# Patient Record
Sex: FEMALE | Race: WHITE | NOT HISPANIC OR LATINO | Employment: OTHER
[De-identification: names, ages, dates, MRNs, and addresses within clinical notes are randomized per-mention and may not be internally consistent; named-entity substitution may affect disease eponyms.]

---

## 2017-01-09 DIAGNOSIS — F41.9 ANXIETY: Primary | ICD-10-CM

## 2017-01-10 RX ORDER — CLONAZEPAM 0.5 MG/1
TABLET ORAL
Qty: 30 TABLET | Refills: 0 | Status: SHIPPED | OUTPATIENT
Start: 2017-01-10 | End: 2017-02-09

## 2017-01-10 NOTE — TELEPHONE ENCOUNTER
Clonazepam 0.5mg tab      Last Written Prescription Date: 12-9-2016  Last Fill Quantity: 30,  # refills: 0   Last Office Visit with G, P or [facility] prescribing provider: 5-

## 2017-02-09 DIAGNOSIS — F41.9 ANXIETY: Primary | ICD-10-CM

## 2017-02-10 RX ORDER — CLONAZEPAM 0.5 MG/1
TABLET ORAL
Qty: 30 TABLET | Refills: 0 | Status: SHIPPED | OUTPATIENT
Start: 2017-02-10 | End: 2017-03-10

## 2017-03-10 DIAGNOSIS — F41.9 ANXIETY: ICD-10-CM

## 2017-03-13 RX ORDER — CLONAZEPAM 0.5 MG/1
TABLET ORAL
Qty: 30 TABLET | Refills: 0 | Status: SHIPPED | OUTPATIENT
Start: 2017-03-13 | End: 2017-07-22

## 2017-04-24 DIAGNOSIS — I10 BENIGN ESSENTIAL HYPERTENSION: ICD-10-CM

## 2017-04-24 DIAGNOSIS — K21.9 ESOPHAGEAL REFLUX: ICD-10-CM

## 2017-04-24 NOTE — TELEPHONE ENCOUNTER
benazepril       Last Written Prescription Date: 2/2/17  Last Fill Quantity: 90, # refills: 0  Last Office Visit with G, [facility] or [facility] prescribing provider: 5/20/16       Potassium   Date Value Ref Range Status   11/13/2013 3.8 3.5 - 5.1 mmol/L Final     Creatinine   Date Value Ref Range Status   11/13/2013 0.89 0.60 - 1.00 mg/dL Final     BP Readings from Last 3 Encounters:   05/20/16 152/80   06/12/15 170/100   02/10/15 140/80

## 2017-04-26 RX ORDER — BENAZEPRIL HYDROCHLORIDE 40 MG/1
40 TABLET ORAL DAILY
Qty: 90 TABLET | Refills: 0 | Status: SHIPPED | OUTPATIENT
Start: 2017-04-26 | End: 2017-07-22

## 2017-04-26 RX ORDER — FAMOTIDINE 20 MG/1
TABLET, FILM COATED ORAL
Qty: 90 TABLET | Refills: 1 | Status: SHIPPED | OUTPATIENT
Start: 2017-04-26 | End: 2017-07-22

## 2017-07-08 ENCOUNTER — HEALTH MAINTENANCE LETTER (OUTPATIENT)
Age: 67
End: 2017-07-08

## 2017-07-22 ENCOUNTER — HOSPITAL ENCOUNTER (EMERGENCY)
Facility: HOSPITAL | Age: 67
Discharge: ANOTHER HOSPITAL | End: 2017-07-23
Attending: EMERGENCY MEDICINE | Admitting: EMERGENCY MEDICINE
Payer: MEDICARE

## 2017-07-22 DIAGNOSIS — R06.02 SHORTNESS OF BREATH: ICD-10-CM

## 2017-07-22 DIAGNOSIS — R79.89 ELEVATED TROPONIN: ICD-10-CM

## 2017-07-22 DIAGNOSIS — J18.9 PNEUMONIA OF LEFT LOWER LOBE DUE TO INFECTIOUS ORGANISM: ICD-10-CM

## 2017-07-22 DIAGNOSIS — J81.0 ACUTE EDEMA OF LUNG (H): Primary | ICD-10-CM

## 2017-07-22 DIAGNOSIS — E87.6 HYPOKALEMIA: ICD-10-CM

## 2017-07-22 LAB
BASE EXCESS BLDA CALC-SCNC: 0 MMOL/L
BASOPHILS # BLD AUTO: 0.1 10E9/L (ref 0–0.2)
BASOPHILS NFR BLD AUTO: 0.5 %
DIFFERENTIAL METHOD BLD: ABNORMAL
EOSINOPHIL # BLD AUTO: 0.4 10E9/L (ref 0–0.7)
EOSINOPHIL NFR BLD AUTO: 2.4 %
ERYTHROCYTE [DISTWIDTH] IN BLOOD BY AUTOMATED COUNT: 12.7 % (ref 10–15)
HCO3 BLD-SCNC: 25 MMOL/L (ref 21–28)
HCT VFR BLD AUTO: 42.7 % (ref 35–47)
HGB BLD-MCNC: 14.4 G/DL (ref 11.7–15.7)
IMM GRANULOCYTES # BLD: 0.1 10E9/L (ref 0–0.4)
IMM GRANULOCYTES NFR BLD: 0.8 %
LYMPHOCYTES # BLD AUTO: 1.8 10E9/L (ref 0.8–5.3)
LYMPHOCYTES NFR BLD AUTO: 10.4 %
MCH RBC QN AUTO: 30.1 PG (ref 26.5–33)
MCHC RBC AUTO-ENTMCNC: 33.7 G/DL (ref 31.5–36.5)
MCV RBC AUTO: 89 FL (ref 78–100)
MONOCYTES # BLD AUTO: 0.9 10E9/L (ref 0–1.3)
MONOCYTES NFR BLD AUTO: 5 %
NEUTROPHILS # BLD AUTO: 14.2 10E9/L (ref 1.6–8.3)
NEUTROPHILS NFR BLD AUTO: 80.9 %
NRBC # BLD AUTO: 0 10*3/UL
NRBC BLD AUTO-RTO: 0 /100
O2/TOTAL GAS SETTING VFR VENT: 44 %
OXYHGB MFR BLD: 92 % (ref 92–100)
PCO2 BLD: 44 MM HG (ref 35–45)
PH BLD: 7.37 PH (ref 7.35–7.45)
PLATELET # BLD AUTO: 203 10E9/L (ref 150–450)
PO2 BLD: 66 MM HG (ref 80–105)
RBC # BLD AUTO: 4.78 10E12/L (ref 3.8–5.2)
WBC # BLD AUTO: 17.5 10E9/L (ref 4–11)

## 2017-07-22 PROCEDURE — 85025 COMPLETE CBC W/AUTO DIFF WBC: CPT | Performed by: EMERGENCY MEDICINE

## 2017-07-22 PROCEDURE — 96375 TX/PRO/DX INJ NEW DRUG ADDON: CPT

## 2017-07-22 PROCEDURE — 80053 COMPREHEN METABOLIC PANEL: CPT | Performed by: EMERGENCY MEDICINE

## 2017-07-22 PROCEDURE — 36600 WITHDRAWAL OF ARTERIAL BLOOD: CPT

## 2017-07-22 PROCEDURE — 25000125 ZZHC RX 250: Performed by: EMERGENCY MEDICINE

## 2017-07-22 PROCEDURE — 82805 BLOOD GASES W/O2 SATURATION: CPT | Performed by: EMERGENCY MEDICINE

## 2017-07-22 PROCEDURE — 83880 ASSAY OF NATRIURETIC PEPTIDE: CPT | Performed by: EMERGENCY MEDICINE

## 2017-07-22 PROCEDURE — 25000128 H RX IP 250 OP 636: Performed by: EMERGENCY MEDICINE

## 2017-07-22 PROCEDURE — 84484 ASSAY OF TROPONIN QUANT: CPT | Performed by: EMERGENCY MEDICINE

## 2017-07-22 PROCEDURE — 85379 FIBRIN DEGRADATION QUANT: CPT | Performed by: EMERGENCY MEDICINE

## 2017-07-22 PROCEDURE — 94640 AIRWAY INHALATION TREATMENT: CPT

## 2017-07-22 PROCEDURE — 93010 ELECTROCARDIOGRAM REPORT: CPT | Performed by: INTERNAL MEDICINE

## 2017-07-22 PROCEDURE — 99285 EMERGENCY DEPT VISIT HI MDM: CPT | Performed by: EMERGENCY MEDICINE

## 2017-07-22 PROCEDURE — 99285 EMERGENCY DEPT VISIT HI MDM: CPT | Mod: 25

## 2017-07-22 PROCEDURE — 85610 PROTHROMBIN TIME: CPT | Performed by: EMERGENCY MEDICINE

## 2017-07-22 PROCEDURE — 36415 COLL VENOUS BLD VENIPUNCTURE: CPT | Performed by: EMERGENCY MEDICINE

## 2017-07-22 PROCEDURE — 83605 ASSAY OF LACTIC ACID: CPT | Performed by: EMERGENCY MEDICINE

## 2017-07-22 PROCEDURE — 93005 ELECTROCARDIOGRAM TRACING: CPT

## 2017-07-22 RX ORDER — LORAZEPAM 2 MG/ML
1 INJECTION INTRAMUSCULAR ONCE
Status: COMPLETED | OUTPATIENT
Start: 2017-07-22 | End: 2017-07-22

## 2017-07-22 RX ORDER — LORAZEPAM 1 MG/1
1 TABLET ORAL ONCE
Status: DISCONTINUED | OUTPATIENT
Start: 2017-07-22 | End: 2017-07-22

## 2017-07-22 RX ORDER — IPRATROPIUM BROMIDE AND ALBUTEROL SULFATE 2.5; .5 MG/3ML; MG/3ML
3 SOLUTION RESPIRATORY (INHALATION) ONCE
Status: COMPLETED | OUTPATIENT
Start: 2017-07-22 | End: 2017-07-22

## 2017-07-22 RX ORDER — HYDRALAZINE HYDROCHLORIDE 20 MG/ML
20 INJECTION INTRAMUSCULAR; INTRAVENOUS ONCE
Status: DISCONTINUED | OUTPATIENT
Start: 2017-07-22 | End: 2017-07-23 | Stop reason: HOSPADM

## 2017-07-22 RX ORDER — SODIUM CHLORIDE 9 MG/ML
INJECTION, SOLUTION INTRAVENOUS CONTINUOUS
Status: DISCONTINUED | OUTPATIENT
Start: 2017-07-22 | End: 2017-07-23 | Stop reason: HOSPADM

## 2017-07-22 RX ADMIN — LORAZEPAM 1 MG: 2 INJECTION INTRAMUSCULAR at 23:29

## 2017-07-22 RX ADMIN — IPRATROPIUM BROMIDE AND ALBUTEROL SULFATE 3 ML: .5; 3 SOLUTION RESPIRATORY (INHALATION) at 23:49

## 2017-07-22 RX ADMIN — SODIUM CHLORIDE: 9 INJECTION, SOLUTION INTRAVENOUS at 23:33

## 2017-07-22 ASSESSMENT — ENCOUNTER SYMPTOMS
HEMOPTYSIS: 0
ABDOMINAL PAIN: 0
FEVER: 0
HEADACHES: 0
WHEEZING: 1
SHORTNESS OF BREATH: 1
VOMITING: 0
DIAPHORESIS: 1
SPUTUM PRODUCTION: 0

## 2017-07-23 ENCOUNTER — TRANSFERRED RECORDS (OUTPATIENT)
Dept: HEALTH INFORMATION MANAGEMENT | Facility: HOSPITAL | Age: 67
End: 2017-07-23

## 2017-07-23 VITALS
TEMPERATURE: 97 F | RESPIRATION RATE: 20 BRPM | OXYGEN SATURATION: 95 % | SYSTOLIC BLOOD PRESSURE: 158 MMHG | BODY MASS INDEX: 34.88 KG/M2 | WEIGHT: 173 LBS | HEART RATE: 117 BPM | HEIGHT: 59 IN | DIASTOLIC BLOOD PRESSURE: 91 MMHG

## 2017-07-23 LAB
ALBUMIN SERPL-MCNC: 3.1 G/DL (ref 3.4–5)
ALP SERPL-CCNC: 110 U/L (ref 40–150)
ALT SERPL W P-5'-P-CCNC: 54 U/L (ref 0–50)
ANION GAP SERPL CALCULATED.3IONS-SCNC: 9 MMOL/L (ref 3–14)
AST SERPL W P-5'-P-CCNC: 53 U/L (ref 0–45)
BILIRUB SERPL-MCNC: 0.2 MG/DL (ref 0.2–1.3)
BUN SERPL-MCNC: 8 MG/DL (ref 7–30)
CALCIUM SERPL-MCNC: 8.3 MG/DL (ref 8.5–10.1)
CHLORIDE SERPL-SCNC: 106 MMOL/L (ref 94–109)
CO2 SERPL-SCNC: 26 MMOL/L (ref 20–32)
CREAT SERPL-MCNC: 0.7 MG/DL (ref 0.52–1.04)
D DIMER PPP DDU-MCNC: 292 NG/ML D-DU (ref 0–300)
GFR SERPL CREATININE-BSD FRML MDRD: 85 ML/MIN/1.7M2
GLUCOSE SERPL-MCNC: 150 MG/DL (ref 70–99)
INR PPP: 0.98 (ref 0.8–1.2)
LACTATE SERPL-SCNC: 2.2 MMOL/L (ref 0.4–2)
NT-PROBNP SERPL-MCNC: 472 PG/ML (ref 0–900)
POTASSIUM SERPL-SCNC: 3 MMOL/L (ref 3.4–5.3)
PROT SERPL-MCNC: 6.4 G/DL (ref 6.8–8.8)
SODIUM SERPL-SCNC: 141 MMOL/L (ref 133–144)
TROPONIN I SERPL-MCNC: 0.06 UG/L (ref 0–0.04)

## 2017-07-23 PROCEDURE — 36415 COLL VENOUS BLD VENIPUNCTURE: CPT | Performed by: EMERGENCY MEDICINE

## 2017-07-23 PROCEDURE — 25000128 H RX IP 250 OP 636: Performed by: EMERGENCY MEDICINE

## 2017-07-23 PROCEDURE — 40000275 ZZH STATISTIC RCP TIME EA 10 MIN

## 2017-07-23 PROCEDURE — 87040 BLOOD CULTURE FOR BACTERIA: CPT | Performed by: EMERGENCY MEDICINE

## 2017-07-23 PROCEDURE — 25000125 ZZHC RX 250: Performed by: EMERGENCY MEDICINE

## 2017-07-23 PROCEDURE — 96372 THER/PROPH/DIAG INJ SC/IM: CPT | Mod: XS

## 2017-07-23 PROCEDURE — 96375 TX/PRO/DX INJ NEW DRUG ADDON: CPT

## 2017-07-23 PROCEDURE — 94660 CPAP INITIATION&MGMT: CPT

## 2017-07-23 PROCEDURE — 25000128 H RX IP 250 OP 636

## 2017-07-23 PROCEDURE — 96365 THER/PROPH/DIAG IV INF INIT: CPT | Mod: XS

## 2017-07-23 PROCEDURE — 96368 THER/DIAG CONCURRENT INF: CPT

## 2017-07-23 PROCEDURE — 71020 ZZHC CHEST TWO VIEWS, FRONT/LAT: CPT | Mod: TC

## 2017-07-23 RX ORDER — HEPARIN SODIUM 5000 [USP'U]/.5ML
5000 INJECTION, SOLUTION INTRAVENOUS; SUBCUTANEOUS ONCE
Status: COMPLETED | OUTPATIENT
Start: 2017-07-23 | End: 2017-07-23

## 2017-07-23 RX ORDER — FUROSEMIDE 10 MG/ML
20 INJECTION INTRAMUSCULAR; INTRAVENOUS ONCE
Status: COMPLETED | OUTPATIENT
Start: 2017-07-23 | End: 2017-07-23

## 2017-07-23 RX ORDER — POTASSIUM CL/LIDO/0.9 % NACL 10MEQ/0.1L
10 INTRAVENOUS SOLUTION, PIGGYBACK (ML) INTRAVENOUS
Status: DISCONTINUED | OUTPATIENT
Start: 2017-07-23 | End: 2017-07-23 | Stop reason: HOSPADM

## 2017-07-23 RX ORDER — NITROGLYCERIN 20 MG/100ML
.07-2 INJECTION INTRAVENOUS CONTINUOUS
Status: DISCONTINUED | OUTPATIENT
Start: 2017-07-23 | End: 2017-07-23 | Stop reason: HOSPADM

## 2017-07-23 RX ORDER — LEVOFLOXACIN 5 MG/ML
500 INJECTION, SOLUTION INTRAVENOUS ONCE
Status: COMPLETED | OUTPATIENT
Start: 2017-07-23 | End: 2017-07-23

## 2017-07-23 RX ORDER — NITROGLYCERIN 20 MG/100ML
INJECTION INTRAVENOUS
Status: COMPLETED
Start: 2017-07-23 | End: 2017-07-23

## 2017-07-23 RX ADMIN — NITROGLYCERIN 0.07 MCG/KG/MIN: 20 INJECTION INTRAVENOUS at 00:52

## 2017-07-23 RX ADMIN — LEVOFLOXACIN 500 MG: 5 INJECTION, SOLUTION INTRAVENOUS at 00:33

## 2017-07-23 RX ADMIN — POTASSIUM CHLORIDE 10 MEQ: 14.9 INJECTION, SOLUTION, CONCENTRATE PARENTERAL at 00:52

## 2017-07-23 RX ADMIN — FUROSEMIDE 20 MG: 10 INJECTION, SOLUTION INTRAVENOUS at 00:25

## 2017-07-23 RX ADMIN — HEPARIN SODIUM 5000 UNITS: 10000 INJECTION, SOLUTION INTRAVENOUS; SUBCUTANEOUS at 00:57

## 2017-07-23 NOTE — ED NOTES
Per Dr. Palencia hold hydralazine at this time as BP is currently 169/89. Pt remains on q15min BP checks. Pt resting comfortably, breathing has slowed and is unlabored.

## 2017-07-23 NOTE — PROGRESS NOTES
Placed pt on Bipap 12/7 and FiO2 to keep SpO2 >92% per Dr Palencia. SpO2 on 30% was 96%. Pt was told to be NPO while on BiPAP.

## 2017-07-23 NOTE — ED NOTES
DATE:  7/23/2017   TIME OF RECEIPT FROM LAB:  0033  LAB TEST:  Lactic acid  LAB VALUE:  2.2  RESULTS GIVEN WITH READ-BACK TO (PROVIDER):  Doni Palencia MD  TIME LAB VALUE REPORTED TO PROVIDER:   0037    Significant value reported to MD

## 2017-07-23 NOTE — ED PROVIDER NOTES
History     Chief Complaint   Patient presents with     Shortness of Breath     quit smoking today, sudden onset shortness of breath and wheezing     Patient is a 66 year old female presenting with shortness of breath. The history is provided by the patient.   Shortness of Breath   Severity:  Moderate  Timing:  Constant  Progression:  Worsening  Chronicity:  New  Context comment:  Quit smoking earlier today  Relieved by:  Nothing  Worsened by:  Nothing  Ineffective treatments:  None tried  Associated symptoms: diaphoresis and wheezing    Associated symptoms: no abdominal pain, no chest pain, no fever, no headaches, no hemoptysis, no sputum production and no vomiting      Heather Rosas is a 66 year old female who presents to the emergency department with shortness of breath.  Patient quit smoking earlier today.  He moved into new apartment recently and the apartment floor is carpeted.  Started feeling short of breath and palpitations this evening.  Progressively worsened.  Denies any chest pain.  Patient reports similar episode of shortness of breath when she tried to quit smoking a few years ago.  Does not use inhalers.  No recent URI symptoms.  Denies any fever.  No cough or sputum production.    I have reviewed the Medications, Allergies, Past Medical and Surgical History, and Social History in the Epic system.    Allergies:   Allergies   Allergen Reactions     Cats Other (See Comments)     Sneezing, runny nose     Codeine Sulfate Nausea and Vomiting and GI Disturbance     Fexofenadine Hcl      Allegra      Rosuvastatin Other (See Comments)     Dizziness - Crestor      [place] Oil GI Disturbance     Any pink fish         No current facility-administered medications on file prior to encounter.   Current Outpatient Prescriptions on File Prior to Encounter:  nicotine (NICODERM CQ) 21 MG/24HR patch 2h hr Place 1 patch onto the skin every 24 hours       Patient Active Problem List   Diagnosis     Dyslipidemia      "Fibromyalgia     HTN (hypertension)     Major depressive disorder, recurrent episode, moderate (H)     ANNA (generalized anxiety disorder)     GERD (Gastroesophageal reflux disease)     Obesity (H)     Schizophrenia (H)     Seasonal allergic rhinitis     ACP (advance care planning)       Past Surgical History:   Procedure Laterality Date     ANGIOGRAM  02/2005    Normal      BIOPSY BREAST  1984    LT, normal     CARPAL TUNNEL RELEASE RT/LT  2005    RT, carpal tunnel     COLONOSCOPY  2011    Repeat in ten years      COLONOSCOPY  1996     COLONOSCOPY  2004     placement of central line  2005       Social History   Substance Use Topics     Smoking status: Former Smoker     Packs/day: 0.50     Types: Cigarettes     Quit date: 12/1/2013     Smokeless tobacco: Never Used      Comment: Year Quit: 2011     Alcohol use Yes      Comment: rare       Most Recent Immunizations   Administered Date(s) Administered     Influenza (H1N1) 12/29/2009     Influenza (IIV3) 09/26/2012     Influenza Vaccine IM 3yrs+ 4 Valent IIV4 11/12/2013     TD (ADULT, 7+) 07/22/2004     Yellow Fever 09/28/2012       BMI: Estimated body mass index is 34.94 kg/(m^2) as calculated from the following:    Height as of this encounter: 1.499 m (4' 11\").    Weight as of this encounter: 78.5 kg (173 lb).        Review of Systems   Constitutional: Positive for diaphoresis. Negative for fever.   Respiratory: Positive for shortness of breath and wheezing. Negative for hemoptysis and sputum production.    Cardiovascular: Negative for chest pain.   Gastrointestinal: Negative for abdominal pain and vomiting.   Neurological: Negative for headaches.   All other systems reviewed and are negative.      Physical Exam   BP: (!) 208/117  Pulse: 117  Temp: 98.8  F (37.1  C)  Resp: (!) 30  SpO2: (!) 86 %  Physical Exam   Constitutional: She appears well-developed and well-nourished. She appears distressed.   HENT:   Head: Normocephalic and atraumatic.   Mouth/Throat: " Oropharynx is clear and moist. No oropharyngeal exudate.   Eyes: Pupils are equal, round, and reactive to light. No scleral icterus.   Neck: Neck supple.   Cardiovascular: Regular rhythm, normal heart sounds and intact distal pulses.    Pulmonary/Chest: She is in respiratory distress. She has wheezes.   Abdominal: Soft. Bowel sounds are normal. There is no tenderness. There is no rebound.   Musculoskeletal: She exhibits no edema or tenderness.   Skin: Skin is warm. No rash noted. She is not diaphoretic.   Nursing note and vitals reviewed.      ED Course   Patient evaluated and started on DuoNeb nebs, Ativan and hydralazine one dose given for elevated blood pressure.  Continuous oxygen on nonrebreather mask.  Laboratory tests ordered and chest x-ray.  12:24-chest x-ray shows bilateral pulmonary vascular congestion and LLL consolidation.  Lasix 20 mg IV given.  CBC shows leukocytosis with WBC count of 17.  Arterial blood gases shows hypoxemia with SPO2 of 66%.  CMP shows mild hypokalemia with potassium of 3.0.  Troponin is elevated to 0.056  Started on nitroglycerin drip.  Heparin bolus given.  IV potassium replacement started.  Started on BiPAP.    ED Course     Procedures         Labs Ordered and Resulted from Time of ED Arrival Up to the Time of Departure from the ED   BLOOD GAS ARTERIAL AND OXYHGB - Abnormal; Notable for the following:        Result Value    pO2 Arterial 66 (*)     All other components within normal limits   CBC WITH PLATELETS DIFFERENTIAL - Abnormal; Notable for the following:     WBC 17.5 (*)     Absolute Neutrophil 14.2 (*)     All other components within normal limits   COMPREHENSIVE METABOLIC PANEL - Abnormal; Notable for the following:     Potassium 3.0 (*)     Glucose 150 (*)     Calcium 8.3 (*)     Albumin 3.1 (*)     Protein Total 6.4 (*)     ALT 54 (*)     AST 53 (*)     All other components within normal limits   TROPONIN I - Abnormal; Notable for the following:     Troponin I ES 0.056  (*)     All other components within normal limits   LACTIC ACID - Abnormal; Notable for the following:     Lactic Acid 2.2 (*)     All other components within normal limits   D-DIMER (FV RANGE)   INR   NT PROBNP INPATIENT   VITAL SIGNS   PULSE OXIMETRY NURSING   CARDIAC CONTINUOUS MONITORING   PERIPHERAL IV CATHETER   STRICT INTAKE AND OUTPUT   BLOOD CULTURE   BLOOD CULTURE       Assessments & Plan (with Medical Decision Making)   Pulmonary edema: Started on nitroglycerin drip and IV Lasix.  Continue BiPAP.  Left lower lobe pneumonia: Started on IV Levaquin and oxygen  Elevated troponin: Given heparin, oxygen and aspirin.  We will continue serial troponin.  Patient transferred to [facility] in [place].  Hypokalemia: Started on IV potassium replacement.  Hypertension: Improved after being started on IV nitroglycerin and Lasix IV.  Patient transferred to [facility] in [place] under care of Dr. Carr    I have reviewed the nursing notes.    I have reviewed the findings, diagnosis, plan and need for follow up with the patient.    Discharge Medication List as of 7/23/2017  2:05 AM          Final diagnoses:   Shortness of breath   Elevated troponin   Pneumonia of left lower lobe due to infectious organism (H)   Acute edema of lung (H)   Hypokalemia       7/22/2017   [facility]     Doni Palencia MD  07/23/17 0431

## 2017-07-23 NOTE — ED NOTES
"Pt notes that she has not seen her PCP, Dr. Avila, in 1 year. Pt states that she \"gradually\" took herself off all her medications and the only med she is using a nicotine patch.  "

## 2017-07-23 NOTE — ED NOTES
"Pt arrives via [facility]. Pt states that she quit smoking today and started using a nicotine patch. Pt states that she had been smoking \"three-quarters\" pack/day. Pt states that she started to feel short of breath and then \"fell asleep\" and then pt woke and couldn't breath. Pt was able to walk down 3 flights of stairs and yelled out for someone to call 911. EMS started an IV and gave pt a duo neb en route as pt's sats were mid 80's upon their arrival. Pt is noticeably dyspneic upon arrival and O2 sats on RA were 84%. Pt was immediately placed on O2 via NC at 2L and was eventually titrated up to 6L before being placed on a NRB at 15L.     "

## 2017-07-23 NOTE — ED NOTES
Family at bedside. Pt's apartment keys given to son and daughter-in-law. Rest of belongings with pt.

## 2017-07-27 ENCOUNTER — TELEPHONE (OUTPATIENT)
Dept: FAMILY MEDICINE | Facility: OTHER | Age: 67
End: 2017-07-27

## 2017-07-27 NOTE — TELEPHONE ENCOUNTER
7:47 AM    Reason for Call: OVERBOOK    Patient is having the following symptoms: Hospital follow up ([facility])/high BP/breathing issues for 6 days.    The patient is requesting an appointment for Friday with Dr Avila (pt has new meds that she will be out of).    Was an appointment offered for this call? Yes    Preferred method for responding to this message: Telephone Call   [phone]    If we cannot reach you directly, may we leave a detailed response at the number you provided? No, pt does not have answering machine    Can this message wait until your PCP/provider returns, if unavailable today? Yes, pt aware provider out today    Britta Morales

## 2017-07-28 NOTE — TELEPHONE ENCOUNTER
Please schedule patient for date/time: can see today at 3:20 today    Have patient go to ER/Urgent Care Center. Urgent Care hours are 9:30 am to 8 pm, open 7 days a week. No.    Provider will call patient.No.    Other:

## 2017-07-29 LAB
BACTERIA SPEC CULT: NORMAL
BACTERIA SPEC CULT: NORMAL
Lab: NORMAL
Lab: NORMAL
MICRO REPORT STATUS: NORMAL
MICRO REPORT STATUS: NORMAL
SPECIMEN SOURCE: NORMAL
SPECIMEN SOURCE: NORMAL

## 2018-02-18 ENCOUNTER — ANESTHESIA (OUTPATIENT)
Dept: SURGERY | Facility: HOSPITAL | Age: 68
End: 2018-02-18
Payer: MEDICARE

## 2018-02-18 ENCOUNTER — HOSPITAL ENCOUNTER (OUTPATIENT)
Facility: HOSPITAL | Age: 68
Setting detail: OBSERVATION
Discharge: HOME | End: 2018-02-20
Attending: FAMILY MEDICINE | Admitting: OBSTETRICS & GYNECOLOGY
Payer: MEDICARE

## 2018-02-18 ENCOUNTER — APPOINTMENT (OUTPATIENT)
Dept: ULTRASOUND IMAGING | Facility: HOSPITAL | Age: 68
End: 2018-02-18
Attending: FAMILY MEDICINE
Payer: MEDICARE

## 2018-02-18 ENCOUNTER — ANESTHESIA EVENT (OUTPATIENT)
Dept: SURGERY | Facility: HOSPITAL | Age: 68
End: 2018-02-18
Payer: MEDICARE

## 2018-02-18 ENCOUNTER — APPOINTMENT (OUTPATIENT)
Dept: GENERAL RADIOLOGY | Facility: HOSPITAL | Age: 68
End: 2018-02-18
Attending: OBSTETRICS & GYNECOLOGY
Payer: MEDICARE

## 2018-02-18 DIAGNOSIS — N93.9 UTERINE BLEEDING: ICD-10-CM

## 2018-02-18 DIAGNOSIS — G89.18 POST-OP PAIN: Primary | ICD-10-CM

## 2018-02-18 DIAGNOSIS — I25.119 CORONARY ARTERY DISEASE INVOLVING NATIVE HEART WITH ANGINA PECTORIS, UNSPECIFIED VESSEL OR LESION TYPE (H): ICD-10-CM

## 2018-02-18 DIAGNOSIS — I10 BENIGN ESSENTIAL HYPERTENSION: ICD-10-CM

## 2018-02-18 PROBLEM — N92.1 METRORRHAGIA: Status: ACTIVE | Noted: 2018-02-18

## 2018-02-18 LAB
ABO + RH BLD: NORMAL
ABO + RH BLD: NORMAL
ALBUMIN SERPL-MCNC: 2.6 G/DL (ref 3.4–5)
ALP SERPL-CCNC: 80 U/L (ref 40–150)
ALT SERPL W P-5'-P-CCNC: 20 U/L (ref 0–50)
ANION GAP SERPL CALCULATED.3IONS-SCNC: 8 MMOL/L (ref 3–14)
AST SERPL W P-5'-P-CCNC: 17 U/L (ref 0–45)
BASOPHILS # BLD AUTO: 0.1 10E9/L (ref 0–0.2)
BASOPHILS NFR BLD AUTO: 0.4 %
BILIRUB SERPL-MCNC: 0.2 MG/DL (ref 0.2–1.3)
BLD PROD TYP BPU: NORMAL
BLD UNIT ID BPU: 0
BLOOD PRODUCT CODE: NORMAL
BPU ID: NORMAL
BUN SERPL-MCNC: 10 MG/DL (ref 7–30)
CALCIUM SERPL-MCNC: 7.9 MG/DL (ref 8.5–10.1)
CHLORIDE SERPL-SCNC: 104 MMOL/L (ref 94–109)
CK SERPL-CCNC: 100 U/L (ref 30–225)
CK SERPL-CCNC: 107 U/L (ref 30–225)
CK SERPL-CCNC: 218 U/L (ref 30–225)
CO2 SERPL-SCNC: 26 MMOL/L (ref 20–32)
CREAT SERPL-MCNC: 0.74 MG/DL (ref 0.52–1.04)
DIFFERENTIAL METHOD BLD: ABNORMAL
EOSINOPHIL # BLD AUTO: 0.1 10E9/L (ref 0–0.7)
EOSINOPHIL NFR BLD AUTO: 0.9 %
ERYTHROCYTE [DISTWIDTH] IN BLOOD BY AUTOMATED COUNT: 14.3 % (ref 10–15)
GFR SERPL CREATININE-BSD FRML MDRD: 79 ML/MIN/1.7M2
GLUCOSE SERPL-MCNC: 158 MG/DL (ref 70–99)
HCT VFR BLD AUTO: 18.9 % (ref 35–47)
HCT VFR BLD AUTO: 27.8 % (ref 35–47)
HGB BLD-MCNC: 6.5 G/DL (ref 11.7–15.7)
HGB BLD-MCNC: 8.8 G/DL (ref 11.7–15.7)
IMM GRANULOCYTES # BLD: 0.1 10E9/L (ref 0–0.4)
IMM GRANULOCYTES NFR BLD: 0.6 %
LYMPHOCYTES # BLD AUTO: 1.8 10E9/L (ref 0.8–5.3)
LYMPHOCYTES NFR BLD AUTO: 14.9 %
MCH RBC QN AUTO: 29.7 PG (ref 26.5–33)
MCHC RBC AUTO-ENTMCNC: 34.4 G/DL (ref 31.5–36.5)
MCV RBC AUTO: 86 FL (ref 78–100)
MONOCYTES # BLD AUTO: 0.8 10E9/L (ref 0–1.3)
MONOCYTES NFR BLD AUTO: 6.9 %
NEUTROPHILS # BLD AUTO: 9.1 10E9/L (ref 1.6–8.3)
NEUTROPHILS NFR BLD AUTO: 76.3 %
NRBC # BLD AUTO: 0 10*3/UL
NRBC BLD AUTO-RTO: 0 /100
PLATELET # BLD AUTO: 200 10E9/L (ref 150–450)
POTASSIUM SERPL-SCNC: 3.5 MMOL/L (ref 3.4–5.3)
PROT SERPL-MCNC: 5.3 G/DL (ref 6.8–8.8)
RBC # BLD AUTO: 2.19 10E12/L (ref 3.8–5.2)
SODIUM SERPL-SCNC: 138 MMOL/L (ref 133–144)
SPECIMEN EXP DATE BLD: NORMAL
TRANSFUSION STATUS PATIENT QL: NORMAL
TROPONIN I SERPL-MCNC: 0.4 UG/L (ref 0–0.04)
TROPONIN I SERPL-MCNC: 0.42 UG/L (ref 0–0.04)
TROPONIN I SERPL-MCNC: 1 UG/L (ref 0–0.04)
TROPONIN I SERPL-MCNC: 3.58 UG/L (ref 0–0.04)
WBC # BLD AUTO: 11.9 10E9/L (ref 4–11)

## 2018-02-18 PROCEDURE — 36430 TRANSFUSION BLD/BLD COMPNT: CPT

## 2018-02-18 PROCEDURE — 85025 COMPLETE CBC W/AUTO DIFF WBC: CPT | Performed by: FAMILY MEDICINE

## 2018-02-18 PROCEDURE — G0378 HOSPITAL OBSERVATION PER HR: HCPCS

## 2018-02-18 PROCEDURE — 85018 HEMOGLOBIN: CPT | Mod: 91 | Performed by: INTERNAL MEDICINE

## 2018-02-18 PROCEDURE — 25000132 ZZH RX MED GY IP 250 OP 250 PS 637: Mod: GY | Performed by: INTERNAL MEDICINE

## 2018-02-18 PROCEDURE — 93005 ELECTROCARDIOGRAM TRACING: CPT

## 2018-02-18 PROCEDURE — 82550 ASSAY OF CK (CPK): CPT | Performed by: OBSTETRICS & GYNECOLOGY

## 2018-02-18 PROCEDURE — 99220 ZZC INITIAL OBSERVATION CARE,LEVL III: CPT | Mod: 57 | Performed by: OBSTETRICS & GYNECOLOGY

## 2018-02-18 PROCEDURE — 25000125 ZZHC RX 250: Performed by: INTERNAL MEDICINE

## 2018-02-18 PROCEDURE — 84484 ASSAY OF TROPONIN QUANT: CPT | Performed by: OBSTETRICS & GYNECOLOGY

## 2018-02-18 PROCEDURE — 80053 COMPREHEN METABOLIC PANEL: CPT | Performed by: FAMILY MEDICINE

## 2018-02-18 PROCEDURE — 93010 ELECTROCARDIOGRAM REPORT: CPT | Performed by: INTERNAL MEDICINE

## 2018-02-18 PROCEDURE — 71045 X-RAY EXAM CHEST 1 VIEW: CPT | Mod: TC

## 2018-02-18 PROCEDURE — 99285 EMERGENCY DEPT VISIT HI MDM: CPT | Performed by: FAMILY MEDICINE

## 2018-02-18 PROCEDURE — 82550 ASSAY OF CK (CPK): CPT | Performed by: INTERNAL MEDICINE

## 2018-02-18 PROCEDURE — 36415 COLL VENOUS BLD VENIPUNCTURE: CPT | Performed by: INTERNAL MEDICINE

## 2018-02-18 PROCEDURE — 94640 AIRWAY INHALATION TREATMENT: CPT

## 2018-02-18 PROCEDURE — 99203 OFFICE O/P NEW LOW 30 MIN: CPT | Performed by: INTERNAL MEDICINE

## 2018-02-18 PROCEDURE — P9016 RBC LEUKOCYTES REDUCED: HCPCS | Performed by: FAMILY MEDICINE

## 2018-02-18 PROCEDURE — 86920 COMPATIBILITY TEST SPIN: CPT | Performed by: FAMILY MEDICINE

## 2018-02-18 PROCEDURE — 84484 ASSAY OF TROPONIN QUANT: CPT | Performed by: INTERNAL MEDICINE

## 2018-02-18 PROCEDURE — A9270 NON-COVERED ITEM OR SERVICE: HCPCS | Mod: GY | Performed by: INTERNAL MEDICINE

## 2018-02-18 PROCEDURE — 86900 BLOOD TYPING SEROLOGIC ABO: CPT | Performed by: FAMILY MEDICINE

## 2018-02-18 PROCEDURE — 25000128 H RX IP 250 OP 636: Performed by: INTERNAL MEDICINE

## 2018-02-18 PROCEDURE — 76856 US EXAM PELVIC COMPLETE: CPT | Mod: TC

## 2018-02-18 PROCEDURE — 36415 COLL VENOUS BLD VENIPUNCTURE: CPT | Performed by: OBSTETRICS & GYNECOLOGY

## 2018-02-18 PROCEDURE — 86850 RBC ANTIBODY SCREEN: CPT | Performed by: FAMILY MEDICINE

## 2018-02-18 PROCEDURE — 84484 ASSAY OF TROPONIN QUANT: CPT | Mod: 91 | Performed by: INTERNAL MEDICINE

## 2018-02-18 PROCEDURE — 36416 COLLJ CAPILLARY BLOOD SPEC: CPT | Performed by: INTERNAL MEDICINE

## 2018-02-18 PROCEDURE — 40000275 ZZH STATISTIC RCP TIME EA 10 MIN

## 2018-02-18 PROCEDURE — 99285 EMERGENCY DEPT VISIT HI MDM: CPT | Mod: 25

## 2018-02-18 PROCEDURE — 86901 BLOOD TYPING SEROLOGIC RH(D): CPT | Performed by: FAMILY MEDICINE

## 2018-02-18 PROCEDURE — 85014 HEMATOCRIT: CPT | Mod: 91 | Performed by: INTERNAL MEDICINE

## 2018-02-18 PROCEDURE — 36415 COLL VENOUS BLD VENIPUNCTURE: CPT | Performed by: FAMILY MEDICINE

## 2018-02-18 PROCEDURE — 25000128 H RX IP 250 OP 636: Performed by: FAMILY MEDICINE

## 2018-02-18 RX ORDER — IPRATROPIUM BROMIDE AND ALBUTEROL SULFATE 2.5; .5 MG/3ML; MG/3ML
3 SOLUTION RESPIRATORY (INHALATION) EVERY 4 HOURS PRN
Status: DISCONTINUED | OUTPATIENT
Start: 2018-02-18 | End: 2018-02-20 | Stop reason: HOSPADM

## 2018-02-18 RX ORDER — SODIUM CHLORIDE 9 MG/ML
1000 INJECTION, SOLUTION INTRAVENOUS CONTINUOUS
Status: DISCONTINUED | OUTPATIENT
Start: 2018-02-18 | End: 2018-02-19

## 2018-02-18 RX ORDER — LORATADINE 10 MG/1
10 TABLET ORAL DAILY
Status: DISCONTINUED | OUTPATIENT
Start: 2018-02-18 | End: 2018-02-20 | Stop reason: HOSPADM

## 2018-02-18 RX ORDER — FUROSEMIDE 10 MG/ML
20 INJECTION INTRAMUSCULAR; INTRAVENOUS ONCE
Status: COMPLETED | OUTPATIENT
Start: 2018-02-18 | End: 2018-02-18

## 2018-02-18 RX ORDER — GUAIFENESIN 600 MG/1
1200 TABLET, EXTENDED RELEASE ORAL 2 TIMES DAILY
Status: DISCONTINUED | OUTPATIENT
Start: 2018-02-18 | End: 2018-02-20 | Stop reason: HOSPADM

## 2018-02-18 RX ORDER — ACETAMINOPHEN 325 MG/1
650 TABLET ORAL ONCE
Status: COMPLETED | OUTPATIENT
Start: 2018-02-18 | End: 2018-02-18

## 2018-02-18 RX ORDER — FLUTICASONE PROPIONATE 50 MCG
1 SPRAY, SUSPENSION (ML) NASAL DAILY
Status: DISCONTINUED | OUTPATIENT
Start: 2018-02-18 | End: 2018-02-20 | Stop reason: HOSPADM

## 2018-02-18 RX ADMIN — FUROSEMIDE 20 MG: 10 INJECTION, SOLUTION INTRAVENOUS at 20:43

## 2018-02-18 RX ADMIN — ACETAMINOPHEN 650 MG: 325 TABLET, FILM COATED ORAL at 21:47

## 2018-02-18 RX ADMIN — IPRATROPIUM BROMIDE AND ALBUTEROL SULFATE 3 ML: .5; 3 SOLUTION RESPIRATORY (INHALATION) at 17:58

## 2018-02-18 RX ADMIN — LORATADINE 10 MG: 10 TABLET ORAL at 21:47

## 2018-02-18 RX ADMIN — SODIUM CHLORIDE 1000 ML: 9 INJECTION, SOLUTION INTRAVENOUS at 11:44

## 2018-02-18 ASSESSMENT — LIFESTYLE VARIABLES: TOBACCO_USE: 1

## 2018-02-18 NOTE — IP AVS SNAPSHOT
[facility]    [address] [address]    Phone:  [phone]    Fax:  [phone]                                       After Visit Summary   2/18/2018    Heather Rosas    MRN: [id number]           After Visit Summary Signature Page     I have received my discharge instructions, and my questions have been answered. I have discussed any challenges I see with this plan with the nurse or doctor.    ..........................................................................................................................................  Patient/Patient Representative Signature      ..........................................................................................................................................  Patient Representative Print Name and Relationship to Patient    ..................................................               ................................................  Date                                            Time    ..........................................................................................................................................  Reviewed by Signature/Title    ...................................................              ..............................................  Date                                                            Time

## 2018-02-18 NOTE — ANESTHESIA PREPROCEDURE EVALUATION
Anesthesia Evaluation     . Pt has had prior anesthetic. Type: MAC    No history of anesthetic complications          ROS/MED HX    ENT/Pulmonary:     (+)allergic rhinitis, tobacco use, Current use 0.75 packs/day  COPD, , . .    Neurologic:  - neg neurologic ROS     Cardiovascular: Comment: Patients troponin elevated today when hemoglobin dropped. Internal med following patient and discussed plan with anesthesia. Likely demand ischemia as no h/o CAD    (+) Dyslipidemia, hypertension---past MI,-. : . . . :. . Previous cardiac testing date:results:date: results:ECG reviewed date:2/18/18 results:Sinus tachycardia possible left atrial enlargement  RBBB date: results:          METS/Exercise Tolerance:  >4 METS   Hematologic:     (+) Anemia, History of Transfusion (s/p x2u PRBC w/ this admit) no previous transfusion reaction -      Musculoskeletal:   (+) arthritis, , , other musculoskeletal- Fibromyalgia      GI/Hepatic:     (+) GERD       Renal/Genitourinary:     (+) chronic renal disease, Other Renal/ Genitourinary, Uterine Bleeding       Endo:     (+) Obesity, .      Psychiatric:     (+) psychiatric history anxiety, depression, other (comment) and schizophrenia (schizophrenia)      Infectious Disease:   (+) Other Infectious Disease h/o Toxic shock syndrome 2006      Malignancy:      - no malignancy   Other: Comment: fibromyalgia   (+) No chance of pregnancy C-spine cleared: N/A, H/O Chronic Pain,no other significant disability                    Physical Exam  Normal systems: cardiovascular and dental    Airway   Mallampati: III  TM distance: >3 FB    Dental     Cardiovascular   Rhythm and rate: regular and normal      Pulmonary (+) wheezes                       Anesthesia Plan      History & Physical Review  History and physical reviewed and following examination; no interval change.    ASA Status:  4 emergent.    NPO Status:  > 8 hours    Plan for MAC with Intravenous and Propofol induction. Maintenance will be  TIVA.  Reason for MAC:  Deep or markedly invasive procedure (G8), Extreme anxiety (QS) and Other - see comments  PONV prophylaxis:  Ondansetron (or other 5HT-3) and Dexamethasone or Solumedrol  Risks and benefits of MAC anesthetic discussed including dental damage, aspiration, loss of airway, conversion to general anesthetic, CV complications, MI, stroke, death. Pt wishes to proceed.     Demand ischemia: no h/o CAD, but Elevated Troponin to 0.996 at 1800 on 2/18/2018 with intermittent chest pain thought secondary to hgb 6.5. Troponin peaked to 5.657 at 0300, now decreasing to 5.361 at 0600. EKG and CK MB remain normal throughout. Hgb improved after transfusions to 9.1.      Postoperative Care  Postoperative pain management:  IV analgesics and Oral pain medications.      Consents  Anesthetic plan, risks, benefits and alternatives discussed with:  Patient.  Use of blood products discussed: Yes.   Use of blood products discussed with Patient.  Consented to blood products.  .                          .

## 2018-02-18 NOTE — ED NOTES
Bed: ED10a  Expected date: 2/18/18  Expected time:   Means of arrival: Ambulance  Comments:  [facility]

## 2018-02-18 NOTE — PLAN OF CARE
Dr. Nettles updated of EKG completion with system evaluation results: Sinus tachycardia, possible left atrial enlargement with right bundle branch block.

## 2018-02-18 NOTE — PROGRESS NOTES
S:   Patient had some chest pain and because to that an EKG and CXR and cardiac enzymes ordered    O:   EKG shows RBBB but no elevation of ST segment or inverted T waves.         CK total was normal         Troponin elevated    A:    Patient is being planned for D&C and endometrial ablation at 6 PM    P:    Internal Medicine consultation made.  Spoke to Dr. Smith         Internal medicine to look into findings of chest pain and Troponin elevation.

## 2018-02-18 NOTE — H&P
[facility]  History and Physical    Heather Rosas MRN# [id number]   Age: 67 year old YOB: 1950     Date of Admission:  2018    Primary care provider: Laci Avila           Chief Complaint:   Heather Rosas is a 67 year old female who has been bleeding for 5 days with the passage of blood clots.  Her Hb has fallen to 6.5 gram % and she seems a little breathless.  She has had an episode of bleeding per vagina about one year ago for about 5 days as well but it stopped so she did not seek medical attention          Pregnancy history:     OBSTETRIC HISTORY:   2      She has 2 adult children and  5 grand children.  No obstetric history on file.  Obstetric History     No data available          EDC: Estimated Date of Delivery: None noted.    Prenatal Labs: Lab Results   Component Value Date    ABO A 2018    RH Pos 2018    HGB 6.5 (LL) 2018       GBS Status:   No results found for: GBS    Active Problem List  Patient Active Problem List   Diagnosis     Dyslipidemia     Fibromyalgia     HTN (hypertension)     Major depressive disorder, recurrent episode, moderate (H)     ANNA (generalized anxiety disorder)     GERD (Gastroesophageal reflux disease)     Obesity (H)     Schizophrenia (H)     Seasonal allergic rhinitis     ACP (advance care planning)       Medication Prior to Admission    (Not in a hospital admission).        Maternal Past Medical History:     Past Medical History:   Diagnosis Date     Allergic rhinitis 2011     Anxiety 2011     Anxiety state, unspecified     Anxiety state     Dyslipidemia 2003     fibromyalgia 2004     GERD, Esophageal reflux 2011     HTN (hypertension)     Essential hypertension     Major depression, recurrent (H) 2011     Nonorganic sleep disorder, unspecified     Non-org. sleep disorder     Obesity 2011     Schizophrenia (H) 2011     Toxic shock syndrome (H)      due to MRSA, ARDS, renal failure                       Family History:     Family History   Problem Relation Age of Onset     C.A.D. Father 45     (cause of death)      Other - See Comments Father      rheumatic fever      Allergies Father      Breast Cancer Maternal Aunt      Breast Cancer Other      CANCER Sister 56     bone ca      Breast Cancer Maternal Aunt      Colon Cancer Paternal Aunt      (cause of death)      Crohn Disease       Depression Maternal Uncle      Depression Maternal Aunt      DIABETES Paternal Grandmother      type 2     GASTROINTESTINAL DISEASE Mother      GERD     CANCER Mother      pancreatic ca (cause of death) /liver ca     Neurologic Disorder Brother      neuropathy     CANCER Brother 58     lymph node in neck     Allergies Brother      Family history reviewed and updated in [facility]            Social History:     Social History   Substance Use Topics     Smoking status: Former Smoker     Packs/day: 0.50     Types: Cigarettes     Quit date: 12/1/2013     Smokeless tobacco: Never Used      Comment: Year Quit: 2011     Alcohol use Yes      Comment: rare            Review of Systems:   CONSTITUTIONAL: NEGATIVE for fever, chills, change in weight  ENT/MOUTH: NEGATIVE for ear, mouth and throat problems  RESP: NEGATIVE for significant cough or SOB  CV: NEGATIVE for chest pain, palpitations or peripheral edema          Physical Exam:   Vitals were reviewed  Temp: 98.5  F (36.9  C) Temp src: Tympanic BP: 144/82 Pulse: 110 Heart Rate: 114 Resp: 16 SpO2: 100 % O2 Device: None (Room air)    Constitutional:   awake, alert, cooperative, no apparent distress, and appears stated age   Eyes:   Lids and lashes normal, pupils equal, round and reactive to light, extra ocular muscles intact, sclera clear, conjunctiva normal   ENT:   Normocephalic, without obvious abnormality, atraumatic, sinuses nontender on palpation, external ears without lesions, oral pharynx with moist mucous membranes, tonsils without  erythema or exudates, gums normal and good dentition.   Neck:   Supple, symmetrical, trachea midline, no adenopathy, thyroid symmetric, not enlarged and no tenderness, skin normal   Hematologic / Lymphatic:   no cervical lymphadenopathy   Back:   Symmetric, no curvature, spinous processes are non-tender on palpation, paraspinous muscles are non-tender on palpation, no costal vertebral tenderness   Lungs:   No increased work of breathing, good air exchange, clear to auscultation bilaterally, no crackles or wheezing   Cardiovascular:   Normal apical impulse, regular rate and rhythm, normal S1 and S2, no S3 or S4, and no murmur noted   Abdomen:   No scars, normal bowel sounds, soft, non-distended, non-tender, no masses palpated, no hepatosplenomegally   Chest / Breast:      Genitounirinary:   External Genitalia:  General appearance; normal   Musculoskeletal:   There is no redness, warmth, or swelling of the joints.  Full range of motion noted.  Motor strength is 5 out of 5 all extremities bilaterally.  Tone is normal.   Neurologic:   Awake, alert, oriented to name, place and time.    Neuropsychiatric:   General: normal, calm and normal eye contact   Skin:   no bruising or bleeding      Vulva normal   Vagina   Blood clots in vagina   Bartholin's glands not enlarged  Cervix:  Appears normal.   Blood clot coming through cervix  Uterus  Anteverted normal size to bulky  Adnexa not palpable not tender    No mass felt in the pelvis                          Assessment:   Heather Rosas is 67 year old female with post menopausal bleeding and anemia with Hb 6.5 Gm %. Slightly beathless.          Plan:   Blood Transfusion  Schedule for dilatation and curettage and endometrial ablation today.      Jose Nettles MD

## 2018-02-18 NOTE — ED PROVIDER NOTES
eMERGENCY dEPARTMENT eNCOUnter        CHIEF COMPLAINT    Chief Complaint   Patient presents with     Vaginal Bleeding       [facility]    Heather Rosas is a 67 year old female who presents with vaginal bleeding starting 4 days ago.  She is postmenopausal and has not had a period for several years.  Today she was passing large clots and started to feel weak.  She called the ambulance  REVIEW OF SYSTEMS    General: No fevers or chills  : No dysuria or flank pain  GI: No vomiting or diarrhea  Pulmonary: No difficulty breathing or cough  Neurologic: No loss of consciousness or syncope  See HPI for further details.  All other systems reviewed and are negative.    PAST MEDICAL & SURGICAL HISTORY    Past Medical History:   Diagnosis Date     Allergic rhinitis 01/01/2011     Anxiety 01/01/2011     Anxiety state, unspecified     Anxiety state     Dyslipidemia 11/26/2003     fibromyalgia 06/14/2004     GERD, Esophageal reflux 01/01/2011     HTN (hypertension)     Essential hypertension     Major depression, recurrent (H) 1/1/2011     Nonorganic sleep disorder, unspecified     Non-org. 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       ALLERGIES    Allergies   Allergen Reactions     Cats Other (See Comments)     Sneezing, runny nose     Codeine Sulfate Nausea and Vomiting and GI Disturbance     Fexofenadine Hcl      Allegra      Rosuvastatin Other (See Comments)     Dizziness - Crestor      Masterson Oil GI Disturbance     Any pink fish       FAMILY AND  SOCIAL HISTORY    Family History   Problem Relation Age of Onset     C.A.D. Father 45     (cause of death)      Other - See Comments Father      rheumatic fever      Allergies Father      Breast Cancer Maternal Aunt      Breast Cancer Other      CANCER Sister 56     bone ca      Breast Cancer Maternal Aunt      Colon Cancer Paternal Aunt      (cause of death)      Crohn Disease       Depression Maternal Uncle      Depression Maternal Aunt      DIABETES Paternal Grandmother      type 2     GASTROINTESTINAL DISEASE Mother      GERD     CANCER Mother      pancreatic ca (cause of death) /liver ca     Neurologic Disorder Brother      neuropathy     CANCER Brother 58     lymph node in neck     Allergies Brother      Social History     Social History     Marital status:      Spouse name: N/A     Number of children: N/A     Years of education: N/A     Occupational History     [id number]          Social History Main Topics     Smoking status: Former Smoker     Packs/day: 0.50     Types: Cigarettes     Quit date: 12/1/2013     Smokeless tobacco: Never Used      Comment: Year Quit: 2011     Alcohol use Yes      Comment: rare     Drug use: No     Sexual activity: No      Comment: devorced     Other Topics Concern      Service No     Blood Transfusions Yes     Permits if needed     Caffeine Concern Yes     1 cup     Occupational Exposure No     Hobby Hazards No     Sleep Concern No     Stress Concern No     Weight Concern No     Special Diet No     Back Care No     Exercise No     Seat Belt Yes     Self-Exams Yes     Parent/Sibling W/ Cabg, Mi Or Angioplasty Before 65f 55m? Yes     Father     Social History Narrative       PHYSICAL EXAM    VITAL SIGNS: /74  Pulse 105  Temp 99.1  F (37.3  C) (Oral)  Resp 20  SpO2 97%   Constitutional:  Well-developed, well-nourished, appears comfortable although is slightly pale  Eyes:  Non-icteric sclera, no conjunctival injection    HENT:  Atraumatic, external nose normal.   NECK: Supple, no JVD   Respiratory:  No respiratory distress, normal breath sounds   Cardiovascular:  Normal rate, no murmurs  GI:  Abdominal is soft, there is no tenderness, Bowel sounds unremarkable   :  No CVA tenderness.  Bimanual vaginal exam shows large clots in the vaginal vault.  Integument:  Nondiaphoretic Skin, no obvious rashes  Neurologic: Awake and oriented, no slurred speech    RADIOLOGY/PROCEDURES    Results for orders placed or performed during the hospital encounter of 02/18/18   US Pelvic Complete with Transvaginal    Narrative    PROCEDURE: US PELVIC COMPLETE WITH TRANSVAGINAL    HISTORY: vaginal bleeding;     TECHNIQUE: Transabdominal and transvaginal ultrasound of the pelvis.    COMPARISON: none    FINDINGS:     MEASUREMENTS:    Endometrium: 28 mm in thickness  Right Ovary: 1.5 x 1.9 x 1.3 cm (Length x Height x Width)  Left Ovary:  2.5x1.6 x 1.6 cm (Length x Height x Width)    UTERUS: Is a 3 cm diameter fibroid in the uterine body. The  endometrium is abnormally thickened measuring 2.8 cm.     ADNEXA: Is a small cyst seen in the right ovary measuring 1.5 cm in  diameter. Arteriovenous flow is seen in both ovaries although  suboptimal images were obtained.    MISC: No free fluid is seen in the cul-de-sac      Impression    IMPRESSION:     Abnormally thickened endometrium for a postmenopausal woman.    JEFERSON NEGRO MD   XR Chest Port 1 View    Narrative    PROCEDURE:  XR CHEST PORT 1 VW    HISTORY:  chest pain; .     COMPARISON:  July 23, 2017    FINDINGS:   The cardiac silhouette is normal in size. The pulmonary vasculature is  normal.  The lungs are clear. No pleural effusion or pneumothorax.      Impression    IMPRESSION:  No acute cardiopulmonary disease.  The bilateral  pulmonary parenchymal opacities have cleared as compared to the  previous examination    JEFERSON NEGRO MD         ED COURSE & MEDICAL DECISION MAKING    Pertinent Labs &  Imaging studies reviewed and interpreted. (See chart for details)  art for details of medications given during the ED stay.      Vitals:    02/18/18 1512 02/18/18 1529 02/18/18 1636 02/18/18 1650   BP: (!) 178/108 159/81 157/72 146/74   Pulse: 105      Resp: 16  22 20   Temp: 98.9  F (37.2  C) 98.9  F (37.2  C) 99.3  F (37.4  C) 99.1  F (37.3  C)   TempSrc: Oral Oral Oral Oral   SpO2: 98%  97% 97%         FINAL IMPRESSION    Uterine bleeding    Plan: Dr. Nettles is consulted and is here to see the patient with plans to take her to the OR.  She had a lot of concerns about blood transfusion, Dr. Nettles discussed risks with her and she agrees.        (Please note that this note was completed with a voice recognition program.  Every attempt was made to edit the dictations, but inevitably there remain words that are mis-transcribed.)       Amanda Hodgson MD  02/18/18 8862

## 2018-02-18 NOTE — PLAN OF CARE
Arrived on floor via gurney. Blood transfusion infusing denies any itching feverish feeling. Reports she has been having midsternal chest pain for 1 hour but has not reported this to anyone yet. She states this began before her blood transfusion started. She reports she is a smoker and a dry cough is noted. Dr Nettles contacted in regards to onset of chest pain. Troponin and ck blood draw ordered, chest xray ordered, and ekg ordered.

## 2018-02-18 NOTE — ED NOTES
"66 y/o female presents via [facility] with reports of vaginal bleeding over last 4 days. Patient states vaginal bleeding started out bright red on Thursday and Friday then turned to \"pinkish water.\" Describes as \"gushing out\" but also states \"it's lightened up.\" Reports weakness and fatigue. Denies pelvic or abdominal pain. States this happened one year ago where she bled for 3 days straight \"and then it just stopped without going in.\" Has not worn sanitary pads - unable to quantify blood loss. 18 gauge established to L AC by EMS.   "

## 2018-02-18 NOTE — ED NOTES
DATE:  2/18/2018   TIME OF RECEIPT FROM LAB:  1156  LAB TEST:  Hgb  LAB VALUE:  6.5  RESULTS GIVEN WITH READ-BACK TO (PROVIDER):  Amanda Hodgson MD  TIME LAB VALUE REPORTED TO PROVIDER:   1158

## 2018-02-18 NOTE — PLAN OF CARE
DATE:  2/18/2018   TIME OF RECEIPT FROM LAB: 5443  LAB TEST:  troponin  LAB VALUE:  0.423  RESULTS GIVEN WITH READ-BACK TO (PROVIDER):  Jose Nettles MD  TIME LAB VALUE REPORTED TO PROVIDER:   8818

## 2018-02-18 NOTE — IP AVS SNAPSHOT
MRN:[id number]                      After Visit Summary   2/18/2018    Heather Rosas    MRN: [id number]           Thank you!     Thank you for choosing [facility] for your care. Our goal is always to provide you with excellent care. Hearing back from our patients is one way we can continue to improve our services. Please take a few minutes to complete the written survey that you may receive in the mail after you visit with us. Thank you!        Patient Information     Date Of Birth          1950        About your hospital stay     You were admitted on:  February 18, 2018 You last received care in the:  [facility]    You were discharged on:  February 20, 2018        Reason for your hospital stay       For blood transfusion because of anemia from blood loss from post menopausal vaginal bleeding.  Hysteroscopy and Dilatation and Curettage and endometrial ablation done on 2.19.2018 by Dr. Nettles  Post op   OK  For observation in Med/Surg. Floor and then discharge if OK.            Reason for your hospital stay       Heather Rosas is a 67 year old woman who was admitted on 2/18/2018.  We were consulted for evaluation of approximately 20 minutes of substernal chest pain after she presented while she was still profoundly anemic.  She showed an elevated troponin which is now begun to decline.  She is otherwise had no exercise related symptoms in the past; she is moderately physically active. Underwent D&C with endometrial ablation after transfusion without incident. Shortly before the procedure she showed moderately accelerated hypertension.  She been treated in the past but discontinued medication treatment after some weight loss.  Subsequently blood pressure has been improved with addition of lisinopril.  Troponins continued to decline.  No recurrence of chest discomfort or any other suggestion of active ischemia.  Discharged with planned follow-up for treatment of hypertension and  planned exercise testing, as well as follow-up with Dr. abraham following treatment for uterine bleeding.                  Who to Call     For medical emergencies, please call 911.  For non-urgent questions about your medical care, please call your primary care provider or clinic, [phone]  For questions related to your surgery, please call your surgery clinic        Attending Provider     Provider Specialty    Amanda Hodgson MD [facility]    Laci Avila MD [facility]    Tho, Jose FULTON MD OB/Gyn       Primary Care Provider Office Phone # Fax #    Laci Avila [phone]      After Care Instructions     Activity       Your activity upon discharge: activity as tolerated.  Avoid sex 6 weeks.            Activity       Your activity upon discharge: activity as tolerated            Diet       Follow this diet upon discharge: Regular adult diet.            Diet       Follow this diet upon discharge: Orders Placed This Encounter      Diet      Regular Diet Adult                  Follow-up Appointments     Follow-up and recommended labs and tests        Follow up with Dr. Nettles , at (location with clinic name or city) in 2-4 weeks at the [facility] [place] .            Follow-up and recommended labs and tests        Follow up with primary care provider, Laci Avila, within 7 days for hospital follow- up.   Outpatient stress test arranged  Follow-up with Dr. Nettles as previously arranged.                  Your next 10 appointments already scheduled     Feb 27, 2018 11:00 AM CST   (Arrive by 10:45 AM)   SHORT with Laci Avila MD   [facility] ([facility] )    [address]  [facility] [address]   [phone]            Mar 19, 2018  1:30 PM CDT   (Arrive by 1:15 PM)   Post Op with Jose Nettles MD   [facility] ([facility] )    [address]  [facility] [address]   [phone]             "  Future tests that were ordered for you     Exercise Stress test           NM Exercise stress test                 Further instructions from your care team       Nuclear Medicine will be calling you to set up an appointment for a future Stress Test. If you do not here from the Nuclear Medicine  by 18 please call [phone] to schedule.  A follow up appointment with Dr Avila and a surgery follow up with Dr Nettles have been scheduled for you.    Pending Results     Date and Time Order Name Status Description    2018 1120 Surgical pathology exam In process             Statement of Approval     Ordered          18 0911  I have reviewed and agree with all the recommendations and orders detailed in this document.  EFFECTIVE NOW     Approved and electronically signed by:  North Smith MD           18 1145  I have reviewed and agree with all the recommendations and orders detailed in this document.  EFFECTIVE NOW     Approved and electronically signed by:  Jose Nettles MD             Admission Information     Date & Time Provider Department Dept. Phone    2018 Jose Nettles MD [facility] [phone]      Your Vitals Were     Blood Pressure Pulse Temperature Respirations Pulse Oximetry       123/48 97 98.7  F (37.1  C) (Tympanic) 18 96%       MyChart Information     [employer] lets you send messages to your doctor, view your test results, renew your prescriptions, schedule appointments and more. To sign up, go to [email or web address] . Click on \"Log in\" on the left side of the screen, which will take you to the Welcome page. Then click on \"Sign up Now\" on the right side of the page.     You will be asked to enter the access code listed below, as well as some personal information. Please follow the directions to create your username and password.     Your access code is: [email or web address]  Expires: 2018  9:23 AM     Your access code will  in 90 days. " If you need help or a new code, please call your [place] clinic or [phone].        Care EveryWhere ID     This is your Care EveryWhere ID. This could be used by other organizations to access your [place] medical records  [id number]        Equal Access to Services     EDELMIRA BACH : Hadii aad ku hadmaryannnellie Saeidali, waaxda luqadaha, qaybta kaalmada adediana, leonardo cabrera pollogarfield gusman qingjaswant haque. So [facility] [phone].    ATENCIÓN: Si habla español, tiene a lea disposición servicios gratuitos de asistencia lingüística. Llame al [phone].    We comply with applicable federal civil rights laws and [place] laws. We do not discriminate on the basis of race, color, national origin, age, disability, sex, sexual orientation, or gender identity.               Review of your medicines      START taking        Dose / Directions    aspirin 81 MG EC tablet   Used for:  Coronary artery disease involving native heart with angina pectoris, unspecified vessel or lesion type (H)        Dose:  81 mg   Take 1 tablet (81 mg) by mouth daily   Quantity:  30 tablet   Refills:  11       lisinopril 5 MG tablet   Commonly known as:  PRINIVIL/ZESTRIL   Used for:  Benign essential hypertension        Dose:  5 mg   Take 1 tablet (5 mg) by mouth daily   Quantity:  30 tablet   Refills:  3       metoprolol succinate 25 MG 24 hr tablet   Commonly known as:  TOPROL-XL   Used for:  Coronary artery disease involving native heart with angina pectoris, unspecified vessel or lesion type (H)        Dose:  12.5 mg   Take 0.5 tablets (12.5 mg) by mouth daily   Quantity:  45 tablet   Refills:  1       oxyCODONE IR 5 MG tablet   Commonly known as:  ROXICODONE   Used for:  Uterine bleeding, Post-op pain        Dose:  5 mg   Take 1 tablet (5 mg) by mouth every 4 hours as needed for moderate to severe pain   Quantity:  10 tablet   Refills:  0         CONTINUE these medicines which have NOT CHANGED        Dose / Directions    nicotine 21 MG/24HR 24  hr patch   Commonly known as:  NICODERM CQ        Dose:  1 patch   Place 1 patch onto the skin every 24 hours   Refills:  0            Where to get your medicines      These medications were sent to [id number] - [place], [place] - [address] AT [facility] [address], [place] [address]     Phone:  [phone]     aspirin 81 MG EC tablet    lisinopril 5 MG tablet    metoprolol succinate 25 MG 24 hr tablet         Some of these will need a paper prescription and others can be bought over the counter. Ask your nurse if you have questions.     Bring a paper prescription for each of these medications     oxyCODONE IR 5 MG tablet                Protect others around you: Learn how to safely use, store and throw away your medicines at www.disposemymeds.org.        Information about OPIOIDS     PRESCRIPTION OPIOIDS: WHAT YOU NEED TO KNOW    Prescription opioids can be used to help relieve moderate to severe pain and are often prescribed following a surgery or injury, or for certain health conditions. These medications can be an important part of treatment but also come with serious risks. It is important to work with your health care provider to make sure you are getting the safest, most effective care.    WHAT ARE THE RISKS AND SIDE EFFECTS OF OPIOID USE?  Prescription opioids carry serious risks of addiction and overdose, especially with prolonged use. An opioid overdose, often marked by slowed breathing can cause sudden death. The use of prescription opioids can have a number of side effects as well, even when taken as directed:      Tolerance - meaning you might need to take more of a medication for the same pain relief    Physical dependence - meaning you have symptoms of withdrawal when a medication is stopped    Increased sensitivity to pain    Constipation    Nausea, vomiting, and dry mouth    Sleepiness and dizziness    Confusion    Depression    Low levels of testosterone that  can result in lower sex drive, energy, and strength    Itching and sweating    RISKS ARE GREATER WITH:    History of drug misuse, substance use disorder, or overdose    Mental health conditions (such as depression or anxiety)    Sleep apnea    Older age (65 years or older)    Pregnancy    Avoid alcohol while taking prescription opioids.   Also, unless specifically advised by your health care provider, medications to avoid include:    Benzodiazepines (such as Xanax or Valium)    Muscle relaxants (such as Soma or Flexeril)    Hypnotics (such as Ambien or Lunesta)    Other prescription opioids    KNOW YOUR OPTIONS:  Talk to your health care provider about ways to manage your pain that do not involve prescription opioids. Some of these options may actually work better and have fewer risks and side effects:    Pain relievers such as acetaminophen, ibuprofen, and naproxen    Some medications that are also used for depression or seizures    Physical therapy and exercise    Cognitive behavioral therapy, a psychological, goal-directed approach, in which patients learn how to modify physical, behavioral, and emotional triggers of pain and stress    IF YOU ARE PRESCRIBED OPIOIDS FOR PAIN:    Never take opioids in greater amounts or more often than prescribed    Follow up with your primary health care provider and work together to create a plan on how to manage your pain.    Talk about ways to help manage your pain that do not involve prescription opioids    Talk about all concerns and side effects    Help prevent misuse and abuse    Never sell or share prescription opioids    Never use another person's prescription opioids    Store prescription opioids in a secure place and out of reach of others (this may include visitors, children, friends, and family)    Visit www.cdc.gov/drugoverdose to learn about risks of opioid abuse and overdose    If you believe you may be struggling with addiction, tell your health care provider and  ask for guidance or call [facility]'s National Helpline at 8-925-176-HELP    LEARN MORE / www.cdc.gov/drugoverdose/prescribing/guideline.html    Safely dispose of unused prescription opioids: Find your local drug take-back programs and more information about the importance of safe disposal at www.doseofreality.[place].gov             Medication List: This is a list of all your medications and when to take them. Check marks below indicate your daily home schedule. Keep this list as a reference.      Medications           Morning Afternoon Evening Bedtime As Needed    aspirin 81 MG EC tablet   Take 1 tablet (81 mg) by mouth daily   Last time this was given:  81 mg on 2/20/2018  8:18 AM                                lisinopril 5 MG tablet   Commonly known as:  PRINIVIL/ZESTRIL   Take 1 tablet (5 mg) by mouth daily   Last time this was given:  5 mg on 2/20/2018  8:18 AM                                metoprolol succinate 25 MG 24 hr tablet   Commonly known as:  TOPROL-XL   Take 0.5 tablets (12.5 mg) by mouth daily                                nicotine 21 MG/24HR 24 hr patch   Commonly known as:  NICODERM CQ   Place 1 patch onto the skin every 24 hours                                oxyCODONE IR 5 MG tablet   Commonly known as:  ROXICODONE   Take 1 tablet (5 mg) by mouth every 4 hours as needed for moderate to severe pain                                          More Information        Endometrial Ablation  Endometrial ablation is an outpatient surgery that can reduce or stop heavy menstrual bleeding. Ablation destroys the lining of the uterus. This surgery is for women who do not want to have any more children and who have not yet entered menopause. It should not be used by women with endometrial hyperplasia or cancer of the uterus. Surgery takes less than an hour, and you can go home later that day.Endometrial ablation is an outpatient surgery that can reduce or stop heavy menstrual bleeding. Ablation destroys the lining of  the uterus. This surgery is for women who do not want to have any more children and who have not yet entered menopause. It should not be used by women with endometrial hyperplasia or cancer of the uterus. Surgery takes less than an hour, and you can go home later that day.  Preparing for surgery    You may be given medicine by mouth or injection for a few weeks or months before your surgery. This thins the lining of the uterus and reduces bleeding.    Your healthcare provider may recommend other procedures to check the inside of your uterus before the ablation is done.     The day before surgery, you may be given medicine or a special substance (laminaria) may be put into the opening to the uterus (cervix). This widens the opening.    To help prevent problems with anesthesia, do not eat or drink anything 10 hours before surgery.  Your surgery     Destroying the lining with heat, freezing, or electric current prevents the lining from growing back.        You ll be given anesthesia so you stay comfortable and relaxed. You will not feel pain during surgery.    Your uterus may be filled with fluid. This puts pressure on the lining to help reduce bleeding. It also allows your healthcare provider to see inside your uterus.    Your healthcare provider puts a small telescope-like instrument through the cervix. This scope may be connected to a video monitor. This helps your healthcare provider see and control the ablation process. At the end of the scope, a device using heat, extreme cold, or electric current destroys the uterine lining. Instead of the scope, your healthcare provider may use another device that both expands and destroys the uterine lining. After being inserted into your uterus, it also uses heat or other energy to destroy the lining. Your healthcare provider will choose the device that s best for you.  Your recovery    You may have cramping or aching in your abdomen after surgery. Your healthcare  provider can give you pain medicine.    You may also have a bloody or watery discharge or bleeding for days or weeks. Use sanitary pads, not tampons.    Don t have sex or play active sports for 2 weeks after surgery.    You can likely return to work in 2 days.    Ask your healthcare provider about using contraception after an ablation.    Your healthcare provider will see you in about 6 weeks to check how well you are healing.  Call your healthcare provider if you have any of the following after surgery:    Chills    Fever of 100.4 F (38 C) or higher, or as directed by your healthcare provider    Frequent urination for 24 hours    Heavy vaginal bleeding    Nausea    Persistent or increased abdominal pain    Shortness of breath   Date Last Reviewed: 6/1/2017 2000-2017 [employer]. [address] [place] [address], [place] [address]. All rights reserved. This information is not intended as a substitute for professional medical care. Always follow your healthcare professional's instructions.                D&C     After the cervical canal is dilated, a curette is inserted into the uterus to take tissue samples.     Your healthcare provider has recommended you have a D&C (dilation and curettage). This common procedure helps your healthcare provider learn more about problems inside your uterus or is done to treat a miscarriage. During a D&C, the cervix (opening of the uterus) is widened, or dilated. Tissue samples are then removed from the endometrium (lining of the uterus) with an instrument called a curette or with suction. In many cases, D&C is done to find the cause of abnormal vaginal bleeding. Or you may need a D&C as a form of treatment.  A hysteroscopy is usually done along with the D&C for a gynecological problem. A hysteroscopy uses a small instrument to see the inside of the uterus. Hysteroscopy and D&C can be done in the operating room or in the healthcare provider's office, depending on the healthcare  provider who does it.  Preparing for D&C    Arrange for an adult family member or friend to drive you home.    Don t eat or drink anything after the midnight before your D&C, unless told otherwise by your healthcare provider.  During your D&C  Just before your D&C, you may get medicine to prevent pain. This may be given through an IV. You may be awake but relaxed during the procedure. Or you may be completely asleep. The type of anesthesia used is different depending on where the procedure takes place. The procedure will not begin until the pain medicine has taken effect. During your D&C:    Instruments are used to hold the vagina open and to steady the uterus. The cervical canal is widened using tapered instruments called dilators.    Usually a thin, rigid, or flexible telescope (hysteroscope) is inserted into the vagina to take images of the inside of the uterus. This allows your healthcare provider to see into the uterus.    The curette or suction is inserted into the uterus. Tissue samples are taken from several areas. These samples are sent to a lab to be studied.  After your D&C    You will rest for a while in a recovery area.    You can expect some cramping for a few hours after the D&C. This can be controlled with an over-the-counter pain reliever.    You may have some light bleeding for a few weeks. Use pads instead of tampons.    Take showers instead of baths for about a week. Ask your healthcare provider if you should avoid exercising or having sex for a period of time.  Risks and complications  D&C rarely causes complications. But as with any procedure, D&C has some risks. Before your D&C, your healthcare provider will discuss these with you. You will be asked to sign a consent form. Risks may include:    Infection    Heavy bleeding    Perforation of the uterine wall or damage to nearby organs    Scar tissue may form causing the lining of the uterus to adhere to itself. This can cause problems with  menstrual flow or difficulty getting pregnant in the future. This is called Asherman syndrome.    The need for additional tests or procedures    Risks associated with anesthesia (the medicine that makes you sleep during surgery)   Call your healthcare provider   Contact your healthcare provider if you have:    Heavy bleeding (more than 1 pad an hour)    A fever of 100.4 F (38 C) or higher, or as directed by your healthcare provider    Increasing belly pain, tenderness, or cramping    Foul-smelling discharge   Date Last Reviewed: 2016-2017 [employer]. [address] [place] [address]. All rights reserved. This information is not intended as a substitute for professional medical care. Always follow your healthcare professional's instructions.                Discharge Instructions for Dilation and Curettage (D and C)  Your doctor performed dilation and curettage (D&C). The reasons for having this procedure vary from person to person. The D&C may be done to control heavy uterine bleeding, to find the cause of irregular bleeding, to perform an , or to remove pregnancy tissue if you have had a miscarriage.  Home care    Take it easy. Rest for 2 days as needed.    Return to your normal activities after 24 to 48 hours. You may also return to work at that time.    Eat a normal diet.    Take an over-the-counter pain reliever for pain, if needed.    Remember, it s OK to have bleeding for about a week after the procedure. The amount of bleeding should be similar to what you have during a normal period.    Don t drive for 24 hours after the procedure unless specifically told by your provider that it is OK to do so.    Don t have sexual intercourse or use tampons or douches until your doctor says it s safe to do so.  Follow-up    Make a follow-up appointment as directed by our staff.     When to call your doctor  Call your doctor right away if you have any of the following:    Bleeding  that soaks more than one sanitary pad in one hour    Severe abdominal pain    Severe cramps    Fever above 100.4 F (38.0 C)    A foul smelling vaginal discharge   Date Last Reviewed: 5/19/2015 2000-2017 [employer]. [address] [place] [address]. All rights reserved. This information is not intended as a substitute for professional medical care. Always follow your healthcare professional's instructions.              After Your Blood Transfusion  Discharge Instructions  After you leave  After a blood transfusion (received red blood cells, platelets, plasma, cryo or granulocytes), you will need to watch for signs of a reaction for the next 48 hours.  Call your clinic or 911 (or go to the Emergency room) if you have any signs of a reaction:    Shaking or chills    Fever (temperature above 100.0 F)    Headache    Nausea    Hives    Itching    Swelling of the face or feeling flushed    Ongoing dry cough (nothing is coughed up)    Trouble breathing or wheezing  Some signs of a reaction won't show up for a few days or up to 4 weeks.   These may include:    Fatigue    Dizziness    Pink or red urine  Your clinic is:   ________________________________________  ________________________________________  For informational purposes only. Not to replace the advice of your health care provider.   Copyright   2015 [facility]. All rights reserved. [employer] 840275 - Rev 07/16.            Aspirin, ASA oral tablets  Brand Names: Aspir-Low, Aspirtab, Aspir-Esperanza, Nicole Advanced Aspirin, Nicole Aspirin, Nicole Aspirin Extra Strength, Nicole Aspirin Plus, Nicole Extra Strength, Nicole Extra Strength Plus, Nicole Genuine Aspirin, Nicole Womens Aspirin, Bufferin, Bufferin Extra Strength, Bufferin Low Dose  What is this medicine?  ASPIRIN (AS pir in) is a pain reliever. It is used to treat mild pain and fever. This medicine is also used as directed by a doctor to prevent and to treat heart attacks, to  prevent strokes, and to treat arthritis or inflammation.  How should I use this medicine?  Take this medicine by mouth with a glass of water. Follow the directions on the package or prescription label. You can take this medicine with or without food. If it upsets your stomach, take it with food. Do not take your medicine more often than directed.  Talk to your pediatrician regarding the use of this medicine in children. While this drug may be prescribed for children as young as 12 years of age for selected conditions, precautions do apply. Children and teenagers should not use this medicine to treat chicken pox or flu symptoms unless directed by a doctor.  Patients over 65 years old may have a stronger reaction and need a smaller dose.  What side effects may I notice from receiving this medicine?  Side effects that you should report to your doctor or health care professional as soon as possible:    allergic reactions like skin rash, itching or hives, swelling of the face, lips, or tongue    breathing problems    changes in hearing, ringing in the ears    confusion    general ill feeling or flu-like symptoms    pain on swallowing    redness, blistering, peeling or loosening of the skin, including inside the mouth or nose    signs and symptoms of bleeding such as bloody or black, tarry stools; red or dark-brown urine; spitting up blood or brown material that looks like coffee grounds; red spots on the skin; unusual bruising or bleeding from the eye, gums, or nose    trouble passing urine or change in the amount of urine    unusually weak or tired    yellowing of the eyes or skin  Side effects that usually do not require medical attention (report to your doctor or health care professional if they continue or are bothersome):    diarrhea or constipation    headache    nausea, vomiting    stomach gas, heartburn  What may interact with this medicine?  Do not take this medicine with any of the following  medications:    cidofovir    ketorolac    probenecid  This medicine may also interact with the following medications:    alcohol    alendronate    bismuth subsalicylate    flavocoxid    herbal supplements like feverfew, garlic, bailee, ginkgo biloba, horse chestnut    medicines for diabetes or glaucoma like acetazolamide, methazolamide    medicines for gout    medicines that treat or prevent blood clots like enoxaparin, heparin, ticlopidine, warfarin    other aspirin and aspirin-like medicines    NSAIDs, medicines for pain and inflammation, like ibuprofen or naproxen    pemetrexed    sulfinpyrazone    varicella live vaccine  What if I miss a dose?  If you are taking this medicine on a regular schedule and miss a dose, take it as soon as you can. If it is almost time for your next dose, take only that dose. Do not take double or extra doses.  Where should I keep my medicine?  Keep out of the reach of children.  Store at room temperature between 15 and 30 degrees C (59 and 86 degrees F). Protect from heat and moisture. Do not use this medicine if it has a strong vinegar smell. Throw away any unused medicine after the expiration date.  What should I tell my health care provider before I take this medicine?  They need to know if you have any of these conditions:    anemia    asthma    bleeding problems    child with chickenpox, the flu, or other viral infection    diabetes    gout    if you frequently drink alcohol containing drinks    kidney disease    liver disease    low level of vitamin K    lupus    smoke tobacco    stomach ulcers or other problems    an unusual or allergic reaction to aspirin, tartrazine dye, other medicines, dyes, or preservatives    pregnant or trying to get pregnant    breast-feeding  What should I watch for while using this medicine?  If you are treating yourself for pain, tell your doctor or health care professional if the pain lasts more than 10 days, if it gets worse, or if there is a new  or different kind of pain. Tell your doctor if you see redness or swelling. Also, check with your doctor if you have a fever that lasts for more than 3 days. Only take this medicine to prevent heart attacks or blood clotting if prescribed by your doctor or health care professional.  Do not take aspirin or aspirin-like medicines with this medicine. Too much aspirin can be dangerous. Always read the labels carefully.  This medicine can irritate your stomach or cause bleeding problems. Do not smoke cigarettes or drink alcohol while taking this medicine. Do not lie down for 30 minutes after taking this medicine to prevent irritation to your throat.  If you are scheduled for any medical or dental procedure, tell your healthcare provider that you are taking this medicine. You may need to stop taking this medicine before the procedure.  This medicine may be used to treat migraines. If you take migraine medicines for 10 or more days a month, your migraines may get worse. Keep a diary of headache days and medicine use. Contact your healthcare professional if your migraine attacks occur more frequently.  NOTE:This sheet is a summary. It may not cover all possible information. If you have questions about this medicine, talk to your doctor, pharmacist, or health care provider. Copyright  2017 [employer]                Lisinopril tablets  Brand Names: Prinivil, Zestril  What is this medicine?  LISINOPRIL (lyse IN oh pril) is an ACE inhibitor. This medicine is used to treat high blood pressure and heart failure. It is also used to protect the heart immediately after a heart attack.  How should I use this medicine?  Take this medicine by mouth with a glass of water. Follow the directions on your prescription label. You may take this medicine with or without food. If it upsets your stomach, take it with food. Take your medicine at regular intervals. Do not take it more often than directed. Do not stop taking except on your doctor's  advice.  Talk to your pediatrician regarding the use of this medicine in children. Special care may be needed. While this drug may be prescribed for children as young as 6 years of age for selected conditions, precautions do apply.  What side effects may I notice from receiving this medicine?  Side effects that you should report to your doctor or health care professional as soon as possible:    allergic reactions like skin rash, itching or hives, swelling of the hands, feet, face, lips, throat, or tongue    breathing problems    signs and symptoms of kidney injury like trouble passing urine or change in the amount of urine    signs and symptoms of increased potassium like muscle weakness; chest pain; or fast, irregular heartbeat    signs and symptoms of liver injury like dark yellow or brown urine; general ill feeling or flu-like symptoms; light-colored stools; loss of appetite; nausea; right upper belly pain; unusually weak or tired; yellowing of the eyes or skin    signs and symptoms of low blood pressure like dizziness; feeling faint or lightheaded, falls; unusually weak or tired    stomach pain with or without nausea and vomiting  Side effects that usually do not require medical attention (report to your doctor or health care professional if they continue or are bothersome):    changes in taste    cough    dizziness    fever    headache    sensitivity to light  What may interact with this medicine?  Do not take this medicine with any of the following medications:    hymenoptera venom    sacubitril; valsartan  This medicines may also interact with the following medications:    aliskiren    angiotensin receptor blockers, like losartan or valsartan    certain medicines for diabetes    diuretics    everolimus    gold compounds    lithium    NSAIDs, medicines for pain and inflammation, like ibuprofen or naproxen    potassium salts or supplements    salt substitutes    sirolimus    temsirolimus  What if I miss a  dose?  If you miss a dose, take it as soon as you can. If it is almost time for your next dose, take only that dose. Do not take double or extra doses.  Where should I keep my medicine?  Keep out of the reach of children.  Store at room temperature between 15 and 30 degrees C (59 and 86 degrees F). Protect from moisture. Keep container tightly closed. Throw away any unused medicine after the expiration date.  What should I tell my health care provider before I take this medicine?  They need to know if you have any of these conditions:    diabetes    heart or blood vessel disease    kidney disease    low blood pressure    previous swelling of the tongue, face, or lips with difficulty breathing, difficulty swallowing, hoarseness, or tightening of the throat    an unusual or allergic reaction to lisinopril, other ACE inhibitors, insect venom, foods, dyes, or preservatives    pregnant or trying to get pregnant    breast-feeding  What should I watch for while using this medicine?  Visit your doctor or health care professional for regular check ups. Check your blood pressure as directed. Ask your doctor what your blood pressure should be, and when you should contact him or her.  Do not treat yourself for coughs, colds, or pain while you are using this medicine without asking your doctor or health care professional for advice. Some ingredients may increase your blood pressure.  Women should inform their doctor if they wish to become pregnant or think they might be pregnant. There is a potential for serious side effects to an unborn child. Talk to your health care professional or pharmacist for more information.  Check with your doctor or health care professional if you get an attack of severe diarrhea, nausea and vomiting, or if you sweat a lot. The loss of too much body fluid can make it dangerous for you to take this medicine.  You may get drowsy or dizzy. Do not drive, use machinery, or do anything that needs mental  alertness until you know how this drug affects you. Do not stand or sit up quickly, especially if you are an older patient. This reduces the risk of dizzy or fainting spells. Alcohol can make you more drowsy and dizzy. Avoid alcoholic drinks.  Avoid salt substitutes unless you are told otherwise by your doctor or health care professional.  NOTE:This sheet is a summary. It may not cover all possible information. If you have questions about this medicine, talk to your doctor, pharmacist, or health care provider. Copyright  2017 [employer]                Patient Education    Metoprolol Succinate Oral tablet, extended-release    Metoprolol Tartrate Oral tablet    Metoprolol Tartrate Solution for injection  Metoprolol Succinate Oral tablet, extended-release  What is this medicine?  METOPROLOL (me TOE proe lole) is a beta-blocker. Beta-blockers reduce the workload on the heart and help it to beat more regularly. This medicine is used to treat high blood pressure and to prevent chest pain. It is also used to after a heart attack and to prevent an additional heart attack from occurring.  This medicine may be used for other purposes; ask your health care provider or pharmacist if you have questions.  What should I tell my health care provider before I take this medicine?  They need to know if you have any of these conditions:    diabetes    heart or vessel disease like slow heart rate, worsening heart failure, heart block, sick sinus syndrome or Raynaud's disease    kidney disease    liver disease    lung or breathing disease, like asthma or emphysema    pheochromocytoma    thyroid disease    an unusual or allergic reaction to metoprolol, other beta-blockers, medicines, foods, dyes, or preservatives    pregnant or trying to get pregnant    breast-feeding  How should I use this medicine?  Take this medicine by mouth with a glass of water. Follow the directions on the prescription label. Do not crush or chew. Take this medicine  with or immediately after meals. Take your doses at regular intervals. Do not take more medicine than directed. Do not stop taking this medicine suddenly. This could lead to serious heart-related effects.  Talk to your pediatrician regarding the use of this medicine in children. While this drug may be prescribed for children as young as 6 years for selected conditions, precautions do apply.  Overdosage: If you think you have taken too much of this medicine contact a poison control center or emergency room at once.  NOTE: This medicine is only for you. Do not share this medicine with others.  What if I miss a dose?  If you miss a dose, take it as soon as you can. If it is almost time for your next dose, take only that dose. Do not take double or extra doses.  What may interact with this medicine?  This medicine may interact with the following medications:    certain medicines for blood pressure, heart disease, irregular heart beat    certain medicines for depression, like monoamine oxidase (MAO) inhibitors, fluoxetine, or paroxetine    clonidine    dobutamine    epinephrine    isoproterenol    reserpine  This list may not describe all possible interactions. Give your health care provider a list of all the medicines, herbs, non-prescription drugs, or dietary supplements you use. Also tell them if you smoke, drink alcohol, or use illegal drugs. Some items may interact with your medicine.  What should I watch for while using this medicine?  Visit your doctor or health care professional for regular check ups. Contact your doctor right away if your symptoms worsen. Check your blood pressure and pulse rate regularly. Ask your health care professional what your blood pressure and pulse rate should be, and when you should contact them.  You may get drowsy or dizzy. Do not drive, use machinery, or do anything that needs mental alertness until you know how this medicine affects you. Do not sit or stand up quickly, especially  if you are an older patient. This reduces the risk of dizzy or fainting spells. Contact your doctor if these symptoms continue. Alcohol may interfere with the effect of this medicine. Avoid alcoholic drinks.  What side effects may I notice from receiving this medicine?  Side effects that you should report to your doctor or health care professional as soon as possible:    allergic reactions like skin rash, itching or hives    cold or numb hands or feet    depression    difficulty breathing    faint    fever with sore throat    irregular heartbeat, chest pain    rapid weight gain    swollen legs or ankles  Side effects that usually do not require medical attention (report to your doctor or health care professional if they continue or are bothersome):    anxiety or nervousness    change in sex drive or performance    dry skin    headache    nightmares or trouble sleeping    short term memory loss    stomach upset or diarrhea    unusually tired  This list may not describe all possible side effects. Call your doctor for medical advice about side effects. You may report side effects to FDA at 3-965-FDA-9276.  Where should I keep my medicine?  Keep out of the reach of children.  Store at room temperature between 15 and 30 degrees C (59 and 86 degrees F). Throw away any unused medicine after the expiration date.  NOTE:This sheet is a summary. It may not cover all possible information. If you have questions about this medicine, talk to your doctor, pharmacist, or health care provider. Copyright  2016 [employer]                Patient Education    Oxycodone Hydrochloride, Acetaminophen Oral capsule    Oxycodone Hydrochloride, Acetaminophen Oral solution    Oxycodone Hydrochloride, Acetaminophen Oral tablet    Oxycodone Hydrochloride, Acetaminophen Oral tablet, biphasic release  Oxycodone Hydrochloride, Acetaminophen Oral solution  What is this medicine?  ACETAMINOPHEN; OXYCODONE (a set a YA omaira fen; ox i KOE done) is a  pain reliever. It is used to treat moderate to severe pain.  This medicine may be used for other purposes; ask your health care provider or pharmacist if you have questions.  What should I tell my health care provider before I take this medicine?  They need to know if you have any of these conditions:    brain tumor    Crohn's disease, inflammatory bowel disease, or ulcerative colitis    drug abuse or addiction    head injury    heart or circulation problems    if you often drink alcohol    kidney disease or problems going to the bathroom    liver diseaselung disease, asthma, or breathing problems    an unusual or allergic reaction to acetaminophen, oxycodone, other opioid analgesics, other medicines, foods, dyes, or preservatives    pregnant or trying to get pregnant    breast-feeding  How should I use this medicine?  Take this medicine by mouth with a full glass of water. Use a specially marked spoon or dropper to measure your dose. Ask your pharmacist if you do not have one. Do not use a household spoon. Follow the directions on the prescription label. If the medicine upsets your stomach, take the medicine with food or milk.   Talk to your pediatrician regarding the use of this medicine in children. Special care may be needed.  Patients over 65 years old may have a stronger reaction and need a smaller dose.  Overdosage: If you think you have taken too much of this medicine contact a poison control center or emergency room at once.  NOTE: This medicine is only for you. Do not share this medicine with others.  What if I miss a dose?  If you miss a dose, take it as soon as you can. If it is almost time for your next dose, take only that dose. Do not take double or extra doses.  What may interact with this medicine?    alcohol    antihistamines    barbiturates like amobarbital, butalbital, butabarbital, methohexital, pentobarbital, phenobarbital, thiopental, and secobarbital    benztropine    drugs for bladder  problems like solifenacin, trospium, oxybutynin, tolterodine, hyoscyamine, and methscopolamine    drugs for breathing problems like ipratropium and tiotropium    drugs for certain stomach or intestine problems like propantheline, homatropine methylbromide, glycopyrrolate, atropine, belladonna, and dicyclomine    general anesthetics like etomidate, ketamine, nitrous oxide, propofol, desflurane, enflurane, halothane, isoflurane, and sevoflurane    medicines for depression, anxiety, or psychotic disturbances    medicines for sleep    muscle relaxants    naltrexone    narcotic medicines (opiates) for pain    phenothiazines like perphenazine, thioridazine, chlorpromazine, mesoridazine, fluphenazine, prochlorperazine, promazine, and trifluoperazine    scopolamine    tramadol    trihexyphenidyl  This list may not describe all possible interactions. Give your health care provider a list of all the medicines, herbs, non-prescription drugs, or dietary supplements you use. Also tell them if you smoke, drink alcohol, or use illegal drugs. Some items may interact with your medicine.  What should I watch for while using this medicine?  Tell your doctor or health care professional if your pain does not go away, if it gets worse, or if you have new or a different type of pain. You may develop tolerance to the medicine. Tolerance means that you will need a higher dose of the medication for pain relief. Tolerance is normal and is expected if you take this medicine for a long time.  Do not suddenly stop taking your medicine because you may develop a severe reaction. Your body becomes used to the medicine. This does NOT mean you are addicted. Addiction is a behavior related to getting and using a drug for a nonmedical reason. If you have pain, you have a medical reason to take pain medicine. Your doctor will tell you how much medicine to take. If your doctor wants you to stop the medicine, the dose will be slowly lowered over time to  avoid any side effects.  You may get drowsy or dizzy. Do not drive, use machinery, or do anything that needs mental alertness until you know how this medicine affects you. Do not stand or sit up quickly, especially if you are an older patient. This reduces the risk of dizzy or fainting spells. Alcohol may interfere with the effect of this medicine. Avoid alcoholic drinks.  There are different types of narcotic medicines (opiates) for pain. If you take more than one type at the same time, you may have more side effects. Give your health care provider a list of all medicines you use. Your doctor will tell you how much medicine to take. Do not take more medicine than directed. Call emergency for help if you have problems breathing.  The medicine will cause constipation. Try to have a bowel movement at least every 2 to 3 days. If you do not have a bowel movement for 3 days, call your doctor or health care professional.  Do not take Tylenol (acetaminophen) or medicines that have acetaminophen with this medicine. Too much acetaminophen can be very dangerous. Many non-prescription medicines contain acetaminophen. Always read the labels carefully to avoid taking more acetaminophen.  What side effects may I notice from receiving this medicine?  Side effects that you should report to your doctor or health care professional as soon as possible:    allergic reactions like skin rash, itching or hives, swelling of the face, lips, or tongue    breathing difficulties, wheezing    confusion    light headedness or fainting spells    severe stomach pain    unusually weak or tired    yellowing of the skin or the whites of the eyes  Side effects that usually do not require medical attention (report to your doctor or health care professional if they continue or are bothersome):    dizziness    drowsiness    nausea    vomiting  This list may not describe all possible side effects. Call your doctor for medical advice about side effects.  You may report side effects to FDA at 1-484-FDA-4898.  Where should I keep my medicine?  Keep out of the reach of children. This medicine can be abused. Keep your medicine in a safe place to protect it from theft. Do not share this medicine with anyone. Selling or giving away this medicine is dangerous and against the law.  Store at room temperature between 15 and 30 degrees C (59 and 86 degrees F). Keep container tightly closed.  This medicine may cause accidental overdose and death if it is taken by other adults, children, or pets. Flush any unused medicine down the toilet to reduce the chance of harm. Do not use the medicine after the expiration date.  NOTE: This sheet is a summary. It may not cover all possible information. If you have questions about this medicine, talk to your doctor, pharmacist, or health care provider.  NOTE:This sheet is a summary. It may not cover all possible information. If you have questions about this medicine, talk to your doctor, pharmacist, or health care provider. Copyright  2016 [employer]

## 2018-02-18 NOTE — ED NOTES
Patient aware of plan of care with no questions or concerns. Blood infusing on transfer. Patient to go to surgery this evening. Vital signs stable.

## 2018-02-19 ENCOUNTER — HOSPITAL ENCOUNTER (OUTPATIENT)
Facility: CLINIC | Age: 68
Setting detail: SPECIMEN
Discharge: HOME | End: 2018-02-19
Admitting: OBSTETRICS & GYNECOLOGY
Payer: MEDICARE

## 2018-02-19 DIAGNOSIS — C54.1 ENDOMETRIAL CANCER (H): Primary | ICD-10-CM

## 2018-02-19 LAB
ABO + RH BLD: NORMAL
ABO + RH BLD: NORMAL
ANION GAP SERPL CALCULATED.3IONS-SCNC: 6 MMOL/L (ref 3–14)
BLD GP AB SCN SERPL QL: NORMAL
BLD PROD TYP BPU: NORMAL
BLD PROD TYP BPU: NORMAL
BLD UNIT ID BPU: 0
BLOOD BANK CMNT PATIENT-IMP: NORMAL
BLOOD PRODUCT CODE: NORMAL
BPU ID: NORMAL
BUN SERPL-MCNC: 10 MG/DL (ref 7–30)
CALCIUM SERPL-MCNC: 7.9 MG/DL (ref 8.5–10.1)
CHLORIDE SERPL-SCNC: 111 MMOL/L (ref 94–109)
CHOLEST SERPL-MCNC: 133 MG/DL
CO2 SERPL-SCNC: 25 MMOL/L (ref 20–32)
CREAT SERPL-MCNC: 0.68 MG/DL (ref 0.52–1.04)
ERYTHROCYTE [DISTWIDTH] IN BLOOD BY AUTOMATED COUNT: 14.4 % (ref 10–15)
GFR SERPL CREATININE-BSD FRML MDRD: 86 ML/MIN/1.7M2
GLUCOSE SERPL-MCNC: 98 MG/DL (ref 70–99)
HCT VFR BLD AUTO: 26.2 % (ref 35–47)
HDLC SERPL-MCNC: 41 MG/DL
HGB BLD-MCNC: 11.7 G/DL (ref 11.7–15.7)
HGB BLD-MCNC: 9.1 G/DL (ref 11.7–15.7)
LDLC SERPL CALC-MCNC: 70 MG/DL
MCH RBC QN AUTO: 29.8 PG (ref 26.5–33)
MCHC RBC AUTO-ENTMCNC: 34.7 G/DL (ref 31.5–36.5)
MCV RBC AUTO: 86 FL (ref 78–100)
NONHDLC SERPL-MCNC: 92 MG/DL
NUM BPU REQUESTED: 4
PLATELET # BLD AUTO: 165 10E9/L (ref 150–450)
POTASSIUM SERPL-SCNC: 3.5 MMOL/L (ref 3.4–5.3)
RBC # BLD AUTO: 3.05 10E12/L (ref 3.8–5.2)
SODIUM SERPL-SCNC: 142 MMOL/L (ref 133–144)
SPECIMEN EXP DATE BLD: NORMAL
TRANSFUSION STATUS PATIENT QL: NORMAL
TRANSFUSION STATUS PATIENT QL: NORMAL
TRIGL SERPL-MCNC: 110 MG/DL
TROPONIN I SERPL-MCNC: 3.76 UG/L (ref 0–0.04)
TROPONIN I SERPL-MCNC: 5.36 UG/L (ref 0–0.04)
TROPONIN I SERPL-MCNC: 5.66 UG/L (ref 0–0.04)
WBC # BLD AUTO: 10.5 10E9/L (ref 4–11)

## 2018-02-19 PROCEDURE — P9016 RBC LEUKOCYTES REDUCED: HCPCS | Performed by: FAMILY MEDICINE

## 2018-02-19 PROCEDURE — 37000009 ZZH ANESTHESIA TECHNICAL FEE, EACH ADDTL 15 MIN: Performed by: OBSTETRICS & GYNECOLOGY

## 2018-02-19 PROCEDURE — G0378 HOSPITAL OBSERVATION PER HR: HCPCS

## 2018-02-19 PROCEDURE — 36416 COLLJ CAPILLARY BLOOD SPEC: CPT | Performed by: NURSE ANESTHETIST, CERTIFIED REGISTERED

## 2018-02-19 PROCEDURE — A9270 NON-COVERED ITEM OR SERVICE: HCPCS | Mod: GY | Performed by: INTERNAL MEDICINE

## 2018-02-19 PROCEDURE — 40000275 ZZH STATISTIC RCP TIME EA 10 MIN

## 2018-02-19 PROCEDURE — 71000014 ZZH RECOVERY PHASE 1 LEVEL 2 FIRST HR: Performed by: OBSTETRICS & GYNECOLOGY

## 2018-02-19 PROCEDURE — 25000132 ZZH RX MED GY IP 250 OP 250 PS 637: Mod: GY | Performed by: INTERNAL MEDICINE

## 2018-02-19 PROCEDURE — 80061 LIPID PANEL: CPT | Performed by: INTERNAL MEDICINE

## 2018-02-19 PROCEDURE — 88305 TISSUE EXAM BY PATHOLOGIST: CPT | Mod: 26 | Performed by: OBSTETRICS & GYNECOLOGY

## 2018-02-19 PROCEDURE — 93005 ELECTROCARDIOGRAM TRACING: CPT | Mod: XS

## 2018-02-19 PROCEDURE — 85018 HEMOGLOBIN: CPT | Performed by: NURSE ANESTHETIST, CERTIFIED REGISTERED

## 2018-02-19 PROCEDURE — 93010 ELECTROCARDIOGRAM REPORT: CPT | Performed by: INTERNAL MEDICINE

## 2018-02-19 PROCEDURE — 36000056 ZZH SURGERY LEVEL 3 1ST 30 MIN: Performed by: OBSTETRICS & GYNECOLOGY

## 2018-02-19 PROCEDURE — 27210794 ZZH OR GENERAL SUPPLY STERILE: Performed by: OBSTETRICS & GYNECOLOGY

## 2018-02-19 PROCEDURE — 25000128 H RX IP 250 OP 636: Performed by: OBSTETRICS & GYNECOLOGY

## 2018-02-19 PROCEDURE — 88305 TISSUE EXAM BY PATHOLOGIST: CPT | Mod: TC | Performed by: OBSTETRICS & GYNECOLOGY

## 2018-02-19 PROCEDURE — 37000008 ZZH ANESTHESIA TECHNICAL FEE, 1ST 30 MIN: Performed by: OBSTETRICS & GYNECOLOGY

## 2018-02-19 PROCEDURE — 25000128 H RX IP 250 OP 636: Performed by: NURSE ANESTHETIST, CERTIFIED REGISTERED

## 2018-02-19 PROCEDURE — 36415 COLL VENOUS BLD VENIPUNCTURE: CPT | Performed by: INTERNAL MEDICINE

## 2018-02-19 PROCEDURE — 25000128 H RX IP 250 OP 636: Performed by: FAMILY MEDICINE

## 2018-02-19 PROCEDURE — 85027 COMPLETE CBC AUTOMATED: CPT | Performed by: INTERNAL MEDICINE

## 2018-02-19 PROCEDURE — 84484 ASSAY OF TROPONIN QUANT: CPT | Mod: 91 | Performed by: INTERNAL MEDICINE

## 2018-02-19 PROCEDURE — 36000058 ZZH SURGERY LEVEL 3 EA 15 ADDTL MIN: Performed by: OBSTETRICS & GYNECOLOGY

## 2018-02-19 PROCEDURE — 94640 AIRWAY INHALATION TREATMENT: CPT

## 2018-02-19 PROCEDURE — 25000125 ZZHC RX 250: Performed by: OBSTETRICS & GYNECOLOGY

## 2018-02-19 PROCEDURE — 58563 HYSTEROSCOPY ABLATION: CPT | Performed by: NURSE ANESTHETIST, CERTIFIED REGISTERED

## 2018-02-19 PROCEDURE — 58563 HYSTEROSCOPY ABLATION: CPT | Performed by: ANESTHESIOLOGY

## 2018-02-19 PROCEDURE — 25000125 ZZHC RX 250: Performed by: ANESTHESIOLOGY

## 2018-02-19 PROCEDURE — 81288 MLH1 GENE: CPT | Performed by: OBSTETRICS & GYNECOLOGY

## 2018-02-19 PROCEDURE — 99225 ZZC SUBSEQUENT OBSERVATION CARE,LEVEL II: CPT | Performed by: INTERNAL MEDICINE

## 2018-02-19 PROCEDURE — 25000125 ZZHC RX 250: Performed by: NURSE ANESTHETIST, CERTIFIED REGISTERED

## 2018-02-19 PROCEDURE — 58563 HYSTEROSCOPY ABLATION: CPT | Performed by: OBSTETRICS & GYNECOLOGY

## 2018-02-19 PROCEDURE — 25000128 H RX IP 250 OP 636: Performed by: INTERNAL MEDICINE

## 2018-02-19 PROCEDURE — 99140 ANES COMP EMERGENCY COND: CPT | Performed by: NURSE ANESTHETIST, CERTIFIED REGISTERED

## 2018-02-19 PROCEDURE — 40000306 ZZH STATISTIC PRE PROC ASSESS II: Performed by: OBSTETRICS & GYNECOLOGY

## 2018-02-19 PROCEDURE — 00000159 ZZHCL STATISTIC H-SEND OUTS PREP: Performed by: OBSTETRICS & GYNECOLOGY

## 2018-02-19 PROCEDURE — 80048 BASIC METABOLIC PNL TOTAL CA: CPT | Performed by: INTERNAL MEDICINE

## 2018-02-19 PROCEDURE — 25000125 ZZHC RX 250: Performed by: INTERNAL MEDICINE

## 2018-02-19 PROCEDURE — 94640 AIRWAY INHALATION TREATMENT: CPT | Mod: 76

## 2018-02-19 PROCEDURE — 88342 IMHCHEM/IMCYTCHM 1ST ANTB: CPT | Mod: TC | Performed by: OBSTETRICS & GYNECOLOGY

## 2018-02-19 PROCEDURE — 88341 IMHCHEM/IMCYTCHM EA ADD ANTB: CPT | Mod: TC | Performed by: OBSTETRICS & GYNECOLOGY

## 2018-02-19 RX ORDER — ONDANSETRON 2 MG/ML
4 INJECTION INTRAMUSCULAR; INTRAVENOUS EVERY 30 MIN PRN
Status: DISCONTINUED | OUTPATIENT
Start: 2018-02-19 | End: 2018-02-19 | Stop reason: HOSPADM

## 2018-02-19 RX ORDER — NALOXONE HYDROCHLORIDE 0.4 MG/ML
.1-.4 INJECTION, SOLUTION INTRAMUSCULAR; INTRAVENOUS; SUBCUTANEOUS
Status: DISCONTINUED | OUTPATIENT
Start: 2018-02-19 | End: 2018-02-20 | Stop reason: HOSPADM

## 2018-02-19 RX ORDER — HYDROMORPHONE HYDROCHLORIDE 1 MG/ML
.3-.5 INJECTION, SOLUTION INTRAMUSCULAR; INTRAVENOUS; SUBCUTANEOUS EVERY 5 MIN PRN
Status: DISCONTINUED | OUTPATIENT
Start: 2018-02-19 | End: 2018-02-19 | Stop reason: HOSPADM

## 2018-02-19 RX ORDER — OXYCODONE HYDROCHLORIDE 5 MG/1
5 TABLET ORAL EVERY 4 HOURS PRN
Qty: 18 TABLET | Refills: 0 | Status: SHIPPED | OUTPATIENT
Start: 2018-02-19 | End: 2018-02-20

## 2018-02-19 RX ORDER — SODIUM CHLORIDE, SODIUM LACTATE, POTASSIUM CHLORIDE, CALCIUM CHLORIDE 600; 310; 30; 20 MG/100ML; MG/100ML; MG/100ML; MG/100ML
INJECTION, SOLUTION INTRAVENOUS CONTINUOUS
Status: DISCONTINUED | OUTPATIENT
Start: 2018-02-19 | End: 2018-02-19 | Stop reason: HOSPADM

## 2018-02-19 RX ORDER — LABETALOL HYDROCHLORIDE 5 MG/ML
10 INJECTION, SOLUTION INTRAVENOUS
Status: DISCONTINUED | OUTPATIENT
Start: 2018-02-19 | End: 2018-02-19 | Stop reason: HOSPADM

## 2018-02-19 RX ORDER — FENTANYL CITRATE 50 UG/ML
INJECTION, SOLUTION INTRAMUSCULAR; INTRAVENOUS PRN
Status: DISCONTINUED | OUTPATIENT
Start: 2018-02-19 | End: 2018-02-19

## 2018-02-19 RX ORDER — ONDANSETRON 4 MG/1
4 TABLET, ORALLY DISINTEGRATING ORAL EVERY 30 MIN PRN
Status: DISCONTINUED | OUTPATIENT
Start: 2018-02-19 | End: 2018-02-19 | Stop reason: HOSPADM

## 2018-02-19 RX ORDER — PROPOFOL 10 MG/ML
INJECTION, EMULSION INTRAVENOUS CONTINUOUS PRN
Status: DISCONTINUED | OUTPATIENT
Start: 2018-02-19 | End: 2018-02-19

## 2018-02-19 RX ORDER — ASPIRIN 81 MG/1
81 TABLET ORAL DAILY
Status: DISCONTINUED | OUTPATIENT
Start: 2018-02-19 | End: 2018-02-20 | Stop reason: HOSPADM

## 2018-02-19 RX ORDER — OXYCODONE HYDROCHLORIDE 5 MG/1
5 TABLET ORAL EVERY 4 HOURS PRN
Status: DISCONTINUED | OUTPATIENT
Start: 2018-02-19 | End: 2018-02-19

## 2018-02-19 RX ORDER — FUROSEMIDE 10 MG/ML
20 INJECTION INTRAMUSCULAR; INTRAVENOUS ONCE
Status: COMPLETED | OUTPATIENT
Start: 2018-02-19 | End: 2018-02-19

## 2018-02-19 RX ORDER — FENTANYL CITRATE 50 UG/ML
25-50 INJECTION, SOLUTION INTRAMUSCULAR; INTRAVENOUS
Status: DISCONTINUED | OUTPATIENT
Start: 2018-02-19 | End: 2018-02-19 | Stop reason: HOSPADM

## 2018-02-19 RX ORDER — HYDRALAZINE HYDROCHLORIDE 20 MG/ML
2.5-5 INJECTION INTRAMUSCULAR; INTRAVENOUS EVERY 10 MIN PRN
Status: DISCONTINUED | OUTPATIENT
Start: 2018-02-19 | End: 2018-02-19 | Stop reason: HOSPADM

## 2018-02-19 RX ORDER — LISINOPRIL 2.5 MG/1
2.5 TABLET ORAL DAILY
Status: DISCONTINUED | OUTPATIENT
Start: 2018-02-19 | End: 2018-02-20

## 2018-02-19 RX ORDER — DEXAMETHASONE SODIUM PHOSPHATE 4 MG/ML
4 INJECTION, SOLUTION INTRA-ARTICULAR; INTRALESIONAL; INTRAMUSCULAR; INTRAVENOUS; SOFT TISSUE
Status: DISCONTINUED | OUTPATIENT
Start: 2018-02-19 | End: 2018-02-19 | Stop reason: HOSPADM

## 2018-02-19 RX ORDER — PROPOFOL 10 MG/ML
INJECTION, EMULSION INTRAVENOUS PRN
Status: DISCONTINUED | OUTPATIENT
Start: 2018-02-19 | End: 2018-02-19

## 2018-02-19 RX ORDER — LIDOCAINE HYDROCHLORIDE 10 MG/ML
INJECTION, SOLUTION INFILTRATION; PERINEURAL PRN
Status: DISCONTINUED | OUTPATIENT
Start: 2018-02-19 | End: 2018-02-19 | Stop reason: HOSPADM

## 2018-02-19 RX ORDER — SCOLOPAMINE TRANSDERMAL SYSTEM 1 MG/1
1 PATCH, EXTENDED RELEASE TRANSDERMAL ONCE
Status: COMPLETED | OUTPATIENT
Start: 2018-02-19 | End: 2018-02-19

## 2018-02-19 RX ORDER — ALBUTEROL SULFATE 0.83 MG/ML
2.5 SOLUTION RESPIRATORY (INHALATION) EVERY 4 HOURS PRN
Status: DISCONTINUED | OUTPATIENT
Start: 2018-02-19 | End: 2018-02-19 | Stop reason: HOSPADM

## 2018-02-19 RX ORDER — SODIUM CHLORIDE 9 MG/ML
1000 INJECTION, SOLUTION INTRAVENOUS CONTINUOUS
Status: DISCONTINUED | OUTPATIENT
Start: 2018-02-19 | End: 2018-02-19

## 2018-02-19 RX ORDER — CEFAZOLIN SODIUM 1 G/3ML
1 INJECTION, POWDER, FOR SOLUTION INTRAMUSCULAR; INTRAVENOUS SEE ADMIN INSTRUCTIONS
Status: DISCONTINUED | OUTPATIENT
Start: 2018-02-19 | End: 2018-02-19 | Stop reason: HOSPADM

## 2018-02-19 RX ORDER — OXYCODONE HYDROCHLORIDE 5 MG/1
5 TABLET ORAL EVERY 4 HOURS PRN
Status: DISCONTINUED | OUTPATIENT
Start: 2018-02-19 | End: 2018-02-20 | Stop reason: HOSPADM

## 2018-02-19 RX ORDER — CEFAZOLIN SODIUM 2 G/100ML
2 INJECTION, SOLUTION INTRAVENOUS
Status: COMPLETED | OUTPATIENT
Start: 2018-02-19 | End: 2018-02-19

## 2018-02-19 RX ORDER — MEPERIDINE HYDROCHLORIDE 25 MG/ML
12.5 INJECTION INTRAMUSCULAR; INTRAVENOUS; SUBCUTANEOUS EVERY 5 MIN PRN
Status: DISCONTINUED | OUTPATIENT
Start: 2018-02-19 | End: 2018-02-19 | Stop reason: HOSPADM

## 2018-02-19 RX ADMIN — FENTANYL CITRATE 25 MCG: 50 INJECTION, SOLUTION INTRAMUSCULAR; INTRAVENOUS at 10:20

## 2018-02-19 RX ADMIN — IPRATROPIUM BROMIDE AND ALBUTEROL SULFATE 3 ML: .5; 3 SOLUTION RESPIRATORY (INHALATION) at 02:27

## 2018-02-19 RX ADMIN — FUROSEMIDE 20 MG: 10 INJECTION, SOLUTION INTRAVENOUS at 09:02

## 2018-02-19 RX ADMIN — IPRATROPIUM BROMIDE AND ALBUTEROL SULFATE 3 ML: .5; 3 SOLUTION RESPIRATORY (INHALATION) at 17:38

## 2018-02-19 RX ADMIN — GUAIFENESIN 1200 MG: 600 TABLET, EXTENDED RELEASE ORAL at 20:52

## 2018-02-19 RX ADMIN — FENTANYL CITRATE 50 MCG: 50 INJECTION, SOLUTION INTRAMUSCULAR; INTRAVENOUS at 10:40

## 2018-02-19 RX ADMIN — CEFAZOLIN SODIUM 2 G: 2 INJECTION, SOLUTION INTRAVENOUS at 10:17

## 2018-02-19 RX ADMIN — SODIUM CHLORIDE 1000 ML: 9 INJECTION, SOLUTION INTRAVENOUS at 00:46

## 2018-02-19 RX ADMIN — SODIUM CHLORIDE 1000 ML: 9 INJECTION, SOLUTION INTRAVENOUS at 08:11

## 2018-02-19 RX ADMIN — SCOPALAMINE 1 PATCH: 1 PATCH, EXTENDED RELEASE TRANSDERMAL at 09:48

## 2018-02-19 RX ADMIN — LORATADINE 10 MG: 10 TABLET ORAL at 20:52

## 2018-02-19 RX ADMIN — ASPIRIN 81 MG: 81 TABLET, COATED ORAL at 16:54

## 2018-02-19 RX ADMIN — SODIUM CHLORIDE, POTASSIUM CHLORIDE, SODIUM LACTATE AND CALCIUM CHLORIDE: 600; 310; 30; 20 INJECTION, SOLUTION INTRAVENOUS at 09:59

## 2018-02-19 RX ADMIN — MIDAZOLAM 1 MG: 1 INJECTION INTRAMUSCULAR; INTRAVENOUS at 10:17

## 2018-02-19 RX ADMIN — PROPOFOL 40 MCG/KG/MIN: 10 INJECTION, EMULSION INTRAVENOUS at 10:23

## 2018-02-19 RX ADMIN — LISINOPRIL 2.5 MG: 2.5 TABLET ORAL at 16:53

## 2018-02-19 RX ADMIN — PROPOFOL 20 MG: 10 INJECTION, EMULSION INTRAVENOUS at 10:34

## 2018-02-19 ASSESSMENT — COPD QUESTIONNAIRES: COPD: 1

## 2018-02-19 NOTE — OP NOTE
[place]   [facility] Gynecology Operative Note     Pre-operative diagnosis: Post menopausal bleeding, Metrorrhagia, Anemia   Post-operative diagnosis: Same   Procedure: Hysteroscopy. Dilatation and curettage and Endometrial ablation with Novasure   Surgeon: Jose Nettles MD   Assistant(s): None   Anesthesia: MAC (monitored anesthesia care) with paracervical block   Estimated blood loss: less than 50ml   Total IV fluids: (See anesthesia record)   Blood transfusion: No transfusion was given during surgery   Total urine output: (See anesthesia record)   Drains: None   Specimens: Endometrial curettings   Findings: Cervical fibroids ;  Irregular shedding of the endometrium   Complications: None   Condition: Stable   Comments: See typed operative report for full details      Details of operation:  Indications for surgery:  Bleeding for 5 days with the passage of blood clots to the point of anemia of Hb 6.5 requiring blood   Transfusion because she became symptomatic with chest pain and some breathlessness.  Blood transfusion given:   3 units of RBC  Last of 3 units running on way to OR    The patient was given IV sedation with Propofol .  She has sleep apnea with tongue falling back requiring the use of a nasal tube.  The Vulva and vagina was prepped with an antiseptic solution. The bladder was emptied with a catheter.  A time out was called and the surgical nursing staff, anesthetist, and surgeon were in agreement as to the surgery being a   Hysteroscopy, D&C and endometrial ablation with Novasure.  The cervix was exposed with a speculum and the anterior lip of the cervix was grasped with a tenaculum.  The uterus was sounded and the cavity length was 9 cm.  The cervix was dilated to Hegar 8 and the uterine cavity was observed with the hysteroscope.  There were 2 cervical fibroids.  The endometrial cavity had irregular shedding of the endometrium.  A curettage was carried out with a sharp curette  until the uterine cavity was empty as was indicated with a gritty sensation.  Curetting were sent for histology.  The Novasure instrument was then introduced and the wire array opened.  The settings of the Novasure for ablation were Lengh 6, Cavity width 3  Power 99  Time of ablation 1.27 min  Ablation was carried out without incident.  The Novasure instrument was removed and the uterus was then observed with the hysteroscope.  There was good ablation and charring of the endometrium indicating a satisfactory ablation.  No active bleeding.    Plan:  For observation in Medical/Surgical unit and home after that.

## 2018-02-19 NOTE — PLAN OF CARE
Pt left with surgery tech at 0914, P RBC transfusing, pt denies pain/discomfort upon leaving [facility] floor.

## 2018-02-19 NOTE — PLAN OF CARE
DATE:  2/18/2018   TIME OF RECEIPT FROM LAB:  2411  LAB TEST:  Troponin  LAB VALUE:  0.9996  RESULTS GIVEN WITH READ-BACK TO (PROVIDER):  Dr Mistry and Jose Nettles MD  TIME LAB VALUE REPORTED TO PROVIDER:   3534

## 2018-02-19 NOTE — PROGRESS NOTES
Assessment completed see flowsheet.   ?   LOC: alert   Others present: Patient   Dx: Post menopausal bleeding / chest pain/ elevated troponins   ?   Lives with: Lives With: alone   Living at: Living Arrangements: apartment   Equipment used: None    Support System: Description of Support System: 2 sons / supportive   ?   ?   Primary PCP: Dr. Avila   POA/Guardian: No    Health Care Directive: NO and doesn't want any information   Pharmacy: [facility]   : No   Homecare/County Services: NO   ?   Adequate Resources for needs (housing, utilities, food/med): YES   Meds and appointments management: Manages her own appointments . States she's on no medications   Work: NO   Transportation: YES   ?   ADLs: Independent   Falls: No   Able to Return to Prior Living Arrangements:Yes  [place]: NO   ?   ?   ?   ?   Goals: Return to apartment   Barriers: No   Needs: No   TOBI: None   Discharge Plan: Return to her apartment  Met with Heather in her room. Alert and pleasant. Lives alone in an apartment at [facility]. Recently moved here from [place].  Feels safe at the apartment.  PCP Dr. Avila seen a year ago.  Dentist Dr. Zaragoza in [place].Pharmacy [facility].  No eye care. Only uses reading glasses. No county services. Did contact patient  financial to see her as she only has Medicare. No health care directive and does not want any information. States she takes no medications. Manages her own appointments.   Independent with her cares. No falls. Does her own shopping and food prep. Sleeps from 11-7 every night.  Drives has two cars. Not working . Stated I guess I'm retired. Smokes and stated she already has smoking cessation information. Does not use alcohol. No drug use. No mood concerns. She is [employer] and stated she needs to enroll at [employer] now that she is in [place]. Support system are her sons Valentin and Montrell. Spare time she sews quilts.   Heather indicates she has no needs for discharge and plans to  discharge back to her apartment. CTS identifies no needs . CTS will remain available.

## 2018-02-19 NOTE — PROVIDER NOTIFICATION
DATE:  2/19/2018   TIME OF RECEIPT FROM LAB:  1680  LAB TEST:  troponin  LAB VALUE:  3.762  RESULTS GIVEN WITH READ-BACK TO (PROVIDER):  Dr. Smith  TIME LAB VALUE REPORTED TO PROVIDER:   1392

## 2018-02-19 NOTE — PLAN OF CARE
T/c to Dr. Smith regarding patients refusal for BP and last /77  Pulse 97  Temp 98.6  F (37  C) (Oral)  Resp 18  SpO2 97%.  Pt has expiratory wheezes noted.  Received new order for Lasix (See MAR).  MD waggoner with no BP, pt has telemetry in place and will call staff if abnormal HR.  Will watch for s/s of hypotension.  Pt denies any s/s of infusion reaction at this time since transfusion started at 0836.

## 2018-02-19 NOTE — PROGRESS NOTES
[facility]    Hospitalist Progress Note    Date of Service (when I saw the patient): 02/19/2018    Assessment & Plan   Heather Rosas is a 67 year old woman who was admitted on 2/18/2018.  We were consulted for evaluation of approximately 20 minutes of substernal chest pain yesterday afternoon shortly after she presented while she was still profoundly anemic.  She is also demonstrated an elevated troponin which is now begun to decline.  She is otherwise had no exercise related symptoms in the past.  She had no symptoms associated with her chest discomfort such as radiation, or diaphoresis.  She had noted some mild dyspnea likely associated with her anemia.  Risk factors include smoking and dyslipidemia identified in the past although repeat this morning shows an LDL of 70 and total cholesterol which is not elevated.  She is not markedly physically active.  She presented noting approximately 5 days of bleeding which symptomatically had declined somewhat in the past day or so.  She does show evidence of continued bleeding with hemoglobin is not increasing significantly after her last unit of packed red blood cells.  She is received a total of 3 units.  EKG is showing a borderline sinus tachycardia.  Right bundle branch block is noted.  Some T-wave changes although these are quite nonspecific and overall not highly concerning.  Overall course is most significant with so-called demand ischemia and does not suggest active occlusive coronary disease.  As noted above she does show evidence of significant ongoing uterine bleeding.  Risk of proposed procedure is moderate however the risk of delaying it further is likely greater.  I have discussed with her that she is at risk of complications that could include further myocardial infarction, shock, or even death however her risk of delaying the procedure is also not inconsiderable.  Risk profile is not unreasonable for the proposed procedure.  Any anesthetic  "approach with consideration of her moderately elevated risk would be quite reasonable.  I have been able to discuss the patient's course and findings with Dr. Nieto of anesthesia.    We will continue to follow this patient with you during her perioperative course.    1.  Elevated troponins  As above this does suggest \"demand\" ischemia but does not suggest any active occlusive coronary process.  In the future graded cardiac stress test would be reasonable and further grading her risk.  Proposed procedure with appropriate attention to this moderately elevated risk is reasonable given her ongoing bleeding.  Once procedure is completed several further serial troponins, while unlikely to change any immediate treatment, will help in further assessing her prognosis.  Without clinical evidence of myocardial dysfunction I am not certain that echocardiogram will be of benefit at least acutely.  2.  Acute blood loss anemia  Goal hemoglobin given demand ischemia hemoglobin of 9-10.  I requested a further unit of packed red blood cells either prior to or during the procedure.  Active Problems:    Metrorrhagia      # Pain Assessment:   Current Pain Score 7/23/2017 7/22/2017 6/12/2015   Pain score (0-10) 0 0 6   Heather roblero pain level was assessed and she currently denies pain.       DVT Prophylaxis: Defer to primary service; active anticoagulation relatively contraindicated because of bleeding.  Code Status: No Order    Disposition: Expected discharge depends on her response to operative intervention.    [place]    Interval History   Awake alert pleasant and interactive.  No dyspnea.  She has had no chest pain since the brief episode yesterday afternoon.  Standing at her bedside this morning without orthostasis or any other disequilibrium or dizziness.    -Data reviewed today: I reviewed all new labs and imaging results over the last 24 hours. I personally reviewed  EKGs both yesterday and today with findings as " above.    Peripheral IV 07/22/17 Right Hand (Active)   Number of days:212       Peripheral IV 07/23/17 Left Upper forearm (Active)   Number of days:211       Peripheral IV 02/18/18 Left (Active)   Site Assessment [facility] 2/19/2018 12:00 AM   Line Status [place] locked 2/19/2018 12:00 AM   Phlebitis Scale 0-->no symptoms 2/19/2018 12:00 AM   Number of days:1       Peripheral IV 02/18/18 Right (Active)   Site Assessment [facility] 2/19/2018 12:00 AM   Line Status Infusing 2/19/2018 12:00 AM   Phlebitis Scale 0-->no symptoms 2/19/2018 12:00 AM   Number of days:1       Urethral Catheter Straight-tip 16 fr (Active)   Number of days:211     Line/device assessment(s) completed for medical necessity    Physical Exam   Temp: 98.7  F (37.1  C) Temp src: Oral BP: 158/72 Pulse: 97 Heart Rate: 92 Resp: 16 SpO2: 97 % O2 Device: None (Room air)    Vital Signs with Ranges  Temp:  [97.7  F (36.5  C)-100.2  F (37.9  C)] 98.7  F (37.1  C)  Pulse:  [] 97  Heart Rate:  [] 92  Resp:  [15-22] 16  BP: (132-178)/() 158/72  SpO2:  [96 %-100 %] 97 %     Awake, alert, interactive.  Speech is clear.  Appears comfortable.  HEENT: Pupils equal, conjugate. No icterus or nystagmus. Oral mucosa moist. No facial asymmetry.   Neck: Supple, jugular veins not elevated. Trachea midline   Chest: No chest wall movement asymmetry. Aeration preserved to bases. Accessory muscles not in use. Expiratory time not increased. No tidal wheezes. No rhonchi. No discrete crackles.   Cardiac: PMI not displaced. S1, S2 unremarkable.  Distinct heart tones.  No S3, S4. P2 not accentuated. No murmurs.    Abdomen: Soft. No palpation or percussion tenderness. No distention. Normoactive bowel sounds. Liver and spleen not increased in size. No bruits, masses, or pulsations.   Extremities: No lower extremity edema.  Extremities warm distally.  Brisk capillary refill.  No eccymoses, clubbing.   Neurologic: Mental state above. Motor 5/5 and bilaterally equal. Tone  preserved. No fasiculations or tremors.     Medications     sodium chloride         loratadine  10 mg Oral Daily     fluticasone  1 spray Both Nostrils Daily     guaiFENesin  1,200 mg Oral BID           Data     Recent Labs  Lab 02/19/18  0615 02/19/18  0235 02/18/18  2204 02/18/18  1925  02/18/18  1149   WBC 10.5  --   --   --   --  11.9*   HGB 9.1*  --   --  8.8*  --  6.5*   MCV 86  --   --   --   --  86     --   --   --   --  200     --   --   --   --  138   POTASSIUM 3.5  --   --   --   --  3.5   CHLORIDE 111*  --   --   --   --  104   CO2 25  --   --   --   --  26   BUN 10  --   --   --   --  10   CR 0.68  --   --   --   --  0.74   ANIONGAP 6  --   --   --   --  8   CARINA 7.9*  --   --   --   --  7.9*   GLC 98  --   --   --   --  158*   ALBUMIN  --   --   --   --   --  2.6*   PROTTOTAL  --   --   --   --   --  5.3*   BILITOTAL  --   --   --   --   --  0.2   ALKPHOS  --   --   --   --   --  80   ALT  --   --   --   --   --  20   AST  --   --   --   --   --  17   TROPI 5.361* 5.657* 3.582*  --   < >  --    < > = values in this interval not displayed.    Lactic Acid   Date Value Ref Range Status   07/22/2017 2.2 (H) 0.4 - 2.0 mmol/L Final     Comment:     Significant value called to and read back by  JANE ROTH AT 0034 BY WAL         Recent Results (from the past 24 hour(s))   US Pelvic Complete with Transvaginal    Narrative    PROCEDURE: US PELVIC COMPLETE WITH TRANSVAGINAL    HISTORY: vaginal bleeding;     TECHNIQUE: Transabdominal and transvaginal ultrasound of the pelvis.    COMPARISON: none    FINDINGS:     MEASUREMENTS:    Endometrium: 28 mm in thickness  Right Ovary: 1.5 x 1.9 x 1.3 cm (Length x Height x Width)  Left Ovary:  2.5x1.6 x 1.6 cm (Length x Height x Width)    UTERUS: Is a 3 cm diameter fibroid in the uterine body. The  endometrium is abnormally thickened measuring 2.8 cm.     ADNEXA: Is a small cyst seen in the right ovary measuring 1.5 cm in  diameter. Arteriovenous flow is seen  in both ovaries although  suboptimal images were obtained.    MISC: No free fluid is seen in the cul-de-sac      Impression    IMPRESSION:     Abnormally thickened endometrium for a postmenopausal woman.    JEFERSON NEGRO MD   XR Chest Port 1 View    Narrative    PROCEDURE:  XR CHEST PORT 1 VW    HISTORY:  chest pain; .     COMPARISON:  July 23, 2017    FINDINGS:   The cardiac silhouette is normal in size. The pulmonary vasculature is  normal.  The lungs are clear. No pleural effusion or pneumothorax.      Impression    IMPRESSION:  No acute cardiopulmonary disease.  The bilateral  pulmonary parenchymal opacities have cleared as compared to the  previous examination    JEFERSON NEGRO MD

## 2018-02-19 NOTE — PROGRESS NOTES
[facility]    Hospitalist Progress Note    Date of Service (when I saw the patient): 02/19/2018    Assessment & Plan   Heather Rosas is a 67 year old woman who was admitted on 2/18/2018.  We were consulted for evaluation of approximately 20 minutes of substernal chest pain yesterday afternoon shortly after she presented while she was still profoundly anemic.  She is also demonstrated an elevated troponin which is now begun to decline.  She is otherwise had no exercise related symptoms in the past.  She had no symptoms associated with her chest discomfort such as radiation, or diaphoresis.  She had noted some mild dyspnea likely associated with her anemia.  Risk factors include smoking and dyslipidemia identified in the past although repeat this morning shows an LDL of 70 and total cholesterol which is not elevated.  She is moderately physically active.  She presented noting approximately 5 days of bleeding which symptomatically had declined somewhat in the past day or so.  She does show evidence of continued bleeding with hemoglobin is not increasing significantly after her last unit of packed red blood cells.  She is received a total of 3 units.  EKG is showing a borderline sinus tachycardia.  Right bundle branch block is noted.  Some T-wave changes although these are quite nonspecific and overall not highly concerning.  Overall course is most significant with so-called demand ischemia and does not suggest active occlusive coronary disease.  As noted above she does show evidence of significant ongoing uterine bleeding.  Risk of proposed procedure is moderate however the risk of delaying it further is likely greater.  I have discussed with her that she is at risk of complications that could include further myocardial infarction, shock, or even death however her risk of delaying the procedure is also not inconsiderable.  Risk profile is not unreasonable for the proposed procedure.  Any anesthetic  "approach with consideration of her moderately elevated risk would be quite reasonable.  I have been able to discuss the patient's course and findings with Dr. Nieto of anesthesia.    We will continue to follow this patient with you during her perioperative course.    1.  Elevated troponins  Coronary artery disease  No issues during procedure which included hysteroscopy, D&C, and endometrial ablation.  Anesthesia with total intravenous anesthesia including propofol.  Troponin immediately prior to procedure elevated at approximately 5.3 but beginning to show decline.  She had no issues during the procedure.  She has had no dyspnea or chest pain.  Further review of available records shows a brief admission at [facility] with a clinical impression of COPD exacerbation and hypertensive urgency.  Review of prior clinic records shows only cardiovascular concern being hypertension.  She does meet criteria for non-occlusive coronary disease (\"demand ischemia\").  Would inclined to treat with low-dose aspirin and subsequent stress testing.  In other respects however she certainly requires treatment of her hypertension with issues as detailed below.  2.  Accelerated hypertension  Immediately prior to procedure she did develop moderately elevated blood pressures with pressures of 180-190/.  Although the patient last night did not recall any cardiovascular disease today's she does relates relate that she had discontinued treatment for her hypertension approximately a year ago after she lost some weight.  She has been treated in the past with a variety of medications including diuretic, amlodipine, and ACE inhibitor or ARB.  Particularly given elevated troponins, she requires improved treatment of her blood pressure.  Will add ACE inhibitor and evaluate her response over the next 12-18 hours.  I discussed with her that closer medical follow-up and she has had in the past is important for her and that " "discontinuing medications is not advised.  She is agreeable to this.  3.  Acute blood loss anemia  Goal hemoglobin given demand ischemia hemoglobin of 9-10.  Hemoglobin immediately preoperatively 11.7.  This is somewhat higher than would be a goal ordinarily but given elevated troponins she is at some increased risk of ischemia with a hemoglobin level that would otherwise be well tolerated.  She is undergone endometrial ablation and further bleeding is unlikely.  4.  Post procedure management  Atelectasis postanesthesia  We will ordinarily following total intravenous anesthesia/propofol prolonged monitoring not mandated but as the patient intends to return home where she lives alone and given her somewhat complicated course further monitoring until tomorrow morning appears warranted.  Borderline hypoxemia responding rapidly to instructions at deep inspiration and coughing.  Likely combination of atelectasis related to abdominal procedure and expected effects of anesthesia itself.  Particularly given issues as above including \"demand ischemia\" a period of monitoring with strict nursing instruction for pulmonary hygiene is warranted.  6.  Home management and behavioral health concerns.  Family members and including daughter-in-law who is a physician's assistant, raises concerns regarding her safety at home as well as previous behavioral health concerns which they described to nursing staff is including a history of schizophrenia and bipolar affective disorder on my evaluation of the patient it is difficult to.  Be certain that in fact the patient is vulnerable or after an adequate period of observation postanesthesia unsafe at home.  Best option may be to recommend to family that observation by frequent visits and perhaps if the patient is agreeable accompanying her to medical follow-up may be the best option.  I have discussed issues with social workers/.    Although plans were in place for discharge " "today, and  that given issues including poorly controlled hypertension beginning only shortly prior to procedure, postoperative atelectasis and borderline hypoxemia, and need for meticulous management given underlying \"demand ischemia,\" it now appears based on my evaluation postoperatively, that a further 12-18 hours of in-hospital monitoring is warranted.    # Pain Assessment:   Current Pain Score 2/19/2018 2/19/2018 2/19/2018   Patient currently in pain? denies denies yes   Pain score (0-10) - - 2   Pain location - - Abdomen   Pain descriptors - - Raphael roblero pain level was assessed and she currently denies pain.  Anticipate the development of postprocedure pain however.  Initial management with oral oxycodone requested by Dr. Nettles.  Patient herself is quite satisfied with her symptom control at the present time.     DVT Prophylaxis: Active ambulation.  Anticoagulation with the exception of antiplatelet medications (aspirin) contraindicated postoperatively.  Code Status: Prior    Disposition: Expected discharge in another 12-18 hours.    [place]    Interval History   Awake alert and interactive.  No discomfort.  No dyspnea.  No chest pain.    -Data reviewed today: I reviewed all new labs and imaging results over the last 24 hours. I personally reviewed  EKGs both yesterday and today with findings as above.    Peripheral IV 07/22/17 Right Hand (Active)   Number of days:212       Peripheral IV 07/23/17 Left Upper forearm (Active)   Number of days:211       Peripheral IV 02/19/18 Left Hand (Active)   Site Assessment [facility] 2/19/2018  1:00 PM   Line Status Infusing 2/19/2018  1:00 PM   Phlebitis Scale 0-->no symptoms 2/19/2018  1:00 PM   Infiltration Scale 0 2/19/2018  1:00 PM   Extravasation? No 2/19/2018 11:30 AM   Number of days:0       Peripheral IV 02/19/18 Right Hand (Active)   Site Assessment [facility] 2/19/2018  1:00 PM   Line Status Saline locked 2/19/2018  1:00 PM   Phlebitis Scale 0-->no symptoms " 2/19/2018  1:00 PM   Infiltration Scale 0 2/19/2018  1:00 PM   Extravasation? No 2/19/2018 11:30 AM   Number of days:0       Urethral Catheter Straight-tip 16 fr (Active)   Number of days:211       Incision/Surgical Site 02/19/18 Perineum (Active)   Incision Assessment UTV 2/19/2018 12:20 PM   Incision Drainage Amount Small 2/19/2018 11:17 AM   Drainage Description Sanguinous 2/19/2018 11:17 AM   Dressing Intervention Clean, dry, intact 2/19/2018 12:20 PM   Number of days:0     Line/device assessment(s) completed for medical necessity    Physical Exam   Temp: 98.2  F (36.8  C) Temp src: Temporal BP: 171/85 Pulse: 97 Heart Rate: 95 Resp: 16 SpO2: 93 % O2 Device: Nasal cannula Oxygen Delivery: 2 LPM  Vital Signs with Ranges  Temp:  [97.1  F (36.2  C)-100.2  F (37.9  C)] 98.2  F (36.8  C)  Pulse:  [] 97  Heart Rate:  [] 95  Resp:  [15-25] 16  BP: (146-186)/() 171/85  SpO2:  [89 %-99 %] 93 %     Awake, alert, interactive.  Speech is clear.  Appears comfortable.  HEENT: Pupils equal, conjugate. No icterus or nystagmus. Oral mucosa moist. No facial asymmetry.   Neck: Supple, jugular veins not elevated. Trachea midline   Chest: No chest wall movement asymmetry. Aeration minimally diminished at bases. Accessory muscles not in use. Expiratory time not increased. No tidal wheezes.  Few basilar medium rhonchi. No discrete crackles.   Cardiac: PMI not displaced. S1, S2 unremarkable.  Distinct heart tones.  No S3, S4. P2 not accentuated. No murmurs.    Abdomen: Soft. No palpation or percussion tenderness. No distention. Normoactive bowel sounds. Liver and spleen not increased in size. No bruits, masses, or pulsations.   Extremities: No lower extremity edema.  Extremities warm distally.  Brisk capillary refill.  No eccymoses, clubbing.   Neurologic: Mental state above. Motor 5/5 and bilaterally equal. Tone preserved. No fasiculations or tremors.     Medications     sodium chloride Stopped (02/19/18 0969)        scopolamine   Transdermal Q8H     [START ON 2/20/2018] scopolamine   Transdermal Once     lisinopril  2.5 mg Oral Daily     aspirin EC  81 mg Oral Daily     loratadine  10 mg Oral Daily     fluticasone  1 spray Both Nostrils Daily     guaiFENesin  1,200 mg Oral BID           Data     Recent Labs  Lab 02/19/18  1154 02/19/18  0615 02/19/18  0235 02/18/18  2204 02/18/18  1925  02/18/18  1149   WBC  --  10.5  --   --   --   --  11.9*   HGB 11.7 9.1*  --   --  8.8*  --  6.5*   MCV  --  86  --   --   --   --  86   PLT  --  165  --   --   --   --  200   NA  --  142  --   --   --   --  138   POTASSIUM  --  3.5  --   --   --   --  3.5   CHLORIDE  --  111*  --   --   --   --  104   CO2  --  25  --   --   --   --  26   BUN  --  10  --   --   --   --  10   CR  --  0.68  --   --   --   --  0.74   ANIONGAP  --  6  --   --   --   --  8   CARNIA  --  7.9*  --   --   --   --  7.9*   GLC  --  98  --   --   --   --  158*   ALBUMIN  --   --   --   --   --   --  2.6*   PROTTOTAL  --   --   --   --   --   --  5.3*   BILITOTAL  --   --   --   --   --   --  0.2   ALKPHOS  --   --   --   --   --   --  80   ALT  --   --   --   --   --   --  20   AST  --   --   --   --   --   --  17   TROPI  --  5.361* 5.657* 3.582*  --   < >  --    < > = values in this interval not displayed.    Lactic Acid   Date Value Ref Range Status   07/22/2017 2.2 (H) 0.4 - 2.0 mmol/L Final     Comment:     Significant value called to and read back by  JANE ROTH AT 0034 BY WAL         Recent Results (from the past 24 hour(s))   XR Chest Port 1 View    Narrative    PROCEDURE:  XR CHEST PORT 1 VW    HISTORY:  chest pain; .     COMPARISON:  July 23, 2017    FINDINGS:   The cardiac silhouette is normal in size. The pulmonary vasculature is  normal.  The lungs are clear. No pleural effusion or pneumothorax.      Impression    IMPRESSION:  No acute cardiopulmonary disease.  The bilateral  pulmonary parenchymal opacities have cleared as compared to the  previous  examination    JEFERSON NEGRO MD

## 2018-02-19 NOTE — ANESTHESIA CARE TRANSFER NOTE
Patient: Heather Rosas    Procedure(s):  HYSTEROSCOPY DILATION AND CURETTAGE, ENDOMETRIAL ABLATION - Wound Class: II-Clean Contaminated    Diagnosis: BLEEDING, ANEMIA  Diagnosis Additional Information: No value filed.    Anesthesia Type:   MAC     Note:  Airway :Nasal Cannula  Patient transferred to:PACU  Handoff Report: Identifed the Patient, Identified the Reponsible Provider, Reviewed the pertinent medical history, Discussed the surgical course, Reviewed Intra-OP anesthesia mangement and issues during anesthesia, Set expectations for post-procedure period and Allowed opportunity for questions and acknowledgement of understanding      Vitals: (Last set prior to Anesthesia Care Transfer)    CRNA VITALS  2/19/2018 1043 - 2/19/2018 1122      2/19/2018             Ht Rate: 79    SpO2: 99 %    EKG: Sinus rhythm;PVC's                Electronically Signed By: AUTUMN Thorne CRNA  February 19, 2018  11:22 AM

## 2018-02-19 NOTE — PLAN OF CARE
DATE:  2/18/2018   TIME OF RECEIPT FROM LAB:  2203  LAB TEST:  Troponin  LAB VALUE:  3.582  RESULTS GIVEN WITH READ-BACK TO (PROVIDER):  Dr. Mistry  TIME LAB VALUE REPORTED TO PROVIDER:   9771

## 2018-02-19 NOTE — PLAN OF CARE
DATE:  2/19/2018   TIME OF RECEIPT FROM LAB:  0708  LAB TEST:  Troponin  LAB VALUE:  5.361  RESULTS GIVEN WITH READ-BACK TO (PROVIDER):Dr. [place]  TIME LAB VALUE REPORTED TO PROVIDER:   0719

## 2018-02-19 NOTE — PLAN OF CARE
Face to face report given with opportunity to observe patient.    Report given to Marisela Jerry   2/19/2018  7:20 AM

## 2018-02-19 NOTE — PLAN OF CARE
Face to face report given with opportunity to observe patient.    Report given to Patrizia Mas   2/18/2018  7:29 PM

## 2018-02-19 NOTE — CONSULTS
ADDENDUM :    Discussed demand ischemia and troponin that is trending up with cardiologist at [facility].  Also voiced my concern about not being able to anticoagulate patient if this constitutes acute coronary syndrome.  Cardiologist recommended that patient should be transfused adequately and then she should undergo gynecological procedure to stop the bleeding.  Anticoagulation is clearly contraindicated at this time.  Patient did not have chest pain prior to becoming anemic therefore it is clearly related to demand ischemia.    She also recommended that patient to be referred to cardiology service to undergo cardiac stress test and further workup as an outpatient.    Patient's hemoglobin is up to 8.8 and hematocrit up to 28, we will transfuse 1 more unit which should bring her hematocrit about 30 and if she remains asymptomatic patient could undergo D&C with ablation under MAC tomorrow.

## 2018-02-19 NOTE — DISCHARGE SUMMARY
Admitted with bleeding per vagina 5 days with the passage of blood clots to the point of anemia of Hb 6.5 and she was symptomatic with chest pain  And some shortness of breath.  3 Units of packed RBC given and Hb raised to 9.5    She was seen by Internal Medicine for chest pain and the consensus was she did not have a myocardial infarction.  Treponin was elevated but CK was normal and EKG showed RBBB but no significant ST segment elevation or inverted T waves    Hysteroscopy D&C and endometrial ablation done with Novasure with no problems or complications.    To Med/ Surg. Floor for observation and then discharge.  See Dr. Nettles in the Ob-Gyn clinic [facility].

## 2018-02-19 NOTE — PROGRESS NOTES
S:   Patient is feeling much better after the blood transfusion        She has seen by Dr. Smith and internal medicine.          Cardiologist were consulted by them and the consensus was that she probably does not         Have an myocardial infarction and that the troponin levels may be associated with the stress of anemia from blood loss.        We have a go ahead with surgery this morning from Internal Medicine Dr. Smith et al.    O:  Patient looks much better this morning with transfusion    A:  Post menopausal bleeding with clots leading to anemia of Hb 6.5 requiring transfusion because she is symptomatic - chest pain and breathlessness.       Uterine fibroid  - single.     Hb post transfusion   9.6    P:  D&C and endometrial ablation at 10 am this morning.

## 2018-02-19 NOTE — PLAN OF CARE
Pt stable on assessment (see flow sheets) Pt denies any pain at this time. Unit of blood has finished. Pt is awake, and sitting in room. Pt is very ambulatory, has been up and moving around without difficulty. Vitals stable. No further complaints at this time.

## 2018-02-19 NOTE — PLAN OF CARE
"Problem: Patient Care Overview  Goal: Plan of Care/Patient Progress Review  Outcome: Improving  To room from PACU at 1220, report received from Ebonie CASAREZ RN.  Denies pain.  Reports no nausea or vomiting.  Denies cramping.  No active vaginal bleeding noted.  Tolerating clear liquids.  Independent bed mobility.  Troponin 5.3 prior to surgery, recheck due at 1400.  Blood pressure elevated, Dr. Smith aware.  Patient reports blood pressure \"is always high\".    Face to face report given with opportunity to observe patient.    Report given to Shane Flaherty   2/19/2018  3:44 PM    "

## 2018-02-19 NOTE — PLAN OF CARE
At the ten minute vital signs check, after the blood was started. Pts temp increased from 98.1 to 100.2. Blood was stopped per policy, provider was notified. Per provider, 650 mg of tylenol was given, and blood was restarted. Pt asymptomatic, unit continues to run without difficulty. Pt in a stable condition at this time.

## 2018-02-19 NOTE — CONSULTS
Hospitalist Consultation     Heather Rosas MRN# [id number]   YOB: 1950 Age: 67 year old   Date of Admission: 2/18/2018     Reason for consult: I was asked by Dr. Nettles to evaluate this patient for substernal chest pain as patient will have to undergo surgery.           Assessment and Plan:   #1 substernal chest pain: This 67-year-old female has several risk factors for coronary artery disease such as her age, smoking, obesity, significant family history where there is coronary death at an early age.  Now that she is severely anemic this has likely precipitated demand ischemia.  But her troponin is trending up without any EKG changes.  Her first set was 0.4, second set 0.423, third set 0.999.  There are no EKG changes.  This qualifies as non-STEMI.  --The patient is not symptomatic she is experiencing myocardial damage therefore she should not undergo surgery at this time.  --We will order 2 more sets of troponin, CK-MB and index and monitor patient closely.  --Will place patient on telemetry.  --Patient deserves either a stress test or coronary angiogram as the next step.  --We will avoid anticoagulation because of her significant bleeding.  --Consult cardiology.    2.  Allergic rhinitis: Patient is very symptomatic with nasal congestion:, Postnasal drip and cough.  --We will start her on Claritin 10 mg daily, Flonase nasal spray, Ocean spray nasal spray, Mucinex 1200 mg twice daily.    3.  COPD: Patient has bronchospasm and cough every time she takes a deep breath.  --Started on DuoNeb treatments every 4 hours.    4.  Nicotine dependence : Patient states that she has been smoking a pack a day for 10 years but it could be more.  She was strictly advised not to continue smoking as it is an independent risk factor for coronary artery disease.  --We will place her on 14 mg of NicoDerm as 21 mg and NicoDerm made her feel sick.    5.  Symptomatic anemia: Secondary to acute blood loss from postmenopausal  vaginal bleeding.  Patient feels short of breath and she also had chest pain secondary to low hemoglobin and hematocrit.  We will transfuse 2 units or 3 to maintain hematocrit around 30 to alleviate these symptoms.    6.  Postmenopausal bleeding: Defer management to gynecologist..           Chief Complaint:   Vaginal bleeding for 3 or 4 days and substernal chest pain that lasted 20 minutes today    History obtained from patient.         History of Present Illness:   This patient is a 67 year old female was staying at a hotel to spend the weekend and she started having vaginal bleeding for 3 or 4 days.  She is 67 years old and has undergone menopause around age 55 or 56.  She states that she bled a lot with clots however the vaginal bleeding was decreasing today.  She had couple of episodes of very brief chest pain yesterday which was substernal and resolved without any intervention.  Today she had chest pain that lasted about 20 minutes.  She was just going to pack and go back home but when she looked  at herself in the mirror she got scared because she looked extremely pale.  She also felt a little lightheaded therefore she decided to call emergency medical services instead of going home.  Evaluation in the emergency department showed her hemoglobin at 6.5 and hematocrit at 18.9.  Patient was admitted by OB/GYN service and we are consulted to evaluate her chest pain as they plan to take her to the operating room for D&C and ablation.    Patient has not had anginal type of chest pain in the past but she has undergone a stress test in 2005 and she was told she did not have any significant coronary artery disease.  She is pain-free at this time.  She has had a slight cough because of sinus congestion and postnasal drip and she also feels somewhat short of breath.    Patient had similar bleeding for 3 or 4 days 2 years ago but she did not seek any medical help for that as the bleeding stopped she just ignored it.  She  denies any history of dyslipidemia but feels that she should be on something for her high blood pressure.  She states that she lost 35 pounds with the hope of not taking any medication for her high blood pressure but she is still not at goal.  Her weight is concerned.            Past Medical History:     Past Medical History:   Diagnosis Date     Allergic rhinitis 2011     Anxiety 2011     Anxiety state, unspecified     Anxiety state     Dyslipidemia 2003     fibromyalgia 2004     GERD, Esophageal reflux 2011     HTN (hypertension)     Essential hypertension     Major depression, recurrent (H) 2011     Nonorganic sleep disorder, unspecified     Non-org. 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  She put herself on 21 mg for nicotine patch but that is making her ill.    FAMILY HISTORY:     Patient's father  of myocardial infarction at age 45, mother  of pancreatic cancer at age 73.  Her sister has asthma          Immunizations:     Immunization History   Administered Date(s) Administered     Influenza (H1N1) 2009     Influenza (IIV3) PF 10/30/2007, 10/20/2010, 2012     Influenza Vaccine IM 3yrs+ 4 Valent IIV4 2013     TD (ADULT, 7+) 2004     Yellow Fever 2012             Allergies:   Patient is  allergic to pet dander, codeine and sulfa drugs.       Medications:     Prescriptions Prior to Admission   Medication Sig Dispense Refill Last Dose     nicotine (NICODERM CQ) 21 MG/24HR patch 2h hr Place 1 patch onto the skin every 24 hours   7/22/2017 at Unknown time             Review of Systems:   ENT/MOUTH: No earache or sore throat.  Complains of postnasal drip and nasal congestion.  RESP: Patient does report some cough and shortness of breath for the last 2 days.  Cough is nonproductive but she complains of postnasal drip.  CV: Patient had brief moments of substernal chest pain yesterday and today it lasted about 20 minutes.  Currently she is pain-free she rated the pain as 6 on a scale of 10.  Abdomen: Denies abdominal pain, nausea, vomiting, diarrhea or constipation.  : Denies dysuria, frequency or hematuria.  Reports heavy vaginal bleeding with clots for 3 or 4 days which has slowed down today.  Extremities: Denies any joint pain or swelling or edema.  Neuro: Denies any headache, felt a little dizzy today but no blurred vision no weakness in extremities or no loss of sensation.           Physical Exam:   Vitals were reviewed  Temp: 99.1  F (37.3  C) Temp src: Oral BP: 146/74 Pulse: 105 Heart Rate: 108 Resp: 20 SpO2: 97 % O2 Device: None (Room air)    Eyes: Conjunctivae is pale and sclerae clear patient has allergy shiners, pupils are equal and reacting to light.    HEENT: Normocephalic, without obvious abnormality, atraumatic, noted nasal congestion, nasal mucosa edematous, no tenderness over sinuses.    Neck: Supple, symmetrical, trachea midline, no adenopathy, thyroid symmetric, not enlarged and no tenderness, skin normal.  Hematologic / Lymphatic: No cervical lymphadenopathy and no supraclavicular lymphadenopathy.  Back: Symmetric, no curvature, spinous processes are non-tender on palpation, paraspinous muscles are non-tender on palpation, no costal vertebral tenderness.  Lungs: Symmetrical chest,  moves equally with respirations, resonant to percussion, patient has expiratory wheezes bilaterally.  Deep breathing triggers cough.  Cardiovascular: PMI has not shifted, no palpable thrills or parasternal heave, mild tachycardia with a normal S1 and S2 no S3 or S4, no murmur.    Abdomen: Obese abdomen, soft, nontender, liver and spleen are nonpalpable.    Genitourinary: Deferred   musculoskeletal: No redness, warmth, or swelling of the joints.  Full range of motion noted.  Motor strength is 5 out of 5 all extremities bilaterally.  Tone is normal.  Neurologic: Awake, alert, oriented to name, place and time.  Cranial nerves II-XII are grossly intact.  Motor is 5 out of 5 bilaterally.  Cerebellar finger to nose, heel to shin intact.  Sensory is intact.  Babinski down going, Romberg negative, and gait is normal.  Neuropsychiatric: Normal affect, mood, orientation, memory and insight.  Skin: No rashes, erythema, pallor, petechia or purpura.          Data:     Lab Results   Component Value Date    WBC 11.9 (H) 02/18/2018    WBC 17.5 (H) 07/22/2017    HGB 6.5 (LL) 02/18/2018    HGB 14.4 07/22/2017    HCT 18.9 (L) 02/18/2018    HCT 42.7 07/22/2017     02/18/2018     07/22/2017     02/18/2018     07/22/2017     (L) 11/13/2013    POTASSIUM 3.5 02/18/2018    POTASSIUM 3.0 (L) 07/22/2017    POTASSIUM 3.8 11/13/2013    CHLORIDE 104 02/18/2018    CHLORIDE 106 07/22/2017    CHLORIDE 97 (L) 11/13/2013    CO2 26 02/18/2018    CO2 26 07/22/2017    CO2 31 11/13/2013    BUN 10 02/18/2018    BUN 8 07/22/2017    BUN 13 11/13/2013    CR 0.74 02/18/2018    CR 0.70 07/22/2017    CR 0.89 11/13/2013     (H) 02/18/2018     (H) 07/22/2017     11/13/2013    DIMER 292 07/22/2017    NTBNPI 472 07/22/2017    TROPI 0.996 (HH) 02/18/2018    TROPI 0.423 (HH) 02/18/2018    TROPI 0.400 (HH) 02/18/2018    TROPI 0.056 (H) 07/22/2017    AST 17 02/18/2018    AST 53 (H) 07/22/2017    AST 19 11/13/2013     ALT 20 02/18/2018    ALT 54 (H) 07/22/2017    ALT 25 11/13/2013    ALKPHOS 80 02/18/2018    ALKPHOS 110 07/22/2017    ALKPHOS 119 11/13/2013    BILITOTAL 0.2 02/18/2018    BILITOTAL 0.2 07/22/2017    BILITOTAL 0.4 11/13/2013    INR 0.98 07/22/2017     EKG results: Normal sinus rhythm with right bundle branch block no ST-T wave segment changes no significant difference from prior EKG.

## 2018-02-19 NOTE — PLAN OF CARE
DATE:  2/19/2018   TIME OF RECEIPT FROM LAB:  0234  LAB TEST:  Troponin   LAB VALUE:  5.657  RESULTS GIVEN WITH READ-BACK TO (PROVIDER):  Dr. Mistry  TIME LAB VALUE REPORTED TO PROVIDER:   023

## 2018-02-19 NOTE — BRIEF OP NOTE
[facility]   [facility] Gynecology Brief Operative Note    Pre-operative diagnosis: Post menopausal bleeding, Metrorrhagia, Anemia   Post-operative diagnosis: Same   Procedure: Hysteroscopy. Dilatation and curettage and Endometrial ablation with Novasure   Surgeon: Jose Nettles MD   Assistant(s): None   Anesthesia: MAC (monitored anesthesia care) with paracervical block   Estimated blood loss: less than 50ml   Total IV fluids: (See anesthesia record)   Blood transfusion: No transfusion was given during surgery   Total urine output: (See anesthesia record)   Drains: None   Specimens: Endometrial curettings   Findings: Cervical fibroids ;  Irregular shedding of the endometrium   Complications: None   Condition: Stable   Comments: See typed operative report for full details

## 2018-02-20 VITALS
SYSTOLIC BLOOD PRESSURE: 123 MMHG | OXYGEN SATURATION: 96 % | TEMPERATURE: 98.7 F | RESPIRATION RATE: 18 BRPM | HEART RATE: 97 BPM | DIASTOLIC BLOOD PRESSURE: 48 MMHG

## 2018-02-20 LAB
HCT VFR BLD AUTO: 29.9 % (ref 35–47)
HGB BLD-MCNC: 10.2 G/DL (ref 11.7–15.7)
TROPONIN I SERPL-MCNC: 1.99 UG/L (ref 0–0.04)

## 2018-02-20 PROCEDURE — G0378 HOSPITAL OBSERVATION PER HR: HCPCS

## 2018-02-20 PROCEDURE — 84484 ASSAY OF TROPONIN QUANT: CPT | Performed by: INTERNAL MEDICINE

## 2018-02-20 PROCEDURE — 40000275 ZZH STATISTIC RCP TIME EA 10 MIN

## 2018-02-20 PROCEDURE — 25000132 ZZH RX MED GY IP 250 OP 250 PS 637: Mod: GY | Performed by: INTERNAL MEDICINE

## 2018-02-20 PROCEDURE — 99217 ZZC OBSERVATION CARE DISCHARGE: CPT | Performed by: INTERNAL MEDICINE

## 2018-02-20 PROCEDURE — 85014 HEMATOCRIT: CPT | Performed by: INTERNAL MEDICINE

## 2018-02-20 PROCEDURE — 85018 HEMOGLOBIN: CPT | Performed by: INTERNAL MEDICINE

## 2018-02-20 PROCEDURE — A9270 NON-COVERED ITEM OR SERVICE: HCPCS | Mod: GY | Performed by: INTERNAL MEDICINE

## 2018-02-20 PROCEDURE — 36415 COLL VENOUS BLD VENIPUNCTURE: CPT | Performed by: INTERNAL MEDICINE

## 2018-02-20 RX ORDER — LISINOPRIL 5 MG/1
5 TABLET ORAL DAILY
Status: DISCONTINUED | OUTPATIENT
Start: 2018-02-20 | End: 2018-02-20 | Stop reason: HOSPADM

## 2018-02-20 RX ORDER — METOPROLOL SUCCINATE 25 MG/1
12.5 TABLET, EXTENDED RELEASE ORAL DAILY
Qty: 45 TABLET | Refills: 1 | Status: SHIPPED | OUTPATIENT
Start: 2018-02-20 | End: 2018-05-14

## 2018-02-20 RX ORDER — LISINOPRIL 5 MG/1
5 TABLET ORAL DAILY
Qty: 30 TABLET | Refills: 3 | Status: SHIPPED | OUTPATIENT
Start: 2018-02-20 | End: 2018-05-14

## 2018-02-20 RX ORDER — OXYCODONE HYDROCHLORIDE 5 MG/1
5 TABLET ORAL EVERY 4 HOURS PRN
Qty: 10 TABLET | Refills: 0 | Status: SHIPPED | OUTPATIENT
Start: 2018-02-20 | End: 2018-03-14

## 2018-02-20 RX ADMIN — GUAIFENESIN 1200 MG: 600 TABLET, EXTENDED RELEASE ORAL at 08:18

## 2018-02-20 RX ADMIN — ASPIRIN 81 MG: 81 TABLET, COATED ORAL at 08:18

## 2018-02-20 RX ADMIN — LISINOPRIL 5 MG: 5 TABLET ORAL at 08:18

## 2018-02-20 NOTE — ANESTHESIA POSTPROCEDURE EVALUATION
Patient: Heather Rosas    Procedure(s):  HYSTEROSCOPY DILATION AND CURETTAGE, ENDOMETRIAL ABLATION - Wound Class: II-Clean Contaminated    Diagnosis:BLEEDING, ANEMIA  Diagnosis Additional Information: No value filed.    Anesthesia Type:  MAC    Note:  Anesthesia Post Evaluation    Patient location during evaluation: Bedside  Patient participation: Able to fully participate in evaluation  Level of consciousness: awake and alert  Pain management: adequate  Airway patency: patent  Cardiovascular status: acceptable  Respiratory status: acceptable  Hydration status: acceptable  PONV: none     Anesthetic complications: None          Last vitals:  Vitals:    02/19/18 1650 02/19/18 1738 02/19/18 2353   BP: 172/83  175/95   Pulse:      Resp: 20  18   Temp: 98.9  F (37.2  C)  98.7  F (37.1  C)   SpO2: 95% 96% 96%         Electronically Signed By: AUTUMN Faustin CRNA  February 20, 2018  7:20 AM

## 2018-02-20 NOTE — PROVIDER NOTIFICATION
DATE:  2/20/2018   TIME OF RECEIPT FROM LAB:  0531  LAB TEST:  Troponin  LAB VALUE:  1.994  RESULTS GIVEN WITH READ-BACK TO (PROVIDER):  Fede RODRIGUES  TIME LAB VALUE REPORTED TO PROVIDER:  0535

## 2018-02-20 NOTE — PROGRESS NOTES
Name: Heather Rosas    MRN#: [id number]    Reason for Hospitalization: Uterine bleeding [N93.9]    Discharge Date: 2/20/2018    Patient / Family response to discharge plan: Understands and agrees    Follow-Up Appt: Future Appointments  Date Time Provider Department Center   2/27/2018 11:00 AM Laci Avila MD [facility]       Other Providers (Care Coordinator, [facility] Services, PCA services etc): No    Discharge Disposition: home  Heather stated her friend brought her car to the hospital and she will transport herself home.    Natalia Lira

## 2018-02-20 NOTE — DISCHARGE INSTRUCTIONS
Nuclear Medicine will be calling you to set up an appointment for a future Stress Test. If you do not here from the Nuclear Medicine  by Wednesday 2/23/18 please call [phone] to schedule.  A follow up appointment with Dr Avila and a surgery follow up with Dr Nettles have been scheduled for you.

## 2018-02-20 NOTE — DISCHARGE SUMMARY
[facility]    Discharge Summary  Hospitalist    Date of Admission:  2/18/2018  Date of Discharge:  2/20/2018  Discharging Provider: [place]  Date of Service (when I saw the patient): 02/20/18    Discharge Diagnoses   Dysfunctional uterine bleeding  Dilation and curettage with endometrial ablation 6/19 by Dr. Nettles  Severe blood loss anemia  Coronary artery disease with elevated troponin related to anemia  Non-ST elevation myocardial infarction because of demand ischemia  Accelerated hypertension  Postoperative atelectasis, resolved  Postoperative hypoxemia, resolved      History of Present Illness   Please see evaluation including history and physical, operative notes, and surgical discharge summary of Dr. Nettles.  She was initially admitted to GYN service because of dysfunctional uterine bleeding.  We were initially consulted for evaluation of chest discomfort as detailed below.  Given medical issues with persisted beyond her surgical procedure, elected to keep the patient in initial day to initiate medical treatment.    Heather Rosas is a 67 year old  woman who was admitted on 2/18/2018.  We were consulted for evaluation of approximately 20 minutes of substernal chest pain yesterday afternoon shortly after she presented while she was still profoundly anemic.  She is also demonstrated an elevated troponin which is now begun to decline.  She is otherwise had no exercise related symptoms in the past.  She had no symptoms associated with her chest discomfort such as radiation, or diaphoresis.  She had noted some mild dyspnea likely associated with her anemia.  Risk factors include smoking and dyslipidemia identified in the past although repeat this morning shows an LDL of 70 and total cholesterol which is not elevated.  She is moderately physically active.  She presented noting approximately 5 days of bleeding which symptomatically had declined somewhat in the past day or so.  She does show  evidence of continued bleeding with hemoglobin is not increasing significantly after her last unit of packed red blood cells.  She is received a total of 3 units.  EKG is showing a borderline sinus tachycardia.  Right bundle branch block is noted.  Some T-wave changes although these are quite nonspecific and overall not highly concerning.  Overall course is most significant with so-called demand ischemia and does not suggest active occlusive coronary disease.  As noted above she does show evidence of significant ongoing uterine bleeding.  Risk of proposed procedure is moderate however the risk of delaying it further is likely greater.  I have discussed with her that she is at risk of complications that could include further myocardial infarction, shock, or even death however her risk of delaying the procedure is also not inconsiderable.  Risk profile is not unreasonable for the proposed procedure.  Any anesthetic approach with consideration of her moderately elevated risk would be quite reasonable.  I have been able to discuss the patient's course and findings with Dr. Nieto of anesthesia.    Hospital Course   Heather Rosas was admitted on 2/18/2018.  The following problems were addressed during her hospitalization:    1.  Elevated troponins  Coronary artery disease  Non-ST elevation myocardial infarction  Meets most sensitive criteria for myocardial infarction based on demand ischemia because of her severe anemia although she has had no prior symptoms even during what she relates as vigorous physical activity.  Certainly no evidence of any active occlusive ischemia.  After transfusion she had no issues during procedure which included hysteroscopy, D&C, and endometrial ablation.  Anesthesia with total intravenous anesthesia including propofol.  Troponin immediately prior to procedure elevated at approximately 5.3 but continues to decline post procedure.  She has had no dyspnea or chest pain.  Further review  of available records shows a brief admission at [facility] with a clinical impression of COPD exacerbation and hypertensive urgency.  Review of prior clinic records shows only cardiovascular concern being hypertension.    Have initiated low-dose aspirin.  At least initially until she undergoes graded cardiac stress testing will add a low dose of beta-blockade.  Space lipid profile did not show significant dyslipidemia with LDL 70 and cholesterol 198.  She is quite anxious to minimize medication she takes but is willing to continue treatment with aspirin and treatment for her hypertension which is detailed below began with lisinopril.  I have discussed with her issues of primary prevention versus secondary prevention using aspirin.  She almost certainly has a degree of coronary disease and therefore this might be regarded as secondary prevention in any event.  I did discuss with her other possible beneficial effects of aspirin as long as she does not develop any adverse reactions and recommended continuing this would be the best approach.  2.  Accelerated hypertension  Immediately prior to procedure she did develop moderately elevated blood pressures with pressures of 180-190/.    She does recall that she been treated for hypertension in the past although discontinued this after she lost some weight and was not certain she had any further issues.  She has been treated with diuretics including hydrochlorothiazide in the past.  Given her admission on this occasion for active bleeding and although blood masks repleted with transfusion would withhold any diuretic.  I suspect she will have adequate response with an ACE inhibitor alone.  Although she has no clinical suggestion of any LV dysfunction I be inclined to favor continuing ACE inhibitor because of her coronary disease.  If she shows further active disease and continues to require a beta-blocker is possible this would be adequate for monotherapy for  "hypertension.  3.  Acute blood loss anemia  Essentially resolved with her GYN procedure and no further bleeding.  Hemoglobin immediately preoperatively 11.7.  This is somewhat higher than would be a goal ordinarily but given elevated troponins she is at some increased risk of ischemia with a hemoglobin level that would otherwise be well tolerated.  She is undergone endometrial ablation and further bleeding is unlikely.  4.  Post procedure management  Atelectasis postanesthesia  Good response to usual postoperative pulmonary hygiene.  Hypoxemia has fully resolved.    North Palencia    Significant Results and Procedures   Dilation and curettage, atrial ablation 6/19 Dr. Nettles    Pending Results   These results will be followed up by  Dr. Nettles   Unresulted Labs Ordered in the Past 30 Days of this Admission     Date and Time Order Name Status Description    2/19/2018 1120 Surgical pathology exam In process           Code Status   Full Code       Primary Care Physician   Laci Avila    Vital signs:  Temp: 98.7  F (37.1  C) Temp src: Tympanic BP: (!) 123/48   Heart Rate: 87 Resp: 18 SpO2: 96 % O2 Device: None (Room air) Oxygen Delivery: 2 LPM      Estimated body mass index is 34.94 kg/(m^2) as calculated from the following:    Height as of 7/23/17: 1.499 m (4' 11\").    Weight as of 7/23/17: 78.5 kg (173 lb).        Awake, alert, concentration preserved.  Walking actively in the halls.  Oriented ×3.  HEENT: Pupils equal, conjugate. No icterus or nystagmus. Oral mucosa moist. No facial asymmetry.   Neck: Supple, jugular veins not elevated. Trachea midline   Chest: No chest wall movement asymmetry. Aeration preserved to bases.. Accessory muscles not in use. Expiratory time not increased. No tidal wheezes. No rhonchi. No discrete crackles.   Cardiac: PMI not displaced. S1, S2 unremarkable. No S3, S4. P2 not accentuated. No murmurs. Carotid upstroke preserved. Carotid amplitude preserved.   Abdomen: Soft. No palpation " or percussion tenderness. No distention. Normoactive bowel sounds. Liver and spleen not increased in size. No bruits, masses, or pulsations.   Extremities: No lower extremity edema. No eccymoses, clubbing.   Neurologic: Mental state above. Motor 5/5 and bilaterally equal. Tone preserved. No fasiculations or tremors. Sensation intact to light touch. DTR 2/4 and bilaterally equal.     Discharge Disposition   Discharged to home  Condition at discharge: Stable    Consultations This Hospital Stay   None    Time Spent on this Encounter   I, [place] Pal[place], personally saw the patient today and spent greater than 30 minutes discharging this patient.    Discharge Orders     NM Exercise stress test     Exercise Stress test     Reason for your hospital stay   For blood transfusion because of anemia from blood loss from post menopausal vaginal bleeding.  Hysteroscopy and Dilatation and Curettage and endometrial ablation done on 2.19.2018 by Dr. Nettles  Post op   OK  For observation in Med/Surg. Floor and then discharge if OK.     Follow-up and recommended labs and tests    Follow up with Dr. Nettles , at (location with clinic name or city) in 2-4 weeks at the [facility] at [place] .     Activity   Your activity upon discharge: activity as tolerated.  Avoid sex 6 weeks.     Reason for your hospital stay   Heather Rosas is a 67 year old woman who was admitted on 2/18/2018.  We were consulted for evaluation of approximately 20 minutes of substernal chest pain after she presented while she was still profoundly anemic.  She showed an elevated troponin which is now begun to decline.  She is otherwise had no exercise related symptoms in the past; she is moderately physically active. Underwent D&C with endometrial ablation after transfusion without incident. Shortly before the procedure she showed moderately accelerated hypertension.  She been treated in the past but discontinued medication treatment after some  weight loss.  Subsequently blood pressure has been improved with addition of lisinopril.  Troponins continued to decline.  No recurrence of chest discomfort or any other suggestion of active ischemia.  Discharged with planned follow-up for treatment of hypertension and planned exercise testing, as well as follow-up with Dr. abraham following treatment for uterine bleeding.     Follow-up and recommended labs and tests    Follow up with primary care provider, Laci Avila, within 7 days for hospital follow- up.   Outpatient stress test arranged  Follow-up with Dr. Nettles as previously arranged.     Activity   Your activity upon discharge: activity as tolerated     Full Code     Full Code     Diet   Follow this diet upon discharge: Regular adult diet.     Diet   Follow this diet upon discharge: Orders Placed This Encounter     Diet     Regular Diet Adult       Discharge Medications   Current Discharge Medication List      START taking these medications    Details   oxyCODONE IR (ROXICODONE) 5 MG tablet Take 1 tablet (5 mg) by mouth every 4 hours as needed for moderate to severe pain  Qty: 10 tablet, Refills: 0    Associated Diagnoses: Uterine bleeding; Post-op pain      aspirin 81 MG EC tablet Take 1 tablet (81 mg) by mouth daily  Qty: 30 tablet, Refills: 11    Associated Diagnoses: Coronary artery disease involving native heart with angina pectoris, unspecified vessel or lesion type (H)      lisinopril (PRINIVIL/ZESTRIL) 5 MG tablet Take 1 tablet (5 mg) by mouth daily  Qty: 30 tablet, Refills: 3    Associated Diagnoses: Benign essential hypertension      metoprolol succinate (TOPROL-XL) 25 MG 24 hr tablet Take 0.5 tablets (12.5 mg) by mouth daily  Qty: 45 tablet, Refills: 1    Associated Diagnoses: Coronary artery disease involving native heart with angina pectoris, unspecified vessel or lesion type (H)         CONTINUE these medications which have NOT CHANGED    Details   nicotine (NICODERM CQ) 21 MG/24HR patch 2h hr  Place 1 patch onto the skin every 24 hours           Allergies   Allergies   Allergen Reactions     Cats Other (See Comments)     Sneezing, runny nose     Codeine Sulfate Nausea and Vomiting and GI Disturbance     Fexofenadine Hcl      Allegra      Rosuvastatin Other (See Comments)     Dizziness - Crestor      [place] Oil GI Disturbance     Any pink fish     Data   Most Recent 3 CBC's:  Recent Labs   Lab Test  02/20/18   0449  02/19/18   1154  02/19/18   0615   02/18/18   1149  07/22/17   2345   WBC   --    --   10.5   --   11.9*  17.5*   HGB  10.2*  11.7  9.1*   < >  6.5*  14.4   MCV   --    --   86   --   86  89   PLT   --    --   165   --   200  203    < > = values in this interval not displayed.      Most Recent 3 BMP's:  Recent Labs   Lab Test  02/19/18   0615  02/18/18   1149  07/22/17   2345   NA  142  138  141   POTASSIUM  3.5  3.5  3.0*   CHLORIDE  111*  104  106   CO2  25  26  26   BUN  10  10  8   CR  0.68  0.74  0.70   ANIONGAP  6  8  9   CARINA  7.9*  7.9*  8.3*   GLC  98  158*  150*     Most Recent 2 LFT's:  Recent Labs   Lab Test  02/18/18   1149  07/22/17   2345   AST  17  53*   ALT  20  54*   ALKPHOS  80  110   BILITOTAL  0.2  0.2     Most Recent INR's and Anticoagulation Dosing History:  Anticoagulation Dose History     Recent Dosing and Labs Latest Ref Rng & Units 7/22/2017    INR 0.80 - 1.20 0.98        Most Recent 3 Troponin's:  Recent Labs   Lab Test  02/20/18   0449  02/19/18   1356  02/19/18   0615   TROPI  1.994*  3.762*  5.361*     Most Recent Cholesterol Panel:  Recent Labs   Lab Test  02/19/18   0615   CHOL  133   LDL  70   HDL  41*   TRIG  110     Most Recent 6 Bacteria Isolates From Any Culture (See EPIC Reports for Culture Details):  Recent Labs   Lab Test  07/23/17   0032  07/23/17   0026   CULT  No growth after 6 days  No growth after 6 days     Most Recent TSH, T4 and A1c Labs:  Recent Labs   Lab Test  01/16/14   0810  11/13/13   0805   TSH   --   0.68   T4   --   0.58*   A1C  5.3   --       Results for orders placed or performed during the hospital encounter of 02/18/18   US Pelvic Complete with Transvaginal    Narrative    PROCEDURE: US PELVIC COMPLETE WITH TRANSVAGINAL    HISTORY: vaginal bleeding;     TECHNIQUE: Transabdominal and transvaginal ultrasound of the pelvis.    COMPARISON: none    FINDINGS:     MEASUREMENTS:    Endometrium: 28 mm in thickness  Right Ovary: 1.5 x 1.9 x 1.3 cm (Length x Height x Width)  Left Ovary:  2.5x1.6 x 1.6 cm (Length x Height x Width)    UTERUS: Is a 3 cm diameter fibroid in the uterine body. The  endometrium is abnormally thickened measuring 2.8 cm.     ADNEXA: Is a small cyst seen in the right ovary measuring 1.5 cm in  diameter. Arteriovenous flow is seen in both ovaries although  suboptimal images were obtained.    MISC: No free fluid is seen in the cul-de-sac      Impression    IMPRESSION:     Abnormally thickened endometrium for a postmenopausal woman.    JEFERSON NEGRO MD   XR Chest Port 1 View    Narrative    PROCEDURE:  XR CHEST PORT 1 VW    HISTORY:  chest pain; .     COMPARISON:  July 23, 2017    FINDINGS:   The cardiac silhouette is normal in size. The pulmonary vasculature is  normal.  The lungs are clear. No pleural effusion or pneumothorax.      Impression    IMPRESSION:  No acute cardiopulmonary disease.  The bilateral  pulmonary parenchymal opacities have cleared as compared to the  previous examination    JEFERSON NEGRO MD

## 2018-02-20 NOTE — PLAN OF CARE
/95  Pulse 97  Temp 98.7  F (37.1  C) (Tympanic)  Resp 18  SpO2 96%      Pt alert and oriented upon initial assessment, is seen having full conversations with self on room however. Wandering halls t/o night. Lungs clear. BS active. HR regular, per ICU tele report SR 90s. Denies chest pain. Trop drawn this AM resulting in 1.994, MD aware, trending down. Will continue to monitor.     Face to face report given with opportunity to observe patient.    Report given to LORENA Cutler   2/20/2018  7:19 AM

## 2018-02-20 NOTE — PLAN OF CARE
Patient discharged at 10:48 AM via ambulation accompanied by other:self and staff. Prescriptions sent with patient to fill . All belongings sent with patient.     Discharge instructions reviewed with pt. Listed belongings gathered and returned to patient. yes    Patient discharged to home.   Report called to n/a    Core Measures and Vaccines  Core Measures applicable during stay: No. If yes, state diagnosis: n/a  Pneumonia and Influenza given prior to discharge, if indicated: N/A    Surgical Patient   Surgical Procedures during stay: yes  Did patient receive discharge instruction on wound care and recognition of infection symptoms? Yes    MISC  Follow up appointment made:  Yes  Home and hospital aquired medications returned to patient: N/A  Patient reports pain was well managed at discharge: Yes

## 2018-02-20 NOTE — PLAN OF CARE
Problem: Patient Care Overview  Goal: Plan of Care/Patient Progress Review  Outcome: Adequate for Discharge Date Met: 02/20/18  VSS, afebrile, denies pain. States vaginal bleeding is zero to very scant.   Lisinopril rec'd. Pt will also start Metoprolol 12.5 mg upon discharge.   Full dressed awaiting discharge-declines full head to toe assessment.

## 2018-02-20 NOTE — PLAN OF CARE
Problem: Patient Care Overview  Goal: Plan of Care/Patient Progress Review  Outcome: No Change  Reason for hospital stay:  Elevated Troponins, HTN, Post D&C / ablation    Pain Management:  Denied any pain this shift    Orientation:  A+O x4 - anxious at times, talking to self and conversing with self at times.     Cardiac:  BP continues to be elevated 172/83, HR 99. AP regular.  Tele on and reading Sinus Tach in the 100's per ICU nurse.    Respiratory:  Lungs diminished clear - occasional loose nonproductive cough.    GI:  BS present x4 - ate 100% supper - Denied any nausea.    :  Voiding without difficulty. Scant amounts light pink drainage on fran pads.    IVF:  Saline Lock    Mobility:  Up independently ambulated in vega x2 and frequently on room.      2/19/2018  11:30 PM  Shane Baker

## 2018-02-20 NOTE — PROGRESS NOTES
Patient financial services met with Heather yesterday. She received information on  Care and [facility].   Updated on Heather's mental health status per  Antonieta and Dr. Palencia . CTS will remain available.

## 2018-02-21 ENCOUNTER — TELEPHONE (OUTPATIENT)
Dept: CASE MANAGEMENT | Facility: HOSPITAL | Age: 68
End: 2018-02-21

## 2018-02-22 ENCOUNTER — TELEPHONE (OUTPATIENT)
Dept: CASE MANAGEMENT | Facility: HOSPITAL | Age: 68
End: 2018-02-22

## 2018-03-02 LAB
COPATH REPORT: NORMAL
COPATH REPORT: NORMAL

## 2018-03-14 ENCOUNTER — HOSPITAL ENCOUNTER (EMERGENCY)
Facility: HOSPITAL | Age: 68
Discharge: HOME | End: 2018-03-15
Attending: FAMILY MEDICINE | Admitting: FAMILY MEDICINE
Payer: MEDICARE

## 2018-03-14 DIAGNOSIS — I10 HYPERTENSION, UNSPECIFIED TYPE: ICD-10-CM

## 2018-03-14 DIAGNOSIS — C54.1 ENDOMETRIAL CARCINOMA (H): ICD-10-CM

## 2018-03-14 DIAGNOSIS — N95.0 POST-MENOPAUSAL BLEEDING: ICD-10-CM

## 2018-03-14 PROCEDURE — 99284 EMERGENCY DEPT VISIT MOD MDM: CPT | Mod: Z6 | Performed by: FAMILY MEDICINE

## 2018-03-14 PROCEDURE — 99283 EMERGENCY DEPT VISIT LOW MDM: CPT

## 2018-03-14 NOTE — ED AVS SNAPSHOT
[facility]    [address]    [place] [address]    Phone:  [phone]                                       Heather Rosas   MRN: [id number]    Department:  [facility]   Date of Visit:  3/14/2018           After Visit Summary Signature Page     I have received my discharge instructions, and my questions have been answered. I have discussed any challenges I see with this plan with the nurse or doctor.    ..........................................................................................................................................  Patient/Patient Representative Signature      ..........................................................................................................................................  Patient Representative Print Name and Relationship to Patient    ..................................................               ................................................  Date                                            Time    ..........................................................................................................................................  Reviewed by Signature/Title    ...................................................              ..............................................  Date                                                            Time

## 2018-03-14 NOTE — ED AVS SNAPSHOT
[facility]    [address]    [place] [address]    Phone:  [phone]                                       Heather Rosas   MRN: [id number]    Department:  [facility]   Date of Visit:  3/14/2018           Patient Information     Date Of Birth          1950        Your diagnoses for this visit were:     Post-menopausal bleeding     Endometrial carcinoma (H)     Hypertension, unspecified type        You were seen by Gabriela Arias MD.      Follow-up Information     Please follow up.    Why:  Contact [phone] to schedule appointment with DR Nettles Friday         Discharge Instructions       Provera/Megace/Delalutin  Generic Name: Progesterone  Drug Type:   This drug is a hormone therapy and works to stop cancer cells from growing  What this drug is used for:   Breast, endometrial, and prostate cancer or __________________________________________  How this drug is given:  This drug is given in a pill. The amount of medicine that you receive depends on many things. Your doctor will find the best dose for you.  Make sure your health care team knows about all the medicines and supplements you take. This will help prevent drug interactions.   Side effects   Most people do not have all the side effects listed. Side effects will usually go away after the treatment is complete. Some of the rare side effects are not listed here, so tell your doctor if you have any unusual symptoms.  Common side effects (occur in less than 3 out of 10 people):    High blood pressure    Headache    Trouble falling asleep or staying asleep (insomnia)    Mood changes, feeling sad    Loose, watery stools (diarrhea)    Feel sick to your stomach (nausea)    Throwing up (vomiting)    Low white cells in blood (infection or fever)    Feeling tired and weak    Hair loss    Breast pain or larger breasts  Rare but serious side effects    Blood clots  Call your doctor right away if you  have:    Signs of a drug allergy: Breathing problems; feel like your throat is closing up; swelling of the mouth, face, lips or tongue; or hives or skin redness or itching.    Fever of 100.5 F (38 C) or higher, or chills  Call your doctor within 24 hours if you:    Feel so sick to your stomach that you can't eat or drink and medicine doesn't help    Have loose, watery stools more than 4 times in 24 hours    Feel so tired that you can't care for yourself    Vomit more than 4 to 5 times in 24 hours    Have black or tarry stools or blood in your stool or urine    Have unusual bruising or bleeding    Have shortness of breath  Other information _________________________  __________________________________________  __________________________________________  If you have any side effects, call us. We can help you manage these problems.  For more information, see:  www.chemocare.com  http://www.nlm.nih.gov/medlineplus/druginformation.htm  http://www.cancer.gov/cancertopics/coping/chemotherapy-and-you  For informational purposes only. Not to replace the advice of your health care provider. Copyright   2016 [facility]. All rights reserved. [employer] 184112 - 01/16.      What is Endometrial Cancer?    Cancer occurs when cells in the body change and start growing out of control. Cancer cells can form lumps of tissue called tumors. Cancer that starts in the lining of the uterus is called endometrial cancer.  Understanding the uterus and endometrium  The uterus is part of the female reproductive system. It's the organ that holds the baby when a woman is pregnant. The endometrium is the inside lining of the uterus. Each month, from puberty to menopause, the lining grows and thickens to prepare for pregnancy. This thickened lining helps to nourish a growing baby. If a woman doesn t become pregnant, the lining of the uterus is shed. This is her period.  When endometrial cancer forms  The endometrium is the most common  place in the uterus for cancer to start. It's the most common type of gynecologic cancer (cancer in the female reproductive system). Cancer can destroy tissue in the uterus. Cancer cells may also spread to nearby organs and other parts of the body. When cancer spreads, it is called metastasis. In general, the more cancer spreads, the harder it is to treat.  Endometrial cancer often causes abnormal vaginal bleeding which may cause a woman to seek medical care. In these cases, endometrial cancer is often found when it is small and has not spread (metastasized). This is when the cancer is easiest to treat and cure.   Treatment options for cancer of the uterus  You and your healthcare provider will discuss your treatment options. These may include:    Surgery to remove the uterus (hysterectomy). Sometimes the fallopian tubes and ovaries are also removed.    Radiation therapy. This uses directed rays of high-energy to kill cancer cells.    Chemotherapy. This uses strong medicine to kill cancer cells.    Hormone therapy. This treatment affects hormone levels and may help slow the growth of cancer cells. It may be used in some cases to avoid hysterectomy and allow a woman to get pregnant in the future.    Biologic therapy (immunotherapy). This uses medicines that act like your body's own immune system to fight cancer.  At this time, these treatments are only available in clinical trials.  Date Last Reviewed: 6/1/2017 2000-2017 [employer]. [address] [place] [address], [place] [address]. All rights reserved. This information is not intended as a substitute for professional medical care. Always follow your healthcare professional's instructions.    Check your blood pressures outpatient and schedule a follow up appointment with your primary care provider to discuss       Future Appointments        Provider Department Dept Phone Center    3/19/2018 1:30 PM Jose Nettles MD [facility] [phone]  [place]         Review of your medicines      START taking        Dose / Directions Last dose taken    medroxyPROGESTERone 10 MG tablet   Commonly known as:  PROVERA   Dose:  10 mg   Quantity:  30 tablet        Take 1 tablet (10 mg) by mouth daily   Refills:  0          Our records show that you are taking the medicines listed below. If these are incorrect, please call your family doctor or clinic.        Dose / Directions Last dose taken    aspirin 81 MG EC tablet   Dose:  81 mg   Quantity:  30 tablet        Take 1 tablet (81 mg) by mouth daily   Refills:  11        lisinopril 5 MG tablet   Commonly known as:  PRINIVIL/ZESTRIL   Dose:  5 mg   Quantity:  30 tablet        Take 1 tablet (5 mg) by mouth daily   Refills:  3        metoprolol succinate 25 MG 24 hr tablet   Commonly known as:  TOPROL-XL   Dose:  12.5 mg   Quantity:  45 tablet        Take 0.5 tablets (12.5 mg) by mouth daily   Refills:  1                Prescriptions were sent or printed at these locations (1 Prescription)                   [facility] [address] [place] [address] [address]  AT [facility] [address], [facility] [address]    Telephone:  [phone]   Fax:  [phone]   Hours:                  E-Prescribed (1 of 1)         medroxyPROGESTERone (PROVERA) 10 MG tablet                Procedures and tests performed during your visit     CBC with platelets differential      Orders Needing Specimen Collection     None      Pending Results     No orders found for last 3 day(s).            Pending Culture Results     No orders found for last 3 day(s).            Thank you for choosing [facility]       Thank you for choosing [facility] for your care. Our goal is always to provide you with excellent care. Hearing back from our patients is one way we can continue to improve our services. Please take a few minutes to complete the written survey that you may receive in the mail after you visit with us. Thank you!        [facility]  "Information     [employer] lets you send messages to your doctor, view your test results, renew your prescriptions, schedule appointments and more. To sign up, go to [email or web address] . Click on \"Log in\" on the left side of the screen, which will take you to the Welcome page. Then click on \"Sign up Now\" on the right side of the page.     You will be asked to enter the access code listed below, as well as some personal information. Please follow the directions to create your username and password.     Your access code is: [email or web address]  Expires: 2018 10:23 AM     Your access code will  in 90 days. If you need help or a new code, please call your [facility] clinic or [phone].        Care EveryWhere ID     This is your Care EveryWhere ID. This could be used by other organizations to access your [facility] medical records  [id number]        Equal Access to Services     EDELMIRA BACH AH: Hadii sarah piersono Sopoornima, waaxda luqadaha, qaybta kaalmada adediana, leonardo holliday . So [facility] [phone].    ATENCIÓN: Si habla español, tiene a lea disposición servicios gratuitos de asistencia lingüística. Llame al [phone].    We comply with applicable federal civil rights laws and [place] laws. We do not discriminate on the basis of race, color, national origin, age, disability, sex, sexual orientation, or gender identity.            After Visit Summary       This is your record. Keep this with you and show to your community pharmacist(s) and doctor(s) at your next visit.                  "

## 2018-03-15 VITALS
TEMPERATURE: 98.6 F | RESPIRATION RATE: 16 BRPM | SYSTOLIC BLOOD PRESSURE: 178 MMHG | OXYGEN SATURATION: 94 % | HEART RATE: 102 BPM | DIASTOLIC BLOOD PRESSURE: 93 MMHG

## 2018-03-15 LAB
BASOPHILS # BLD AUTO: 0.1 10E9/L (ref 0–0.2)
BASOPHILS NFR BLD AUTO: 0.8 %
DIFFERENTIAL METHOD BLD: ABNORMAL
EOSINOPHIL # BLD AUTO: 0.3 10E9/L (ref 0–0.7)
EOSINOPHIL NFR BLD AUTO: 2.6 %
ERYTHROCYTE [DISTWIDTH] IN BLOOD BY AUTOMATED COUNT: 14.5 % (ref 10–15)
HCT VFR BLD AUTO: 33.9 % (ref 35–47)
HGB BLD-MCNC: 11.1 G/DL (ref 11.7–15.7)
IMM GRANULOCYTES # BLD: 0.1 10E9/L (ref 0–0.4)
IMM GRANULOCYTES NFR BLD: 0.5 %
LYMPHOCYTES # BLD AUTO: 2.3 10E9/L (ref 0.8–5.3)
LYMPHOCYTES NFR BLD AUTO: 19.8 %
MCH RBC QN AUTO: 26.9 PG (ref 26.5–33)
MCHC RBC AUTO-ENTMCNC: 32.7 G/DL (ref 31.5–36.5)
MCV RBC AUTO: 82 FL (ref 78–100)
MONOCYTES # BLD AUTO: 1 10E9/L (ref 0–1.3)
MONOCYTES NFR BLD AUTO: 8.7 %
NEUTROPHILS # BLD AUTO: 7.8 10E9/L (ref 1.6–8.3)
NEUTROPHILS NFR BLD AUTO: 67.6 %
NRBC # BLD AUTO: 0 10*3/UL
NRBC BLD AUTO-RTO: 0 /100
PLATELET # BLD AUTO: 270 10E9/L (ref 150–450)
RBC # BLD AUTO: 4.13 10E12/L (ref 3.8–5.2)
WBC # BLD AUTO: 11.6 10E9/L (ref 4–11)

## 2018-03-15 PROCEDURE — 85025 COMPLETE CBC W/AUTO DIFF WBC: CPT | Performed by: FAMILY MEDICINE

## 2018-03-15 PROCEDURE — 25000132 ZZH RX MED GY IP 250 OP 250 PS 637: Mod: GY | Performed by: FAMILY MEDICINE

## 2018-03-15 PROCEDURE — 25000132 ZZH RX MED GY IP 250 OP 250 PS 637: Mod: GY

## 2018-03-15 PROCEDURE — A9270 NON-COVERED ITEM OR SERVICE: HCPCS | Mod: GY

## 2018-03-15 PROCEDURE — 36415 COLL VENOUS BLD VENIPUNCTURE: CPT | Performed by: FAMILY MEDICINE

## 2018-03-15 RX ORDER — METOPROLOL TARTRATE 50 MG
50 TABLET ORAL ONCE
Status: COMPLETED | OUTPATIENT
Start: 2018-03-15 | End: 2018-03-15

## 2018-03-15 RX ORDER — MEDROXYPROGESTERONE ACETATE 5 MG
10 TABLET ORAL DAILY
Status: DISCONTINUED | OUTPATIENT
Start: 2018-03-15 | End: 2018-03-15 | Stop reason: HOSPADM

## 2018-03-15 RX ORDER — MEDROXYPROGESTERONE ACETATE 5 MG
TABLET ORAL
Status: COMPLETED
Start: 2018-03-15 | End: 2018-03-15

## 2018-03-15 RX ORDER — MEDROXYPROGESTERONE ACETATE 10 MG
10 TABLET ORAL DAILY
Qty: 30 TABLET | Refills: 0 | Status: SHIPPED | OUTPATIENT
Start: 2018-03-15 | End: 2018-05-14

## 2018-03-15 RX ADMIN — MEDROXYPROGESTERONE ACETATE 10 MG: 5 TABLET ORAL at 00:41

## 2018-03-15 RX ADMIN — Medication 10 MG: at 00:41

## 2018-03-15 RX ADMIN — METOPROLOL TARTRATE 50 MG: 50 TABLET, FILM COATED ORAL at 00:41

## 2018-03-15 ASSESSMENT — ENCOUNTER SYMPTOMS
GASTROINTESTINAL NEGATIVE: 1
MUSCULOSKELETAL NEGATIVE: 1
CONSTITUTIONAL NEGATIVE: 1
RESPIRATORY NEGATIVE: 1
CARDIOVASCULAR NEGATIVE: 1

## 2018-03-15 NOTE — ED NOTES
Per Dr. Gan, ok to d/c pt once SBP <180. Pt continues to be pain free. Pt denies any gushes of blood and noted small amount when up to BR to void. Pt ready to go home.

## 2018-03-15 NOTE — DISCHARGE INSTRUCTIONS
Provera/Megace/Delalutin  Generic Name: Progesterone  Drug Type:   This drug is a hormone therapy and works to stop cancer cells from growing  What this drug is used for:   Breast, endometrial, and prostate cancer or __________________________________________  How this drug is given:  This drug is given in a pill. The amount of medicine that you receive depends on many things. Your doctor will find the best dose for you.  Make sure your health care team knows about all the medicines and supplements you take. This will help prevent drug interactions.   Side effects   Most people do not have all the side effects listed. Side effects will usually go away after the treatment is complete. Some of the rare side effects are not listed here, so tell your doctor if you have any unusual symptoms.  Common side effects (occur in less than 3 out of 10 people):    High blood pressure    Headache    Trouble falling asleep or staying asleep (insomnia)    Mood changes, feeling sad    Loose, watery stools (diarrhea)    Feel sick to your stomach (nausea)    Throwing up (vomiting)    Low white cells in blood (infection or fever)    Feeling tired and weak    Hair loss    Breast pain or larger breasts  Rare but serious side effects    Blood clots  Call your doctor right away if you have:    Signs of a drug allergy: Breathing problems; feel like your throat is closing up; swelling of the mouth, face, lips or tongue; or hives or skin redness or itching.    Fever of 100.5 F (38 C) or higher, or chills  Call your doctor within 24 hours if you:    Feel so sick to your stomach that you can't eat or drink and medicine doesn't help    Have loose, watery stools more than 4 times in 24 hours    Feel so tired that you can't care for yourself    Vomit more than 4 to 5 times in 24 hours    Have black or tarry stools or blood in your stool or urine    Have unusual bruising or bleeding    Have shortness of breath  Other information  _________________________  __________________________________________  __________________________________________  If you have any side effects, call us. We can help you manage these problems.  For more information, see:  www.chemocare.com  http://www.nlm.nih.gov/medlineplus/druginformation.htm  http://www.cancer.gov/cancertopics/coping/chemotherapy-and-you  For informational purposes only. Not to replace the advice of your health care provider. Copyright   2016 [facility]. All rights reserved. [employer] 728685 - 01/16.      What is Endometrial Cancer?    Cancer occurs when cells in the body change and start growing out of control. Cancer cells can form lumps of tissue called tumors. Cancer that starts in the lining of the uterus is called endometrial cancer.  Understanding the uterus and endometrium  The uterus is part of the female reproductive system. It's the organ that holds the baby when a woman is pregnant. The endometrium is the inside lining of the uterus. Each month, from puberty to menopause, the lining grows and thickens to prepare for pregnancy. This thickened lining helps to nourish a growing baby. If a woman doesn t become pregnant, the lining of the uterus is shed. This is her period.  When endometrial cancer forms  The endometrium is the most common place in the uterus for cancer to start. It's the most common type of gynecologic cancer (cancer in the female reproductive system). Cancer can destroy tissue in the uterus. Cancer cells may also spread to nearby organs and other parts of the body. When cancer spreads, it is called metastasis. In general, the more cancer spreads, the harder it is to treat.  Endometrial cancer often causes abnormal vaginal bleeding which may cause a woman to seek medical care. In these cases, endometrial cancer is often found when it is small and has not spread (metastasized). This is when the cancer is easiest to treat and cure.   Treatment options for  cancer of the uterus  You and your healthcare provider will discuss your treatment options. These may include:    Surgery to remove the uterus (hysterectomy). Sometimes the fallopian tubes and ovaries are also removed.    Radiation therapy. This uses directed rays of high-energy to kill cancer cells.    Chemotherapy. This uses strong medicine to kill cancer cells.    Hormone therapy. This treatment affects hormone levels and may help slow the growth of cancer cells. It may be used in some cases to avoid hysterectomy and allow a woman to get pregnant in the future.    Biologic therapy (immunotherapy). This uses medicines that act like your body's own immune system to fight cancer.  At this time, these treatments are only available in clinical trials.  Date Last Reviewed: 6/1/2017 2000-2017 [employer]. [address] [place] [address], [place] [address]. All rights reserved. This information is not intended as a substitute for professional medical care. Always follow your healthcare professional's instructions.    Check your blood pressures outpatient and schedule a follow up appointment with your primary care provider to discuss

## 2018-03-15 NOTE — ED PROVIDER NOTES
"  History     Chief Complaint   Patient presents with     Vaginal Bleeding     had d&c and ablation last month, had 2 \"gushes\" of blood today     HPI  Heather Rosas is a 67 year old female who presents to the ER with concern of vaginal bleeding Patient was recently admitted to [facility] on 2/18 for post menopausal bleeding. At that time she required 3 units of blood and had a D and C . She has not yet followed up for her biopsy results. She states that she had what she thought was normal post op discharge until today when she developed 2 other episodes of 'gushing ' of blood Patient currently not having any bleeding now. Denies dizziness or chest pain . NO sob. Patient has high blood pressure. Patient states she has recently been diagnosed and started on antihypertensive therapy by her primary care provider     Problem List:    Patient Active Problem List    Diagnosis Date Noted     Metrorrhagia 02/18/2018     Priority: Medium     ACP (advance care planning) 05/20/2016     Priority: Medium     Advance Care Planning 5/20/2016: ACP Review of Chart / Resources Provided:  Reviewed chart for advance care plan.  Heather Rosas has no plan or code status on file. Discussed available resources and provided with information. Confirmed code status reflects current choices pending further ACP discussions.  Confirmed/documented legally designated decision makers.  Added by Margot Shannon             HTN (hypertension)      Priority: Medium     Major depressive disorder, recurrent episode, moderate (H) 01/01/2011     Priority: Medium     ANNA (generalized anxiety disorder) 01/01/2011     Priority: Medium     GERD (Gastroesophageal reflux disease) 01/01/2011     Priority: Medium     Obesity (H) 01/01/2011     Priority: Medium     Schizophrenia (H) 01/01/2011     Priority: Medium     Seasonal allergic rhinitis 01/01/2011     Priority: Medium     Fibromyalgia 06/14/2004     Priority: Medium     Dyslipidemia 11/26/2003     " Priority: Medium        Past Medical History:    Past Medical History:   Diagnosis Date     Allergic rhinitis 01/01/2011     Anxiety 01/01/2011     Anxiety state, unspecified      Dyslipidemia 11/26/2003     fibromyalgia 06/14/2004     GERD, Esophageal reflux 01/01/2011     HTN (hypertension)      Major depression, recurrent (H) 1/1/2011     Nonorganic sleep disorder, unspecified      Obesity 01/01/2011     Schizophrenia (H) 01/01/2011     Toxic shock syndrome (H) 2006       Past Surgical History:    Past Surgical History:   Procedure Laterality Date     ANGIOGRAM  02/2005    Normal      BIOPSY BREAST  1984    LT, normal     CARPAL TUNNEL RELEASE RT/LT  2005    RT, carpal tunnel     COLONOSCOPY  2011    Repeat in ten years      COLONOSCOPY  1996     COLONOSCOPY  2004     DILATION AND CURETTAGE, HYSTEROSCOPY, ABLATE ENDOMETRIUM, COMBINED N/A 2/19/2018    Procedure: COMBINED DILATION AND CURETTAGE, HYSTEROSCOPY, ABLATE ENDOMETRIUM;  HYSTEROSCOPY DILATION AND CURETTAGE, ENDOMETRIAL ABLATION;  Surgeon: Jose Nettles MD;  Location: [facility]     placement of central line  2005       Family History:    Family History   Problem Relation Age of Onset     C.A.D. Father 45     (cause of death)      Other - See Comments Father      rheumatic fever      Allergies Father      CANCER Sister 56     bone ca      GASTROINTESTINAL DISEASE Mother      GERD     CANCER Mother      pancreatic ca (cause of death) /liver ca     Breast Cancer Maternal Aunt      Breast Cancer Other      Breast Cancer Maternal Aunt      Colon Cancer Paternal Aunt      (cause of death)      Crohn Disease Other      Depression Maternal Uncle      Depression Maternal Aunt      DIABETES Paternal Grandmother      type 2     Neurologic Disorder Brother      neuropathy     CANCER Brother 58     lymph node in neck     Allergies Brother        Social History:  Marital Status:   [4]  Social History   Substance Use Topics     Smoking status: Former Smoker      Packs/day: 0.50     Types: Cigarettes     Quit date: 12/1/2013     Smokeless tobacco: Never Used      Comment: Year Quit: 2011     Alcohol use No      Comment: rare        Medications:      medroxyPROGESTERone (PROVERA) 10 MG tablet   aspirin 81 MG EC tablet   lisinopril (PRINIVIL/ZESTRIL) 5 MG tablet   metoprolol succinate (TOPROL-XL) 25 MG 24 hr tablet         Review of Systems   Constitutional: Negative.    HENT: Negative.    Respiratory: Negative.    Cardiovascular: Negative.    Gastrointestinal: Negative.    Genitourinary: Positive for vaginal bleeding. Negative for vaginal pain.   Musculoskeletal: Negative.    Skin: Negative.        Physical Exam   BP: (!) 209/105  Pulse: 102  Heart Rate: 96  Temp: 98.8  F (37.1  C)  Resp: 18  SpO2: 97 %      Physical Exam   Constitutional: She is oriented to person, place, and time. She appears well-developed and well-nourished. No distress.   HENT:   Head: Normocephalic.   Eyes: Pupils are equal, round, and reactive to light.   Neck: Normal range of motion. Neck supple. No JVD present. No tracheal deviation present. No thyromegaly present.   Cardiovascular: Normal rate and regular rhythm.  Exam reveals no friction rub.    Murmur heard.  Pulmonary/Chest: Effort normal and breath sounds normal. No stridor.   Abdominal: Soft. She exhibits no distension. There is no tenderness. There is no rebound and no guarding.   Musculoskeletal: Normal range of motion. She exhibits no edema, tenderness or deformity.   Neurological: She is alert and oriented to person, place, and time. No cranial nerve deficit.   Skin: Skin is warm and dry. She is not diaphoretic.   Psychiatric: She has a normal mood and affect.       ED Course     ED Course     Procedures          Patient arrived to ER with complaint of recurrent vaginal bleeding. Patient without active bleeding at time of arrival . Patient with stable vital signs with elevated blood pressure. NO tachycardia. Hgb improved from recent  hospital discharge at 11.1. REviewed patients recent endometrial biospy result showing endometrial adenocarcinoma. Patient unaware of diagnosis prior to this. Discussed with DR Hampton who advised to have patient seen by DR Nettles this Friday . Discussed diagnosis with patient and answered questions to the best of my ability . Patient given 1 dose of oral provera in ER prior to discharge and will be discharged with prescription of provera to control bleeding. IN addition patient given 50 mg metoprolol for her hypertension and discussed importance of following up with her primary care provider for this     Results for orders placed or performed during the hospital encounter of 03/14/18 (from the past 24 hour(s))   CBC with platelets differential   Result Value Ref Range    WBC 11.6 (H) 4.0 - 11.0 10e9/L    RBC Count 4.13 3.8 - 5.2 10e12/L    Hemoglobin 11.1 (L) 11.7 - 15.7 g/dL    Hematocrit 33.9 (L) 35.0 - 47.0 %    MCV 82 78 - 100 fl    MCH 26.9 26.5 - 33.0 pg    MCHC 32.7 31.5 - 36.5 g/dL    RDW 14.5 10.0 - 15.0 %    Platelet Count 270 150 - 450 10e9/L    Diff Method Automated Method     % Neutrophils 67.6 %    % Lymphocytes 19.8 %    % Monocytes 8.7 %    % Eosinophils 2.6 %    % Basophils 0.8 %    % Immature Granulocytes 0.5 %    Nucleated RBCs 0 0 /100    Absolute Neutrophil 7.8 1.6 - 8.3 10e9/L    Absolute Lymphocytes 2.3 0.8 - 5.3 10e9/L    Absolute Monocytes 1.0 0.0 - 1.3 10e9/L    Absolute Eosinophils 0.3 0.0 - 0.7 10e9/L    Absolute Basophils 0.1 0.0 - 0.2 10e9/L    Abs Immature Granulocytes 0.1 0 - 0.4 10e9/L    Absolute Nucleated RBC 0.0        Medications   medroxyPROGESTERone (PROVERA) tablet 10 mg (10 mg Oral Given 3/15/18 0041)   metoprolol tartrate (LOPRESSOR) tablet 50 mg (50 mg Oral Given 3/15/18 0041)       Assessments & Plan (with Medical Decision Making)     I have reviewed the nursing notes.    I have reviewed the findings, diagnosis, plan and need for follow up with the patient.      New  Prescriptions    MEDROXYPROGESTERONE (PROVERA) 10 MG TABLET    Take 1 tablet (10 mg) by mouth daily       Final diagnoses:   Post-menopausal bleeding   Endometrial carcinoma (H)   Hypertension, unspecified type       3/14/2018   [facility]     Gabriela Arias MD  03/15/18 0109       Gabriela Arias MD  03/16/18 4035

## 2018-03-15 NOTE — ED NOTES
Pt given verbal and written d/c instructions and pt verbalizes understanding. Pt advised that prescription for provera was e-scribed to [facility]. Pt also advised to call clinic tomorrow and they will fit pt into Dr. Nettles's schedule on Friday, per Dr. Gan's phone call with Dr. Hampton, pt provided with phone number. Pt left department ambulatory.

## 2018-03-15 NOTE — ED NOTES
"Pt to room 2 ambulatory. Pt states that last month she had a d&c and an ablation done by Dr. Nettles for vaginal bleeding. Pt notes that today around 0800 she had a \"gush\" of blood and then nothing since. Pt thought that maybe it would subside and then about an hour before arrival pt had \"another gush\" and thought maybe she should come in. Pt states \"last time I waited too long\" and ended up needing a blood transfusion for low hemoglobin. Pt concerned that she \"probably\" needs a hysterectomy and states \"maybe they'll do it tomorrow if stay overnight\". Pt into gown, monitors placed, call light in reach and IV established.  "

## 2018-03-19 ENCOUNTER — TELEPHONE (OUTPATIENT)
Dept: OBGYN | Facility: OTHER | Age: 68
End: 2018-03-19

## 2018-03-19 NOTE — TELEPHONE ENCOUNTER
Called patient to remind her of her appointment with Dr. Nettles today at 1:30. Left message for patient due to no answer. Patient no showed last appointment it is very important that she is seen.

## 2018-03-20 ENCOUNTER — TELEPHONE (OUTPATIENT)
Dept: OBGYN | Facility: OTHER | Age: 68
End: 2018-03-20

## 2018-03-20 NOTE — TELEPHONE ENCOUNTER
Patient left a voicemail regarding missing her appointment on 3/19/2018. She states that she got busy and thought she would be back in time for her appointment and she was not. She has received Dr Nettles's voicemails and she understands that it is important for her to be seen regarding her cancerous results but she is going to go and get a second opinion. Regarding her D&C she states that she feels great and that her bleeding has stopped. She also states that she may call to make another appointment with you but that would be after she gets her second opinion.

## 2018-05-14 ENCOUNTER — HOSPITAL ENCOUNTER (INPATIENT)
Facility: HOSPITAL | Age: 68
LOS: 2 days | Discharge: LEFT BEFORE DISCHARGE | DRG: 065 | End: 2018-05-16
Attending: FAMILY MEDICINE | Admitting: INTERNAL MEDICINE
Payer: MEDICARE

## 2018-05-14 ENCOUNTER — APPOINTMENT (OUTPATIENT)
Dept: CT IMAGING | Facility: HOSPITAL | Age: 68
DRG: 065 | End: 2018-05-14
Attending: FAMILY MEDICINE
Payer: MEDICARE

## 2018-05-14 DIAGNOSIS — I25.119 CORONARY ARTERY DISEASE INVOLVING NATIVE HEART WITH ANGINA PECTORIS, UNSPECIFIED VESSEL OR LESION TYPE (H): ICD-10-CM

## 2018-05-14 DIAGNOSIS — I63.9 CEREBROVASCULAR ACCIDENT (CVA) DETERMINED BY CLINICAL ASSESSMENT (H): ICD-10-CM

## 2018-05-14 PROBLEM — R07.9 CHEST PAIN: Status: ACTIVE | Noted: 2018-05-14

## 2018-05-14 PROBLEM — I16.0 HYPERTENSIVE URGENCY: Status: ACTIVE | Noted: 2018-05-14

## 2018-05-14 LAB
ALBUMIN UR-MCNC: 10 MG/DL
ANION GAP SERPL CALCULATED.3IONS-SCNC: 7 MMOL/L (ref 3–14)
APPEARANCE UR: CLEAR
APTT PPP: 26 SEC (ref 24–37)
BACTERIA #/AREA URNS HPF: ABNORMAL /HPF
BASOPHILS # BLD AUTO: 0.1 10E9/L (ref 0–0.2)
BASOPHILS NFR BLD AUTO: 0.7 %
BILIRUB UR QL STRIP: NEGATIVE
BUN SERPL-MCNC: 17 MG/DL (ref 7–30)
CALCIUM SERPL-MCNC: 8.7 MG/DL (ref 8.5–10.1)
CHLORIDE SERPL-SCNC: 104 MMOL/L (ref 94–109)
CO2 SERPL-SCNC: 26 MMOL/L (ref 20–32)
COLOR UR AUTO: ABNORMAL
CREAT SERPL-MCNC: 0.8 MG/DL (ref 0.52–1.04)
DIFFERENTIAL METHOD BLD: ABNORMAL
EOSINOPHIL # BLD AUTO: 0.3 10E9/L (ref 0–0.7)
EOSINOPHIL NFR BLD AUTO: 2.3 %
ERYTHROCYTE [DISTWIDTH] IN BLOOD BY AUTOMATED COUNT: 18.5 % (ref 10–15)
GFR SERPL CREATININE-BSD FRML MDRD: 72 ML/MIN/1.7M2
GLUCOSE BLDC GLUCOMTR-MCNC: 89 MG/DL (ref 70–99)
GLUCOSE SERPL-MCNC: 93 MG/DL (ref 70–99)
GLUCOSE UR STRIP-MCNC: NEGATIVE MG/DL
HCT VFR BLD AUTO: 35.9 % (ref 35–47)
HGB BLD-MCNC: 11 G/DL (ref 11.7–15.7)
HGB UR QL STRIP: ABNORMAL
IMM GRANULOCYTES # BLD: 0.1 10E9/L (ref 0–0.4)
IMM GRANULOCYTES NFR BLD: 0.4 %
INR PPP: 1.01 (ref 0.8–1.2)
KETONES UR STRIP-MCNC: NEGATIVE MG/DL
LEUKOCYTE ESTERASE UR QL STRIP: NEGATIVE
LYMPHOCYTES # BLD AUTO: 2.2 10E9/L (ref 0.8–5.3)
LYMPHOCYTES NFR BLD AUTO: 17.9 %
MCH RBC QN AUTO: 22.2 PG (ref 26.5–33)
MCHC RBC AUTO-ENTMCNC: 30.6 G/DL (ref 31.5–36.5)
MCV RBC AUTO: 72 FL (ref 78–100)
MONOCYTES # BLD AUTO: 1.1 10E9/L (ref 0–1.3)
MONOCYTES NFR BLD AUTO: 9 %
NEUTROPHILS # BLD AUTO: 8.4 10E9/L (ref 1.6–8.3)
NEUTROPHILS NFR BLD AUTO: 69.7 %
NITRATE UR QL: NEGATIVE
NRBC # BLD AUTO: 0 10*3/UL
NRBC BLD AUTO-RTO: 0 /100
PH UR STRIP: 6.5 PH (ref 4.7–8)
PLATELET # BLD AUTO: 231 10E9/L (ref 150–450)
POTASSIUM SERPL-SCNC: 3.4 MMOL/L (ref 3.4–5.3)
RBC # BLD AUTO: 4.96 10E12/L (ref 3.8–5.2)
RBC #/AREA URNS AUTO: 1 /HPF (ref 0–2)
SODIUM SERPL-SCNC: 137 MMOL/L (ref 133–144)
SOURCE: ABNORMAL
SP GR UR STRIP: 1.01 (ref 1–1.03)
TROPONIN I SERPL-MCNC: <0.015 UG/L (ref 0–0.04)
UROBILINOGEN UR STRIP-MCNC: NORMAL MG/DL (ref 0–2)
WBC # BLD AUTO: 12 10E9/L (ref 4–11)
WBC #/AREA URNS AUTO: <1 /HPF (ref 0–5)

## 2018-05-14 PROCEDURE — 93005 ELECTROCARDIOGRAM TRACING: CPT

## 2018-05-14 PROCEDURE — 81001 URINALYSIS AUTO W/SCOPE: CPT | Performed by: FAMILY MEDICINE

## 2018-05-14 PROCEDURE — 85730 THROMBOPLASTIN TIME PARTIAL: CPT | Performed by: FAMILY MEDICINE

## 2018-05-14 PROCEDURE — 00000146 ZZHCL STATISTIC GLUCOSE BY METER IP

## 2018-05-14 PROCEDURE — 96374 THER/PROPH/DIAG INJ IV PUSH: CPT

## 2018-05-14 PROCEDURE — 25000128 H RX IP 250 OP 636: Performed by: FAMILY MEDICINE

## 2018-05-14 PROCEDURE — 12000000 ZZH R&B MED SURG/OB

## 2018-05-14 PROCEDURE — 70496 CT ANGIOGRAPHY HEAD: CPT | Mod: TC

## 2018-05-14 PROCEDURE — 99223 1ST HOSP IP/OBS HIGH 75: CPT | Mod: AI | Performed by: INTERNAL MEDICINE

## 2018-05-14 PROCEDURE — 84484 ASSAY OF TROPONIN QUANT: CPT | Performed by: FAMILY MEDICINE

## 2018-05-14 PROCEDURE — 93010 ELECTROCARDIOGRAM REPORT: CPT | Performed by: INTERNAL MEDICINE

## 2018-05-14 PROCEDURE — 80048 BASIC METABOLIC PNL TOTAL CA: CPT | Performed by: FAMILY MEDICINE

## 2018-05-14 PROCEDURE — 85025 COMPLETE CBC W/AUTO DIFF WBC: CPT | Performed by: FAMILY MEDICINE

## 2018-05-14 PROCEDURE — G0378 HOSPITAL OBSERVATION PER HR: HCPCS

## 2018-05-14 PROCEDURE — 25000131 ZZH RX MED GY IP 250 OP 636 PS 637: Mod: GY | Performed by: FAMILY MEDICINE

## 2018-05-14 PROCEDURE — 70450 CT HEAD/BRAIN W/O DYE: CPT | Mod: TC

## 2018-05-14 PROCEDURE — 99285 EMERGENCY DEPT VISIT HI MDM: CPT | Mod: Z6 | Performed by: FAMILY MEDICINE

## 2018-05-14 PROCEDURE — 96361 HYDRATE IV INFUSION ADD-ON: CPT

## 2018-05-14 PROCEDURE — 99285 EMERGENCY DEPT VISIT HI MDM: CPT | Mod: 25

## 2018-05-14 PROCEDURE — 85610 PROTHROMBIN TIME: CPT | Performed by: FAMILY MEDICINE

## 2018-05-14 PROCEDURE — A9270 NON-COVERED ITEM OR SERVICE: HCPCS | Mod: GY | Performed by: FAMILY MEDICINE

## 2018-05-14 PROCEDURE — 25000132 ZZH RX MED GY IP 250 OP 250 PS 637: Mod: GY | Performed by: FAMILY MEDICINE

## 2018-05-14 RX ORDER — ONDANSETRON 2 MG/ML
4 INJECTION INTRAMUSCULAR; INTRAVENOUS EVERY 6 HOURS PRN
Status: DISCONTINUED | OUTPATIENT
Start: 2018-05-14 | End: 2018-05-16 | Stop reason: HOSPADM

## 2018-05-14 RX ORDER — LIDOCAINE 40 MG/G
CREAM TOPICAL
Status: DISCONTINUED | OUTPATIENT
Start: 2018-05-14 | End: 2018-05-16 | Stop reason: HOSPADM

## 2018-05-14 RX ORDER — LABETALOL HYDROCHLORIDE 5 MG/ML
20 INJECTION, SOLUTION INTRAVENOUS ONCE
Status: COMPLETED | OUTPATIENT
Start: 2018-05-14 | End: 2018-05-14

## 2018-05-14 RX ORDER — ASPIRIN 81 MG/1
324 TABLET, CHEWABLE ORAL ONCE
Status: COMPLETED | OUTPATIENT
Start: 2018-05-14 | End: 2018-05-14

## 2018-05-14 RX ORDER — CARVEDILOL 25 MG/1
25 TABLET ORAL 2 TIMES DAILY WITH MEALS
Status: DISCONTINUED | OUTPATIENT
Start: 2018-05-15 | End: 2018-05-16 | Stop reason: HOSPADM

## 2018-05-14 RX ORDER — NALOXONE HYDROCHLORIDE 0.4 MG/ML
.1-.4 INJECTION, SOLUTION INTRAMUSCULAR; INTRAVENOUS; SUBCUTANEOUS
Status: DISCONTINUED | OUTPATIENT
Start: 2018-05-14 | End: 2018-05-14

## 2018-05-14 RX ORDER — ACETAMINOPHEN 325 MG/1
650 TABLET ORAL EVERY 4 HOURS PRN
Status: DISCONTINUED | OUTPATIENT
Start: 2018-05-14 | End: 2018-05-14

## 2018-05-14 RX ORDER — IOPAMIDOL 755 MG/ML
50 INJECTION, SOLUTION INTRAVASCULAR ONCE
Status: COMPLETED | OUTPATIENT
Start: 2018-05-14 | End: 2018-05-14

## 2018-05-14 RX ORDER — ACETAMINOPHEN 650 MG/1
650 SUPPOSITORY RECTAL EVERY 4 HOURS PRN
Status: DISCONTINUED | OUTPATIENT
Start: 2018-05-14 | End: 2018-05-16 | Stop reason: HOSPADM

## 2018-05-14 RX ORDER — ONDANSETRON 4 MG/1
4 TABLET, ORALLY DISINTEGRATING ORAL EVERY 6 HOURS PRN
Status: DISCONTINUED | OUTPATIENT
Start: 2018-05-14 | End: 2018-05-16 | Stop reason: HOSPADM

## 2018-05-14 RX ORDER — NALOXONE HYDROCHLORIDE 0.4 MG/ML
.1-.4 INJECTION, SOLUTION INTRAMUSCULAR; INTRAVENOUS; SUBCUTANEOUS
Status: DISCONTINUED | OUTPATIENT
Start: 2018-05-14 | End: 2018-05-16 | Stop reason: HOSPADM

## 2018-05-14 RX ORDER — DOCUSATE SODIUM 100 MG/1
100 CAPSULE, LIQUID FILLED ORAL 2 TIMES DAILY
Status: DISCONTINUED | OUTPATIENT
Start: 2018-05-14 | End: 2018-05-16 | Stop reason: HOSPADM

## 2018-05-14 RX ORDER — BISACODYL 5 MG
10 TABLET, DELAYED RELEASE (ENTERIC COATED) ORAL DAILY PRN
Status: DISCONTINUED | OUTPATIENT
Start: 2018-05-14 | End: 2018-05-16 | Stop reason: HOSPADM

## 2018-05-14 RX ORDER — ASPIRIN 325 MG
325 TABLET ORAL DAILY
Status: DISCONTINUED | OUTPATIENT
Start: 2018-05-15 | End: 2018-05-16 | Stop reason: HOSPADM

## 2018-05-14 RX ORDER — BISACODYL 5 MG
15 TABLET, DELAYED RELEASE (ENTERIC COATED) ORAL DAILY PRN
Status: DISCONTINUED | OUTPATIENT
Start: 2018-05-14 | End: 2018-05-16 | Stop reason: HOSPADM

## 2018-05-14 RX ORDER — BISACODYL 5 MG
5 TABLET, DELAYED RELEASE (ENTERIC COATED) ORAL DAILY PRN
Status: DISCONTINUED | OUTPATIENT
Start: 2018-05-14 | End: 2018-05-16 | Stop reason: HOSPADM

## 2018-05-14 RX ORDER — ACETAMINOPHEN 325 MG/1
650 TABLET ORAL EVERY 4 HOURS PRN
Status: DISCONTINUED | OUTPATIENT
Start: 2018-05-14 | End: 2018-05-16 | Stop reason: HOSPADM

## 2018-05-14 RX ADMIN — SODIUM CHLORIDE 500 ML: 9 INJECTION, SOLUTION INTRAVENOUS at 18:59

## 2018-05-14 RX ADMIN — LABETALOL HYDROCHLORIDE 20 MG: 5 INJECTION, SOLUTION INTRAVENOUS at 19:56

## 2018-05-14 RX ADMIN — IOPAMIDOL 50 ML: 755 INJECTION, SOLUTION INTRAVENOUS at 18:25

## 2018-05-14 RX ADMIN — ASPIRIN 81 MG 324 MG: 81 TABLET ORAL at 21:07

## 2018-05-14 ASSESSMENT — PAIN DESCRIPTION - DESCRIPTORS: DESCRIPTORS: CRAMPING

## 2018-05-14 NOTE — ED NOTES
The patient presents with right sided arm and leg weakness for two days. States she has had to use her push cart to be able to maneuver around.

## 2018-05-14 NOTE — ED NOTES
"Neuro evaluation called, Dr. Hodgson notified. FAST exam negative, right hand grasp noted to be weaker than the left, legs of equal strength bilaterally. Patient reports she has had right arm and right leg weakness since 0130 \"Saturday night\", clarified with the patient it was Juan A morning, stating she noted she had weakness on the right side along with worsened handwriting while doing crossword puzzles at that time. She states she had no improvement in her symptoms and was needing to use her wheeled grocery cart for getting around her apartment. She drove herself to the hospital today and presents through triage. GCS 15, speech clear and appropriate. Denies recent illness.  "

## 2018-05-14 NOTE — ED NOTES
Code Stroke called per Dr. Hodgson's direction. Report from Dr. Hodgson the patient is not a thrombolytic candidate. Last known well time 0130 Sunday. States right arm and right leg weakness for two days.

## 2018-05-15 ENCOUNTER — APPOINTMENT (OUTPATIENT)
Dept: OCCUPATIONAL THERAPY | Facility: HOSPITAL | Age: 68
DRG: 065 | End: 2018-05-15
Attending: NURSE PRACTITIONER
Payer: MEDICARE

## 2018-05-15 ENCOUNTER — APPOINTMENT (OUTPATIENT)
Dept: PHYSICAL THERAPY | Facility: HOSPITAL | Age: 68
DRG: 065 | End: 2018-05-15
Payer: MEDICARE

## 2018-05-15 ENCOUNTER — APPOINTMENT (OUTPATIENT)
Dept: MRI IMAGING | Facility: HOSPITAL | Age: 68
DRG: 065 | End: 2018-05-15
Attending: NURSE PRACTITIONER
Payer: MEDICARE

## 2018-05-15 ENCOUNTER — HOSPITAL ENCOUNTER (OUTPATIENT)
Dept: CARDIOLOGY | Facility: HOSPITAL | Age: 68
Setting detail: OBSERVATION
DRG: 065 | End: 2018-05-15
Attending: INTERNAL MEDICINE
Payer: MEDICARE

## 2018-05-15 PROBLEM — N85.02 ENDOMETRIAL HYPERPLASIA WITH ATYPIA: Status: ACTIVE | Noted: 2018-05-09

## 2018-05-15 LAB
ALBUMIN SERPL-MCNC: 3.2 G/DL (ref 3.4–5)
ALP SERPL-CCNC: 129 U/L (ref 40–150)
ALT SERPL W P-5'-P-CCNC: 22 U/L (ref 0–50)
ANION GAP SERPL CALCULATED.3IONS-SCNC: 8 MMOL/L (ref 3–14)
AST SERPL W P-5'-P-CCNC: 16 U/L (ref 0–45)
BILIRUB SERPL-MCNC: 0.2 MG/DL (ref 0.2–1.3)
BUN SERPL-MCNC: 20 MG/DL (ref 7–30)
CALCIUM SERPL-MCNC: 8.5 MG/DL (ref 8.5–10.1)
CHLORIDE SERPL-SCNC: 105 MMOL/L (ref 94–109)
CO2 SERPL-SCNC: 26 MMOL/L (ref 20–32)
CREAT SERPL-MCNC: 0.8 MG/DL (ref 0.52–1.04)
ERYTHROCYTE [DISTWIDTH] IN BLOOD BY AUTOMATED COUNT: 18.6 % (ref 10–15)
GFR SERPL CREATININE-BSD FRML MDRD: 71 ML/MIN/1.7M2
GLUCOSE SERPL-MCNC: 96 MG/DL (ref 70–99)
HCT VFR BLD AUTO: 33.5 % (ref 35–47)
HGB BLD-MCNC: 10 G/DL (ref 11.7–15.7)
MCH RBC QN AUTO: 21.9 PG (ref 26.5–33)
MCHC RBC AUTO-ENTMCNC: 29.9 G/DL (ref 31.5–36.5)
MCV RBC AUTO: 74 FL (ref 78–100)
PLATELET # BLD AUTO: 213 10E9/L (ref 150–450)
POTASSIUM SERPL-SCNC: 3.6 MMOL/L (ref 3.4–5.3)
PROT SERPL-MCNC: 6.9 G/DL (ref 6.8–8.8)
RBC # BLD AUTO: 4.56 10E12/L (ref 3.8–5.2)
SODIUM SERPL-SCNC: 139 MMOL/L (ref 133–144)
WBC # BLD AUTO: 11 10E9/L (ref 4–11)

## 2018-05-15 PROCEDURE — G0378 HOSPITAL OBSERVATION PER HR: HCPCS

## 2018-05-15 PROCEDURE — 97166 OT EVAL MOD COMPLEX 45 MIN: CPT | Mod: GO

## 2018-05-15 PROCEDURE — 97161 PT EVAL LOW COMPLEX 20 MIN: CPT | Mod: GP

## 2018-05-15 PROCEDURE — 40000786 ZZHCL STATISTIC ACTIVE MRSA SURVEILLANCE CULTURE: Performed by: INTERNAL MEDICINE

## 2018-05-15 PROCEDURE — 25000128 H RX IP 250 OP 636: Performed by: RADIOLOGY

## 2018-05-15 PROCEDURE — A9270 NON-COVERED ITEM OR SERVICE: HCPCS | Mod: GY | Performed by: INTERNAL MEDICINE

## 2018-05-15 PROCEDURE — 80053 COMPREHEN METABOLIC PANEL: CPT | Performed by: INTERNAL MEDICINE

## 2018-05-15 PROCEDURE — 40000193 ZZH STATISTIC PT WARD VISIT

## 2018-05-15 PROCEDURE — 36415 COLL VENOUS BLD VENIPUNCTURE: CPT | Performed by: INTERNAL MEDICINE

## 2018-05-15 PROCEDURE — 40000268 ZZH STATISTIC NO CHARGES

## 2018-05-15 PROCEDURE — A9270 NON-COVERED ITEM OR SERVICE: HCPCS | Mod: GY | Performed by: STUDENT IN AN ORGANIZED HEALTH CARE EDUCATION/TRAINING PROGRAM

## 2018-05-15 PROCEDURE — 25000132 ZZH RX MED GY IP 250 OP 250 PS 637: Mod: GY | Performed by: NURSE PRACTITIONER

## 2018-05-15 PROCEDURE — 25000132 ZZH RX MED GY IP 250 OP 250 PS 637: Mod: GY | Performed by: INTERNAL MEDICINE

## 2018-05-15 PROCEDURE — 85027 COMPLETE CBC AUTOMATED: CPT | Performed by: INTERNAL MEDICINE

## 2018-05-15 PROCEDURE — 25000128 H RX IP 250 OP 636: Performed by: INTERNAL MEDICINE

## 2018-05-15 PROCEDURE — 93306 TTE W/DOPPLER COMPLETE: CPT | Mod: TC

## 2018-05-15 PROCEDURE — 93306 TTE W/DOPPLER COMPLETE: CPT | Mod: 26 | Performed by: INTERNAL MEDICINE

## 2018-05-15 PROCEDURE — A9585 GADOBUTROL INJECTION: HCPCS | Performed by: RADIOLOGY

## 2018-05-15 PROCEDURE — 12000000 ZZH R&B MED SURG/OB

## 2018-05-15 PROCEDURE — 99233 SBSQ HOSP IP/OBS HIGH 50: CPT | Performed by: NURSE PRACTITIONER

## 2018-05-15 PROCEDURE — A9270 NON-COVERED ITEM OR SERVICE: HCPCS | Mod: GY | Performed by: NURSE PRACTITIONER

## 2018-05-15 PROCEDURE — 40000133 ZZH STATISTIC OT WARD VISIT

## 2018-05-15 PROCEDURE — 25000132 ZZH RX MED GY IP 250 OP 250 PS 637: Mod: GY | Performed by: STUDENT IN AN ORGANIZED HEALTH CARE EDUCATION/TRAINING PROGRAM

## 2018-05-15 PROCEDURE — 70553 MRI BRAIN STEM W/O & W/DYE: CPT | Mod: TC

## 2018-05-15 RX ORDER — OXYCODONE AND ACETAMINOPHEN 5; 325 MG/1; MG/1
1 TABLET ORAL EVERY 4 HOURS PRN
Status: DISCONTINUED | OUTPATIENT
Start: 2018-05-15 | End: 2018-05-16 | Stop reason: HOSPADM

## 2018-05-15 RX ORDER — GADOBUTROL 604.72 MG/ML
7.5 INJECTION INTRAVENOUS ONCE
Status: COMPLETED | OUTPATIENT
Start: 2018-05-15 | End: 2018-05-15

## 2018-05-15 RX ORDER — ALPRAZOLAM 0.25 MG
1 TABLET ORAL ONCE
Status: COMPLETED | OUTPATIENT
Start: 2018-05-15 | End: 2018-05-15

## 2018-05-15 RX ORDER — ATORVASTATIN CALCIUM 40 MG/1
40 TABLET, FILM COATED ORAL
Status: DISCONTINUED | OUTPATIENT
Start: 2018-05-15 | End: 2018-05-16 | Stop reason: HOSPADM

## 2018-05-15 RX ORDER — CARVEDILOL 25 MG/1
25 TABLET ORAL ONCE
Status: COMPLETED | OUTPATIENT
Start: 2018-05-15 | End: 2018-05-15

## 2018-05-15 RX ORDER — HYDROMORPHONE HYDROCHLORIDE 1 MG/ML
0.5 INJECTION, SOLUTION INTRAMUSCULAR; INTRAVENOUS; SUBCUTANEOUS ONCE
Status: COMPLETED | OUTPATIENT
Start: 2018-05-15 | End: 2018-05-15

## 2018-05-15 RX ORDER — TRAMADOL HYDROCHLORIDE 50 MG/1
50 TABLET ORAL EVERY 8 HOURS PRN
Status: DISCONTINUED | OUTPATIENT
Start: 2018-05-15 | End: 2018-05-16 | Stop reason: HOSPADM

## 2018-05-15 RX ORDER — HYDRALAZINE HYDROCHLORIDE 20 MG/ML
10 INJECTION INTRAMUSCULAR; INTRAVENOUS EVERY 6 HOURS PRN
Status: DISCONTINUED | OUTPATIENT
Start: 2018-05-15 | End: 2018-05-16 | Stop reason: HOSPADM

## 2018-05-15 RX ORDER — IBUPROFEN 200 MG
400 TABLET ORAL EVERY 12 HOURS PRN
Status: DISCONTINUED | OUTPATIENT
Start: 2018-05-15 | End: 2018-05-15

## 2018-05-15 RX ORDER — IBUPROFEN 200 MG
400 TABLET ORAL 2 TIMES DAILY
Status: DISCONTINUED | OUTPATIENT
Start: 2018-05-15 | End: 2018-05-15

## 2018-05-15 RX ADMIN — ASPIRIN 325 MG ORAL TABLET 325 MG: 325 PILL ORAL at 09:01

## 2018-05-15 RX ADMIN — OXYCODONE HYDROCHLORIDE AND ACETAMINOPHEN 1 TABLET: 5; 325 TABLET ORAL at 22:09

## 2018-05-15 RX ADMIN — DOCUSATE SODIUM 100 MG: 100 CAPSULE, LIQUID FILLED ORAL at 20:45

## 2018-05-15 RX ADMIN — TRAMADOL HYDROCHLORIDE 50 MG: 50 TABLET, COATED ORAL at 03:10

## 2018-05-15 RX ADMIN — IBUPROFEN 400 MG: 200 TABLET, FILM COATED ORAL at 02:31

## 2018-05-15 RX ADMIN — ATORVASTATIN CALCIUM 40 MG: 40 TABLET, FILM COATED ORAL at 20:45

## 2018-05-15 RX ADMIN — CARVEDILOL 25 MG: 25 TABLET, FILM COATED ORAL at 09:01

## 2018-05-15 RX ADMIN — TRAMADOL HYDROCHLORIDE 50 MG: 50 TABLET, COATED ORAL at 17:03

## 2018-05-15 RX ADMIN — GADOBUTROL 7.5 ML: 604.72 INJECTION INTRAVENOUS at 08:33

## 2018-05-15 RX ADMIN — HYDRALAZINE HYDROCHLORIDE 10 MG: 20 INJECTION INTRAMUSCULAR; INTRAVENOUS at 00:47

## 2018-05-15 RX ADMIN — HYDROMORPHONE HYDROCHLORIDE 0.5 MG: 1 INJECTION, SOLUTION INTRAMUSCULAR; INTRAVENOUS; SUBCUTANEOUS at 04:07

## 2018-05-15 RX ADMIN — CARVEDILOL 25 MG: 25 TABLET, FILM COATED ORAL at 16:50

## 2018-05-15 RX ADMIN — Medication 2.5 MG: at 22:09

## 2018-05-15 RX ADMIN — ENOXAPARIN SODIUM 40 MG: 40 INJECTION SUBCUTANEOUS at 00:52

## 2018-05-15 RX ADMIN — Medication 1 MG: at 20:44

## 2018-05-15 RX ADMIN — ACETAMINOPHEN 650 MG: 325 TABLET ORAL at 01:20

## 2018-05-15 RX ADMIN — CARVEDILOL 25 MG: 25 TABLET, FILM COATED ORAL at 00:47

## 2018-05-15 RX ADMIN — ALPRAZOLAM 1 MG: 0.25 TABLET ORAL at 00:47

## 2018-05-15 ASSESSMENT — ACTIVITIES OF DAILY LIVING (ADL): PREVIOUS_RESPONSIBILITIES: MEAL PREP;HOUSEKEEPING;LAUNDRY;SHOPPING;MEDICATION MANAGEMENT;FINANCES;DRIVING

## 2018-05-15 ASSESSMENT — PAIN DESCRIPTION - DESCRIPTORS
DESCRIPTORS: SHARP
DESCRIPTORS: SHARP
DESCRIPTORS: CRAMPING

## 2018-05-15 NOTE — PROGRESS NOTES
05/15/18 1125   Quick Adds   Quick Adds Certification   Type of Visit Initial PT Evaluation   Living Environment   Lives With alone   Living Arrangements apartment   Home Accessibility no concerns   Transportation Available car   Living Environment Comment lives at the [address]   Self-Care   Dominant Hand right   Usual Activity Tolerance good   Current Activity Tolerance good   Regular Exercise no   Equipment Currently Used at Home none   Functional Level Prior   Ambulation 0-->independent   Transferring 0-->independent   Toileting 0-->independent   Bathing 0-->independent   Dressing 0-->independent   Eating 0-->independent   Communication 0-->understands/communicates without difficulty   Swallowing 0-->swallows foods/liquids without difficulty   Cognition 0 - no cognition issues reported   Fall history within last six months no   Which of the above functional risks had a recent onset or change? none   General Information   Onset of Illness/Injury or Date of Surgery - Date 05/14/18   Referring Physician Claudia Chino NP   Patient/Family Goals Statement to be bettter   Pertinent History of Current Problem (include personal factors and/or comorbidities that impact the POC) states she was not feeling right for a couple days and had some weakness and numbness in her right foot. COnfirmed Left CVA   Cognitive Status Examination   Orientation orientation to person, place and time   Level of Consciousness alert   Follows Commands and Answers Questions 100% of the time   Personal Safety and Judgment impulsive   Memory intact   Pain Assessment   Patient Currently in Pain No   Integumentary/Edema   Integumentary/Edema no deficits were identifed   Posture    Posture Protracted shoulders   Range of Motion (ROM)   ROM Quick Adds No deficits were identified   Strength   Strength Comments strength is grossly left 5/5, right 4/5 all except hip is -4/5   Bed Mobility   Bed Mobility Comments independent   Transfer  Skills   Transfer Comments CGA with pivots but sit to stand is independent   Gait   Gait Comments Gait with the  feet at a time with balance at midline but with turning she did need recovery assistance x 3 times.   Balance   Balance Comments balance is good in sitting with independent recovery for moderate challenges, standing ststic is good but dynamically she needs a slight cue assiatcne to correct with pivot and bending forward. Functionally is not safe with turning and bending to floor.   Sensory Examination   Sensory Perception no deficits were identified   Coordination   Coordination Comments right leg at times is slight with very slight ataxia   Muscle Tone   Muscle Tone no deficits were identified   General Therapy Interventions   Planned Therapy Interventions balance training;gait training;neuromuscular re-education   Clinical Impression   Criteria for Skilled Therapeutic Intervention yes, treatment indicated   PT Diagnosis decreased balance with gait   Influenced by the following impairments recent cva   Functional limitations due to impairments transitional movements   Clinical Presentation Evolving/Changing   Clinical Presentation Rationale clincial obs   Clinical Decision Making (Complexity) Low complexity   Therapy Frequency` daily   Predicted Duration of Therapy Intervention (days/wks) 7 days   Anticipated Equipment Needs at Discharge (walker or cane TBD)   Anticipated Discharge Disposition Acute Rehabilitation Facility   Risk & Benefits of therapy have been explained Yes   Patient, Family & other staff in agreement with plan of care Yes   Clinical Impression Comments Client presents fresh from a left CVA with slight right proximal weakness and slight ataxia with turning and more advanced transitional movememts. Today was her first time up so I am not sure how much will resolve by AM. SHe lives alone and is a slight fal risk for transitional motions especially with fatigue.   Therapy  "Certification   Start of care date 05/15/18   Certification date from 05/21/18   Certification date to 05/15/18   Medical Diagnosis left CVA   Certification I certify the need for these services furnished under this plan of treatment and while under my care.  (Physician co-signature of this document indicates review and certification of the therapy plan).    [facility] AM-PAC  \"6 Clicks\" V.2 Basic Mobility Inpatient Short Form   1. Turning from your back to your side while in a flat bed without using bedrails? 4 - None   2. Moving from lying on your back to sitting on the side of a flat bed without using bedrails? 4 - None   3. Moving to and from a bed to a chair (including a wheelchair)? 3 - A Little   4. Standing up from a chair using your arms (e.g., wheelchair, or bedside chair)? 4 - None   5. To walk in hospital room? 3 - A Little   6. Climbing 3-5 steps with a railing? 3 - A Little   Basic Mobility Raw Score (Score out of 24.Lower scores equate to lower levels of function) 21   Total Evaluation Time   Total Evaluation Time (Minutes) 18     "

## 2018-05-15 NOTE — PLAN OF CARE
Problem: Patient Care Overview  Goal: Plan of Care/Patient Progress Review  Outcome: No Change  OT evaluation completed.  Discharge Planner OT   Patient plan for discharge: pt wants to return home  Current status: SBA,   Barriers to return to prior living situation: pt is impulsive, has impaired balance and UE coordination. Pt has decreased insights to deficits and is a safety risk.  Recommendations for discharge: inpatient rehab  Rationale for recommendations: Pt would benefit from rehab to improve function. Pt believes she is managing fine and is safe to drive and return to independent living situation.        Entered by: Leelee Ortez 05/15/2018 1:40 PM

## 2018-05-15 NOTE — PLAN OF CARE
"Pt is very happy/giggly about how well she feels from last night. MRI, echo, and PT evaluation done this AM. VSS. /64  Pulse 78  Temp 98.2  F (36.8  C) (Tympanic)  Resp 20  Ht 1.499 m (4' 11\")  Wt 84.8 kg (186 lb 15.2 oz)  SpO2 98%  BMI 37.76 kg/m2. Medications given. Very tired due to not sleeping last night.     Face to face report given with opportunity to observe patient.    Report given to Arti Robertson   5/15/2018  11:02 AM      "

## 2018-05-15 NOTE — ED NOTES
The patient was steady at the bedside transferring to the commode to void without difficulty. Denies leg weakness, reports some right arm weakness still subjectively present.

## 2018-05-15 NOTE — PLAN OF CARE
[facility] Inpatient Admission Note:    Patient admitted to 3224/3224-1 at approximately 2305 via wheel chair accompanied by transport tech from emergency room . Report received from [place] in SBAR format at 2255 via telephone. Patient ambulated to bed via self.. Patient is alert and oriented X 3, reports pain; rates at 2 on 0-10 scale.  Patient oriented to room, unit, hourly rounding, and plan of care. Explained admission packet and patient handbook with patient bill of rights brochure. Will continue to monitor and document as needed.     Inpatient Nursing criteria listed below was met:      Health care directives status obtained and documented: No- states she doesn't have one      Care Everywhere authorization obtained na      MRSA swab completed for patient 65 years and older: Yes      Patient identifies a surrogate decision maker: No      Core Measure diagnosis present:yes. If yes, state diagnosis: stroke       Is initial lactic acid >2.0? No.       Clergy visit ordered if patient requests: refused      Skin issues/needs documented: N/A      Isolation Patient: no       Fall Prevention - steady on feet. Didn't need      Care Plan initiated: Yes      Education Documented (including assessment): Yes      Patient has discharge needs :Not at this time.

## 2018-05-15 NOTE — PLAN OF CARE
"Problem: Patient Care Overview  Goal: Plan of Care/Patient Progress Review  Outcome: No Change    Vitals: VSS. BP has been trending high, treated with hydralazine. Last BP was 154/57.  /57  Pulse 78  Temp 98.1  F (36.7  C) (Tympanic)  Resp 20  Ht 1.499 m (4' 11\")  Wt 84.8 kg (186 lb 15.2 oz)  SpO2 97%  BMI 37.76 kg/m2    Pain: Pain rated 8 to 10/10 to right foot, and then later left foot also but only on lateral side. Attempted to treat with tylenol, ibuprofen, tramadol, and dilaudid. We also used ice and attempted to wrap foot, because pt stated that pressure helps with the pain. Nothing helped, however around 430-kelechi pt was able to sleep after dilaudid was given. When asking questions and talking with pt most of her s/s of pain (howling, crying, grimacing) went away and she would smile and be able to communicate. She also was able to eat her full mean while loudly crying in pain. MD aware of pain situation.     Orientation: A&O. All neuro checks intact. Very anxious. Administered 1 mg xanax, didn't seem to help much.    Cardiac: Reg. Tele monitor on reading SR 60's per ICU.    Lungs: Clear    ABD: Bowels Active. ABD obese.     Urinating: WNL    Skin: WNL    IV fluids: SL    Antibiotics: None    Mobility: Independent in room, steady on feet. No weakness noticed. Equal strength to both sides.    Safety:  Call light in reach. Rounding done.    Comment: Pt up and down a lot. Stated that she probably wont have this pain if she was at home instead of the hospital.              "

## 2018-05-15 NOTE — ED NOTES
The patient has been informed her admission is delayed until approximately 2330. She reports she is restless and is requesting a recliner and a meal tray. Nursing supervisor notified. The patient is steady on her feet, legs of equal strength. Right hand grasp remains slightly less than the left.

## 2018-05-15 NOTE — ED NOTES
"To CT via stretcher accompanied by the RN and monitored. The patient is alert with GCS 15, right hand grasp weaker than the left grasp. States an \"ache\" in her right arm and right leg. Monitor in a NSR, no ectopy noted.  "

## 2018-05-15 NOTE — ED NOTES
Awaiting information from the stroke neurologist per report from Dr. Hodgson. Daughter in law at the bedside.

## 2018-05-15 NOTE — PROVIDER NOTIFICATION
Notified MD that we attempted to give tylenol, ibuprofen and tramodol. Pt is still howling and crying, however she was able to eat. When assessing she howled when I came near the left foot even though she keeps complaining of the right foot pain. When asked about it she stated her left foot hurts on the lateral side and the right does hurt everywhere. Asked MD if he would like to come see patient. Ordered to put in 0.5 IV dilaudid one time dose.

## 2018-05-15 NOTE — PLAN OF CARE
Problem: Patient Care Overview  Goal: Plan of Care/Patient Progress Review  Discharge Planner PT   Patient plan for discharge: structured rehab  Current status: requires assistance for balance and turning especially transitional motions and walker is new to her and she lives alone  Barriers to return to prior living situation: high llevel balance  Recommendations for discharge: inpatient rehab  Rationale for recommendations: she lives alone and has some high level balance deficits       Entered by: Leyla Gaytan 05/15/2018 11:40 AM

## 2018-05-15 NOTE — ED NOTES
Patient to room 3224 via wheelchair for admission. The patient denies pain at this time. Right hand grasp slightly less than the left. Bilateral leg strength equal and the patient is steady on her feet. Cooperative at this time.

## 2018-05-15 NOTE — H&P
[facility]    History and Physical  Hospitalist       Date of Admission:  5/14/2018    Assessment & Plan   Heather Rosas is a 67 year old female who presents with R hand and R leg weakness since Saturday    Active Problems:  1. CVA  The presentation is consistent with acute CVA.  CT head, CTA neck does not show any pathologies.   But she is not a candidate to receive any interventions including TPA treatment considering the time interval.  The case has been discussed with the stroke neurologist in [place].  She has been on baby aspirin prior.  Per neurology recommendation, we will switch it to full dose  Aspirin, will start her on Zocor.  We will get her blood pressure controlled.  I would obtain carotid ultrasound and echocardiogram tomorrow morning.  Keep on telemetry.  We will also obtain MRI head and neck    2.HTN urgency   We will put her on Coreg 25 mg twice daily.    3.Mild hypokalemia   This is in the setting of elevated blood pressure.  I would give 20 mg of potassium    4. Leukocytosis  She does not have any signs of active infection.  I suspect leukocytosis is due to reaction    5.  DVT prophylaxis with Lovenox    DVT Prophylaxis: Lovenox  Code Status: Full Code    Disposition: Expected discharge in 1days    Kenia Paniagua    Primary Care Physician   Laci Avila    Chief Complaint   Right hand and right leg weakness since Saturday    History is obtained from the patient    History of Present Illness   Heather Rosas is a 67 year old female past medical history significant for recently diagnosed hypertension, major depressive disorder, anxiety disorder, dyslipidemia, fibromyalgia.  She lives at home independently.  She has been in her usual state of health until last Saturday.  Saturday night, while she was playing cross words, she has noticed right upper extremity weakness along with right lower extremity weakness.  This has persisted through Sunday and Monday.  She has noticed  some unsteady in her gait.  Therefore, she had decided to come to the ER.  CT head done in the ER does not show any acute pathologies.  She has been in hypertensive urgency on presentation to the ER, systolic blood pressure of around 220.  At the time of my evaluation, blood pressure has started to improve with labetalol boluses.  She denies any headache or blurry vision.  Denies any difficulty swallowing.  Denies chest pain palpitation.  She has never experienced similar symptoms before.  She tells me, she was diagnosed with hypertension about 3 weeks ago.  Since then she has been on metoprolol.  She is still smoking couple of cigarettes a day.  Denies any IV drug use.       Past Medical History    I have reviewed this patient's medical history and updated it with pertinent information if needed.   Past Medical History:   Diagnosis Date     Allergic rhinitis 01/01/2011     Anxiety 01/01/2011     Anxiety state, unspecified     Anxiety state     Dyslipidemia 11/26/2003     fibromyalgia 06/14/2004     GERD, Esophageal reflux 01/01/2011     HTN (hypertension)     Essential hypertension     Major depression, recurrent (H) 1/1/2011     Nonorganic sleep disorder, unspecified     Non-org. 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  Past Surgical History:   Procedure Laterality Date     ANGIOGRAM  02/2005    Normal      BIOPSY BREAST  1984    LT, normal     CARPAL TUNNEL RELEASE RT/LT  2005    RT, carpal tunnel     COLONOSCOPY  2011    Repeat in ten years      COLONOSCOPY  1996     COLONOSCOPY  2004     DILATION AND CURETTAGE, HYSTEROSCOPY, ABLATE ENDOMETRIUM, COMBINED N/A 2/19/2018    Procedure: COMBINED DILATION AND CURETTAGE, HYSTEROSCOPY, ABLATE ENDOMETRIUM;  HYSTEROSCOPY DILATION AND CURETTAGE,  ENDOMETRIAL ABLATION;  Surgeon: Jose Nettles MD;  Location: [facility] OR     placement of central line  2005       Prior to Admission Medications   Prior to Admission Medications   Prescriptions Last Dose Informant Patient Reported? Taking?   Furosemide (LASIX PO) 5/14/2018 at Unknown time  Yes Yes   Sig: Take 20 mg by mouth daily   METOPROLOL SUCCINATE ER PO 5/14/2018 at Unknown time  Yes Yes   Sig: Take 100 mg by mouth daily   aspirin 81 MG EC tablet 5/14/2018 at Unknown time  No Yes   Sig: Take 1 tablet (81 mg) by mouth daily      Facility-Administered Medications: None     Allergies   Allergies   Allergen Reactions     Cats Other (See Comments)     Sneezing, runny nose     Codeine Sulfate Nausea and Vomiting and GI Disturbance     Fexofenadine Hcl      Allegra      Rosuvastatin Other (See Comments)     Dizziness - Crestor      Aurora Oil GI Disturbance     Any pink fish       Social History   I have reviewed this patient's social history and updated it with pertinent information if needed. Heather Rosas  reports that she quit smoking about 4 years ago. Her smoking use included Cigarettes. She smoked 0.50 packs per day. She has never used smokeless tobacco. She reports that she does not drink alcohol or use illicit drugs.    Family History   I have reviewed this patient's family history and updated it with pertinent information if needed.   Family History   Problem Relation Age of Onset     C.A.D. Father 45     (cause of death)      Other - See Comments Father      rheumatic fever      Allergies Father      CANCER Sister 56     bone ca      GASTROINTESTINAL DISEASE Mother      GERD     CANCER Mother      pancreatic ca (cause of death) /liver ca     Breast Cancer Maternal Aunt      Breast Cancer Other      Breast Cancer Maternal Aunt      Colon Cancer Paternal Aunt      (cause of death)      Crohn Disease Other      Depression Maternal Uncle      Depression Maternal Aunt      DIABETES Paternal Grandmother      type  2     Neurologic Disorder Brother      neuropathy     CANCER Brother 58     lymph node in neck     Allergies Brother        Review of Systems   12 system reviewed.  Pertinent positives and negatives mentioned in the HPI    Physical Exam   Temp: 98  F (36.7  C) Temp src: Tympanic BP: (!) 198/86 Pulse: 83 Heart Rate: 76 Resp: 21 SpO2: 94 % O2 Device: None (Room air)    Vital Signs with Ranges  Temp:  [98  F (36.7  C)] 98  F (36.7  C)  Pulse:  [83] 83  Heart Rate:  [73-79] 76  Resp:  [14-24] 21  BP: (139-227)/() 198/86  SpO2:  [92 %-100 %] 94 %  0 lbs 0 oz      Constitutional: Not in distress  Eyes: EOMI, PERRLA  HEENT: moist mucosa  Respiratory: clear ant  Cardiovascular: RRR, No M   GI: soft, + BS  Lymph/Hematologic: No LAD  Genitourinary: No CVA tenderness  Skin: no rash  Musculoskeletal: no deformities   Neurologic: no deficit  Psychiatric: cooperative    Data   Data reviewed today:  I personally reviewed  EKG, chest x-ray.    Recent Labs  Lab 05/14/18  1814   WBC 12.0*   HGB 11.0*   MCV 72*      INR 1.01      POTASSIUM 3.4   CHLORIDE 104   CO2 26   BUN 17   CR 0.80   ANIONGAP 7   CARINA 8.7   GLC 93   TROPI <0.015       Recent Results (from the past 24 hour(s))   CT Head w/o Contrast    Narrative    PROCEDURE: CT HEAD W/O CONTRAST     HISTORY: stroke; .    COMPARISON: None.    TECHNIQUE:  Helical images of the head from the foramen magnum to the  vertex were obtained without contrast.    FINDINGS: The ventricles and sulci are normal in volume. No acute  intracranial hemorrhage, mass effect, midline shift, hydrocephalus or  basilar cystern effacement are present.    The grey-white matter interface is preserved.    The calvarium is intact. The mastoid air cells are clear.  The  visualized paranasal sinuses are clear.      Impression    IMPRESSION: Normal brain      JEFERSON NEGRO MD   CT Head Neck Angio w/o & w Contrast    Narrative    PROCEDURE: CTA ANGIOGRAM HEAD NECK 5/14/2018 7:21  PM    HISTORY: stroke;     COMPARISONS: None.    Meds/Dose Given: Isovue 370 50 Ml    TECHNIQUE: CT angiogram of the neck with sagittal coronal and  three-dimensional MIP reconstructions. CT angiogram of the brain with  sagittal coronal and three-dimensional MIP reconstructions    FINDINGS: CT angiogram of the neck: The left common carotid artery  left carotid bifurcation and left internal carotid artery appears  normal. The brachiocephalic artery right common carotid artery right  internal carotid artery appears normal. The right subclavian and right  vertebral artery is normal. 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 The  left vertebral artery is widely patent.    CT angiogram of the brain with three-dimensional reconstructions the  distal vertebral arteries appear normal. The basilar artery is widely  patent. The left posterior cerebral artery arises via the basilar  artery. The right posterior cerebral arises via the posterior  communicating artery. Both carotid siphons are widely patent. The  supraclinoid internal carotid anterior and middle cerebral arteries  appear normal. No intracranial stenoses, aneurysms or vascular shifts.    CT scan of the brain with IV contrast reveals the ventricular system  to be normal in size. There are no enhancing masses ventricular shifts  or extracerebral collections. The brainstem and cerebellum appear  normal.    CT scan of the neck with IV contrast: The muscles of mastication in  the parapharyngeal spaces appear normal. The salivary glands are  normal. The larynx and upper trachea is normal there is a low-density  mass with some calcifications in the left lobe of the thyroid. This  mass measures 1.2 cm in diameter further imaging evaluation with  ultrasound is recommended. Upper mediastinal and cervical lymph nodes  appear normal. The lung apices are clear. Degenerative changes are  present in the cervical  spine.         Impression    IMPRESSION: No vascular stenoses occlusions aneurysms or vascular  shifts are noted.    JEFERSON NEGRO MD

## 2018-05-15 NOTE — PROVIDER NOTIFICATION
Notified MD of pt's pain rated 8/10 to right foot. States that the pain feels like cramping and pressure with standing up  relieves it, but she is tired. Attempted to help with tylenol and wrapping foot. Pt has also had 1 mg of xanax. Pt is howling and crying in pain, if distracted with questions she smiles and doesn't show any s/s of pain. MD put in orders for ibuprofen and tramadol. Will continue to monitor.

## 2018-05-15 NOTE — PROVIDER NOTIFICATION
Notified MD of BP with systolic in the 180's. Ordered to put in IV hydratazine PRN and give one time dose of coreg now. Also mentioned pt is very anxious and pacing in room. Told to order and administer xanax.

## 2018-05-15 NOTE — PLAN OF CARE
"Problem: Stroke (Ischemic) (Adult)  Goal: Signs and Symptoms of Listed Potential Problems Will be Absent, Minimized or Managed (Stroke)  Signs and symptoms of listed potential problems will be absent, minimized or managed by discharge/transition of care (reference Stroke (Ischemic) (Adult) CPG).  Outcome: Improving                 Reason for hospital stay:  CVA  Most recent vitals: /60  Pulse 64  Temp 98.2  F (36.8  C) (Tympanic)  Resp 20  Ht 1.499 m (4' 11\")  Wt 84.8 kg (186 lb 15.2 oz)  SpO2 98%  BMI 37.76 kg/m2  Pain Management:  Denies  Orientation:  Alert and oriented, pleasant, talkative  Respiratory:  Denies problems  IVF:  Saline lock  ABX:  none  Neuros: intact no changes, right side- hand arm slower to respond but grasps strong, equal.  Speech clear, appropriate. Up in room with assist of 1- steady.   Safety:  Calling for assistance appropriately.  Comments:  Will be starting Lipitor tonight with supper, patient is agreeable.       5/15/2018  2:21 PM  Arti Tovar        Face to face report given with opportunity to observe patient.    Report given to Reece WHIPPLE RN.     Arti Tovar   5/15/2018  3:17 PM      "

## 2018-05-15 NOTE — PROGRESS NOTES
05/15/18 1241   Quick Adds   Type of Visit Initial Occupational Therapy Evaluation   Living Environment   Lives With alone   Living Arrangements apartment   Home Accessibility no concerns   Transportation Available car   Living Environment Comment pt lives on 3rd floor at [facility] [address]   Self-Care   Dominant Hand right   Usual Activity Tolerance good   Current Activity Tolerance fair   Regular Exercise no   Equipment Currently Used at Home none   Functional Level Prior   Ambulation 0-->independent   Transferring 0-->independent   Toileting 0-->independent   Bathing 0-->independent   Dressing 0-->independent   Eating 0-->independent   Communication 0-->understands/communicates without difficulty   Swallowing 0-->swallows foods/liquids without difficulty   Cognition 0 - no cognition issues reported   Fall history within last six months no   Which of the above functional risks had a recent onset or change? none   General Information   Onset of Illness/Injury or Date of Surgery - Date 05/14/18   Referring Physician Alicia Chino NP   Patient/Family Goals Statement Pt wants to return home   Additional Occupational Profile Info/Pertinent History of Current Problem Pt presented with weakness of R side. Pt had a CVA   Precautions/Limitations fall precautions   Weight-Bearing Status - LUE full weight-bearing   Weight-Bearing Status - RUE full weight-bearing   Weight-Bearing Status - LLE full weight-bearing   Weight-Bearing Status - RLE full weight-bearing   Heart Disease Risk Factors High blood pressure;Smoking   General Info Comments Pt reported symptoms started Saturday and she drove herself here Mon. Pt reported she had been taking care of things at home fine for 2 days   Cognitive Status Examination   Orientation orientation to person, place and time   Level of Consciousness alert   Able to Follow Commands WNL/WFL   Personal Safety (Cognitive) impulsive;decreased insight to deficits   Memory intact   Attention No  deficits were identified   Organization/Problem Solving No deficits were identified   Executive Function No deficits were identified   Visual Perception   Visual Perception No deficits were identified   Sensory Examination   Sensory Quick Adds No deficits were identified   Pain Assessment   Patient Currently in Pain No   Range of Motion (ROM)   ROM Comment 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   Strength   Strength Comments R UE grossly 3/5, L UE 4/5   Muscle Tone Assessment   Muscle Tone Quick Adds No deficits were identified   Coordination   Upper Extremity Coordination Right UE impaired   Functional Limitations Decreased speed;Object transport impaired;Reach to targets impaired;Fine motor ADL performance impaired   Transfer Skill: Bed to Chair/Chair to Bed   Level of [place]: Bed to Chair stand-by assist   Physical Assist/Nonphysical Assist: Bed to Chair 1 person assist;supervision;verbal cues   Weight-Bearing Restrictions full weight-bearing   Assistive Device - Transfer Skill Bed to Chair Chair to Bed Rehab Eval standard walker   Transfer Skill: Sit to Stand   Level of [place]: Sit/Stand stand-by assist   Physical Assist/Nonphysical Assist: Sit/Stand supervision;verbal cues   Balance   Balance Quick Add Standing balance: dynamic   Standing Balance: Dynamic fair balance   Upper Body Dressing   Level of [place]: Dress Upper Body independent   Lower Body Dressing   Level of [place]: Dress Lower Body stand-by assist   Physical Assist/Nonphysical Assist: Dress Lower Body supervision;verbal cues  (Pt fatigued and needed breaks, impulsive leaning forward)   Toileting   Level of [place]: Toilet stand-by assist   Physical Assist/Nonphysical Assist: Toilet supervision   Grooming   Level of [place]: Grooming stand-by assist   Physical Assist/Nonphysical Assist: Grooming supervision   Instrumental Activities of Daily Living (IADL)   Previous Responsibilities meal  "prep;housekeeping;laundry;shopping;medication management;finances;driving   IADL Comments Pt reported she drove herself here and plans to drive self home   Activities of Daily Living Analysis   Impairments Contributing to Impaired Activities of Daily Living coordination impaired;strength decreased;motor control impaired   Clinical Impression   Criteria for Skilled Therapeutic Interventions Met yes, treatment indicated   OT Diagnosis decreased ADL function and safety   Influenced by the following impairments impulsive, impaired coordination   Assessment of Occupational Performance 3-5 Performance Deficits   Clinical Decision Making (Complexity) Moderate complexity   Anticipated Discharge Disposition Acute Rehabilitation Facility   Risks and Benefits of Treatment have been explained. Yes   Patient, Family & other staff in agreement with plan of care Yes   Clinical Impression Comments Pt is impulsive and does show deficits in UE function. Pt is unaware of deficits and believes she is able to drive self home. Pt would benefit from inpatient rehab.   [facility] TM \"6 Clicks\"   2016, Trustees of [facility] [employer] [employer] [facility] [employer], under license to [employer].  All rights reserved.   6 Clicks Short Forms Daily Activity Inpatient Short Form   [facility]  \"6 Clicks\" Daily Activity Inpatient Short Form   1. Putting on and taking off regular lower body clothing? 3 - A Little   2. Bathing (including washing, rinsing, drying)? 3 - A Little   3. Toileting, which includes using toilet, bedpan or urinal? 4 - None   4. Putting on and taking off regular upper body clothing? 4 - None   5. Taking care of personal grooming such as brushing teeth? 4 - None   6. Eating meals? 4 - None   Daily Activity Raw Score (Score out of 24.Lower scores equate to lower levels of function) 22   Total Evaluation Time   Total Evaluation Time (Minutes) 25      05/15/18 1241   Quick Adds   Type of Visit Initial Occupational Therapy Evaluation "   Living Environment   Lives With alone   Living Arrangements apartment   Home Accessibility no concerns   Transportation Available car   Living Environment Comment pt lives on 3rd floor at [address]   Self-Care   Dominant Hand right   Usual Activity Tolerance good   Current Activity Tolerance fair   Regular Exercise no   Equipment Currently Used at Home none   Functional Level Prior   Ambulation 0-->independent   Transferring 0-->independent   Toileting 0-->independent   Bathing 0-->independent   Dressing 0-->independent   Eating 0-->independent   Communication 0-->understands/communicates without difficulty   Swallowing 0-->swallows foods/liquids without difficulty   Cognition 0 - no cognition issues reported   Fall history within last six months no   Which of the above functional risks had a recent onset or change? none   General Information   Onset of Illness/Injury or Date of Surgery - Date 05/14/18   Referring Physician Alicia Chino NP   Patient/Family Goals Statement Pt wants to return home   Additional Occupational Profile Info/Pertinent History of Current Problem Pt presented with weakness of R side. Pt had a CVA   Precautions/Limitations fall precautions   Weight-Bearing Status - LUE full weight-bearing   Weight-Bearing Status - RUE full weight-bearing   Weight-Bearing Status - LLE full weight-bearing   Weight-Bearing Status - RLE full weight-bearing   Heart Disease Risk Factors High blood pressure;Smoking   General Info Comments Pt reported symptoms started Saturday and she drove herself here Mon. Pt reported she had been taking care of things at home fine for 2 days   Cognitive Status Examination   Orientation orientation to person, place and time   Level of Consciousness alert   Able to Follow Commands WNL/WFL   Personal Safety (Cognitive) impulsive;decreased insight to deficits   Memory intact   Attention No deficits were identified   Organization/Problem Solving No deficits were identified    Executive Function No deficits were identified   Visual Perception   Visual Perception No deficits were identified   Sensory Examination   Sensory Quick Adds No deficits were identified   Pain Assessment   Patient Currently in Pain No   Range of Motion (ROM)   ROM Comment 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   Strength   Strength Comments R UE grossly 3/5, L UE 4/5   Muscle Tone Assessment   Muscle Tone Quick Adds No deficits were identified   Coordination   Upper Extremity Coordination Right UE impaired   Functional Limitations Decreased speed;Object transport impaired;Reach to targets impaired;Fine motor ADL performance impaired   Transfer Skill: Bed to Chair/Chair to Bed   Level of [place]: Bed to Chair stand-by assist   Physical Assist/Nonphysical Assist: Bed to Chair 1 person assist;supervision;verbal cues   Weight-Bearing Restrictions full weight-bearing   Assistive Device - Transfer Skill Bed to Chair Chair to Bed Rehab Eval standard walker   Transfer Skill: Sit to Stand   Level of [place]: Sit/Stand stand-by assist   Physical Assist/Nonphysical Assist: Sit/Stand supervision;verbal cues   Balance   Balance Quick Add Standing balance: dynamic   Standing Balance: Dynamic fair balance   Upper Body Dressing   Level of [place]: Dress Upper Body independent   Lower Body Dressing   Level of [place]: Dress Lower Body stand-by assist   Physical Assist/Nonphysical Assist: Dress Lower Body supervision;verbal cues  (Pt fatigued and needed breaks, impulsive leaning forward)   Toileting   Level of [place]: Toilet stand-by assist   Physical Assist/Nonphysical Assist: Toilet supervision   Grooming   Level of [place]: Grooming stand-by assist   Physical Assist/Nonphysical Assist: Grooming supervision   Instrumental Activities of Daily Living (IADL)   Previous Responsibilities meal prep;housekeeping;laundry;shopping;medication management;finances;driving   IADL Comments Pt reported she drove herself  "here and plans to drive self home   Activities of Daily Living Analysis   Impairments Contributing to Impaired Activities of Daily Living coordination impaired;strength decreased;motor control impaired   Clinical Impression   Criteria for Skilled Therapeutic Interventions Met yes, treatment indicated   OT Diagnosis decreased ADL function and safety   Influenced by the following impairments impulsive, impaired coordination   Assessment of Occupational Performance 3-5 Performance Deficits   Clinical Decision Making (Complexity) Moderate complexity   Anticipated Discharge Disposition Acute Rehabilitation Facility   Risks and Benefits of Treatment have been explained. Yes   Patient, Family & other staff in agreement with plan of care Yes   Clinical Impression Comments Pt is impulsive and does show deficits in UE function. Pt is unaware of deficits and believes she is able to drive self home. Pt would benefit from inpatient rehab.   [facility] [employer] TM \"6 Clicks\"   2016, Trustees of [facility], under license to [employer].  All rights reserved.   6 Clicks Short Forms Daily Activity Inpatient Short Form   [facility] [employer]  \"6 Clicks\" Daily Activity Inpatient Short Form   1. Putting on and taking off regular lower body clothing? 3 - A Little   2. Bathing (including washing, rinsing, drying)? 3 - A Little   3. Toileting, which includes using toilet, bedpan or urinal? 4 - None   4. Putting on and taking off regular upper body clothing? 4 - None   5. Taking care of personal grooming such as brushing teeth? 4 - None   6. Eating meals? 4 - None   Daily Activity Raw Score (Score out of 24.Lower scores equate to lower levels of function) 22   Total Evaluation Time   Total Evaluation Time (Minutes) 25     "

## 2018-05-15 NOTE — PROGRESS NOTES
Heather Rosas 67 year old    Returned from MRI  Exam Performed : MRI BRAIN W/WO  Pushed to Reading Service?: No  Tolerated Procedure : poorly  May resume previous diet : Yes  Time Returned to Unit : 8:55  Report Given to : Arti WATERMAN     5/15/2018 8:57 AM   Bharti Sanford

## 2018-05-15 NOTE — PROGRESS NOTES
[facility]    Hospitalist Progress Note    Date of Service (when I saw the patient): 05/15/2018    Assessment & Plan   Heather Rosas is a 67 year old female who was admitted on 5/14/2018.       Cerebrovascular accident (H): Left hemisphere, MRI today showed basal ganglia lacunar infarct. Will get PT/OT/ST evaluation today. She has been very emotionally/behaviorly labile since her admission going from yelling and crying to smiling and laughing. Complaining of 10/10 pain to her left foot then moved to her right foot. Now denying all pain. PT discussed inpatient rehab with her and she states she would very much want to do that, an hour later she was stating she wanted to go home and was fine. ? If this is stroke effect versus her underlying mental health issues.  Will start Lipitor as well as daily full dose ASA for secondary prevention.       Hypertensive urgency: Presented with quite elevated blood pressures. She was hospitalized 7/2017 with hypertensive urgency and angina at [facility]. She was discharged from there on norvasc and losartan. During her hospital stay 2/2018 she denied being on any home blood pressure medications and did have hypotension due to acute hemorrhage. She was discharged on metoprolol 12.5mg and 5mg lisinopril. She was evaluated in [place] urgent care 4/7 for acute dyspnea and again stated she was not on blood pressure medications but was 203/103. This was not addressed at that visit. She then presented to the urgent care in [place] 4/30 with complains of dyspnea and stated he had stopped her metoprolol and was instructed to restart Metoprolol XL at 50mg QD at that time and started on Lasix with concern for CHF based on cardiomegaly,pulmonary congestion on on CXR and BNP of 199pg/ml. She returned to [place] for visit 5/9 and metoprolol XL was increased to 100mg PO Daily.     Her multiple visits across multiple systems with a primary care provider have made following her care  difficult. Furthermore she has not been compliant with her medications on multiple occasions. On arrival she is started on Coreg and PRN labetalol. Pressures have improved and stabilized. She is outside 48 hour recommendation for permissive hypertension for her CVA, however, will not attempt to drive her pressures further down in the acute setting. Would prefer her pressures do not fall below 140-160's systolic.       GERD (Gastroesophageal reflux disease)      Schizophrenia (H): Her underlying mental illness playing a significant role in her overall health as it impeding her compliance with medications as well as her ability to follow-through with plan of care with a PCP.       # Pain Assessment:  Current Pain Score 5/15/2018   Patient currently in pain? denies   Pain score (0-10) -   Pain location -   Pain descriptors -   Heather roblero pain level was assessed and she currently denies pain.        DVT Prophylaxis: Low Risk/Ambulatory with no VTE prophylaxis indicated  Code Status: Full Code    Disposition: Expected discharge in 1 days once pt/o/st evaluation complete, 48 hours on telemetry, echo complete.    Claudia Chino, CNP    Interval History   Awake,alert, smiling. Denies chest pain, shortness of breath, abdominal pain or nausea. States she does not have any lower leg pain.     -Data reviewed today: I reviewed all new labs and imaging results over the last 24 hours. I personally reviewed no images or EKG's today.    Physical Exam   Temp: 98.2  F (36.8  C) Temp src: Tympanic BP: 152/60 Pulse: 64 Heart Rate: 62 Resp: 20 SpO2: 98 % O2 Device: None (Room air)    Vitals:    05/14/18 1813 05/14/18 2345   Weight: 85.7 kg (188 lb 15 oz) 84.8 kg (186 lb 15.2 oz)     Vital Signs with Ranges  Temp:  [97.8  F (36.6  C)-98.2  F (36.8  C)] 98.2  F (36.8  C)  Pulse:  [64-83] 64  Heart Rate:  [62-79] 62  Resp:  [14-24] 20  BP: (139-227)/() 152/60  SpO2:  [92 %-100 %] 98 %  I/O last 3 completed shifts:  In: 1820  [P.O.:1720; I.V.:100]  Out: -     Peripheral IV 05/14/18 Right Hand (Active)   Site Assessment WDL 5/15/2018  9:00 AM   Line Status Saline locked 5/15/2018  9:00 AM   Phlebitis Scale 0-->no symptoms 5/15/2018  9:00 AM   Infiltration Scale 0 5/15/2018  9:00 AM   Number of days:1     Line/device assessment(s) completed for medical necessity    Constitutional: Awake,alert, no acute distress  Respiratory: Clear but diminished bilaterally  Cardiovascular: HRR, no murmurs, rubs, thrills  GI: Soft, nontender, bowel sounds postive  Skin/Integumen: No open areas, rashes or bruising noted.   Other:  Moves all extremities well though does have difficulty with balance sitting edge of the bed. Slurred speech also noted though no facial droop.     Medications       aspirin  325 mg Oral Daily     atorvastatin  40 mg Oral Daily at 8 pm     carvedilol  25 mg Oral BID w/meals     docusate sodium  100 mg Oral BID     enoxaparin  40 mg Subcutaneous Q24H     sodium chloride (PF)  3 mL Intracatheter Q8H       Data     Recent Labs  Lab 05/15/18  0534 05/14/18  1814   WBC 11.0 12.0*   HGB 10.0* 11.0*   MCV 74* 72*    231   INR  --  1.01    137   POTASSIUM 3.6 3.4   CHLORIDE 105 104   CO2 26 26   BUN 20 17   CR 0.80 0.80   ANIONGAP 8 7   CARINA 8.5 8.7   GLC 96 93   ALBUMIN 3.2*  --    PROTTOTAL 6.9  --    BILITOTAL 0.2  --    ALKPHOS 129  --    ALT 22  --    AST 16  --    TROPI  --  <0.015       Recent Results (from the past 24 hour(s))   CT Head w/o Contrast    Narrative    PROCEDURE: CT HEAD W/O CONTRAST     HISTORY: stroke; .    COMPARISON: None.    TECHNIQUE:  Helical images of the head from the foramen magnum to the  vertex were obtained without contrast.    FINDINGS: The ventricles and sulci are normal in volume. No acute  intracranial hemorrhage, mass effect, midline shift, hydrocephalus or  basilar cystern effacement are present.    The grey-white matter interface is preserved.    The calvarium is intact. The mastoid air  cells are clear.  The  visualized paranasal sinuses are clear.      Impression    IMPRESSION: Normal brain      JEFERSON NEGRO MD   CT Head Neck Angio w/o & w Contrast    Narrative    PROCEDURE: CTA ANGIOGRAM HEAD NECK 5/14/2018 7:21 PM    HISTORY: stroke;     COMPARISONS: None.    Meds/Dose Given: Isovue 370 50 Ml    TECHNIQUE: CT angiogram of the neck with sagittal coronal and  three-dimensional MIP reconstructions. CT angiogram of the brain with  sagittal coronal and three-dimensional MIP reconstructions    FINDINGS: CT angiogram of the neck: The left common carotid artery  left carotid bifurcation and left internal carotid artery appears  normal. The brachiocephalic artery right common carotid artery right  internal carotid artery appears normal. The right subclavian and right  vertebral artery is normal. 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 The  left vertebral artery is widely patent.    CT angiogram of the brain with three-dimensional reconstructions the  distal vertebral arteries appear normal. The basilar artery is widely  patent. The left posterior cerebral artery arises via the basilar  artery. The right posterior cerebral arises via the posterior  communicating artery. Both carotid siphons are widely patent. The  supraclinoid internal carotid anterior and middle cerebral arteries  appear normal. No intracranial stenoses, aneurysms or vascular shifts.    CT scan of the brain with IV contrast reveals the ventricular system  to be normal in size. There are no enhancing masses ventricular shifts  or extracerebral collections. The brainstem and cerebellum appear  normal.    CT scan of the neck with IV contrast: The muscles of mastication in  the parapharyngeal spaces appear normal. The salivary glands are  normal. The larynx and upper trachea is normal there is a low-density  mass with some calcifications in the left lobe of the thyroid.  This  mass measures 1.2 cm in diameter further imaging evaluation with  ultrasound is recommended. Upper mediastinal and cervical lymph nodes  appear normal. The lung apices are clear. Degenerative changes are  present in the cervical spine.         Impression    IMPRESSION: No vascular stenoses occlusions aneurysms or vascular  shifts are noted.    JEFERSON NEGRO MD   MR Brain w/o & w Contrast    Narrative    PROCEDURE: MR BRAIN W/O & W CONTRAST 5/15/2018 8:48 AM    HISTORY: Right sided weakness;     COMPARISONS: None.    Meds/Dose Given: Gadavist 7.5 mL    TECHNIQUE: Images were obtained sagittally T1 weighted axially  diffusion gradient echo FLAIR T1 and T2-weighted. Images were obtained  axially sagittally coronally T1 weighted following gadolinium  administration.    FINDINGS: Diffusion-weighted images indicate an acute lacunar infarct  is seen located in the basal ganglion and posterior limb of internal  capsule on the left. There are foci of bright signal in the white  matter both cerebral hemispheres most likely on the basis small vessel  disease. On the enhanced scans no areas of pathologic enhancement are  seen. The ventricular system is normal in size. The brainstem and  cerebellum appear normal. The paranasal sinuses are clear.         Impression    IMPRESSION: Acute lacunar infarct in the basal ganglia and posterior  limb of the internal capsule on the left.    JEFERSON NEGRO MD

## 2018-05-15 NOTE — PROGRESS NOTES
"Assessment completed see flowsheet.   ?   LOC: alert   Others present: Patient   Dx: CVA   ?   Lives with: Lives With: alone   Living at: Living Arrangements: apartment   Equipment used:None   Support System: Description of Support System: Other (see comments) (Unable to assess)   ?   ?   Primary PCP: Dr. Avila but states she will be looking for a new Dr.   POA/Guardian: No    Health Care Directive: NO  Does not want information.   Pharmacy: Varies where she goes.   : No   Homecare/County Services: No   ?   Adequate Resources for needs (housing, utilities, food/med): YES   Meds and appointments management: YES   Work: Retired   Transportation: Yes   ?   ADLs: States Independent   Falls: No   Able to Return to Prior Living Arrangements: PT has recommended Inpt Rehab   [place]:No  ??   Goals: Return home after Inpt Rehab   Barriers: Pt may not be willing to go to rehab   Needs: Needs Rehab   TOBI: None   Discharge Plan: PT recommended Inpt rehab before returning back to apartment  Met with Heather in her room. She was ok with me completing her assessment while she ate her salad. She was alert and did a lot of smiling. Stated she lives alone  in an apartment at [facility] and feels safe there.  Stated she does own a home in [place] but prefers to live in [place] because there is better dating.  PCP listed is Dr. Avila but Heather stated she is planning on changing DrRenee's as she has not seen him in a long time. She stated she does not know who she may go to and did not want any resource list. Stated she has been to the dentist about 6 months ago but doesn't remember who it was. Stated she doesn't stay with one dentist. Indicates she only wears \"cheaters\" and does not see any eye DrRenee Denies seeing any specialists. Does not use any pharmacy consistently. Denies any concerns with her medications and states she makes her own appointments.  Does  not have an Advanced Directive and did not want any information. " "States she has no home medical equipment.   Indicates she is independent with her self cares, shopping and food prep.  Stated she has no falls. She stated she uses no device to ambulate. Discussed PT Leyla recommendation for Inpt Rehab. Heather stated she wasn't sure she was going to go. I reviewed recommendation by PT Leyla and the importance of Inpt rehab for her safety  to return to her apartment. Heather stated stated again she didn't think she wanted to go. I did inform her I had initiated a referral to [facility] since PT had discussed this with her and she was open to rehab during their conversation.  Discussed again that with her stroke this would be a good plan for her discharge. She stated she did not know she had a stroke. She only wanted to come to the ER because of weakness in her arm when she was doing her crossword puzzle and weakness in her leg. She only wanted to get a shot for a \"clot buster\" and then return home. I reminded  her that NP Alicia had discussed her stroke with her.     Indicates she sleeps good. Denied any depression,anxiety, or mental illness. Denies seeing any counselor, psychologist, or psychiatrist. Denies any stressors. Quit smoking 4 yrs ago. Does not use alcohol. Denies any illicit drug use. Indicates her support system is her 2 sons and daughter-in-law. In her spare time she does crossword puzzles, reads and sews.     Lexi from [employer] did come see Heather as she does not have a supplement insurance to her Medicare. Lexi indicated she has too many assets for MA and will mail her a Laura form. She also gave her a pamphlet for [employer]. She will mail her an application. Heather indicated to me she has enough money for her needs and bills.     Reviewed again the recommendation for Inpt rehab. She stated again she was probably not going to go. She indicates she would probably just return home. When asked how she would get home, she stated  she will make arrangements.  " CTS will remain available.

## 2018-05-15 NOTE — ED NOTES
"The patient was in her room yelling out loud stating she wanted to go home, and stated \"how can you keep me here like this, why am I still here in the ER and not up in my room?\". The patient pulled out her right arm antecubital IV and it was lying on the floor. She had eaten a box lunch meal and had water and juice and was sitting up in a bedside recliner. The patient denies pain and states \"my right arm and leg are fine\". GCS 15. Patient informed of her admission delay and she calmed down with reassurance.  "

## 2018-05-15 NOTE — PROGRESS NOTES
SLP screened pt's swallowing, speech, and cognition.  Pt demonstrating the following:  Swallowing WNL, speech mild misarticulations with complete intelligibility, moderate executive functioning difficulties involving impulsiveness and self-evaluation and judgment (cognition).  A formal cognitive evaluation and treatment is recommended for discharge setting.

## 2018-05-15 NOTE — ED PROVIDER NOTES
"eMERGENCY dEPARTMENT eNCOUnter        CHIEF COMPLAINT    Chief Complaint   Patient presents with     One-sided Weakness     right sided. ongoing for the last 2 days.       HPI    Heather Rosas is a 67 year old female who presents with right arm and leg weakness which started 2 days ago.  She said she was at home she was writing a letter and realized that \"suddenly my penmanship look bad\" she then realized she was also having trouble with her right leg.  Unfortunately she did not seek medical attention until today, 48 hours after the event.  She is here with a walker, she does not usually use a walker.  Denies any difficulty speaking or visual changes.  No headaches no trauma.    She feels that her handwriting is still not normal she has not noticed any speaking difficulties.  She used to see Dr. Santos but says she is switching care to Dr. Wendie Schneider in [place].  She was recently started on metoprolol 100 mg a day for her blood pressure.  She has been taking that regularly.    REVIEW OF SYSTEMS    Neurologic: No no LOC, No hearing loss  Cardiac: No Chest Pain, no syncope  Respiratory: No cough or difficulty breathing  GI: No Bloody Stool or Diarrhea  : No Dysuria or Hematuria  General: No Fever  All other systems reviewed and are negative.    PAST MEDICAL & SURGICAL HISTORY    Past Medical History:   Diagnosis Date     Allergic rhinitis 01/01/2011     Anxiety 01/01/2011     Anxiety state, unspecified     Anxiety state     Dyslipidemia 11/26/2003     fibromyalgia 06/14/2004     GERD, Esophageal reflux 01/01/2011     HTN (hypertension)     Essential hypertension     Major depression, recurrent (H) 1/1/2011     Nonorganic sleep disorder, unspecified     Non-org. 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, ARDS, renal failure     Past Surgical History:   Procedure Laterality Date     ANGIOGRAM  02/2005    Normal      BIOPSY BREAST  1984 "    LT, normal     CARPAL TUNNEL RELEASE RT/LT  2005    RT, carpal tunnel     COLONOSCOPY  2011    Repeat in ten years      COLONOSCOPY  1996     COLONOSCOPY  2004     DILATION AND CURETTAGE, HYSTEROSCOPY, ABLATE ENDOMETRIUM, COMBINED N/A 2/19/2018    Procedure: COMBINED DILATION AND CURETTAGE, HYSTEROSCOPY, ABLATE ENDOMETRIUM;  HYSTEROSCOPY DILATION AND CURETTAGE, ENDOMETRIAL ABLATION;  Surgeon: Jose Ntetles MD;  Location: [facility] OR     placement of central line  2005       CURRENT MEDICATIONS    Current Outpatient Rx   Medication Sig Dispense Refill     aspirin 81 MG EC tablet Take 1 tablet (81 mg) by mouth daily 30 tablet 11     Furosemide (LASIX PO) Take 20 mg by mouth daily       METOPROLOL SUCCINATE ER PO Take 100 mg by mouth daily         ALLERGIES    Allergies   Allergen Reactions     Cats Other (See Comments)     Sneezing, runny nose     Codeine Sulfate Nausea and Vomiting and GI Disturbance     Fexofenadine Hcl      Allegra      Rosuvastatin Other (See Comments)     Dizziness - Crestor      Drayden Oil GI Disturbance     Any pink fish       SOCIAL & FAMILY HISTORY    Social History     Social History     Marital status:      Spouse name: N/A     Number of children: N/A     Years of education: N/A     Occupational History     [id number]          Social History Main Topics     Smoking status: Former Smoker     Packs/day: 0.50     Types: Cigarettes     Quit date: 12/1/2013     Smokeless tobacco: Never Used      Comment: Year Quit: 2011     Alcohol use No      Comment: rare     Drug use: No     Sexual activity: No      Comment: devorced     Other Topics Concern      Service No     Blood Transfusions Yes     Permits if needed     Caffeine Concern Yes     1 cup     Occupational Exposure No     Hobby Hazards No     Sleep Concern No     Stress Concern No     Weight Concern No     Special Diet No     Back Care No     Exercise No     Seat Belt Yes     Self-Exams Yes      Parent/Sibling W/ Cabg, Mi Or Angioplasty Before 65f 55m? Yes     Father     Social History Narrative     Family History   Problem Relation Age of Onset     C.A.D. Father 45     (cause of death)      Other - See Comments Father      rheumatic fever      Allergies Father      CANCER Sister 56     bone ca      GASTROINTESTINAL DISEASE Mother      GERD     CANCER Mother      pancreatic ca (cause of death) /liver ca     Breast Cancer Maternal Aunt      Breast Cancer Other      Breast Cancer Maternal Aunt      Colon Cancer Paternal Aunt      (cause of death)      Crohn Disease Other      Depression Maternal Uncle      Depression Maternal Aunt      DIABETES Paternal Grandmother      type 2     Neurologic Disorder Brother      neuropathy     CANCER Brother 58     lymph node in neck     Allergies Brother        PHYSICAL EXAM    VITAL SIGNS: BP (!) 198/86  Pulse 83  Temp 98  F (36.7  C) (Tympanic)  Resp 21  SpO2 94%   Constitutional:  Well developed, well nourished, no acute distress   Eyes: Pupils equally round and react to light, sclera nonicteric  HENT:  Atraumatic, no trismus  Neck: supple, no JVD, no posterior neck tenderness  Respiratory:  Lungs Clear, no retractions   Cardiovascular:  regular , no murmurs  GI:  Soft, nontender, normal bowel sounds  Musculoskeletal:  No edema, right sided hand and   Vascular:  all pulses are 2+ equal bilaterally  Integument:  Well hydrated, no petechiae   Neurologic:  Alert & oriented, no slurred speech, she has slightly decreased strength of her right hand and right leg.  Toes are downgoing.  Psych: Pleasant affect, no hallucinations    EKG    NSR, no ischemic changes    National Institutes of Health Stroke Scale  Exam Interval: Baseline   Score    Level of consciousness: (0)   Alert, keenly responsive    LOC questions: (0)   Answers both questions correctly    LOC commands: (0)   Performs both tasks correctly    Best gaze: (0)   Normal    Visual: (0)   No visual loss     Facial palsy: (0)   Normal symmetrical movements    Motor arm (left): (0)   No drift    Motor arm (right): (1)    drift    Motor leg (left): (0)   No drift    Motor leg (right): (1)   Drift    Limb ataxia: (1)   Present in one limb    Sensory: (0)   Normal- no sensory loss    Best language: (0)   Normal- no aphasia    Dysarthria: (0)   Normal    Extinction and inattention: (0)   No abnormality        Total Score:  3         RADIOLOGY  (read by the radiologist)  Results for orders placed or performed during the hospital encounter of 05/14/18   CT Head w/o Contrast    Narrative    PROCEDURE: CT HEAD W/O CONTRAST     HISTORY: stroke; .    COMPARISON: None.    TECHNIQUE:  Helical images of the head from the foramen magnum to the  vertex were obtained without contrast.    FINDINGS: The ventricles and sulci are normal in volume. No acute  intracranial hemorrhage, mass effect, midline shift, hydrocephalus or  basilar cystern effacement are present.    The grey-white matter interface is preserved.    The calvarium is intact. The mastoid air cells are clear.  The  visualized paranasal sinuses are clear.      Impression    IMPRESSION: Normal brain      JEFERSON NEGRO MD     Results for orders placed or performed during the hospital encounter of 05/14/18   CT Head w/o Contrast    Narrative    PROCEDURE: CT HEAD W/O CONTRAST     HISTORY: stroke; .    COMPARISON: None.    TECHNIQUE:  Helical images of the head from the foramen magnum to the  vertex were obtained without contrast.    FINDINGS: The ventricles and sulci are normal in volume. No acute  intracranial hemorrhage, mass effect, midline shift, hydrocephalus or  basilar cystern effacement are present.    The grey-white matter interface is preserved.    The calvarium is intact. The mastoid air cells are clear.  The  visualized paranasal sinuses are clear.      Impression    IMPRESSION: Normal brain      JEFERSON NEGRO MD   CT Head Neck Angio w/o & w Contrast     Narrative    PROCEDURE: CTA ANGIOGRAM HEAD NECK 5/14/2018 7:21 PM    HISTORY: stroke;     COMPARISONS: None.    Meds/Dose Given: Isovue 370 50 Ml    TECHNIQUE: CT angiogram of the neck with sagittal coronal and  three-dimensional MIP reconstructions. CT angiogram of the brain with  sagittal coronal and three-dimensional MIP reconstructions    FINDINGS: CT angiogram of the neck: The left common carotid artery  left carotid bifurcation and left internal carotid artery appears  normal. The brachiocephalic artery right common carotid artery right  internal carotid artery appears normal. The right subclavian and right  vertebral artery is normal. 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 The  left vertebral artery is widely patent.    CT angiogram of the brain with three-dimensional reconstructions the  distal vertebral arteries appear normal. The basilar artery is widely  patent. The left posterior cerebral artery arises via the basilar  artery. The right posterior cerebral arises via the posterior  communicating artery. Both carotid siphons are widely patent. The  supraclinoid internal carotid anterior and middle cerebral arteries  appear normal. No intracranial stenoses, aneurysms or vascular shifts.    CT scan of the brain with IV contrast reveals the ventricular system  to be normal in size. There are no enhancing masses ventricular shifts  or extracerebral collections. The brainstem and cerebellum appear  normal.    CT scan of the neck with IV contrast: The muscles of mastication in  the parapharyngeal spaces appear normal. The salivary glands are  normal. The larynx and upper trachea is normal there is a low-density  mass with some calcifications in the left lobe of the thyroid. This  mass measures 1.2 cm in diameter further imaging evaluation with  ultrasound is recommended. Upper mediastinal and cervical lymph nodes  appear normal. The lung apices  are clear. Degenerative changes are  present in the cervical spine.         Impression    IMPRESSION: No vascular stenoses occlusions aneurysms or vascular  shifts are noted.    JEFERSON NEGRO MD         ED COURSE & MEDICAL DECISION MAKING    Pertinent Labs & Imaging studies reviewed and interpreted. (See chart for details)    See chart for details of medications given during the ED stay.    Vitals:    05/14/18 2027 05/14/18 2030 05/14/18 2045 05/14/18 2046   BP: 171/77 171/69 (!) 198/86    Pulse:       Resp:    21   Temp:       TempSrc:       SpO2: 94% 93% 94%          FINAL IMPRESSION    1. Cerebrovascular accident (CVA) determined by clinical assessment (H)        PLAN  I reviewed CT of the head and CT angiogram of the neck.  Findings consistent with a small left pontine stroke.  I reviewed with stroke neurologist at [facility].  Not a candidate for thrombolytics at this point she is given labetalol 20 mg which reduced her blood pressure to 170/80.  Aspirin 325 mg she will be admitted for further stroke workup and blood pressure management.        (Please note that this note was completed with a voice recognition program.  Every attempt was made to edit the dictations, but inevitably there remain words that are mis-transcribed.)       Amanda Hodgson MD  05/14/18 [id number]

## 2018-05-15 NOTE — ED NOTES
Back to ED room 10 from CT. The patient tolerated the CTA without difficulty, VSS, pulse in the 70-80's and oxygen saturation in the 94-96 % range on room air. Denies change in her symptoms.

## 2018-05-15 NOTE — PLAN OF CARE
"Answered Pt's call light with c/o 10/10 right foot pain, administered po Ultram and discussed with Pt that she has received IBU and Tylenol. On administration of the Ultram the Pt states \"now I can't use my left hand\" assisted the Pt to get the medication to her mouth and then handed her the water cup to her left hand which she grapsed without difficulty to take a drink. Encouraged the Pt to try and sleep/relax and let the medication take effect.  "

## 2018-05-16 ENCOUNTER — HOSPITAL ENCOUNTER (EMERGENCY)
Facility: HOSPITAL | Age: 68
Discharge: HOME | End: 2018-05-16
Attending: FAMILY MEDICINE | Admitting: FAMILY MEDICINE
Payer: MEDICARE

## 2018-05-16 VITALS
OXYGEN SATURATION: 95 % | TEMPERATURE: 98 F | HEART RATE: 64 BPM | DIASTOLIC BLOOD PRESSURE: 73 MMHG | SYSTOLIC BLOOD PRESSURE: 184 MMHG | BODY MASS INDEX: 37.91 KG/M2 | RESPIRATION RATE: 18 BRPM | HEIGHT: 59 IN | WEIGHT: 188.05 LBS

## 2018-05-16 VITALS
OXYGEN SATURATION: 95 % | TEMPERATURE: 98.4 F | RESPIRATION RATE: 22 BRPM | DIASTOLIC BLOOD PRESSURE: 81 MMHG | SYSTOLIC BLOOD PRESSURE: 164 MMHG

## 2018-05-16 DIAGNOSIS — I63.81 ACUTE LACUNAR INFARCTION (H): ICD-10-CM

## 2018-05-16 DIAGNOSIS — F41.1 GAD (GENERALIZED ANXIETY DISORDER): ICD-10-CM

## 2018-05-16 DIAGNOSIS — F20.3 UNDIFFERENTIATED SCHIZOPHRENIA (H): ICD-10-CM

## 2018-05-16 DIAGNOSIS — M62.81 GENERALIZED MUSCLE WEAKNESS: ICD-10-CM

## 2018-05-16 LAB
ANION GAP SERPL CALCULATED.3IONS-SCNC: 8 MMOL/L (ref 3–14)
BACTERIA SPEC CULT: NORMAL
BASOPHILS # BLD AUTO: 0.1 10E9/L (ref 0–0.2)
BASOPHILS NFR BLD AUTO: 0.9 %
BUN SERPL-MCNC: 15 MG/DL (ref 7–30)
CALCIUM SERPL-MCNC: 9.4 MG/DL (ref 8.5–10.1)
CHLORIDE SERPL-SCNC: 105 MMOL/L (ref 94–109)
CHOLEST SERPL-MCNC: 186 MG/DL
CO2 SERPL-SCNC: 26 MMOL/L (ref 20–32)
CREAT SERPL-MCNC: 0.69 MG/DL (ref 0.52–1.04)
DIFFERENTIAL METHOD BLD: ABNORMAL
EOSINOPHIL # BLD AUTO: 0.4 10E9/L (ref 0–0.7)
EOSINOPHIL NFR BLD AUTO: 3.5 %
ERYTHROCYTE [DISTWIDTH] IN BLOOD BY AUTOMATED COUNT: 18.7 % (ref 10–15)
GFR SERPL CREATININE-BSD FRML MDRD: 85 ML/MIN/1.7M2
GLUCOSE SERPL-MCNC: 101 MG/DL (ref 70–99)
HCT VFR BLD AUTO: 36.8 % (ref 35–47)
HDLC SERPL-MCNC: 44 MG/DL
HGB BLD-MCNC: 10.9 G/DL (ref 11.7–15.7)
IMM GRANULOCYTES # BLD: 0 10E9/L (ref 0–0.4)
IMM GRANULOCYTES NFR BLD: 0.3 %
LDLC SERPL CALC-MCNC: 117 MG/DL
LYMPHOCYTES # BLD AUTO: 1.8 10E9/L (ref 0.8–5.3)
LYMPHOCYTES NFR BLD AUTO: 17.8 %
MCH RBC QN AUTO: 21.7 PG (ref 26.5–33)
MCHC RBC AUTO-ENTMCNC: 29.6 G/DL (ref 31.5–36.5)
MCV RBC AUTO: 73 FL (ref 78–100)
MONOCYTES # BLD AUTO: 1 10E9/L (ref 0–1.3)
MONOCYTES NFR BLD AUTO: 10 %
NEUTROPHILS # BLD AUTO: 6.7 10E9/L (ref 1.6–8.3)
NEUTROPHILS NFR BLD AUTO: 67.5 %
NONHDLC SERPL-MCNC: 142 MG/DL
NRBC # BLD AUTO: 0 10*3/UL
NRBC BLD AUTO-RTO: 0 /100
PLATELET # BLD AUTO: 236 10E9/L (ref 150–450)
POTASSIUM SERPL-SCNC: 3.8 MMOL/L (ref 3.4–5.3)
RBC # BLD AUTO: 5.02 10E12/L (ref 3.8–5.2)
SODIUM SERPL-SCNC: 139 MMOL/L (ref 133–144)
SPECIMEN SOURCE: NORMAL
TRIGL SERPL-MCNC: 127 MG/DL
WBC # BLD AUTO: 9.9 10E9/L (ref 4–11)

## 2018-05-16 PROCEDURE — 25000128 H RX IP 250 OP 636: Performed by: INTERNAL MEDICINE

## 2018-05-16 PROCEDURE — 80048 BASIC METABOLIC PNL TOTAL CA: CPT | Performed by: NURSE PRACTITIONER

## 2018-05-16 PROCEDURE — 36415 COLL VENOUS BLD VENIPUNCTURE: CPT | Performed by: NURSE PRACTITIONER

## 2018-05-16 PROCEDURE — 99283 EMERGENCY DEPT VISIT LOW MDM: CPT | Mod: Z6 | Performed by: FAMILY MEDICINE

## 2018-05-16 PROCEDURE — 80061 LIPID PANEL: CPT | Performed by: NURSE PRACTITIONER

## 2018-05-16 PROCEDURE — 99283 EMERGENCY DEPT VISIT LOW MDM: CPT

## 2018-05-16 PROCEDURE — 85025 COMPLETE CBC W/AUTO DIFF WBC: CPT | Performed by: NURSE PRACTITIONER

## 2018-05-16 RX ORDER — ATORVASTATIN CALCIUM 40 MG/1
40 TABLET, FILM COATED ORAL DAILY
Qty: 30 TABLET | Refills: 0 | Status: SHIPPED | OUTPATIENT
Start: 2018-05-16 | End: 2018-08-02

## 2018-05-16 RX ORDER — ASPIRIN 325 MG
325 TABLET ORAL DAILY
Qty: 30 TABLET | Refills: 0 | Status: SHIPPED | OUTPATIENT
Start: 2018-05-16 | End: 2018-08-02

## 2018-05-16 RX ADMIN — HYDRALAZINE HYDROCHLORIDE 10 MG: 20 INJECTION INTRAMUSCULAR; INTRAVENOUS at 05:05

## 2018-05-16 ASSESSMENT — ENCOUNTER SYMPTOMS
SLEEP DISTURBANCE: 0
AGITATION: 1
ENDOCRINE NEGATIVE: 1
TREMORS: 0
UNEXPECTED WEIGHT CHANGE: 0
CHILLS: 0
HYPERACTIVE: 0
ACTIVITY CHANGE: 0
EYES NEGATIVE: 1
WEAKNESS: 1
FATIGUE: 0
RESPIRATORY NEGATIVE: 1
CARDIOVASCULAR NEGATIVE: 1
HALLUCINATIONS: 0
FEVER: 0
CONFUSION: 0
DYSPHORIC MOOD: 0
MUSCULOSKELETAL NEGATIVE: 1
APPETITE CHANGE: 0
NERVOUS/ANXIOUS: 1
DIAPHORESIS: 0
DECREASED CONCENTRATION: 1

## 2018-05-16 NOTE — PROGRESS NOTES
I did hear back from [facility] who indicates they are not willing to take this patient at this time.  Nothing their suggestion she should stay closer to her family and home.

## 2018-05-16 NOTE — ED PROVIDER NOTES
History     Chief Complaint   Patient presents with     Extremity Weakness     HPI  Heather Rosas is a 67 year old female who presents requesting admission to rehab facility . Patient left our facility AMA this AM after she was admitted for weakness secondary to lacunar infarct and hypertensive urgency . Patient was to be evaluated for admission to [place] for rehabilitation but then disappeared from her exam room without notifying her nurse . Patient returned today by EMS stating she is ready to be admitted for rehab. Initially she was insistent on admission to [facility] but then later willing to consider other facilities such as [place] . There is no changes in her medically since her discharge just several hours ago     Problem List:    Patient Active Problem List    Diagnosis Date Noted     Cerebrovascular accident (H) 05/14/2018     Priority: Medium     Hypertensive urgency 05/14/2018     Priority: Medium     CVA (cerebral vascular accident) (H) 05/14/2018     Priority: Medium     Chest pain 05/14/2018     Priority: Medium     Endometrial hyperplasia with atypia 05/09/2018     Priority: Medium     Metrorrhagia 02/18/2018     Priority: Medium     ACP (advance care planning) 05/20/2016     Priority: Medium     Advance Care Planning 5/20/2016: ACP Review of Chart / Resources Provided:  Reviewed chart for advance care plan.  Heather Rosas has no plan or code status on file. Discussed available resources and provided with information. Confirmed code status reflects current choices pending further ACP discussions.  Confirmed/documented legally designated decision makers.  Added by Margot Shannon             HTN (hypertension)      Priority: Medium     Major depressive disorder, recurrent episode, moderate (H) 01/01/2011     Priority: Medium     ANNA (generalized anxiety disorder) 01/01/2011     Priority: Medium     GERD (Gastroesophageal reflux disease) 01/01/2011     Priority: Medium     Obesity (H)  01/01/2011     Priority: Medium     Schizophrenia (H) 01/01/2011     Priority: Medium     Seasonal allergic rhinitis 01/01/2011     Priority: Medium     Fibromyalgia 06/14/2004     Priority: Medium     Dyslipidemia 11/26/2003     Priority: Medium        Past Medical History:    Past Medical History:   Diagnosis Date     Allergic rhinitis 01/01/2011     Anxiety 01/01/2011     Anxiety state, unspecified      Dyslipidemia 11/26/2003     fibromyalgia 06/14/2004     GERD, Esophageal reflux 01/01/2011     HTN (hypertension)      Major depression, recurrent (H) 1/1/2011     Nonorganic sleep disorder, unspecified      Obesity 01/01/2011     Schizophrenia (H) 01/01/2011     Toxic shock syndrome (H) 2006       Past Surgical History:    Past Surgical History:   Procedure Laterality Date     ANGIOGRAM  02/2005    Normal      BIOPSY BREAST  1984    LT, normal     CARPAL TUNNEL RELEASE RT/LT  2005    RT, carpal tunnel     COLONOSCOPY  2011    Repeat in ten years      COLONOSCOPY  1996     COLONOSCOPY  2004     DILATION AND CURETTAGE, HYSTEROSCOPY, ABLATE ENDOMETRIUM, COMBINED N/A 2/19/2018    Procedure: COMBINED DILATION AND CURETTAGE, HYSTEROSCOPY, ABLATE ENDOMETRIUM;  HYSTEROSCOPY DILATION AND CURETTAGE, ENDOMETRIAL ABLATION;  Surgeon: Jose Nettles MD;  Location: [facility]     placement of central line  2005       Family History:    Family History   Problem Relation Age of Onset     C.A.D. Father 45     (cause of death)      Other - See Comments Father      rheumatic fever      Allergies Father      CANCER Sister 56     bone ca      GASTROINTESTINAL DISEASE Mother      GERD     CANCER Mother      pancreatic ca (cause of death) /liver ca     Breast Cancer Maternal Aunt      Breast Cancer Other      Breast Cancer Maternal Aunt      Colon Cancer Paternal Aunt      (cause of death)      Crohn Disease Other      Depression Maternal Uncle      Depression Maternal Aunt      DIABETES Paternal Grandmother      type 2     Neurologic  Disorder Brother      neuropathy     CANCER Brother 58     lymph node in neck     Allergies Brother        Social History:  Marital Status:   [4]  Social History   Substance Use Topics     Smoking status: Former Smoker     Packs/day: 0.50     Types: Cigarettes     Quit date: 12/1/2013     Smokeless tobacco: Never Used      Comment: Year Quit: 2011     Alcohol use No      Comment: rare        Medications:      aspirin 325 MG tablet   atorvastatin (LIPITOR) 40 MG tablet   Furosemide (LASIX PO)   METOPROLOL SUCCINATE ER PO         Review of Systems   Constitutional: Negative for activity change, appetite change, chills, diaphoresis, fatigue, fever and unexpected weight change.   HENT: Negative.    Eyes: Negative.    Respiratory: Negative.    Cardiovascular: Negative.    Endocrine: Negative.    Genitourinary: Negative.    Musculoskeletal: Negative.    Skin: Negative.    Neurological: Positive for weakness. Negative for tremors.   Psychiatric/Behavioral: Positive for agitation and decreased concentration. Negative for confusion, dysphoric mood, hallucinations, self-injury, sleep disturbance and suicidal ideas. The patient is nervous/anxious. The patient is not hyperactive.    All other systems reviewed and are negative.      Physical Exam   BP: (!) 181/87  Heart Rate: 76  Temp: 98.8  F (37.1  C)  Resp: 16  SpO2: 96 %      Physical Exam   Constitutional: She is oriented to person, place, and time. She appears well-developed and well-nourished.   HENT:   Head: Normocephalic.   Neck: Normal range of motion. Neck supple.   Cardiovascular: Normal rate.    Pulmonary/Chest: Effort normal.   Abdominal: Soft.   Neurological: She is alert and oriented to person, place, and time.   Psychiatric:   Now willing to go to [place] for rehab    Nursing note and vitals reviewed.      ED Course     ED Course     Procedures          Patient presents to ER via EMS within a couple hours of leaving our facility AMA  Discussed patient  with Alicia Chino who felt patient was medically stable and was going to be discharged to a rehab facility today . Social service consultation requested. Arrangements made for admission to [facility]  Rehabilitation [facility] . Patient in agreement to transfer to [facility]  for further PT , OT and speech therapy .     Results for orders placed or performed during the hospital encounter of 05/14/18 (from the past 24 hour(s))   CBC with platelets differential   Result Value Ref Range    WBC 9.9 4.0 - 11.0 10e9/L    RBC Count 5.02 3.8 - 5.2 10e12/L    Hemoglobin 10.9 (L) 11.7 - 15.7 g/dL    Hematocrit 36.8 35.0 - 47.0 %    MCV 73 (L) 78 - 100 fl    MCH 21.7 (L) 26.5 - 33.0 pg    MCHC 29.6 (L) 31.5 - 36.5 g/dL    RDW 18.7 (H) 10.0 - 15.0 %    Platelet Count 236 150 - 450 10e9/L    Diff Method Automated Method     % Neutrophils 67.5 %    % Lymphocytes 17.8 %    % Monocytes 10.0 %    % Eosinophils 3.5 %    % Basophils 0.9 %    % Immature Granulocytes 0.3 %    Nucleated RBCs 0 0 /100    Absolute Neutrophil 6.7 1.6 - 8.3 10e9/L    Absolute Lymphocytes 1.8 0.8 - 5.3 10e9/L    Absolute Monocytes 1.0 0.0 - 1.3 10e9/L    Absolute Eosinophils 0.4 0.0 - 0.7 10e9/L    Absolute Basophils 0.1 0.0 - 0.2 10e9/L    Abs Immature Granulocytes 0.0 0 - 0.4 10e9/L    Absolute Nucleated RBC 0.0    Basic metabolic panel   Result Value Ref Range    Sodium 139 133 - 144 mmol/L    Potassium 3.8 3.4 - 5.3 mmol/L    Chloride 105 94 - 109 mmol/L    Carbon Dioxide 26 20 - 32 mmol/L    Anion Gap 8 3 - 14 mmol/L    Glucose 101 (H) 70 - 99 mg/dL    Urea Nitrogen 15 7 - 30 mg/dL    Creatinine 0.69 0.52 - 1.04 mg/dL    GFR Estimate 85 >60 mL/min/1.7m2    GFR Estimate If Black >90 >60 mL/min/1.7m2    Calcium 9.4 8.5 - 10.1 mg/dL   Lipid profile   Result Value Ref Range    Cholesterol 186 <200 mg/dL    Triglycerides 127 <150 mg/dL    HDL Cholesterol 44 (L) >49 mg/dL    LDL Cholesterol Calculated 117 (H) <100 mg/dL    Non HDL Cholesterol 142 (H) <130 mg/dL        Medications - No data to display    Assessments & Plan (with Medical Decision Making)     I have reviewed the nursing notes.    I have reviewed the findings, diagnosis, plan and need for follow up with the patient.      New Prescriptions    No medications on file       Final diagnoses:   Acute lacunar infarction (H)   ANNA (generalized anxiety disorder)   Schizophrenia (H)   Generalized muscle weakness       5/16/2018   [facility]     Gabriela Arias MD  05/16/18 2426

## 2018-05-16 NOTE — ED NOTES
Pt is up using the bathroom. Pt used cart on wheels to assit her to the bathroom. Pt did well without issues.

## 2018-05-16 NOTE — PLAN OF CARE
Pt left AMA.     BP was 190's/80's around 0500, treated with IV hydralazine, only decreased to 180's/70's around 0600. MD was planning to do AM evaluation. Also discussed going to inpt rehab, which pt was very nervous about. I told her the final choice was her's and to hear out the information in the AM before making her choice.     Aid brought her coffee around 0650 and breakfast was ordered. ICU asked me to check on her around 720, because tele monitor was not reading. Within minutes I was in her room with gown and tele on bed with all pt belongs gone. Security was notified to find pt. Called pt's phone number that was on file. Pt answered and stated that she didn't want to do rehab and didn't need to be in the hospital any more. Asked her to come back to help her with her health care or at least to fill out AMA paperwork. She told me to send them in the mail and hung up the phone.     Didn't find her IV in garbage and attempted to recall her. Did not answer. Left voicemail to ask her to come back to ED to get her IV out or to show that she had taken it out herself. No phone call back at this time.     MD and  notified of pt leaving.

## 2018-05-16 NOTE — ED NOTES
Arrived via [facility] [place] ambulance, transferred to cot, gown placed, call light in reach.  Left AMA from [place] this morning, drove self home.  Unable to get ahold of family members, so called ambulance and requested to go to [facility].  Was admitted with right sided weakness, that is persisting.  Denies pain at this time.  Requesting to go to [facility] in [place]. Pulled out own IV.

## 2018-05-16 NOTE — PLAN OF CARE
Problem: Patient Care Overview  Goal: Plan of Care/Patient Progress Review  Physical Therapy Discharge Summary    Reason for therapy discharge:    Patient left AMA    Progress towards therapy goal(s). See [facility] Care [facility] for goal details.  Goals not met.  Barriers to achieving goals:   patient left AMA.    Therapy recommendation(s):    patient left AMA. Continued PT services were recommended but patient left AMA

## 2018-05-16 NOTE — PLAN OF CARE
Problem: Patient Care Overview  Goal: Plan of Care/Patient Progress Review  Outcome: Improving  Pt A&O x3, last /69 after receiving 25 mg Coreg.  Gave 50 mg Ultram for RLE pain with some relief but pain increased, Got 1x order for Percocet 5/325 for pain and 2.5 Ambien for sleep.  Pt tearful while waiting for pain meds.  Neuros intact.  Pt up independently in room and in halls use use of personal cart/walker.  Call light in reach.  Face to face report given with opportunity to observe patient.    Report given to Margot Dsouza   5/16/2018  12:33 AM

## 2018-05-16 NOTE — ED NOTES
Pt was sitting in a chair in the room. Pt. called to be assisted back to bed pt. was a stand by assist. Pt is connected to the monitors and call light within reach.

## 2018-05-16 NOTE — ED AVS SNAPSHOT
[facility]    [address]    [place] [address]    Phone:  [phone]                                       Heather Rosas   MRN: [id number]    Department:  [facility]   Date of Visit:  5/16/2018           After Visit Summary Signature Page     I have received my discharge instructions, and my questions have been answered. I have discussed any challenges I see with this plan with the nurse or doctor.    ..........................................................................................................................................  Patient/Patient Representative Signature      ..........................................................................................................................................  Patient Representative Print Name and Relationship to Patient    ..................................................               ................................................  Date                                            Time    ..........................................................................................................................................  Reviewed by Signature/Title    ...................................................              ..............................................  Date                                                            Time

## 2018-05-16 NOTE — ED NOTES
Report to [facility] - Mario CARRILLO.  Phone - [phone].  Pt transported to [facility] via [facility] transport.

## 2018-05-16 NOTE — PROVIDER NOTIFICATION
Notified MD that BP was 190's/80's. Gave IV hydralazine. Recheck was 180's/70's. No new orders at this time.

## 2018-05-16 NOTE — ED AVS SNAPSHOT
[facility]    [address]    [place] [address]    Phone:  [phone]                                       Heather Rosas   MRN: [id number]    Department:  [facility]   Date of Visit:  5/16/2018           Patient Information     Date Of Birth          1950        Your diagnoses for this visit were:     Acute lacunar infarction (H)     ANNA (generalized anxiety disorder)     Schizophrenia (H)     Generalized muscle weakness        You were seen by Gabriela Arias MD.      ED Discharge Orders     Activity - Up with nursing assistance           Fall precautions           Full Code           General info for SNF       Length of Stay Estimate: Short Term Care: Estimated # of Days <30  Condition at Discharge: Stable  Level of care:skilled   Rehabilitation Potential: Fair  Admission H&P remains valid and up-to-date: Yes  Recent Chemotherapy: N/A  Use Nursing Home Standing Orders: Yes            Mantoux instructions       Give two-step Mantoux (PPD) Per Facility Policy Yes            Occupational Therapy Adult Consult       Evaluate and treat as clinically indicated.    Reason:  Weakness,CVA            Physical Therapy Adult Consult       Evaluate and treat as clinically indicated.    Reason:  Weakness, CVA            Reason for your hospital stay       Patient experienced CVA . Needs rehab in inpatient setting            Speech Language Path Adult Consult       Evaluate and treat as clinically indicated.    Reason:  Weakness, CVA                     Review of your medicines      Our records show that you are taking the medicines listed below. If these are incorrect, please call your family doctor or clinic.        Dose / Directions Last dose taken    aspirin 325 MG tablet   Dose:  325 mg   Quantity:  30 tablet        Take 1 tablet (325 mg) by mouth daily   Refills:  0        atorvastatin 40 MG tablet   Commonly known as:  LIPITOR   Dose:  40 mg   Quantity:  30  "tablet        Take 1 tablet (40 mg) by mouth daily   Refills:  0        LASIX PO   Dose:  20 mg        Take 20 mg by mouth daily   Refills:  0        METOPROLOL SUCCINATE ER PO   Dose:  100 mg        Take 100 mg by mouth daily   Refills:  0                Orders Needing Specimen Collection     None      Pending Results     No orders found from 2018 to 2018.            Pending Culture Results     No orders found from 2018 to 2018.            Thank you for choosing [facility]       Thank you for choosing [facility] for your care. Our goal is always to provide you with excellent care. Hearing back from our patients is one way we can continue to improve our services. Please take a few minutes to complete the written survey that you may receive in the mail after you visit with us. Thank you!        [facility] Information     [employer] lets you send messages to your doctor, view your test results, renew your prescriptions, schedule appointments and more. To sign up, go to [email or web address] . Click on \"Log in\" on the left side of the screen, which will take you to the Welcome page. Then click on \"Sign up Now\" on the right side of the page.     You will be asked to enter the access code listed below, as well as some personal information. Please follow the directions to create your username and password.     Your access code is: [email or web address]  Expires: 2018 10:23 AM     Your access code will  in 90 days. If you need help or a new code, please call your [facility] clinic or [phone].        Care EveryWhere ID     This is your Care EveryWhere ID. This could be used by other organizations to access your [facility] medical records  [id number]        Equal Access to Services     [facility] : Gaston Pham, ramu west, leonardo phillips. So [facility] [phone].    ATENCIÓN: Si habla español, tiene a lea disposición servicios " paige de asistencia lingüística. Pete bloom [phone].    We comply with applicable federal civil rights laws and [place] laws. We do not discriminate on the basis of race, color, national origin, age, disability, sex, sexual orientation, or gender identity.            After Visit Summary       This is your record. Keep this with you and show to your community pharmacist(s) and doctor(s) at your next visit.

## 2018-05-16 NOTE — PLAN OF CARE
Problem: Patient Care Overview  Goal: Plan of Care/Patient Progress Review  Outcome: No Change  Vitals: VSS. BP has been trending high at 171/71 and later was 193/83. Administered IV hydralazine, recheck was 184/73. Afebrile. RA at 95%.     Pain: Pain rated 3/10 to right leg/foot and later denied. States that it's not too bad and that she's tired. Pt did receive ultram and oxy before bedtime.       Orientation: A&O. All neuro checks intact.      Cardiac: Reg. Tele monitor on reading SR 70's per ICU.     Lungs: Clear     ABD: Bowels Active. ABD obese.      Urinating: WNL     Skin: WNL     IV fluids: SL     Antibiotics: None     Mobility: Stand by assist. No weakness noticed. Equal strength to both sides.     Safety:  Call light in reach. Rounding done. Bed/chair alarm used. Pt has taken the chair alarm off a couple times on her own.     Comment: Pt up and down, but did get much more sleep than last night. Stated she is ready to go home, because the hospital makes her feel more nervous. Pt stated she will not be going to inpt rehab, she wishes to go home.      Face to face report given with opportunity to observe patient.    Report given to Margot Robertson   5/16/2018  6:55 AM

## 2018-05-16 NOTE — DISCHARGE SUMMARY
[facility]    Discharge Summary  Hospitalist    Date of Admission:  5/14/2018  Date of Discharge:  5/16/2018  Discharging Provider: Claudia Chino CNP  Date of Service (when I saw the patient): 5/15/18    Discharge Diagnoses   Principal Problem:    Cerebrovascular accident (H)  Active Problems:    Hypertensive urgency    GERD (Gastroesophageal reflux disease)    Schizophrenia (H)      History of Present Illness   From admission:Heather Rosas is a 67 year old female past medical history significant for recently diagnosed hypertension, major depressive disorder, anxiety disorder, dyslipidemia, fibromyalgia.  She lives at home independently.  She has been in her usual state of health until last Saturday.  Saturday night, while she was playing cross words, she has noticed right upper extremity weakness along with right lower extremity weakness.  This has persisted through Sunday and Monday.  She has noticed some unsteady in her gait.  Therefore, she had decided to come to the ER.  CT head done in the ER does not show any acute pathologies.  She has been in hypertensive urgency on presentation to the ER, systolic blood pressure of around 220.  At the time of my evaluation, blood pressure has started to improve with labetalol boluses.  She denies any headache or blurry vision.  Denies any difficulty swallowing.  Denies chest pain palpitation.  She has never experienced similar symptoms before.  She tells me, she was diagnosed with hypertension about 3 weeks ago.  Since then she has been on metoprolol.  She is still smoking couple of cigarettes a day.  Denies any IV drug use.    Hospital Course   Heather Rosas was admitted on 5/14/2018.  The following problems were addressed during her hospitalization:    Notified by nursing that the patient was found missing from her room. They called her at home and she stated she left and would not be coming back. She left without discussing anything with nursing  "or myself.  Called her at home and tried to discuss with her. She stated to me that she had pets at home that she needed to take care of and all of her family lives in [place]. Advised her that she has high risk for recurrence of stroke and her blood pressure is still not well controlled. She told me \"send my my medications\", advised her I would send prescriptions to her pharmacy and that it was important for her to take them so that she did not have another stroke and she hung up the phone. Will not change her blood pressure medications at this time because I think this will add to her confusion and noncompliance. Will continue metoprolol and Lasix and increase ASA to 325mg daily adding Lipitor.    A/p as of 5/15:  Cerebrovascular accident (H): Left hemisphere, MRI 5/15 showed basal ganglia lacunar infarct.  PT/OT/ST evaluation . She has been very emotionally/behaviorly labile since her admission going from yelling and crying to smiling and laughing. Complaining of 10/10 pain to her left foot then moved to her right foot. Now denying all pain. PT discussed inpatient rehab with her and she states she would very much want to do that, an hour later she was stating she wanted to go home and was fine. ? If this is stroke effect versus her underlying mental health issues.  Will start Lipitor as well as daily full dose ASA for secondary prevention.        Hypertensive urgency: Presented with quite elevated blood pressures. She was hospitalized 7/2017 with hypertensive urgency and angina at [facility]. She was discharged from there on norvasc and losartan. During her hospital stay 2/2018 she denied being on any home blood pressure medications and did have hypotension due to acute hemorrhage. She was discharged on metoprolol 12.5mg and 5mg lisinopril. She was evaluated in [place] urgent care 4/7 for acute dyspnea and again stated she was not on blood pressure medications but was 203/103. This was not addressed at that " visit. She then presented to the urgent care in [place] 4/30 with complains of dyspnea and stated he had stopped her metoprolol and was instructed to restart Metoprolol XL at 50mg QD at that time and started on Lasix with concern for CHF based on cardiomegaly,pulmonary congestion on on CXR and BNP of 199pg/ml. She returned to [place] for visit 5/9 and metoprolol XL was increased to 100mg PO Daily.                           Her multiple visits across multiple systems with a primary care provider have made following her care difficult. Furthermore she has not been compliant with her medications on multiple occasions. On arrival she is started on Coreg and PRN labetalol. Pressures have improved and stabilized. She is outside 48 hour recommendation for permissive hypertension for her CVA, however, will not attempt to drive her pressures further down in the acute setting. Would prefer her pressures do not fall below 140-160's systolic.        GERD (Gastroesophageal reflux disease)       Schizophrenia (H): Her underlying mental illness playing a significant role in her overall health as it impeding her compliance with medications as well as her ability to follow-through with plan of care with a PCP.         Claudia Chino, CNP      Code Status   Full Code       Primary Care Physician   Laci Avila    Discharge Disposition   Discharged to home  Condition at discharge: unknown    Consultations This Hospital Stay   PHYSICAL THERAPY ADULT IP CONSULT  OCCUPATIONAL THERAPY ADULT IP CONSULT  SPEECH LANGUAGE PATH ADULT IP CONSULT  SWALLOW EVAL SPEECH PATH AT BEDSIDE IP CONSULT    Time Spent on this Encounter   Claudia ADKINS, personally saw the patient today and spent greater than 30 minutes discharging this patient.    Discharge Orders   No discharge procedures on file.  Discharge Medications   Discharge Medication List as of 5/16/2018  7:37 AM      CONTINUE these medications which have NOT CHANGED    Details    aspirin 325 MG EC tablet Take 1 tablet (325 mg) by mouth daily, Disp-30 tablet, R-11, E-Prescribe      Furosemide (LASIX PO) Take 20 mg by mouth daily, Historical      METOPROLOL SUCCINATE ER PO Take 100 mg by mouth daily, Historical           Allergies   Allergies   Allergen Reactions     Cats Other (See Comments)     Sneezing, runny nose     Codeine Sulfate Nausea and Vomiting and GI Disturbance     Fexofenadine Hcl      Allegra      Rosuvastatin Other (See Comments)     Dizziness - Crestor      [place] Oil GI Disturbance     Any pink fish     Data   Most Recent 3 CBC's:  Recent Labs   Lab Test  05/16/18   0538  05/15/18   0534  05/14/18   1814   WBC  9.9  11.0  12.0*   HGB  10.9*  10.0*  11.0*   MCV  73*  74*  72*   PLT  236  213  231      Most Recent 3 BMP's:  Recent Labs   Lab Test  05/16/18   0538  05/15/18   0534  05/14/18   1814   NA  139  139  137   POTASSIUM  3.8  3.6  3.4   CHLORIDE  105  105  104   CO2  26  26  26   BUN  15  20  17   CR  0.69  0.80  0.80   ANIONGAP  8  8  7   CARINA  9.4  8.5  8.7   GLC  101*  96  93     Most Recent 2 LFT's:  Recent Labs   Lab Test  05/15/18   0534  02/18/18   1149   AST  16  17   ALT  22  20   ALKPHOS  129  80   BILITOTAL  0.2  0.2     Most Recent INR's and Anticoagulation Dosing History:  Anticoagulation Dose History     Recent Dosing and Labs Latest Ref Rng & Units 7/22/2017 5/14/2018    INR 0.80 - 1.20 0.98 1.01        Most Recent 3 Troponin's:  Recent Labs   Lab Test  05/14/18   1814  02/20/18   0449  02/19/18   1356   TROPI  <0.015  1.994*  3.762*     Most Recent Cholesterol Panel:  Recent Labs   Lab Test  05/16/18   0538   CHOL  186   LDL  117*   HDL  44*   TRIG  127     Most Recent 6 Bacteria Isolates From Any Culture (See EPIC Reports for Culture Details):  Recent Labs   Lab Test  05/15/18   0045  07/23/17   0032  07/23/17   0026   CULT  No MRSA isolated  No growth after 6 days  No growth after 6 days     Most Recent TSH, T4 and A1c Labs:  Recent Labs   Lab Test   01/16/14   0810  11/13/13   0805   TSH   --   0.68   T4   --   0.58*   A1C  5.3   --      Results for orders placed or performed during the hospital encounter of 05/14/18   CT Head w/o Contrast    Narrative    PROCEDURE: CT HEAD W/O CONTRAST     HISTORY: stroke; .    COMPARISON: None.    TECHNIQUE:  Helical images of the head from the foramen magnum to the  vertex were obtained without contrast.    FINDINGS: The ventricles and sulci are normal in volume. No acute  intracranial hemorrhage, mass effect, midline shift, hydrocephalus or  basilar cystern effacement are present.    The grey-white matter interface is preserved.    The calvarium is intact. The mastoid air cells are clear.  The  visualized paranasal sinuses are clear.      Impression    IMPRESSION: Normal brain      JEFERSON NEGRO MD   CT Head Neck Angio w/o & w Contrast    Narrative    PROCEDURE: CTA ANGIOGRAM HEAD NECK 5/14/2018 7:21 PM    HISTORY: stroke;     COMPARISONS: None.    Meds/Dose Given: Isovue 370 50 Ml    TECHNIQUE: CT angiogram of the neck with sagittal coronal and  three-dimensional MIP reconstructions. CT angiogram of the brain with  sagittal coronal and three-dimensional MIP reconstructions    FINDINGS: CT angiogram of the neck: The left common carotid artery  left carotid bifurcation and left internal carotid artery appears  normal. The brachiocephalic artery right common carotid artery right  internal carotid artery appears normal. The right subclavian and right  vertebral artery is normal. 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 The  left vertebral artery is widely patent.    CT angiogram of the brain with three-dimensional reconstructions the  distal vertebral arteries appear normal. The basilar artery is widely  patent. The left posterior cerebral artery arises via the basilar  artery. The right posterior cerebral arises via the posterior  communicating artery. Both  carotid siphons are widely patent. The  supraclinoid internal carotid anterior and middle cerebral arteries  appear normal. No intracranial stenoses, aneurysms or vascular shifts.    CT scan of the brain with IV contrast reveals the ventricular system  to be normal in size. There are no enhancing masses ventricular shifts  or extracerebral collections. The brainstem and cerebellum appear  normal.    CT scan of the neck with IV contrast: The muscles of mastication in  the parapharyngeal spaces appear normal. The salivary glands are  normal. The larynx and upper trachea is normal there is a low-density  mass with some calcifications in the left lobe of the thyroid. This  mass measures 1.2 cm in diameter further imaging evaluation with  ultrasound is recommended. Upper mediastinal and cervical lymph nodes  appear normal. The lung apices are clear. Degenerative changes are  present in the cervical spine.         Impression    IMPRESSION: No vascular stenoses occlusions aneurysms or vascular  shifts are noted.    JEFERSON NEGRO MD   MR Brain w/o & w Contrast    Narrative    PROCEDURE: MR BRAIN W/O & W CONTRAST 5/15/2018 8:48 AM    HISTORY: Right sided weakness;     COMPARISONS: None.    Meds/Dose Given: Gadavist 7.5 mL    TECHNIQUE: Images were obtained sagittally T1 weighted axially  diffusion gradient echo FLAIR T1 and T2-weighted. Images were obtained  axially sagittally coronally T1 weighted following gadolinium  administration.    FINDINGS: Diffusion-weighted images indicate an acute lacunar infarct  is seen located in the basal ganglion and posterior limb of internal  capsule on the left. There are foci of bright signal in the white  matter both cerebral hemispheres most likely on the basis small vessel  disease. On the enhanced scans no areas of pathologic enhancement are  seen. The ventricular system is normal in size. The brainstem and  cerebellum appear normal. The paranasal sinuses are clear.          Impression    IMPRESSION: Acute lacunar infarct in the basal ganglia and posterior  limb of the internal capsule on the left.    JEFERSON NEGRO MD

## 2018-05-16 NOTE — PROGRESS NOTES
Patient has been accepted at [facility] inpatient rehab.  She did speak with Karuna and indicated she was interested in the program and was informed of her responsibility for this program.    [facility] and she did okay that I call her son, Montrell  Message left for son.

## 2018-05-16 NOTE — PROVIDER NOTIFICATION
Pt c/o right LLE pain after receiving 50 mg Ultram with little relief.  MD to place order for Percocet once

## 2018-05-16 NOTE — PROGRESS NOTES
Patient did leave this morning AMA.  I did meet with her and she indicates she is wanting to go to [place].  I spoke with her about the tentative plan that I learned from both Natalia MAX in Care Transitions and Alicia NP Hospitalist.    I did speak with her about a plan for [place] [facility] rehab.  She is in agreement with this plan, currently.  I did call and leave a message for Gely and then did speak with Carolyn as well.    Continue to try to work a plan to go to Rehab.

## 2018-05-17 ENCOUNTER — TRANSFERRED RECORDS (OUTPATIENT)
Dept: HEALTH INFORMATION MANAGEMENT | Facility: CLINIC | Age: 68
End: 2018-05-17

## 2018-08-02 ENCOUNTER — HOSPITAL ENCOUNTER (EMERGENCY)
Facility: HOSPITAL | Age: 68
Discharge: HOME | End: 2018-08-02
Attending: PHYSICIAN ASSISTANT | Admitting: PHYSICIAN ASSISTANT
Payer: MEDICARE

## 2018-08-02 VITALS
TEMPERATURE: 97.8 F | DIASTOLIC BLOOD PRESSURE: 73 MMHG | SYSTOLIC BLOOD PRESSURE: 163 MMHG | HEART RATE: 74 BPM | OXYGEN SATURATION: 99 % | RESPIRATION RATE: 18 BRPM

## 2018-08-02 DIAGNOSIS — M25.561 ACUTE PAIN OF RIGHT KNEE: ICD-10-CM

## 2018-08-02 PROCEDURE — G0463 HOSPITAL OUTPT CLINIC VISIT: HCPCS

## 2018-08-02 PROCEDURE — 25000125 ZZHC RX 250: Performed by: PHYSICIAN ASSISTANT

## 2018-08-02 PROCEDURE — 99213 OFFICE O/P EST LOW 20 MIN: CPT | Performed by: PHYSICIAN ASSISTANT

## 2018-08-02 RX ORDER — IBUPROFEN 200 MG
400-600 TABLET ORAL 2 TIMES DAILY PRN
COMMUNITY
End: 2023-08-03

## 2018-08-02 RX ORDER — PREDNISONE 20 MG/1
TABLET ORAL
Qty: 10 TABLET | Refills: 0 | Status: SHIPPED | OUTPATIENT
Start: 2018-08-02 | End: 2018-09-01

## 2018-08-02 RX ORDER — PREDNISONE 20 MG/1
40 TABLET ORAL ONCE
Status: COMPLETED | OUTPATIENT
Start: 2018-08-02 | End: 2018-08-02

## 2018-08-02 RX ADMIN — PREDNISONE 40 MG: 20 TABLET ORAL at 21:35

## 2018-08-02 ASSESSMENT — ENCOUNTER SYMPTOMS
CARDIOVASCULAR NEGATIVE: 1
CONSTITUTIONAL NEGATIVE: 1
ARTHRALGIAS: 1
PSYCHIATRIC NEGATIVE: 1

## 2018-08-02 NOTE — ED AVS SNAPSHOT
[facility]    [address] [address]    Phone:  [phone]                                       Heather Rosas   MRN: [id number]    Department:  [facility]   Date of Visit:  8/2/2018           Patient Information     Date Of Birth          1950        Your diagnoses for this visit were:     Acute pain of right knee        You were seen by Deyanira Cabello PA.      Follow-up Information     Follow up with Wendie Schneider    Why:  If symptoms worsen or if not resolving    Contact information:    [place] [address] [facility] [id number] [phone]          Follow up with [facility].    Specialty:  EMERGENCY MEDICINE    Why:  If further concerns develop    Contact information:    [address] [address] [phone]    Additional information:    From [facility]: Take [address]. Turn left at [address]. Turn left at the first stoplight on [address]. At the first stop sign, take a right onto [address]. Take a left into the parking lot and continue through until you reach the North enterance of the building.       From [place]: Take [address]. Take the [address] ramp towards [place]. Turn left onto [address]. Take a slight right onto [address]. Turn left at the first stoplight on [address]. At the first stop sign, take a right onto [address]. Take a left into the parking lot and continue through until you reach the North enterance of the building.       From [place]: Take [address]. Take a right at [address]. At the first stop sign, take a right onto [address]. Take a left into the parking lot and continue through until you reach the North enterance of the building.       Discharge References/Attachments     ARTHRALGIA (ENGLISH)    KNEE PAIN (ENGLISH)    KNEE PAIN AND SWELLING, REDUCING (ENGLISH)         Review of  "your medicines      START taking        Dose / Directions Last dose taken    predniSONE 20 MG tablet   Commonly known as:  DELTASONE   Quantity:  10 tablet        Take two tablets (= 40mg) each day for 5 (five) days   Refills:  0          Our records show that you are taking the medicines listed below. If these are incorrect, please call your family doctor or clinic.        Dose / Directions Last dose taken    ASPIRIN PO   Dose:  81 mg        Take 81 mg by mouth daily   Refills:  0        IBUPROFEN PO   Dose:  800 mg        Take 800 mg by mouth every 6 hours as needed for moderate pain   Refills:  0        LASIX PO   Dose:  20 mg        Take 20 mg by mouth daily   Refills:  0        METOPROLOL SUCCINATE ER PO   Dose:  50 mg        Take 50 mg by mouth daily   Refills:  0                Prescriptions were sent or printed at these locations (1 Prescription)                   [facility] [address] [facility], [place] - [address] AT [facility] [address]   [address], [place] [address]    Telephone:  [phone]   Fax:  [phone]   Hours:                  E-Prescribed (1 of 1)         predniSONE (DELTASONE) 20 MG tablet                Orders Needing Specimen Collection     None      Pending Results     No orders found from 7/31/2018 to 8/3/2018.            Pending Culture Results     No orders found from 7/31/2018 to 8/3/2018.            Thank you for choosing [facility]       Thank you for choosing [facility] for your care. Our goal is always to provide you with excellent care. Hearing back from our patients is one way we can continue to improve our services. Please take a few minutes to complete the written survey that you may receive in the mail after you visit with us. Thank you!        [employer] Information     [employer] lets you send messages to your doctor, view your test results, renew your prescriptions, schedule appointments and more. To sign up, go to [email or web address] . Click on \"Log in\" on the " "left side of the screen, which will take you to the Welcome page. Then click on \"Sign up Now\" on the right side of the page.     You will be asked to enter the access code listed below, as well as some personal information. Please follow the directions to create your username and password.     Your access code is: [email or web address]  Expires: 10/31/2018  9:28 PM     Your access code will  in 90 days. If you need help or a new code, please call your [facility] clinic or [phone].        Care EveryWhere ID     This is your Care EveryWhere ID. This could be used by other organizations to access your [facility] medical records  [id number]        Equal Access to Services     EDELMIRA BACH : Gaston Pham, ramu west, lee garcia, leonardo haque. So [facility] [phone].    ATENCIÓN: Si habla español, tiene a lea disposición servicios gratuitos de asistencia lingüística. Llame al [phone].    We comply with applicable federal civil rights laws and [place] laws. We do not discriminate on the basis of race, color, national origin, age, disability, sex, sexual orientation, or gender identity.            After Visit Summary       This is your record. Keep this with you and show to your community pharmacist(s) and doctor(s) at your next visit.                  "

## 2018-08-02 NOTE — ED AVS SNAPSHOT
[facility]    [address]    [place] [address]    Phone:  [phone]                                       Heather Rosas   MRN: [id number]    Department:  [facility]   Date of Visit:  8/2/2018           After Visit Summary Signature Page     I have received my discharge instructions, and my questions have been answered. I have discussed any challenges I see with this plan with the nurse or doctor.    ..........................................................................................................................................  Patient/Patient Representative Signature      ..........................................................................................................................................  Patient Representative Print Name and Relationship to Patient    ..................................................               ................................................  Date                                            Time    ..........................................................................................................................................  Reviewed by Signature/Title    ...................................................              ..............................................  Date                                                            Time

## 2018-08-03 NOTE — ED TRIAGE NOTES
"Pt presents today alone with c/o right knee and foot pain. Rates pain at 7. Aching pain. Front of knee pain. Bottom of foot pain. Started mothers day on and off, states she usually deals with the pain but this is \"different\".   "

## 2018-08-03 NOTE — ED PROVIDER NOTES
"  History     Chief Complaint   Patient presents with     Knee Pain     The history is provided by the patient. No  was used.     Heather Rosas is a 67 year old female who has chronic right knee pain but much worse over last 2 days. States it past she was told she had torn a ligament \"but no one will fix it.\" denies any new injury or fall. Pain is sharp, worse with walking.    Problem List:    Patient Active Problem List    Diagnosis Date Noted     Cerebrovascular accident (H) 05/14/2018     Priority: Medium     Hypertensive urgency 05/14/2018     Priority: Medium     CVA (cerebral vascular accident) (H) 05/14/2018     Priority: Medium     Chest pain 05/14/2018     Priority: Medium     Endometrial hyperplasia with atypia 05/09/2018     Priority: Medium     Metrorrhagia 02/18/2018     Priority: Medium     ACP (advance care planning) 05/20/2016     Priority: Medium     Advance Care Planning 5/20/2016: ACP Review of Chart / Resources Provided:  Reviewed chart for advance care plan.  Heather Rosas has no plan or code status on file. Discussed available resources and provided with information. Confirmed code status reflects current choices pending further ACP discussions.  Confirmed/documented legally designated decision makers.  Added by Margot Shannon             HTN (hypertension)      Priority: Medium     Major depressive disorder, recurrent episode, moderate (H) 01/01/2011     Priority: Medium     ANNA (generalized anxiety disorder) 01/01/2011     Priority: Medium     GERD (Gastroesophageal reflux disease) 01/01/2011     Priority: Medium     Obesity (H) 01/01/2011     Priority: Medium     Schizophrenia (H) 01/01/2011     Priority: Medium     Seasonal allergic rhinitis 01/01/2011     Priority: Medium     Fibromyalgia 06/14/2004     Priority: Medium     Dyslipidemia 11/26/2003     Priority: Medium        Past Medical History:    Past Medical History:   Diagnosis Date     Allergic rhinitis " 01/01/2011     Anxiety 01/01/2011     Anxiety state, unspecified      Dyslipidemia 11/26/2003     fibromyalgia 06/14/2004     GERD, Esophageal reflux 01/01/2011     HTN (hypertension)      Major depression, recurrent (H) 1/1/2011     Nonorganic sleep disorder, unspecified      Obesity 01/01/2011     Schizophrenia (H) 01/01/2011     Toxic shock syndrome (H) 2006       Past Surgical History:    Past Surgical History:   Procedure Laterality Date     ANGIOGRAM  02/2005    Normal      BIOPSY BREAST  1984    LT, normal     CARPAL TUNNEL RELEASE RT/LT  2005    RT, carpal tunnel     COLONOSCOPY  2011    Repeat in ten years      COLONOSCOPY  1996     COLONOSCOPY  2004     DILATION AND CURETTAGE, HYSTEROSCOPY, ABLATE ENDOMETRIUM, COMBINED N/A 2/19/2018    Procedure: COMBINED DILATION AND CURETTAGE, HYSTEROSCOPY, ABLATE ENDOMETRIUM;  HYSTEROSCOPY DILATION AND CURETTAGE, ENDOMETRIAL ABLATION;  Surgeon: Jose Nettles MD;  Location: [facility]     placement of central line  2005       Family History:    Family History   Problem Relation Age of Onset     C.A.D. Father 45     (cause of death)      Other - See Comments Father      rheumatic fever      Allergies Father      Cancer Sister 56     bone ca      GASTROINTESTINAL DISEASE Mother      GERD     Cancer Mother      pancreatic ca (cause of death) /liver ca     Breast Cancer Maternal Aunt      Breast Cancer Other      Breast Cancer Maternal Aunt      Colon Cancer Paternal Aunt      (cause of death)      Crohn Disease Other      Depression Maternal Uncle      Depression Maternal Aunt      Diabetes Paternal Grandmother      type 2     Neurologic Disorder Brother      neuropathy     Cancer Brother 58     lymph node in neck     Allergies Brother        Social History:  Marital Status:   [4]  Social History   Substance Use Topics     Smoking status: Former Smoker     Packs/day: 0.50     Types: Cigarettes     Quit date: 12/1/2013     Smokeless tobacco: Never Used      Comment: Year  Quit: 2011     Alcohol use No      Comment: rare        Medications:      ASPIRIN PO   Furosemide (LASIX PO)   IBUPROFEN PO   METOPROLOL SUCCINATE ER PO   predniSONE (DELTASONE) 20 MG tablet         Review of Systems   Constitutional: Negative.    HENT: Negative.    Cardiovascular: Negative.    Musculoskeletal: Positive for arthralgias and gait problem.   Psychiatric/Behavioral: Negative.        Physical Exam   BP: 163/73  Pulse: 74  Temp: 97.8  F (36.6  C)  Resp: 18  SpO2: 99 %      Physical Exam   Constitutional: She is oriented to person, place, and time. She appears well-developed and well-nourished. No distress.   Cardiovascular: Normal rate.    Pulmonary/Chest: Effort normal.   Musculoskeletal:   Right knee: no e/e/e/e, diffuse moderate TTP to medial and lateral joint lines. Neg v/v/d, m/n/v intact. 5/5 strength. +AFROM w/ pain   Neurological: She is alert and oriented to person, place, and time.   Skin: She is not diaphoretic.   Psychiatric: She has a normal mood and affect.   Nursing note and vitals reviewed.      ED Course     ED Course     Procedures      Pt decline xrays and ace wrap  Medications   predniSONE (DELTASONE) tablet 40 mg (40 mg Oral Given 8/2/18 2135)       Assessments & Plan (with Medical Decision Making)     I have reviewed the nursing notes.    I have reviewed the findings, diagnosis, plan and need for follow up with the patient.      New Prescriptions    PREDNISONE (DELTASONE) 20 MG TABLET    Take two tablets (= 40mg) each day for 5 (five) days       Final diagnoses:   Acute pain of right knee           Elevate injured area above heart as often as possible and when resting.  Apply ice at least three times a day x 20 minutes.   Patient verbally educated and given appropriate education sheets for the diagnoses and has no questions.  Take medications as directed.   Follow up with your Primary Care provider if symptoms increase or if not resolving.  if further concerns develop, return to the  ER  Deyanira Cabello Certified   Physician Assistant  8/2/2018  9:31 PM  URGENT CARE CL[facility]    8/2/2018   [facility]     Deyanira Cabello PA  08/02/18 2131       Deyanira Cabello PA  08/02/18 2138

## 2018-09-01 ENCOUNTER — HOSPITAL ENCOUNTER (EMERGENCY)
Facility: HOSPITAL | Age: 68
Discharge: ANOTHER HOSPITAL | End: 2018-09-01
Attending: FAMILY MEDICINE | Admitting: FAMILY MEDICINE
Payer: MEDICARE

## 2018-09-01 ENCOUNTER — APPOINTMENT (OUTPATIENT)
Dept: GENERAL RADIOLOGY | Facility: HOSPITAL | Age: 68
End: 2018-09-01
Attending: FAMILY MEDICINE
Payer: MEDICARE

## 2018-09-01 ENCOUNTER — TRANSFERRED RECORDS (OUTPATIENT)
Dept: HEALTH INFORMATION MANAGEMENT | Facility: CLINIC | Age: 68
End: 2018-09-01

## 2018-09-01 VITALS
BODY MASS INDEX: 43.02 KG/M2 | OXYGEN SATURATION: 98 % | HEART RATE: 104 BPM | RESPIRATION RATE: 20 BRPM | SYSTOLIC BLOOD PRESSURE: 147 MMHG | WEIGHT: 213 LBS | DIASTOLIC BLOOD PRESSURE: 86 MMHG | TEMPERATURE: 97.7 F

## 2018-09-01 DIAGNOSIS — I21.4 NSTEMI (NON-ST ELEVATED MYOCARDIAL INFARCTION) (H): ICD-10-CM

## 2018-09-01 DIAGNOSIS — D50.9 MICROCYTIC ANEMIA: ICD-10-CM

## 2018-09-01 DIAGNOSIS — I50.21 ACUTE SYSTOLIC CONGESTIVE HEART FAILURE (H): ICD-10-CM

## 2018-09-01 LAB
ALBUMIN UR-MCNC: NEGATIVE MG/DL
ANION GAP SERPL CALCULATED.3IONS-SCNC: 11 MMOL/L (ref 3–14)
APPEARANCE UR: CLEAR
BASOPHILS # BLD AUTO: 0.1 10E9/L (ref 0–0.2)
BASOPHILS NFR BLD AUTO: 0.5 %
BILIRUB UR QL STRIP: NEGATIVE
BUN SERPL-MCNC: 7 MG/DL (ref 7–30)
CALCIUM SERPL-MCNC: 8.8 MG/DL (ref 8.5–10.1)
CHLORIDE SERPL-SCNC: 95 MMOL/L (ref 94–109)
CO2 SERPL-SCNC: 27 MMOL/L (ref 20–32)
COLOR UR AUTO: NORMAL
CREAT SERPL-MCNC: 0.55 MG/DL (ref 0.52–1.04)
DIFFERENTIAL METHOD BLD: ABNORMAL
EOSINOPHIL # BLD AUTO: 0.1 10E9/L (ref 0–0.7)
EOSINOPHIL NFR BLD AUTO: 0.8 %
ERYTHROCYTE [DISTWIDTH] IN BLOOD BY AUTOMATED COUNT: 19.5 % (ref 10–15)
GFR SERPL CREATININE-BSD FRML MDRD: >90 ML/MIN/1.7M2
GLUCOSE SERPL-MCNC: 98 MG/DL (ref 70–99)
GLUCOSE UR STRIP-MCNC: NEGATIVE MG/DL
HCT VFR BLD AUTO: 23.7 % (ref 35–47)
HGB BLD-MCNC: 6.8 G/DL (ref 11.7–15.7)
HGB UR QL STRIP: NEGATIVE
HYPOCHROMIA BLD QL: PRESENT
IMM GRANULOCYTES # BLD: 0.2 10E9/L (ref 0–0.4)
IMM GRANULOCYTES NFR BLD: 1.4 %
KETONES UR STRIP-MCNC: NEGATIVE MG/DL
LEUKOCYTE ESTERASE UR QL STRIP: NEGATIVE
LYMPHOCYTES # BLD AUTO: 1 10E9/L (ref 0.8–5.3)
LYMPHOCYTES NFR BLD AUTO: 7.7 %
MCH RBC QN AUTO: 18.1 PG (ref 26.5–33)
MCHC RBC AUTO-ENTMCNC: 28.7 G/DL (ref 31.5–36.5)
MCV RBC AUTO: 63 FL (ref 78–100)
MONOCYTES # BLD AUTO: 1.2 10E9/L (ref 0–1.3)
MONOCYTES NFR BLD AUTO: 9.9 %
NEUTROPHILS # BLD AUTO: 9.9 10E9/L (ref 1.6–8.3)
NEUTROPHILS NFR BLD AUTO: 79.7 %
NITRATE UR QL: NEGATIVE
NRBC # BLD AUTO: 0 10*3/UL
NRBC BLD AUTO-RTO: 0 /100
NT-PROBNP SERPL-MCNC: 3610 PG/ML (ref 0–900)
PH UR STRIP: 7.5 PH (ref 4.7–8)
PLATELET # BLD AUTO: 373 10E9/L (ref 150–450)
POTASSIUM SERPL-SCNC: 3.5 MMOL/L (ref 3.4–5.3)
RBC # BLD AUTO: 3.76 10E12/L (ref 3.8–5.2)
SODIUM SERPL-SCNC: 133 MMOL/L (ref 133–144)
SOURCE: NORMAL
SP GR UR STRIP: 1 (ref 1–1.03)
TROPONIN I SERPL-MCNC: 5.78 UG/L (ref 0–0.04)
TSH SERPL DL<=0.005 MIU/L-ACNC: 1.39 MU/L (ref 0.4–4)
UROBILINOGEN UR STRIP-MCNC: NORMAL MG/DL (ref 0–2)
WBC # BLD AUTO: 12.4 10E9/L (ref 4–11)

## 2018-09-01 PROCEDURE — 96374 THER/PROPH/DIAG INJ IV PUSH: CPT

## 2018-09-01 PROCEDURE — 86850 RBC ANTIBODY SCREEN: CPT | Performed by: FAMILY MEDICINE

## 2018-09-01 PROCEDURE — 99285 EMERGENCY DEPT VISIT HI MDM: CPT | Mod: 25

## 2018-09-01 PROCEDURE — 71046 X-RAY EXAM CHEST 2 VIEWS: CPT | Mod: TC

## 2018-09-01 PROCEDURE — 93010 ELECTROCARDIOGRAM REPORT: CPT | Performed by: INTERNAL MEDICINE

## 2018-09-01 PROCEDURE — 25000128 H RX IP 250 OP 636: Performed by: FAMILY MEDICINE

## 2018-09-01 PROCEDURE — 86905 BLOOD TYPING RBC ANTIGENS: CPT | Performed by: FAMILY MEDICINE

## 2018-09-01 PROCEDURE — 83880 ASSAY OF NATRIURETIC PEPTIDE: CPT | Performed by: FAMILY MEDICINE

## 2018-09-01 PROCEDURE — 85025 COMPLETE CBC W/AUTO DIFF WBC: CPT | Performed by: FAMILY MEDICINE

## 2018-09-01 PROCEDURE — 36415 COLL VENOUS BLD VENIPUNCTURE: CPT | Performed by: FAMILY MEDICINE

## 2018-09-01 PROCEDURE — 96375 TX/PRO/DX INJ NEW DRUG ADDON: CPT

## 2018-09-01 PROCEDURE — 99285 EMERGENCY DEPT VISIT HI MDM: CPT | Performed by: FAMILY MEDICINE

## 2018-09-01 PROCEDURE — 80048 BASIC METABOLIC PNL TOTAL CA: CPT | Performed by: FAMILY MEDICINE

## 2018-09-01 PROCEDURE — 86900 BLOOD TYPING SEROLOGIC ABO: CPT | Performed by: FAMILY MEDICINE

## 2018-09-01 PROCEDURE — 84484 ASSAY OF TROPONIN QUANT: CPT | Performed by: FAMILY MEDICINE

## 2018-09-01 PROCEDURE — 86901 BLOOD TYPING SEROLOGIC RH(D): CPT | Performed by: FAMILY MEDICINE

## 2018-09-01 PROCEDURE — 93005 ELECTROCARDIOGRAM TRACING: CPT

## 2018-09-01 PROCEDURE — 84443 ASSAY THYROID STIM HORMONE: CPT | Performed by: FAMILY MEDICINE

## 2018-09-01 PROCEDURE — 81003 URINALYSIS AUTO W/O SCOPE: CPT | Performed by: FAMILY MEDICINE

## 2018-09-01 RX ORDER — FUROSEMIDE 10 MG/ML
40 INJECTION INTRAMUSCULAR; INTRAVENOUS ONCE
Status: COMPLETED | OUTPATIENT
Start: 2018-09-01 | End: 2018-09-01

## 2018-09-01 RX ORDER — ONDANSETRON 2 MG/ML
4 INJECTION INTRAMUSCULAR; INTRAVENOUS ONCE
Status: COMPLETED | OUTPATIENT
Start: 2018-09-01 | End: 2018-09-01

## 2018-09-01 RX ORDER — MORPHINE SULFATE 2 MG/ML
2 INJECTION, SOLUTION INTRAMUSCULAR; INTRAVENOUS ONCE
Status: COMPLETED | OUTPATIENT
Start: 2018-09-01 | End: 2018-09-01

## 2018-09-01 RX ORDER — LISINOPRIL AND HYDROCHLOROTHIAZIDE 12.5; 2 MG/1; MG/1
1 TABLET ORAL DAILY
COMMUNITY
End: 2018-12-03

## 2018-09-01 RX ADMIN — FUROSEMIDE 40 MG: 10 INJECTION, SOLUTION INTRAVENOUS at 10:04

## 2018-09-01 RX ADMIN — MORPHINE SULFATE 2 MG: 2 INJECTION, SOLUTION INTRAMUSCULAR; INTRAVENOUS at 10:52

## 2018-09-01 RX ADMIN — ONDANSETRON 4 MG: 2 INJECTION INTRAMUSCULAR; INTRAVENOUS at 10:52

## 2018-09-01 ASSESSMENT — ENCOUNTER SYMPTOMS
NAUSEA: 0
WHEEZING: 0
CHEST TIGHTNESS: 0
DYSPHORIC MOOD: 1
WEAKNESS: 0
SHORTNESS OF BREATH: 1
ARTHRALGIAS: 1
DIARRHEA: 0
ABDOMINAL PAIN: 0
FEVER: 0
DIAPHORESIS: 0
MYALGIAS: 1
CONSTIPATION: 0
BLOOD IN STOOL: 0
HEADACHES: 0
DYSURIA: 1
VOMITING: 0
NERVOUS/ANXIOUS: 1
FATIGUE: 1
ACTIVITY CHANGE: 1

## 2018-09-01 NOTE — ED NOTES
Critical result of  HGB value of 6.8  Reported to  Dr. Allen  Time given to provider  10:25 AM  Antonieta Hurd RN 10:25 AM 9/1/2018

## 2018-09-01 NOTE — ED PROVIDER NOTES
"  History     Chief Complaint   Patient presents with     Leg Swelling     50# wt gain in 6 wks.      HPI  Heather Rosas is a 67 year old female who presents emergency room with a 50 pound weight gain in 6 weeks.  She is short of breath with any exertion, she has mild tachycardia, and states she feels \"awful\".  She has pitting edema in her ankles and feet, that extends all the way up to her sacrum.  She is quite anxious, does have an anxiety disorder underlying.  She was on Lasix in the past, has been taken off it.  She also recently had a change in her blood pressure medication and is currently on Lisinopril/hydrochlorothiazide combination.  She complains of generalized discomfort, does have a history of fibromyalgia.     Problem List:    Patient Active Problem List    Diagnosis Date Noted     Cerebrovascular accident (H) 05/14/2018     Priority: Medium     Hypertensive urgency 05/14/2018     Priority: Medium     CVA (cerebral vascular accident) (H) 05/14/2018     Priority: Medium     Chest pain 05/14/2018     Priority: Medium     Endometrial hyperplasia with atypia 05/09/2018     Priority: Medium     Metrorrhagia 02/18/2018     Priority: Medium     ACP (advance care planning) 05/20/2016     Priority: Medium     Advance Care Planning 5/20/2016: ACP Review of Chart / Resources Provided:  Reviewed chart for advance care plan.  Heather Rosas has no plan or code status on file. Discussed available resources and provided with information. Confirmed code status reflects current choices pending further ACP discussions.  Confirmed/documented legally designated decision makers.  Added by Margot Shannon             HTN (hypertension)      Priority: Medium     Major depressive disorder, recurrent episode, moderate (H) 01/01/2011     Priority: Medium     ANNA (generalized anxiety disorder) 01/01/2011     Priority: Medium     GERD (Gastroesophageal reflux disease) 01/01/2011     Priority: Medium     Obesity (H) 01/01/2011 "     Priority: Medium     Schizophrenia (H) 01/01/2011     Priority: Medium     Seasonal allergic rhinitis 01/01/2011     Priority: Medium     Fibromyalgia 06/14/2004     Priority: Medium     Dyslipidemia 11/26/2003     Priority: Medium        Past Medical History:    Past Medical History:   Diagnosis Date     Allergic rhinitis 01/01/2011     Anxiety 01/01/2011     Anxiety state, unspecified      Dyslipidemia 11/26/2003     fibromyalgia 06/14/2004     GERD, Esophageal reflux 01/01/2011     HTN (hypertension)      Major depression, recurrent (H) 1/1/2011     Nonorganic sleep disorder, unspecified      Obesity 01/01/2011     Schizophrenia (H) 01/01/2011     Toxic shock syndrome (H) 2006       Past Surgical History:    Past Surgical History:   Procedure Laterality Date     ANGIOGRAM  02/2005    Normal      BIOPSY BREAST  1984    LT, normal     CARPAL TUNNEL RELEASE RT/LT  2005    RT, carpal tunnel     COLONOSCOPY  2011    Repeat in ten years      COLONOSCOPY  1996     COLONOSCOPY  2004     DILATION AND CURETTAGE, HYSTEROSCOPY, ABLATE ENDOMETRIUM, COMBINED N/A 2/19/2018    Procedure: COMBINED DILATION AND CURETTAGE, HYSTEROSCOPY, ABLATE ENDOMETRIUM;  HYSTEROSCOPY DILATION AND CURETTAGE, ENDOMETRIAL ABLATION;  Surgeon: Jose Nettles MD;  Location: [facility]     placement of central line  2005       Family History:    Family History   Problem Relation Age of Onset     C.A.D. Father 45     (cause of death)      Other - See Comments Father      rheumatic fever      Allergies Father      Cancer Sister 56     bone ca      GASTROINTESTINAL DISEASE Mother      GERD     Cancer Mother      pancreatic ca (cause of death) /liver ca     Breast Cancer Maternal Aunt      Breast Cancer Other      Breast Cancer Maternal Aunt      Colon Cancer Paternal Aunt      (cause of death)      Crohn Disease Other      Depression Maternal Uncle      Depression Maternal Aunt      Diabetes Paternal Grandmother      type 2     Neurologic Disorder Brother       neuropathy     Cancer Brother 58     lymph node in neck     Allergies Brother        Social History:  Marital Status:   [4]  Social History   Substance Use Topics     Smoking status: Former Smoker     Packs/day: 0.50     Types: Cigarettes     Quit date: 12/1/2013     Smokeless tobacco: Never Used      Comment: Year Quit: 2011     Alcohol use No      Comment: rare        Medications:      ASPIRIN PO   IBUPROFEN PO   lisinopril-hydrochlorothiazide (PRINZIDE/ZESTORETIC) 20-12.5 MG per tablet         Review of Systems   Constitutional: Positive for activity change and fatigue. Negative for diaphoresis and fever.   HENT: Negative.    Respiratory: Positive for shortness of breath. Negative for chest tightness and wheezing.    Cardiovascular: Negative for chest pain.        Did develop chest pain while here, resolved with Morphine.   Gastrointestinal: Negative for abdominal pain, blood in stool, constipation, diarrhea, nausea and vomiting.   Genitourinary: Positive for dysuria.   Musculoskeletal: Positive for arthralgias and myalgias.   Skin: Negative.    Neurological: Negative for syncope, weakness and headaches.   Psychiatric/Behavioral: Positive for dysphoric mood. The patient is nervous/anxious.        Physical Exam   BP: 139/80  Pulse: 104  Heart Rate: 104  Temp: 97.7  F (36.5  C)  Resp: 20  Weight: 96.6 kg (213 lb) (stated. states last wt before this was 165# on July 12,2018)  SpO2: 99 %      Physical Exam   Constitutional: She appears well-developed and well-nourished. She appears distressed.   HENT:   Head: Normocephalic and atraumatic.   Neck: Normal range of motion. Neck supple.   Cardiovascular: Normal rate, regular rhythm and intact distal pulses.    Murmur heard.  Pulmonary/Chest: Effort normal and breath sounds normal. No respiratory distress.   Abdominal: Soft. Bowel sounds are normal. She exhibits no distension. There is no tenderness.   Musculoskeletal: Normal range of motion. She exhibits no  edema.   Neurological: She is alert.   Skin: Skin is warm and dry.   Psychiatric: Her mood appears anxious. Her affect is blunt. Cognition and memory are impaired.   Nursing note and vitals reviewed.      ED Course     ED Course     Procedures         EKG Interpretation:      Interpreted by Genesis Blevins  Time reviewed: 0930  Symptoms at time of EKG: fluid overload   Rhythm: sinus tachycardia  Rate: 100-110  Axis: Normal  Ectopy: none  Conduction: right bundle branch block (complete)  ST Segments/ T Waves: Non-specific ST-T wave changes  Q Waves: III  Comparison to prior: Unchanged    Clinical Impression: right bundle branch block and sinus tachycardia                  Results for orders placed or performed during the hospital encounter of 09/01/18 (from the past 24 hour(s))   CBC with platelets differential   Result Value Ref Range    WBC 12.4 (H) 4.0 - 11.0 10e9/L    RBC Count 3.76 (L) 3.8 - 5.2 10e12/L    Hemoglobin 6.8 (LL) 11.7 - 15.7 g/dL    Hematocrit 23.7 (L) 35.0 - 47.0 %    MCV 63 (L) 78 - 100 fl    MCH 18.1 (L) 26.5 - 33.0 pg    MCHC 28.7 (L) 31.5 - 36.5 g/dL    RDW 19.5 (H) 10.0 - 15.0 %    Platelet Count 373 150 - 450 10e9/L    Diff Method Automated Method     % Neutrophils 79.7 %    % Lymphocytes 7.7 %    % Monocytes 9.9 %    % Eosinophils 0.8 %    % Basophils 0.5 %    % Immature Granulocytes 1.4 %    Nucleated RBCs 0 0 /100    Absolute Neutrophil 9.9 (H) 1.6 - 8.3 10e9/L    Absolute Lymphocytes 1.0 0.8 - 5.3 10e9/L    Absolute Monocytes 1.2 0.0 - 1.3 10e9/L    Absolute Eosinophils 0.1 0.0 - 0.7 10e9/L    Absolute Basophils 0.1 0.0 - 0.2 10e9/L    Abs Immature Granulocytes 0.2 0 - 0.4 10e9/L    Absolute Nucleated RBC 0.0     Hypochromasia Present    Basic metabolic panel   Result Value Ref Range    Sodium 133 133 - 144 mmol/L    Potassium 3.5 3.4 - 5.3 mmol/L    Chloride 95 94 - 109 mmol/L    Carbon Dioxide 27 20 - 32 mmol/L    Anion Gap 11 3 - 14 mmol/L    Glucose 98 70 - 99 mg/dL    Urea  Nitrogen 7 7 - 30 mg/dL    Creatinine 0.55 0.52 - 1.04 mg/dL    GFR Estimate >90 >60 mL/min/1.7m2    GFR Estimate If Black >90 >60 mL/min/1.7m2    Calcium 8.8 8.5 - 10.1 mg/dL   Troponin I   Result Value Ref Range    Troponin I ES 5.776 (HH) 0.000 - 0.045 ug/L   Nt probnp inpatient   Result Value Ref Range    N-Terminal Pro BNP Inpatient 3610 (H) 0 - 900 pg/mL   TSH with free T4 reflex   Result Value Ref Range    TSH 1.39 0.40 - 4.00 mU/L   UA reflex to Microscopic and Culture   Result Value Ref Range    Color Urine Straw     Appearance Urine Clear     Glucose Urine Negative NEG^Negative mg/dL    Bilirubin Urine Negative NEG^Negative    Ketones Urine Negative NEG^Negative mg/dL    Specific Gravity Urine 1.004 1.003 - 1.035    Blood Urine Negative NEG^Negative    pH Urine 7.5 4.7 - 8.0 pH    Protein Albumin Urine Negative NEG^Negative mg/dL    Urobilinogen mg/dL Normal 0.0 - 2.0 mg/dL    Nitrite Urine Negative NEG^Negative    Leukocyte Esterase Urine Negative NEG^Negative    Source Unspecified Urine        Medications   furosemide (LASIX) injection 40 mg (40 mg Intravenous Given 9/1/18 1004)   morphine (PF) injection 2 mg (2 mg Intravenous Given 9/1/18 1052)   ondansetron (ZOFRAN) injection 4 mg (4 mg Intravenous Given 9/1/18 1052)       Assessments & Plan (with Medical Decision Making)   Patient has anasarca, pitting to sacrum.  Labs indicate congestive heart failure, but she also has a troponin of 5.776.  She did not have chest pain initially, but had chest pain that started after arrival here.  She was given morphine and Zofran at that time with complete resolution of her pain.  In addition to that the patient has hemoglobin of 6.8.  She has not had any black stools, no emesis of any sort, does not have hematuria.  TSH is normal at 1.39.  Type and screen were performed, patient has antibodies and blood was not able to be obtained prior to her transfer to [place], spoke with Dr. Camara and he stated she should  just come without blood.  she will be transferred to [place], accepting physician is Dr. Diaz.    I have reviewed the nursing notes.    I have reviewed the findings, diagnosis, plan and need for follow up with the patient.  New Prescriptions    No medications on file       Final diagnoses:   Microcytic anemia   NSTEMI (non-ST elevated myocardial infarction) (H)   Acute systolic congestive heart failure (H)       9/1/2018   [facility]     Genesis Barragan MD  09/01/18 2271

## 2018-09-01 NOTE — ED NOTES
Patient back up onto the [facility].  States she is feeling much better after the oxygen was put on.  Breathing is less labored.  Patient requesting to sit up.  Call light within reach.

## 2018-09-01 NOTE — ED NOTES
Patient to room via WC.  Patient reports weight gain of 50 LBS in the last 6 weeks.  Patient states she feels like she is retaining fluid up into her stomach.  Patient is awake/alert and interactive.  Patient able to stand and walk into the bathroom.  Patient became very SOB with activity and reports that last night she was having upper mid back pain which is new for her.  After patient finished in the bathroom back to bed.  Gown on.  Swelling noted to her lower extremities up into her mid thighs.  abd is taut to touch as well.  Lungs diminished to from the mid lobe to her upper lobe with some exp. Wheezes noted.  EKG done, cardiac monitor on, and rest of assessment as charted.  Call light within reach and son back at bedside.

## 2018-09-01 NOTE — ED NOTES
Pepin Fire here; report given.  Patient remains awake/alert and interactive.  VSS  States she is feeling much better then when she got here.  Family updated.

## 2018-09-02 LAB
ABO + RH BLD: ABNORMAL
ABO + RH BLD: ABNORMAL
BLD GP AB SCN SERPL QL: ABNORMAL
BLOOD BANK CMNT PATIENT-IMP: ABNORMAL
BLOOD BANK CMNT PATIENT-IMP: ABNORMAL
SPECIMEN EXP DATE BLD: ABNORMAL

## 2018-10-09 DIAGNOSIS — C54.1 ENDOMETRIAL CANCER (H): Primary | ICD-10-CM

## 2018-10-19 ENCOUNTER — HOSPITAL ENCOUNTER (OUTPATIENT)
Dept: CT IMAGING | Facility: HOSPITAL | Age: 68
Discharge: HOME | End: 2018-10-19
Attending: OBSTETRICS & GYNECOLOGY | Admitting: OBSTETRICS & GYNECOLOGY
Payer: MEDICARE

## 2018-10-19 DIAGNOSIS — C54.1 ENDOMETRIAL CANCER (H): ICD-10-CM

## 2018-10-19 PROCEDURE — 25500064 ZZH RX 255 OP 636: Performed by: RADIOLOGY

## 2018-10-19 PROCEDURE — 74177 CT ABD & PELVIS W/CONTRAST: CPT | Mod: TC

## 2018-10-19 RX ORDER — IOPAMIDOL 612 MG/ML
100 INJECTION, SOLUTION INTRAVASCULAR ONCE
Status: COMPLETED | OUTPATIENT
Start: 2018-10-19 | End: 2018-10-19

## 2018-10-19 RX ADMIN — IOPAMIDOL 100 ML: 612 INJECTION, SOLUTION INTRAVENOUS at 14:23

## 2018-10-19 RX ADMIN — DIATRIZOATE MEGLUMINE AND DIATRIZOATE SODIUM 30 ML: 660; 100 SOLUTION ORAL; RECTAL at 14:24

## 2018-12-03 ENCOUNTER — DOCUMENTATION ONLY (OUTPATIENT)
Dept: ONCOLOGY | Facility: OTHER | Age: 68
End: 2018-12-03

## 2018-12-03 ENCOUNTER — ONCOLOGY VISIT (OUTPATIENT)
Dept: ONCOLOGY | Facility: OTHER | Age: 68
End: 2018-12-03
Attending: INTERNAL MEDICINE
Payer: MEDICARE

## 2018-12-03 ENCOUNTER — PATIENT OUTREACH (OUTPATIENT)
Dept: CARE COORDINATION | Facility: OTHER | Age: 68
End: 2018-12-03
Payer: MEDICARE

## 2018-12-03 VITALS
WEIGHT: 187.8 LBS | HEART RATE: 79 BPM | BODY MASS INDEX: 37.86 KG/M2 | OXYGEN SATURATION: 98 % | TEMPERATURE: 98.8 F | SYSTOLIC BLOOD PRESSURE: 146 MMHG | HEIGHT: 59 IN | RESPIRATION RATE: 20 BRPM | DIASTOLIC BLOOD PRESSURE: 96 MMHG

## 2018-12-03 DIAGNOSIS — C54.1 ADENOCARCINOMA OF THE ENDOMETRIUM/UTERUS (H): ICD-10-CM

## 2018-12-03 DIAGNOSIS — Z12.31 ENCOUNTER FOR SCREENING MAMMOGRAM FOR BREAST CANCER: ICD-10-CM

## 2018-12-03 DIAGNOSIS — C55 MALIGNANT NEOPLASM OF UTERUS, UNSPECIFIED SITE (H): ICD-10-CM

## 2018-12-03 DIAGNOSIS — Z53.9 ERRONEOUS ENCOUNTER--DISREGARD: Primary | ICD-10-CM

## 2018-12-03 DIAGNOSIS — C54.1 ADENOCARCINOMA OF THE ENDOMETRIUM/UTERUS (H): Primary | ICD-10-CM

## 2018-12-03 DIAGNOSIS — C54.1 ENDOMETRIAL ADENOCARCINOMA (H): ICD-10-CM

## 2018-12-03 LAB
ALBUMIN SERPL-MCNC: 3.7 G/DL (ref 3.4–5)
ALP SERPL-CCNC: 145 U/L (ref 40–150)
ALT SERPL W P-5'-P-CCNC: 19 U/L (ref 0–50)
ANION GAP SERPL CALCULATED.3IONS-SCNC: 5 MMOL/L (ref 3–14)
AST SERPL W P-5'-P-CCNC: 15 U/L (ref 0–45)
BASOPHILS # BLD AUTO: 0.1 10E9/L (ref 0–0.2)
BASOPHILS NFR BLD AUTO: 0.7 %
BILIRUB SERPL-MCNC: 0.3 MG/DL (ref 0.2–1.3)
BUN SERPL-MCNC: 16 MG/DL (ref 7–30)
CALCIUM SERPL-MCNC: 9.6 MG/DL (ref 8.5–10.1)
CHLORIDE SERPL-SCNC: 101 MMOL/L (ref 94–109)
CO2 SERPL-SCNC: 29 MMOL/L (ref 20–32)
CREAT SERPL-MCNC: 0.78 MG/DL (ref 0.52–1.04)
DIFFERENTIAL METHOD BLD: ABNORMAL
EOSINOPHIL # BLD AUTO: 0.5 10E9/L (ref 0–0.7)
EOSINOPHIL NFR BLD AUTO: 5.2 %
ERYTHROCYTE [DISTWIDTH] IN BLOOD BY AUTOMATED COUNT: 18.2 % (ref 10–15)
GFR SERPL CREATININE-BSD FRML MDRD: 74 ML/MIN/1.7M2
GLUCOSE SERPL-MCNC: 88 MG/DL (ref 70–99)
HCT VFR BLD AUTO: 45.9 % (ref 35–47)
HGB BLD-MCNC: 14.9 G/DL (ref 11.7–15.7)
IMM GRANULOCYTES # BLD: 0 10E9/L (ref 0–0.4)
IMM GRANULOCYTES NFR BLD: 0.3 %
LDH SERPL L TO P-CCNC: 182 U/L (ref 81–234)
LYMPHOCYTES # BLD AUTO: 1.4 10E9/L (ref 0.8–5.3)
LYMPHOCYTES NFR BLD AUTO: 14.6 %
MCH RBC QN AUTO: 26.3 PG (ref 26.5–33)
MCHC RBC AUTO-ENTMCNC: 32.5 G/DL (ref 31.5–36.5)
MCV RBC AUTO: 81 FL (ref 78–100)
MONOCYTES # BLD AUTO: 0.9 10E9/L (ref 0–1.3)
MONOCYTES NFR BLD AUTO: 9.4 %
NEUTROPHILS # BLD AUTO: 6.6 10E9/L (ref 1.6–8.3)
NEUTROPHILS NFR BLD AUTO: 69.8 %
NRBC # BLD AUTO: 0 10*3/UL
NRBC BLD AUTO-RTO: 0 /100
PLATELET # BLD AUTO: 270 10E9/L (ref 150–450)
POTASSIUM SERPL-SCNC: 3.9 MMOL/L (ref 3.4–5.3)
PROT SERPL-MCNC: 8.1 G/DL (ref 6.8–8.8)
RBC # BLD AUTO: 5.66 10E12/L (ref 3.8–5.2)
SODIUM SERPL-SCNC: 135 MMOL/L (ref 133–144)
WBC # BLD AUTO: 9.5 10E9/L (ref 4–11)

## 2018-12-03 PROCEDURE — G0463 HOSPITAL OUTPT CLINIC VISIT: HCPCS

## 2018-12-03 PROCEDURE — 80053 COMPREHEN METABOLIC PANEL: CPT | Mod: ZL | Performed by: INTERNAL MEDICINE

## 2018-12-03 PROCEDURE — 99205 OFFICE O/P NEW HI 60 MIN: CPT | Performed by: INTERNAL MEDICINE

## 2018-12-03 PROCEDURE — 36415 COLL VENOUS BLD VENIPUNCTURE: CPT | Mod: ZL | Performed by: INTERNAL MEDICINE

## 2018-12-03 PROCEDURE — 85025 COMPLETE CBC W/AUTO DIFF WBC: CPT | Mod: ZL | Performed by: INTERNAL MEDICINE

## 2018-12-03 PROCEDURE — 83615 LACTATE (LD) (LDH) ENZYME: CPT | Mod: ZL | Performed by: INTERNAL MEDICINE

## 2018-12-03 PROCEDURE — 86304 IMMUNOASSAY TUMOR CA 125: CPT | Mod: ZL | Performed by: INTERNAL MEDICINE

## 2018-12-03 RX ORDER — LORAZEPAM 1 MG/1
1 TABLET ORAL EVERY 6 HOURS PRN
Qty: 30 TABLET | Refills: 5 | Status: SHIPPED | OUTPATIENT
Start: 2018-12-03 | End: 2019-08-13

## 2018-12-03 RX ORDER — ALBUTEROL SULFATE 0.83 MG/ML
2.5 SOLUTION RESPIRATORY (INHALATION)
Status: CANCELLED | OUTPATIENT
Start: 2018-12-13

## 2018-12-03 RX ORDER — METHYLPREDNISOLONE SODIUM SUCCINATE 125 MG/2ML
125 INJECTION, POWDER, LYOPHILIZED, FOR SOLUTION INTRAMUSCULAR; INTRAVENOUS
Status: CANCELLED
Start: 2018-12-13

## 2018-12-03 RX ORDER — SPIRONOLACTONE 25 MG/1
25 TABLET ORAL DAILY
COMMUNITY
End: 2019-08-13

## 2018-12-03 RX ORDER — DIPHENHYDRAMINE HCL 50 MG
50 CAPSULE ORAL ONCE
Status: CANCELLED
Start: 2018-12-13

## 2018-12-03 RX ORDER — DILTIAZEM HYDROCHLORIDE 240 MG/1
240 CAPSULE, COATED, EXTENDED RELEASE ORAL DAILY
COMMUNITY
End: 2019-08-13

## 2018-12-03 RX ORDER — PRAVASTATIN SODIUM 10 MG
10 TABLET ORAL DAILY
COMMUNITY
End: 2018-12-03

## 2018-12-03 RX ORDER — ROPINIROLE 0.25 MG/1
0.25 TABLET, FILM COATED ORAL DAILY PRN
COMMUNITY
End: 2020-07-11

## 2018-12-03 RX ORDER — FUROSEMIDE 40 MG
40 TABLET ORAL DAILY
COMMUNITY
End: 2020-07-11

## 2018-12-03 RX ORDER — SODIUM CHLORIDE 9 MG/ML
1000 INJECTION, SOLUTION INTRAVENOUS CONTINUOUS PRN
Status: CANCELLED
Start: 2018-12-13

## 2018-12-03 RX ORDER — DIPHENHYDRAMINE HYDROCHLORIDE 50 MG/ML
50 INJECTION INTRAMUSCULAR; INTRAVENOUS
Status: CANCELLED
Start: 2018-12-13

## 2018-12-03 RX ORDER — LORAZEPAM 2 MG/ML
1 INJECTION INTRAMUSCULAR EVERY 6 HOURS PRN
Status: CANCELLED
Start: 2018-12-13

## 2018-12-03 RX ORDER — EPINEPHRINE 1 MG/ML
0.3 INJECTION, SOLUTION, CONCENTRATE INTRAVENOUS EVERY 5 MIN PRN
Status: CANCELLED | OUTPATIENT
Start: 2018-12-13

## 2018-12-03 RX ORDER — FUROSEMIDE 20 MG
20 TABLET ORAL DAILY
COMMUNITY
End: 2018-12-03 | Stop reason: DRUGHIGH

## 2018-12-03 RX ORDER — PROCHLORPERAZINE MALEATE 10 MG
10 TABLET ORAL EVERY 6 HOURS PRN
Qty: 30 TABLET | Refills: 5 | Status: SHIPPED | OUTPATIENT
Start: 2018-12-03 | End: 2018-12-03

## 2018-12-03 RX ORDER — ATORVASTATIN CALCIUM 20 MG/1
20 TABLET, FILM COATED ORAL
COMMUNITY
End: 2019-08-13

## 2018-12-03 RX ORDER — PALONOSETRON 0.05 MG/ML
0.25 INJECTION, SOLUTION INTRAVENOUS ONCE
Status: CANCELLED
Start: 2018-12-13

## 2018-12-03 RX ORDER — MEPERIDINE HYDROCHLORIDE 25 MG/ML
25 INJECTION INTRAMUSCULAR; INTRAVENOUS; SUBCUTANEOUS EVERY 30 MIN PRN
Status: CANCELLED | OUTPATIENT
Start: 2018-12-13

## 2018-12-03 RX ORDER — EPINEPHRINE 0.3 MG/.3ML
0.3 INJECTION SUBCUTANEOUS EVERY 5 MIN PRN
Status: CANCELLED | OUTPATIENT
Start: 2018-12-13

## 2018-12-03 RX ORDER — MULTIPLE VITAMINS W/ MINERALS TAB 9MG-400MCG
1 TAB ORAL DAILY
COMMUNITY
End: 2018-12-13

## 2018-12-03 RX ORDER — ALBUTEROL SULFATE 90 UG/1
1-2 AEROSOL, METERED RESPIRATORY (INHALATION)
Status: CANCELLED
Start: 2018-12-13

## 2018-12-03 RX ORDER — AMOXICILLIN 250 MG
1 CAPSULE ORAL DAILY PRN
COMMUNITY
End: 2019-08-13

## 2018-12-03 RX ORDER — PERPHENAZINE 2 MG/1
2 TABLET ORAL AT BEDTIME
Status: ON HOLD | COMMUNITY
End: 2018-12-11

## 2018-12-03 RX ORDER — OXYCODONE HYDROCHLORIDE 5 MG/1
5 TABLET ORAL EVERY 4 HOURS PRN
Status: ON HOLD | COMMUNITY
End: 2018-12-11

## 2018-12-03 RX ORDER — CLONAZEPAM 0.25 MG/1
0.25 TABLET, ORALLY DISINTEGRATING ORAL
COMMUNITY
End: 2019-08-13

## 2018-12-03 ASSESSMENT — PAIN SCALES - GENERAL: PAINLEVEL: NO PAIN (0)

## 2018-12-03 ASSESSMENT — PATIENT HEALTH QUESTIONNAIRE - PHQ9: SUM OF ALL RESPONSES TO PHQ QUESTIONS 1-9: 0

## 2018-12-03 NOTE — MR AVS SNAPSHOT
After Visit Summary   12/3/2018    Heather Rosas    MRN: [id number]           Patient Information     Date Of Birth          1950        Visit Information        Provider Department      12/3/2018 2:00 PM Rhys Jaimes MD [facility]        Today's Diagnoses     Malignant neoplasm of uterus, unspecified site (H)    -  1    Endometrial adenocarcinoma (H)        Encounter for screening mammogram for breast cancer        Adenocarcinoma of the endometrium/uterus (H)          Care Instructions    We would like to see you back for your second treatment. Please come 30minute(s) prior for lab work. You have been scheduled for chemo education. You have also been referred to general surgery to have your port placed. They will call you to let you know when the port is being placed. Once we know when your port is being placed, we will call you to set up your first treatment.     Your prescriptions have been sent to:   [facility] [address] [place] [address], [place] - [address] AT [facility] OF  [address]  [address]  [facility] [address]  Phone: [phone] Fax: [phone]    When you are in need of a refill of your medications, please call your pharmacy and they will send us the request. If you have any questions please call [phone]            Follow-ups after your visit        Additional Services     GENERAL SURG ADULT REFERRAL       Your provider has referred you to: [facility]: [facility] [phone]   [email or web address]  PORT PLACEMENT, PATIENT IS ON ASPIRIN 81MG. WILL BE STARTING CHEMO NEXT WEEK. RECENT OPEN HEART SURGERY WITH GRAFTING AT THE BEGINNING OF September.     Please be aware that coverage of these services is subject to the terms and limitations of your health insurance plan.  Call member services at your health plan with any benefit or coverage questions.      Please bring the  following to your appointment:  >>   Any x-rays, CTs or MRIs which have been performed.  Contact the facility where they were done to arrange for  prior to your scheduled appointment.  Any new CT, MRI or other procedures ordered by your specialist must be performed at a [facility] or coordinated by your clinic's referral office.    >>   List of current medications   >>   This referral request   >>   Any documents/labs given to you for this referral                  Your next 10 appointments already scheduled     Dec 12, 2018 12:00 PM CST   Level 1 with HC INF RM 3308   [facility] )    [address]  [facility] [address]   [phone]            Dec 12, 2018  1:30 PM CST   (Arrive by 1:15 PM)   Cardiac Evaluation with HI CARDIOPULMONARY REHAB ROOM   Cardiac Rehab ([facility] )    [address] [address]   [phone]              Future tests that were ordered for you today     Open Future Orders        Priority Expected Expires Ordered    MA Screen Bilateral w/Chris Routine 12/3/2018 12/4/2019 12/3/2018            Who to contact     If you have questions or need follow up information about today's clinic visit or your schedule please contact [facility] directly at [phone].  Normal or non-critical lab and imaging results will be communicated to you by MyChart, letter or phone within 4 business days after the clinic has received the results. If you do not hear from us within 7 days, please contact the clinic through [employer] or phone. If you have a critical or abnormal lab result, we will notify you by phone as soon as possible.  Submit refill requests through [employer] or call your pharmacy and they will forward the refill request to us. Please allow 3 business days for your refill to be completed.          Additional Information About Your Visit        [employer] Information     [employer] lets you  "send messages to your doctor, view your test results, renew your prescriptions, schedule appointments and more. To sign up, go to [email or web address] . Click on \"Log in\" on the left side of the screen, which will take you to the Welcome page. Then click on \"Sign up Now\" on the right side of the page.     You will be asked to enter the access code listed below, as well as some personal information. Please follow the directions to create your username and password.     Your access code is: [email or web address]  Expires: 3/3/2019  3:48 PM     Your access code will  in 90 days. If you need help or a new code, please call your [facility] clinic or [phone].        Care EveryWhere ID     This is your Care EveryWhere ID. This could be used by other organizations to access your [facility] medical records  [id number]        Your Vitals Were     Pulse Temperature Respirations Height Pulse Oximetry BMI (Body Mass Index)    79 98.8  F (37.1  C) (Tympanic) 20 1.499 m (4' 11\") 98% 37.93 kg/m2       Blood Pressure from Last 3 Encounters:   18 (!) 146/96   18 147/86   18 163/73    Weight from Last 3 Encounters:   18 85.2 kg (187 lb 12.8 oz)   18 96.6 kg (213 lb)   18 85.3 kg (188 lb 0.8 oz)              We Performed the Following          CBC with platelets differential     Comprehensive metabolic panel     GENERAL SURG ADULT REFERRAL     Lactate Dehydrogenase          Today's Medication Changes          These changes are accurate as of 12/3/18  3:48 PM.  If you have any questions, ask your nurse or doctor.               Start taking these medicines.        Dose/Directions    LORazepam 1 MG tablet   Commonly known as:  ATIVAN   Used for:  Adenocarcinoma of the endometrium/uterus (H)   Started by:  Rhys Jaimes MD        Dose:  1 mg   Take 1 tablet (1 mg) by mouth every 6 hours as needed (nausea/vomiting, anxiety or sleep)   Quantity:  30 tablet   Refills:  5       " prochlorperazine 10 MG tablet   Commonly known as:  COMPAZINE   Used for:  Adenocarcinoma of the endometrium/uterus (H)   Started by:  Rhys Jaimes MD        Dose:  10 mg   Take 1 tablet (10 mg) by mouth every 6 hours as needed (nausea/vomiting)   Quantity:  30 tablet   Refills:  5         These medicines have changed or have updated prescriptions.        Dose/Directions    furosemide 40 MG tablet   Commonly known as:  LASIX   This may have changed:  Another medication with the same name was removed. Continue taking this medication, and follow the directions you see here.   Changed by:  Rhys Jaimes MD        Dose:  40 mg   Take 40 mg by mouth daily   Refills:  0            Where to get your medicines      These medications were sent to [email or web address] [address] [facility], [place] - [address] AT [facility] OF [facility] [address], [facility] [place] [address]     Phone:  [phone]     prochlorperazine 10 MG tablet         Some of these will need a paper prescription and others can be bought over the counter.  Ask your nurse if you have questions.     Bring a paper prescription for each of these medications     LORazepam 1 MG tablet               Information about OPIOIDS     PRESCRIPTION OPIOIDS: WHAT YOU NEED TO KNOW   We gave you an opioid (narcotic) pain medicine. It is important to manage your pain, but opioids are not always the best choice. You should first try all the other options your care team gave you. Take this medicine for as short a time (and as few doses) as possible.    Some activities can increase your pain, such as bandage changes or therapy sessions. It may help to take your pain medicine 30 to 60 minutes before these activities. Reduce your stress by getting enough sleep, working on hobbies you enjoy and practicing relaxation or meditation. Talk to your care team about ways to manage your pain beyond prescription opioids.    These medicines have risks:    DO NOT drive when on new  or higher doses of pain medicine. These medicines can affect your alertness and reaction times, and you could be arrested for driving under the influence (DUI). If you need to use opioids long-term, talk to your care team about driving.    DO NOT operate heavy machinery    DO NOT do any other dangerous activities while taking these medicines.    DO NOT drink any alcohol while taking these medicines.     If the opioid prescribed includes acetaminophen, DO NOT take with any other medicines that contain acetaminophen. Read all labels carefully. Look for the word  acetaminophen  or  Tylenol.  Ask your pharmacist if you have questions or are unsure.    You can get addicted to pain medicines, especially if you have a history of addiction (chemical, alcohol or substance dependence). Talk to your care team about ways to reduce this risk.    All opioids tend to cause constipation. Drink plenty of water and eat foods that have a lot of fiber, such as fruits, vegetables, prune juice, apple juice and high-fiber cereal. Take a laxative (Miralax, milk of magnesia, Colace, Senna) if you don t move your bowels at least every other day. Other side effects include upset stomach, sleepiness, dizziness, throwing up, tolerance (needing more of the medicine to have the same effect), physical dependence and slowed breathing.    Store your pills in a secure place, locked if possible. We will not replace any lost or stolen medicine. If you don t finish your medicine, please throw away (dispose) as directed by your pharmacist. The [place] Pollution Control Agency has more information about safe disposal: https://www.pca.state.[place]/living-green/managing-unwanted-medications         Primary Care Provider Office Phone # Fax #    Jeronimo Eisenberg [phone]       [facility] [address]  [facility] [address]        Equal Access to Services     EDELMIRA BACH AH: ramu Jones qaybta  leonardo waitelashay holliday ah. So [facility] [phone].    ATENCIÓN: Si emma johnson, tiene a lea disposición servicios gratuitos de asistencia lingüística. Pete al [phone].    We comply with applicable federal civil rights laws and [place] laws. We do not discriminate on the basis of race, color, national origin, age, disability, sex, sexual orientation, or gender identity.            Thank you!     Thank you for choosing [facility]  for your care. Our goal is always to provide you with excellent care. Hearing back from our patients is one way we can continue to improve our services. Please take a few minutes to complete the written survey that you may receive in the mail after your visit with us. Thank you!             Your Updated Medication List - Protect others around you: Learn how to safely use, store and throw away your medicines at www.disposemymeds.org.          This list is accurate as of 12/3/18  3:48 PM.  Always use your most recent med list.                   Brand Name Dispense Instructions for use Diagnosis    amiodarone 100 mg Tabs half-tab    PACERONE/CODARONE     Take 200 mg by mouth daily        aspirin 81 MG tablet    ASA     Take 81 mg by mouth daily        atorvastatin 20 MG tablet    LIPITOR     Take 20 mg by mouth daily (with dinner)        CARTIA  MG 24 hr capsule   Generic drug:  diltiazem ER COATED BEADS      Take 240 mg by mouth daily        clonazePAM 0.25 MG Tbdp ODT tab    klonoPIN     Take 0.25 mg by mouth nightly as needed for anxiety        furosemide 40 MG tablet    LASIX     Take 40 mg by mouth daily        IBUPROFEN PO      Take 800 mg by mouth every 6 hours as needed for moderate pain        LORazepam 1 MG tablet    ATIVAN    30 tablet    Take 1 tablet (1 mg) by mouth every 6 hours as needed (nausea/vomiting, anxiety or sleep)    Adenocarcinoma of the endometrium/uterus (H)       Metoprolol Succinate 25 MG Cs24       Take 25 mg by mouth daily        Multi-vitamin tablet      Take 1 tablet by mouth daily        oxyCODONE 5 MG tablet    ROXICODONE     Take 5 mg by mouth every 4 hours as needed for severe pain        perphenazine 2 MG tablet      Take 2 mg by mouth At Bedtime        prochlorperazine 10 MG tablet    COMPAZINE    30 tablet    Take 1 tablet (10 mg) by mouth every 6 hours as needed (nausea/vomiting)    Adenocarcinoma of the endometrium/uterus (H)       REQUIP 0.25 MG tablet   Generic drug:  rOPINIRole      Take 0.25 mg by mouth daily as needed        senna-docusate 8.6-50 MG tablet    SENOKOT-S/PERICOLACE     Take 1 tablet by mouth daily as needed for constipation        spironolactone 25 MG tablet    ALDACTONE     Take 25 mg by mouth daily

## 2018-12-03 NOTE — PATIENT INSTRUCTIONS
We would like to see you back for your second treatment. Please come 30minute(s) prior for lab work. You have been scheduled for chemo education. You have also been referred to general surgery to have your port placed. They will call you to let you know when the port is being placed. Once we know when your port is being placed, we will call you to set up your first treatment.     Your prescriptions have been sent to:   [facility] [address] [place] [address], [place] - [address] AT [facility] OF [address]  [facility] [place] [address]  Phone: [phone] Fax: [phone]    When you are in need of a refill of your medications, please call your pharmacy and they will send us the request. If you have any questions please call [phone]

## 2018-12-03 NOTE — NURSING NOTE
"Chief Complaint   Patient presents with     Consult     new diagnosed Uterine CA-Dr Marion referring       Initial BP (!) 146/96  Pulse 79  Temp 98.8  F (37.1  C) (Tympanic)  Resp 20  Ht 1.499 m (4' 11\")  Wt 85.2 kg (187 lb 12.8 oz)  SpO2 98%  BMI 37.93 kg/m2 Estimated body mass index is 37.93 kg/(m^2) as calculated from the following:    Height as of this encounter: 1.499 m (4' 11\").    Weight as of this encounter: 85.2 kg (187 lb 12.8 oz).  Medication Reconciliation: complete     Patient was assessed using the NCCN psychosocial distress thermometer. Patient rated the score as a 0/10. Patient rated current stressors as none. Stressors will be brought to the attention of provider or Oncology RN Care Coordinator for a score of 6 or greater or per nurses discretion.       Luz Maria Gregorio, RN    "

## 2018-12-04 ENCOUNTER — TELEPHONE (OUTPATIENT)
Dept: SURGERY | Facility: OTHER | Age: 68
End: 2018-12-04

## 2018-12-04 LAB — CANCER AG125 SERPL-ACNC: 19 U/ML (ref 0–30)

## 2018-12-04 NOTE — TELEPHONE ENCOUNTER
Nurse attempted to contact the patient regarding setting up a port a cath placement. Patient was referred by Dr Jaimes.  The numbers have been left for the patient to return our call to set this up over the phone.    WILFREDO GARCIA

## 2018-12-04 NOTE — PROGRESS NOTES
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      REQUESTING PHYSICIAN:  Dr. Anabel Marion, as well as Dr. Eisenberg      HISTORY OF PRESENT ILLNESS:  Ms. Rosas is a 68-year-old white female with a prior history of atrial fibrillation, coronary artery disease.  We were asked to evaluate concerning new diagnosis of stage IB high-grade endometrioid adenocarcinoma of the uterus.  She originally stated she had presented with vaginal bleeding back in February 2018 and eventually was referred to Dr. Nettles, who performed an endometrial biopsy which revealed high-grade endometrioid adenocarcinoma, possible carcinosarcoma.  Apparently, she had undergone coronary artery bypass graft surgery and her appointment with Dr. Marion was delayed.  She did see Dr. Marion, who repeated an endometrial biopsy on 09/05/2018, which revealed high-grade endometrial carcinoma, endometrioid type, FIGO grade 3, including abundant cell tumor component with early spindle cell features.  Carcinosarcoma component could not be excluded.  There was a squamous metaplastic component also noted.  Subsequently the next day, she actually underwent coronary artery bypass grafting x 4.  She was diagnosed with a left main 3-vessel coronary artery disease.  During hospitalization, she had atrial fibrillation with RVR postoperatively and she converted to normal sinus rhythm on amiodarone.  The plan was to initiate anticoagulation after completing endometrial surgery.  The patient subsequently did see Dr. Marion again on 10/11/2018.  The plan was to proceed with robotic staging procedure after CT chest, abdomen and pelvis.  CT chest, abdomen and pelvis was performed on 10/19/2018.  The findings were that there was no evidence of metastatic disease from the patient's endometrial cancer.  The patient subsequently underwent a  surgical procedure.  She underwent a HENRY/BSO on 10/31/2018 and pathology revealed that she had a high-grade endometrioid adenocarcinoma, FIGO grade 3/3, tumor measured greater than 3 cm in greatest dimension and extensively invading into the underlying myometrium with myometrial invasion 1.9 cm.  While there was no surface serosal involvement seen, fundus of the uterus was disrupted, exposing the underlying myometrium invasive tumor.  There was foci of lymphovascular invasion by tumor identified within the myometrium but also within the cervix.  No invasive cervical stromal involvement by tumor was seen.  Myometrium also exhibited a solitary leiomyoma.  Right and left parametrium appeared negative for tumor.  Right and left ovaries and right and left fallopian tubes were negative for tumor.  One out of 11 pelvic lymph nodes was positive for isolated tumor cells with 6 right pelvic lymph nodes negative for tumor.  There was 1 right periaortic lymph node that was negative for tumor.  The omentum was negative for tumor.  The patient was staged pathologic T1b pathologic N0 (i plus) or stage IB isolated tumor cells.  Given the high-grade nature of the tumor, Dr. Marion has recommended adjuvant carboplatin and Taxol for 6 cycles, then follow up with her for staging studies.  The patient is currently postop.  She says she is doing relatively well.  She plans to start anticoagulation for A-fib tomorrow.  Otherwise, she denies any fevers, night sweats, weight loss.  There is no family history of endometrial cancer, breast cancer or colon cancer.      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      PAST SURGICAL HISTORY:  Includes biopsy of benign breasts, cardiac catheterization, 4-vessel bypass graft 09/10/2018 performed by MACARIO Horne and C 02/19/2018, hysterectomy as above, robotic-assisted total laparoscopic hysterectomy, bilateral salpingo-oophorectomy, bilateral pelvic and periaortic lymph nodes, omentum biopsy, and cystoscopy performed by Dr. Marion on 10/30/2018.      ALLERGIES:  SHE IS ALLERGIC TO CODEINE, SULFATE, FEXOFENADINE, SIMVASTATIN.      MEDICATIONS: Currently on include:   1.  Aldactone 25 mg daily.   2.  Senokot p.r.n.   3.  Requip 0.25 mg daily.   4.  Roxicodone 5 mg q. 4h. p.r.n.   5.  Metoprolol succinate 25 mg daily.   6.  Ibuprofen 800 mg q. 6h. p.r.n.   7.  Lasix 40 mg daily.   8.  Diltiazem  mg daily.   9.  Lipitor 10 mg daily.   10.  Aspirin 81 mg daily.   11.  Amiodarone 200 mg daily.   12.  Compazine 10 mg q. 6h.   13.  Ativan 1 mg q. 6h. p.r.n.      SOCIAL HISTORY:  She said she was a former smoker.  She smoked 1 pack per day, she quit at age 34.  She smoked for at least 22 years.  Alcohol is negative.  She worked as a CNA.      FAMILY HISTORY:  Significant for mother with pancreatic cancer, sister with esophageal cancer, brother with tonsillar cancer.      REVIEW OF SYSTEMS:   CENTRAL NERVOUS SYSTEM:  Negative for headache, change in mental status.   RESPIRATORY:  Negative for shortness of breath, cough, hemoptysis.   CARDIAC:  No chest pain, palpitations.   GASTROINTESTINAL:  Negative for change in bowel habits, bright red blood per rectum, hematemesis.   MUSCULOSKELETAL:  Occasional myalgias related to Lipitor.   GENITOURINARY:  Negative for urgency, hematuria or nocturia.   CONSTITUTIONAL:  Negative fevers, night sweats, weight loss.      PHYSICAL EXAMINATION:   GENERAL:  She is a middle-aged white  female in no acute distress.   VITAL SIGNS:  Blood pressure 121/96, pulse 79, respirations 20, temperature 98.8.   HEENT:  Atraumatic, normocephalic.  Oropharynx nonerythematous.   NECK:  Supple.   LUNGS:  Clear to auscultation and percussion.   HEART:  Regular rhythm, S1, S2, normal.   ABDOMEN:  Soft, obese, nontender.   LYMPHATICS:  No cervical, supraclavicular, axillary, or inguinal nodes.   EXTREMITIES:  No edema.   NEUROLOGIC:  Nonfocal.      LABORATORY DATA:  CBC with white count 9.5, H and H 14.9 and 45.9, platelet count 270.  BUN is 16, creatinine 0.78.  LFTs are normal.      IMPRESSION:  High-grade endometrioid adenocarcinoma of the uterus with staging consistent with pathologic T1b pathologic N0 (i +) or stage IB with isolated tumor cells involving 1 of the periaortic lymph nodes.  The patient has high-grade tumor and would benefit from adjuvant chemotherapy as per Dr. Marion.  Will prescribe carboplatin AUC 6 and paclitaxel 175 mg/m2 to be given every 21 days.  She will complete 6 cycles and follow up with Dr. Marion for staging.  Otherwise, we will obtain baseline CA-125.      Seventy minutes was spent with patient, greater than half the time spent discussing the above and the fact that she has newly diagnosed high-grade endometrial adenocarcinoma of the uterus and she will require adjuvant chemotherapy given the high-risk nature of this disease and that there will be a disease-free survival benefit in adding adjuvant chemotherapy.         GALILEA NOE MD             D: 2018   T: 2018   MT: DESTINY      Name:     RACQUEL PINZON   MRN:      [id number]        Account:      [id number]   :      1950           Visit Date:   2018      Document: [id number]       cc: Anabel Calhoun MD

## 2018-12-04 NOTE — PROGRESS NOTES
**Please disregard prior note. Drug interactions have now been updated**    [facility]    Pharmacy IP Consult    The inpatient pharmacy was consulted to review the patient's chart for any significant drug interactions between home medications, new take home medications, pre-treats, PRN medications, and chemotherapy agents.     Current Outpatient Prescriptions   Medication Sig Dispense Refill     amiodarone (PACERONE/CODARONE) 100 mg TABS half-tab Take 200 mg by mouth daily       aspirin (ASA) 81 MG tablet Take 81 mg by mouth daily       atorvastatin (LIPITOR) 20 MG tablet Take 20 mg by mouth daily (with dinner)       clonazePAM (KLONOPIN) 0.25 MG TBDP ODT tab Take 0.25 mg by mouth nightly as needed for anxiety       diltiazem ER COATED BEADS (CARTIA XT) 240 MG 24 hr capsule Take 240 mg by mouth daily       furosemide (LASIX) 40 MG tablet Take 40 mg by mouth daily       IBUPROFEN PO Take 800 mg by mouth every 6 hours as needed for moderate pain       LORazepam (ATIVAN) 1 MG tablet Take 1 tablet (1 mg) by mouth every 6 hours as needed (nausea/vomiting, anxiety or sleep) 30 tablet 5     Metoprolol Succinate 25 MG CS24 Take 25 mg by mouth daily       multivitamin w/minerals (MULTI-VITAMIN) tablet Take 1 tablet by mouth daily       oxyCODONE (ROXICODONE) 5 MG tablet Take 5 mg by mouth every 4 hours as needed for severe pain       perphenazine 2 MG tablet Take 2 mg by mouth At Bedtime       prochlorperazine (COMPAZINE) 10 MG tablet Take 1 tablet (10 mg) by mouth every 6 hours as needed (nausea/vomiting) 30 tablet 5     rOPINIRole (REQUIP) 0.25 MG tablet Take 0.25 mg by mouth daily as needed       senna-docusate (SENOKOT-S/PERICOLACE) 8.6-50 MG tablet Take 1 tablet by mouth daily as needed for constipation       spironolactone (ALDACTONE) 25 MG tablet Take 25 mg by mouth daily       Take home medications:   1. Lorazepam  2. Prochlorperazine    Pre-Treats:   1. Palonosetron  2. Dexamethasone  3.  Diphenhydramine  4. Famotidine    PRN medications:   1. Lorazepam  2. Prochlorperazine    Chemotherapy agents:   1. Paclitaxel  2. Carboplatin    The following interactions were found and will require patient education:     Drug-Drug interactions:   1. Paclitaxel and Amiodarone: may increase exposure to paclitaxel and risk of toxicity   2. Amiodarone and Famotidine: increased risk of QT-prolongation  3. Amiodarone and Perphenazine: increased risk of QT-prolongation  4. Amiodarone and Prochlorperazine: increased risk of QT-prolongation  5. Amiodarone and Diltiazem: increased risk of amiodarone toxicity, bradycardia, sinus arrest or AV block  6. Atorvastatin and Diltiazem: increased risk of rhabdomyolysis   7. Lorazepam and Oxycodone: increased risk of respiratory and CNS depression  8. Metoprolol and Diltiazem: increased risk of bradycardia, hypotension, and AV conduction disturbances  9. Oxycodone and Perphenazine: increased risk of respiratory and CNS depression  10. Oxycodone and Prochlorperazine: increased risk of respiratory and CNS depression   11. Oxycodone and Diltiazem: may increase exposure to oxycodone      Drug-Food interactions:   1. Amiodarone and Grapefruit Juice: may result in increased amiodarone concentrations and decreased metabolite activity, which may reduce changes to UT and QTc intervals by amiodarone  2. Oxycodone and Grapefruit Juice: may result in increased oxycodone concentrations     Drug-Ethanol interactions:   1. Clonazepam and Ethanol: increased risk of sedation  2. Oxycodone and Ethanol: increased risk of respiratory and CNS depression    There is a significant drug-drug interaction between paclitaxel and amiodarone, potentially leading to an increased risk of paclitaxel exposure and toxicity.   Patient is taking metoprolol, amiodarone and diltiazem at home. The addition of the QT-prolonging medications (famotidine and prochlorperazine) is cause for a concern. Consider cardiac  monitoring if necessary.     If there are any additional questions or concerns, please contact the inpatient pharmacy ([phone]) for further review.     Sarah Hassan RPH  December 3, 2018

## 2018-12-04 NOTE — PROGRESS NOTES
[facility]    Pharmacy IP Consult    The inpatient pharmacy was consulted to review the patient's chart for any significant drug interactions between home medications, new take home medications, pre-treats, PRN medications, and chemotherapy agents.     Current Outpatient Prescriptions   Medication Sig Dispense Refill     amiodarone (PACERONE/CODARONE) 100 mg TABS half-tab Take 200 mg by mouth daily       aspirin (ASA) 81 MG tablet Take 81 mg by mouth daily       atorvastatin (LIPITOR) 20 MG tablet Take 20 mg by mouth daily (with dinner)       clonazePAM (KLONOPIN) 0.25 MG TBDP ODT tab Take 0.25 mg by mouth nightly as needed for anxiety       diltiazem ER COATED BEADS (CARTIA XT) 240 MG 24 hr capsule Take 240 mg by mouth daily       furosemide (LASIX) 40 MG tablet Take 40 mg by mouth daily       IBUPROFEN PO Take 800 mg by mouth every 6 hours as needed for moderate pain       LORazepam (ATIVAN) 1 MG tablet Take 1 tablet (1 mg) by mouth every 6 hours as needed (nausea/vomiting, anxiety or sleep) 30 tablet 5     Metoprolol Succinate 25 MG CS24 Take 25 mg by mouth daily       multivitamin w/minerals (MULTI-VITAMIN) tablet Take 1 tablet by mouth daily       oxyCODONE (ROXICODONE) 5 MG tablet Take 5 mg by mouth every 4 hours as needed for severe pain       perphenazine 2 MG tablet Take 2 mg by mouth At Bedtime       prochlorperazine (COMPAZINE) 10 MG tablet Take 1 tablet (10 mg) by mouth every 6 hours as needed (nausea/vomiting) 30 tablet 5     rOPINIRole (REQUIP) 0.25 MG tablet Take 0.25 mg by mouth daily as needed       senna-docusate (SENOKOT-S/PERICOLACE) 8.6-50 MG tablet Take 1 tablet by mouth daily as needed for constipation       spironolactone (ALDACTONE) 25 MG tablet Take 25 mg by mouth daily       Take home medications:   1. Lorazepam  2. Prochlorperazine    Pre-Treats:   1. Palonosetron  2. Dexamethasone  3. Diphenhydramine  4. Famotidine    PRN medications:   1. Lorazepam  2.  Prochlorperazine     Chemotherapy agents:   1. Paclitaxel  2. Carboplatin    The following interactions were found and will require patient education:     No significant interactions found.         If there are any additional questions or concerns, please contact the inpatient pharmacy ([phone]) for further review.     Sarah Hassan RPH  December 3, 2018

## 2018-12-05 DIAGNOSIS — C54.1 ADENOCARCINOMA OF THE ENDOMETRIUM/UTERUS (H): Primary | ICD-10-CM

## 2018-12-05 RX ORDER — PROCHLORPERAZINE MALEATE 10 MG
TABLET ORAL
Qty: 385 TABLET | Refills: 0 | Status: SHIPPED | OUTPATIENT
Start: 2018-12-05 | End: 2019-08-13

## 2018-12-05 NOTE — TELEPHONE ENCOUNTER
Patient returned our call regarding setting up her port a cath.  Patient is scheduled for December 11, 2018 with Dr Trinidad.  Patient was given the surgery instructions when she was in oncology for her consult with Dr Jaimes.  Patient is aware that she will not have her arrival time until the day prior to her procedure.  WILFREDO GARCIA

## 2018-12-06 NOTE — H&P (VIEW-ONLY)
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      REQUESTING PHYSICIAN:  Dr. Anabel Marion, as well as Dr. Eisenberg      HISTORY OF PRESENT ILLNESS:  Ms. Rosas is a 68-year-old white female with a prior history of atrial fibrillation, coronary artery disease.  We were asked to evaluate concerning new diagnosis of stage IB high-grade endometrioid adenocarcinoma of the uterus.  She originally stated she had presented with vaginal bleeding back in February 2018 and eventually was referred to Dr. Nettles, who performed an endometrial biopsy which revealed high-grade endometrioid adenocarcinoma, possible carcinosarcoma.  Apparently, she had undergone coronary artery bypass graft surgery and her appointment with Dr. Marion was delayed.  She did see Dr. Marion, who repeated an endometrial biopsy on 09/05/2018, which revealed high-grade endometrial carcinoma, endometrioid type, FIGO grade 3, including abundant cell tumor component with early spindle cell features.  Carcinosarcoma component could not be excluded.  There was a squamous metaplastic component also noted.  Subsequently the next day, she actually underwent coronary artery bypass grafting x 4.  She was diagnosed with a left main 3-vessel coronary artery disease.  During hospitalization, she had atrial fibrillation with RVR postoperatively and she converted to normal sinus rhythm on amiodarone.  The plan was to initiate anticoagulation after completing endometrial surgery.  The patient subsequently did see Dr. Marion again on 10/11/2018.  The plan was to proceed with robotic staging procedure after CT chest, abdomen and pelvis.  CT chest, abdomen and pelvis was performed on 10/19/2018.  The findings were that there was no evidence of metastatic disease from the patient's endometrial cancer.  The patient subsequently underwent a  surgical procedure.  She underwent a HENRY/BSO on 10/31/2018 and pathology revealed that she had a high-grade endometrioid adenocarcinoma, FIGO grade 3/3, tumor measured greater than 3 cm in greatest dimension and extensively invading into the underlying myometrium with myometrial invasion 1.9 cm.  While there was no surface serosal involvement seen, fundus of the uterus was disrupted, exposing the underlying myometrium invasive tumor.  There was foci of lymphovascular invasion by tumor identified within the myometrium but also within the cervix.  No invasive cervical stromal involvement by tumor was seen.  Myometrium also exhibited a solitary leiomyoma.  Right and left parametrium appeared negative for tumor.  Right and left ovaries and right and left fallopian tubes were negative for tumor.  One out of 11 pelvic lymph nodes was positive for isolated tumor cells with 6 right pelvic lymph nodes negative for tumor.  There was 1 right periaortic lymph node that was negative for tumor.  The omentum was negative for tumor.  The patient was staged pathologic T1b pathologic N0 (i plus) or stage IB isolated tumor cells.  Given the high-grade nature of the tumor, Dr. Marion has recommended adjuvant carboplatin and Taxol for 6 cycles, then follow up with her for staging studies.  The patient is currently postop.  She says she is doing relatively well.  She plans to start anticoagulation for A-fib tomorrow.  Otherwise, she denies any fevers, night sweats, weight loss.  There is no family history of endometrial cancer, breast cancer or colon cancer.      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      PAST SURGICAL HISTORY:  Includes biopsy of benign breasts, cardiac catheterization, 4-vessel bypass graft 09/10/2018 performed by MACARIO Horne and C 02/19/2018, hysterectomy as above, robotic-assisted total laparoscopic hysterectomy, bilateral salpingo-oophorectomy, bilateral pelvic and periaortic lymph nodes, omentum biopsy, and cystoscopy performed by Dr. Marion on 10/30/2018.      ALLERGIES:  SHE IS ALLERGIC TO CODEINE, SULFATE, FEXOFENADINE, SIMVASTATIN.      MEDICATIONS: Currently on include:   1.  Aldactone 25 mg daily.   2.  Senokot p.r.n.   3.  Requip 0.25 mg daily.   4.  Roxicodone 5 mg q. 4h. p.r.n.   5.  Metoprolol succinate 25 mg daily.   6.  Ibuprofen 800 mg q. 6h. p.r.n.   7.  Lasix 40 mg daily.   8.  Diltiazem  mg daily.   9.  Lipitor 10 mg daily.   10.  Aspirin 81 mg daily.   11.  Amiodarone 200 mg daily.   12.  Compazine 10 mg q. 6h.   13.  Ativan 1 mg q. 6h. p.r.n.      SOCIAL HISTORY:  She said she was a former smoker.  She smoked 1 pack per day, she quit at age 34.  She smoked for at least 22 years.  Alcohol is negative.  She worked as a CNA.      FAMILY HISTORY:  Significant for mother with pancreatic cancer, sister with esophageal cancer, brother with tonsillar cancer.      REVIEW OF SYSTEMS:   CENTRAL NERVOUS SYSTEM:  Negative for headache, change in mental status.   RESPIRATORY:  Negative for shortness of breath, cough, hemoptysis.   CARDIAC:  No chest pain, palpitations.   GASTROINTESTINAL:  Negative for change in bowel habits, bright red blood per rectum, hematemesis.   MUSCULOSKELETAL:  Occasional myalgias related to Lipitor.   GENITOURINARY:  Negative for urgency, hematuria or nocturia.   CONSTITUTIONAL:  Negative fevers, night sweats, weight loss.      PHYSICAL EXAMINATION:   GENERAL:  She is a middle-aged white  female in no acute distress.   VITAL SIGNS:  Blood pressure 121/96, pulse 79, respirations 20, temperature 98.8.   HEENT:  Atraumatic, normocephalic.  Oropharynx nonerythematous.   NECK:  Supple.   LUNGS:  Clear to auscultation and percussion.   HEART:  Regular rhythm, S1, S2, normal.   ABDOMEN:  Soft, obese, nontender.   LYMPHATICS:  No cervical, supraclavicular, axillary, or inguinal nodes.   EXTREMITIES:  No edema.   NEUROLOGIC:  Nonfocal.      LABORATORY DATA:  CBC with white count 9.5, H and H 14.9 and 45.9, platelet count 270.  BUN is 16, creatinine 0.78.  LFTs are normal.      IMPRESSION:  High-grade endometrioid adenocarcinoma of the uterus with staging consistent with pathologic T1b pathologic N0 (i +) or stage IB with isolated tumor cells involving 1 of the periaortic lymph nodes.  The patient has high-grade tumor and would benefit from adjuvant chemotherapy as per Dr. Marion.  Will prescribe carboplatin AUC 6 and paclitaxel 175 mg/m2 to be given every 21 days.  She will complete 6 cycles and follow up with Dr. Marion for staging.  Otherwise, we will obtain baseline CA-125.      Seventy minutes was spent with patient, greater than half the time spent discussing the above and the fact that she has newly diagnosed high-grade endometrial adenocarcinoma of the uterus and she will require adjuvant chemotherapy given the high-risk nature of this disease and that there will be a disease-free survival benefit in adding adjuvant chemotherapy.         GALILEA NOE MD             D: 2018   T: 2018   MT: DESTINY      Name:     RACQUEL PINZON   MRN:      [id number]        Account:      [id number]   :      1950           Visit Date:   2018      Document: [id number]       cc: Anabel Calhoun MD

## 2018-12-10 ENCOUNTER — ANESTHESIA EVENT (OUTPATIENT)
Dept: SURGERY | Facility: HOSPITAL | Age: 68
End: 2018-12-10
Payer: MEDICARE

## 2018-12-10 ASSESSMENT — ENCOUNTER SYMPTOMS: DYSRHYTHMIAS: 1

## 2018-12-10 NOTE — ANESTHESIA PREPROCEDURE EVALUATION
Anesthesia Pre-Procedure Evaluation    Patient: Heather Rosas   MRN: [id number] : 1950          Preoperative Diagnosis: ADENOCARCINOMA OF THE ENDOMETRIUM/UTERUS    Procedure(s):  PORT-A-CATH PLACEMENT    Past Medical History:   Diagnosis Date     Allergic rhinitis 2011     Anxiety 2011     Anxiety state, unspecified     Anxiety state     Dyslipidemia 2003     fibromyalgia 2004     GERD, Esophageal reflux 2011     HTN (hypertension)     Essential hypertension     Major depression, recurrent (H) 2011     Nonorganic sleep disorder, unspecified     Non-org. 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, ARDS, renal failure     Past Surgical History:   Procedure Laterality Date     ANGIOGRAM  2005    Normal      BIOPSY BREAST  1984    LT, normal     BYPASS GRAFT ARTERY CORONARY  09/10/2018     CARPAL TUNNEL RELEASE RT/LT      RT, carpal tunnel     COLONOSCOPY      Repeat in ten years      COLONOSCOPY       COLONOSCOPY       DILATION AND CURETTAGE, HYSTEROSCOPY, ABLATE ENDOMETRIUM, COMBINED N/A 2018    Procedure: COMBINED DILATION AND CURETTAGE, HYSTEROSCOPY, ABLATE ENDOMETRIUM;  HYSTEROSCOPY DILATION AND CURETTAGE, ENDOMETRIAL ABLATION;  Surgeon: Jose Nettles MD;  Location: [facility] OR     HYSTERECTOMY TOTAL ABD, NICK SALPINGO-OOPHORECTOMY, NODE DISSECTION, TUMOR DEBULKING, COMBINED  10/30/2018     placement of central line         Anesthesia Evaluation     . Pt has had prior anesthetic.     No history of anesthetic complications          ROS/MED HX    ENT/Pulmonary:     (+)allergic rhinitis, , . Other pulmonary disease Hx ARDs.    Neurologic:     (+)CVA     Cardiovascular:     (+) Dyslipidemia, hypertension--CAD, -past MI,CABG-. : . . . :. dysrhythmias a-fib, .       METS/Exercise Tolerance:     Hematologic:  - neg hematologic  ROS       Musculoskeletal:  - neg musculoskeletal ROS  "      GI/Hepatic:     (+) GERD       Renal/Genitourinary:  - ROS Renal section negative       Endo:     (+) Obesity, .      Psychiatric:     (+) psychiatric history anxiety, schizophrenia and depression      Infectious Disease:   (+) MRSA,       Malignancy:   (+) Malignancy History of Other  Other CA Endometrial Active status post         Other:    (+) H/O Chronic Pain,                        Physical Exam  Normal systems: cardiovascular, pulmonary and dental    Airway   Mallampati: III  TM distance: >3 FB  Neck ROM: full    Dental     Cardiovascular   Rhythm and rate: regular and normal      Pulmonary    breath sounds clear to auscultation            Lab Results   Component Value Date    WBC 9.5 12/03/2018    HGB 14.9 12/03/2018    HCT 45.9 12/03/2018     12/03/2018     12/03/2018    POTASSIUM 3.9 12/03/2018    CHLORIDE 101 12/03/2018    CO2 29 12/03/2018    BUN 16 12/03/2018    CR 0.78 12/03/2018    GLC 88 12/03/2018    CARINA 9.6 12/03/2018    ALBUMIN 3.7 12/03/2018    PROTTOTAL 8.1 12/03/2018    ALT 19 12/03/2018    AST 15 12/03/2018    ALKPHOS 145 12/03/2018    BILITOTAL 0.3 12/03/2018    PTT 26 05/14/2018    INR 1.01 05/14/2018    TSH 1.39 09/01/2018    T4 0.58 (L) 11/13/2013       Preop Vitals  BP Readings from Last 3 Encounters:   12/03/18 (!) 146/96   09/01/18 147/86   08/02/18 163/73    Pulse Readings from Last 3 Encounters:   12/03/18 79   09/01/18 104   08/02/18 74      Resp Readings from Last 3 Encounters:   12/03/18 20   09/01/18 20   08/02/18 18    SpO2 Readings from Last 3 Encounters:   12/03/18 98%   09/01/18 98%   08/02/18 99%      Temp Readings from Last 1 Encounters:   12/03/18 98.8  F (37.1  C) (Tympanic)    Ht Readings from Last 1 Encounters:   12/03/18 1.499 m (4' 11\")      Wt Readings from Last 1 Encounters:   12/03/18 85.2 kg (187 lb 12.8 oz)    Estimated body mass index is 37.93 kg/m  as calculated from the following:    Height as of 12/3/18: 1.499 m (4' 11\").    Weight as of " 12/3/18: 85.2 kg (187 lb 12.8 oz).       Anesthesia Plan      History & Physical Review  History and physical reviewed and following examination; no interval change.    ASA Status:  3 .    NPO Status:  > 8 hours    Plan for MAC with Intravenous and Propofol induction. Maintenance will be TIVA.  Reason for MAC:  Chronic cardiopulmonary disease (G9), Deep or markedly invasive procedure (G8) and Procedure to face, neck, head or breast  PONV prophylaxis:  Ondansetron (or other 5HT-3) and Dexamethasone or Solumedrol  Hp 12/3/18 provatas      Postoperative Care  Postoperative pain management:  IV analgesics and Oral pain medications.      Consents  Anesthetic plan, risks, benefits and alternatives discussed with:  Patient..                 AUTUMN Faustin CRNA

## 2018-12-11 ENCOUNTER — ANESTHESIA (OUTPATIENT)
Dept: SURGERY | Facility: HOSPITAL | Age: 68
End: 2018-12-11
Payer: MEDICARE

## 2018-12-11 ENCOUNTER — HOSPITAL ENCOUNTER (OUTPATIENT)
Facility: HOSPITAL | Age: 68
Discharge: HOME | End: 2018-12-11
Attending: SURGERY | Admitting: SURGERY
Payer: MEDICARE

## 2018-12-11 ENCOUNTER — APPOINTMENT (OUTPATIENT)
Dept: GENERAL RADIOLOGY | Facility: HOSPITAL | Age: 68
End: 2018-12-11
Attending: SURGERY
Payer: MEDICARE

## 2018-12-11 VITALS
RESPIRATION RATE: 18 BRPM | HEART RATE: 71 BPM | SYSTOLIC BLOOD PRESSURE: 146 MMHG | DIASTOLIC BLOOD PRESSURE: 87 MMHG | OXYGEN SATURATION: 94 % | TEMPERATURE: 98.1 F

## 2018-12-11 PROCEDURE — 25000128 H RX IP 250 OP 636: Performed by: SURGERY

## 2018-12-11 PROCEDURE — 27210794 ZZH OR GENERAL SUPPLY STERILE: Performed by: SURGERY

## 2018-12-11 PROCEDURE — 25000125 ZZHC RX 250: Performed by: SURGERY

## 2018-12-11 PROCEDURE — 37000009 ZZH ANESTHESIA TECHNICAL FEE, EACH ADDTL 15 MIN: Performed by: SURGERY

## 2018-12-11 PROCEDURE — 27110028 ZZH OR GENERAL SUPPLY NON-STERILE: Performed by: SURGERY

## 2018-12-11 PROCEDURE — 36561 INSERT TUNNELED CV CATH: CPT | Performed by: NURSE ANESTHETIST, CERTIFIED REGISTERED

## 2018-12-11 PROCEDURE — 25000128 H RX IP 250 OP 636: Performed by: NURSE ANESTHETIST, CERTIFIED REGISTERED

## 2018-12-11 PROCEDURE — C1788 PORT, INDWELLING, IMP: HCPCS | Performed by: SURGERY

## 2018-12-11 PROCEDURE — 71000027 ZZH RECOVERY PHASE 2 EACH 15 MINS: Performed by: SURGERY

## 2018-12-11 PROCEDURE — 36561 INSERT TUNNELED CV CATH: CPT | Performed by: ANESTHESIOLOGY

## 2018-12-11 PROCEDURE — 37000008 ZZH ANESTHESIA TECHNICAL FEE, 1ST 30 MIN: Performed by: SURGERY

## 2018-12-11 PROCEDURE — 36000060 ZZH SURGERY LEVEL 3 W FLUORO 1ST 30 MIN: Performed by: SURGERY

## 2018-12-11 PROCEDURE — 36561 INSERT TUNNELED CV CATH: CPT | Performed by: SURGERY

## 2018-12-11 PROCEDURE — 36000058 ZZH SURGERY LEVEL 3 EA 15 ADDTL MIN: Performed by: SURGERY

## 2018-12-11 PROCEDURE — 40000305 ZZH STATISTIC PRE PROC ASSESS I: Performed by: SURGERY

## 2018-12-11 PROCEDURE — 40000278 XR SURGERY CARM FLUORO LESS THAN 5 MIN: Mod: TC

## 2018-12-11 DEVICE — PORT-IMPLANTABLE POWER PORT
Type: IMPLANTABLE DEVICE | Site: CHEST  WALL | Status: NON-FUNCTIONAL
Removed: 2020-10-06

## 2018-12-11 RX ORDER — MEPERIDINE HYDROCHLORIDE 50 MG/ML
12.5 INJECTION INTRAMUSCULAR; INTRAVENOUS; SUBCUTANEOUS
Status: DISCONTINUED | OUTPATIENT
Start: 2018-12-11 | End: 2018-12-11 | Stop reason: HOSPADM

## 2018-12-11 RX ORDER — CEFAZOLIN SODIUM 2 G/100ML
2 INJECTION, SOLUTION INTRAVENOUS
Status: COMPLETED | OUTPATIENT
Start: 2018-12-11 | End: 2018-12-11

## 2018-12-11 RX ORDER — LORAZEPAM 2 MG/ML
1 INJECTION INTRAMUSCULAR EVERY 6 HOURS PRN
Status: CANCELLED
Start: 2019-01-03

## 2018-12-11 RX ORDER — ALBUTEROL SULFATE 90 UG/1
1-2 AEROSOL, METERED RESPIRATORY (INHALATION)
Status: CANCELLED
Start: 2019-01-03

## 2018-12-11 RX ORDER — FENTANYL CITRATE 50 UG/ML
INJECTION, SOLUTION INTRAMUSCULAR; INTRAVENOUS PRN
Status: DISCONTINUED | OUTPATIENT
Start: 2018-12-11 | End: 2018-12-11

## 2018-12-11 RX ORDER — HEPARIN SODIUM 1000 [USP'U]/ML
INJECTION, SOLUTION INTRAVENOUS; SUBCUTANEOUS PRN
Status: DISCONTINUED | OUTPATIENT
Start: 2018-12-11 | End: 2018-12-11 | Stop reason: HOSPADM

## 2018-12-11 RX ORDER — CEFAZOLIN SODIUM 1 G/3ML
1 INJECTION, POWDER, FOR SOLUTION INTRAMUSCULAR; INTRAVENOUS SEE ADMIN INSTRUCTIONS
Status: DISCONTINUED | OUTPATIENT
Start: 2018-12-11 | End: 2018-12-11 | Stop reason: HOSPADM

## 2018-12-11 RX ORDER — ALBUTEROL SULFATE 0.83 MG/ML
2.5 SOLUTION RESPIRATORY (INHALATION)
Status: CANCELLED | OUTPATIENT
Start: 2019-01-03

## 2018-12-11 RX ORDER — FENTANYL CITRATE 50 UG/ML
25-50 INJECTION, SOLUTION INTRAMUSCULAR; INTRAVENOUS
Status: DISCONTINUED | OUTPATIENT
Start: 2018-12-11 | End: 2018-12-11 | Stop reason: HOSPADM

## 2018-12-11 RX ORDER — MEPERIDINE HYDROCHLORIDE 25 MG/ML
25 INJECTION INTRAMUSCULAR; INTRAVENOUS; SUBCUTANEOUS EVERY 30 MIN PRN
Status: CANCELLED | OUTPATIENT
Start: 2019-01-03

## 2018-12-11 RX ORDER — SODIUM CHLORIDE, SODIUM LACTATE, POTASSIUM CHLORIDE, CALCIUM CHLORIDE 600; 310; 30; 20 MG/100ML; MG/100ML; MG/100ML; MG/100ML
INJECTION, SOLUTION INTRAVENOUS CONTINUOUS
Status: DISCONTINUED | OUTPATIENT
Start: 2018-12-11 | End: 2018-12-11 | Stop reason: HOSPADM

## 2018-12-11 RX ORDER — SODIUM CHLORIDE 9 MG/ML
1000 INJECTION, SOLUTION INTRAVENOUS CONTINUOUS PRN
Status: CANCELLED
Start: 2019-01-03

## 2018-12-11 RX ORDER — NALOXONE HYDROCHLORIDE 0.4 MG/ML
.1-.4 INJECTION, SOLUTION INTRAMUSCULAR; INTRAVENOUS; SUBCUTANEOUS
Status: DISCONTINUED | OUTPATIENT
Start: 2018-12-11 | End: 2018-12-11 | Stop reason: HOSPADM

## 2018-12-11 RX ORDER — PALONOSETRON 0.05 MG/ML
0.25 INJECTION, SOLUTION INTRAVENOUS ONCE
Status: CANCELLED
Start: 2019-01-03

## 2018-12-11 RX ORDER — PROPOFOL 10 MG/ML
INJECTION, EMULSION INTRAVENOUS PRN
Status: DISCONTINUED | OUTPATIENT
Start: 2018-12-11 | End: 2018-12-11

## 2018-12-11 RX ORDER — EPINEPHRINE 1 MG/ML
0.3 INJECTION, SOLUTION, CONCENTRATE INTRAVENOUS EVERY 5 MIN PRN
Status: CANCELLED | OUTPATIENT
Start: 2019-01-03

## 2018-12-11 RX ORDER — DIPHENHYDRAMINE HCL 50 MG
50 CAPSULE ORAL ONCE
Status: CANCELLED
Start: 2019-01-03

## 2018-12-11 RX ORDER — EPINEPHRINE 0.3 MG/.3ML
0.3 INJECTION SUBCUTANEOUS EVERY 5 MIN PRN
Status: CANCELLED | OUTPATIENT
Start: 2019-01-03

## 2018-12-11 RX ORDER — DIPHENHYDRAMINE HYDROCHLORIDE 50 MG/ML
50 INJECTION INTRAMUSCULAR; INTRAVENOUS
Status: CANCELLED
Start: 2019-01-03

## 2018-12-11 RX ORDER — METHYLPREDNISOLONE SODIUM SUCCINATE 125 MG/2ML
125 INJECTION, POWDER, LYOPHILIZED, FOR SOLUTION INTRAMUSCULAR; INTRAVENOUS
Status: CANCELLED
Start: 2019-01-03

## 2018-12-11 RX ADMIN — PROPOFOL 20 MG: 10 INJECTION, EMULSION INTRAVENOUS at 07:40

## 2018-12-11 RX ADMIN — PROPOFOL 20 MG: 10 INJECTION, EMULSION INTRAVENOUS at 07:55

## 2018-12-11 RX ADMIN — PROPOFOL 20 MG: 10 INJECTION, EMULSION INTRAVENOUS at 08:00

## 2018-12-11 RX ADMIN — PROPOFOL 20 MG: 10 INJECTION, EMULSION INTRAVENOUS at 07:45

## 2018-12-11 RX ADMIN — CEFAZOLIN SODIUM 2 G: 2 INJECTION, SOLUTION INTRAVENOUS at 07:37

## 2018-12-11 RX ADMIN — FENTANYL CITRATE 50 MCG: 50 INJECTION, SOLUTION INTRAMUSCULAR; INTRAVENOUS at 07:30

## 2018-12-11 RX ADMIN — FENTANYL CITRATE 50 MCG: 50 INJECTION, SOLUTION INTRAMUSCULAR; INTRAVENOUS at 07:38

## 2018-12-11 RX ADMIN — MIDAZOLAM 1 MG: 1 INJECTION INTRAMUSCULAR; INTRAVENOUS at 07:38

## 2018-12-11 RX ADMIN — PROPOFOL 20 MG: 10 INJECTION, EMULSION INTRAVENOUS at 07:50

## 2018-12-11 RX ADMIN — PROPOFOL 20 MG: 10 INJECTION, EMULSION INTRAVENOUS at 08:10

## 2018-12-11 RX ADMIN — PROPOFOL 20 MG: 10 INJECTION, EMULSION INTRAVENOUS at 08:05

## 2018-12-11 RX ADMIN — SODIUM CHLORIDE, POTASSIUM CHLORIDE, SODIUM LACTATE AND CALCIUM CHLORIDE: 600; 310; 30; 20 INJECTION, SOLUTION INTRAVENOUS at 07:30

## 2018-12-11 RX ADMIN — MIDAZOLAM 1 MG: 1 INJECTION INTRAMUSCULAR; INTRAVENOUS at 07:30

## 2018-12-11 NOTE — ANESTHESIA POSTPROCEDURE EVALUATION
Patient: Heather Rosas    Procedure(s):  PORT-A-CATH PLACEMENT    Diagnosis:ADENOCARCINOMA OF THE ENDOMETRIUM/UTERUS  Diagnosis Additional Information: No value filed.    Anesthesia Type:  MAC    Note:  Anesthesia Post Evaluation    Patient location during evaluation: Phase 2 and Bedside  Patient participation: Able to fully participate in evaluation  Level of consciousness: awake and alert  Pain management: adequate  Airway patency: patent  Cardiovascular status: acceptable  Respiratory status: acceptable  Hydration status: stable  PONV: none     Anesthetic complications: None          Last vitals:  Vitals:    12/11/18 0900 12/11/18 0905 12/11/18 0910   BP:  150/88 146/87   Pulse:      Resp:   18   Temp:      SpO2: 95% 95% 94%         Electronically Signed By: Aaron Lal MD  December 11, 2018  10:29 AM

## 2018-12-11 NOTE — ANESTHESIA CARE TRANSFER NOTE
Patient: Heather Rosas    Procedure(s):  PORT-A-CATH PLACEMENT    Diagnosis: ADENOCARCINOMA OF THE ENDOMETRIUM/UTERUS  Diagnosis Additional Information: No value filed.    Anesthesia Type:   MAC     Note:  Airway :Nasal Cannula  Patient transferred to:Phase II  Handoff Report: Identifed the Patient, Identified the Reponsible Provider, Reviewed the pertinent medical history, Discussed the surgical course, Reviewed Intra-OP anesthesia mangement and issues during anesthesia, Set expectations for post-procedure period and Allowed opportunity for questions and acknowledgement of understanding      Vitals: (Last set prior to Anesthesia Care Transfer)    CRNA VITALS  12/11/2018 0746 - 12/11/2018 0846      12/11/2018             Pulse:  63    Ht Rate:  62    SpO2:  100 %    Resp Rate (set):  8                Electronically Signed By: AUTUMN hCavez CRNA  December 11, 2018  9:47 AM

## 2018-12-11 NOTE — OR NURSING
Patient and responsible adult given discharge instructions with no questions regarding instructions. Tray score 20. Pain level 0/10.  Discharged from unit via ambulated. Patient discharged to home.

## 2018-12-11 NOTE — OP NOTE
PREOPERATIVE DIAGNOSES:   1.  ADENOCARCINOMA OF THE ENDOMETRIUM/UTERUS   2.  Need for venous access with port for adjuvant chemotherapy.      POSTOPERATIVE DIAGNOSIS:     1.  Same    2.  Need for venous access with port for adjuvant chemotherapy.      PROCEDURE:  Insertion Port-A-Cath via US guidance.      HISTORY:  See pre-op note      DESCRIPTION OF PROCEDURE:  With the patient in the supine position on the operating table, general anesthesia was induced.  The requisite timeout pause was observed during which the patient's correct identity and planned procedure were confirmed by the operating room personnel in attendance. With the use of Ultrasound and with the patient in slight trendelenburg position the right  IJ was accessed and wire was confirmed to be in the SVC by XR. Then again with the use of local anesthetic a 5cm incision was made in the right chest and a subcutaneous pocket was created, then with the use of a tunneling device the catheter was tunneled to the neck incision. Then using seldinger technique the Right internal jugular vein was dilated under fluoroscopy and the split sheath left in place. The catheter was then introduced and pulled back into position right at the level of the right mainstem bronchus. The catheter aspirated easily. The port was then secured to the catheter with the provided locking system and secured in the pocket with 3-0 prolene suture. With the use of a gaston needle the port was accessed and aspirated easily. The port was then flushed with heparinized saline.  The skin was then closed in layers with 3-0 vicryl and 4-0 monocryl suture and derma bond was placed over the top.      Rafi Trinidad

## 2018-12-12 ENCOUNTER — INFUSION THERAPY VISIT (OUTPATIENT)
Dept: INFUSION THERAPY | Facility: OTHER | Age: 68
End: 2018-12-12
Attending: INTERNAL MEDICINE
Payer: MEDICARE

## 2018-12-12 ENCOUNTER — HOSPITAL ENCOUNTER (OUTPATIENT)
Dept: CARDIAC REHAB | Facility: HOSPITAL | Age: 68
Setting detail: THERAPIES SERIES
End: 2018-12-12
Attending: INTERNAL MEDICINE
Payer: MEDICARE

## 2018-12-12 VITALS — BODY MASS INDEX: 38.32 KG/M2 | HEIGHT: 59 IN | WEIGHT: 190.1 LBS

## 2018-12-12 DIAGNOSIS — C54.1 PRIMARY MALIGNANT NEOPLASM OF ENDOMETRIUM (H): Primary | ICD-10-CM

## 2018-12-12 PROCEDURE — 40000116 ZZH STATISTIC OP CR VISIT

## 2018-12-12 PROCEDURE — 93798 PHYS/QHP OP CAR RHAB W/ECG: CPT

## 2018-12-12 PROCEDURE — 99207 ZZC NO CHARGE LOS: CPT

## 2018-12-12 ASSESSMENT — 6 MINUTE WALK TEST (6MWT)
GENDER SELECTION: FEMALE
MALE CALC: 1121.89
PREDICTED: 1128.73
TOTAL DISTANCE WALKED (FT): 925
FEMALE CALC: 1324.62

## 2018-12-12 ASSESSMENT — MIFFLIN-ST. JEOR: SCORE: 1307.92

## 2018-12-12 NOTE — PROGRESS NOTES
12/12/18 1300   Session   Session Initial Evaluation and Exercise Prescription   Certified through this date 01/10/19   Cardiac Rehab Assessment   Cardiac Rehab Assessment 12/12: Heather starts Phase II Cardiac Rehab today after having NSTEMI and CABG X 3 in September of this year. Her Cardiac Rehab is somewhat delayed as she has also has endometrial adenocarcinoma, for which she had a hysterectomy in November. She had some restrictions after this surgery which kept her from attending Phase II initially. Those restrictions are lifted as of today. She did have a Port-a-Cath inserted yesterday in order for her to start her chemotherapy treatments, tomorrow. She feels she is able to attend Cardiac Rehab and do her chemo which she says is once every 3 weeks. She may miss a session here or there because of her chemotherapy.   Heather states she had shortness of breath for quite a few months prior to her going in to get it checked out. She eventually decided her shortness of breath was caused by her heart which is what brought her in to the doctor. She states she had one night months ago in which her right side was numb and weak, and her right leg continues to give her problems. She states she had a CVA.   She also says she has some severe light-headedness at times, enough that she wonders if she should drive. She says when this has happened her blood pressures and blood sugars have both been fine. She is wondering if it is change in medication. Staff will monitor this, and address it with cardiology. Staff will also monitor her psychosocial needs during this time dealing with her cancer and the neighbor who is bothering her. She appears to be downplaying the stress this may be causing her.   The patient's history and clinical status including hemodynamics and ECG were evaluated. The patient was assessed to be stable and appropriate to begin exercise.   The patient's functional capacity and exercise prescription were  "determined by the completion of the 6 minute walk test. See results above. The patient was oriented to the program.  Risk factor profile was completed. Goals and objectives were discussed. CV response was WNL. No symptoms, complaints or pain were reported. Good prognosis for reaching goals below. Skilled therapy is necessary in order to monitor CV response to exercise, to provide education on risk factors and behavior change counseling needed to achieve patient's goals.  Plan to progress to 30-40 minutes of exercise prior to discharge from cardiac rehab.  Initial THR of 20-30 beats above RHR; Effort rating of 4-6. Initiate muscle conditioning as appropriate. Provide risk factor education and behavior change counseling.      General Information   Treatment Diagnosis Coronary Artery Bypass Surgery   Date of Treatment Diagnosis 09/10/18   Secondary Treatment Diagnosis NSTEMI   Comorbidities Cerebrovascular Disease;Malignancy   Other Medical History family history of heart disease father  at 45   Lead up symptoms had been \"breathless\" for a period of 10 months, went to hospital in Aug. before    Hospital Location [facility]   Hospital Discharge Date 18   Signs and Symptoms Post Hospital Discharge Lightheadedness   Comments Patient went to short term rehab after hospital stay.    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   Preventative " "Medications   CMS recommended medications Beta Blocker;Lipid Lowering;Influenza vaccination   Fall Risk Screen   Fall screen completed by Cardiac Rehab   Have you fallen 2 or more times in the past year? No   Have you fallen and had an injury in the past year? No   Is patient a fall risk? No   Fall screen comments Patient is not at risk for falls.    Abuse Screen (yes response referral indicated)   Feels Unsafe at Home or Work/School no   Feels Threatened by Someone no   Does Anyone Try to Keep You From Having Contact with Others or Doing Things Outside Your Home? no   Physical Signs of Abuse Present no   Pain   Patient Currently in Pain No   Physical Assessments   Incisions WNL   Edema +2 Mild   Right Lung Sounds normal   Left Lung Sounds normal   Limitations No limitations   Comments Patient had hysterectomy, 6 weeks ago restrictions have been lifted, port was placed yesterday for chemo, patient not to put arms over head.    Individualized Treatment Plan   Monitored Sessions Scheduled 24   Monitored Sessions Attended 1   Oxygen   Supplemental Oxygen needed No   Nutrition Management - Weight Management   Assessment Initial Assessment   Age 68   Weight 86.2 kg (190 lb 1.6 oz)   Height 1.499 m (4' 11\")   BMI (Calculated) 38.48   Goal Weight 65.8 kg (145 lb)  (long term goal )   Initial Rate Your Plate Score. Dietary tool to assess eating patterns. Scores range from 24 to 72. The higher the score the healthier the eating pattern. 60   Weight Management Comments Patient states she is heavy like her [place] relatives.    Nutrition Management - Lipids   Lipids Labs Available   Date 05/06/18   Total Cholesterol 186   Triglycerides 127   HDL 44      Prescribed Lipid Medication Yes   Statin Intensity High Intensity   Lipid Comments Patient is compliant with all medications.    Nutrition Management - Diabetes   Diabetes No   Nutrition Management Summary   Dietary Recommendations Low Fat;Low Cholesterol;Low Sodium "   Stages of Change for Diet Compliance Contemplation   Interventions Planned Attend Nutrition Education Class(es);Educate on Weight Management Principles   Patient Goals Goal #1   Goal #1 Description Patient would like to lose 10 lbs while attending Cardiac Rehab by increasing exercise and change snacking habits to healthier choices.    Goal #1 Target Date 03/01/19   Nutrition Summary Comments Patient states she has been able to lose weight in the past.    Nutrition Target Outcome Weight loss .5-1 lb/week (if BMI > 25)   Psychosocial Management   Psychosocial Assessment Initial   Is there history of clinical depression or increased risk of depression? No previous history   Current Level of Stress per Patient Report Denies   Current Coping Skills Uses Stress Management/Relaxation Techniques   Initial Patient Health Questionnaire -9 Score (PHQ-9) for depression. 5-9 Minimal symptoms, 10-14 Minor depression, 15-19 Major depression, moderately severe, > 20 Major depression, severe  2   Initial [facility] Survey score.  Quality of Life:   If total score > 25 review individual areas where patient rated a 4 or 5.  Consider patients current medical condition and what role that plays on the score.   Adjust treatment protocol to improve areas of concern.  Consider the following:  PHQ9 score, DASI, and re-assessment within the next 30 days to assist with developing treatments.  19   Stages of Change Contemplation   Interventions Planned Patient to verbalize understanding of negative impact of stress to personal health;Assist patient to identify positive support system;Provide resources for stress relaxation;Patient to attend stress management class(es)   Psychosocial Comments Patient states she is stressed by her neighbor who she has had to call the police on in the past. She states she does feel safe at home however. For the most part she says she just deals with stress and is fine.    Psychosocial Target Outcome Identify  absence or presence of depression using valid screening tool   Other Core Components - Hypertension   History of or Diagnosis of Hypertension Yes   Currently taking Anti-Hypertensives Beta blocker;CCB   Hypertension Comments Patient takes medications as prescribed.    Other Core Components - Tobacco   History of Tobacco Use Yes   Quit Date or Planned Quit Date 12/01/13   Tobacco Use Status Former (Quit > 6 mo ago)   Tobacco Habit Cigarettes   Tobacco Use per Day (average) .5   Years of Tobacco Use 22   Stages of Change Maintenance   Tobacco Comments Patient confesses she has cheated from time to time.    Other Core Components Summary   Interventions Planned Instruct patient on the DASH diet;Attend education class on Blood Pressure;Continue to assess readiness to change and implement appropriate process(es) of change;Educate on importance of maintaining low sodium diet;Educate on importance of monitoring daily weight;Educate on signs/symptoms and when to call the doctor (i.e. increased edema, dyspnea, fatigue, dizziness/lightheadedness, change in sleep patterns, chest discomfort);Instruct and educate to self manage Heart Failure symptoms   Patient Goals No   Other Core Components Comments Patient states she was told to monitor her weights daily, but she does not really weigh herself regularly. She was encouraged to monitor her weight more closely.    Other Core Components Target Outcome BP < 140/90 or < 130/80 with DM or CKD;Compliance with Diet, Medications and Symptom Management to allow for stable Heart Failure   Activity/Exercise History   Activity/Exercise Assessment Initial   Activity/Exercise Status prior to event? Was Physically Active   Number of Days Currently participating in Moderate Physical Activity? 7   Number of Days Currently performing  Aerobic Exercise (including rehab)? 0   Number of Minutes per Session Currently of Aerobic Exercise (average)? 0   Current Stage of Change (Physical Activity)  Contemplation   Current Stage of Change (Aerobic Exercise) Contemplation   Patient Goals Goal #1   Goal #1 Description Patient would like to increase your acitvity to help her lose weight.    Goal #1 Target Date 03/01/19   Activity/Exercise Comments Patient states she was not a couch potato, but not overly active. She will not join a gym. Patient in the past has danced in her kitchen for exercise.     Activity/Exercise Target Outcome An Accumulation of 150  Minutes of Aerobic Activity per Week   Exercise Assessment   6 Minute Walk Predicted - Gender Selection Female   6 Minute Walk Predicted (Male) 1121.89   6 Minute Walk Predicted (Female) 1324.62   Initial 6 Minute Walk Distance (Feet) 925 ft   Resting HR 62 bpm   Exercise HR 79 bpm   Post Exercise HR 63 bpm   Resting /85   Exercise /84   Post Exercise /80   Pre SpO2 95   While Exercising SpO2 97   Post SpO2 97   Effort Rating 3   Current MET Level 2.3   MET Level Goal 4-5   ECG Rhythm Normal sinus rhythm   Ectopy None   Current Symptoms Joint pain  (Patient has a bad right knee. )   Limitations/Restrictions Orthopedic (see comments)  (right knee)   Exercise Prescription   Mode Nustep;Treadmill;Recumbant bike;Arm Ergometer   Duration/Time 30-45 min   Frequency 2 days/week   THR (85% of age predicted max HR) 130.9   OMNI Effort Rating (0-10 Scale) 4-6/10   Progression Continuous bouts;Total exercise time of 30-45 minutes;Aerobic exercise to OMNI rating of 6 or below and at or below THR   Comments Patient wishes to attend CR two days per week.    Recommended Home Exercise   Type of Exercise Walking   30 Day Exercise Plan Patient to start doing some walking at home.    Current Home Exercise   Type of Exercise None   Follow-up/On-going Support   Provider follow-up needed on the following No follow-up needed   Learning Assessment   Learner Patient   Primary Language English   Preferred Learning Style Listening;Reading;Demonstration;Pictures/Video    Barriers to Learning No barriers noted   Patient Education   Education recommended Anatomy and Physiology of the Heart;Blood Pressure;Exercise Principles;Medication Overview;Muscle Conditioning;Nutrition;Stress Management   Education Comments Patient is informed of all education sessions and encouraged to attend.    Physician cosignature/electronic signature indicates approval of this ITP document. I have established, reviewed and made necessary changes to the individualized treatment plan and exercise prescription for this patient.

## 2018-12-12 NOTE — PROGRESS NOTES
"Chemotherapy Education    Patient is a 68 year old female here today for chemotherapy education, accompanied by self.  Pt has a cancer diagnosis of Endometrial Cancer and their main concern is getting started.  Their Oncologist is Dr. Rhys Jaimes, and PCP is Jeronimo Eisenberg.  Reviewed the following with the patient and their support person:  General chemotherapy information, including ways it is excreted from the body and cleaning and containment of vomitus or other bodily fluid, use of the bathroom, sexual health and intimacy, what to do if needing to miss a treatment, when to call a provider and the need for staff to wear protective equipment.  Importance of Central line care (port) or IV site care.  Proper use of the take home infusion pump, troubleshooting, and who to call if there is a malfunction.  Treatment regimen; Paclitaxel and Carboplatin every 21 days for 6 cycles and rationale for strict adherence, specific medication names including pre-treatment medications and at home scheduled or as needed medications, delivery methods, and side effects and management; including skin changes/hand-foot syndrome, anemia, neutropenia, thrombocytopenia, diarrhea/constipation, hair loss syndrome, memory changes/ \"chemobrain\", mouth sores, taste changes, neuropathy, fatigue, myelosuppression, and risk of extravasation or infiltration.  Infection prevention, and monitoring of lab values, what lab tests and what changes of these values meant, along with the possibility of hydration or blood product transfusion, or the need to defer or hold treatment.    General orientation to the Medical Oncology department, Infusion Services department, Huc/scheduling, bathrooms and usual flow of the treatment day provided as well as introduction to the Infusion nurses.  Patient received written information including \"Guide to Cancer Services\" folder, specific drug information guides, approved Internet sources, available community " resources, and side effect specific management pamphlets.  Business card with contact information given as well as Patient Navigator contact information.  No barriers to learning identified. Patient and family verbalized understanding of all written and verbal information. All questions answered to patients satisfaction.  Informed consent signed, copy given to patient.  Other concerns: none  Pt instructed to call with further questions or concerns.  Patient states understanding and is in agreement with this plan.  Copy of appointments, and after visit summary (AVS) given to patient. Patient discharged ambulatory. Luz Maria Gregorio RN on 12/12/2018 at 1:19 PM

## 2018-12-13 ENCOUNTER — INFUSION THERAPY VISIT (OUTPATIENT)
Dept: INFUSION THERAPY | Facility: OTHER | Age: 68
End: 2018-12-13
Attending: INTERNAL MEDICINE
Payer: MEDICARE

## 2018-12-13 VITALS
WEIGHT: 187.9 LBS | OXYGEN SATURATION: 95 % | DIASTOLIC BLOOD PRESSURE: 85 MMHG | SYSTOLIC BLOOD PRESSURE: 145 MMHG | RESPIRATION RATE: 18 BRPM | HEIGHT: 59 IN | BODY MASS INDEX: 37.88 KG/M2 | TEMPERATURE: 98.6 F

## 2018-12-13 DIAGNOSIS — C54.1 ADENOCARCINOMA OF THE ENDOMETRIUM/UTERUS (H): Primary | ICD-10-CM

## 2018-12-13 DIAGNOSIS — C54.1 ENDOMETRIAL ADENOCARCINOMA (H): ICD-10-CM

## 2018-12-13 DIAGNOSIS — C55 MALIGNANT NEOPLASM OF UTERUS, UNSPECIFIED SITE (H): ICD-10-CM

## 2018-12-13 LAB
ALBUMIN SERPL-MCNC: 3.3 G/DL (ref 3.4–5)
ALP SERPL-CCNC: 146 U/L (ref 40–150)
ALT SERPL W P-5'-P-CCNC: 17 U/L (ref 0–50)
ANION GAP SERPL CALCULATED.3IONS-SCNC: 5 MMOL/L (ref 3–14)
AST SERPL W P-5'-P-CCNC: 13 U/L (ref 0–45)
BASOPHILS # BLD AUTO: 0.1 10E9/L (ref 0–0.2)
BASOPHILS NFR BLD AUTO: 0.8 %
BILIRUB SERPL-MCNC: 0.3 MG/DL (ref 0.2–1.3)
BUN SERPL-MCNC: 15 MG/DL (ref 7–30)
CALCIUM SERPL-MCNC: 9.4 MG/DL (ref 8.5–10.1)
CHLORIDE SERPL-SCNC: 103 MMOL/L (ref 94–109)
CO2 SERPL-SCNC: 27 MMOL/L (ref 20–32)
CREAT SERPL-MCNC: 0.68 MG/DL (ref 0.52–1.04)
DIFFERENTIAL METHOD BLD: ABNORMAL
EOSINOPHIL # BLD AUTO: 0.8 10E9/L (ref 0–0.7)
EOSINOPHIL NFR BLD AUTO: 8.3 %
ERYTHROCYTE [DISTWIDTH] IN BLOOD BY AUTOMATED COUNT: 18.1 % (ref 10–15)
GFR SERPL CREATININE-BSD FRML MDRD: 86 ML/MIN/1.7M2
GLUCOSE SERPL-MCNC: 95 MG/DL (ref 70–99)
HCT VFR BLD AUTO: 44.9 % (ref 35–47)
HGB BLD-MCNC: 14.5 G/DL (ref 11.7–15.7)
IMM GRANULOCYTES # BLD: 0 10E9/L (ref 0–0.4)
IMM GRANULOCYTES NFR BLD: 0.4 %
LYMPHOCYTES # BLD AUTO: 1.5 10E9/L (ref 0.8–5.3)
LYMPHOCYTES NFR BLD AUTO: 16.7 %
MAGNESIUM SERPL-MCNC: 1.8 MG/DL (ref 1.6–2.3)
MCH RBC QN AUTO: 26.8 PG (ref 26.5–33)
MCHC RBC AUTO-ENTMCNC: 32.3 G/DL (ref 31.5–36.5)
MCV RBC AUTO: 83 FL (ref 78–100)
MONOCYTES # BLD AUTO: 0.9 10E9/L (ref 0–1.3)
MONOCYTES NFR BLD AUTO: 9.8 %
NEUTROPHILS # BLD AUTO: 5.8 10E9/L (ref 1.6–8.3)
NEUTROPHILS NFR BLD AUTO: 64 %
NRBC # BLD AUTO: 0 10*3/UL
NRBC BLD AUTO-RTO: 0 /100
PLATELET # BLD AUTO: 245 10E9/L (ref 150–450)
POTASSIUM SERPL-SCNC: 4.2 MMOL/L (ref 3.4–5.3)
PROT SERPL-MCNC: 7.6 G/DL (ref 6.8–8.8)
RBC # BLD AUTO: 5.41 10E12/L (ref 3.8–5.2)
SODIUM SERPL-SCNC: 135 MMOL/L (ref 133–144)
WBC # BLD AUTO: 9 10E9/L (ref 4–11)

## 2018-12-13 PROCEDURE — 36415 COLL VENOUS BLD VENIPUNCTURE: CPT | Mod: ZL | Performed by: INTERNAL MEDICINE

## 2018-12-13 PROCEDURE — 96367 TX/PROPH/DG ADDL SEQ IV INF: CPT

## 2018-12-13 PROCEDURE — 96415 CHEMO IV INFUSION ADDL HR: CPT

## 2018-12-13 PROCEDURE — 96375 TX/PRO/DX INJ NEW DRUG ADDON: CPT

## 2018-12-13 PROCEDURE — 96417 CHEMO IV INFUS EACH ADDL SEQ: CPT

## 2018-12-13 PROCEDURE — 83735 ASSAY OF MAGNESIUM: CPT | Mod: ZL | Performed by: INTERNAL MEDICINE

## 2018-12-13 PROCEDURE — 85025 COMPLETE CBC W/AUTO DIFF WBC: CPT | Mod: ZL | Performed by: INTERNAL MEDICINE

## 2018-12-13 PROCEDURE — 25000125 ZZHC RX 250: Performed by: INTERNAL MEDICINE

## 2018-12-13 PROCEDURE — 25000128 H RX IP 250 OP 636: Performed by: INTERNAL MEDICINE

## 2018-12-13 PROCEDURE — 96413 CHEMO IV INFUSION 1 HR: CPT

## 2018-12-13 PROCEDURE — 80053 COMPREHEN METABOLIC PANEL: CPT | Mod: ZL | Performed by: INTERNAL MEDICINE

## 2018-12-13 RX ORDER — LIDOCAINE/PRILOCAINE 2.5 %-2.5%
CREAM (GRAM) TOPICAL PRN
Qty: 30 G | Refills: 3 | Status: SHIPPED | OUTPATIENT
Start: 2018-12-13 | End: 2019-08-13

## 2018-12-13 RX ORDER — HEPARIN SODIUM (PORCINE) LOCK FLUSH IV SOLN 100 UNIT/ML 100 UNIT/ML
5 SOLUTION INTRAVENOUS ONCE
Status: COMPLETED | OUTPATIENT
Start: 2018-12-13 | End: 2018-12-13

## 2018-12-13 RX ORDER — PALONOSETRON 0.05 MG/ML
0.25 INJECTION, SOLUTION INTRAVENOUS ONCE
Status: COMPLETED | OUTPATIENT
Start: 2018-12-13 | End: 2018-12-13

## 2018-12-13 RX ORDER — LIDOCAINE/PRILOCAINE 2.5 %-2.5%
CREAM (GRAM) TOPICAL PRN
Qty: 30 G | Refills: 3 | Status: SHIPPED | OUTPATIENT
Start: 2018-12-13 | End: 2019-01-02

## 2018-12-13 RX ORDER — HEPARIN SODIUM (PORCINE) LOCK FLUSH IV SOLN 100 UNIT/ML 100 UNIT/ML
5 SOLUTION INTRAVENOUS ONCE
Status: CANCELLED
Start: 2018-12-13 | End: 2018-12-13

## 2018-12-13 RX ADMIN — DIPHENHYDRAMINE HYDROCHLORIDE 50 MG: 50 INJECTION, SOLUTION INTRAMUSCULAR; INTRAVENOUS at 11:29

## 2018-12-13 RX ADMIN — HEPARIN 5 ML: 100 SYRINGE at 16:34

## 2018-12-13 RX ADMIN — CARBOPLATIN 550 MG: 10 INJECTION, SOLUTION INTRAVENOUS at 15:40

## 2018-12-13 RX ADMIN — SODIUM CHLORIDE 250 ML: 9 INJECTION, SOLUTION INTRAVENOUS at 11:03

## 2018-12-13 RX ADMIN — DEXAMETHASONE SODIUM PHOSPHATE 20 MG: 10 INJECTION, SOLUTION INTRAMUSCULAR; INTRAVENOUS at 11:07

## 2018-12-13 RX ADMIN — FAMOTIDINE 40 MG: 10 INJECTION, SOLUTION INTRAVENOUS at 12:04

## 2018-12-13 RX ADMIN — PACLITAXEL 263 MG: 6 INJECTION, SOLUTION INTRAVENOUS at 12:28

## 2018-12-13 RX ADMIN — PALONOSETRON HYDROCHLORIDE 0.25 MG: 0.25 INJECTION INTRAVENOUS at 11:03

## 2018-12-13 ASSESSMENT — MIFFLIN-ST. JEOR: SCORE: 1297.94

## 2018-12-13 NOTE — PATIENT INSTRUCTIONS
Discharge Instructions for Infusion Therapy/Chemotherapy Department:    Your doctor has prescribed the following medication(s) paclitaxel / carboplatin to treat your cancer.    All treatments have potential side effects, but they don't all happen to everyone.  If you have any questions, problems, or concerns, we want you to call us.  You can reach the Infusion Nurses at [phone] Monday - Friday 8:00am to 4:30pm or the Health Unit Coordinator at [phone] Monday - Friday 8:00am to 5:00pm.      After hours, evenings, weekends, and holidays please call Urgent Care [phone] or toll free [phone] or go to your nearest Emergency Department.    IV, PICC line or Port access site care or injection site care:   Please report signs of infection or infiltration;  *Redness     *Swelling/puffiness  *Pain/tenderness   *Fever 100.4 F or higher  *Drainage         Possible side effects include:  *Diarrhea     *Allergic Reaction  *Constipation    *Drop in blood counts  *Depression/Anxiey   *Nausea/Vomiting  *Weakness/Fatigue   *Cold intolerance  *Skin changes/Rash   *Stomatitis (mouth sores)  *Loss of appetite/Taste changes *Weight gain/Swelling (edema)  *Hand-Foot syndrome   *Hypertension (high blood pressure)  *Abdominal pain    *Peripheral Neuropathy (numbness/tingling in hands/feet)    YOU NEED TO CALL US OR GO TO YOUR NEAREST URGENT CARE/EMERGENCY DEPARTMENT IF ANY OF THE FOLLOWING OCCUR:     *You have a fever of 100.4 F or higher.      *You are experiencing uncontrolled vomiting while taking your  anti-nausea medications.      *You are having watery diarrhea (4-6 loose stools in a 24 hour  period).      *You are experiencing a severe rash or skin changes.      *You have mouth sores or a sore throat.      *You have severe constipation (no BM for 3 days).      ANY questions or problems, PLEASE do not hesitate to call us!!

## 2018-12-13 NOTE — PROGRESS NOTES
Patient is a 68 year old female here accompanied by self today for infusion of paclitaxel / carboplatin under the orders of Dr. Jaimes.  Port accessed by Dahiana Dawson RN after 2 unsuccessful attempts by this nurse.    12-13-18 lab values: WBC 9.0, ANC 5.8, , HGB 14.5, AST 13, ALT 17, Alkaline Phosphatase 146, Creatinine 0.68.     Paclitaxel dose verified with Marley Bledsoe RN prior to release of drug.    Patient meets parameters for today's infusion.  Denies questions or concerns regarding today's infusion and/or medications being administered.      Patient identified with two identifiers, order verified, and verbal consent for today's infusion obtained from patient. Written consent for treatment is on file and valid.    1228: IV pump verified with paclitaxel 263mg dose, drug, and rate of administration by second RN, Carolyn Burkett. Infusion administered per protocol.     Patient handed off to Carolyn Calabrese RN and Marley Bledsoe RN.

## 2018-12-13 NOTE — PROGRESS NOTES
Right sided power port accessed with 19 gauge 1 inch 90 degree bent non coring power needle. Lidocaine adminsitered to injection site prior to insertion of needle as patient c/o port feeling tender.  Port recently placed.    Line flushed with 10 cc's normal saline.  Needle secured with sterile transparent dressing.  10 cc's blood discarded, and blood taken for 2 tubes of ordered labs.  Patient tolerated well.  Denies pain and discomfort at this time.  Port flushes easily without resistance.    Hand hygiene performed: yes   Mask donned by caregiver: yes Site prepped with CHG: yes Labs drawn: yes Dressing applied using aseptic technique: yes   Patient states she has needle phobia.  Emla cream ordered.  Patient instructed to use prior to port access.

## 2018-12-13 NOTE — PROGRESS NOTES
1540 IV pump verified with Carboplatin 550mg dose, drug, and rate of administration by second RN, Ioana Bledsoe. Infusion administered per protocol.     OLESYA HADDAD

## 2018-12-13 NOTE — PROGRESS NOTES
Patient tolerated infusion well, no signs or symptoms of adverse reaction noted. Patient denies pain nor discomfort.     Needle removed, tip intact. Site clean, dry and intact. Covered with a sterile bandage, slight pressure applied for 30 seconds. Pt instructed to leave bandage intact for a minimum of one hour, and to call with questions or concerns. Copy of appointments, discharge instructions, and after visit summary (AVS) provided to patient. Patient states understanding, discharged ambulatory.

## 2018-12-17 ENCOUNTER — HOSPITAL ENCOUNTER (OUTPATIENT)
Dept: CARDIAC REHAB | Facility: HOSPITAL | Age: 68
Setting detail: THERAPIES SERIES
End: 2018-12-17
Attending: INTERNAL MEDICINE
Payer: MEDICARE

## 2018-12-17 DIAGNOSIS — I21.4 NSTEMI (NON-ST ELEVATED MYOCARDIAL INFARCTION) (H): ICD-10-CM

## 2018-12-17 DIAGNOSIS — Z95.1 S/P CABG X 4: ICD-10-CM

## 2018-12-17 PROCEDURE — 93797 PHYS/QHP OP CAR RHAB WO ECG: CPT

## 2018-12-17 PROCEDURE — 40000575 ZZH STATISTIC OP CARDIAC VISIT #2

## 2018-12-17 PROCEDURE — 93798 PHYS/QHP OP CAR RHAB W/ECG: CPT

## 2018-12-17 PROCEDURE — 40000116 ZZH STATISTIC OP CR VISIT

## 2018-12-18 ENCOUNTER — TELEPHONE (OUTPATIENT)
Dept: INFUSION THERAPY | Facility: OTHER | Age: 68
End: 2018-12-18

## 2018-12-18 NOTE — TELEPHONE ENCOUNTER
Chemotherapy Infusion Status Check    Pt is a 68 year old female, 5 day(s) post first chemotherapy infusion of paclitaxel / carboplatin.    Primary care provider is: Dr. Eisenberg    Oncology provider is:  Dr. Jaimes.      Unable to reach patient at this time and unable to leave voicemail. Will attempt again at a later time.

## 2018-12-19 ENCOUNTER — HOSPITAL ENCOUNTER (OUTPATIENT)
Dept: CARDIAC REHAB | Facility: HOSPITAL | Age: 68
Setting detail: THERAPIES SERIES
End: 2018-12-19
Attending: INTERNAL MEDICINE
Payer: MEDICARE

## 2018-12-19 PROCEDURE — 40000116 ZZH STATISTIC OP CR VISIT

## 2018-12-19 PROCEDURE — 93798 PHYS/QHP OP CAR RHAB W/ECG: CPT

## 2018-12-24 ENCOUNTER — HOSPITAL ENCOUNTER (OUTPATIENT)
Dept: CARDIAC REHAB | Facility: HOSPITAL | Age: 68
Setting detail: THERAPIES SERIES
End: 2018-12-24
Attending: INTERNAL MEDICINE
Payer: MEDICARE

## 2018-12-24 PROCEDURE — 40000116 ZZH STATISTIC OP CR VISIT

## 2018-12-24 PROCEDURE — 93798 PHYS/QHP OP CAR RHAB W/ECG: CPT

## 2018-12-26 ENCOUNTER — HOSPITAL ENCOUNTER (OUTPATIENT)
Dept: CARDIAC REHAB | Facility: HOSPITAL | Age: 68
Setting detail: THERAPIES SERIES
End: 2018-12-26
Attending: INTERNAL MEDICINE
Payer: MEDICARE

## 2018-12-26 ENCOUNTER — ANCILLARY PROCEDURE (OUTPATIENT)
Dept: MAMMOGRAPHY | Facility: OTHER | Age: 68
End: 2018-12-26
Attending: INTERNAL MEDICINE
Payer: MEDICARE

## 2018-12-26 DIAGNOSIS — Z12.31 ENCOUNTER FOR SCREENING MAMMOGRAM FOR BREAST CANCER: ICD-10-CM

## 2018-12-26 PROCEDURE — 93798 PHYS/QHP OP CAR RHAB W/ECG: CPT

## 2018-12-26 PROCEDURE — 77063 BREAST TOMOSYNTHESIS BI: CPT | Mod: TC

## 2018-12-26 PROCEDURE — 40000116 ZZH STATISTIC OP CR VISIT

## 2018-12-31 ENCOUNTER — PATIENT OUTREACH (OUTPATIENT)
Dept: CARE COORDINATION | Facility: OTHER | Age: 68
End: 2018-12-31

## 2018-12-31 NOTE — PROGRESS NOTES
Clinic Care Coordination Contact  Care Team Conversations    Received call from patient that she had submitted both [employer]/ [employer] applications.  She has received her giftcard from [employer], but had not yet heard from [employer] and was ensuring nothing was wrong.  Advised her that it can take about 6 weeks to receive word from them.  Advised her to call around the middle of January if she does not receive word.    Melina Correa, [facility]  Outpatient   [phone]

## 2019-01-01 NOTE — PROGRESS NOTES
Oncology Follow-up Visit:  January 2, 2019    Reason for Visit:  Patient presents with:  RECHECK: Follow up Endometrial adenocarcinoma      Nursing Note and documentation reviewed: yes    HPI: This is a 68-year-old female patient who presents to the oncology/hematology clinic today for evaluation prior to receiving cycle 2 chemotherapy for stage IB endometrial cancer diagnosed originally in February 2018.  Patient had multiple other medical issues which delayed treatment and additional biopsy completed in September 2018 again showed a FIGO grade 3 uterine cancer.  She eventually underwent surgery 10/2018 and patient was staged  [id number] (i plus); [id number].    She presents today stating she feels she tolerated the chemotherapy fairly well.  He does have some increased fatigue but relates this to having recent car issues and not sleeping well last night.  She had no issues with nausea.  She did have about a 3-day span of increased muscle and joint pain approximately 2 weeks after chemotherapy and did use some ibuprofen every other day for this.  She states she will have this prior to storms related to arthritis and is unsure if it was related to that or the chemotherapy.  Main complaint is in regards to constipation stating she dealt with this prior to chemotherapy also.  Her PCP recommended a laxative with stool softener and she has been doing to daily.  She continues to follow with cardiology.    Patient underwent recent mammogram showing abnormality of the right breast.  She will be undergoing a diagnostic mammogram and ultrasound on January 16, 2019.  This is causing her some anxiety.    Oncologic History:     February 2018 patient presented with vaginal bleeding and underwent endometrial biopsy by Dr. 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  She was referred to Dr. Marion with [facility] and didn't see her then as planned.    In September 2018 she  saw Dr. Marion who did a repeat endometrial biopsy showing a high-grade endometrial carcinoma, endometrioid type, FIGO grade 3 with early spindle cell features and a carcinosarcoma component could not be excluded and there was a squamous metaplastic component also noted.    The day following seeing Dr. Marion, she underwent CABG x4 and developed atrial fibrillation while hospitalized.    CT scan of the chest abdomen and pelvis completed on 10/19/2018 showed no evidence of metastatic disease and on 10/31/2018 she underwent HENRY/BSO by Dr. Marion.  Pathology was consistent with a high-grade endometrioid adenocarcinoma, FIGO grade 3/3 with tumor measuring greater than 3 cm and extensively invading into the underlying myometrium 1.9 cm.  There was a foci of lymphovascular invasion.  Right and left ovaries were negative for tumor and 1 out of 11 pelvic lymph nodes was positive for isolated tumor cells.  She was staged [id number] (i plus) or stage IB isolated tumor cells.    Related to high-grade nature of the tumor Dr. Marion recommended adjuvant carboplatin and Taxol x6 cycles then follow-up for staging studies.  She was seen here by Dr. Jaimes on 12/3/2018 in order to initiate treatment per Dr. Marion recommendations.  The plan was for Taxol 175 mg per metered squared with carboplatin AUC of 6 given every 21 days x6 cycles.    Pre-TX weight = 85.2kg (187lbs)  MMR proteins = Absent MLH1 and PMS2 with MLH1 promoter methylation positive   Genetic counseling:  To discuss    Current Chemo Regime/TX: Taxol 140 mg per metered squared/carboplatin AUC 6 every 21 days initiated 12/13/2018  Current Cycle:  2  # of completed cycles: 1    Previous treatment:  n/a    Past Medical History:   Diagnosis Date     Allergic rhinitis 01/01/2011     Anxiety 01/01/2011     Anxiety state, unspecified     Anxiety state     Dyslipidemia 11/26/2003     fibromyalgia 06/14/2004     GERD, Esophageal reflux 01/01/2011     HTN (hypertension)     Essential  hypertension     Major depression, recurrent (H) 1/1/2011     Nonorganic sleep disorder, unspecified     Non-org. 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, ARDS, renal failure       Past Surgical History:   Procedure Laterality Date     ANGIOGRAM  02/2005    Normal      BIOPSY BREAST  1984    LT, normal     BYPASS GRAFT ARTERY CORONARY  09/10/2018     CARPAL TUNNEL RELEASE RT/LT  2005    RT, carpal tunnel     COLONOSCOPY  2011    Repeat in ten years      COLONOSCOPY  1996     COLONOSCOPY  2004     DILATION AND CURETTAGE, HYSTEROSCOPY, ABLATE ENDOMETRIUM, COMBINED N/A 2/19/2018    Procedure: COMBINED DILATION AND CURETTAGE, HYSTEROSCOPY, ABLATE ENDOMETRIUM;  HYSTEROSCOPY DILATION AND CURETTAGE, ENDOMETRIAL ABLATION;  Surgeon: Jose Nettles MD;  Location: [facility] OR     HYSTERECTOMY TOTAL ABD, NICK SALPINGO-OOPHORECTOMY, NODE DISSECTION, TUMOR DEBULKING, COMBINED  10/30/2018     INSERT PORT VASCULAR ACCESS N/A 12/11/2018    Procedure: PORT-A-CATH PLACEMENT;  Surgeon: Rafi Trinidad MD;  Location: [facility] OR     placement of central line  2005       Family History   Problem Relation Age of Onset     C.A.D. Father 45        (cause of death)      Other - See Comments Father         rheumatic fever      Allergies Father      Cancer Sister 56        bone ca      Gastrointestinal Disease Mother         GERD     Cancer Mother         pancreatic ca (cause of death) /liver ca     Breast Cancer Maternal Aunt      Breast Cancer Other      Breast Cancer Maternal Aunt      Colon Cancer Paternal Aunt         (cause of death)      Crohn's Disease Other      Depression Maternal Uncle      Depression Maternal Aunt      Diabetes Paternal Grandmother         type 2     Neurologic Disorder Brother         neuropathy     Cancer Brother 58        lymph node in neck     Allergies Brother        Social History     Socioeconomic History     Marital status:       Spouse name: Not on file     Number of children: Not on file     Years of education: Not on file     Highest education level: Not on file   Social Needs     Financial resource strain: Not on file     Food insecurity - worry: Not on file     Food insecurity - inability: Not on file     Transportation needs - medical: Not on file     Transportation needs - non-medical: Not on file   Occupational History     Occupation: [id number]     Employer: [employer]     Comment:    Tobacco Use     Smoking status: Former Smoker     Packs/day: 0.50     Types: Cigarettes     Last attempt to quit: 2013     Years since quittin.0     Smokeless tobacco: Never Used     Tobacco comment: Year Quit:    Substance and Sexual Activity     Alcohol use: No     Comment: rare     Drug use: No     Sexual activity: No     Comment: devorced   Other Topics Concern      Service No     Blood Transfusions Yes     Comment: Permits if needed     Caffeine Concern Yes     Comment: 1 cup     Occupational Exposure No     Hobby Hazards No     Sleep Concern No     Stress Concern No     Weight Concern No     Special Diet No     Back Care No     Exercise No     Bike Helmet Not Asked     Seat Belt Yes     Self-Exams Yes     Parent/sibling w/ CABG, MI or angioplasty before 65F 55M? Yes     Comment: Father   Social History Narrative     Not on file       Current Outpatient Medications   Medication     amiodarone (PACERONE/CODARONE) 100 mg TABS half-tab     aspirin (ASA) 325 MG EC tablet     aspirin (ASA) 81 MG tablet     atorvastatin (LIPITOR) 20 MG tablet     diltiazem ER COATED BEADS (CARTIA XT) 240 MG 24 hr capsule     furosemide (LASIX) 40 MG tablet     IBUPROFEN PO     Metoprolol Succinate 25 MG CS24     rOPINIRole (REQUIP) 0.25 MG tablet     senna-docusate (SENOKOT-S/PERICOLACE) 8.6-50 MG tablet     spironolactone (ALDACTONE) 25 MG tablet     acetaminophen (TYLENOL) 325 MG tablet     clonazePAM (KLONOPIN) 0.25 MG  "TBDP ODT tab     lidocaine-prilocaine (EMLA) 2.5-2.5 % external cream     LORazepam (ATIVAN) 1 MG tablet     prochlorperazine (COMPAZINE) 10 MG tablet     No current facility-administered medications for this visit.         Allergies   Allergen Reactions     Cats Other (See Comments)     Sneezing, runny nose     Codeine Sulfate Nausea and Vomiting and GI Disturbance     Fexofenadine Hcl      Allegra      Rosuvastatin Other (See Comments)     Dizziness - Crestor      [place] Oil GI Disturbance     Any pink fish       Review Of Systems:  Constitutional:    denies fever, weight changes, chills, and night sweats.  Eyes:    denies double vision; has had some blurred vision since chemo  Ears/Nose/Throat:   denies ear pain, nose problems, difficulty swallowing  Respiratory:   denies shortness of breath, cough   Skin:   denies rash, lesions  Cardiovascular:   denies chest pain, palpitations, edema-following with cardiology  Gastrointestinal:   denies abdominal pain, bloating, nausea, vomiting, early satiety-see hPI  Genitourinary:   denies difficulty with urination, blood in urine; no vaginal discharge  Musculoskeletal:    denies new muscle pain, bone pain-see hPI  Neurologic:   denies lightheadedness, headaches, has mild numbness or tingling to fingertips  Psychiatric:   denies anxiety, depression but admits to feeling down r/t financial issues  Hematologic/Lymphatic/Immunologic:   denies easy bruising, easy bleeding, lumps or bumps noted  Endocrine:   Denies increased thirst    Cognitive issues: feels she gets a little confused at times; forgets things    Sexual concerns:  No issues    Fatigue (0=no fatigue; 10=worst fatigue imaginable): 2    Pain  (0=no pain; 10=worst pain imaginable): 0    Quality of Life (0=as bad as can be; 10=as good as can be): 6-7    ECOG Performance Status: 1    Physical Exam:  /80   Pulse 72   Temp 97.9  F (36.6  C) (Tympanic)   Resp 18   Ht 1.499 m (4' 11\")   Wt 85.5 kg (188 lb 9.6 oz) "   SpO2 97%   BMI 38.09 kg/m      GENERAL APPEARANCE: Healthy, alert and in no acute distress.  HEENT: Normocephalic, Sclerae anicteric. Oropharynx without ulcers, lesions, or thrush.  Sclera erythematous.  NECK:   No asymmetry or masses, no thyromegaly.  LYMPHATICS: No palpable cervical, supraclavicular, axillary, or inguinal nodes   RESP: Lungs clear to auscultation bilaterally, respirations regular and easy  CARDIOVASCULAR: Regular rate and rhythm. Normal S1, S2  ABDOMEN: Soft, nontender. Bowel sounds auscultated all 4 quadrants. No palpable organomegaly or masses.  MUSCULOSKELETAL: Extremities without gross deformities noted. No edema of bilateral lower extremities.  NEURO: Alert and oriented x 3.  Gait steady.  PSYCHIATRIC: Mentation and affect appear normal.  Mood appropriate.    Laboratory:  Results for orders placed or performed in visit on 01/02/19   CBC with platelets differential   Result Value Ref Range    WBC 7.9 4.0 - 11.0 10e9/L    RBC Count 5.00 3.8 - 5.2 10e12/L    Hemoglobin 13.8 11.7 - 15.7 g/dL    Hematocrit 41.2 35.0 - 47.0 %    MCV 82 78 - 100 fl    MCH 27.6 26.5 - 33.0 pg    MCHC 33.5 31.5 - 36.5 g/dL    RDW 17.2 (H) 10.0 - 15.0 %    Platelet Count 273 150 - 450 10e9/L    Diff Method Automated Method     % Neutrophils 61.5 %    % Lymphocytes 23.4 %    % Monocytes 11.0 %    % Eosinophils 1.4 %    % Basophils 0.9 %    % Immature Granulocytes 1.8 %    Nucleated RBCs 0 0 /100    Absolute Neutrophil 4.9 1.6 - 8.3 10e9/L    Absolute Lymphocytes 1.9 0.8 - 5.3 10e9/L    Absolute Monocytes 0.9 0.0 - 1.3 10e9/L    Absolute Eosinophils 0.1 0.0 - 0.7 10e9/L    Absolute Basophils 0.1 0.0 - 0.2 10e9/L    Abs Immature Granulocytes 0.1 0 - 0.4 10e9/L    Absolute Nucleated RBC 0.0    Comprehensive metabolic panel   Result Value Ref Range    Sodium 134 133 - 144 mmol/L    Potassium 3.8 3.4 - 5.3 mmol/L    Chloride 103 94 - 109 mmol/L    Carbon Dioxide 23 20 - 32 mmol/L    Anion Gap 8 3 - 14 mmol/L    Glucose  86 70 - 99 mg/dL    Urea Nitrogen 13 7 - 30 mg/dL    Creatinine 0.71 0.52 - 1.04 mg/dL    GFR Estimate 88 >60 mL/min/[1.73_m2]    GFR Estimate If Black >90 >60 mL/min/[1.73_m2]    Calcium 9.1 8.5 - 10.1 mg/dL    Bilirubin Total 0.3 0.2 - 1.3 mg/dL    Albumin 3.5 3.4 - 5.0 g/dL    Protein Total 7.3 6.8 - 8.8 g/dL    Alkaline Phosphatase 147 40 - 150 U/L    ALT 23 0 - 50 U/L    AST 16 0 - 45 U/L   Magnesium   Result Value Ref Range    Magnesium 1.9 1.6 - 2.3 mg/dL       Imaging Studies:      Recent mammogram:  EXAM: MA SCREENING BILATERAL W/ SHIVA, 12/26/2018 9:52 AM     COMPARISONS: 4/20/2011, 3/9/2010, 2/7/2008.     HISTORY: Encounter for screening mammogram for breast cancer Screening     FINDINGS: Prominent axillary lymph nodes. On the MLO projection, there  is a dense focus seen in the axillary tissues of the right breast  measuring approximately 2.8 by by 0.8 cm in size. Benign  calcifications. Axillary lymph nodes.     BREAST DENSITY: Scattered fibroglandular densities.                                                                      IMPRESSION: BI-RADS CATEGORY: 0 - Incomplete - Need Additional Imaging  Evaluation and/or Prior Mammograms for Comparison.  Partial  characterization of a well-circumscribed right axillary nodule.  Additional imaging is recommended for better characterization.     RECOMMENDED FOLLOW-UP: Diagnostic Mammogram and Ultrasound.     2.8 x 0.8 cm well-circumscribed dense nodule in the right axilla may  represent a dense lymph node however is only partially seen. Recommend  Karina view of the right breast to better characterize the axillary  tissues. Compression views and ultrasound may be necessary in better  characterizing the soft tissues in this region.     STONE COOK MD      ASSESSMENT/PLAN:    #1  Endometrial cancer:  [id number] (i plus); [id number] isolated tumor cells, high-grade endometrioid adenocarcinoma, diagnosed 2/2018 and undergoing surgery 10/2018 by Dr. Marion with  recommendation for Taxol/Carboplatin x6 cycles.  She will receive cycle 2 chemotherapy tomorrow in follow-up prior to cycle 3 with labs per treatment plan.    #2  Family History of cancer: Mother  of pancreatic cancer; also breast cancer history.  She is in favor of a referral for genetic counseling and this was sent.  She is aware that they will call for an appointment.    #3 abnormal mammogram: She will be having a diagnostic mammogram and ultrasound completed on 2019.  We will watch for these reports.     I encouraged patient to call with any questions or concerns.    Approximately 35 minutes spent with the patient, with >75% of this time spent in counseling discussion of her cancer diagnosis along with her family history and recommendation for genetic counseling.  We also discussed the psychosocial aspects and effects of her cancer diagnosis and occasional issues with depression symptoms.  We discussed her chemotherapy and side effects along with plan for continued monitoring.    Gabriela LEYVA, FNP-BC, AOCNP

## 2019-01-02 ENCOUNTER — APPOINTMENT (OUTPATIENT)
Dept: LAB | Facility: OTHER | Age: 69
End: 2019-01-02
Attending: INTERNAL MEDICINE
Payer: MEDICARE

## 2019-01-02 ENCOUNTER — PATIENT OUTREACH (OUTPATIENT)
Dept: ONCOLOGY | Facility: OTHER | Age: 69
End: 2019-01-02

## 2019-01-02 ENCOUNTER — ONCOLOGY VISIT (OUTPATIENT)
Dept: ONCOLOGY | Facility: OTHER | Age: 69
End: 2019-01-02
Attending: NURSE PRACTITIONER
Payer: MEDICARE

## 2019-01-02 VITALS
SYSTOLIC BLOOD PRESSURE: 122 MMHG | OXYGEN SATURATION: 97 % | DIASTOLIC BLOOD PRESSURE: 80 MMHG | HEART RATE: 72 BPM | HEIGHT: 59 IN | RESPIRATION RATE: 18 BRPM | BODY MASS INDEX: 38.02 KG/M2 | TEMPERATURE: 97.9 F | WEIGHT: 188.6 LBS

## 2019-01-02 DIAGNOSIS — C54.1 ENDOMETRIAL ADENOCARCINOMA (H): Primary | ICD-10-CM

## 2019-01-02 DIAGNOSIS — R92.8 ABNORMAL MAMMOGRAM: ICD-10-CM

## 2019-01-02 DIAGNOSIS — Z80.9 FAMILY HISTORY OF CANCER: ICD-10-CM

## 2019-01-02 DIAGNOSIS — C54.1 ADENOCARCINOMA OF THE ENDOMETRIUM/UTERUS (H): Primary | ICD-10-CM

## 2019-01-02 DIAGNOSIS — Z76.89 HEALTH CARE HOME: ICD-10-CM

## 2019-01-02 PROCEDURE — 99214 OFFICE O/P EST MOD 30 MIN: CPT | Performed by: NURSE PRACTITIONER

## 2019-01-02 PROCEDURE — G0463 HOSPITAL OUTPT CLINIC VISIT: HCPCS

## 2019-01-02 RX ORDER — ACETAMINOPHEN 325 MG/1
650 TABLET ORAL DAILY
COMMUNITY
End: 2019-08-13

## 2019-01-02 RX ORDER — ALBUTEROL SULFATE 0.83 MG/ML
2.5 SOLUTION RESPIRATORY (INHALATION)
Status: CANCELLED | OUTPATIENT
Start: 2019-01-24

## 2019-01-02 RX ORDER — EPINEPHRINE 0.3 MG/.3ML
0.3 INJECTION SUBCUTANEOUS EVERY 5 MIN PRN
Status: CANCELLED | OUTPATIENT
Start: 2019-01-24

## 2019-01-02 RX ORDER — DIPHENHYDRAMINE HYDROCHLORIDE 50 MG/ML
50 INJECTION INTRAMUSCULAR; INTRAVENOUS
Status: CANCELLED
Start: 2019-01-24

## 2019-01-02 RX ORDER — MEPERIDINE HYDROCHLORIDE 25 MG/ML
25 INJECTION INTRAMUSCULAR; INTRAVENOUS; SUBCUTANEOUS EVERY 30 MIN PRN
Status: CANCELLED | OUTPATIENT
Start: 2019-01-24

## 2019-01-02 RX ORDER — ALBUTEROL SULFATE 90 UG/1
1-2 AEROSOL, METERED RESPIRATORY (INHALATION)
Status: CANCELLED
Start: 2019-01-24

## 2019-01-02 RX ORDER — EPINEPHRINE 1 MG/ML
0.3 INJECTION, SOLUTION, CONCENTRATE INTRAVENOUS EVERY 5 MIN PRN
Status: CANCELLED | OUTPATIENT
Start: 2019-01-24

## 2019-01-02 RX ORDER — PALONOSETRON 0.05 MG/ML
0.25 INJECTION, SOLUTION INTRAVENOUS ONCE
Status: CANCELLED
Start: 2019-01-24

## 2019-01-02 RX ORDER — METHYLPREDNISOLONE SODIUM SUCCINATE 125 MG/2ML
125 INJECTION, POWDER, LYOPHILIZED, FOR SOLUTION INTRAMUSCULAR; INTRAVENOUS
Status: CANCELLED
Start: 2019-01-24

## 2019-01-02 RX ORDER — SODIUM CHLORIDE 9 MG/ML
1000 INJECTION, SOLUTION INTRAVENOUS CONTINUOUS PRN
Status: CANCELLED
Start: 2019-01-24

## 2019-01-02 RX ORDER — LORAZEPAM 2 MG/ML
1 INJECTION INTRAMUSCULAR EVERY 6 HOURS PRN
Status: CANCELLED
Start: 2019-01-24

## 2019-01-02 RX ORDER — DIPHENHYDRAMINE HCL 50 MG
50 CAPSULE ORAL ONCE
Status: CANCELLED
Start: 2019-01-24

## 2019-01-02 ASSESSMENT — ACTIVITIES OF DAILY LIVING (ADL): DEPENDENT_IADLS:: INDEPENDENT

## 2019-01-02 ASSESSMENT — PAIN SCALES - GENERAL: PAINLEVEL: NO PAIN (0)

## 2019-01-02 ASSESSMENT — MIFFLIN-ST. JEOR: SCORE: 1301.11

## 2019-01-02 NOTE — NURSING NOTE
"Chief Complaint   Patient presents with     RECHECK     Follow up Endometrial adenocarcinoma        Initial /80   Pulse 72   Temp 97.9  F (36.6  C) (Tympanic)   Resp 18   Ht 1.499 m (4' 11\")   Wt 85.5 kg (188 lb 9.6 oz)   SpO2 97%   BMI 38.09 kg/m   Estimated body mass index is 38.09 kg/m  as calculated from the following:    Height as of this encounter: 1.499 m (4' 11\").    Weight as of this encounter: 85.5 kg (188 lb 9.6 oz).  Medication Reconciliation: complete.  Immunizations reviewed, advanced directives given to patient today, pain = 0, PHQ9 = 3.    Karuna Larsen LPN    "

## 2019-01-02 NOTE — LETTER
[facility] [place]  [address]  [facility] [address]  [phone]      January 3, 2019      Heather Rosas  [address] [address]      EMERGENCY CARE PLAN  Presenting Problem Treatment Plan   Questions or concerns during clinic hours          24 hour Nurse line available - Call main clinic line and follow prompts I will call the clinic directly: [phone]    ONCOLOGY DEPARTMENT - [phone]    24 Hour Nurse Line [phone]- Follow prompts and press option for nurse advisor    [facility]  [address] [place] [address]   Patient needs to schedule an appointment  I will call the scheduling team during business hours at [phone]   Same day treatment   I will call the clinic first, then urgent care if needed   Clinic Care Coordinators [facility]  ONCOLOGY RN CARE COORDINATOR  Wendie Barreto - [phone]    ONCOLOGY   Karen - [phone]      GENERAL CARE COORDINATION  RN Clinic Care Coordinator  [phone]    [phone]   Crisis Services:  Behavioral or Mental Health [facility]  [phone]   Emergency treatment--Immediately CALL 911

## 2019-01-02 NOTE — PATIENT INSTRUCTIONS
We would like to see you back per your calender.    When you are in need of a refill, please call your pharmacy and they will send us a request.     If you have any questions please call [phone]    Other instructions:  I will send a referral to the Cancer Risk Assessment group for genetic counseling and they will call you to schedule.

## 2019-01-03 ENCOUNTER — INFUSION THERAPY VISIT (OUTPATIENT)
Dept: INFUSION THERAPY | Facility: OTHER | Age: 69
End: 2019-01-03
Attending: INTERNAL MEDICINE
Payer: MEDICARE

## 2019-01-03 VITALS
WEIGHT: 190.1 LBS | BODY MASS INDEX: 38.32 KG/M2 | TEMPERATURE: 98 F | HEIGHT: 59 IN | DIASTOLIC BLOOD PRESSURE: 70 MMHG | OXYGEN SATURATION: 97 % | RESPIRATION RATE: 16 BRPM | SYSTOLIC BLOOD PRESSURE: 133 MMHG | HEART RATE: 82 BPM

## 2019-01-03 DIAGNOSIS — C55 MALIGNANT NEOPLASM OF UTERUS, UNSPECIFIED SITE (H): ICD-10-CM

## 2019-01-03 DIAGNOSIS — C54.1 ENDOMETRIAL ADENOCARCINOMA (H): ICD-10-CM

## 2019-01-03 DIAGNOSIS — C54.1 ADENOCARCINOMA OF THE ENDOMETRIUM/UTERUS (H): Primary | ICD-10-CM

## 2019-01-03 PROCEDURE — 96375 TX/PRO/DX INJ NEW DRUG ADDON: CPT

## 2019-01-03 PROCEDURE — 96413 CHEMO IV INFUSION 1 HR: CPT

## 2019-01-03 PROCEDURE — 96367 TX/PROPH/DG ADDL SEQ IV INF: CPT

## 2019-01-03 PROCEDURE — 25000125 ZZHC RX 250: Performed by: INTERNAL MEDICINE

## 2019-01-03 PROCEDURE — 96415 CHEMO IV INFUSION ADDL HR: CPT

## 2019-01-03 PROCEDURE — 25000128 H RX IP 250 OP 636: Performed by: INTERNAL MEDICINE

## 2019-01-03 PROCEDURE — 96417 CHEMO IV INFUS EACH ADDL SEQ: CPT

## 2019-01-03 RX ORDER — PALONOSETRON 0.05 MG/ML
0.25 INJECTION, SOLUTION INTRAVENOUS ONCE
Status: COMPLETED | OUTPATIENT
Start: 2019-01-03 | End: 2019-01-03

## 2019-01-03 RX ORDER — HEPARIN SODIUM (PORCINE) LOCK FLUSH IV SOLN 100 UNIT/ML 100 UNIT/ML
5 SOLUTION INTRAVENOUS ONCE
Status: CANCELLED
Start: 2019-01-03 | End: 2019-01-03

## 2019-01-03 RX ORDER — HEPARIN SODIUM (PORCINE) LOCK FLUSH IV SOLN 100 UNIT/ML 100 UNIT/ML
5 SOLUTION INTRAVENOUS ONCE
Status: COMPLETED | OUTPATIENT
Start: 2019-01-03 | End: 2019-01-03

## 2019-01-03 RX ADMIN — PALONOSETRON HYDROCHLORIDE 0.25 MG: 0.25 INJECTION INTRAVENOUS at 09:52

## 2019-01-03 RX ADMIN — DEXAMETHASONE SODIUM PHOSPHATE 20 MG: 10 INJECTION, SOLUTION INTRAMUSCULAR; INTRAVENOUS at 09:58

## 2019-01-03 RX ADMIN — PACLITAXEL 329 MG: 6 INJECTION, SOLUTION INTRAVENOUS at 11:15

## 2019-01-03 RX ADMIN — FAMOTIDINE 40 MG: 10 INJECTION, SOLUTION INTRAVENOUS at 10:22

## 2019-01-03 RX ADMIN — DIPHENHYDRAMINE HYDROCHLORIDE 50 MG: 50 INJECTION, SOLUTION INTRAMUSCULAR; INTRAVENOUS at 10:54

## 2019-01-03 RX ADMIN — SODIUM CHLORIDE 1000 ML: 9 INJECTION, SOLUTION INTRAVENOUS at 09:52

## 2019-01-03 RX ADMIN — HEPARIN 5 ML: 100 SYRINGE at 15:24

## 2019-01-03 RX ADMIN — CARBOPLATIN 550 MG: 10 INJECTION, SOLUTION INTRAVENOUS at 14:25

## 2019-01-03 ASSESSMENT — MIFFLIN-ST. JEOR: SCORE: 1308.17

## 2019-01-03 NOTE — PATIENT INSTRUCTIONS
Discharge Instructions for Infusion Therapy/Chemotherapy Department:    Your doctor has prescribed the following medication(s) paclitaxel / carboplatin to treat your cancer.    All treatments have potential side effects, but they don't all happen to everyone.  If you have any questions, problems, or concerns, we want you to call us.  You can reach the Infusion Nurses at [phone] Monday - Friday 8:00am to 4:30pm or the Health Unit Coordinator at [phone] Monday - Friday 8:00am to 5:00pm.      After hours, evenings, weekends, and holidays please call Urgent Care [phone] or toll free [phone] or go to your nearest Emergency Department.    IV, PICC line or Port access site care or injection site care:   Please report signs of infection or infiltration;  *Redness     *Swelling/puffiness  *Pain/tenderness   *Fever 100.4 F or higher  *Drainage         Possible side effects include:  *Diarrhea     *Allergic Reaction  *Constipation    *Drop in blood counts  *Depression/Anxiey   *Nausea/Vomiting  *Weakness/Fatigue   *Cold intolerance  *Skin changes/Rash   *Stomatitis (mouth sores)  *Loss of appetite/Taste changes *Weight gain/Swelling (edema)  *Hand-Foot syndrome   *Hypertension (high blood pressure)  *Abdominal pain    *Peripheral Neuropathy (numbness/tingling in hands/feet)    YOU NEED TO CALL US OR GO TO YOUR NEAREST URGENT CARE/EMERGENCY DEPARTMENT IF ANY OF THE FOLLOWING OCCUR:     *You have a fever of 100.4 F or higher.      *You are experiencing uncontrolled vomiting while taking your  anti-nausea medications.      *You are having watery diarrhea (4-6 loose stools in a 24 hour  period).      *You are experiencing a severe rash or skin changes.      *You have mouth sores or a sore throat.      *You have severe constipation (no BM for 3 days).      ANY questions or problems, PLEASE do not hesitate to call us!!

## 2019-01-03 NOTE — PROGRESS NOTES
"Clinic Care Coordination Contact  Care Team Conversation    OCC RN met face to face with the pt following the visit with Margot Mendenhall CNP Oncology this day.  This was the initial visit this writer has had with the pt.  Pt described during a recent office visit with her PCP, he commented on how much she was talking and then questioned her about depression.  She states she has a lot on her plate, being single, relocating, health issues and now cancer, to name a few she identified.  This topic came up during the portion this writer discusses about the [facility] Support Group, Area Psychologists as some pts struggle mentally with their cancer dx.  Pt stated her PCP offered to start the pt on a \"mild antidepressant\".  Pt asked this writer what med might be a good one; to that this writer informed the pt this is a better conversation to have with her PCP.  It was explained that he knows her medical hx, medications and has recognized this issue and already discussed it with her.  Pt informed that based on this information, it is difficult and not appropriate for this nurse to make a recommendation.  Pt strongly encouraged to contact her PCP to further discuss this issue.  Pt stated understanding.  Margot Mendenhall NP notified of this depression finding/discussion.  Pt states she was informed of her cancer dx last February and postponed doing anything until a few mos later.  See notes below from other information discussed.    Face to Face Oncology visit      Resources given to patient to assist with their journey with in cancer folder:    Care Coordinator Contact information - information about the program and the role of this writer and that of the Social Work CC discussed and flyer given.  OCC Business card given to pt              ACS card   Support Group  - discussed location, meeting time and given a brief description of the purpose of the group   ACS Peer Support online resource   List of area Psychologist " Support   Aromatherapy    Vaccination information   Cancer Rehab   How to be a Caregiver - ACS document   How to be a Friend to a Patient with Cancer - ACS booklet     Financial, Billing, or insurance questions; Visited previously by APRIL Garcia.  JAYS Application  -discussed by Karen     [facility] Application - discussed program/benefit and use. Discussed by Karen    Do you have any spiritual needs?  None at this time       Concerns with nutrition   Concerns with nutritional intake (nausea, vomiting, poor appetite, items that may be affected by her chemotherapy) - Nutrition booklet from [facility] on eating during and after chemotherapy   Nutrition consult made: None      Pharmacy   Any questions regarding your medications? None at this time       Do you have any concerns with being able to care for yourself at home? None at this time  Social work met face to face with pt previously     *     Concerns with physical health -    Cancer Rehab referral made - None    *    Clinical Trials explained to patient and handout provided in cancer folder    *    Palliative Care Referral - program briefly discussed and leaflet with further program benefits and explanations given today.      April explained to patient. Pt declined      Do you have any other questions or needs that we have not discussed - refer to note above R/T Depression.  Pt states she discussed constipation issues with DALIA Mendenhall today.    Wendie Cote, RN  [facility] Oncology Care Coordinator

## 2019-01-03 NOTE — PROGRESS NOTES
Received call from pharmacy questioning why Taxol dose was 140 mg/m2 for cycle one and now is 175 mg/m2.  Clarified with Margot Mendenhall, AUTUMN, FNP-BC, AOCNP  That she spoke with Dr. Jaimes and this dose increase was intentional.

## 2019-01-03 NOTE — PROGRESS NOTES
Patient is a 67 y/o female here accompanied by self today for infusion of paclitaxel/carboplatin under the orders of Dr. Jaimes.  right sided power port accessed with 22 gauge 3/4 inch 90 degree bent non coring needle.  Line flushed with 10 cc's normal saline.  Needle secured with sterile transparent dressing. Patient tolerated well.  Denies pain and discomfort at this time.  Port flushes easily without resistance.    Hand hygiene performed: yes   Mask donned by caregiver: yes Site prepped with CHG: yes Labs drawn: yes Dressing applied using aseptic technique: yes   Todays lab values: WBC 7.9, ANC 4.9, , HGB 13.8, AST 16, ALT 23, Alkaline Phosphatase 147, Creatinine 0.71.     Paclitaxel  dose of verified with Marley Bledsoe RN prior to release of drug.    Psychosocial well being assessed.  New concerns? No new concerns.  If yes, patient to compete Psychosocial Distress Rating Thermometer and nurse to complete PDR dot phrase.      Patient meets parameters for today's infusion.  Denies questions or concerns regarding today's infusion and/or medications being administered.      Patient identified with two identifiers, order verified, and verbal consent for today's infusion obtained from patient. Written consent for treatment is on file and valid.    1115 IV pump verified with paclitaxel 329 mg dose, drug, and rate of administration by second RN, Arti Moe. Infusion administered per protocol. Patient tolerated infusion well, no signs or symptoms of adverse reaction noted. Patient denies pain nor discomfort.   1:27 Patient reports having hot flash. Paclitaxel stopped. States hot flash improved.   1335 Restarted paclitaxel   1425 IV pump verified with carboplatin 550 mg dose, drug, and rate of administration by second RNArti. Infusion administered per protocol.

## 2019-01-07 ENCOUNTER — HOSPITAL ENCOUNTER (OUTPATIENT)
Dept: CARDIAC REHAB | Facility: HOSPITAL | Age: 69
Setting detail: THERAPIES SERIES
End: 2019-01-07
Attending: INTERNAL MEDICINE
Payer: MEDICARE

## 2019-01-07 PROCEDURE — 93798 PHYS/QHP OP CAR RHAB W/ECG: CPT

## 2019-01-07 PROCEDURE — 40000116 ZZH STATISTIC OP CR VISIT

## 2019-01-08 VITALS — HEIGHT: 59 IN | BODY MASS INDEX: 37.25 KG/M2 | WEIGHT: 184.8 LBS

## 2019-01-08 ASSESSMENT — MIFFLIN-ST. JEOR: SCORE: 1283.88

## 2019-01-08 ASSESSMENT — 6 MINUTE WALK TEST (6MWT)
MALE CALC: 1135.8
GENDER SELECTION: FEMALE
TOTAL DISTANCE WALKED (FT): 925
FEMALE CALC: 1342.72
PREDICTED: 1142.72

## 2019-01-08 NOTE — PROGRESS NOTES
" 01/08/19 1400   Session   Session 30 Day Individualized Treatment Plan   Certified through this date 02/06/19   Cardiac Rehab Assessment   Cardiac Rehab Assessment 1/8: Pt has completed 7 sessions of Phase II rehab thus far: 6 exercise sessions and an additional session for education session. Overall, pt has been doing well and has been attending sessions as tolerated following chemo therapy sessions. Pt has been diagnosed with cancer and has been balancing treatment with cardiac rehab sessions. Since pt has started treatment they have experienced generalized fatigue and they've been unable to attend rehab sessions. Staff is aware of the situation pt is facing and has been able to accommodate pt's needs and will continue to do so throughout their treatment process. Pt's most recent recent session went well and pt achieved 35 minutes of exercise since last visit over a week ago. Pt stated they had more energy and were able to tolerate exercise prescription without event. Staff will reassess pt at next session.     Pt has attended one session of education and has been encouraged to continue sessions as they fit within pt's schedule. Staff will continue to monitor and assess pt's attendance and answer questions they may have regarding education or Phase II rehab.    General Information   Treatment Diagnosis Coronary Artery Bypass Surgery   Date of Treatment Diagnosis 09/10/18   Secondary Treatment Diagnosis NSTEMI   Comorbidities Cerebrovascular Disease;Malignancy   Other Medical History Pt's family has a history of heart disease: father passing at age 45   Lead up symptoms Pt was been \"breathless\" over the past 10 months and was seen in August for CABG event.    Hospital Location [facility] Discharge Date 09/18/18   Signs and Symptoms Post Hospital Discharge Lightheadedness   Outpatient Cardiac Rehab Start Date 12/12/18   Primary Physician Faina   Primary Physician Follow Up Completed   Surgeon " "Leonardo   Surgeon Follow Up Completed   Cardiologist Hoa   Cardiologist Follow Up Completed   Ejection Fraction 55-60%   Risk Stratification Moderate   Summary of Cath Report   Summary of Cath Report No information available   Living and Work Status    Living Arrangements and Social Status house   Support System Live alone, family in area   Return to Employment Unknown   Occupation Patient is a CNA, 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   Preventative Medications   CMS recommended medications Beta Blocker;Lipid Lowering;Influenza vaccination   Fall Risk Screen   Fall screen completed by Cardiac Rehab   Have you fallen 2 or more times in the past year? No   Have you fallen and had an injury in the past year? No   Timed Up and Go score (seconds) NA   Is patient a fall risk? No   Fall screen comments 1/8: Pt recently started chemo treatment, staff will continue to monitor and assess their balance at the next session.    Abuse Screen (yes response referral indicated)   Feels Unsafe at Home or Work/School no   Does Anyone Try to Keep You From Having Contact with Others or Doing Things Outside Your Home? no   Physical Signs of Abuse Present no   Pain   Patient Currently in Pain No   Physical Assessments   Incisions WNL   Edema Not assessed   Right Lung Sounds not assessed   Left Lung Sounds not assessed   Limitations No limitations   Comments 1/8: Pt recently started chemo treatment and has not been attending Phase II regularly due to gernealized weekness and feeling ill. Pt returned to Phase II on 1/7/19, staff will continue to monitor and assess.    Individualized Treatment Plan   Monitored Sessions Scheduled 24   Monitored Sessions Attended 6   Oxygen   Supplemental Oxygen needed No   Nutrition Management - Weight Management   Assessment Re-assessment   Age 68   Weight 83.8 kg (184 lb 12.8 oz)   Height 1.499 m (4' 11\")   BMI (Calculated) 37.4   Goal Weight 65.8 kg (145 lb " "0 oz)  (Long Term Goal)   Initial Rate Your Plate Score. Dietary tool to assess eating patterns. Scores range from 24 to 72. The higher the score the healthier the eating pattern. 60   Weight Management Comments 1/8: Pt states they take after the [place] side of the family, \"heavy\" like their relatives.   Nutrition Management - Lipids   Lipids Labs Available   Date 05/06/18   Total Cholesterol 186   Triglycerides 127   HDL 44      Prescribed Lipid Medication Yes   Statin Intensity High Intensity   Lipid Comments Patient is compliant with all medications.    Nutrition Management - Diabetes   Diabetes No   Nutrition Management Summary   Dietary Recommendations Low Fat;Low Cholesterol;Low Sodium   Stages of Change for Diet Compliance Contemplation   Interventions Planned Attend Nutrition Education Class(es);Educate on Weight Management Principles   Patient Goals Goal #1   Goal #1 Description Patient would like to lose 10 lbs while attending Cardiac Rehab by increasing exercise and change snacking habits to healthier choices.    Goal #1 Target Date 03/01/19   Goal #1 Progress Towards Goal 1/8: Pt has lost 6lbs since starting Phase II rehab. Staff is aware pt was going to start dietary program but is also battling cancer which could have resulted in weight loss.    Nutrition Summary Comments 1/8: Pt has lost weight in the past by following a diet and has shown interest to start the previous program up again. Staff will assess at next session.    Nutrition Target Outcome Weight loss .5-1 lb/week (if BMI > 25)   Psychosocial Management   Psychosocial Assessment Re-assessment   Is there history of clinical depression or increased risk of depression? No previous history   Current Level of Stress per Patient Report Denies   Current Coping Skills Uses Stress Management/Relaxation Techniques   Initial Patient Health Questionnaire -9 Score (PHQ-9) for depression. 5-9 Minimal symptoms, 10-14 Minor depression, 15-19 Major " depression, moderately severe, > 20 Major depression, severe  2   Initial [facility] Survey score.  Quality of Life:   If total score > 25 review individual areas where patient rated a 4 or 5.  Consider patients current medical condition and what role that plays on the score.   Adjust treatment protocol to improve areas of concern.  Consider the following:  PHQ9 score, DASI, and re-assessment within the next 30 days to assist with developing treatments.  19   Stages of Change Contemplation   Interventions Planned Patient to verbalize understanding of negative impact of stress to personal health;Assist patient to identify positive support system;Provide resources for stress relaxation;Patient to attend stress management class(es)   Patient Goal No   Psychosocial Comments Patient states she is stressed by her neighbor who she has had to call the police on in the past. She states she does feel safe at home however. For the most part she says she just deals with stress and is fine.    Psychosocial Target Outcome Identify absence or presence of depression using valid screening tool   Other Core Components - Hypertension   History of or Diagnosis of Hypertension Yes   Currently taking Anti-Hypertensives Beta blocker;CCB   Hypertension Comments 1/8: Patient takes medications as prescribed.    Other Core Components - Tobacco   History of Tobacco Use Yes   Quit Date or Planned Quit Date 12/01/13   Tobacco Use Status Former (Quit > 6 mo ago)   Tobacco Habit Cigarettes   Tobacco Use per Day (average) .5   Years of Tobacco Use 22   Stages of Change Maintenance   Tobacco Comments 1/8: Pt has smoked since quitting in 2013 but tries to abstain from smoking as much as possible.   Other Core Components Summary   Interventions Planned Instruct patient on the DASH diet;Attend education class on Blood Pressure;Continue to assess readiness to change and implement appropriate process(es) of change;Educate on importance of maintaining  low sodium diet;Educate on importance of monitoring daily weight;Educate on signs/symptoms and when to call the doctor (i.e. increased edema, dyspnea, fatigue, dizziness/lightheadedness, change in sleep patterns, chest discomfort);Instruct and educate to self manage Heart Failure symptoms   Patient Goals No   Other Core Components Comments Patient states she was told to monitor her weights daily, but she does not really weigh herself regularly. She was encouraged to monitor her weight more closely.    Other Core Components Target Outcome BP < 140/90 or < 130/80 with DM or CKD;Compliance with Diet, Medications and Symptom Management to allow for stable Heart Failure   Activity/Exercise History   Activity/Exercise Assessment Re-assessment   Activity/Exercise Status prior to event? Was Physically Active   Number of Days Currently participating in Moderate Physical Activity? 7   Number of Days Currently performing  Aerobic Exercise (including rehab)? 2   Number of Minutes per Session Currently of Aerobic Exercise (average)? 35   Current Stage of Change (Physical Activity) Preparation   Current Stage of Change (Aerobic Exercise) Preparation   Patient Goals Goal #1   Goal #1 Description Pt would like to increase their activity and participate in Cardiac Rehab 2 d/week.   Goal #1 Target Date 03/01/19   Goal #1 Progress Towards Goal 1/8: Due to pt having recent chemo treatment pt has not been able to attend Phase II rehab. Staff will assess at next session.    Activity/Exercise Comments Patient states she was not a couch potato, but not overly active. She will not join a gym. Patient in the past has danced in her kitchen for exercise.     Activity/Exercise Target Outcome An Accumulation of 150  Minutes of Aerobic Activity per Week   Exercise Assessment   6 Minute Walk Predicted - Gender Selection Female   6 Minute Walk Predicted (Male) 1135.8   6 Minute Walk Predicted (Female) 1342.72   Initial 6 Minute Walk Distance (Feet)  925 ft   Resting  bpm   Exercise  bpm   Post Exercise HR 96 bpm   Resting /72   Exercise /70   Post Exercise /84   Effort Rating 4-5   Current MET Level 2.5   MET Level Goal 4-5   ECG Rhythm Sinus rhythm   Ectopy None   Current Symptoms Joint pain   Limitations/Restrictions Orthopedic (see comments)  (R knee)   Exercise Prescription   Mode Nustep;Treadmill;Recumbant bike;Arm Ergometer   Duration/Time 30-45 min   Frequency 2 days/week   THR (85% of age predicted max HR) 130.9   OMNI Effort Rating (0-10 Scale) 4-6/10   Progression Continuous bouts;Total exercise time of 30-45 minutes;Aerobic exercise to OMNI rating of 6 or below and at or below THR   Comments Patient wishes to attend CR two days per week.    Recommended Home Exercise   Type of Exercise Walking   Frequency (days per week) 3   Duration (minutes per session) Intermittent;15-30 min   Effort Rating Recommended 4-6/10   30 Day Exercise Plan 1/8: Pt has been encouraged to start home exercise program as soon as possible.    Current Home Exercise   Type of Exercise None   Frequency (days per week) 0   Duration (minutes per session) 0   Follow-up/On-going Support   Provider follow-up needed on the following No follow-up needed   Learning Assessment   Learner Patient   Primary Language English   Preferred Learning Style Listening;Reading;Demonstration;Pictures/Video   Barriers to Learning No barriers noted   Patient Education   Education recommended Anatomy and Physiology of the Heart;Blood Pressure;Exercise Principles;Medication Overview;Muscle Conditioning;Nutrition;Stress Management   Education classes attended Other (see comment)  (Healing Process)   Education Comments Patient is informed of all education sessions and encouraged to attend.    Physician cosignature/electronic signature indicates approval of this ITP document. I have established, reviewed and made necessary changes to the individualized treatment plan and exercise  prescription for this patient.

## 2019-01-14 ENCOUNTER — HOSPITAL ENCOUNTER (OUTPATIENT)
Dept: CARDIAC REHAB | Facility: HOSPITAL | Age: 69
Setting detail: THERAPIES SERIES
End: 2019-01-14
Attending: INTERNAL MEDICINE
Payer: MEDICARE

## 2019-01-14 ENCOUNTER — HOSPITAL ENCOUNTER (OUTPATIENT)
Dept: BONE DENSITY | Facility: HOSPITAL | Age: 69
Discharge: HOME | End: 2019-01-14
Attending: FAMILY MEDICINE | Admitting: FAMILY MEDICINE
Payer: MEDICARE

## 2019-01-14 DIAGNOSIS — Z78.0 ASYMPTOMATIC POSTMENOPAUSAL ESTROGEN DEFICIENCY: ICD-10-CM

## 2019-01-14 PROCEDURE — 40000116 ZZH STATISTIC OP CR VISIT

## 2019-01-14 PROCEDURE — 77080 DXA BONE DENSITY AXIAL: CPT | Mod: TC

## 2019-01-14 PROCEDURE — 93798 PHYS/QHP OP CAR RHAB W/ECG: CPT

## 2019-01-16 ENCOUNTER — ANCILLARY PROCEDURE (OUTPATIENT)
Dept: MAMMOGRAPHY | Facility: OTHER | Age: 69
End: 2019-01-16
Attending: INTERNAL MEDICINE
Payer: MEDICARE

## 2019-01-16 ENCOUNTER — HOSPITAL ENCOUNTER (OUTPATIENT)
Dept: CARDIAC REHAB | Facility: HOSPITAL | Age: 69
Setting detail: THERAPIES SERIES
End: 2019-01-16
Attending: INTERNAL MEDICINE
Payer: MEDICARE

## 2019-01-16 DIAGNOSIS — N63.11 LUMP IN UPPER OUTER QUADRANT OF RIGHT BREAST: ICD-10-CM

## 2019-01-16 PROCEDURE — 93798 PHYS/QHP OP CAR RHAB W/ECG: CPT

## 2019-01-16 PROCEDURE — G0279 TOMOSYNTHESIS, MAMMO: HCPCS | Mod: TC

## 2019-01-16 PROCEDURE — 40000116 ZZH STATISTIC OP CR VISIT

## 2019-01-21 ENCOUNTER — HOSPITAL ENCOUNTER (OUTPATIENT)
Dept: CARDIAC REHAB | Facility: HOSPITAL | Age: 69
Setting detail: THERAPIES SERIES
End: 2019-01-21
Attending: INTERNAL MEDICINE
Payer: MEDICARE

## 2019-01-21 PROCEDURE — 40000116 ZZH STATISTIC OP CR VISIT

## 2019-01-21 PROCEDURE — 93797 PHYS/QHP OP CAR RHAB WO ECG: CPT | Mod: 59

## 2019-01-21 PROCEDURE — 93798 PHYS/QHP OP CAR RHAB W/ECG: CPT

## 2019-01-21 PROCEDURE — 40000575 ZZH STATISTIC OP CARDIAC VISIT #2

## 2019-01-23 NOTE — PROGRESS NOTES
Oncology Follow-up Visit:  January 24, 2019    Reason for Visit:  Patient presents with:  RECHECK: Follow up Endometrial adenocarcinoma      Nursing Note and documentation reviewed: yes    HPI:  This is a 68-year-old female patient who presents to the oncology/hematology clinic today for evaluation prior to receiving cycle 3 chemotherapy for stage IB endometrial cancer diagnosed originally in February 2018.  Patient had multiple other medical issues which delayed treatment and additional biopsy completed in September 2018 again showed a FIGO grade 3 uterine cancer.  She eventually underwent surgery 10/2018 and patient was staged  [id number] (i plus); [id number].    She presents today stating she did well with her last cycle.  Her main issues today are in regards to feeling as though her memory is getting worse and she is also dealing with her emotions being up and down.  Starting an antidepressant has been discussed with her in the past by her PCP.  She tells me today she gets extreme joint pains on day 3, 4 and 5 and it mainly occurs in her hips, her legs, her knees and her feet.  She uses ibuprofen 4 tablets daily and Tylenol 3 tablets daily during this time.  She questions if there is something else I could give her that is nonaddictive for the pain.  She is having some issues with constipation still but feels she is dealing with this and having a bowel movement at least every other day.  She is using stool softener 2 a day and a laxative once a day.  She denies any blood in her stool.  She does complain of some vaginal odor stating this was present prior to her diagnosis but does feel that it is a little different now.  She did have a phone call into her GYN in regards to this.  She denies any vaginal discharge, itching or pain.    She does have questions in regards to her chemotherapy dosing today.    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  She was referred to Dr. Marino with [facility] and didn't see her then as planned.     In September 2018 she saw Dr. Marion who did a repeat endometrial biopsy showing a high-grade endometrial carcinoma, endometrioid type, FIGO grade 3 with early spindle cell features and a carcinosarcoma component could not be excluded and there was a squamous metaplastic component also noted.     The day following seeing Dr. Marion, she underwent CABG x4 and developed atrial fibrillation while hospitalized.     CT scan of the chest abdomen and pelvis completed on 10/19/2018 showed no evidence of metastatic disease and on 10/31/2018 she underwent HENRY/BSO by Dr. Marion.  Pathology was consistent with a high-grade endometrioid adenocarcinoma, FIGO grade 3/3 with tumor measuring greater than 3 cm and extensively invading into the underlying myometrium 1.9 cm.  There was a foci of lymphovascular invasion.  Right and left ovaries were negative for tumor and 1 out of 11 pelvic lymph nodes was positive for isolated tumor cells.  She was staged [id number] (i plus) or stage IB isolated tumor cells.     Related to high-grade nature of the tumor Dr. Marion recommended adjuvant carboplatin and Taxol x6 cycles then follow-up for staging studies.  She was seen here by Dr. Jaimes on 12/3/2018 in order to initiate treatment per Dr. Marion recommendations.  The plan was for Taxol 175 mg per metered squared with carboplatin AUC of 6 given every 21 days x6 cycles.     Pre-TX weight = 85.2kg (187lbs)  MMR proteins = Absent MLH1 and PMS2 with MLH1 promoter methylation positive   Genetic counseling:  To discuss     Current Chemo Regime/TX: Taxol 175 mg per metered squared/carboplatin AUC 6 every 21 days (550mg per Dr. Jaimes) initiated 12/13/2018  Current Cycle:  3  # of completed cycles: 2     Previous treatment:  n/a     Past Medical History:    Diagnosis Date     Allergic rhinitis 01/01/2011     Anxiety 01/01/2011     Anxiety state, unspecified     Anxiety state     Dyslipidemia 11/26/2003     fibromyalgia 06/14/2004     GERD, Esophageal reflux 01/01/2011     HTN (hypertension)     Essential hypertension     Major depression, recurrent (H) 1/1/2011     Nonorganic sleep disorder, unspecified     Non-org. 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, ARDS, renal failure       Past Surgical History:   Procedure Laterality Date     ANGIOGRAM  02/2005    Normal      BIOPSY BREAST  1984    LT, normal     BYPASS GRAFT ARTERY CORONARY  09/10/2018     CARPAL TUNNEL RELEASE RT/LT  2005    RT, carpal tunnel     COLONOSCOPY  2011    Repeat in ten years      COLONOSCOPY  1996     COLONOSCOPY  2004     DILATION AND CURETTAGE, HYSTEROSCOPY, ABLATE ENDOMETRIUM, COMBINED N/A 2/19/2018    Procedure: COMBINED DILATION AND CURETTAGE, HYSTEROSCOPY, ABLATE ENDOMETRIUM;  HYSTEROSCOPY DILATION AND CURETTAGE, ENDOMETRIAL ABLATION;  Surgeon: Jose Nettles MD;  Location: [facility] OR     HYSTERECTOMY TOTAL ABD, NICK SALPINGO-OOPHORECTOMY, NODE DISSECTION, TUMOR DEBULKING, COMBINED  10/30/2018     INSERT PORT VASCULAR ACCESS N/A 12/11/2018    Procedure: PORT-A-CATH PLACEMENT;  Surgeon: Rafi Trinidad MD;  Location: [facility] OR     placement of central line  2005       Family History   Problem Relation Age of Onset     C.A.D. Father 45        (cause of death)      Other - See Comments Father         rheumatic fever      Allergies Father      Cancer Sister 56        Esophageal to bone cancer     Gastrointestinal Disease Mother         GERD     Cancer Mother         pancreatic ca (cause of death) /liver ca     Breast Cancer Maternal Aunt      Breast Cancer Maternal Aunt      Colon Cancer Paternal Aunt         (cause of death)      Crohn's Disease Other      Depression Maternal Uncle      Depression Maternal Aunt       Diabetes Paternal Grandmother         type 2     Neurologic Disorder Brother         neuropathy     Cancer Brother 58        Tonsilular cancer/ lymph node in neck     Allergies Brother      Breast Cancer Cousin      Breast Cancer Cousin      Breast Cancer Cousin        Social History     Socioeconomic History     Marital status:      Spouse name: Not on file     Number of children: Not on file     Years of education: Not on file     Highest education level: Not on file   Social Needs     Financial resource strain: Not on file     Food insecurity - worry: Not on file     Food insecurity - inability: Not on file     Transportation needs - medical: Not on file     Transportation needs - non-medical: Not on file   Occupational History     Occupation: [id number]     Employer: [employer]     Comment:    Tobacco Use     Smoking status: Former Smoker     Packs/day: 0.50     Types: Cigarettes     Start date: 1971     Last attempt to quit: 2013     Years since quittin.1     Smokeless tobacco: Never Used     Tobacco comment: Year Quit:    Substance and Sexual Activity     Alcohol use: No     Comment: rare     Drug use: No     Sexual activity: No     Comment: devorced   Other Topics Concern      Service No     Blood Transfusions Yes     Comment: Permits if needed     Caffeine Concern Yes     Comment: 1 cup     Occupational Exposure No     Hobby Hazards No     Sleep Concern No     Stress Concern No     Weight Concern No     Special Diet No     Back Care No     Exercise No     Bike Helmet Not Asked     Seat Belt Yes     Self-Exams Yes     Parent/sibling w/ CABG, MI or angioplasty before 65F 55M? Yes     Comment: Father   Social History Narrative     Not on file       Current Outpatient Medications   Medication     amiodarone (PACERONE/CODARONE) 100 mg TABS half-tab     aspirin (ASA) 325 MG EC tablet     aspirin (ASA) 81 MG tablet     atorvastatin (LIPITOR) 20 MG tablet      clonazePAM (KLONOPIN) 0.25 MG TBDP ODT tab     diltiazem ER COATED BEADS (CARTIA XT) 240 MG 24 hr capsule     furosemide (LASIX) 40 MG tablet     IBUPROFEN PO     lidocaine-prilocaine (EMLA) 2.5-2.5 % external cream     Metoprolol Succinate 25 MG CS24     rOPINIRole (REQUIP) 0.25 MG tablet     senna-docusate (SENOKOT-S/PERICOLACE) 8.6-50 MG tablet     spironolactone (ALDACTONE) 25 MG tablet     acetaminophen (TYLENOL) 325 MG tablet     LORazepam (ATIVAN) 1 MG tablet     prochlorperazine (COMPAZINE) 10 MG tablet     No current facility-administered medications for this visit.         Allergies   Allergen Reactions     Cats Other (See Comments)     Sneezing, runny nose     Codeine Sulfate Nausea and Vomiting and GI Disturbance     Fexofenadine Hcl      Allegra      Rosuvastatin Other (See Comments)     Dizziness - Crestor      Benson Oil GI Disturbance     Any pink fish       Review Of Systems:  Constitutional:    denies fever, weight changes, chills, and night sweats.  Eyes:    denies blurred, some dilation of her eyes; some blurred vision and no eye pain and no double vision  Ears/Nose/Throat:   denies nose problems, difficulty swallowing, has a feeling of muffled hearring  Respiratory:   denies shortness of breath, cough   Skin:   denies rash, lesions  Cardiovascular:   denies chest pain, palpitations, edema  Gastrointestinal:   denies abdominal pain, bloating, nausea, vomiting, early satiety; is dealing with some constipation no blood in her stool  Genitourinary:   denies difficulty with urination, blood in urine; see HPI  Musculoskeletal:    See HPI  Neurologic:   denies lightheadedness, headaches, right hand little finger and left thumb and little finger are numb  Psychiatric: See HPI  Hematologic/Lymphatic/Immunologic:   denies easy bruising, easy bleeding, lumps or bumps noted  Endocrine:   Has dry mouth; hot flashes    Fatigue (0=no fatigue; 10=worst fatigue imaginable): 0    Pain  (0=no pain; 10=worst  "pain imaginable): 0    ECOG Performance Status: 0    Physical Exam:  /70   Pulse 80   Temp 96.9  F (36.1  C) (Tympanic)   Resp 20   Ht 1.499 m (4' 11\")   Wt 85.6 kg (188 lb 12.8 oz)   SpO2 99%   BMI 38.13 kg/m      GENERAL APPEARANCE: Healthy, alert and in no acute distress.  HEENT: Normocephalic, Sclerae anicteric. Oropharynx without ulcers, lesions, or thrush.  NECK:   No asymmetry or masses, no thyromegaly.  LYMPHATICS: No palpable cervical, supraclavicular, axillary, or inguinal nodes   RESP: Lungs clear to auscultation bilaterally, respirations regular and easy  CARDIOVASCULAR: Regular rate and rhythm. Normal S1, S2; murmur  ABDOMEN: Soft, nontender. Bowel sounds auscultated all 4 quadrants. No palpable organomegaly or masses.  MUSCULOSKELETAL: Extremities without gross deformities noted. No edema of bilateral lower extremities.  NEURO: Alert and oriented x 3.  Gait steady.  PSYCHIATRIC: Mentation and affect appear normal.  Mood appropriate.    Laboratory:  Results for orders placed or performed in visit on 01/24/19   CBC with platelets differential   Result Value Ref Range    WBC 10.2 4.0 - 11.0 10e9/L    RBC Count 4.77 3.8 - 5.2 10e12/L    Hemoglobin 13.9 11.7 - 15.7 g/dL    Hematocrit 41.3 35.0 - 47.0 %    MCV 87 78 - 100 fl    MCH 29.1 26.5 - 33.0 pg    MCHC 33.7 31.5 - 36.5 g/dL    RDW 18.1 (H) 10.0 - 15.0 %    Platelet Count 259 150 - 450 10e9/L    Diff Method Automated Method     % Neutrophils 69.9 %    % Lymphocytes 15.8 %    % Monocytes 10.1 %    % Eosinophils 1.7 %    % Basophils 1.0 %    % Immature Granulocytes 1.5 %    Nucleated RBCs 0 0 /100    Absolute Neutrophil 7.1 1.6 - 8.3 10e9/L    Absolute Lymphocytes 1.6 0.8 - 5.3 10e9/L    Absolute Monocytes 1.0 0.0 - 1.3 10e9/L    Absolute Eosinophils 0.2 0.0 - 0.7 10e9/L    Absolute Basophils 0.1 0.0 - 0.2 10e9/L    Abs Immature Granulocytes 0.2 0 - 0.4 10e9/L    Absolute Nucleated RBC 0.0    Comprehensive metabolic panel   Result Value Ref " Range    Sodium 138 133 - 144 mmol/L    Potassium 3.6 3.4 - 5.3 mmol/L    Chloride 105 94 - 109 mmol/L    Carbon Dioxide 26 20 - 32 mmol/L    Anion Gap 7 3 - 14 mmol/L    Glucose 98 70 - 99 mg/dL    Urea Nitrogen 18 7 - 30 mg/dL    Creatinine 0.71 0.52 - 1.04 mg/dL    GFR Estimate 89 >60 mL/min/[1.73_m2]    GFR Estimate If Black >90 >60 mL/min/[1.73_m2]    Calcium 9.1 8.5 - 10.1 mg/dL    Bilirubin Total 0.2 0.2 - 1.3 mg/dL    Albumin 3.6 3.4 - 5.0 g/dL    Protein Total 7.5 6.8 - 8.8 g/dL    Alkaline Phosphatase 134 40 - 150 U/L    ALT 20 0 - 50 U/L    AST 16 0 - 45 U/L   Magnesium   Result Value Ref Range    Magnesium 1.8 1.6 - 2.3 mg/dL       Imaging Studies: None completed for today's visit      ASSESSMENT/PLAN:    #1  Endometrial cancer:  [id number] (i plus); [id number] isolated tumor cells, high-grade endometrioid adenocarcinoma, diagnosed 2/2018 and undergoing surgery 10/2018 by Dr. Marion with recommendation for Taxol/Carboplatin x6 cycles.    We did discuss the dosing of her chemotherapy today in depth.  She will receive cycle 3 today and return prior to cycle 4 with labs per treatment plan.    #2 depression: We discussed instituting a low-dose antidepressant.  Related to patient's cardiac history and medications I would like her to follow-up with her PCP in regards to the best management of this.    #3  Vaginal odor: We did discuss this in depth and I did recommend a visit with a GYN provider related to potential infection.  Patient declines doing this at this point.    #4  Joint pains: We will try Claritin 10 mg for 7 days after chemotherapy and I instructed her to actually start this 3-4 days prior to her next cycle.  In addition I did send a prescription for tramadol 50 mg to use every 6 hours as needed for pain during those days when she experiences the joint pain.  She verbalized understanding.    I encouraged patient to call with any questions or concerns.      Gabriela LEYVA, FNP-BC, AOCNP

## 2019-01-24 ENCOUNTER — INFUSION THERAPY VISIT (OUTPATIENT)
Dept: INFUSION THERAPY | Facility: OTHER | Age: 69
End: 2019-01-24
Attending: INTERNAL MEDICINE
Payer: MEDICARE

## 2019-01-24 ENCOUNTER — ONCOLOGY VISIT (OUTPATIENT)
Dept: ONCOLOGY | Facility: OTHER | Age: 69
End: 2019-01-24
Attending: NURSE PRACTITIONER
Payer: MEDICARE

## 2019-01-24 ENCOUNTER — PATIENT OUTREACH (OUTPATIENT)
Dept: ONCOLOGY | Facility: OTHER | Age: 69
End: 2019-01-24

## 2019-01-24 VITALS
DIASTOLIC BLOOD PRESSURE: 76 MMHG | BODY MASS INDEX: 38.06 KG/M2 | WEIGHT: 188.8 LBS | SYSTOLIC BLOOD PRESSURE: 140 MMHG | TEMPERATURE: 96.9 F | HEART RATE: 80 BPM | RESPIRATION RATE: 18 BRPM | HEIGHT: 59 IN | OXYGEN SATURATION: 99 %

## 2019-01-24 VITALS
BODY MASS INDEX: 38.06 KG/M2 | HEIGHT: 59 IN | HEART RATE: 80 BPM | OXYGEN SATURATION: 99 % | TEMPERATURE: 96.9 F | SYSTOLIC BLOOD PRESSURE: 120 MMHG | WEIGHT: 188.8 LBS | DIASTOLIC BLOOD PRESSURE: 70 MMHG | RESPIRATION RATE: 18 BRPM

## 2019-01-24 VITALS
RESPIRATION RATE: 20 BRPM | HEART RATE: 80 BPM | HEIGHT: 59 IN | OXYGEN SATURATION: 99 % | WEIGHT: 188.8 LBS | BODY MASS INDEX: 38.06 KG/M2 | SYSTOLIC BLOOD PRESSURE: 120 MMHG | TEMPERATURE: 96.9 F | DIASTOLIC BLOOD PRESSURE: 70 MMHG

## 2019-01-24 DIAGNOSIS — C54.1 ADENOCARCINOMA OF THE ENDOMETRIUM/UTERUS (H): Primary | ICD-10-CM

## 2019-01-24 DIAGNOSIS — C55 MALIGNANT NEOPLASM OF UTERUS, UNSPECIFIED SITE (H): ICD-10-CM

## 2019-01-24 DIAGNOSIS — N89.8 VAGINAL ODOR: ICD-10-CM

## 2019-01-24 DIAGNOSIS — C54.1 ENDOMETRIAL ADENOCARCINOMA (H): ICD-10-CM

## 2019-01-24 DIAGNOSIS — F32.A DEPRESSION, UNSPECIFIED DEPRESSION TYPE: ICD-10-CM

## 2019-01-24 DIAGNOSIS — C54.1 ENDOMETRIAL ADENOCARCINOMA (H): Primary | ICD-10-CM

## 2019-01-24 DIAGNOSIS — M25.50 MULTIPLE JOINT PAIN: ICD-10-CM

## 2019-01-24 LAB
ALBUMIN SERPL-MCNC: 3.6 G/DL (ref 3.4–5)
ALP SERPL-CCNC: 134 U/L (ref 40–150)
ALT SERPL W P-5'-P-CCNC: 20 U/L (ref 0–50)
ANION GAP SERPL CALCULATED.3IONS-SCNC: 7 MMOL/L (ref 3–14)
AST SERPL W P-5'-P-CCNC: 16 U/L (ref 0–45)
BASOPHILS # BLD AUTO: 0.1 10E9/L (ref 0–0.2)
BASOPHILS NFR BLD AUTO: 1 %
BILIRUB SERPL-MCNC: 0.2 MG/DL (ref 0.2–1.3)
BUN SERPL-MCNC: 18 MG/DL (ref 7–30)
CALCIUM SERPL-MCNC: 9.1 MG/DL (ref 8.5–10.1)
CHLORIDE SERPL-SCNC: 105 MMOL/L (ref 94–109)
CO2 SERPL-SCNC: 26 MMOL/L (ref 20–32)
CREAT SERPL-MCNC: 0.71 MG/DL (ref 0.52–1.04)
DIFFERENTIAL METHOD BLD: ABNORMAL
EOSINOPHIL # BLD AUTO: 0.2 10E9/L (ref 0–0.7)
EOSINOPHIL NFR BLD AUTO: 1.7 %
ERYTHROCYTE [DISTWIDTH] IN BLOOD BY AUTOMATED COUNT: 18.1 % (ref 10–15)
GFR SERPL CREATININE-BSD FRML MDRD: 89 ML/MIN/{1.73_M2}
GLUCOSE SERPL-MCNC: 98 MG/DL (ref 70–99)
HCT VFR BLD AUTO: 41.3 % (ref 35–47)
HGB BLD-MCNC: 13.9 G/DL (ref 11.7–15.7)
IMM GRANULOCYTES # BLD: 0.2 10E9/L (ref 0–0.4)
IMM GRANULOCYTES NFR BLD: 1.5 %
LYMPHOCYTES # BLD AUTO: 1.6 10E9/L (ref 0.8–5.3)
LYMPHOCYTES NFR BLD AUTO: 15.8 %
MAGNESIUM SERPL-MCNC: 1.8 MG/DL (ref 1.6–2.3)
MCH RBC QN AUTO: 29.1 PG (ref 26.5–33)
MCHC RBC AUTO-ENTMCNC: 33.7 G/DL (ref 31.5–36.5)
MCV RBC AUTO: 87 FL (ref 78–100)
MONOCYTES # BLD AUTO: 1 10E9/L (ref 0–1.3)
MONOCYTES NFR BLD AUTO: 10.1 %
NEUTROPHILS # BLD AUTO: 7.1 10E9/L (ref 1.6–8.3)
NEUTROPHILS NFR BLD AUTO: 69.9 %
NRBC # BLD AUTO: 0 10*3/UL
NRBC BLD AUTO-RTO: 0 /100
PLATELET # BLD AUTO: 259 10E9/L (ref 150–450)
POTASSIUM SERPL-SCNC: 3.6 MMOL/L (ref 3.4–5.3)
PROT SERPL-MCNC: 7.5 G/DL (ref 6.8–8.8)
RBC # BLD AUTO: 4.77 10E12/L (ref 3.8–5.2)
SODIUM SERPL-SCNC: 138 MMOL/L (ref 133–144)
WBC # BLD AUTO: 10.2 10E9/L (ref 4–11)

## 2019-01-24 PROCEDURE — 96367 TX/PROPH/DG ADDL SEQ IV INF: CPT

## 2019-01-24 PROCEDURE — 96415 CHEMO IV INFUSION ADDL HR: CPT

## 2019-01-24 PROCEDURE — G0463 HOSPITAL OUTPT CLINIC VISIT: HCPCS

## 2019-01-24 PROCEDURE — 36415 COLL VENOUS BLD VENIPUNCTURE: CPT | Mod: ZL | Performed by: INTERNAL MEDICINE

## 2019-01-24 PROCEDURE — 83735 ASSAY OF MAGNESIUM: CPT | Mod: ZL | Performed by: INTERNAL MEDICINE

## 2019-01-24 PROCEDURE — 80053 COMPREHEN METABOLIC PANEL: CPT | Mod: ZL | Performed by: INTERNAL MEDICINE

## 2019-01-24 PROCEDURE — 99214 OFFICE O/P EST MOD 30 MIN: CPT | Performed by: NURSE PRACTITIONER

## 2019-01-24 PROCEDURE — 96417 CHEMO IV INFUS EACH ADDL SEQ: CPT

## 2019-01-24 PROCEDURE — 96523 IRRIG DRUG DELIVERY DEVICE: CPT

## 2019-01-24 PROCEDURE — 25000132 ZZH RX MED GY IP 250 OP 250 PS 637: Mod: GY | Performed by: INTERNAL MEDICINE

## 2019-01-24 PROCEDURE — A9270 NON-COVERED ITEM OR SERVICE: HCPCS | Mod: GY | Performed by: INTERNAL MEDICINE

## 2019-01-24 PROCEDURE — 25000125 ZZHC RX 250: Performed by: INTERNAL MEDICINE

## 2019-01-24 PROCEDURE — 25000128 H RX IP 250 OP 636: Performed by: INTERNAL MEDICINE

## 2019-01-24 PROCEDURE — 96375 TX/PRO/DX INJ NEW DRUG ADDON: CPT

## 2019-01-24 PROCEDURE — 96413 CHEMO IV INFUSION 1 HR: CPT

## 2019-01-24 PROCEDURE — 85025 COMPLETE CBC W/AUTO DIFF WBC: CPT | Mod: ZL | Performed by: INTERNAL MEDICINE

## 2019-01-24 PROCEDURE — G0463 HOSPITAL OUTPT CLINIC VISIT: HCPCS | Mod: 25

## 2019-01-24 RX ORDER — PALONOSETRON 0.05 MG/ML
0.25 INJECTION, SOLUTION INTRAVENOUS ONCE
Status: COMPLETED | OUTPATIENT
Start: 2019-01-24 | End: 2019-01-24

## 2019-01-24 RX ORDER — DIPHENHYDRAMINE HCL 50 MG
50 CAPSULE ORAL ONCE
Status: COMPLETED | OUTPATIENT
Start: 2019-01-24 | End: 2019-01-24

## 2019-01-24 RX ORDER — HEPARIN SODIUM (PORCINE) LOCK FLUSH IV SOLN 100 UNIT/ML 100 UNIT/ML
5 SOLUTION INTRAVENOUS ONCE
Status: CANCELLED
Start: 2019-01-24 | End: 2019-01-24

## 2019-01-24 RX ORDER — TRAMADOL HYDROCHLORIDE 50 MG/1
50 TABLET ORAL EVERY 6 HOURS PRN
Qty: 30 TABLET | Refills: 0 | Status: SHIPPED | OUTPATIENT
Start: 2019-01-24 | End: 2019-02-12

## 2019-01-24 RX ORDER — HEPARIN SODIUM (PORCINE) LOCK FLUSH IV SOLN 100 UNIT/ML 100 UNIT/ML
5 SOLUTION INTRAVENOUS ONCE
Status: COMPLETED | OUTPATIENT
Start: 2019-01-24 | End: 2019-01-24

## 2019-01-24 RX ADMIN — PALONOSETRON HYDROCHLORIDE 0.25 MG: 0.25 INJECTION INTRAVENOUS at 11:39

## 2019-01-24 RX ADMIN — DEXAMETHASONE SODIUM PHOSPHATE 20 MG: 10 INJECTION, SOLUTION INTRAMUSCULAR; INTRAVENOUS at 11:13

## 2019-01-24 RX ADMIN — CARBOPLATIN 550 MG: 10 INJECTION, SOLUTION INTRAVENOUS at 15:11

## 2019-01-24 RX ADMIN — DIPHENHYDRAMINE HYDROCHLORIDE 50 MG: 50 CAPSULE ORAL at 11:14

## 2019-01-24 RX ADMIN — SODIUM CHLORIDE 250 ML: 9 INJECTION, SOLUTION INTRAVENOUS at 11:13

## 2019-01-24 RX ADMIN — SODIUM CHLORIDE, PRESERVATIVE FREE 5 ML: 5 INJECTION INTRAVENOUS at 16:07

## 2019-01-24 RX ADMIN — FAMOTIDINE 40 MG: 10 INJECTION, SOLUTION INTRAVENOUS at 11:39

## 2019-01-24 RX ADMIN — PACLITAXEL 329 MG: 6 INJECTION, SOLUTION INTRAVENOUS at 12:03

## 2019-01-24 ASSESSMENT — ACTIVITIES OF DAILY LIVING (ADL): DEPENDENT_IADLS:: INDEPENDENT

## 2019-01-24 ASSESSMENT — MIFFLIN-ST. JEOR
SCORE: 1302.27
SCORE: 1302.27
SCORE: 1302.02

## 2019-01-24 ASSESSMENT — PATIENT HEALTH QUESTIONNAIRE - PHQ9: SUM OF ALL RESPONSES TO PHQ QUESTIONS 1-9: 0

## 2019-01-24 ASSESSMENT — PAIN SCALES - GENERAL: PAINLEVEL: NO PAIN (0)

## 2019-01-24 NOTE — PATIENT INSTRUCTIONS
We will see you back as planned. If you have any questions or concerns, we can be reached Monday through Friday 8am - 430pm at [phone] ([facility]), [phone] ([facility] Loera with Dr Jaimes), or [phone] (Infusion). If you have concerns related to a potential reaction/side effect after hours/weekends/holiday's, please seek emergent medical care.   Patient Education   Patient Education     Paclitaxel Solution for injection  What is this medicine?  PACLITAXEL (INDERJIT li TAX el) is a chemotherapy drug. It targets fast dividing cells, like cancer cells, and causes these cells to die. This medicine is used to treat ovarian cancer, breast cancer, and other cancers.  This medicine may be used for other purposes; ask your health care provider or pharmacist if you have questions.  What should I tell my health care provider before I take this medicine?  They need to know if you have any of these conditions:    blood disorders    irregular heartbeat    infection (especially a virus infection such as chickenpox, cold sores, or herpes)    liver disease    previous or ongoing radiation therapy    an unusual or allergic reaction to paclitaxel, alcohol, polyoxyethylated castor oil, other chemotherapy agents, other medicines, foods, dyes, or preservatives    pregnant or trying to get pregnant    breast-feeding  How should I use this medicine?  This drug is given as an infusion into a vein. It is administered in a hospital or clinic by a specially trained health care professional.  Talk to your pediatrician regarding the use of this medicine in children. Special care may be needed.  Overdosage: If you think you have taken too much of this medicine contact a poison control center or emergency room at once.  NOTE: This medicine is only for you. Do not share this medicine with others.  What if I miss a dose?  It is important not to miss your dose. Call your doctor or health care professional if you are unable to keep an appointment.  What  may interact with this medicine?  Do not take this medicine with any of the following medications:    disulfiram    metronidazole  This medicine may also interact with the following medications:    cyclosporine    diazepam    ketoconazole    medicines to increase blood counts like filgrastim, pegfilgrastim, sargramostim    other chemotherapy drugs like cisplatin, doxorubicin, epirubicin, etoposide, teniposide, vincristine    quinidine    testosterone    vaccines    verapamil  Talk to your doctor or health care professional before taking any of these medicines:    acetaminophen    aspirin    ibuprofen    ketoprofen    naproxen  This list may not describe all possible interactions. Give your health care provider a list of all the medicines, herbs, non-prescription drugs, or dietary supplements you use. Also tell them if you smoke, drink alcohol, or use illegal drugs. Some items may interact with your medicine.  What should I watch for while using this medicine?  Your condition will be monitored carefully while you are receiving this medicine. You will need important blood work done while you are taking this medicine.  This drug may make you feel generally unwell. This is not uncommon, as chemotherapy can affect healthy cells as well as cancer cells. Report any side effects. Continue your course of treatment even though you feel ill unless your doctor tells you to stop.  In some cases, you may be given additional medicines to help with side effects. Follow all directions for their use.  Call your doctor or health care professional for advice if you get a fever, chills or sore throat, or other symptoms of a cold or flu. Do not treat yourself. This drug decreases your body's ability to fight infections. Try to avoid being around people who are sick.  This medicine may increase your risk to bruise or bleed. Call your doctor or health care professional if you notice any unusual bleeding.  Be careful brushing and flossing  your teeth or using a toothpick because you may get an infection or bleed more easily. If you have any dental work done, tell your dentist you are receiving this medicine.  Avoid taking products that contain aspirin, acetaminophen, ibuprofen, naproxen, or ketoprofen unless instructed by your doctor. These medicines may hide a fever.  Do not become pregnant while taking this medicine. Women should inform their doctor if they wish to become pregnant or think they might be pregnant. There is a potential for serious side effects to an unborn child. Talk to your health care professional or pharmacist for more information. Do not breast-feed an infant while taking this medicine.  Men are advised not to father a child while receiving this medicine.  What side effects may I notice from receiving this medicine?  Side effects that you should report to your doctor or health care professional as soon as possible:    allergic reactions like skin rash, itching or hives, swelling of the face, lips, or tongue    low blood counts - This drug may decrease the number of white blood cells, red blood cells and platelets. You may be at increased risk for infections and bleeding.    signs of infection - fever or chills, cough, sore throat, pain or difficulty passing urine    signs of decreased platelets or bleeding - bruising, pinpoint red spots on the skin, black, tarry stools, nosebleeds    signs of decreased red blood cells - unusually weak or tired, fainting spells, lightheadedness    breathing problems    chest pain    high or low blood pressure    mouth sores    nausea and vomiting    pain, swelling, redness or irritation at the injection site    pain, tingling, numbness in the hands or feet    slow or irregular heartbeat    swelling of the ankle, feet, hands  Side effects that usually do not require medical attention (report to your doctor or health care professional if they continue or are bothersome):    bone pain    complete  hair loss including hair on your head, underarms, pubic hair, eyebrows, and eyelashes    changes in the color of fingernails    diarrhea    loosening of the fingernails    loss of appetite    muscle or joint pain    red flush to skin    sweating  This list may not describe all possible side effects. Call your doctor for medical advice about side effects. You may report side effects to FDA at 3-064-VDM-3334.  Where should I keep my medicine?  This drug is given in a hospital or clinic and will not be stored at home.  NOTE:This sheet is a summary. It may not cover all possible information. If you have questions about this medicine, talk to your doctor, pharmacist, or health care provider. Copyright  2016 Gold Standard           Carboplatin Solution for injection  What is this medicine?  CARBOPLATIN (ZOEY erik sylvester tin) is a chemotherapy drug. It targets fast dividing cells, like cancer cells, and causes these cells to die. This medicine is used to treat ovarian cancer and many other cancers.  This medicine may be used for other purposes; ask your health care provider or pharmacist if you have questions.  What should I tell my health care provider before I take this medicine?  They need to know if you have any of these conditions:    blood disorders    hearing problems    kidney disease    recent or ongoing radiation therapy    an unusual or allergic reaction to carboplatin, cisplatin, other chemotherapy, other medicines, foods, dyes, or preservatives    pregnant or trying to get pregnant    breast-feeding  How should I use this medicine?  This drug is usually given as an infusion into a vein. It is administered in a hospital or clinic by a specially trained health care professional.  Talk to your pediatrician regarding the use of this medicine in children. Special care may be needed.  Overdosage: If you think you have taken too much of this medicine contact a poison control center or emergency room at once.  NOTE: This  medicine is only for you. Do not share this medicine with others.  What if I miss a dose?  It is important not to miss a dose. Call your doctor or health care professional if you are unable to keep an appointment.  What may interact with this medicine?    medicines for seizures    medicines to increase blood counts like filgrastim, pegfilgrastim, sargramostim    some antibiotics like amikacin, gentamicin, neomycin, streptomycin, tobramycin    vaccines  Talk to your doctor or health care professional before taking any of these medicines:    acetaminophen    aspirin    ibuprofen    ketoprofen    naproxen  This list may not describe all possible interactions. Give your health care provider a list of all the medicines, herbs, non-prescription drugs, or dietary supplements you use. Also tell them if you smoke, drink alcohol, or use illegal drugs. Some items may interact with your medicine.  What should I watch for while using this medicine?  Your condition will be monitored carefully while you are receiving this medicine. You will need important blood work done while you are taking this medicine.  This drug may make you feel generally unwell. This is not uncommon, as chemotherapy can affect healthy cells as well as cancer cells. Report any side effects. Continue your course of treatment even though you feel ill unless your doctor tells you to stop.  In some cases, you may be given additional medicines to help with side effects. Follow all directions for their use.  Call your doctor or health care professional for advice if you get a fever, chills or sore throat, or other symptoms of a cold or flu. Do not treat yourself. This drug decreases your body's ability to fight infections. Try to avoid being around people who are sick.  This medicine may increase your risk to bruise or bleed. Call your doctor or health care professional if you notice any unusual bleeding.  Be careful brushing and flossing your teeth or using a  toothpick because you may get an infection or bleed more easily. If you have any dental work done, tell your dentist you are receiving this medicine.  Avoid taking products that contain aspirin, acetaminophen, ibuprofen, naproxen, or ketoprofen unless instructed by your doctor. These medicines may hide a fever.  Do not become pregnant while taking this medicine. Women should inform their doctor if they wish to become pregnant or think they might be pregnant. There is a potential for serious side effects to an unborn child. Talk to your health care professional or pharmacist for more information. Do not breast-feed an infant while taking this medicine.  What side effects may I notice from receiving this medicine?  Side effects that you should report to your doctor or health care professional as soon as possible:    allergic reactions like skin rash, itching or hives, swelling of the face, lips, or tongue    signs of infection - fever or chills, cough, sore throat, pain or difficulty passing urine    signs of decreased platelets or bleeding - bruising, pinpoint red spots on the skin, black, tarry stools, nosebleeds    signs of decreased red blood cells - unusually weak or tired, fainting spells, lightheadedness    breathing problems    changes in hearing    changes in vision    chest pain    high blood pressure    low blood counts - This drug may decrease the number of white blood cells, red blood cells and platelets. You may be at increased risk for infections and bleeding.    nausea and vomiting    pain, swelling, redness or irritation at the injection site    pain, tingling, numbness in the hands or feet    problems with balance, talking, walking    trouble passing urine or change in the amount of urine  Side effects that usually do not require medical attention (report to your doctor or health care professional if they continue or are bothersome):    hair loss    loss of appetite    metallic taste in the mouth or  changes in taste  This list may not describe all possible side effects. Call your doctor for medical advice about side effects. You may report side effects to FDA at 2-821-VAE-6252.  Where should I keep my medicine?  This drug is given in a hospital or clinic and will not be stored at home.  NOTE:This sheet is a summary. It may not cover all possible information. If you have questions about this medicine, talk to your doctor, pharmacist, or health care provider. Copyright  2016 Gold Standard

## 2019-01-24 NOTE — PROGRESS NOTES
Patients right sided power port accessed using non-coring, 19 gauge, 3/4 inch needle.Mask donned by caregiver: yes Site prepped with CHG: yes Labs drawn: yes Dressing applied using aseptic technique: yes. Port accessed per facility protocol. Port flushed easily, blood return noted.  No signs and symptoms of infection or infiltration.  Port flushed 2 mL's normal saline, blood return noted, flushed with 8  mLs Normal Saline, 10 mLs blood discarded.  2 tubes taken for ordered labs, port flushed with 20 mLs normal saline.  Port left accessed as patient has appointment with Margot Mendenhall, and is then due for chemo if parameters are met.  Patient discharged with no complaints. Declined AVS.

## 2019-01-24 NOTE — PROGRESS NOTES
Clinic Care Coordination Contact  Care Team Conversations    OCC RN met face to face with the pt this day in the Infusion dept.  The purpose of the visit was to follow up with any new issues or concerns.  Pt received a copy of the Emergency Care Plan this day.  The benefits and use of the document were discussed and pt was given an opportunity to ask questions about this form.  Pt stated an example of how she could use it in the future, such as if she had to cancel an Oncology appt; she used today's weather as an example.  Pt stated understanding the document.  Pt stated she had her folder and bag she received from this writer in the past and had a prominent place she was going to put the ED Care Plan.  Pt highlighted the pertinent phone numbers.  Pt was here to receive her Chemotherapy today.  Pt stated she felt good today and expressed no new concerns; she stated one day recently where she was reminiscing about the past and thought of this writer and decided to bake cookies as it made her happy.  The visit ended with all questions answered and understanding stated.      Wendie Cote RN  [facility] Care Coordinator

## 2019-01-24 NOTE — PROGRESS NOTES
Patient is a 68 year old female here accompanied by self today for infusion of carboplatin and paclitaxel under the orders of Dr. Jaimes.     Today's lab values: WBC 10.2, ANC 7.1, , HGB 13.9, AST 16, ALT 20, Alkaline Phosphatase 134, Creatinine 0.71.     Paclitaxel dose of 329mg verified with Arti Moe RN prior to release of drug.    Psychosocial well being assessed.  New concerns? Reports she is depressed and has been struggling with it. Talked to Margot Mendenhall NP who chose to have patient see PCP for medication management after reviewing medications with pharmacy. [facility] helped her set up appointment for next week with PCP Dr Eisenberg.      Patient was assessed using the NCCN psychosocial distress thermometer. Patient rated the score as a 4-5. Patient rated current stressors as financial, transportation, worry, sadness. Stressors will be brought to the attention of provider or Oncology RN Care Coordinator for a score of 6 or greater or per nurses discretion. Care Coordinator did stop in to visit with this patient this date.        Patient meets parameters for today's infusion.  Denies questions or concerns regarding today's infusion and/or medications being administered.      Patient identified with two identifiers, order verified, and verbal consent for today's infusion obtained from patient. Written consent for treatment is on file and valid.    1203: IV pump verified with paclitaxel 329mg dose, drug, and rate of administration by second RN, Arti Moe. Infusion administered per protocol. Patient tolerated infusion well, no signs or symptoms of adverse reaction noted. Patient denies pain nor discomfort.     1511: IV pump verified with carboplatin 550mg dose, drug, and rate of administration by second RN, Dana Mcclendon. Infusion administered per protocol. Patient tolerated infusion well, no signs or symptoms of adverse reaction noted. Patient denies pain nor discomfort.     Needle removed, tip  intact. Site clean, dry and intact. Covered with a sterile bandage, slight pressure applied for 30 seconds. Pt instructed to leave bandage intact for a minimum of one hour, and to call with questions or concerns. Copy of appointments, discharge instructions, and after visit summary (AVS) provided to patient. Patient states understanding, discharged ambulatory.

## 2019-01-24 NOTE — PATIENT INSTRUCTIONS
We would like to see you back per your calender.     Your prescription for Tramadol has been sent to: [facility].      When you are in need of a refill, please call your pharmacy and they will send us a request.     If you have any questions please call [phone]    Other instructions:      1.  Please  claritin (loratidine) 10mg tablet and take daily for 7 days-start this 3-4 days prior to your next cycle of chemo-this may help to decrease your pain with the chemotherapy.  2.  A prescription for Tramadol was sent to your pharmacy to use during the days after chemo when you have pain.  3.  I would like you to see your primary care provider for a discussion on what medication could be started for depression.  4.  I recommend GYN evaluation for vaginal complaints.

## 2019-01-24 NOTE — NURSING NOTE
"Chief Complaint   Patient presents with     RECHECK     Follow up Endometrial adenocarcinoma        Initial /70   Pulse 80   Temp 96.9  F (36.1  C) (Tympanic)   Resp 20   Ht 1.499 m (4' 11\")   Wt 85.6 kg (188 lb 12.8 oz)   SpO2 99%   BMI 38.13 kg/m   Estimated body mass index is 38.13 kg/m  as calculated from the following:    Height as of this encounter: 1.499 m (4' 11\").    Weight as of this encounter: 85.6 kg (188 lb 12.8 oz).  Medication Reconciliation: complete.  Immunizations reviewed, has info on advanced directives, pain = 0.     Aromatherapy / Integrative Healing Progress Note      Heather Rosas  68 year old  female  There were no encounter diagnoses.      Initial session: YES  Subsequent session: NO    Assessment & Reassessment (30 -  60 minutes post treatment)    Pain Location: N/A        Other symptoms: anxiety    Post Session Observation: Calm/Relaxed    Intervention    Integrative Therapy(ies) Provided   List all that apply. Specify aromatherapy product used: Inhalation: sweet orange    Qualitative Responses    Patient comments/response: she thinks it does    Family/support person comments/response: N/A      Concurrent medications given for symptoms being addressed: Lorazepam       Time Spent: 20 min        Karuna Larsen LPN                  "

## 2019-01-28 ENCOUNTER — HOSPITAL ENCOUNTER (OUTPATIENT)
Dept: CARDIAC REHAB | Facility: HOSPITAL | Age: 69
Setting detail: THERAPIES SERIES
End: 2019-01-28
Attending: INTERNAL MEDICINE
Payer: MEDICARE

## 2019-01-28 PROCEDURE — 93798 PHYS/QHP OP CAR RHAB W/ECG: CPT

## 2019-01-28 PROCEDURE — 40000116 ZZH STATISTIC OP CR VISIT

## 2019-02-04 ENCOUNTER — PATIENT OUTREACH (OUTPATIENT)
Dept: CARE COORDINATION | Facility: OTHER | Age: 69
End: 2019-02-04

## 2019-02-04 NOTE — PROGRESS NOTES
Clinic Care Coordination Contact  Care Team Conversations    Received voicemail from pt that she still has not received [employer] funding.  She also stated she had heard that [employer] has been increased to a second gift allotment of $200 and would like to reapply.    Reached out to [employer] and left message for return call.  Attempted to call pt to update her, but she was unavailable and did not have voicemail.  Will try again.  Mailed new [employer] application to pt.    Melina Correa, [facility]  Outpatient   [phone]

## 2019-02-04 NOTE — LETTER
February 4, 2019  Melina Correa  Clinic care coordination      Heather Rosas  [address] [address]        Dear Heather,     Here is a new [employer] application per your voicemail.  I also have reached out to [employer] and will update you with what I find out.      Sincerely,      APRIL Coffey

## 2019-02-06 VITALS — BODY MASS INDEX: 37.5 KG/M2 | HEIGHT: 59 IN | WEIGHT: 186 LBS

## 2019-02-06 ASSESSMENT — 6 MINUTE WALK TEST (6MWT)
TOTAL DISTANCE WALKED (FT): 925
FEMALE CALC: 1338.62
PREDICTED: 1139.56
MALE CALC: 1132.65
GENDER SELECTION: FEMALE

## 2019-02-06 ASSESSMENT — MIFFLIN-ST. JEOR: SCORE: 1289.32

## 2019-02-06 NOTE — PROGRESS NOTES
" 02/06/19 1000   Session   Session 60 Day Individualized Treatment Plan   Certified through this date 03/07/19   Cardiac Rehab Assessment   Cardiac Rehab Assessment 2/6: Pt completes 12 sessions of Phase II rehab since start: 10 exercise sessions and 2 sessions for education. During the first few sessions pt was progressing well while exercising and achieving MET levels that were appropriate for the workloads. During the initial session pt was straight forward acknowledged that they will being going in for treatment for cancer diagnosis and will not be able to participate on a regular basis. Staff is aware of the changes and will continue to monitor and assess pt when they are exercising during Phase II.    Pt has attended two educational sessions when since their start and they intend to continue attending more if they attend on days education is offered. Staff will continue to monitor and assess during their sessions.    General Information   Treatment Diagnosis Coronary Artery Bypass Surgery   Date of Treatment Diagnosis 09/10/18   Secondary Treatment Diagnosis NSTEMI   Comorbidities Cerebrovascular Disease;Malignancy   Other Medical History Pt's family has a history of heart disease: father passing at age 45   Lead up symptoms Pt was been \"breathless\" over the past 10 months and was seen in August for CABG event.    Hospital Location [facility]   Hospital Discharge Date 09/18/18   Signs and Symptoms Post Hospital Discharge Lightheadedness   Outpatient Cardiac Rehab Start Date 12/12/18   Primary Physician Faina   Primary Physician Follow Up Completed   Surgeon Leonardo   Surgeon Follow Up Completed   Cardiologist Hoa   Cardiologist Follow Up Completed   Ejection Fraction 55-60%   Risk Stratification Moderate   Summary of Cath Report   Summary of Cath Report No information available   Living and Work Status    Living Arrangements and Social Status house   Support System Live alone, family in area " "  Return to Employment Unknown   Occupation Patient is a CNA, 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   Preventative Medications   CMS recommended medications Beta Blocker;Lipid Lowering;Influenza vaccination   Fall Risk Screen   Fall screen completed by Cardiac Rehab   Have you fallen 2 or more times in the past year? No   Have you fallen and had an injury in the past year? No   Timed Up and Go score (seconds) NA   Is patient a fall risk? No   Fall screen comments 2/6: Pt recently started chemo treatment, staff will continue to monitor and assess.   Abuse Screen (yes response referral indicated)   Feels Unsafe at Home or Work/School no   Feels Threatened by Someone no   Does Anyone Try to Keep You From Having Contact with Others or Doing Things Outside Your Home? no   Physical Signs of Abuse Present no   Pain   Patient Currently in Pain No   Physical Assessments   Incisions WNL   Edema Not assessed   Right Lung Sounds not assessed   Left Lung Sounds not assessed   Limitations No limitations   Comments 2/6: Pt recently started chemo treatment and has not been attending Phase II regularly due to gernealized weekness and feeling ill.    Individualized Treatment Plan   Monitored Sessions Scheduled 24   Monitored Sessions Attended 10   Oxygen   Supplemental Oxygen needed No   Nutrition Management - Weight Management   Assessment Re-assessment   Age 68   Weight 84.4 kg (186 lb)   Height 1.499 m (4' 11\")   BMI (Calculated) 37.65   Goal Weight 65.8 kg (145 lb 0 oz)   Initial Rate Your Plate Score. Dietary tool to assess eating patterns. Scores range from 24 to 72. The higher the score the healthier the eating pattern. 60   Weight Management Comments 2/6: Pt states they take after the Kinyarwanda side of the family, \"heavy\" like their relatives.   Nutrition Management - Lipids   Lipids Labs Available   Date 05/06/18   Total Cholesterol 186   Triglycerides 127   HDL 44    "   Prescribed Lipid Medication Yes   Statin Intensity High Intensity   Lipid Comments Patient is compliant with all medications.    Nutrition Management - Diabetes   Diabetes No   Nutrition Management Summary   Dietary Recommendations Low Fat;Low Cholesterol;Low Sodium   Stages of Change for Diet Compliance Contemplation   Interventions Planned Attend Nutrition Education Class(es);Educate on Weight Management Principles   Patient Goals Goal #1   Goal #1 Description Patient would like to lose 10 lbs while attending Cardiac Rehab by increasing exercise and change snacking habits to healthier choices.    Goal #1 Target Date 03/01/19   Goal #1 Progress Towards Goal 2/6: Pt's current weight is 4 lbs less than the initial evaluation. Pt has not attended Phase II rehab since 1/28/19 due to cancer treatment.   Nutrition Summary Comments 2/6: Pt has lost weight in the past by following a diet and has shown interest to start the previous program up again. Staff will assess at next session.    Nutrition Target Outcome Weight loss .5-1 lb/week (if BMI > 25)   Psychosocial Management   Psychosocial Assessment Re-assessment   Is there history of clinical depression or increased risk of depression? No previous history   Current Level of Stress per Patient Report Denies   Current Coping Skills Uses Stress Management/Relaxation Techniques   Initial Patient Health Questionnaire -9 Score (PHQ-9) for depression. 5-9 Minimal symptoms, 10-14 Minor depression, 15-19 Major depression, moderately severe, > 20 Major depression, severe  2   Initial [facility] Survey score.  Quality of Life:   If total score > 25 review individual areas where patient rated a 4 or 5.  Consider patients current medical condition and what role that plays on the score.   Adjust treatment protocol to improve areas of concern.  Consider the following:  PHQ9 score, DASI, and re-assessment within the next 30 days to assist with developing treatments.  19   Stages  of Change Contemplation   Interventions Planned Patient to verbalize understanding of negative impact of stress to personal health;Assist patient to identify positive support system;Provide resources for stress relaxation;Patient to attend stress management class(es)   Patient Goal No   Psychosocial Comments Patient states she is stressed by her neighbor who she has had to call the police on in the past. She states she does feel safe at home however. For the most part she says she just deals with stress and is fine.    Psychosocial Target Outcome Identify absence or presence of depression using valid screening tool   Other Core Components - Hypertension   History of or Diagnosis of Hypertension Yes   Currently taking Anti-Hypertensives Beta blocker;CCB   Hypertension Comments 2/6: Patient takes medications as prescribed.    Other Core Components - Tobacco   History of Tobacco Use Yes   Quit Date or Planned Quit Date 12/01/13   Tobacco Use Status Former (Quit > 6 mo ago)   Tobacco Habit Cigarettes   Tobacco Use per Day (average) .5   Years of Tobacco Use 22   Stages of Change Maintenance   Tobacco Comments 2/6: Pt has smoked since quitting in 2013 but tries to abstain from smoking as much as possible.   Other Core Components Summary   Interventions Planned Instruct patient on the DASH diet;Attend education class on Blood Pressure;Continue to assess readiness to change and implement appropriate process(es) of change;Educate on importance of maintaining low sodium diet;Educate on importance of monitoring daily weight;Educate on signs/symptoms and when to call the doctor (i.e. increased edema, dyspnea, fatigue, dizziness/lightheadedness, change in sleep patterns, chest discomfort);Instruct and educate to self manage Heart Failure symptoms   Patient Goals No   Other Core Components Comments Patient states she was told to monitor her weights daily, but she does not really weigh herself regularly. She was encouraged to  monitor her weight more closely.    Other Core Components Target Outcome BP < 140/90 or < 130/80 with DM or CKD;Compliance with Diet, Medications and Symptom Management to allow for stable Heart Failure   Activity/Exercise History   Activity/Exercise Assessment Re-assessment   Activity/Exercise Status prior to event? Was Physically Active   Number of Days Currently participating in Moderate Physical Activity? 7   Number of Days Currently performing  Aerobic Exercise (including rehab)? 2   Number of Minutes per Session Currently of Aerobic Exercise (average)? 35   Current Stage of Change (Physical Activity) Preparation   Current Stage of Change (Aerobic Exercise) Preparation   Patient Goals Goal #1   Goal #1 Description Pt would like to increase their activity and participate in Cardiac Rehab 2 d/week.   Goal #1 Target Date 03/01/19   Goal #1 Progress Towards Goal 2/6: Due to pt having recent chemo treatment pt has not been able to attend Phase II rehab. Staff will assess at next session.    Activity/Exercise Comments Patient states she was not a couch potato, but not overly active. She will not join a gym. Patient in the past has danced in her kitchen for exercise.     Activity/Exercise Target Outcome An Accumulation of 150  Minutes of Aerobic Activity per Week   Exercise Assessment   6 Minute Walk Predicted - Gender Selection Female   6 Minute Walk Predicted (Male) 1132.65   6 Minute Walk Predicted (Female) 1338.62   Initial 6 Minute Walk Distance (Feet) 925 ft   Resting HR 76 bpm   Exercise HR 97 bpm   Post Exercise HR 77 bpm   Resting /84   Exercise /60   Post Exercise /76   Effort Rating 4-5   Current MET Level 2.6   MET Level Goal 4-5   ECG Rhythm Sinus rhythm   Ectopy None   Current Symptoms Joint pain   Limitations/Restrictions Orthopedic (see comments)  (R Knee)   Exercise Prescription   Mode Nustep;Treadmill;Recumbant bike;Arm Ergometer   Duration/Time 30-45 min   Frequency 2 days/week    THR (85% of age predicted max HR) 130.9   OMNI Effort Rating (0-10 Scale) 4-6/10   Progression Continuous bouts;Total exercise time of 30-45 minutes;Aerobic exercise to OMNI rating of 6 or below and at or below THR   Comments Patient wishes to attend CR two days per week.    Recommended Home Exercise   Type of Exercise Walking   Frequency (days per week) 3   Duration (minutes per session) Intermittent;15-30 min   Effort Rating Recommended 4-6/10   30 Day Exercise Plan 2/6: Pt has been encouraged to start home exercise program as soon as possible.    Current Home Exercise   Type of Exercise None   Frequency (days per week) 0   Duration (minutes per session) 0   Follow-up/On-going Support   Provider follow-up needed on the following No follow-up needed   Learning Assessment   Learner Patient   Primary Language English   Preferred Learning Style Listening;Reading;Demonstration;Pictures/Video   Barriers to Learning No barriers noted   Patient Education   Education recommended Anatomy and Physiology of the Heart;Blood Pressure;Exercise Principles;Medication Overview;Muscle Conditioning;Nutrition;Stress Management   Education classes attended Other (see comment);Blood Pressure  (Healing Process)   Education Comments Patient is informed of all education sessions and encouraged to attend.    Physician cosignature/electronic signature indicates approval of this ITP document. I have established, reviewed and made necessary changes to the individualized treatment plan and exercise prescription for this patient.

## 2019-02-07 NOTE — PROGRESS NOTES
Received call from [facility].  They advised pt has been approved for award money, but when they called she did not answer/ did not return call.  They stated they will try again today.    Melina Correa, [facility]  Outpatient   [phone]

## 2019-02-12 ENCOUNTER — APPOINTMENT (OUTPATIENT)
Dept: LAB | Facility: OTHER | Age: 69
End: 2019-02-12
Attending: INTERNAL MEDICINE
Payer: MEDICARE

## 2019-02-12 ENCOUNTER — ONCOLOGY VISIT (OUTPATIENT)
Dept: ONCOLOGY | Facility: OTHER | Age: 69
End: 2019-02-12
Attending: NURSE PRACTITIONER
Payer: MEDICARE

## 2019-02-12 VITALS
OXYGEN SATURATION: 98 % | HEART RATE: 81 BPM | WEIGHT: 189.2 LBS | TEMPERATURE: 97.8 F | DIASTOLIC BLOOD PRESSURE: 66 MMHG | RESPIRATION RATE: 20 BRPM | BODY MASS INDEX: 38.14 KG/M2 | HEIGHT: 59 IN | SYSTOLIC BLOOD PRESSURE: 128 MMHG

## 2019-02-12 DIAGNOSIS — M25.50 MULTIPLE JOINT PAIN: ICD-10-CM

## 2019-02-12 DIAGNOSIS — C54.1 ADENOCARCINOMA OF THE ENDOMETRIUM/UTERUS (H): Primary | ICD-10-CM

## 2019-02-12 LAB
ALBUMIN SERPL-MCNC: 3.2 G/DL (ref 3.4–5)
ALP SERPL-CCNC: 140 U/L (ref 40–150)
ALT SERPL W P-5'-P-CCNC: 18 U/L (ref 0–50)
ANION GAP SERPL CALCULATED.3IONS-SCNC: 8 MMOL/L (ref 3–14)
AST SERPL W P-5'-P-CCNC: 15 U/L (ref 0–45)
BASOPHILS # BLD AUTO: 0.1 10E9/L (ref 0–0.2)
BASOPHILS NFR BLD AUTO: 1.4 %
BILIRUB SERPL-MCNC: 0.3 MG/DL (ref 0.2–1.3)
BUN SERPL-MCNC: 15 MG/DL (ref 7–30)
CALCIUM SERPL-MCNC: 9.1 MG/DL (ref 8.5–10.1)
CHLORIDE SERPL-SCNC: 101 MMOL/L (ref 94–109)
CO2 SERPL-SCNC: 27 MMOL/L (ref 20–32)
CREAT SERPL-MCNC: 1.01 MG/DL (ref 0.52–1.04)
DIFFERENTIAL METHOD BLD: ABNORMAL
EOSINOPHIL # BLD AUTO: 0.2 10E9/L (ref 0–0.7)
EOSINOPHIL NFR BLD AUTO: 2.5 %
ERYTHROCYTE [DISTWIDTH] IN BLOOD BY AUTOMATED COUNT: 18 % (ref 10–15)
GFR SERPL CREATININE-BSD FRML MDRD: 58 ML/MIN/{1.73_M2}
GLUCOSE SERPL-MCNC: 93 MG/DL (ref 70–99)
HCT VFR BLD AUTO: 37.8 % (ref 35–47)
HGB BLD-MCNC: 13 G/DL (ref 11.7–15.7)
IMM GRANULOCYTES # BLD: 0.1 10E9/L (ref 0–0.4)
IMM GRANULOCYTES NFR BLD: 1.1 %
LYMPHOCYTES # BLD AUTO: 1.4 10E9/L (ref 0.8–5.3)
LYMPHOCYTES NFR BLD AUTO: 22.5 %
MAGNESIUM SERPL-MCNC: 2.1 MG/DL (ref 1.6–2.3)
MCH RBC QN AUTO: 30.4 PG (ref 26.5–33)
MCHC RBC AUTO-ENTMCNC: 34.4 G/DL (ref 31.5–36.5)
MCV RBC AUTO: 89 FL (ref 78–100)
MONOCYTES # BLD AUTO: 0.8 10E9/L (ref 0–1.3)
MONOCYTES NFR BLD AUTO: 11.9 %
NEUTROPHILS # BLD AUTO: 3.9 10E9/L (ref 1.6–8.3)
NEUTROPHILS NFR BLD AUTO: 60.6 %
NRBC # BLD AUTO: 0 10*3/UL
NRBC BLD AUTO-RTO: 0 /100
PLATELET # BLD AUTO: 243 10E9/L (ref 150–450)
POTASSIUM SERPL-SCNC: 3.3 MMOL/L (ref 3.4–5.3)
PROT SERPL-MCNC: 6.7 G/DL (ref 6.8–8.8)
RBC # BLD AUTO: 4.27 10E12/L (ref 3.8–5.2)
SODIUM SERPL-SCNC: 136 MMOL/L (ref 133–144)
WBC # BLD AUTO: 6.4 10E9/L (ref 4–11)

## 2019-02-12 PROCEDURE — 85025 COMPLETE CBC W/AUTO DIFF WBC: CPT | Mod: ZL | Performed by: INTERNAL MEDICINE

## 2019-02-12 PROCEDURE — 80053 COMPREHEN METABOLIC PANEL: CPT | Mod: ZL | Performed by: INTERNAL MEDICINE

## 2019-02-12 PROCEDURE — 99215 OFFICE O/P EST HI 40 MIN: CPT | Performed by: INTERNAL MEDICINE

## 2019-02-12 PROCEDURE — 36415 COLL VENOUS BLD VENIPUNCTURE: CPT | Mod: ZL | Performed by: INTERNAL MEDICINE

## 2019-02-12 PROCEDURE — G0463 HOSPITAL OUTPT CLINIC VISIT: HCPCS

## 2019-02-12 PROCEDURE — 83735 ASSAY OF MAGNESIUM: CPT | Mod: ZL | Performed by: INTERNAL MEDICINE

## 2019-02-12 RX ORDER — ALBUTEROL SULFATE 90 UG/1
1-2 AEROSOL, METERED RESPIRATORY (INHALATION)
Status: CANCELLED
Start: 2019-02-14

## 2019-02-12 RX ORDER — EPINEPHRINE 1 MG/ML
0.3 INJECTION, SOLUTION, CONCENTRATE INTRAVENOUS EVERY 5 MIN PRN
Status: CANCELLED | OUTPATIENT
Start: 2019-02-14

## 2019-02-12 RX ORDER — PALONOSETRON 0.05 MG/ML
0.25 INJECTION, SOLUTION INTRAVENOUS ONCE
Status: CANCELLED
Start: 2019-02-14

## 2019-02-12 RX ORDER — GABAPENTIN 300 MG/1
300 CAPSULE ORAL
Qty: 270 CAPSULE | Refills: 3 | Status: SHIPPED | OUTPATIENT
Start: 2019-02-12 | End: 2019-08-13

## 2019-02-12 RX ORDER — ALBUTEROL SULFATE 0.83 MG/ML
2.5 SOLUTION RESPIRATORY (INHALATION)
Status: CANCELLED | OUTPATIENT
Start: 2019-02-14

## 2019-02-12 RX ORDER — METHYLPREDNISOLONE SODIUM SUCCINATE 125 MG/2ML
125 INJECTION, POWDER, LYOPHILIZED, FOR SOLUTION INTRAMUSCULAR; INTRAVENOUS
Status: CANCELLED
Start: 2019-02-14

## 2019-02-12 RX ORDER — DIPHENHYDRAMINE HYDROCHLORIDE 50 MG/ML
50 INJECTION INTRAMUSCULAR; INTRAVENOUS
Status: CANCELLED
Start: 2019-02-14

## 2019-02-12 RX ORDER — TRAMADOL HYDROCHLORIDE 50 MG/1
50 TABLET ORAL EVERY 6 HOURS PRN
Qty: 30 TABLET | Refills: 0 | Status: SHIPPED | OUTPATIENT
Start: 2019-02-12 | End: 2019-03-05

## 2019-02-12 RX ORDER — LORAZEPAM 2 MG/ML
1 INJECTION INTRAMUSCULAR EVERY 6 HOURS PRN
Status: CANCELLED
Start: 2019-02-14

## 2019-02-12 RX ORDER — SODIUM CHLORIDE 9 MG/ML
1000 INJECTION, SOLUTION INTRAVENOUS CONTINUOUS PRN
Status: CANCELLED
Start: 2019-02-14

## 2019-02-12 RX ORDER — DIPHENHYDRAMINE HCL 50 MG
50 CAPSULE ORAL ONCE
Status: CANCELLED
Start: 2019-02-14

## 2019-02-12 RX ORDER — EPINEPHRINE 0.3 MG/.3ML
0.3 INJECTION SUBCUTANEOUS EVERY 5 MIN PRN
Status: CANCELLED | OUTPATIENT
Start: 2019-02-14

## 2019-02-12 RX ORDER — MEPERIDINE HYDROCHLORIDE 25 MG/ML
25 INJECTION INTRAMUSCULAR; INTRAVENOUS; SUBCUTANEOUS EVERY 30 MIN PRN
Status: CANCELLED | OUTPATIENT
Start: 2019-02-14

## 2019-02-12 ASSESSMENT — PAIN SCALES - GENERAL: PAINLEVEL: NO PAIN (0)

## 2019-02-12 ASSESSMENT — PATIENT HEALTH QUESTIONNAIRE - PHQ9: SUM OF ALL RESPONSES TO PHQ QUESTIONS 1-9: 0

## 2019-02-12 ASSESSMENT — MIFFLIN-ST. JEOR: SCORE: 1303.84

## 2019-02-12 NOTE — NURSING NOTE
"Chief Complaint   Patient presents with     RECHECK     follow up treatment Thursday       Initial /66   Pulse 81   Temp 97.8  F (36.6  C) (Tympanic)   Resp 20   Ht 1.499 m (4' 11\")   Wt 85.8 kg (189 lb 3.2 oz)   SpO2 98%   BMI 38.21 kg/m   Estimated body mass index is 38.21 kg/m  as calculated from the following:    Height as of this encounter: 1.499 m (4' 11\").    Weight as of this encounter: 85.8 kg (189 lb 3.2 oz).  Medication Reconciliation: complete    Luz Maria Gregorio, LORENA    "

## 2019-02-13 NOTE — PROGRESS NOTES
Visit Date:   02/12/2019      HISTORY OF PRESENT ILLNESS:  Mrs. 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  We had originally seen the patient in consultation back on 12/03/2018, at the request of Dr. Anabel Marion.  At that time, Mrs. Rosas was  a 68-year-old white female with a history of atrial fibrillation and coronary artery disease.  We were asked to evaluate considering a newly diagnosed stage IB high-grade endometrioid adenocarcinoma of the uterus.  She originally stated she had presented with vaginal bleeding back in 02/2008 and eventually was referred to Dr. Nettles for an endometrial biopsy, which revealed grade endometrioid adenocarcinoma, possibly carcinosarcoma.  Apparently she had undergone coronary artery bypass graft surgery and her appointment with Dr. Marion was delayed.  She did see Dr. Marion who repeated an endometrial biopsy 09/05/2018, which revealed grade endometrial carcinoma, endometrioid type FIGO grade 3, including abundant cell tumor component with early spindle cell features.  Carcinosarcoma component could not be excluded.  There was a squamous metaplastic component also noted.  Subsequently, the next day she underwent coronary artery bypass grafting x 4.  She was diagnosed with a left main 3-vessel coronary artery disease.  During hospitalization, she had atrial fibrillation with RVR postoperatively.  She converted to normal sinus rhythm on amiodarone.  The plan was to initiate anticoagulation after completing endometrial surgery.  The patient subsequently did see Dr. Marion again on 10/11/2018.  The plan was to proceed with robotic staging procedure after CT chest, abdomen and pelvis.  CT chest abdomen and pelvis was performed on 10/19/2018.  The findings were there was no evidence of metastatic disease from the patient's endometrial cancer.  The patient subsequently underwent a HENRY/BSO  10/31/2018.  Pathology revealed that she had a high grade endometrioid adenocarcinoma, FIGO grade 3/3 tumor measuring 3 cm in greatest dimension extensively invading into the underlying myometrium with myometrial invasion of 1.9 cm.  While there was no surface serosal involvement seen, the fundus of the uterus was disrupted exposing the underlying myometrium invasive tumor.  There was foci of lymphovascular invasion by tumor identified within the myometrium but also within the cervix.  No invasive cervical stroma involvement by tumor was seen.  Myometrium also exhibited a solitary leiomyoma.  Right and left parametrium appeared negative for tumor.  Right and left ovaries and right and left fallopian tubes were negative for tumor.  One out of 11 pelvic lymph nodes were positive for isolated tumor cells with 6 right pelvic nodes negative for tumor.  There was one right periaortic lymph node that was negative for tumor.  The omentum was negative for tumor.  The patient staged pathologic T1b N0 I plus or stage IB isolated tumor cells.  Given the high-grade nature of the tumor, Dr. Marion recommended adjuvant carboplatin and Taxol for 6 cycles, then followup with staging studies.  When I saw the patient on 12/03/2018, the plan was to proceed with carboplatin AUC 6, paclitaxel 175 mg/m2 given every 21 days, and to involve Dr. Marion for staging after 6 cycles.        The patient is here to start her fourth cycle carboplatin and Taxol.  Most recently she was seen by Margot Mendenhall, nurse practitioner on 01/24/2019.  At that time she has multiple complaints of joint pains and she was prescribed Claritin 10 mg for 7 days after chemotherapy and also, Tramadol 50 mg was given every 6 hours.  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  She was open to genetic counseling which she is scheduled to have.  Otherwise, she says currently today, the  Tramadol helped her joint pain, but now she is having sort of a burning foot pain.  She says it is very painful when she walks.  Otherwise, she denies any other symptoms of nausea, vomiting, abdominal pain, fevers, night sweats, or weight loss.  She is otherwise doing well in terms of her chemotherapy side effects, except for joint pain/neuropathic pain.  Otherwise, no other complaints.      PHYSICAL EXAMINATION:  She is a middle-aged white female in no acute distress.   VITAL SIGNS:  Reveal a blood pressure of 120/66, pulse 81, respirations 20, temperature 97.8.   HEENT:  Atraumatic, normocephalic.  Oropharynx is erythematous.   NECK:  Supple.   LUNGS:  Clear to auscultation/percussion.   HEART:  Regular rhythm, S1, S2 normal.   ABDOMEN:  Soft, normoactive bowel sounds.     LYMPHATICS:  No cervical, supraclavicular, axillary or inguinal nodes.   EXTREMITIES:  No edema.     NEUROLOGIC:  Nonfocal.      LABORATORY DATA:  Reveal CBC with a white count of 6.4, H and H 13.0 and 37.8, platelet count 243,000.  BUN is 15, creatinine 1.01.  LFTs are normal.  Magnesium 2.1.        IMPRESSION:  High-grade endometrioid adenocarcinoma of the uterus with staging system pathologic T1b, pathologic N0, I plus or stage IB with isolated tumor cells involving 1 of the periaortic lymph nodes.  The patient has high-grade tumor and would benefit from adjuvant chemotherapy, as per Dr. Arredondo.  The patient was started on carboplatin AUC 6, and paclitaxel 175 mg/m2 to be given every 21 days.  After 2 cycles she developed joint pains and was treated with Claritin and Ultram was seen to help.  Now she has neuropathic pain.  We will therefore, continue with Ultram for pain management, and also add gabapentin 300 mg at bedtime.  Otherwise, we will dose reduce the Taxol by 20%.  She will proceed with cycle 4 as planned and otherwise complete 6 cycles and then follow up with Dr. Marion.      Forty minutes was spent with the patient, greater  than half the time spent discussing the above and the fact that she was having adverse side effects from Taxol and that we needed to dose reduce her time spent on chemotherapy and followup plans.         GALILEA NOE MD             D: 2019   T: 2019   MT: BOB      Name:     RACQUEL PINZON   MRN:      [id number]        Account:      [id number]   :      1950           Visit Date:   2019      Document: [id number]       cc: Anabel Calhoun MD

## 2019-02-14 ENCOUNTER — INFUSION THERAPY VISIT (OUTPATIENT)
Dept: INFUSION THERAPY | Facility: OTHER | Age: 69
End: 2019-02-14
Attending: INTERNAL MEDICINE
Payer: MEDICARE

## 2019-02-14 VITALS
DIASTOLIC BLOOD PRESSURE: 72 MMHG | SYSTOLIC BLOOD PRESSURE: 141 MMHG | RESPIRATION RATE: 18 BRPM | TEMPERATURE: 98.2 F | OXYGEN SATURATION: 95 % | HEART RATE: 79 BPM | BODY MASS INDEX: 37.21 KG/M2 | WEIGHT: 184.6 LBS | HEIGHT: 59 IN

## 2019-02-14 DIAGNOSIS — C55 MALIGNANT NEOPLASM OF UTERUS, UNSPECIFIED SITE (H): ICD-10-CM

## 2019-02-14 DIAGNOSIS — C54.1 ADENOCARCINOMA OF THE ENDOMETRIUM/UTERUS (H): Primary | ICD-10-CM

## 2019-02-14 DIAGNOSIS — C54.1 ENDOMETRIAL ADENOCARCINOMA (H): ICD-10-CM

## 2019-02-14 PROCEDURE — 25800030 ZZH RX IP 258 OP 636: Performed by: INTERNAL MEDICINE

## 2019-02-14 PROCEDURE — 25000132 ZZH RX MED GY IP 250 OP 250 PS 637: Mod: GY | Performed by: INTERNAL MEDICINE

## 2019-02-14 PROCEDURE — 96413 CHEMO IV INFUSION 1 HR: CPT

## 2019-02-14 PROCEDURE — 96374 THER/PROPH/DIAG INJ IV PUSH: CPT

## 2019-02-14 PROCEDURE — 96375 TX/PRO/DX INJ NEW DRUG ADDON: CPT

## 2019-02-14 PROCEDURE — 96367 TX/PROPH/DG ADDL SEQ IV INF: CPT

## 2019-02-14 PROCEDURE — 25000128 H RX IP 250 OP 636: Performed by: INTERNAL MEDICINE

## 2019-02-14 PROCEDURE — 96417 CHEMO IV INFUS EACH ADDL SEQ: CPT

## 2019-02-14 PROCEDURE — A9270 NON-COVERED ITEM OR SERVICE: HCPCS | Mod: GY | Performed by: INTERNAL MEDICINE

## 2019-02-14 PROCEDURE — 25000125 ZZHC RX 250: Performed by: INTERNAL MEDICINE

## 2019-02-14 PROCEDURE — 96415 CHEMO IV INFUSION ADDL HR: CPT

## 2019-02-14 RX ORDER — HEPARIN SODIUM (PORCINE) LOCK FLUSH IV SOLN 100 UNIT/ML 100 UNIT/ML
5 SOLUTION INTRAVENOUS ONCE
Status: CANCELLED
Start: 2019-02-14 | End: 2019-02-14

## 2019-02-14 RX ORDER — HEPARIN SODIUM (PORCINE) LOCK FLUSH IV SOLN 100 UNIT/ML 100 UNIT/ML
5 SOLUTION INTRAVENOUS ONCE
Status: COMPLETED | OUTPATIENT
Start: 2019-02-14 | End: 2019-02-14

## 2019-02-14 RX ORDER — DIPHENHYDRAMINE HCL 50 MG
50 CAPSULE ORAL ONCE
Status: COMPLETED | OUTPATIENT
Start: 2019-02-14 | End: 2019-02-14

## 2019-02-14 RX ORDER — PALONOSETRON 0.05 MG/ML
0.25 INJECTION, SOLUTION INTRAVENOUS ONCE
Status: COMPLETED | OUTPATIENT
Start: 2019-02-14 | End: 2019-02-14

## 2019-02-14 RX ADMIN — PALONOSETRON HYDROCHLORIDE 0.25 MG: 0.25 INJECTION INTRAVENOUS at 10:32

## 2019-02-14 RX ADMIN — CARBOPLATIN 460 MG: 10 INJECTION, SOLUTION INTRAVENOUS at 14:27

## 2019-02-14 RX ADMIN — DIPHENHYDRAMINE HYDROCHLORIDE 50 MG: 50 CAPSULE ORAL at 10:30

## 2019-02-14 RX ADMIN — SODIUM CHLORIDE 250 ML: 9 INJECTION, SOLUTION INTRAVENOUS at 10:32

## 2019-02-14 RX ADMIN — HEPARIN 5 ML: 100 SYRINGE at 15:24

## 2019-02-14 RX ADMIN — FAMOTIDINE 40 MG: 10 INJECTION, SOLUTION INTRAVENOUS at 11:00

## 2019-02-14 RX ADMIN — PACLITAXEL 263 MG: 6 INJECTION, SOLUTION INTRAVENOUS at 11:24

## 2019-02-14 RX ADMIN — DEXAMETHASONE SODIUM PHOSPHATE 20 MG: 10 INJECTION, SOLUTION INTRAMUSCULAR; INTRAVENOUS at 10:35

## 2019-02-14 ASSESSMENT — MIFFLIN-ST. JEOR: SCORE: 1282.97

## 2019-02-14 NOTE — PATIENT INSTRUCTIONS
Discharge Instructions for Infusion Therapy/Chemotherapy Department:    Your doctor has prescribed the following medication(s) paclitaxel/carboplatin to treat your chemo.    All treatments have potential side effects, but they don't all happen to everyone.  If you have any questions, problems, or concerns, we want you to call us.  You can reach the Infusion Nurses at [phone] Monday - Friday 8:00am to 4:30pm or the Health Unit Coordinator at [phone] Monday - Friday 8:00am to 5:00pm.      After hours, evenings, weekends, and holidays please call Urgent Care [phone] or toll free [phone] or go to your nearest Emergency Department.    IV, PICC line or Port access site care or injection site care:   Please report signs of infection or infiltration;  *Redness     *Swelling/puffiness  *Pain/tenderness   *Fever 100.4 F or higher  *Drainage         Possible side effects include:  *Diarrhea     *Allergic Reaction  *Constipation    *Drop in blood counts  *Depression/Anxiey   *Nausea/Vomiting  *Weakness/Fatigue   *Cold intolerance  *Skin changes/Rash   *Stomatitis (mouth sores)  *Loss of appetite/Taste changes *Weight gain/Swelling (edema)  *Hand-Foot syndrome   *Hypertension (high blood pressure)  *Abdominal pain    *Peripheral Neuropathy (numbness/tingling in hands/feet)    YOU NEED TO CALL US OR GO TO YOUR NEAREST URGENT CARE/EMERGENCY DEPARTMENT IF ANY OF THE FOLLOWING OCCUR:     *You have a fever of 100.4 F or higher.      *You are experiencing uncontrolled vomiting while taking your  anti-nausea medications.      *You are having watery diarrhea (4-6 loose stools in a 24 hour  period).      *You are experiencing a severe rash or skin changes.      *You have mouth sores or a sore throat.      *You have severe constipation (no BM for 3 days).      ANY questions or problems, PLEASE do not hesitate to call us!!

## 2019-02-14 NOTE — PROGRESS NOTES
1124: IV pump verified with paclitaxel 263mg dose, drug, and rate of administration by second RN, Dana Mcclendon. Infusion administered per protocol.

## 2019-02-14 NOTE — PROGRESS NOTES
Patient is a 69 y/o female here accompanied by self today for infusion of paclitaxol/carboplatin under the orders of Dr. Jaimes.  Right sided power port accessed with 19 gauge 3/4 inch 90 degree bent non coring needle.  Line flushed with 10 cc's normal saline.  Needle secured with sterile transparent dressing.    Patient tolerated well.  Denies pain and discomfort at this time.  Port flushes easily without resistance.    Hand hygiene performed: yes   Mask donned by caregiver: yes Site prepped with CHG: yes Labs drawn: yes Dressing applied using aseptic technique: yes   2-12-19 lab values: WBC 6.4, ANC 3.9, , HGB 13.0, AST 15, ALT 18, Alkaline Phosphatase 140, Creatinine 1.01.         Psychosocial well being assessed.  New concerns? none.        Patient meets parameters for today's infusion.  Denies questions or concerns regarding today's infusion and/or medications being administered.      Patient identified with two identifiers, order verified, and verbal consent for today's infusion obtained from patient. Written consent for treatment is on file and valid.    1427 IV pump verified with carboplatin 460 mg dose, drug, and rate of administration by second RN, Marley Bledsoe. Infusion administered per protocol. Patient tolerated infusion well, no signs or symptoms of adverse reaction noted. Patient denies pain nor discomfort.     Needle removed, tip intact. Site clean, dry and intact. Covered with a sterile bandage, slight pressure applied for 30 seconds. Pt instructed to leave bandage intact for a minimum of one hour, and to call with questions or concerns. Copy of appointments, discharge instructions, and after visit summary (AVS) provided to patient. Patient states understanding, discharged ambulatory.

## 2019-03-01 NOTE — PROGRESS NOTES
"Cardiac rehab has tried to establish contact with pt after subsequent \"No Shows\" to Phase II sessions without success. Following the unsuccessful attempts to reach pt by phone, cardiac rehab coordinator sent a letter to the pt regarding their status to return. Pt phoned cardiac staff on 3/1/19 after receiving their letter and the decision was made to place pt on hold until their cancer treatment is completed in mid-April. Pt states they will contact staff in early April to reevaluate.     Julio  "

## 2019-03-07 RX ORDER — PALONOSETRON 0.05 MG/ML
0.25 INJECTION, SOLUTION INTRAVENOUS ONCE
Status: CANCELLED
Start: 2019-03-08

## 2019-03-07 RX ORDER — DIPHENHYDRAMINE HCL 50 MG
50 CAPSULE ORAL ONCE
Status: CANCELLED
Start: 2019-03-07

## 2019-03-08 ENCOUNTER — ONCOLOGY VISIT (OUTPATIENT)
Dept: ONCOLOGY | Facility: OTHER | Age: 69
End: 2019-03-08
Attending: NURSE PRACTITIONER
Payer: MEDICARE

## 2019-03-08 ENCOUNTER — INFUSION THERAPY VISIT (OUTPATIENT)
Dept: INFUSION THERAPY | Facility: OTHER | Age: 69
End: 2019-03-08
Attending: INTERNAL MEDICINE
Payer: MEDICARE

## 2019-03-08 VITALS
HEART RATE: 61 BPM | DIASTOLIC BLOOD PRESSURE: 70 MMHG | TEMPERATURE: 97.6 F | RESPIRATION RATE: 20 BRPM | WEIGHT: 192.6 LBS | HEIGHT: 59 IN | BODY MASS INDEX: 38.83 KG/M2 | SYSTOLIC BLOOD PRESSURE: 120 MMHG | OXYGEN SATURATION: 95 %

## 2019-03-08 DIAGNOSIS — G62.9 NEUROPATHY: ICD-10-CM

## 2019-03-08 DIAGNOSIS — M25.50 MULTIPLE JOINT PAIN: ICD-10-CM

## 2019-03-08 DIAGNOSIS — C54.1 ADENOCARCINOMA OF THE ENDOMETRIUM/UTERUS (H): Primary | ICD-10-CM

## 2019-03-08 DIAGNOSIS — Z80.9 FAMILY HISTORY OF CANCER: ICD-10-CM

## 2019-03-08 DIAGNOSIS — Z53.9 ERRONEOUS ENCOUNTER--DISREGARD: Primary | ICD-10-CM

## 2019-03-08 PROBLEM — I25.2 HISTORY OF NON-ST ELEVATION MYOCARDIAL INFARCTION (NSTEMI): Status: ACTIVE | Noted: 2018-09-06

## 2019-03-08 PROBLEM — Z95.1 S/P CABG X 4: Status: ACTIVE | Noted: 2018-09-10

## 2019-03-08 PROBLEM — I34.0 MODERATE MITRAL REGURGITATION: Status: ACTIVE | Noted: 2018-10-30

## 2019-03-08 PROBLEM — I48.91 ATRIAL FIBRILLATION (H): Status: ACTIVE | Noted: 2018-11-26

## 2019-03-08 PROBLEM — Z86.79 HISTORY OF CORONARY ARTERY DISEASE: Status: ACTIVE | Noted: 2018-10-30

## 2019-03-08 PROBLEM — E87.1 HYPONATREMIA: Status: ACTIVE | Noted: 2018-10-30

## 2019-03-08 PROBLEM — I50.32 CHRONIC DIASTOLIC CONGESTIVE HEART FAILURE (H): Status: ACTIVE | Noted: 2018-09-20

## 2019-03-08 LAB
ALBUMIN SERPL-MCNC: 3.8 G/DL (ref 3.4–5)
ALP SERPL-CCNC: 147 U/L (ref 40–150)
ALT SERPL W P-5'-P-CCNC: 17 U/L (ref 0–50)
ANION GAP SERPL CALCULATED.3IONS-SCNC: 9 MMOL/L (ref 3–14)
AST SERPL W P-5'-P-CCNC: 13 U/L (ref 0–45)
BASOPHILS # BLD AUTO: 0.1 10E9/L (ref 0–0.2)
BASOPHILS NFR BLD AUTO: 0.9 %
BILIRUB SERPL-MCNC: 0.3 MG/DL (ref 0.2–1.3)
BUN SERPL-MCNC: 16 MG/DL (ref 7–30)
CALCIUM SERPL-MCNC: 9.3 MG/DL (ref 8.5–10.1)
CHLORIDE SERPL-SCNC: 100 MMOL/L (ref 94–109)
CO2 SERPL-SCNC: 29 MMOL/L (ref 20–32)
CREAT SERPL-MCNC: 0.68 MG/DL (ref 0.52–1.04)
DIFFERENTIAL METHOD BLD: ABNORMAL
EOSINOPHIL # BLD AUTO: 0.2 10E9/L (ref 0–0.7)
EOSINOPHIL NFR BLD AUTO: 1.7 %
ERYTHROCYTE [DISTWIDTH] IN BLOOD BY AUTOMATED COUNT: 16 % (ref 10–15)
GFR SERPL CREATININE-BSD FRML MDRD: >90 ML/MIN/{1.73_M2}
GLUCOSE SERPL-MCNC: 94 MG/DL (ref 70–99)
HCT VFR BLD AUTO: 39.5 % (ref 35–47)
HGB BLD-MCNC: 13.4 G/DL (ref 11.7–15.7)
IMM GRANULOCYTES # BLD: 0.1 10E9/L (ref 0–0.4)
IMM GRANULOCYTES NFR BLD: 1.2 %
LYMPHOCYTES # BLD AUTO: 1.4 10E9/L (ref 0.8–5.3)
LYMPHOCYTES NFR BLD AUTO: 15.2 %
MAGNESIUM SERPL-MCNC: 1.9 MG/DL (ref 1.6–2.3)
MCH RBC QN AUTO: 31.6 PG (ref 26.5–33)
MCHC RBC AUTO-ENTMCNC: 33.9 G/DL (ref 31.5–36.5)
MCV RBC AUTO: 93 FL (ref 78–100)
MONOCYTES # BLD AUTO: 1 10E9/L (ref 0–1.3)
MONOCYTES NFR BLD AUTO: 11.1 %
NEUTROPHILS # BLD AUTO: 6.4 10E9/L (ref 1.6–8.3)
NEUTROPHILS NFR BLD AUTO: 69.9 %
NRBC # BLD AUTO: 0 10*3/UL
NRBC BLD AUTO-RTO: 0 /100
PLATELET # BLD AUTO: 248 10E9/L (ref 150–450)
POTASSIUM SERPL-SCNC: 3.5 MMOL/L (ref 3.4–5.3)
PROT SERPL-MCNC: 7.3 G/DL (ref 6.8–8.8)
RBC # BLD AUTO: 4.24 10E12/L (ref 3.8–5.2)
SODIUM SERPL-SCNC: 138 MMOL/L (ref 133–144)
WBC # BLD AUTO: 9.1 10E9/L (ref 4–11)

## 2019-03-08 PROCEDURE — 80053 COMPREHEN METABOLIC PANEL: CPT | Mod: ZL | Performed by: NURSE PRACTITIONER

## 2019-03-08 PROCEDURE — 96523 IRRIG DRUG DELIVERY DEVICE: CPT

## 2019-03-08 PROCEDURE — 83735 ASSAY OF MAGNESIUM: CPT | Mod: ZL | Performed by: NURSE PRACTITIONER

## 2019-03-08 PROCEDURE — 99214 OFFICE O/P EST MOD 30 MIN: CPT | Performed by: NURSE PRACTITIONER

## 2019-03-08 PROCEDURE — G0463 HOSPITAL OUTPT CLINIC VISIT: HCPCS

## 2019-03-08 PROCEDURE — 36415 COLL VENOUS BLD VENIPUNCTURE: CPT | Mod: ZL | Performed by: NURSE PRACTITIONER

## 2019-03-08 PROCEDURE — 85025 COMPLETE CBC W/AUTO DIFF WBC: CPT | Mod: ZL | Performed by: NURSE PRACTITIONER

## 2019-03-08 RX ORDER — PREDNISONE 10 MG/1
10 TABLET ORAL 2 TIMES DAILY
Qty: 10 TABLET | Refills: 0 | Status: SHIPPED | OUTPATIENT
Start: 2019-03-08 | End: 2019-03-18

## 2019-03-08 ASSESSMENT — PAIN SCALES - GENERAL: PAINLEVEL: MODERATE PAIN (4)

## 2019-03-08 ASSESSMENT — PATIENT HEALTH QUESTIONNAIRE - PHQ9: SUM OF ALL RESPONSES TO PHQ QUESTIONS 1-9: 0

## 2019-03-08 ASSESSMENT — MIFFLIN-ST. JEOR: SCORE: 1319.26

## 2019-03-08 NOTE — NURSING NOTE
"Chief Complaint   Patient presents with     RECHECK     Follow up Endometrial adenocarcinoma        Initial /70   Pulse 61   Temp 97.6  F (36.4  C) (Tympanic)   Resp 20   Ht 1.499 m (4' 11\")   Wt 87.4 kg (192 lb 9.6 oz)   SpO2 95%   BMI 38.90 kg/m   Estimated body mass index is 38.9 kg/m  as calculated from the following:    Height as of this encounter: 1.499 m (4' 11\").    Weight as of this encounter: 87.4 kg (192 lb 9.6 oz).  Medication Reconciliation: complete.  Immunizations reviewed, new info given on advanced directives, pain = 4/10 joints and muscles, PHQ9 = 0.    Karuna Larsen LPN    "

## 2019-03-08 NOTE — PROGRESS NOTES
Oncology Follow-up Visit:  March 8, 2019    Reason for Visit:  Patient presents with:  RECHECK: Follow up Endometrial adenocarcinoma      Nursing Note and documentation reviewed: yes    HPI:   This is a 68-year-old female patient who presents to the oncology/hematology clinic today for evaluation prior to receiving cycle 5 chemotherapy for stage IB endometrial cancer diagnosed originally in February 2018.  Patient had multiple other medical issues which delayed treatment and additional biopsy completed in September 2018 again showed a FIGO grade 3 uterine cancer.  She eventually underwent surgery 10/2018 and patient was staged  [id number] (i plus); [id number].    She presents to the clinic today complaining of extreme joint and muscle pains.  She states they would start about 2 days after chemotherapy and this time has lasted until today.  She feels that the pains have eased and up quite a bit in the last 48 hours.  She states after her last cycle she was close to going to the urgent care twice related to the extreme discomfort.  She would try different therapies including emu oil, heat, ibuprofen and tramadol with little relief.  She is very nervous about going forth with chemotherapy as she is anticipating worsening pain again.  She does have some neuropathy that continues to the bottom of her feet lasting about a week and then it resolves.  She has numbness and tingling to the tips of her fingers that is very mild.  She never initiated the gabapentin as was recommended.  Her Taxol dose was decreased by 20% with cycle 4.    She was seen by cardiology on 3/4/2019 for increased shortness of breath and swelling in her lower extremities with no specific etiology found.  She did have a low potassium of 3.1 noted at that visit with recommendation for 40 mEq of potassium for 3 days and rechecking her lab again today.  She admits she never picked up this prescription.  She is been increasing her bananas.  Her amiodarone was  discontinued that at that visit and her metoprolol was increased to 2 tablets daily.  Recommendation was for a follow-up BP check in 2 weeks with her PCP.  Plan is to follow-up with cardiology again at the end of April.    Oncologic History:     February 2018 patient presented with vaginal bleeding and underwent endometrial biopsy by Dr. 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  She was referred to Dr. Marion with [facility] and didn't see her then as planned.     In September 2018 she saw Dr. Marion who did a repeat endometrial biopsy showing a high-grade endometrial carcinoma, endometrioid type, FIGO grade 3 with early spindle cell features and a carcinosarcoma component could not be excluded and there was a squamous metaplastic component also noted.     The day following seeing Dr. Marion, she underwent CABG x4 and developed atrial fibrillation while hospitalized.     CT scan of the chest abdomen and pelvis completed on 10/19/2018 showed no evidence of metastatic disease and on 10/31/2018 she underwent HENRY/BSO by Dr. Marion.  Pathology was consistent with a high-grade endometrioid adenocarcinoma, FIGO grade 3/3 with tumor measuring greater than 3 cm and extensively invading into the underlying myometrium 1.9 cm.  There was a foci of lymphovascular invasion.  Right and left ovaries were negative for tumor and 1 out of 11 pelvic lymph nodes was positive for isolated tumor cells.  She was staged [id number] (i plus) or stage IB isolated tumor cells.     Related to high-grade nature of the tumor Dr. Marion recommended adjuvant carboplatin and Taxol x6 cycles then follow-up for staging studies.  She was seen here by Dr. Jaimes on 12/3/2018 in order to initiate treatment per Dr. Marion recommendations.  The plan was for Taxol 175 mg per metered squared with carboplatin AUC of 6 given every 21 days x6 cycles.     Pre-TX weight = 85.2kg (187lbs)  MMR proteins =  Absent MLH1 and PMS2 with MLH1 promoter methylation positive   Genetic counseling:  To discuss     Current Chemo Regime/TX: Taxol 175 mg per metered squared/carboplatin AUC 6 every 21 days (550mg per Dr. Jaimes) initiated 12/13/2018  Current Cycle:  5  # of completed cycles: 4     Previous treatment:  n/a     Past Medical History:   Diagnosis Date     Allergic rhinitis 01/01/2011     Anxiety 01/01/2011     Anxiety state, unspecified     Anxiety state     Dyslipidemia 11/26/2003     fibromyalgia 06/14/2004     GERD, Esophageal reflux 01/01/2011     HTN (hypertension)     Essential hypertension     Major depression, recurrent (H) 1/1/2011     Nonorganic sleep disorder, unspecified     Non-org. 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, ARDS, renal failure       Past Surgical History:   Procedure Laterality Date     ANGIOGRAM  02/2005    Normal      BIOPSY BREAST  1984    LT, normal     BYPASS GRAFT ARTERY CORONARY  09/10/2018     CARPAL TUNNEL RELEASE RT/LT  2005    RT, carpal tunnel     COLONOSCOPY  2011    Repeat in ten years      COLONOSCOPY  1996     COLONOSCOPY  2004     DILATION AND CURETTAGE, HYSTEROSCOPY, ABLATE ENDOMETRIUM, COMBINED N/A 2/19/2018    Procedure: COMBINED DILATION AND CURETTAGE, HYSTEROSCOPY, ABLATE ENDOMETRIUM;  HYSTEROSCOPY DILATION AND CURETTAGE, ENDOMETRIAL ABLATION;  Surgeon: Jose Nettles MD;  Location: [facility] OR     HYSTERECTOMY TOTAL ABD, NICK SALPINGO-OOPHORECTOMY, NODE DISSECTION, TUMOR DEBULKING, COMBINED  10/30/2018     INSERT PORT VASCULAR ACCESS N/A 12/11/2018    Procedure: PORT-A-CATH PLACEMENT;  Surgeon: Rafi Trinidad MD;  Location: [facility] OR     placement of central line  2005       Family History   Problem Relation Age of Onset     C.A.D. Father 45        (cause of death)      Other - See Comments Father         rheumatic fever      Allergies Father      Cancer Sister 56        Esophageal to bone cancer      Gastrointestinal Disease Mother         GERD     Cancer Mother         pancreatic ca (cause of death) /liver ca     Breast Cancer Maternal Aunt      Breast Cancer Maternal Aunt      Colon Cancer Paternal Aunt         (cause of death)      Crohn's Disease Other      Depression Maternal Uncle      Depression Maternal Aunt      Diabetes Paternal Grandmother         type 2     Neurologic Disorder Brother         neuropathy     Cancer Brother 58        Tonsilular cancer/ lymph node in neck     Allergies Brother      Breast Cancer Cousin      Breast Cancer Cousin      Breast Cancer Cousin        Social History     Socioeconomic History     Marital status:      Spouse name: Not on file     Number of children: Not on file     Years of education: Not on file     Highest education level: Not on file   Occupational History     Occupation: [id number]     Employer: [employer]     Comment:    Social Needs     Financial resource strain: Not on file     Food insecurity:     Worry: Not on file     Inability: Not on file     Transportation needs:     Medical: Not on file     Non-medical: Not on file   Tobacco Use     Smoking status: Light Tobacco Smoker     Packs/day: 0.50     Types: Cigarettes     Start date: 1971     Last attempt to quit: 2013     Years since quittin.2     Smokeless tobacco: Never Used     Tobacco comment: Year Quit:    Substance and Sexual Activity     Alcohol use: No     Comment: rare     Drug use: No     Sexual activity: No     Comment: devorced   Lifestyle     Physical activity:     Days per week: Not on file     Minutes per session: Not on file     Stress: Not on file   Relationships     Social connections:     Talks on phone: Not on file     Gets together: Not on file     Attends Sikhism service: Not on file     Active member of club or organization: Not on file     Attends meetings of clubs or organizations: Not on file     Relationship status: Not on  file     Intimate partner violence:     Fear of current or ex partner: Not on file     Emotionally abused: Not on file     Physically abused: Not on file     Forced sexual activity: Not on file   Other Topics Concern      Service No     Blood Transfusions Yes     Comment: Permits if needed     Caffeine Concern Yes     Comment: 1 cup     Occupational Exposure No     Hobby Hazards No     Sleep Concern No     Stress Concern No     Weight Concern No     Special Diet No     Back Care No     Exercise No     Bike Helmet Not Asked     Seat Belt Yes     Self-Exams Yes     Parent/sibling w/ CABG, MI or angioplasty before 65F 55M? Yes     Comment: Father   Social History Narrative     Not on file       Current Outpatient Medications   Medication     ACE/ARB/ARNI NOT PRESCRIBED (INTENTIONAL)     aspirin (ASA) 325 MG EC tablet     atorvastatin (LIPITOR) 20 MG tablet     diltiazem ER COATED BEADS (CARTIA XT) 240 MG 24 hr capsule     furosemide (LASIX) 40 MG tablet     IBUPROFEN PO     Metoprolol Succinate 25 MG CS24     predniSONE (DELTASONE) 10 MG tablet     rOPINIRole (REQUIP) 0.25 MG tablet     spironolactone (ALDACTONE) 25 MG tablet     traMADol (ULTRAM) 50 MG tablet     acetaminophen (TYLENOL) 325 MG tablet     clonazePAM (KLONOPIN) 0.25 MG TBDP ODT tab     gabapentin (NEURONTIN) 300 MG capsule     lidocaine-prilocaine (EMLA) 2.5-2.5 % external cream     LORazepam (ATIVAN) 1 MG tablet     prochlorperazine (COMPAZINE) 10 MG tablet     senna-docusate (SENOKOT-S/PERICOLACE) 8.6-50 MG tablet     No current facility-administered medications for this visit.         Allergies   Allergen Reactions     Cats Other (See Comments)     Sneezing, runny nose     Codeine Sulfate Nausea and Vomiting and GI Disturbance     Fexofenadine Hcl      Allegra      Rosuvastatin Other (See Comments)     Dizziness - Crestor      [place] Oil GI Disturbance     Any pink fish       Review Of Systems:  Constitutional:    denies fever, weight  "changes, chills, and night sweats.  Eyes:    denies blurred or double vision  Ears/Nose/Throat:   denies ear pain, nose problems, difficulty swallowing  Respiratory:   See HPI  Skin:   denies rash, lesions  Cardiovascular:   denies chest pain, palpitations  Gastrointestinal:   denies abdominal pain, bloating, nausea  Genitourinary:   denies difficulty with urination, blood in urine  Musculoskeletal:    See HPI  Neurologic:   denies lightheadedness, headaches-see HPI  Psychiatric:   denies anxiety, depression  Hematologic/Lymphatic/Immunologic:   denies easy bruising, easy bleeding, lumps or bumps noted  Endocrine:   Denies increased thirst    ECOG Performance Status: 2-3 related to pain    Physical Exam:  /70   Pulse 61   Temp 97.6  F (36.4  C) (Tympanic)   Resp 20   Ht 1.499 m (4' 11\")   Wt 87.4 kg (192 lb 9.6 oz)   SpO2 95%   BMI 38.90 kg/m      GENERAL APPEARANCE: Healthy, alert and in no acute distress, but audible groan with movement in the exam room  HEENT: Normocephalic, Sclerae anicteric. Oropharynx without ulcers, lesions, or thrush.  NECK:   No asymmetry or masses, no thyromegaly.  LYMPHATICS: No palpable cervical, supraclavicular, axillary, or inguinal nodes   RESP: Lungs clear to auscultation bilaterally, respirations regular and easy  CARDIOVASCULAR: Regular rate and rhythm. Normal S1, S2  ABDOMEN: Soft, nontender. Bowel sounds auscultated all 4 quadrants. No palpable organomegaly or masses.  MUSCULOSKELETAL: Extremities without gross deformities noted.  Unable to get onto the exam table for exam related to discomfort  NEURO: Alert and oriented x 3.  Gait steady.  PSYCHIATRIC: Mentation and affect appear normal.  Mood appropriate.    Laboratory:  Results for orders placed or performed in visit on 03/08/19   CBC with platelets differential   Result Value Ref Range    WBC 9.1 4.0 - 11.0 10e9/L    RBC Count 4.24 3.8 - 5.2 10e12/L    Hemoglobin 13.4 11.7 - 15.7 g/dL    Hematocrit 39.5 35.0 - " 47.0 %    MCV 93 78 - 100 fl    MCH 31.6 26.5 - 33.0 pg    MCHC 33.9 31.5 - 36.5 g/dL    RDW 16.0 (H) 10.0 - 15.0 %    Platelet Count 248 150 - 450 10e9/L    Diff Method Automated Method     % Neutrophils 69.9 %    % Lymphocytes 15.2 %    % Monocytes 11.1 %    % Eosinophils 1.7 %    % Basophils 0.9 %    % Immature Granulocytes 1.2 %    Nucleated RBCs 0 0 /100    Absolute Neutrophil 6.4 1.6 - 8.3 10e9/L    Absolute Lymphocytes 1.4 0.8 - 5.3 10e9/L    Absolute Monocytes 1.0 0.0 - 1.3 10e9/L    Absolute Eosinophils 0.2 0.0 - 0.7 10e9/L    Absolute Basophils 0.1 0.0 - 0.2 10e9/L    Abs Immature Granulocytes 0.1 0 - 0.4 10e9/L    Absolute Nucleated RBC 0.0    Comprehensive metabolic panel   Result Value Ref Range    Sodium 138 133 - 144 mmol/L    Potassium 3.5 3.4 - 5.3 mmol/L    Chloride 100 94 - 109 mmol/L    Carbon Dioxide 29 20 - 32 mmol/L    Anion Gap 9 3 - 14 mmol/L    Glucose 94 70 - 99 mg/dL    Urea Nitrogen 16 7 - 30 mg/dL    Creatinine 0.68 0.52 - 1.04 mg/dL    GFR Estimate >90 >60 mL/min/[1.73_m2]    GFR Estimate If Black >90 >60 mL/min/[1.73_m2]    Calcium 9.3 8.5 - 10.1 mg/dL    Bilirubin Total 0.3 0.2 - 1.3 mg/dL    Albumin 3.8 3.4 - 5.0 g/dL    Protein Total 7.3 6.8 - 8.8 g/dL    Alkaline Phosphatase 147 40 - 150 U/L    ALT 17 0 - 50 U/L    AST 13 0 - 45 U/L   Magnesium   Result Value Ref Range    Magnesium 1.9 1.6 - 2.3 mg/dL       Imaging Studies:  None for today      ASSESSMENT/PLAN:    #1  Endometrial cancer:  [id number] (i plus); [id number] isolated tumor cells, high-grade endometrioid adenocarcinoma, diagnosed 2/2018 and undergoing surgery 10/2018 by Dr. Marion with recommendation for Taxol/Carboplatin x6 cycles.  Cycle 5 will be held related to extreme muscle/joint pain side effect from the Taxol and will discuss with Dr. burch us before administering any further chemotherapy.  She is aware we will call her next week with plan.     #2  Neuropathy: I recommended instituting the gabapentin 300 mg at at  bedtime and increasing to twice daily in about a week.    #3  Arthralgia: I will send a prescription for prednisone 10 mg twice daily times 5 days to her pharmacy; she will continue with the ibuprofen and tramadol as needed and will initiate the gabapentin as above.  Extensive discussion was undertaken in regards to holding her treatment today and questionable further treatment related to this extreme side effect.  I explained that this can worsen with each treatment to the point of extreme decrease in mobility.    #4  Family History of cancer:  Related to patient's personal and family history of cancer, recommendation for genetic counseling was made along with discussion of what this entails. Patient declines a referral.  Offered to place referral if changes mind in the future.    I encouraged patient to call with any questions or concerns.      Gabriela LEYVA, HAYDEEP-BC, AOCNP

## 2019-03-08 NOTE — PATIENT INSTRUCTIONS
We will call you with the plan for follow up.  I will discuss further chemotherapy with Dr. Jaimes.    Your prescription for prednisone has been sent to: [facility].    When you are in need of a refill, please call your pharmacy and they will send us a request.     If you have any questions please call [phone]    Other instructions:      1.  Start Prednisone 10mg twice a day for 5 days  2.  Start the gabapentin 300mg at night and increase to 300mg twice a day in 1 week.

## 2019-03-08 NOTE — PROGRESS NOTES
Patients right sided power port accessed using power non-coring, 19 gauge, 3/4 inch needle.Mask donned by caregiver: yes Site prepped with CHG: yes Labs drawn: yes Dressing applied using aseptic technique: yes. Port accessed per facility protocol. Port flushed easily, blood return noted.  No signs and symptoms of infection or infiltration.  Port flushed 2 mL's normal saline, blood return noted, flushed with 8  mLs Normal Saline, 10 mLs blood discarded.  2 tubes taken for ordered labs, port flushed with 20 mLs normal saline.  Port left accessed as patient has appointment with Margot Mendenhall, and is then due for chemo if parameters are met.  Patient discharged with no complaints.

## 2019-03-14 ENCOUNTER — TELEPHONE (OUTPATIENT)
Dept: ONCOLOGY | Facility: OTHER | Age: 69
End: 2019-03-14

## 2019-03-14 NOTE — TELEPHONE ENCOUNTER
Spoke with Dr. Fiona maxwell updated on my visit with patient last week and treatment held. He has concerns with further Taxol as she had extreme pain that is worsening with each treatment.  He would like to see her on Monday 3/18/19 to discuss further treatment.  Attempted phone call to patient x3 and no answer and unable to leave message as no voicemail set up.

## 2019-03-14 NOTE — TELEPHONE ENCOUNTER
Telephone call to patient and discussed her issues with the extreme joint and muscle pain after her last treatment.  She states the prednisone did work and she is no longer having any pain.  She did take the Neurontin for 3 days but no longer.  I will have her see Dr. Jaimes on Monday, 3/18/2019 9:00 for lab at 930 for Dr. Jaimes to discuss any further therapy.

## 2019-03-14 NOTE — TELEPHONE ENCOUNTER
Received message that patient had called with concerns regarding her treatment being cancelled.  Spoke with Margot Mendenhall, AUTUMN, FNP-BC, AOCNP.  Patient should have completed prednisone yesterday for treatment of severe muscle/joint pain post taxol treatment.  Margot will be consulting with Dr. Jaimes today.  States she will notify patient of future plan once she can speak with hao.  He has been out of the office since patients appointment.  Attempted X 2 to call patient and notify her of this; however there was no answer on her phone, and no voicemail box has been set up.

## 2019-03-18 ENCOUNTER — ONCOLOGY VISIT (OUTPATIENT)
Dept: ONCOLOGY | Facility: OTHER | Age: 69
End: 2019-03-18
Attending: INTERNAL MEDICINE
Payer: MEDICARE

## 2019-03-18 VITALS
RESPIRATION RATE: 18 BRPM | DIASTOLIC BLOOD PRESSURE: 80 MMHG | WEIGHT: 187.4 LBS | BODY MASS INDEX: 37.78 KG/M2 | OXYGEN SATURATION: 97 % | HEART RATE: 69 BPM | TEMPERATURE: 97 F | HEIGHT: 59 IN | SYSTOLIC BLOOD PRESSURE: 120 MMHG

## 2019-03-18 DIAGNOSIS — C54.1 ADENOCARCINOMA OF THE ENDOMETRIUM/UTERUS (H): Primary | ICD-10-CM

## 2019-03-18 PROCEDURE — 99213 OFFICE O/P EST LOW 20 MIN: CPT | Performed by: INTERNAL MEDICINE

## 2019-03-18 PROCEDURE — G0463 HOSPITAL OUTPT CLINIC VISIT: HCPCS

## 2019-03-18 RX ORDER — EPINEPHRINE 1 MG/ML
0.3 INJECTION, SOLUTION, CONCENTRATE INTRAVENOUS EVERY 5 MIN PRN
Status: CANCELLED | OUTPATIENT
Start: 2019-03-18

## 2019-03-18 RX ORDER — METHYLPREDNISOLONE SODIUM SUCCINATE 125 MG/2ML
125 INJECTION, POWDER, LYOPHILIZED, FOR SOLUTION INTRAMUSCULAR; INTRAVENOUS
Status: CANCELLED
Start: 2019-03-18

## 2019-03-18 RX ORDER — DIPHENHYDRAMINE HYDROCHLORIDE 50 MG/ML
50 INJECTION INTRAMUSCULAR; INTRAVENOUS
Status: CANCELLED
Start: 2019-03-18

## 2019-03-18 RX ORDER — EPINEPHRINE 0.3 MG/.3ML
0.3 INJECTION SUBCUTANEOUS EVERY 5 MIN PRN
Status: CANCELLED | OUTPATIENT
Start: 2019-03-18

## 2019-03-18 RX ORDER — LORAZEPAM 2 MG/ML
1 INJECTION INTRAMUSCULAR EVERY 6 HOURS PRN
Status: CANCELLED
Start: 2019-03-18

## 2019-03-18 RX ORDER — SODIUM CHLORIDE 9 MG/ML
1000 INJECTION, SOLUTION INTRAVENOUS CONTINUOUS PRN
Status: CANCELLED
Start: 2019-03-18

## 2019-03-18 RX ORDER — ALBUTEROL SULFATE 0.83 MG/ML
2.5 SOLUTION RESPIRATORY (INHALATION)
Status: CANCELLED | OUTPATIENT
Start: 2019-03-18

## 2019-03-18 RX ORDER — MEPERIDINE HYDROCHLORIDE 25 MG/ML
25 INJECTION INTRAMUSCULAR; INTRAVENOUS; SUBCUTANEOUS EVERY 30 MIN PRN
Status: CANCELLED | OUTPATIENT
Start: 2019-03-18

## 2019-03-18 RX ORDER — ALBUTEROL SULFATE 90 UG/1
1-2 AEROSOL, METERED RESPIRATORY (INHALATION)
Status: CANCELLED
Start: 2019-03-18

## 2019-03-18 ASSESSMENT — PATIENT HEALTH QUESTIONNAIRE - PHQ9: SUM OF ALL RESPONSES TO PHQ QUESTIONS 1-9: 0

## 2019-03-18 ASSESSMENT — PAIN SCALES - GENERAL: PAINLEVEL: NO PAIN (0)

## 2019-03-18 ASSESSMENT — MIFFLIN-ST. JEOR: SCORE: 1295.67

## 2019-03-18 NOTE — NURSING NOTE
"Chief Complaint   Patient presents with     RECHECK     Follow up Endometrial adenocarcinoma        Initial /80   Pulse 69   Temp 97  F (36.1  C) (Tympanic)   Resp 18   Ht 1.499 m (4' 11\")   Wt 85 kg (187 lb 6.4 oz)   SpO2 97%   BMI 37.85 kg/m   Estimated body mass index is 37.85 kg/m  as calculated from the following:    Height as of this encounter: 1.499 m (4' 11\").    Weight as of this encounter: 85 kg (187 lb 6.4 oz).  Medication Reconciliation: complete.  Immunizations reviewed, has info on advanced directives, pain = 0, PHQ9 = 0.    Karuna Larsen LPN    "

## 2019-03-19 NOTE — PROGRESS NOTES
Visit Date:   03/18/2019      HISTORY OF PRESENT ILLNESS:  Mrs. 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  We had originally seen the patient in consultation back on 12/3/2018 at the request of Dr. Anabel Marion.  At that time, Mrs. 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  She originally stated she had presented with vaginal bleeding back in 02/2018 and eventually was referred to Dr. Nettles for endometrial biopsy, which revealed high grade endometrioid adenocarcinoma, possibly carcinosarcoma.  Apparently she had undergone coronary artery bypass graft surgery and an appointment with Dr. Marion was delayed.  She did see Dr. Marion who repeated an endometrial biopsy on 9/5/2018 which revealed grade 3 endometrial carcinoma, endometrioid type, FIGO grade 3A, including abundant  cell tumor component with early spindle cell features.  Carcinosarcoma component could not be excluded.  There was a squamous metaplastic component also noted.  Subsequently, the next day she underwent coronary artery bypass grafting x4.  She was diagnosed with left main 3-vessel coronary artery disease.  During hospitalization, she had atrial fibrillation with RVR postoperatively.  She converted to normal sinus rhythm on amiodarone.  The plan was to initiate anticoagulation.  After completing endometrial surgery, the patient subsequently did see Dr. Marion again on 10/11/2018 and the plan was to proceed with robotic staging procedure after CT chest, chest, abdomen and pelvis was performed on 10/19/2018.  As there was no evidence of metastatic disease from the patient's endometrial cancer.  The patient subsequently underwent a HENRY/BSO 10/31/2018.  Pathology revealed that she had a  high-grade endometrioid adenocarcinoma, FIGO Grade 3/3, tumor measured 3 cm in greatest dimension extensively invading into the underlying myometrium with myometrial invasion of 1.9 cm.  There was no surface serosal involvement seen.  The fundus of the uterus was disrupted, exposing the underlying myometrium invasive tumor.  There was foci of lymphovascular invasion by tumor identified within the myometrium but also within the cervix.  No invasive cervical stroma involved by tumor seen.  Myometrium also exhibited a solitary leiomyoma.  Right and left parametria appeared negative for tumor.  Right and left ovaries and right and left fallopian tubes were negative for tumor.  One out of 11 pelvic lymph nodes were positive for isolated tumor cells and 6 right pelvic nodes negative for tumor.  There was one right periaortic lymph node that was negative for tumor.  The omentum was negative for tumor.  The patient was staged pathologic T1b N0 I plus or stage 1B with isolated tumor cells.  Given the high-grade nature of the tumor, Dr. Marion was recommended adjuvant carboplatin and Taxol 6 cycles and followup with staging studies.  When w saw the patient 12/3/2018, the plan was to proceed with carboplatin AUC of 6, paclitaxel 175 mg/m2 given every 21 days and to follow up with Dr. Marion with staging studies after 6 cycles.  The patient had been seen by Margot Mendenhall, nurse practitioner ON 01/23/2019.  At that time she had multiple complaints of joint pains.  She was provided Claritin 10 mg 7 days after chemotherapy and also Tramadol 50 mg given every 6 hours.  The patient also had a family history of colon cancer and she had an absent MLH1 and PMS2 and MLH1 promoter methylation positive.  She was open to genetic counseling, which she is scheduled to have.  Otherwise, she did have ongoing burning foot pain; tramadol seemed to help and then she completed 4 cycles of carboplatin and Taxol.  The last dose of carboplatin was  based on a creatinine of 1.01 and adjusted body weight.  She was due for her fifth cycle of chemotherapy and was seen by Margot Mendenhall, nurse practitioner on 3/8/2019.  At that time she had complaints of extreme joint and muscle pains.  It was decided to hold her chemotherapy due to her neuropathic arthralgia-type symptoms and the patient was treated with prednisone 10 mg twice daily x5 days.  She says she feels much better since taking the prednisone and is willing to resume chemo.  We discussed the neuropathy, kidney cyst with Taxol also, could be associated carboplatin in the elderly patients over 65 years of age.  She is willing to proceed with Taxol at a reduced dose.  Otherwise, she feels relatively well.  She has no complaints of chest pain, abdominal pain, fevers, night sweats, weight loss.  She recently had her amiodarone discontinued by her cardiologist and metoprolol was increased to 2 tablets daily.  She offers no complaints of fevers, night sweats, weight loss.  She has some mild numbness and tingling in her hands and feet.  She has the joint pain is significantly improved or essentially resolved.      PHYSICAL EXAMINATION:   GENERAL:  She is a middle-aged white female in no acute distress.   VITAL SIGNS:  Reveal blood pressure 120/80, pulse 89, respirations 18, temperature 97.   HEENT:  Atraumatic, normocephalic.  Oropharynx nonerythematous.   NECK:  Supple.   LUNGS:  Clear to auscultation and percussion.   HEART:  Regular rhythm, S1, normal.   ABDOMEN:  Soft, normoactive bowel sounds.  Nontender.   LYMPHATICS:  No cervical, supraclavicular, axillary or inguinal nodes.   EXTREMITIES:  No edema.   NEUROLOGIC:  Nonfocal.      LABORATORY DATA:  Not done.      IMPRESSION:  High-grade endometrial adenocarcinoma of the uterus with staging consistent with pathologic T1b 0 I plus or stage 1B with isolated tumor cells involving one of the periaortic lymph nodes.  The patient had a high-grade tumor and would  benefit from adjuvant chemotherapy as per Dr. Anabel Marion.  The patient started carboplatin AUC of 6, paclitaxel 175 mg/m2 given every 21 days.  After 2 cycles she developed joint pains, was treated with Claritin, Ultram and then had her dose reduced of Taxol by 20%, now with ongoing arthralgias and neuropathic symptoms, responsive to prednisone.  We will dose reduce the Taxol by a total of 25% of the original dose.  We will also maintain her carboplatin at a lower dose.   utilizing a creatinine of 1.01 as there is data suggesting neuropathy can be associated with carboplatin in patients greater than age 65.  We will await the results of the creatinine and if it is above 1, we may have to adjust her dose, but for now will keep her dose based on creatinine of 1.  We will proceed with carboplatin AUC of 6 with a baseline creatinine of 1 and adjusted bodyweight, Taxol 175 mg/m2 with 25% dose reduction.  We will see the patient otherwise in 3 weeks, obtain CBC, CMP, LDH, CA 27-29.      Forty minutes was spent with the patient, greater than half the time spent in counseling and coordination of care.         GALILEA NOE MD             D: 2019   T: 2019   MT: CRIS      Name:     RACQUEL PINZON   MRN:      [id number]        Account:      [id number]   :      1950           Visit Date:   2019      Document: [id number]       cc: Anabel Calhoun MD

## 2019-03-21 ENCOUNTER — INFUSION THERAPY VISIT (OUTPATIENT)
Dept: INFUSION THERAPY | Facility: OTHER | Age: 69
End: 2019-03-21
Attending: INTERNAL MEDICINE
Payer: MEDICARE

## 2019-03-21 VITALS
DIASTOLIC BLOOD PRESSURE: 84 MMHG | TEMPERATURE: 98.3 F | WEIGHT: 186.8 LBS | OXYGEN SATURATION: 97 % | BODY MASS INDEX: 37.66 KG/M2 | SYSTOLIC BLOOD PRESSURE: 146 MMHG | RESPIRATION RATE: 18 BRPM | HEART RATE: 73 BPM | HEIGHT: 59 IN

## 2019-03-21 DIAGNOSIS — C55 MALIGNANT NEOPLASM OF UTERUS, UNSPECIFIED SITE (H): ICD-10-CM

## 2019-03-21 DIAGNOSIS — C54.1 ADENOCARCINOMA OF THE ENDOMETRIUM/UTERUS (H): Primary | ICD-10-CM

## 2019-03-21 DIAGNOSIS — C54.1 ENDOMETRIAL ADENOCARCINOMA (H): ICD-10-CM

## 2019-03-21 LAB
ALBUMIN SERPL-MCNC: 3.7 G/DL (ref 3.4–5)
ALP SERPL-CCNC: 157 U/L (ref 40–150)
ALT SERPL W P-5'-P-CCNC: 20 U/L (ref 0–50)
ANION GAP SERPL CALCULATED.3IONS-SCNC: 4 MMOL/L (ref 3–14)
AST SERPL W P-5'-P-CCNC: 12 U/L (ref 0–45)
BASOPHILS # BLD AUTO: 0.1 10E9/L (ref 0–0.2)
BASOPHILS NFR BLD AUTO: 0.5 %
BILIRUB SERPL-MCNC: 0.3 MG/DL (ref 0.2–1.3)
BUN SERPL-MCNC: 15 MG/DL (ref 7–30)
CALCIUM SERPL-MCNC: 9.3 MG/DL (ref 8.5–10.1)
CHLORIDE SERPL-SCNC: 105 MMOL/L (ref 94–109)
CO2 SERPL-SCNC: 29 MMOL/L (ref 20–32)
CREAT SERPL-MCNC: 0.76 MG/DL (ref 0.52–1.04)
DIFFERENTIAL METHOD BLD: NORMAL
EOSINOPHIL # BLD AUTO: 0.5 10E9/L (ref 0–0.7)
EOSINOPHIL NFR BLD AUTO: 5.2 %
ERYTHROCYTE [DISTWIDTH] IN BLOOD BY AUTOMATED COUNT: 14.3 % (ref 10–15)
GFR SERPL CREATININE-BSD FRML MDRD: 82 ML/MIN/{1.73_M2}
GLUCOSE SERPL-MCNC: 101 MG/DL (ref 70–99)
HCT VFR BLD AUTO: 40.3 % (ref 35–47)
HGB BLD-MCNC: 13.9 G/DL (ref 11.7–15.7)
IMM GRANULOCYTES # BLD: 0.1 10E9/L (ref 0–0.4)
IMM GRANULOCYTES NFR BLD: 0.6 %
LYMPHOCYTES # BLD AUTO: 1.8 10E9/L (ref 0.8–5.3)
LYMPHOCYTES NFR BLD AUTO: 18.5 %
MAGNESIUM SERPL-MCNC: 2 MG/DL (ref 1.6–2.3)
MCH RBC QN AUTO: 32.5 PG (ref 26.5–33)
MCHC RBC AUTO-ENTMCNC: 34.5 G/DL (ref 31.5–36.5)
MCV RBC AUTO: 94 FL (ref 78–100)
MONOCYTES # BLD AUTO: 1 10E9/L (ref 0–1.3)
MONOCYTES NFR BLD AUTO: 10.3 %
NEUTROPHILS # BLD AUTO: 6.2 10E9/L (ref 1.6–8.3)
NEUTROPHILS NFR BLD AUTO: 64.9 %
NRBC # BLD AUTO: 0 10*3/UL
NRBC BLD AUTO-RTO: 0 /100
PLATELET # BLD AUTO: 190 10E9/L (ref 150–450)
POTASSIUM SERPL-SCNC: 3.8 MMOL/L (ref 3.4–5.3)
PROT SERPL-MCNC: 7.3 G/DL (ref 6.8–8.8)
RBC # BLD AUTO: 4.28 10E12/L (ref 3.8–5.2)
SODIUM SERPL-SCNC: 138 MMOL/L (ref 133–144)
WBC # BLD AUTO: 9.6 10E9/L (ref 4–11)

## 2019-03-21 PROCEDURE — 96375 TX/PRO/DX INJ NEW DRUG ADDON: CPT

## 2019-03-21 PROCEDURE — 83735 ASSAY OF MAGNESIUM: CPT | Mod: ZL | Performed by: INTERNAL MEDICINE

## 2019-03-21 PROCEDURE — 25000132 ZZH RX MED GY IP 250 OP 250 PS 637: Mod: GY | Performed by: INTERNAL MEDICINE

## 2019-03-21 PROCEDURE — 80053 COMPREHEN METABOLIC PANEL: CPT | Mod: ZL | Performed by: INTERNAL MEDICINE

## 2019-03-21 PROCEDURE — 85025 COMPLETE CBC W/AUTO DIFF WBC: CPT | Mod: ZL | Performed by: INTERNAL MEDICINE

## 2019-03-21 PROCEDURE — 25000128 H RX IP 250 OP 636: Performed by: INTERNAL MEDICINE

## 2019-03-21 PROCEDURE — 96413 CHEMO IV INFUSION 1 HR: CPT

## 2019-03-21 PROCEDURE — A9270 NON-COVERED ITEM OR SERVICE: HCPCS | Mod: GY | Performed by: INTERNAL MEDICINE

## 2019-03-21 PROCEDURE — 36415 COLL VENOUS BLD VENIPUNCTURE: CPT | Mod: ZL | Performed by: INTERNAL MEDICINE

## 2019-03-21 PROCEDURE — 25800030 ZZH RX IP 258 OP 636: Performed by: INTERNAL MEDICINE

## 2019-03-21 PROCEDURE — 96367 TX/PROPH/DG ADDL SEQ IV INF: CPT

## 2019-03-21 PROCEDURE — 96417 CHEMO IV INFUS EACH ADDL SEQ: CPT

## 2019-03-21 PROCEDURE — 96415 CHEMO IV INFUSION ADDL HR: CPT

## 2019-03-21 PROCEDURE — 25000125 ZZHC RX 250: Performed by: INTERNAL MEDICINE

## 2019-03-21 RX ORDER — HEPARIN SODIUM (PORCINE) LOCK FLUSH IV SOLN 100 UNIT/ML 100 UNIT/ML
5 SOLUTION INTRAVENOUS ONCE
Status: COMPLETED | OUTPATIENT
Start: 2019-03-21 | End: 2019-03-21

## 2019-03-21 RX ORDER — DIPHENHYDRAMINE HCL 50 MG
50 CAPSULE ORAL ONCE
Status: COMPLETED | OUTPATIENT
Start: 2019-03-21 | End: 2019-03-21

## 2019-03-21 RX ORDER — PALONOSETRON 0.05 MG/ML
0.25 INJECTION, SOLUTION INTRAVENOUS ONCE
Status: COMPLETED | OUTPATIENT
Start: 2019-03-21 | End: 2019-03-21

## 2019-03-21 RX ORDER — HEPARIN SODIUM (PORCINE) LOCK FLUSH IV SOLN 100 UNIT/ML 100 UNIT/ML
5 SOLUTION INTRAVENOUS ONCE
Status: CANCELLED
Start: 2019-03-21 | End: 2019-03-21

## 2019-03-21 RX ADMIN — DEXAMETHASONE SODIUM PHOSPHATE 20 MG: 10 INJECTION, SOLUTION INTRAMUSCULAR; INTRAVENOUS at 10:31

## 2019-03-21 RX ADMIN — PACLITAXEL 247 MG: 6 INJECTION, SOLUTION INTRAVENOUS at 11:42

## 2019-03-21 RX ADMIN — Medication 5 ML: at 15:58

## 2019-03-21 RX ADMIN — PALONOSETRON HYDROCHLORIDE 0.25 MG: 0.25 INJECTION INTRAVENOUS at 11:10

## 2019-03-21 RX ADMIN — SODIUM CHLORIDE 1000 ML: 9 INJECTION, SOLUTION INTRAVENOUS at 10:09

## 2019-03-21 RX ADMIN — CARBOPLATIN 460 MG: 10 INJECTION, SOLUTION INTRAVENOUS at 15:02

## 2019-03-21 RX ADMIN — DIPHENHYDRAMINE HYDROCHLORIDE 50 MG: 50 CAPSULE ORAL at 10:08

## 2019-03-21 RX ADMIN — FAMOTIDINE 40 MG: 10 INJECTION, SOLUTION INTRAVENOUS at 11:10

## 2019-03-21 ASSESSMENT — MIFFLIN-ST. JEOR: SCORE: 1293.2

## 2019-03-21 NOTE — PROGRESS NOTES
Spoke with Luz Maria Jaimes' nurse, per Dr. Jaimes patient carboplatin dose will remain the same as previous cycle.

## 2019-03-21 NOTE — PATIENT INSTRUCTIONS
Discharge Instructions for Infusion Therapy/Chemotherapy Department:    Your doctor has prescribed the following medication(s) paclitaxel/carboplatin to treat your cancer.    All treatments have potential side effects, but they don't all happen to everyone.  If you have any questions, problems, or concerns, we want you to call us.  You can reach the Infusion Nurses at [phone] Dr Jaimes Nurse [phone]  Monday - Friday 8:00am to 4:30pm or the Health Unit Coordinator at [phone] Monday - Friday 8:00am to 5:00pm.      After hours, evenings, weekends, and holidays please call Urgent Care [phone] or toll free [phone] or go to your nearest Emergency Department.    IV, PICC line or Port access site care or injection site care:   Please report signs of infection or infiltration;  *Redness     *Swelling/puffiness  *Pain/tenderness   *Fever 100.4 F or higher  *Drainage         Possible side effects include:  *Diarrhea     *Allergic Reaction  *Constipation    *Drop in blood counts  *Depression/Anxiey   *Nausea/Vomiting  *Weakness/Fatigue   *Cold intolerance  *Skin changes/Rash   *Stomatitis (mouth sores)  *Loss of appetite/Taste changes *Weight gain/Swelling (edema)  *Hand-Foot syndrome   *Hypertension (high blood pressure)  *Abdominal pain    *Peripheral Neuropathy (numbness/tingling in hands/feet)    YOU NEED TO CALL US OR GO TO YOUR NEAREST URGENT CARE/EMERGENCY DEPARTMENT IF ANY OF THE FOLLOWING OCCUR:     *You have a fever of 100.4 F or higher.      *You are experiencing uncontrolled vomiting while taking your  anti-nausea medications.      *You are having watery diarrhea (4-6 loose stools in a 24 hour  period).      *You are experiencing a severe rash or skin changes.      *You have mouth sores or a sore throat.      *You have severe constipation (no BM for 3 days).      ANY questions or problems, PLEASE do not hesitate to call us!!

## 2019-03-21 NOTE — PROGRESS NOTES
Right sided power port accessed with 19 gauge 3/4 inch 90 degree bent non coring needle.  Line flushed with 10 cc's normal saline.  Needle secured with sterile transparent dressing.  10 cc's blood discarded, and blood taken for 2 tubes of ordered labs.  Patient tolerated well.  Denies pain and discomfort at this time.  Port flushes easily without resistance.    Hand hygiene performed: yes   Mask donned by caregiver: yes Site prepped with CHG: yes Labs drawn: yes Dressing applied using aseptic technique: yes

## 2019-03-21 NOTE — PROGRESS NOTES
Patient is a 67 y/o female here accompanied by self today for infusion of carboplatin/paclitaxel under the orders of Dr. Jaimes.      Today's lab values: WBC 9.6, ANC 6.2, , HGB 13.9, AST 12, ALT 20, Alkaline Phosphatase 157, Creatinine 0.76.     Patient meets parameters for today's infusion.  Denies questions or concerns regarding today's infusion and/or medications being administered.      Patient identified with two identifiers, order verified, and verbal consent for today's infusion obtained from patient. Written consent for treatment is on file and valid.    1142 IV pump verified with paclitaxel 247mg dose, drug, and rate of administration by second RNDana. Infusion administered per protocol. Patient tolerated infusion well, no signs or symptoms of adverse reaction noted. Patient denies pain nor discomfort.     1502 IV pump verified with carboplatin 460 mg dose, drug, and rate of administration by second RNDana. Infusion administered per protocol.

## 2019-04-08 RX ORDER — ALBUTEROL SULFATE 90 UG/1
1-2 AEROSOL, METERED RESPIRATORY (INHALATION)
Status: CANCELLED
Start: 2019-04-08

## 2019-04-08 RX ORDER — DIPHENHYDRAMINE HCL 50 MG
50 CAPSULE ORAL ONCE
Status: CANCELLED
Start: 2019-04-08

## 2019-04-08 RX ORDER — METHYLPREDNISOLONE SODIUM SUCCINATE 125 MG/2ML
125 INJECTION, POWDER, LYOPHILIZED, FOR SOLUTION INTRAMUSCULAR; INTRAVENOUS
Status: CANCELLED
Start: 2019-04-08

## 2019-04-08 RX ORDER — PALONOSETRON 0.05 MG/ML
0.25 INJECTION, SOLUTION INTRAVENOUS ONCE
Status: CANCELLED
Start: 2019-04-08

## 2019-04-08 RX ORDER — LORAZEPAM 2 MG/ML
1 INJECTION INTRAMUSCULAR EVERY 6 HOURS PRN
Status: CANCELLED
Start: 2019-04-08

## 2019-04-08 RX ORDER — ALBUTEROL SULFATE 0.83 MG/ML
2.5 SOLUTION RESPIRATORY (INHALATION)
Status: CANCELLED | OUTPATIENT
Start: 2019-04-08

## 2019-04-08 RX ORDER — DIPHENHYDRAMINE HYDROCHLORIDE 50 MG/ML
50 INJECTION INTRAMUSCULAR; INTRAVENOUS
Status: CANCELLED
Start: 2019-04-08

## 2019-04-08 RX ORDER — MEPERIDINE HYDROCHLORIDE 25 MG/ML
25 INJECTION INTRAMUSCULAR; INTRAVENOUS; SUBCUTANEOUS EVERY 30 MIN PRN
Status: CANCELLED | OUTPATIENT
Start: 2019-04-08

## 2019-04-08 RX ORDER — EPINEPHRINE 0.3 MG/.3ML
0.3 INJECTION SUBCUTANEOUS EVERY 5 MIN PRN
Status: CANCELLED | OUTPATIENT
Start: 2019-04-08

## 2019-04-08 RX ORDER — SODIUM CHLORIDE 9 MG/ML
1000 INJECTION, SOLUTION INTRAVENOUS CONTINUOUS PRN
Status: CANCELLED
Start: 2019-04-08

## 2019-04-08 RX ORDER — EPINEPHRINE 1 MG/ML
0.3 INJECTION, SOLUTION, CONCENTRATE INTRAVENOUS EVERY 5 MIN PRN
Status: CANCELLED | OUTPATIENT
Start: 2019-04-08

## 2019-04-12 ENCOUNTER — APPOINTMENT (OUTPATIENT)
Dept: LAB | Facility: OTHER | Age: 69
End: 2019-04-12
Attending: INTERNAL MEDICINE
Payer: MEDICARE

## 2019-04-12 ENCOUNTER — ONCOLOGY VISIT (OUTPATIENT)
Dept: ONCOLOGY | Facility: OTHER | Age: 69
End: 2019-04-12
Attending: INTERNAL MEDICINE
Payer: MEDICARE

## 2019-04-12 ENCOUNTER — INFUSION THERAPY VISIT (OUTPATIENT)
Dept: INFUSION THERAPY | Facility: OTHER | Age: 69
End: 2019-04-12
Attending: INTERNAL MEDICINE
Payer: MEDICARE

## 2019-04-12 VITALS
RESPIRATION RATE: 18 BRPM | BODY MASS INDEX: 38.51 KG/M2 | DIASTOLIC BLOOD PRESSURE: 78 MMHG | TEMPERATURE: 97.5 F | WEIGHT: 191 LBS | OXYGEN SATURATION: 98 % | HEART RATE: 76 BPM | SYSTOLIC BLOOD PRESSURE: 120 MMHG | HEIGHT: 59 IN

## 2019-04-12 VITALS
WEIGHT: 191 LBS | OXYGEN SATURATION: 98 % | SYSTOLIC BLOOD PRESSURE: 143 MMHG | HEART RATE: 78 BPM | BODY MASS INDEX: 38.51 KG/M2 | DIASTOLIC BLOOD PRESSURE: 74 MMHG | TEMPERATURE: 97.5 F | HEIGHT: 59 IN | RESPIRATION RATE: 16 BRPM

## 2019-04-12 DIAGNOSIS — C55 MALIGNANT NEOPLASM OF UTERUS, UNSPECIFIED SITE (H): ICD-10-CM

## 2019-04-12 DIAGNOSIS — C54.1 ADENOCARCINOMA OF THE ENDOMETRIUM/UTERUS (H): Primary | ICD-10-CM

## 2019-04-12 DIAGNOSIS — C54.1 ENDOMETRIAL ADENOCARCINOMA (H): ICD-10-CM

## 2019-04-12 LAB
ALBUMIN SERPL-MCNC: 3.9 G/DL (ref 3.4–5)
ALP SERPL-CCNC: 153 U/L (ref 40–150)
ALT SERPL W P-5'-P-CCNC: 24 U/L (ref 0–50)
ANION GAP SERPL CALCULATED.3IONS-SCNC: 8 MMOL/L (ref 3–14)
AST SERPL W P-5'-P-CCNC: 15 U/L (ref 0–45)
BASOPHILS # BLD AUTO: 0.1 10E9/L (ref 0–0.2)
BASOPHILS NFR BLD AUTO: 0.6 %
BILIRUB SERPL-MCNC: 0.2 MG/DL (ref 0.2–1.3)
BUN SERPL-MCNC: 24 MG/DL (ref 7–30)
CALCIUM SERPL-MCNC: 9.8 MG/DL (ref 8.5–10.1)
CHLORIDE SERPL-SCNC: 99 MMOL/L (ref 94–109)
CO2 SERPL-SCNC: 27 MMOL/L (ref 20–32)
CREAT SERPL-MCNC: 0.84 MG/DL (ref 0.52–1.04)
DIFFERENTIAL METHOD BLD: NORMAL
EOSINOPHIL # BLD AUTO: 0.1 10E9/L (ref 0–0.7)
EOSINOPHIL NFR BLD AUTO: 1.4 %
ERYTHROCYTE [DISTWIDTH] IN BLOOD BY AUTOMATED COUNT: 13.2 % (ref 10–15)
GFR SERPL CREATININE-BSD FRML MDRD: 72 ML/MIN/{1.73_M2}
GLUCOSE SERPL-MCNC: 104 MG/DL (ref 70–99)
HCT VFR BLD AUTO: 40.4 % (ref 35–47)
HGB BLD-MCNC: 14 G/DL (ref 11.7–15.7)
IMM GRANULOCYTES # BLD: 0.1 10E9/L (ref 0–0.4)
IMM GRANULOCYTES NFR BLD: 1 %
LYMPHOCYTES # BLD AUTO: 1.7 10E9/L (ref 0.8–5.3)
LYMPHOCYTES NFR BLD AUTO: 16.2 %
MAGNESIUM SERPL-MCNC: 2.1 MG/DL (ref 1.6–2.3)
MCH RBC QN AUTO: 32.3 PG (ref 26.5–33)
MCHC RBC AUTO-ENTMCNC: 34.7 G/DL (ref 31.5–36.5)
MCV RBC AUTO: 93 FL (ref 78–100)
MONOCYTES # BLD AUTO: 1 10E9/L (ref 0–1.3)
MONOCYTES NFR BLD AUTO: 10.1 %
NEUTROPHILS # BLD AUTO: 7.2 10E9/L (ref 1.6–8.3)
NEUTROPHILS NFR BLD AUTO: 70.7 %
NRBC # BLD AUTO: 0 10*3/UL
NRBC BLD AUTO-RTO: 0 /100
PLATELET # BLD AUTO: 219 10E9/L (ref 150–450)
POTASSIUM SERPL-SCNC: 3.8 MMOL/L (ref 3.4–5.3)
PROT SERPL-MCNC: 7.6 G/DL (ref 6.8–8.8)
RBC # BLD AUTO: 4.34 10E12/L (ref 3.8–5.2)
SODIUM SERPL-SCNC: 134 MMOL/L (ref 133–144)
WBC # BLD AUTO: 10.2 10E9/L (ref 4–11)

## 2019-04-12 PROCEDURE — 80053 COMPREHEN METABOLIC PANEL: CPT | Mod: ZL | Performed by: INTERNAL MEDICINE

## 2019-04-12 PROCEDURE — 99214 OFFICE O/P EST MOD 30 MIN: CPT | Performed by: INTERNAL MEDICINE

## 2019-04-12 PROCEDURE — 96413 CHEMO IV INFUSION 1 HR: CPT

## 2019-04-12 PROCEDURE — 96415 CHEMO IV INFUSION ADDL HR: CPT

## 2019-04-12 PROCEDURE — 36415 COLL VENOUS BLD VENIPUNCTURE: CPT | Mod: ZL | Performed by: INTERNAL MEDICINE

## 2019-04-12 PROCEDURE — 25800030 ZZH RX IP 258 OP 636: Performed by: INTERNAL MEDICINE

## 2019-04-12 PROCEDURE — 96417 CHEMO IV INFUS EACH ADDL SEQ: CPT

## 2019-04-12 PROCEDURE — A9270 NON-COVERED ITEM OR SERVICE: HCPCS | Mod: GY | Performed by: INTERNAL MEDICINE

## 2019-04-12 PROCEDURE — G0463 HOSPITAL OUTPT CLINIC VISIT: HCPCS

## 2019-04-12 PROCEDURE — G0463 HOSPITAL OUTPT CLINIC VISIT: HCPCS | Mod: 25

## 2019-04-12 PROCEDURE — 96367 TX/PROPH/DG ADDL SEQ IV INF: CPT

## 2019-04-12 PROCEDURE — 25000125 ZZHC RX 250: Performed by: INTERNAL MEDICINE

## 2019-04-12 PROCEDURE — 83735 ASSAY OF MAGNESIUM: CPT | Mod: ZL | Performed by: INTERNAL MEDICINE

## 2019-04-12 PROCEDURE — 25000132 ZZH RX MED GY IP 250 OP 250 PS 637: Mod: GY | Performed by: INTERNAL MEDICINE

## 2019-04-12 PROCEDURE — 25000128 H RX IP 250 OP 636: Performed by: INTERNAL MEDICINE

## 2019-04-12 PROCEDURE — 85025 COMPLETE CBC W/AUTO DIFF WBC: CPT | Mod: ZL | Performed by: INTERNAL MEDICINE

## 2019-04-12 PROCEDURE — 96375 TX/PRO/DX INJ NEW DRUG ADDON: CPT

## 2019-04-12 RX ORDER — ASPIRIN 81 MG/1
81 TABLET ORAL DAILY
COMMUNITY
End: 2019-08-13

## 2019-04-12 RX ORDER — PALONOSETRON 0.05 MG/ML
0.25 INJECTION, SOLUTION INTRAVENOUS ONCE
Status: COMPLETED | OUTPATIENT
Start: 2019-04-12 | End: 2019-04-12

## 2019-04-12 RX ORDER — DIPHENHYDRAMINE HCL 50 MG
50 CAPSULE ORAL ONCE
Status: COMPLETED | OUTPATIENT
Start: 2019-04-12 | End: 2019-04-12

## 2019-04-12 RX ORDER — HEPARIN SODIUM (PORCINE) LOCK FLUSH IV SOLN 100 UNIT/ML 100 UNIT/ML
5 SOLUTION INTRAVENOUS ONCE
Status: COMPLETED | OUTPATIENT
Start: 2019-04-12 | End: 2019-04-12

## 2019-04-12 RX ORDER — HEPARIN SODIUM (PORCINE) LOCK FLUSH IV SOLN 100 UNIT/ML 100 UNIT/ML
5 SOLUTION INTRAVENOUS ONCE
Status: CANCELLED
Start: 2019-04-12

## 2019-04-12 RX ADMIN — DEXAMETHASONE SODIUM PHOSPHATE 20 MG: 10 INJECTION, SOLUTION INTRAMUSCULAR; INTRAVENOUS at 10:44

## 2019-04-12 RX ADMIN — DIPHENHYDRAMINE HYDROCHLORIDE 50 MG: 50 CAPSULE ORAL at 10:12

## 2019-04-12 RX ADMIN — PALONOSETRON HYDROCHLORIDE 0.25 MG: 0.25 INJECTION INTRAVENOUS at 10:12

## 2019-04-12 RX ADMIN — PACLITAXEL 247 MG: 6 INJECTION, SOLUTION INTRAVENOUS at 11:07

## 2019-04-12 RX ADMIN — Medication 5 ML: at 15:09

## 2019-04-12 RX ADMIN — SODIUM CHLORIDE: 9 INJECTION, SOLUTION INTRAVENOUS at 10:11

## 2019-04-12 RX ADMIN — FAMOTIDINE 40 MG: 10 INJECTION, SOLUTION INTRAVENOUS at 10:12

## 2019-04-12 RX ADMIN — CARBOPLATIN 460 MG: 10 INJECTION, SOLUTION INTRAVENOUS at 14:15

## 2019-04-12 ASSESSMENT — PAIN SCALES - GENERAL: PAINLEVEL: NO PAIN (0)

## 2019-04-12 ASSESSMENT — PATIENT HEALTH QUESTIONNAIRE - PHQ9: SUM OF ALL RESPONSES TO PHQ QUESTIONS 1-9: 0

## 2019-04-12 ASSESSMENT — MIFFLIN-ST. JEOR
SCORE: 1312.25
SCORE: 1312

## 2019-04-12 NOTE — NURSING NOTE
"Chief Complaint   Patient presents with     RECHECK     Follow up Adenocarcinoma of the endometrium/uterus        Initial /78   Pulse 76   Temp 97.5  F (36.4  C) (Tympanic)   Resp 18   Ht 1.499 m (4' 11\")   Wt 86.6 kg (191 lb)   SpO2 98%   BMI 38.58 kg/m   Estimated body mass index is 38.58 kg/m  as calculated from the following:    Height as of this encounter: 1.499 m (4' 11\").    Weight as of this encounter: 86.6 kg (191 lb).  Medication Reconciliation: complete.  Immunizations reviewed, Advanced directives has info, pain = 0, PHQ9 = 0.    Karuna Larsen LPN    "

## 2019-04-12 NOTE — PROGRESS NOTES
Visit Date:   04/12/2019      HISTORY OF PRESENT ILLNESS:  Ms. 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  We had originally seen the patient in consultation back on 12/03/2018 at the request of Dr. Anabel Marion.  At that time, Ms. 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  She originally stated she had presented with vaginal bleeding back in 02/2018.  Eventually, was referred to Dr. Nettles for endometrial biopsy which revealed high-grade endometrioid adenocarcinoma, possibly carcinosarcoma.  Apparently she had undergone coronary artery bypass graft surgery and an appointment with Dr. Marion was delayed.  She did see Dr. Marion who repeated endometrial biopsy on 09/05/2018, which revealed a grade 3 endometrial carcinoma, endometrioid type with FIGO grade 3A, including abundant cell tumor component with early spindle cell features, carcinosarcoma component not be excluded.  There was a squamous metaplastic component also noted.  Subsequently, the next day, she underwent coronary artery bypass grafting x 4.  She was diagnosed with left main vessel coronary artery disease.  During hospitalization, she had atrial fibrillation with RVR postoperatively.  She converted to normal sinus rhythm on amiodarone.  The plan was to initiate anticoagulation after completing endometrial surgery.  The patient subsequently did see Dr. Marion again on 10/11/2018 and the plan was to proceed with a robotic staging procedure after CT chest, abdomen and pelvis was performed on 10/19/2018.  At that time, there was no evidence of metastatic disease of the patient's endometrial cancer.  The patient subsequently underwent a HENRY/BSO on 10/31/2018.  Pathology revealed that she had a high-grade  endometrioid adenocarcinoma, FIGO grade 3/3, tumor measured 3 cm in greatest dimension extensively invading into the underlying myometrium and myometrial invasion of 1.9 cm.  There was no surface serosal involvement seen, the fundus of the uterus was disrupted exposing the underlying myometrium invasive tumor.  There was foci of lymphovascular invasion by tumor identified within the myometrium but also within the cervix, no invasive cervical stroma involved by tumor was seen.  Myometrium also exhibited a solitary leiomyoma.  Right and left parametria appeared negative for tumor.  Right and left ovaries and right and left fallopian tubes were negative for tumor.  One out of 11 pelvic lymph nodes were positive for isolated tumor cells. Six right pelvic nodes were negative for tumor.  There was one right periaortic lymph node that was negative for tumor.  The omentum was negative for tumor.  The patient was staged pathologic T1b N0 I plus or stage 1B with isolated tumor cells.  Given the high-grade nature of the tumor, Dr. Marion were recommending adjuvant carboplatin and Taxol x 6 cycles and followup staging studies.  When we saw the patient on 12/03/2018 and the plan was to proceed with carboplatin AUC of 6, paclitaxel 175 mg /m2 given every 21 days and to follow up with Dr. Marion with staging studies after 6 cycles.        The patient had been seen by Margot Mendenhall, nurse practitioner on 01/23/2019.  At that time she had multiple complaints of joint pain.  She was being treated with Claritin 10 mg 7 days after chemotherapy.  Also, Tramadol 50 mg given every 6 hours.  The patient also had a family history of colon cancer.  She had an absent MLH1 and PMS2, MLH1 promoter methylation positive.  She was open to genetic counseling, which she is scheduled to have.  Otherwise, she did have ongoing burning foot pain, tramadol seemed to help and then she completed 4 cycles of carboplatin and Taxol, the last dose of  carboplatin was based on creatinine 1.01 and adjusted body weight.  She was due for her fifth cycle of chemotherapy and was seen by Margot Mendenhall, nurse practitioner, on 03/08/2019.  At that time she had complaints of extreme joint and muscle pains.  It was decided to hold her chemotherapy due to her neuropathic arthralgia type symptoms.  The patient was treated with prednisone 10 mg twice daily x 5 days.  She felt much better since taking prednisone and is willing to resume chemo.  We discussed neuropathy with Taxol so it could be associated with carboplatin in elderly patients over 65 years of age.  She was willing to proceed with Taxol reduced dose.  Otherwise, felt relatively well.  She went on to receive her fifth cycle and now is due for her 6th cycle of carboplatin and Taxol.  She says she feels much better.  She had hardly any joint pains.  She denies any neuropathy, fevers, night sweats, weight loss.  She says she would like to get rehabilitation for her leg, because she sometimes feel weak she says, but otherwise she would like to hold off for at least 3 weeks after her last chemotherapy, which was today.  She is not sure about seeing Dr. Marion right away.  Nonetheless, she is due for staging studies.  She denies any fevers, night sweats, weight loss.      PHYSICAL EXAMINATION:   GENERAL:  She is a middle-aged white female in no acute distress.   VITAL SIGNS:  Blood pressure 120/78, pulse 76, respirations 18, temperature 97.5.   HEENT:  Atraumatic, normocephalic.  Oropharynx erythematous.   NECK:  Supple.   LUNGS:  Clear to auscultation and percussion.   HEART:  Regular rhythm, S1, S2 normal.   ABDOMEN:  Soft, nontender.   LYMPHATICS:  No cervical, supraclavicular, axillary or inguinal nodes.   EXTREMITIES:  No edema.   NEUROLOGIC:  Nonfocal.      LABORATORY DATA:  Reveal CBC that is essentially within normal limits.  LFTs are pending.  Magnesium is pending.      IMPRESSION:  High-grade endometrial  adenocarcinoma of the uterus with staging consistent with pathologic T1b N0 I plus or stage 1B with isolated tumor cells involving one of the periaortic lymph nodes.  The patient had high-grade tumor and would benefit from adjuvant chemotherapy as per Dr. Anabel Marion.  The patient was started on carboplatin AUC of 6, paclitaxel 175 mg/m2 given every 21 days.  After 2 cycles, she developed joint pains for which she used Claritin and Ultram and then had her dose reduced of Taxol by 20%, now with ongoing arthralgias, neuropathic symptoms, responsive to prednisone.  We dose reduced Taxol by 25% with her fifth cycle and maintained her carboplatin at a lower dose with dosing based on creatinine of 1.  Nonetheless, she is here for 6th cycle of chemotherapy.  There are no contraindications to chemotherapy.  She will proceed with her sixth cycle of carboplatin and Taxol.  Otherwise, I will have her get staged in 3 weeks with a CT chest, abdomen and pelvis and also obtain CA-125.  She will follow up with Dr. Marion after her scans.  She will see us 1 week after the scans.      Forty minutes was spent with the patient, greater than half the time spent on counseling and coordination of care.         GALILEA NOE MD             D: 2019   T: 2019   MT: HIPOLITO      Name:     RACQUEL PINZON   MRN:      [id number]        Account:      [id number]   :      1950           Visit Date:   2019      Document: [id number]       cc: Anabel Calhoun MD

## 2019-04-12 NOTE — PROGRESS NOTES
Patient is a 68 year old female here accompanied by self today for infusion of taxol/carboplatin under the orders of Dr. Jaimes.  Right sided power port accessed with 19 gauge 3/4 inch 90 degree bent non coring needle.  Line flushed with 20 cc's normal saline.  Needle secured with sterile transparent dressing.  Patient tolerated well.  Denies pain and discomfort at this time.  Port flushes easily without resistance.    Hand hygiene performed: yes   Mask donned by caregiver: yes Site prepped with CHG: yes Labs drawn: yes Dressing applied using aseptic technique: yes   Today's lab values: WBC 10.2, ANC 7.2, , HGB 14, AST 15, ALT 24, Alkaline Phosphatase 153, Creatinine 0.84.     Taxol dose verified with Russ Wood RN prior to release of drug.    Psychosocial well being assessed.  New concerns? Denies any.      Patient meets parameters for today's infusion.  Denies questions or concerns regarding today's infusion and/or medications being administered.      Patient identified with two identifiers, order verified, and verbal consent for today's infusion obtained from patient. Written consent for treatment is on file and valid.    1107: IV pump verified with Taxol dose, drug, and rate of administration. Infusion administered per protocol. Patient tolerated infusion well, no signs or symptoms of adverse reaction noted. Patient denies pain nor discomfort.     1415: IV pump verified with carboplatin 460mg dose, drug, and rate of administration. Infusion administered per protocol. Patient tolerated infusion well, no signs or symptoms of adverse reaction noted. Patient denies pain nor discomfort.     Needle removed, tip intact. Site clean, dry and intact. Covered with a sterile bandage, slight pressure applied for 30 seconds. Pt instructed to leave bandage intact for a minimum of one hour, and to call with questions or concerns. Patient states understanding, discharged ambulatory.

## 2019-05-09 ENCOUNTER — TELEPHONE (OUTPATIENT)
Dept: ONCOLOGY | Facility: OTHER | Age: 69
End: 2019-05-09

## 2019-05-09 NOTE — TELEPHONE ENCOUNTER
This writer called to reschedule a missed lab and scan.  No answer on house number, no answering machine.  Cell phone not in use and patient does not know how/have VM on it, had been an issue in the past. Noted in chart.

## 2019-05-16 ENCOUNTER — TELEPHONE (OUTPATIENT)
Dept: ONCOLOGY | Facility: OTHER | Age: 69
End: 2019-05-16

## 2019-05-16 NOTE — PROGRESS NOTES
"Cardiac Rehab Discharge Summary    Reason for discharge:    Pt has not returned to Phase II rehab since being placed \"On Hold\" following other medical concerns. Staff unable to reach pt after 2 months of phone calls and letters being sent.    Progress towards goals:  Goals partially met.  Barriers to achieving goals:   Other medical complications..    Recommendation(s):    Continue home exercise program.  "

## 2019-05-16 NOTE — TELEPHONE ENCOUNTER
"This writer called patient to reschedule some CT's that were missed, and to make sure she knew that her appointment with Dr. Jaimes needed to be rescheduled.  Patient states \"I'm sorry, I don't know what's going on. I needed to get in touch with my doctor in [place].  I just don't know what's going on.\"  Patient diconnected the call.  This writer tried to call back and patient did not pick back up, no answering machine.  Noted in chart.  "

## 2019-05-28 ENCOUNTER — TELEPHONE (OUTPATIENT)
Dept: ONCOLOGY | Facility: OTHER | Age: 69
End: 2019-05-28

## 2019-05-28 NOTE — TELEPHONE ENCOUNTER
"This writer called patient to reschedule missed scans and follow-up.  Patient states \"I would like to check with my doctor in [place] [facility] [primary care].  I will call you back when I know more of what they think.\"  Writer offered direct number to Oncology, patient states she has it.  Thanked her for keeping us in the loop.  Noted in chart.  "

## 2019-05-29 ENCOUNTER — APPOINTMENT (OUTPATIENT)
Dept: GENERAL RADIOLOGY | Facility: HOSPITAL | Age: 69
End: 2019-05-29
Attending: FAMILY MEDICINE
Payer: MEDICARE

## 2019-05-29 ENCOUNTER — HOSPITAL ENCOUNTER (EMERGENCY)
Facility: HOSPITAL | Age: 69
Discharge: HOME | End: 2019-05-29
Attending: PHYSICIAN ASSISTANT | Admitting: PHYSICIAN ASSISTANT
Payer: MEDICARE

## 2019-05-29 VITALS
TEMPERATURE: 98.6 F | RESPIRATION RATE: 16 BRPM | SYSTOLIC BLOOD PRESSURE: 121 MMHG | DIASTOLIC BLOOD PRESSURE: 79 MMHG | OXYGEN SATURATION: 96 %

## 2019-05-29 DIAGNOSIS — J01.90 ACUTE NON-RECURRENT SINUSITIS, UNSPECIFIED LOCATION: ICD-10-CM

## 2019-05-29 LAB
BASOPHILS # BLD AUTO: 0 10E9/L (ref 0–0.2)
BASOPHILS NFR BLD AUTO: 0.5 %
DIFFERENTIAL METHOD BLD: NORMAL
EOSINOPHIL # BLD AUTO: 0.2 10E9/L (ref 0–0.7)
EOSINOPHIL NFR BLD AUTO: 2.8 %
ERYTHROCYTE [DISTWIDTH] IN BLOOD BY AUTOMATED COUNT: 13.2 % (ref 10–15)
HCT VFR BLD AUTO: 36.2 % (ref 35–47)
HGB BLD-MCNC: 12.7 G/DL (ref 11.7–15.7)
IMM GRANULOCYTES # BLD: 0.1 10E9/L (ref 0–0.4)
IMM GRANULOCYTES NFR BLD: 0.8 %
LYMPHOCYTES # BLD AUTO: 1.1 10E9/L (ref 0.8–5.3)
LYMPHOCYTES NFR BLD AUTO: 17.2 %
MCH RBC QN AUTO: 32.3 PG (ref 26.5–33)
MCHC RBC AUTO-ENTMCNC: 35.1 G/DL (ref 31.5–36.5)
MCV RBC AUTO: 92 FL (ref 78–100)
MONOCYTES # BLD AUTO: 1.1 10E9/L (ref 0–1.3)
MONOCYTES NFR BLD AUTO: 17.8 %
NEUTROPHILS # BLD AUTO: 3.9 10E9/L (ref 1.6–8.3)
NEUTROPHILS NFR BLD AUTO: 60.9 %
NRBC # BLD AUTO: 0 10*3/UL
NRBC BLD AUTO-RTO: 0 /100
PLATELET # BLD AUTO: 211 10E9/L (ref 150–450)
RBC # BLD AUTO: 3.93 10E12/L (ref 3.8–5.2)
WBC # BLD AUTO: 6.4 10E9/L (ref 4–11)

## 2019-05-29 PROCEDURE — 85025 COMPLETE CBC W/AUTO DIFF WBC: CPT | Performed by: FAMILY MEDICINE

## 2019-05-29 PROCEDURE — 36415 COLL VENOUS BLD VENIPUNCTURE: CPT | Performed by: FAMILY MEDICINE

## 2019-05-29 PROCEDURE — 99284 EMERGENCY DEPT VISIT MOD MDM: CPT | Mod: Z6 | Performed by: PHYSICIAN ASSISTANT

## 2019-05-29 PROCEDURE — 71046 X-RAY EXAM CHEST 2 VIEWS: CPT | Mod: TC

## 2019-05-29 PROCEDURE — 99284 EMERGENCY DEPT VISIT MOD MDM: CPT | Mod: 25

## 2019-05-29 NOTE — ED AVS SNAPSHOT
[facility]  [address]  [place] [address]  Phone:  [phone]                                    Heather Rosas   MRN: [id number]    Department:  [facility]   Date of Visit:  5/29/2019           After Visit Summary Signature Page    I have received my discharge instructions, and my questions have been answered. I have discussed any challenges I see with this plan with the nurse or doctor.    ..........................................................................................................................................  Patient/Patient Representative Signature      ..........................................................................................................................................  Patient Representative Print Name and Relationship to Patient    ..................................................               ................................................  Date                                   Time    ..........................................................................................................................................  Reviewed by Signature/Title    ...................................................              ..............................................  Date                                               Time          22EPIC Rev 08/18

## 2019-05-29 NOTE — ED PROVIDER NOTES
History     Chief Complaint   Patient presents with     URI     Fatigue     HPI  Heather Rosas is a 68 year old female who presents with complaints of nasal and sinus congestion with productive cough for 4 days with associated fatigue and subjective fever at night.  Denies measured fever, shortness of breath.  Has been taking OTC medications with minimal relief.  History of endometrial cancer.  Last dose of chemo 6 weeks ago.  History of a-fib.  Not currently anticoagulated.      Allergies:  Allergies   Allergen Reactions     Cats Other (See Comments)     Sneezing, runny nose     Codeine Sulfate Nausea and Vomiting and GI Disturbance     Fexofenadine Hcl      Allegra      Rosuvastatin Other (See Comments)     Dizziness - Crestor      [place] Oil GI Disturbance     Any pink fish       Problem List:    Patient Active Problem List    Diagnosis Date Noted     Endometrial adenocarcinoma (H) 12/03/2018     Priority: Medium     Malignant neoplasm of uterus, unspecified site (H) 12/03/2018     Priority: Medium     Atrial fibrillation (H) 11/26/2018     Priority: Medium     History of coronary artery disease 10/30/2018     Priority: Medium     Hyponatremia 10/30/2018     Priority: Medium     Moderate mitral regurgitation 10/30/2018     Priority: Medium     Adenocarcinoma of the endometrium/uterus (H) 10/09/2018     Priority: Medium     Discovered on 02/18 biopsy. Pt lost to f/u       Chronic diastolic congestive heart failure (H) 09/20/2018     Priority: Medium     S/P CABG x 4 09/10/2018     Priority: Medium     History of non-ST elevation myocardial infarction (NSTEMI) 09/06/2018     Priority: Medium     Endometrial cancer (H) 09/04/2018     Priority: Medium     Cerebrovascular accident (H) 05/14/2018     Priority: Medium     Hypertensive urgency 05/14/2018     Priority: Medium     CVA (cerebral vascular accident) (H) 05/14/2018     Priority: Medium     Chest pain 05/14/2018     Priority: Medium     Endometrial  hyperplasia with atypia 2018     Priority: Medium     Metrorrhagia 2018     Priority: Medium     ACP (advance care planning) 2016     Priority: Medium     Advance Care Planning 2016: ACP Review of Chart / Resources Provided:  Reviewed chart for advance care plan.  Heather CASTRO Navigarfield has no plan or code status on file. Discussed available resources and provided with information. Confirmed code status reflects current choices pending further ACP discussions.  Confirmed/documented legally designated decision makers.  Added by Margot Shannon             HTN (hypertension)      Priority: Medium     Major depressive disorder, recurrent episode, moderate (H) 2011     Priority: Medium     ANNA (generalized anxiety disorder) 2011     Priority: Medium     GERD (Gastroesophageal reflux disease) 2011     Priority: Medium     Obesity (H) 2011     Priority: Medium     Schizophrenia (H) 2011     Priority: Medium     Seasonal allergic rhinitis 2011     Priority: Medium     Fibromyalgia 2004     Priority: Medium     Dyslipidemia 2003     Priority: Medium        Past Medical History:    Past Medical History:   Diagnosis Date     Allergic rhinitis 2011     Anxiety 2011     Anxiety state, unspecified      Dyslipidemia 2003     fibromyalgia 2004     GERD, Esophageal reflux 2011     HTN (hypertension)      Major depression, recurrent (H) 2011     Nonorganic sleep disorder, unspecified      Obesity 2011     Schizophrenia (H) 2011     Toxic shock syndrome (H)        Social History:  Marital Status:   [4]  Social History     Tobacco Use     Smoking status: Former Smoker     Packs/day: 0.50     Types: Cigarettes     Start date: 1971     Last attempt to quit: 2013     Years since quittin.4     Smokeless tobacco: Never Used     Tobacco comment: Year Quit:    Substance Use Topics     Alcohol use: No      Comment: rare     Drug use: No        Medications:      amoxicillin-clavulanate (AUGMENTIN) 875-125 MG tablet   acetaminophen (TYLENOL) 325 MG tablet   aspirin 81 MG EC tablet   atorvastatin (LIPITOR) 20 MG tablet   clonazePAM (KLONOPIN) 0.25 MG TBDP ODT tab   diltiazem ER COATED BEADS (CARTIA XT) 240 MG 24 hr capsule   furosemide (LASIX) 40 MG tablet   gabapentin (NEURONTIN) 300 MG capsule   IBUPROFEN PO   lidocaine-prilocaine (EMLA) 2.5-2.5 % external cream   LORazepam (ATIVAN) 1 MG tablet   Metoprolol Succinate 25 MG CS24   prochlorperazine (COMPAZINE) 10 MG tablet   rOPINIRole (REQUIP) 0.25 MG tablet   senna-docusate (SENOKOT-S/PERICOLACE) 8.6-50 MG tablet   spironolactone (ALDACTONE) 25 MG tablet   traMADol (ULTRAM) 50 MG tablet         Review of Systems :  Constitutional: Negative for fever.   HENT: Positive for congestion and sinus pressure. Negative for sore throat.    Respiratory: Positive for cough. Negative for shortness of breath.      Physical Exam   BP: 128/80  Heart Rate: 75  Temp: 97.8  F (36.6  C)  Resp: 16  SpO2: 97 %      Physical Exam   Constitutional: Well-developed and well-nourished.   Head: Normocephalic and atraumatic.   Eyes: Conjunctivae and EOM are normal.  GREGG.  Ears: TMs normal bilaterally  Nose: Congested with no sinus tenderness  Throat: no erythema or significant tonsillar swelling  Neck: Normal range of motion. No palpable lymphadenopathy  Cardiovascular: Irregularly irregular rhythm with normal rate.   Pulmonary/Chest: Effort normal and CTAB.  Skin: Skin is warm and dry. No rash noted.     ED Course               Results for orders placed or performed during the hospital encounter of 05/29/19 (from the past 24 hour(s))   CBC with platelets differential   Result Value Ref Range    WBC 6.4 4.0 - 11.0 10e9/L    RBC Count 3.93 3.8 - 5.2 10e12/L    Hemoglobin 12.7 11.7 - 15.7 g/dL    Hematocrit 36.2 35.0 - 47.0 %    MCV 92 78 - 100 fl    MCH 32.3 26.5 - 33.0 pg    MCHC 35.1 31.5 -  36.5 g/dL    RDW 13.2 10.0 - 15.0 %    Platelet Count 211 150 - 450 10e9/L    Diff Method Automated Method     % Neutrophils 60.9 %    % Lymphocytes 17.2 %    % Monocytes 17.8 %    % Eosinophils 2.8 %    % Basophils 0.5 %    % Immature Granulocytes 0.8 %    Nucleated RBCs 0 0 /100    Absolute Neutrophil 3.9 1.6 - 8.3 10e9/L    Absolute Lymphocytes 1.1 0.8 - 5.3 10e9/L    Absolute Monocytes 1.1 0.0 - 1.3 10e9/L    Absolute Eosinophils 0.2 0.0 - 0.7 10e9/L    Absolute Basophils 0.0 0.0 - 0.2 10e9/L    Abs Immature Granulocytes 0.1 0 - 0.4 10e9/L    Absolute Nucleated RBC 0.0      CXR negative for pneumonia as read by myself.      Medications - No data to display    Assessments & Plan (with Medical Decision Making)     I have reviewed the nursing notes.    I have reviewed the findings, diagnosis, plan and need for follow up with the patient.  May use Afrin nasal spray (oxymetazoline) two sprays in each nostril twice daily for up to three days.  Wait 15 minutes and try Neti pot or sinus rinse as tolerated or try saline nasal sprays throughout the day.  May take two extra strength Tylenol (acetaminophen 500 mg) every 8 hours as needed for pain.       Discussed delayed treatment with antibiotics given only 4 days of symptoms but as patient is on chemo, prescription was sent with instructions to start antibiotics if fever were to develop more than 5 days after onset of symptoms.       Medication List      Started    amoxicillin-clavulanate 875-125 MG tablet  Commonly known as:  AUGMENTIN  1 tablet, Oral, 2 TIMES DAILY            Final diagnoses:   Acute non-recurrent sinusitis, unspecified location       NAM Yates on 5/29/2019 at 6:05 PM   5/29/2019   [facility]         Quirino Wayne PA  05/29/19 5604

## 2019-05-29 NOTE — ED TRIAGE NOTES
"Patient presents with complaints of URI; cough, post nasal drainage, fatigue and scratchy throat.  Patient states she has a history of endometrial cancer and finished chemo April.  States last weekend was [place] to [place] and \"one of them got me sick\"    "

## 2019-05-29 NOTE — DISCHARGE INSTRUCTIONS
May use Afrin nasal spray (oxymetazoline) two sprays in each nostril twice daily for up to three days.  Wait 15 minutes and try Neti pot or sinus rinse as tolerated or try saline nasal sprays throughout the day.  May take two extra strength Tylenol (acetaminophen 500 mg) every 8 hours as needed for pain.

## 2019-05-29 NOTE — ED NOTES
Patient in today with complains of a cough now after seeing grand babies that were sick last week. She says she's been coughing up what she describes as bubbly and white, thin.

## 2019-07-22 ENCOUNTER — TELEPHONE (OUTPATIENT)
Dept: ONCOLOGY | Facility: OTHER | Age: 69
End: 2019-07-22

## 2019-07-22 NOTE — TELEPHONE ENCOUNTER
"This writer received message from the Radiation Oncology , stating patient had called and left a message on her phone.  Message was regarding getting her port removed.  Patient has stated previously that she \"wants to follow-up in [place] with her doctors.\"  She has been difficult to get ahold of, and struggles with messages left on her voicemail not being received.  Have reached out to son [Valentin] in the past to help with messages being received.  Patient was due for scans and a follow-up in May.  Routed to care coordinator to reach out to patient.  "

## 2019-07-23 NOTE — TELEPHONE ENCOUNTER
LM for call back.Spoke with  and pt needs Follow-up appt. Needs scans and labs to see if cancer is in remission.Will discuss with call back.    Eloisa Ge RN   Oncology Care Coordinatior

## 2019-08-01 NOTE — TELEPHONE ENCOUNTER
Can you send recall letter? See below.Thank you.      Eloisa Ge RN   Oncology Care Coordinatior

## 2019-08-13 ENCOUNTER — HOSPITAL ENCOUNTER (EMERGENCY)
Facility: HOSPITAL | Age: 69
Discharge: HOME | End: 2019-08-13
Attending: INTERNAL MEDICINE | Admitting: INTERNAL MEDICINE
Payer: MEDICARE

## 2019-08-13 VITALS
SYSTOLIC BLOOD PRESSURE: 156 MMHG | RESPIRATION RATE: 12 BRPM | DIASTOLIC BLOOD PRESSURE: 86 MMHG | OXYGEN SATURATION: 97 % | TEMPERATURE: 98.6 F

## 2019-08-13 DIAGNOSIS — F41.9 ANXIETY: ICD-10-CM

## 2019-08-13 DIAGNOSIS — R42 DIZZINESS: ICD-10-CM

## 2019-08-13 LAB
ALBUMIN SERPL-MCNC: 3.5 G/DL (ref 3.4–5)
ALP SERPL-CCNC: 156 U/L (ref 40–150)
ALT SERPL W P-5'-P-CCNC: 19 U/L (ref 0–50)
ANION GAP SERPL CALCULATED.3IONS-SCNC: 7 MMOL/L (ref 3–14)
AST SERPL W P-5'-P-CCNC: 14 U/L (ref 0–45)
BASOPHILS # BLD AUTO: 0.1 10E9/L (ref 0–0.2)
BASOPHILS NFR BLD AUTO: 0.8 %
BILIRUB SERPL-MCNC: 0.2 MG/DL (ref 0.2–1.3)
BUN SERPL-MCNC: 22 MG/DL (ref 7–30)
CALCIUM SERPL-MCNC: 9.2 MG/DL (ref 8.5–10.1)
CHLORIDE SERPL-SCNC: 103 MMOL/L (ref 94–109)
CO2 SERPL-SCNC: 29 MMOL/L (ref 20–32)
CREAT SERPL-MCNC: 0.87 MG/DL (ref 0.52–1.04)
DIFFERENTIAL METHOD BLD: NORMAL
EOSINOPHIL # BLD AUTO: 0.3 10E9/L (ref 0–0.7)
EOSINOPHIL NFR BLD AUTO: 3.5 %
ERYTHROCYTE [DISTWIDTH] IN BLOOD BY AUTOMATED COUNT: 12.6 % (ref 10–15)
GFR SERPL CREATININE-BSD FRML MDRD: 69 ML/MIN/{1.73_M2}
GLUCOSE SERPL-MCNC: 96 MG/DL (ref 70–99)
HCT VFR BLD AUTO: 42.6 % (ref 35–47)
HGB BLD-MCNC: 14.6 G/DL (ref 11.7–15.7)
IMM GRANULOCYTES # BLD: 0 10E9/L (ref 0–0.4)
IMM GRANULOCYTES NFR BLD: 0.4 %
LYMPHOCYTES # BLD AUTO: 2.1 10E9/L (ref 0.8–5.3)
LYMPHOCYTES NFR BLD AUTO: 22.7 %
MCH RBC QN AUTO: 31.5 PG (ref 26.5–33)
MCHC RBC AUTO-ENTMCNC: 34.3 G/DL (ref 31.5–36.5)
MCV RBC AUTO: 92 FL (ref 78–100)
MONOCYTES # BLD AUTO: 0.9 10E9/L (ref 0–1.3)
MONOCYTES NFR BLD AUTO: 10 %
NEUTROPHILS # BLD AUTO: 5.7 10E9/L (ref 1.6–8.3)
NEUTROPHILS NFR BLD AUTO: 62.6 %
NRBC # BLD AUTO: 0 10*3/UL
NRBC BLD AUTO-RTO: 0 /100
PLATELET # BLD AUTO: 205 10E9/L (ref 150–450)
POTASSIUM SERPL-SCNC: 3.3 MMOL/L (ref 3.4–5.3)
PROT SERPL-MCNC: 7.3 G/DL (ref 6.8–8.8)
RBC # BLD AUTO: 4.64 10E12/L (ref 3.8–5.2)
SODIUM SERPL-SCNC: 139 MMOL/L (ref 133–144)
WBC # BLD AUTO: 9.2 10E9/L (ref 4–11)

## 2019-08-13 PROCEDURE — 36415 COLL VENOUS BLD VENIPUNCTURE: CPT | Performed by: INTERNAL MEDICINE

## 2019-08-13 PROCEDURE — 80053 COMPREHEN METABOLIC PANEL: CPT | Performed by: INTERNAL MEDICINE

## 2019-08-13 PROCEDURE — 99283 EMERGENCY DEPT VISIT LOW MDM: CPT | Mod: Z6 | Performed by: INTERNAL MEDICINE

## 2019-08-13 PROCEDURE — 85025 COMPLETE CBC W/AUTO DIFF WBC: CPT | Performed by: INTERNAL MEDICINE

## 2019-08-13 PROCEDURE — 99283 EMERGENCY DEPT VISIT LOW MDM: CPT

## 2019-08-13 RX ORDER — AMOXICILLIN 250 MG
1 CAPSULE ORAL DAILY PRN
COMMUNITY
End: 2020-07-11

## 2019-08-13 NOTE — ED AVS SNAPSHOT
[facility]  [address]  [place] [address]  Phone:  [phone]                                    Heather Rosas   MRN: [id number]    Department:  [facility]   Date of Visit:  8/13/2019           After Visit Summary Signature Page    I have received my discharge instructions, and my questions have been answered. I have discussed any challenges I see with this plan with the nurse or doctor.    ..........................................................................................................................................  Patient/Patient Representative Signature      ..........................................................................................................................................  Patient Representative Print Name and Relationship to Patient    ..................................................               ................................................  Date                                   Time    ..........................................................................................................................................  Reviewed by Signature/Title    ...................................................              ..............................................  Date                                               Time          22EPIC Rev 08/18

## 2019-08-18 ASSESSMENT — ENCOUNTER SYMPTOMS
WOUND: 0
ANAL BLEEDING: 0
PALPITATIONS: 0
ABDOMINAL DISTENTION: 0
LIGHT-HEADEDNESS: 0
DYSURIA: 0
CONFUSION: 0
CHILLS: 0
ARTHRALGIAS: 0
VOMITING: 0
SLEEP DISTURBANCE: 1
CHEST TIGHTNESS: 0
FEVER: 0
HEADACHES: 0
ABDOMINAL PAIN: 0
FLANK PAIN: 0
COUGH: 0
NECK STIFFNESS: 0
WHEEZING: 0
VOICE CHANGE: 0
COLOR CHANGE: 0
BLOOD IN STOOL: 0
SHORTNESS OF BREATH: 0
NECK PAIN: 0
NERVOUS/ANXIOUS: 1
DIZZINESS: 1
DIAPHORESIS: 0
HEMATURIA: 0
NUMBNESS: 0
NAUSEA: 0
BACK PAIN: 0
MYALGIAS: 0

## 2019-08-18 NOTE — ED PROVIDER NOTES
History     Chief Complaint   Patient presents with     Dizziness     Started around 1600     The history is provided by the patient.     Heather Rosas is a 68 year old female who came for feeling dizzy since 4 pm. Hx of similar symptoms in the past accompanied with anxiety.     Allergies:  Allergies   Allergen Reactions     Cats Other (See Comments)     Sneezing, runny nose     Codeine Sulfate Nausea and Vomiting and GI Disturbance     Fexofenadine Hcl      Allegra      Rosuvastatin Other (See Comments)     Dizziness - Crestor      [place] Oil GI Disturbance     Any pink fish       Problem List:    Patient Active Problem List    Diagnosis Date Noted     Endometrial adenocarcinoma (H) 12/03/2018     Priority: Medium     Malignant neoplasm of uterus, unspecified site (H) 12/03/2018     Priority: Medium     Atrial fibrillation (H) 11/26/2018     Priority: Medium     History of coronary artery disease 10/30/2018     Priority: Medium     Hyponatremia 10/30/2018     Priority: Medium     Moderate mitral regurgitation 10/30/2018     Priority: Medium     Adenocarcinoma of the endometrium/uterus (H) 10/09/2018     Priority: Medium     Discovered on 02/18 biopsy. Pt lost to f/u       Chronic diastolic congestive heart failure (H) 09/20/2018     Priority: Medium     S/P CABG x 4 09/10/2018     Priority: Medium     History of non-ST elevation myocardial infarction (NSTEMI) 09/06/2018     Priority: Medium     Endometrial cancer (H) 09/04/2018     Priority: Medium     Cerebrovascular accident (H) 05/14/2018     Priority: Medium     Hypertensive urgency 05/14/2018     Priority: Medium     CVA (cerebral vascular accident) (H) 05/14/2018     Priority: Medium     Chest pain 05/14/2018     Priority: Medium     Endometrial hyperplasia with atypia 05/09/2018     Priority: Medium     Metrorrhagia 02/18/2018     Priority: Medium     ACP (advance care planning) 05/20/2016     Priority: Medium     Advance Care Planning 5/20/2016: ACP  Review of Chart / Resources Provided:  Reviewed chart for advance care plan.  Heather MATTHEW Rosas has no plan or code status on file. Discussed available resources and provided with information. Confirmed code status reflects current choices pending further ACP discussions.  Confirmed/documented legally designated decision makers.  Added by Margot Shannon             HTN (hypertension)      Priority: Medium     Major depressive disorder, recurrent episode, moderate (H) 01/01/2011     Priority: Medium     ANNA (generalized anxiety disorder) 01/01/2011     Priority: Medium     GERD (Gastroesophageal reflux disease) 01/01/2011     Priority: Medium     Obesity (H) 01/01/2011     Priority: Medium     Schizophrenia (H) 01/01/2011     Priority: Medium     Seasonal allergic rhinitis 01/01/2011     Priority: Medium     Fibromyalgia 06/14/2004     Priority: Medium     Dyslipidemia 11/26/2003     Priority: Medium        Past Medical History:    Past Medical History:   Diagnosis Date     Allergic rhinitis 01/01/2011     Anxiety 01/01/2011     Anxiety state, unspecified      Dyslipidemia 11/26/2003     fibromyalgia 06/14/2004     GERD, Esophageal reflux 01/01/2011     HTN (hypertension)      Major depression, recurrent (H) 1/1/2011     Nonorganic sleep disorder, unspecified      Obesity 01/01/2011     Schizophrenia (H) 01/01/2011     Toxic shock syndrome (H) 2006       Past Surgical History:    Past Surgical History:   Procedure Laterality Date     ANGIOGRAM  02/2005    Normal      BIOPSY BREAST  1984    LT, normal     BYPASS GRAFT ARTERY CORONARY  09/10/2018     CARPAL TUNNEL RELEASE RT/LT  2005    RT, carpal tunnel     COLONOSCOPY  2011    Repeat in ten years      COLONOSCOPY  1996     COLONOSCOPY  2004     DILATION AND CURETTAGE, HYSTEROSCOPY, ABLATE ENDOMETRIUM, COMBINED N/A 2/19/2018    Procedure: COMBINED DILATION AND CURETTAGE, HYSTEROSCOPY, ABLATE ENDOMETRIUM;  HYSTEROSCOPY DILATION AND CURETTAGE, ENDOMETRIAL ABLATION;   Surgeon: Jose Nettles MD;  Location: [facility]     HYSTERECTOMY TOTAL ABD, NICK SALPINGO-OOPHORECTOMY, NODE DISSECTION, TUMOR DEBULKING, COMBINED  10/30/2018     INSERT PORT VASCULAR ACCESS N/A 2018    Procedure: PORT-A-CATH PLACEMENT;  Surgeon: Rafi Trinidad MD;  Location: [facility]     placement of central line         Family History:    Family History   Problem Relation Age of Onset     C.A.D. Father 45        (cause of death)      Other - See Comments Father         rheumatic fever      Allergies Father      Cancer Sister 56        Esophageal to bone cancer     Gastrointestinal Disease Mother         GERD     Cancer Mother         pancreatic ca (cause of death) /liver ca     Breast Cancer Maternal Aunt      Breast Cancer Maternal Aunt      Colon Cancer Paternal Aunt         (cause of death)      Crohn's Disease Other      Depression Maternal Uncle      Depression Maternal Aunt      Diabetes Paternal Grandmother         type 2     Neurologic Disorder Brother         neuropathy     Cancer Brother 58        Tonsilular cancer/ lymph node in neck     Allergies Brother      Breast Cancer Cousin      Breast Cancer Cousin      Breast Cancer Cousin        Social History:  Marital Status:   [4]  Social History     Tobacco Use     Smoking status: Former Smoker     Packs/day: 0.50     Types: Cigarettes     Start date: 1971     Last attempt to quit: 2013     Years since quittin.7     Smokeless tobacco: Never Used     Tobacco comment: Year Quit:    Substance Use Topics     Alcohol use: No     Comment: rare     Drug use: No        Medications:      furosemide (LASIX) 40 MG tablet   IBUPROFEN PO   Metoprolol Succinate 25 MG CS24   senna-docusate (SENOKOT-S/PERICOLACE) 8.6-50 MG tablet   rOPINIRole (REQUIP) 0.25 MG tablet         Review of Systems   Constitutional: Negative for chills, diaphoresis and fever.   HENT: Negative for voice change.    Eyes: Negative for visual disturbance.    Respiratory: Negative for cough, chest tightness, shortness of breath and wheezing.    Cardiovascular: Negative for chest pain, palpitations and leg swelling.   Gastrointestinal: Negative for abdominal distention, abdominal pain, anal bleeding, blood in stool, nausea and vomiting.   Genitourinary: Negative for decreased urine volume, dysuria, flank pain and hematuria.   Musculoskeletal: Negative for arthralgias, back pain, gait problem, myalgias, neck pain and neck stiffness.   Skin: Negative for color change, pallor, rash and wound.   Neurological: Positive for dizziness. Negative for syncope, light-headedness, numbness and headaches.   Psychiatric/Behavioral: Positive for sleep disturbance. Negative for confusion and suicidal ideas. The patient is nervous/anxious.        Physical Exam   BP: 147/87  Heart Rate: 76  Temp: 98.6  F (37  C)  Resp: 16  SpO2: 96 %      Physical Exam   Constitutional: She is oriented to person, place, and time. She appears well-developed and well-nourished.   HENT:   Head: Normocephalic and atraumatic.   Mouth/Throat: No oropharyngeal exudate.   Eyes: Pupils are equal, round, and reactive to light. Conjunctivae are normal.   Neck: Normal range of motion. Neck supple. No JVD present. No tracheal deviation present. No thyromegaly present.   Cardiovascular: Normal rate, regular rhythm, normal heart sounds and intact distal pulses. Exam reveals no gallop and no friction rub.   No murmur heard.  Pulmonary/Chest: Effort normal and breath sounds normal. No stridor. No respiratory distress. She has no wheezes. She has no rales. She exhibits no tenderness.   Abdominal: Soft. Bowel sounds are normal. She exhibits no distension and no mass. There is no tenderness. There is no rebound and no guarding.   Musculoskeletal: Normal range of motion. She exhibits no edema or tenderness.   Lymphadenopathy:     She has no cervical adenopathy.   Neurological: She is alert and oriented to person, place, and  time.   Skin: Skin is warm and dry. No rash noted. No erythema. No pallor.   Psychiatric: Her behavior is normal. Her mood appears anxious.   Nursing note and vitals reviewed.      ED Course        Procedures                 No results found for this or any previous visit (from the past 24 hour(s)).    Medications - No data to display    Assessments & Plan (with Medical Decision Making)   Anxiety dizziness  Labs WNL  Pt observed in ER, symptoms resolved  D c home, follow-up with PCP  I have reviewed the nursing notes.    I have reviewed the findings, diagnosis, plan and need for follow up with the patient.      Discharge Medication List as of 8/13/2019  1:10 AM          Final diagnoses:   Anxiety   Dizziness       8/13/2019   [facility]     Adriel Burnham MD  08/18/19 [id number]

## 2019-09-04 ENCOUNTER — HOSPITAL ENCOUNTER (EMERGENCY)
Facility: HOSPITAL | Age: 69
Discharge: HOME | End: 2019-09-04
Admitting: NURSE PRACTITIONER
Payer: MEDICARE

## 2019-09-04 VITALS
DIASTOLIC BLOOD PRESSURE: 100 MMHG | SYSTOLIC BLOOD PRESSURE: 188 MMHG | OXYGEN SATURATION: 99 % | TEMPERATURE: 96 F | RESPIRATION RATE: 18 BRPM

## 2019-09-04 DIAGNOSIS — K08.89 PAIN, DENTAL: Primary | ICD-10-CM

## 2019-09-04 PROCEDURE — 25000132 ZZH RX MED GY IP 250 OP 250 PS 637: Mod: GY | Performed by: NURSE PRACTITIONER

## 2019-09-04 PROCEDURE — 96372 THER/PROPH/DIAG INJ SC/IM: CPT

## 2019-09-04 PROCEDURE — G0463 HOSPITAL OUTPT CLINIC VISIT: HCPCS | Mod: 25

## 2019-09-04 PROCEDURE — 99213 OFFICE O/P EST LOW 20 MIN: CPT | Mod: Z6 | Performed by: NURSE PRACTITIONER

## 2019-09-04 PROCEDURE — 25000128 H RX IP 250 OP 636: Performed by: NURSE PRACTITIONER

## 2019-09-04 RX ORDER — KETOROLAC TROMETHAMINE 30 MG/ML
30 INJECTION, SOLUTION INTRAMUSCULAR; INTRAVENOUS ONCE
Status: COMPLETED | OUTPATIENT
Start: 2019-09-04 | End: 2019-09-04

## 2019-09-04 RX ADMIN — AMOXICILLIN AND CLAVULANATE POTASSIUM 1 TABLET: 875; 125 TABLET, FILM COATED ORAL at 21:30

## 2019-09-04 RX ADMIN — KETOROLAC TROMETHAMINE 30 MG: 30 INJECTION, SOLUTION INTRAMUSCULAR at 21:30

## 2019-09-04 ASSESSMENT — ENCOUNTER SYMPTOMS
SINUS PRESSURE: 0
WOUND: 0
SINUS PAIN: 0
COUGH: 0
FACIAL SWELLING: 0
APPETITE CHANGE: 0
CHILLS: 0
FATIGUE: 0
FEVER: 0
SORE THROAT: 0
RHINORRHEA: 0

## 2019-09-04 NOTE — ED AVS SNAPSHOT
[facility]  [address]  [place] [address]  Phone:  [phone]                                    Heather Roass   MRN: [id number]    Department:  [facility]   Date of Visit:  9/4/2019           After Visit Summary Signature Page    I have received my discharge instructions, and my questions have been answered. I have discussed any challenges I see with this plan with the nurse or doctor.    ..........................................................................................................................................  Patient/Patient Representative Signature      ..........................................................................................................................................  Patient Representative Print Name and Relationship to Patient    ..................................................               ................................................  Date                                   Time    ..........................................................................................................................................  Reviewed by Signature/Title    ...................................................              ..............................................  Date                                               Time          22EPIC Rev 08/18

## 2019-09-05 NOTE — ED TRIAGE NOTES
Pt is here today with c/o left lower jaw pain. Unsure of cause. Says has a hx of getting same infx in same spot 1 year ago today. Pt stated she tied calling the dentist and there was no answer. ibu at 500pm today unsure of dosage.

## 2019-09-05 NOTE — ED PROVIDER NOTES
History     Chief Complaint   Patient presents with     Dental Pain     c/o dental pain     HPI  Heather Rosas is a 68 year old female who presents ambulatory with right lower jaw pain for the last 3 days. Pain is constant, 10/10. Pain has increased. Nothing specific increases discomfort. She has tried ibuprofen, aspirin, acetaminophen, warm packs, cold packs without relief. She called the dentist and was unable to get in. She was supposed to go this afternoon but then needed a new windshield on her car. Denies fever, chills, swelling, otalgia, pharyngitis, rhinorrhea, congestion.    Allergies:  Allergies   Allergen Reactions     Cats Other (See Comments)     Sneezing, runny nose     Codeine Sulfate Nausea and Vomiting and GI Disturbance     Fexofenadine Hcl      Allegra      Rosuvastatin Other (See Comments)     Dizziness - Crestor      [place] Oil GI Disturbance     Any pink fish       Problem List:    Patient Active Problem List    Diagnosis Date Noted     Endometrial adenocarcinoma (H) 12/03/2018     Priority: Medium     Malignant neoplasm of uterus, unspecified site (H) 12/03/2018     Priority: Medium     Atrial fibrillation (H) 11/26/2018     Priority: Medium     History of coronary artery disease 10/30/2018     Priority: Medium     Hyponatremia 10/30/2018     Priority: Medium     Moderate mitral regurgitation 10/30/2018     Priority: Medium     Adenocarcinoma of the endometrium/uterus (H) 10/09/2018     Priority: Medium     Discovered on 02/18 biopsy. Pt lost to f/u       Chronic diastolic congestive heart failure (H) 09/20/2018     Priority: Medium     S/P CABG x 4 09/10/2018     Priority: Medium     History of non-ST elevation myocardial infarction (NSTEMI) 09/06/2018     Priority: Medium     Endometrial cancer (H) 09/04/2018     Priority: Medium     Cerebrovascular accident (H) 05/14/2018     Priority: Medium     Hypertensive urgency 05/14/2018     Priority: Medium     CVA (cerebral vascular accident)  (H) 05/14/2018     Priority: Medium     Chest pain 05/14/2018     Priority: Medium     Endometrial hyperplasia with atypia 05/09/2018     Priority: Medium     Metrorrhagia 02/18/2018     Priority: Medium     ACP (advance care planning) 05/20/2016     Priority: Medium     Advance Care Planning 5/20/2016: ACP Review of Chart / Resources Provided:  Reviewed chart for advance care plan.  Heather MATTHEW Rosas has no plan or code status on file. Discussed available resources and provided with information. Confirmed code status reflects current choices pending further ACP discussions.  Confirmed/documented legally designated decision makers.  Added by Margot Shannon             HTN (hypertension)      Priority: Medium     Major depressive disorder, recurrent episode, moderate (H) 01/01/2011     Priority: Medium     ANNA (generalized anxiety disorder) 01/01/2011     Priority: Medium     GERD (Gastroesophageal reflux disease) 01/01/2011     Priority: Medium     Obesity (H) 01/01/2011     Priority: Medium     Schizophrenia (H) 01/01/2011     Priority: Medium     Seasonal allergic rhinitis 01/01/2011     Priority: Medium     Fibromyalgia 06/14/2004     Priority: Medium     Dyslipidemia 11/26/2003     Priority: Medium        Past Medical History:    Past Medical History:   Diagnosis Date     Allergic rhinitis 01/01/2011     Anxiety 01/01/2011     Anxiety state, unspecified      Dyslipidemia 11/26/2003     fibromyalgia 06/14/2004     GERD, Esophageal reflux 01/01/2011     HTN (hypertension)      Major depression, recurrent (H) 1/1/2011     Nonorganic sleep disorder, unspecified      Obesity 01/01/2011     Schizophrenia (H) 01/01/2011     Toxic shock syndrome (H) 2006       Past Surgical History:    Past Surgical History:   Procedure Laterality Date     ANGIOGRAM  02/2005    Normal      BIOPSY BREAST  1984    LT, normal     BYPASS GRAFT ARTERY CORONARY  09/10/2018     CARPAL TUNNEL RELEASE RT/LT  2005    RT, carpal tunnel      COLONOSCOPY      Repeat in ten years      COLONOSCOPY       COLONOSCOPY       DILATION AND CURETTAGE, HYSTEROSCOPY, ABLATE ENDOMETRIUM, COMBINED N/A 2018    Procedure: COMBINED DILATION AND CURETTAGE, HYSTEROSCOPY, ABLATE ENDOMETRIUM;  HYSTEROSCOPY DILATION AND CURETTAGE, ENDOMETRIAL ABLATION;  Surgeon: Jose Nettles MD;  Location: [facility]     HYSTERECTOMY TOTAL ABD, INCK SALPINGO-OOPHORECTOMY, NODE DISSECTION, TUMOR DEBULKING, COMBINED  10/30/2018     INSERT PORT VASCULAR ACCESS N/A 2018    Procedure: PORT-A-CATH PLACEMENT;  Surgeon: Rafi Trinidad MD;  Location: [facility]     placement of central line         Family History:    Family History   Problem Relation Age of Onset     C.A.D. Father 45        (cause of death)      Other - See Comments Father         rheumatic fever      Allergies Father      Cancer Sister 56        Esophageal to bone cancer     Gastrointestinal Disease Mother         GERD     Cancer Mother         pancreatic ca (cause of death) /liver ca     Breast Cancer Maternal Aunt      Breast Cancer Maternal Aunt      Colon Cancer Paternal Aunt         (cause of death)      Crohn's Disease Other      Depression Maternal Uncle      Depression Maternal Aunt      Diabetes Paternal Grandmother         type 2     Neurologic Disorder Brother         neuropathy     Cancer Brother 58        Tonsilular cancer/ lymph node in neck     Allergies Brother      Breast Cancer Cousin      Breast Cancer Cousin      Breast Cancer Cousin        Social History:  Marital Status:   [4]  Social History     Tobacco Use     Smoking status: Former Smoker     Packs/day: 0.50     Types: Cigarettes     Start date: 1971     Last attempt to quit: 2013     Years since quittin.7     Smokeless tobacco: Never Used     Tobacco comment: Year Quit:    Substance Use Topics     Alcohol use: No     Comment: rare     Drug use: No        Medications:      amoxicillin-clavulanate (AUGMENTIN)  875-125 MG tablet   furosemide (LASIX) 40 MG tablet   IBUPROFEN PO   Metoprolol Succinate 25 MG CS24   rOPINIRole (REQUIP) 0.25 MG tablet   senna-docusate (SENOKOT-S/PERICOLACE) 8.6-50 MG tablet         Review of Systems   Constitutional: Negative for appetite change, chills, fatigue and fever.   HENT: Positive for dental problem. Negative for congestion, ear discharge, ear pain, facial swelling, mouth sores, postnasal drip, rhinorrhea, sinus pressure, sinus pain, sneezing and sore throat.    Respiratory: Negative for cough.    Skin: Negative for rash and wound.       Physical Exam   BP: (!) 188/100  Heart Rate: 88  Temp: 96  F (35.6  C)  Resp: 18  SpO2: 99 %      Physical Exam   Constitutional: She is oriented to person, place, and time. She is active and cooperative.  Non-toxic appearance. No distress.   HENT:   Head: Normocephalic and atraumatic.   Right Ear: Tympanic membrane, external ear and ear canal normal.   Left Ear: Tympanic membrane, external ear and ear canal normal.   Nose: Nose normal. No rhinorrhea.   Mouth/Throat: Uvula is midline, oropharynx is clear and moist and mucous membranes are normal. No oral lesions. Dental caries (several dental caries, no erythema or swelling or fluctuance to lower gums but she does have tenderness) present. No uvula swelling.   Cardiovascular: Normal rate, regular rhythm, S1 normal and S2 normal. Exam reveals no gallop, no S3 and no S4.   Murmur heard.   Systolic murmur is present with a grade of 3/6.  Pulmonary/Chest: Effort normal and breath sounds normal. No respiratory distress. She has no wheezes. She has no rhonchi. She has no rales.   Neurological: She is alert and oriented to person, place, and time.   Skin: Skin is warm, dry and intact. No rash noted.   Psychiatric: Her speech is normal. Her mood appears anxious. Her affect is inappropriate. She is hyperactive and withdrawn. Cognition and memory are normal.   Patient screaming/crying and clenching jaw  momentarily and then goes back to conversation as if she was not just in pain. She does this several times throughout exam.       ED Course        Procedures               No results found for this or any previous visit (from the past 24 hour(s)).    Medications   ketorolac (TORADOL) injection 30 mg (30 mg Intramuscular Given 9/4/19 2130)   amoxicillin-clavulanate (AUGMENTIN) 875-125 MG per tablet 1 tablet (1 tablet Oral Given 9/4/19 2130)       Assessments & Plan (with Medical Decision Making)     I have reviewed the nursing notes.    I have reviewed the findings, diagnosis, plan and need for follow up with the patient.  (K08.89) Pain, dental  (primary encounter diagnosis)  Comment: Acute, symptomatic.  Plan: Toradol shot for pain. Augmentin tonight due to pharmacy closed  Prescription for augmentin sent to pharmacy- start tomorrow and complete course    - Take entire course of antibiotic even if you start to feel better.  - Antibiotics can cause stomach upset including nausea and diarrhea. Read your bottle or ask the pharmacist if antibiotic can be taken with food to help prevent nausea. If you have symptoms of diarrhea you can take an over-the-counter probiotic and/or increase foods with probiotics such as yogurt, Paulie, sauerkraut.    Make appointment with dentist tomorrow. Dental care is going to be mainstay of treatment.     Ice and/or heat packs  Tylenol and/or ibuprofen per package instructions.     Follow-up with new or worsening symptoms.       Discharge Medication List as of 9/4/2019  9:22 PM          Final diagnoses:   Pain, dental     Leelee Castro CNP  9/4/2019   [facility]     Leelee Castro CNP  09/04/19 2150

## 2019-09-05 NOTE — DISCHARGE INSTRUCTIONS
(K08.89) Pain, dental  (primary encounter diagnosis)  Comment: Acute, symptomatic.  Plan: Toradol shot for pain. Augmentin tonight due to pharmacy closed  Prescription for augmentin sent to pharmacy- start tomorrow and complete course    - Take entire course of antibiotic even if you start to feel better.  - Antibiotics can cause stomach upset including nausea and diarrhea. Read your bottle or ask the pharmacist if antibiotic can be taken with food to help prevent nausea. If you have symptoms of diarrhea you can take an over-the-counter probiotic and/or increase foods with probiotics such as yogurt, [place], sauerkraut.    Make appointment with dentist tomorrow. Dental care is going to be mainstay of treatment.     Ice and/or heat packs  Tylenol and/or ibuprofen per package instructions.     Follow-up with new or worsening symptoms.     Leelee Castro, CNP

## 2020-01-22 RX ORDER — HEPARIN SODIUM (PORCINE) LOCK FLUSH IV SOLN 100 UNIT/ML 100 UNIT/ML
5 SOLUTION INTRAVENOUS ONCE
Status: CANCELLED
Start: 2020-01-22

## 2020-07-11 ENCOUNTER — HOSPITAL ENCOUNTER (EMERGENCY)
Facility: HOSPITAL | Age: 70
Discharge: HOME | End: 2020-07-12
Attending: INTERNAL MEDICINE | Admitting: INTERNAL MEDICINE
Payer: MEDICARE

## 2020-07-11 ENCOUNTER — APPOINTMENT (OUTPATIENT)
Dept: GENERAL RADIOLOGY | Facility: HOSPITAL | Age: 70
End: 2020-07-11
Attending: INTERNAL MEDICINE
Payer: MEDICARE

## 2020-07-11 DIAGNOSIS — I10 UNCONTROLLED HYPERTENSION: ICD-10-CM

## 2020-07-11 DIAGNOSIS — M79.5 FOREIGN BODY (FB) IN SOFT TISSUE: ICD-10-CM

## 2020-07-11 PROCEDURE — 73630 X-RAY EXAM OF FOOT: CPT | Mod: TC,LT

## 2020-07-11 PROCEDURE — 25000132 ZZH RX MED GY IP 250 OP 250 PS 637: Mod: GY | Performed by: INTERNAL MEDICINE

## 2020-07-11 PROCEDURE — 99283 EMERGENCY DEPT VISIT LOW MDM: CPT | Mod: Z6 | Performed by: INTERNAL MEDICINE

## 2020-07-11 PROCEDURE — 99283 EMERGENCY DEPT VISIT LOW MDM: CPT

## 2020-07-11 RX ORDER — CLONIDINE HYDROCHLORIDE 0.1 MG/1
0.2 TABLET ORAL ONCE
Status: COMPLETED | OUTPATIENT
Start: 2020-07-11 | End: 2020-07-11

## 2020-07-11 RX ADMIN — CLONIDINE HYDROCHLORIDE 0.2 MG: 0.1 TABLET ORAL at 22:37

## 2020-07-11 NOTE — ED AVS SNAPSHOT
[facility]  [address]  [place] [address]  Phone:  [phone]                                    Heather Rosas   MRN: [id number]    Department:  [facility]   Date of Visit:  7/11/2020           After Visit Summary Signature Page    I have received my discharge instructions, and my questions have been answered. I have discussed any challenges I see with this plan with the nurse or doctor.    ..........................................................................................................................................  Patient/Patient Representative Signature      ..........................................................................................................................................  Patient Representative Print Name and Relationship to Patient    ..................................................               ................................................  Date                                   Time    ..........................................................................................................................................  Reviewed by Signature/Title    ...................................................              ..............................................  Date                                               Time          22EPIC Rev 08/18

## 2020-07-12 VITALS
RESPIRATION RATE: 16 BRPM | HEART RATE: 59 BPM | OXYGEN SATURATION: 94 % | DIASTOLIC BLOOD PRESSURE: 108 MMHG | TEMPERATURE: 98.2 F | SYSTOLIC BLOOD PRESSURE: 184 MMHG

## 2020-07-12 PROCEDURE — 25000132 ZZH RX MED GY IP 250 OP 250 PS 637: Mod: GY | Performed by: INTERNAL MEDICINE

## 2020-07-12 RX ORDER — METOPROLOL TARTRATE 50 MG
50 TABLET ORAL ONCE
Status: COMPLETED | OUTPATIENT
Start: 2020-07-12 | End: 2020-07-12

## 2020-07-12 RX ORDER — AMLODIPINE BESYLATE 5 MG/1
2.5 TABLET ORAL DAILY
Qty: 5 TABLET | Refills: 0 | Status: SHIPPED | OUTPATIENT
Start: 2020-07-12 | End: 2020-09-01

## 2020-07-12 RX ORDER — AMLODIPINE BESYLATE 5 MG/1
5 TABLET ORAL ONCE
Status: COMPLETED | OUTPATIENT
Start: 2020-07-12 | End: 2020-07-12

## 2020-07-12 RX ADMIN — METOPROLOL TARTRATE 50 MG: 50 TABLET, FILM COATED ORAL at 01:32

## 2020-07-12 RX ADMIN — AMOXICILLIN AND CLAVULANATE POTASSIUM 1 TABLET: 875; 125 TABLET, FILM COATED ORAL at 02:19

## 2020-07-12 RX ADMIN — AMLODIPINE BESYLATE 5 MG: 5 TABLET ORAL at 01:32

## 2020-07-12 ASSESSMENT — ENCOUNTER SYMPTOMS
ABDOMINAL PAIN: 0
COLOR CHANGE: 0
DIFFICULTY URINATING: 0
EYE REDNESS: 0
FEVER: 0
CONFUSION: 0
HEADACHES: 0
SHORTNESS OF BREATH: 0
NECK STIFFNESS: 0
ARTHRALGIAS: 0

## 2020-07-12 NOTE — ED TRIAGE NOTES
Pt states she stepped on glass with her left foot yesterday and has been unable to remove. States heel area of foot. Today the arch started getting stiff feeling

## 2020-07-12 NOTE — ED NOTES
pts BP elevated but denies any symptoms other than the left heel of her foot has a foreign body. Pt thinks it is glass and she stepped on it last night. Pt feeling stiffness in the left foot. MD at bedside.

## 2020-07-12 NOTE — ED NOTES
Pt continues to have high BP. meds given. Wound on foot dressed with dry gauze and triple antibiotic.

## 2020-07-12 NOTE — ED NOTES
Pt back from xray.  Pt continues to deny high blood pressure symptoms.   MD notified that pt has had xray done.   Lac cart at bedside with 2% lidocaine with epi.

## 2020-07-13 NOTE — ED PROVIDER NOTES
History     Chief Complaint   Patient presents with     Glass in foot     The history is provided by the patient.   Foreign Body   Location:  Skin  Suspected object:  Glass  Pain quality:  Aching  Pain severity:  Mild  Duration:  2 days  Timing:  Constant  Chronicity:  New  Associated symptoms: no abdominal pain and no congestion      Allergies:  Allergies   Allergen Reactions     Cats Other (See Comments)     Sneezing, runny nose     Codeine Sulfate Nausea and Vomiting and GI Disturbance     Fexofenadine Hcl      Allegra      Rosuvastatin Other (See Comments)     Dizziness - Crestor        Problem List:    Patient Active Problem List    Diagnosis Date Noted     Endometrial adenocarcinoma (H) 12/03/2018     Priority: Medium     Malignant neoplasm of uterus, unspecified site (H) 12/03/2018     Priority: Medium     Atrial fibrillation (H) 11/26/2018     Priority: Medium     History of coronary artery disease 10/30/2018     Priority: Medium     Hyponatremia 10/30/2018     Priority: Medium     Moderate mitral regurgitation 10/30/2018     Priority: Medium     Adenocarcinoma of the endometrium/uterus (H) 10/09/2018     Priority: Medium     Discovered on 02/18 biopsy. Pt lost to f/u       Chronic diastolic congestive heart failure (H) 09/20/2018     Priority: Medium     S/P CABG x 4 09/10/2018     Priority: Medium     History of non-ST elevation myocardial infarction (NSTEMI) 09/06/2018     Priority: Medium     Endometrial cancer (H) 09/04/2018     Priority: Medium     Cerebrovascular accident (H) 05/14/2018     Priority: Medium     Hypertensive urgency 05/14/2018     Priority: Medium     CVA (cerebral vascular accident) (H) 05/14/2018     Priority: Medium     Chest pain 05/14/2018     Priority: Medium     Endometrial hyperplasia with atypia 05/09/2018     Priority: Medium     Metrorrhagia 02/18/2018     Priority: Medium     ACP (advance care planning) 05/20/2016     Priority: Medium     Advance Care Planning 5/20/2016:  ACP Review of Chart / Resources Provided:  Reviewed chart for advance care plan.  Heather MATTHEW Rosas has no plan or code status on file. Discussed available resources and provided with information. Confirmed code status reflects current choices pending further ACP discussions.  Confirmed/documented legally designated decision makers.  Added by Margot Shannon             HTN (hypertension)      Priority: Medium     Major depressive disorder, recurrent episode, moderate (H) 01/01/2011     Priority: Medium     ANNA (generalized anxiety disorder) 01/01/2011     Priority: Medium     GERD (Gastroesophageal reflux disease) 01/01/2011     Priority: Medium     Obesity (H) 01/01/2011     Priority: Medium     Schizophrenia (H) 01/01/2011     Priority: Medium     Seasonal allergic rhinitis 01/01/2011     Priority: Medium     Fibromyalgia 06/14/2004     Priority: Medium     Dyslipidemia 11/26/2003     Priority: Medium        Past Medical History:    Past Medical History:   Diagnosis Date     Allergic rhinitis 01/01/2011     Anxiety 01/01/2011     Anxiety state, unspecified      Dyslipidemia 11/26/2003     fibromyalgia 06/14/2004     GERD, Esophageal reflux 01/01/2011     HTN (hypertension)      Major depression, recurrent (H) 1/1/2011     Nonorganic sleep disorder, unspecified      Obesity 01/01/2011     Schizophrenia (H) 01/01/2011     Toxic shock syndrome (H) 2006       Past Surgical History:    Past Surgical History:   Procedure Laterality Date     ANGIOGRAM  02/2005    Normal      BIOPSY BREAST  1984    LT, normal     BYPASS GRAFT ARTERY CORONARY  09/10/2018     CARPAL TUNNEL RELEASE RT/LT  2005    RT, carpal tunnel     COLONOSCOPY  2011    Repeat in ten years      COLONOSCOPY  1996     COLONOSCOPY  2004     DILATION AND CURETTAGE, HYSTEROSCOPY, ABLATE ENDOMETRIUM, COMBINED N/A 2/19/2018    Procedure: COMBINED DILATION AND CURETTAGE, HYSTEROSCOPY, ABLATE ENDOMETRIUM;  HYSTEROSCOPY DILATION AND CURETTAGE, ENDOMETRIAL ABLATION;   Surgeon: Jose Nettles MD;  Location: [facility]     HYSTERECTOMY TOTAL ABD, NICK SALPINGO-OOPHORECTOMY, NODE DISSECTION, TUMOR DEBULKING, COMBINED  10/30/2018     INSERT PORT VASCULAR ACCESS N/A 2018    Procedure: PORT-A-CATH PLACEMENT;  Surgeon: Rafi Trinidad MD;  Location: [facility]     placement of central line         Family History:    Family History   Problem Relation Age of Onset     C.A.D. Father 45        (cause of death)      Other - See Comments Father         rheumatic fever      Allergies Father      Cancer Sister 56        Esophageal to bone cancer     Gastrointestinal Disease Mother         GERD     Cancer Mother         pancreatic ca (cause of death) /liver ca     Breast Cancer Maternal Aunt      Breast Cancer Maternal Aunt      Colon Cancer Paternal Aunt         (cause of death)      Crohn's Disease Other      Depression Maternal Uncle      Depression Maternal Aunt      Diabetes Paternal Grandmother         type 2     Neurologic Disorder Brother         neuropathy     Cancer Brother 58        Tonsilular cancer/ lymph node in neck     Allergies Brother      Breast Cancer Cousin      Breast Cancer Cousin      Breast Cancer Cousin        Social History:  Marital Status:   [4]  Social History     Tobacco Use     Smoking status: Current Some Day Smoker     Packs/day: 0.50     Types: Cigarettes     Start date: 1971     Last attempt to quit: 2013     Years since quittin.6     Smokeless tobacco: Never Used     Tobacco comment: Year Quit:    Substance Use Topics     Alcohol use: No     Comment: rare     Drug use: No        Medications:    amLODIPine (NORVASC) 5 MG tablet  amoxicillin-clavulanate (AUGMENTIN) 875-125 MG tablet  IBUPROFEN PO  Metoprolol Succinate 25 MG CS24          Review of Systems   Constitutional: Negative for fever.   HENT: Negative for congestion.    Eyes: Negative for redness.   Respiratory: Negative for shortness of breath.    Cardiovascular: Negative for  chest pain.   Gastrointestinal: Negative for abdominal pain.   Genitourinary: Negative for difficulty urinating.   Musculoskeletal: Negative for arthralgias and neck stiffness.   Skin: Negative for color change.   Neurological: Negative for headaches.   Psychiatric/Behavioral: Negative for confusion.   All other systems reviewed and are negative.      Physical Exam   BP: (!) 216/132  Pulse: 95  Heart Rate: 94  Temp: 99.6  F (37.6  C)  Resp: 16  SpO2: 97 %      Physical Exam  Constitutional:       General: She is not in acute distress.     Appearance: She is not diaphoretic.   HENT:      Head: Atraumatic.      Mouth/Throat:      Pharynx: No oropharyngeal exudate.   Eyes:      General: No scleral icterus.     Pupils: Pupils are equal, round, and reactive to light.   Cardiovascular:      Heart sounds: Normal heart sounds.   Pulmonary:      Effort: No respiratory distress.      Breath sounds: Normal breath sounds.   Abdominal:      General: Bowel sounds are normal.      Palpations: Abdomen is soft.      Tenderness: There is no abdominal tenderness.   Musculoskeletal:         General: No tenderness.        Feet:       Comments: Tiny protrusion of skin about 1 x 1 mm crater like hole on it . on L foot sole,  no obvious foreign body   Skin:     General: Skin is warm.      Findings: No rash.         ED Course        Procedures                   Results for orders placed or performed during the hospital encounter of 07/11/20 (from the past 24 hour(s))   Foot XR, G/E 3 views, left    Narrative    Exam: XR FOOT LT G/E 3 VW     History:Female, age 69 years, foreign body    Comparison:  3 views of the right foot 12/5/2011    Technique: Three views are submitted.    Findings: Bones are normally mineralized. No evidence of acute or  subacute fracture.  No evidence of dislocation.  No evidence of  apparent foreign body.    Large osteophyte arising from the distal, lateral margin of the first  metatarsal abuts an osteophyte arising  from the proximal, medial  aspect of the second metatarsal, similar when compared to the right  foot.           Impression    Impression:  No evidence of acute or subacute bony abnormality.     No apparent radiodense foreign body.    Prominent osteophytes of the first and second metatarsals are similar  when compared to the right foot, likely normal variant.    STONE COOK MD       Medications   cloNIDine (CATAPRES) tablet 0.2 mg (0.2 mg Oral Given 7/11/20 2237)   amLODIPine (NORVASC) tablet 5 mg (5 mg Oral Given 7/12/20 0132)   metoprolol tartrate (LOPRESSOR) tablet 50 mg (50 mg Oral Given 7/12/20 0132)   amoxicillin-clavulanate (AUGMENTIN) 875-125 MG per tablet 1 tablet (1 tablet Oral Given 7/12/20 0219)       Assessments & Plan (with Medical Decision Making)   Possible glass in left foot sole  Xray negative  Uncontrolled HTN, asymptomatic  I explored protrusion of Left sole with small foreceps under magnifying glass , I could not find any foreign body  Augmenting started + amlodipne added to her BP medication  She also has access port on her left chest wall that want to be removed, I advised her to follow-up with Surgery clinic for reevaluation of her foreign body and port removal, also follow-up with PCP /  Medical clinic for BP medication optimization  She understood and agreed.   I have reviewed the nursing notes.    I have reviewed the findings, diagnosis, plan and need for follow up with the patient.      Discharge Medication List as of 7/12/2020  2:26 AM      START taking these medications    Details   amLODIPine (NORVASC) 5 MG tablet Take 0.5 tablets (2.5 mg) by mouth daily for 10 days, Disp-5 tablet,R-0, Local Print      amoxicillin-clavulanate (AUGMENTIN) 875-125 MG tablet Take 1 tablet by mouth 2 times daily for 3 days, Disp-6 tablet,R-0, Local Print             Final diagnoses:   Uncontrolled hypertension   Foreign body (FB) in soft tissue       7/11/2020   [facility]     Adriel Burnham,  MD  07/12/20 8699

## 2020-07-18 NOTE — PROGRESS NOTES
[address] [facility] [employer]    PROGRESS NOTE             7/18/2020    1:35 PM    Name:   Charles Landers  MRN:     [id number]     Acct:      [de-identified]   Room:   [facility] [place] [address] Day:  5  Admit Date:  7/12/2020  8:20 PM    PCP:  Isuara Araiza DO  Code Status:  Full Code    Subjective:     C/C:   Chief Complaint   Patient presents with    Altered Mental Status    Hypertension      Interval History Status: improved. Patient seen and examined at bedside. Nurse report no overnight events. Patient remains pleasantly confused. Patient's potassium was 3.1 and received a total 60 mEq of potassium. Patient had loose bowel movement and will have Cool cath removed today. Patient will be transferred to ARU tomorrow. Brief History:     See H&P     Review of Systems:     Review of Systems   Constitutional: Negative for fatigue and fever. Eyes: Negative for visual disturbance. Respiratory: Negative for shortness of breath and wheezing. Cardiovascular: Negative for chest pain and palpitations. Gastrointestinal: Negative for abdominal pain, constipation, diarrhea, nausea and vomiting. Musculoskeletal: Negative for arthralgias. Neurological: Negative for headaches. Psychiatric/Behavioral: Positive for confusion. Medications: Allergies:     Allergies   Allergen Reactions    Diclofenac-Misoprostol Other (See Comments)     Per Dr Eliazar Lieberman - patient has GI intolerance to oral formulations       Current Meds:   Scheduled Meds:    docusate sodium  100 mg Oral Daily    iron sucrose  100 mg Intravenous Q24H    famotidine  20 mg Oral Daily    amoxicillin-clavulanate  1 tablet Oral 2 times per day    amLODIPine  5 mg Oral Daily    spironolactone  25 mg Oral Daily    And    hydroCHLOROthiazide  25 mg Oral Daily    sodium chloride flush  10 mL Intravenous 2 times per day    sodium chloride flush  10 mL Intravenous 2 times per day    Patient tolerated infusion well, with the exception of some mild nausea during carbo infusion that has subsided. no signs or symptoms of adverse reaction noted. Patient denies pain nor discomfort. Encouraged patient on taking at-home PRN antiemetics. Verbalizes understanding.  Needle removed, tip intact. Site clean, dry and intact. Covered with a sterile bandage, slight pressure applied for 30 seconds. Pt instructed to leave bandage intact for a minimum of one hour, and to call with questions or concerns.  Patient states understanding, discharged ambulatory.      enoxaparin  30 mg Subcutaneous Daily    clopidogrel  75 mg Oral Daily    metoprolol tartrate  50 mg Oral BID    cloNIDine  1 patch Transdermal Weekly    ezetimibe  10 mg Oral Nightly     Continuous Infusions:    dextrose       PRN Meds: potassium chloride, glucose, dextrose, glucagon (rDNA), dextrose, sodium chloride flush, sodium chloride flush, acetaminophen, metoprolol, hydrALAZINE, aspirin    Data:     Past Medical History:   has a past medical history of Arthritis, Cancer ([facility] [address] CKD (chronic kidney disease) stage 4, GFR 15-29 ml/min ([facility]), COPD (chronic obstructive pulmonary disease) ([facility] [address] [facility] Gout, Hyperlipidemia, Hypertension, Left renal atrophy, and Other abnormality of urination(788.69). Social History:   reports that she quit smoking about 21 months ago. She smoked 1.00 pack per day. She has never used smokeless tobacco. She reports that she does not drink alcohol or use drugs. Family History:   Family History   Problem Relation Age of Onset    Heart Disease Brother     Other Mother         polycythemia    Heart Disease Father     Heart Disease Sister         enlarged heart    Cancer Son         prostate       Vitals:  BP (!) 126/47   Pulse 89   Temp 97.9 °F (36.6 °C) (Oral)   Resp 18   Ht 5' 5\" (1.651 m)   Wt 174 lb 2.6 oz (79 kg)   SpO2 94%   BMI 28.98 kg/m²   Temp (24hrs), Av.7 °F (36.5 °C), Min:97.2 °F (36.2 °C), Max:97.9 °F (36.6 °C)    Recent Labs     20  0907 20  0509 20  0713 20  1128   POCGLU 72 101 90 115*       I/O(24Hr):     Intake/Output Summary (Last 24 hours) at 2020 1335  Last data filed at 2020 0557  Gross per 24 hour   Intake 200 ml   Output 1350 ml   Net -1150 ml       Labs:    [unfilled]    Lab Results   Component Value Date/Time    SPECIAL NOT REPORTED 07/15/2020 09:19 AM     Lab Results   Component Value Date/Time    CULTURE NORMAL RESPIRATORY BENJI LIGHT GROWTH 07/15/2020 09:19 AM [facility]    Radiology:    Ct Head Wo Contrast    Result Date: 7/12/2020  EXAMINATION: CT OF THE HEAD WITHOUT CONTRAST  7/12/2020 8:42 pm TECHNIQUE: CT of the head was performed without the administration of intravenous contrast. Dose modulation, iterative reconstruction, and/or weight based adjustment of the mA/kV was utilized to reduce the radiation dose to as low as reasonably achievable. COMPARISON: 12/19/2018 HISTORY: ORDERING SYSTEM PROVIDED HISTORY: AMS TECHNOLOGIST PROVIDED HISTORY: AMS Reason for Exam: AMS, unable to speak, patient is unable to stop shaking her legs. patient is unable to follow any type of instructions or answer any questions. Acuity: Acute Type of Exam: Initial FINDINGS: BRAIN/VENTRICLES: There is no acute intracranial hemorrhage, mass effect or midline shift. No abnormal extra-axial fluid collection. The gray-white differentiation is maintained without evidence of an acute infarct. There is no evidence of hydrocephalus. Generalized involutional changes and moderate chronic small ischemic disease. Intracranial vascular calcifications. Question small chronic lacune infarct identified within the region of the right paramedian dane. ORBITS: The visualized portion of the orbits demonstrate no acute abnormality. SINUSES: The visualized paranasal sinuses and mastoid air cells demonstrate no acute abnormality. SOFT TISSUES/SKULL:  No acute abnormality of the visualized skull or soft tissues. No acute intracranial abnormality. Chronic small ischemic disease and age related change. Critical results were called by Dr. Tj Keyes to Dr Ashley Thakkar on 7/12/2020 at 20:52. Us Renal Complete    Result Date: 7/13/2020  EXAMINATION: RETROPERITONEAL ULTRASOUND OF THE KIDNEYS 7/13/2020 COMPARISON: 04/19/2019 and prior HISTORY: ORDERING SYSTEM PROVIDED HISTORY: TIOM TECHNOLOGIST PROVIDED HISTORY: TIMO FINDINGS: Kidneys:  The right kidney measures 10.2 x 5.8 x 5.2 cm in length and the left kidney measures 8.4 x 4.5 x 4.6 cm in length. Study limited by body habitus and overlying bowel gas. Simple-appearing cyst on the right measuring 1.3 x 1.2 x 1.6 cm noted. There is no hydronephrosis or abnormal echogenicity. Left kidney is somewhat limited in evaluation without evidence of hydronephrosis. There is mild cortical thinning. Limited study secondary to body habitus and overlying bowel gas. Right kidney appears grossly unremarkable in appearance. Left kidney is limited but grossly unremarkable. There is mild cortical thinning. Ir Alejandro Bordenter Device Plmt/replace/removal    Result Date: 7/14/2020  PROCEDURE: ULTRASOUND GUIDED VASCULAR ACCESS. FLUOROSCOPY GUIDED PICC PLACEMENT 7/14/2020. HISTORY: ORDERING SYSTEM PROVIDED HISTORY: PICC line TECHNOLOGIST PROVIDED HISTORY: Patient in ICU, on vent, sedated. In need of IV access for drips, sedation, Please place ASAP. Spouse's name Katie Mauricio, home No. [phone]. Spouse POA, only one to make decisions for patient. PICC line SEDATION: None FLUOROSCOPY DOSE AND TYPE OR TIME AND EXPOSURES: DAP 88 cGy cm squared TECHNIQUE: Informed consent was obtained after a detailed explanation of the procedure including risks, benefits, and alternatives. Universal protocol was observed. The right arm was prepped and draped in sterile fashion using maximum sterile barrier technique. Local anesthesia was achieved with lidocaine. A micropuncture needle was used to access the right basilic vein using ultrasound guidance. An ultrasound image demonstrating patency of the vein with needle tip located within it. An image was obtained and stored in PACs. A 0.018 guidewire was used to place a peel-a-way sheath and a 5 [employer] Leeann power injectable dual-lumen PICC was advanced with fluoroscopic guidance with the tip at the cavo-atrial junction. The catheter flushed easily and there was a good blood return. The catheter was secured to the skin.   The patient tolerated the procedure well and there were no immediate complications. FINDINGS: Fluoroscopic image demonstrates the tip of the catheter at the cavo-atrial junction. Successful ultrasound and fluoroscopy guided right basilic vein 5 Hungarian power injectable dual-lumen PICC placement. Ready for use. Xr Chest Portable    Result Date: 7/16/2020  EXAMINATION: ONE XRAY VIEW OF THE CHEST 7/16/2020 5:26 am COMPARISON: 07/15/2020 HISTORY: ORDERING SYSTEM PROVIDED HISTORY: ETT placement TECHNOLOGIST PROVIDED HISTORY: ETT placement Reason for Exam: ETT placement Acuity: Unknown Type of Exam: Ongoing Additional signs and symptoms: ETT placement Relevant Medical/Surgical History: ETT placement FINDINGS: Right upper extremity PICC in good position. Low ET tube projecting 1.3 cm from the rianna. The tube should be withdrawn 2-3 cm for better positioning. Enteric tube passes beneath the diaphragm but the tip is not seen. Shallow inspiration. Cardiomegaly. Perihilar congestion and patchy peripheral airspace disease possibly edema or pneumonia. Low ET tube projects 1.3 cm from the rianna. Tube should be withdrawn 2-3 cm for better positioning. Shallow inspiration magnifies central congestion. Patchy peripheral airspace disease possibly from pneumonia. The findings were sent to the Radiology Results [address] at 8:34 am on 7/16/2020to be communicated to a licensed caregiver. Xr Chest Portable    Result Date: 7/15/2020  EXAMINATION: ONE XRAY VIEW OF THE CHEST 7/15/2020 6:31 am COMPARISON: Chest radiograph performed 07/14/2020. HISTORY: ORDERING SYSTEM PROVIDED HISTORY: ETT placement TECHNOLOGIST PROVIDED HISTORY: ETT placement Reason for Exam: on vent Acuity: Acute Type of Exam: Subsequent/Follow-up FINDINGS: There is mild chronic pulmonary change. There is no sara consolidation or effusion. There is no pneumothorax. The mediastinal structures are stable. There is an endotracheal tube with the tip in the midtrachea.   There is a gastric tube and the tip is not visualized. The extrathoracic soft tissues are unremarkable. There is a right-sided PICC line with the tip in the distal SVC. Chronic pulmonary change without definite acute cardiopulmonary process. Support tubes as described above. Xr Chest Portable    Result Date: 7/14/2020  EXAMINATION: ONE XRAY VIEW OF THE CHEST 7/14/2020 6:52 am COMPARISON: 07/13/2020 HISTORY: ORDERING SYSTEM PROVIDED HISTORY: ETT placement TECHNOLOGIST PROVIDED HISTORY: ETT placement Reason for Exam: Vent protocol. H/O COPD. Acuity: Unknown Type of Exam: Unknown Additional signs and symptoms: Vent protocol. H/O COPD. FINDINGS: Endotracheal tube extends to the mid trachea. Nasogastric tube extends below the diaphragm. Subtle asymmetric left perihilar opacity is again demonstrated. No pleural effusion. No pneumothorax. Cardiac and mediastinal silhouettes are similar to prior. Left axillary clips. No significant interval change in subtle left perihilar airspace disease as compared to prior. Follow-up to resolution recommended. Xr Chest Portable    Result Date: 7/13/2020  EXAMINATION: ONE XRAY VIEW OF THE CHEST 7/13/2020 6:04 am COMPARISON: 07/12/2020 HISTORY: ORDERING SYSTEM PROVIDED HISTORY: ETT placement TECHNOLOGIST PROVIDED HISTORY: ETT placement Reason for Exam: ETT placement Acuity: Unknown Type of Exam: Ongoing Additional signs and symptoms: ETT placement Relevant Medical/Surgical History: ETT placement FINDINGS: Endotracheal tube and NG tube remain in place. The heart and mediastinal structures are stable. There is a questionable infiltrate in the left mid lung laterally. This may relate to previous treatment for presumed left breast disease. The lungs are otherwise clear. Orthopedic hardware overlies the lower cervical spine. Surgical clips are seen in the left axilla. Questionable infiltrate left mid lung.      Xr Chest Portable    Result Date: MR #           [id number]      Sonographer             Popeye Andrews   Accession #    [id number]  Interpreting Physician  Abbie Smith   Referring                  Referring Physician     [facility]  Nurse  Practitioner  Procedure Type of Study:   Abdominal: Renal, Renal Artery Scan Bilateral.  Patient Status: In Patient. Technical Quality:Poor visualization. Conclusions   Summary   Simultaneous real time imaging utilizing B-Mode, color doppler and  spectral waveform analysis was performed on the abdominal aorta and  bilateral renal arteries for renal artery stenosis, study demonstrates:   Right:  Renal artery stenosis <60%  Atrophic kidney   Left:  Renal artery stenosis <60%  limited study unable to visualize kidney. Signature   ----------------------------------------------------------------  Electronically signed by Popeye Andrews(Sonographer) on  07/17/2020 10:54 AM  ----------------------------------------------------------------   ----------------------------------------------------------------  Electronically signed by Kassie Michelle(Interpreting  physician) on 07/18/2020 09:08 AM  ----------------------------------------------------------------  Findings:   Right Impression:                                      Left Impression:  Renal / Aortic Ratio within normal limits (<3.5mc/s). Kidney was not                                                         visualized. Good color flow renal parenchyma. Kidney appears to be below average in size, left  kidney was not visualized to compare it with. Unilateral Impression:  Velocities are measured in cm/s ; Diameters are measured in cm Abdominal Aortic Flow +--------------------------------+---+----+------------+-------------------+ ! Location                        ! PSV! EDV ! AP Diam     !Trans Diam         ! +--------------------------------+---+----+------------+-------------------+ ! Aorta Supra Renal               !200!1.34!1.86        !1.72 ! +--------------------------------+---+----+------------+-------------------+ ! Aorta Juxta Renal               !144!14.8!2.67        !2.64               ! +--------------------------------+---+----+------------+-------------------+ ! Aorta Infra Renal               !145!1.34!2.12        !2.2                ! +--------------------------------+---+----+------------+-------------------+ Renal Duplex Measurements +--------------------------------++-----+----+----+----++---+----+----+----+ ! Renal Artery A                  !!Right! ! Left!    !!   !    !    !    ! +--------------------------------++-----+----+----+----++---+----+----+----+ ! Location                        ! !PSV  ! EDV ! RI  !RAR !!PSV! EDV ! RI  !RAR ! +--------------------------------++-----+----+----+----++---+----+----+----+ ! Ostial Renal                    !!415  !38.7!0.91!2.88!!374!0   !1   !2.6 ! +--------------------------------++-----+----+----+----++---+----+----+----+ ! Prox Renal                      !!514  !2.97!0.99!3.57!!295!14.6!0.95!2.05! +--------------------------------++-----+----+----+----++---+----+----+----+ ! Dist Renal                      !!43.3 !10.2!0.76!0.3 !!   !    !    !    ! +--------------------------------++-----+----+----+----++---+----+----+----+ Right Miscellaneous Measurements   - The average kidney length is 6.24 cm. Mri Brain Wo Contrast    Result Date: 7/16/2020  EXAMINATION: MRI OF THE BRAIN WITHOUT CONTRAST  7/16/2020 3:19 pm TECHNIQUE: Multiplanar multisequence MRI of the brain was performed without the administration of intravenous contrast. COMPARISON: CT head 07/12/2020 HISTORY: ORDERING SYSTEM PROVIDED HISTORY: altered mental status TECHNOLOGIST PROVIDED HISTORY: altered mental status Reason for Exam: Pt admitted with AMS and not following any instructions, was extubated this AM, still having some AMS but following instructions.  FINDINGS: INTRACRANIAL STRUCTURES/VENTRICLES: Diffuse parenchymal volume loss is redemonstrated. There is no diffusion restriction to suggest acute infarct. Mild chronic white matter microvascular ischemic changes are present. There is bilateral parietal cortical and subcortical white matter T2/FLAIR hyperintensity, right greater than left. No mass effect or midline shift. No evidence of an acute intracranial hemorrhage. The ventricles and sulci are normal in size and configuration. The sellar/suprasellar regions appear unremarkable. The normal signal voids within the major intracranial vessels appear maintained. ORBITS: Orbital structures are unremarkable. SINUSES: Minimal frothy secretions in the sphenoid sinuses. Bilateral mastoid effusions. These findings may relate to recent intubation. BONES/SOFT TISSUES: The bone marrow signal intensity appears normal. The soft tissues demonstrate no acute abnormality. 1. Bilateral parietal cortical and subcortical signal abnormality most consistent with posterior reversible encephalopathy syndrome. 2. No acute infarct or hemorrhage. 3. Mild chronic white matter microvascular ischemic changes. Physical Examination:        Physical Exam  Constitutional:       Appearance: Normal appearance. Comments: Pleasantly confused   HENT:      Head: Normocephalic. Mouth/Throat:      Mouth: Mucous membranes are moist.   Eyes:      Conjunctiva/sclera: Conjunctivae normal.   Cardiovascular:      Rate and Rhythm: Normal rate and regular rhythm. Pulses: Normal pulses. Heart sounds: Normal heart sounds. No murmur. Pulmonary:      Effort: Pulmonary effort is normal. No respiratory distress. Breath sounds: Normal breath sounds. No wheezing. Abdominal:      General: Bowel sounds are normal.      Palpations: Abdomen is soft. Tenderness: There is no abdominal tenderness. Genitourinary:     Comments: Cool cath in place  Neurological:      Mental Status: She is oriented to person, place, and time.    Psychiatric: daily   -Carotid Duplex (05/27): Left 50-59% ;  Right <50%  -Seen Dr. Velez is patient's vascular surgeon on 6/8/2020     6. CKD stage 3; GFR 34  -Creatinine at 1.45 (H,, trending up 1.36); BL at 1.4  -BNP: 12,814 (H trending up from [address] 07/12/20)  -Patient is not on Lasix  -Renal artery Doppler, concerning for bilateral renal artery stenosis less than 60%  -Continue to monitor I's and O's per shift     7.  Iron deficiency anemia  -Restarted IV Venofer 100 mg for 3 days  -Labs: H/H- 9.7/29.7 (L, but trending up)   - Iron studies FE-23 (L), TIBC-161 (L), iron binding capacity-1 4 (L), H/H- 8.7/27.5 (L, and trending down)  -FOBT- pending  -Continue to monitor H&H     8.  Secondary hypertension possibly due to renal artery stenosis ruled out  -Renal artery Doppler, concerning for bilateral renal artery stenosis less than 60%  -Aldosterone and renin levels pending  -Avoid ACE or ARBs for HTN management     DVT PPX: Enoxaparin  GI P PPX: Famotidine  Dispo: FLOR Menendez MD  7/18/2020  1:35 PM

## 2020-09-01 ENCOUNTER — HOSPITAL ENCOUNTER (EMERGENCY)
Facility: HOSPITAL | Age: 70
Discharge: HOME | End: 2020-09-01
Attending: FAMILY MEDICINE | Admitting: FAMILY MEDICINE
Payer: MEDICARE

## 2020-09-01 VITALS
RESPIRATION RATE: 20 BRPM | WEIGHT: 180 LBS | TEMPERATURE: 97.7 F | BODY MASS INDEX: 35.34 KG/M2 | DIASTOLIC BLOOD PRESSURE: 110 MMHG | SYSTOLIC BLOOD PRESSURE: 213 MMHG | HEIGHT: 60 IN | HEART RATE: 68 BPM | OXYGEN SATURATION: 96 %

## 2020-09-01 DIAGNOSIS — K02.9 DENTAL CARIES: ICD-10-CM

## 2020-09-01 DIAGNOSIS — L84 CORN OR CALLUS: ICD-10-CM

## 2020-09-01 PROCEDURE — 99283 EMERGENCY DEPT VISIT LOW MDM: CPT | Mod: 25

## 2020-09-01 PROCEDURE — 96372 THER/PROPH/DIAG INJ SC/IM: CPT | Mod: XS

## 2020-09-01 PROCEDURE — 11055 PARING/CUTG B9 HYPRKER LES 1: CPT | Mod: GZ | Performed by: FAMILY MEDICINE

## 2020-09-01 PROCEDURE — 11055 PARING/CUTG B9 HYPRKER LES 1: CPT | Performed by: FAMILY MEDICINE

## 2020-09-01 PROCEDURE — 25000128 H RX IP 250 OP 636: Performed by: FAMILY MEDICINE

## 2020-09-01 PROCEDURE — 99283 EMERGENCY DEPT VISIT LOW MDM: CPT | Mod: 25 | Performed by: FAMILY MEDICINE

## 2020-09-01 RX ORDER — KETOROLAC TROMETHAMINE 30 MG/ML
30 INJECTION, SOLUTION INTRAMUSCULAR; INTRAVENOUS ONCE
Status: DISCONTINUED | OUTPATIENT
Start: 2020-09-01 | End: 2020-09-01 | Stop reason: DRUGHIGH

## 2020-09-01 RX ORDER — KETOROLAC TROMETHAMINE 30 MG/ML
30 INJECTION, SOLUTION INTRAMUSCULAR; INTRAVENOUS ONCE
Status: COMPLETED | OUTPATIENT
Start: 2020-09-01 | End: 2020-09-01

## 2020-09-01 RX ADMIN — KETOROLAC TROMETHAMINE 30 MG: 30 INJECTION, SOLUTION INTRAMUSCULAR at 04:29

## 2020-09-01 ASSESSMENT — MIFFLIN-ST. JEOR: SCORE: 1272.97

## 2020-09-01 NOTE — ED PROVIDER NOTES
History     Chief Complaint   Patient presents with     Dental Pain     Rt lower jaw pain x 3 weeks that increases with biting down, pain increased tonight. pt noticed slight swelling yesterday.     HPI  Heather Rosas is a 69 year old female who resents to the emergency room with right-sided lower jaw pain and left upper jaw pain that is worse when she is biting down.  Patient noted some swelling in her right lower jaw.  She was seen here about a year ago for a similar symptom and states that she has seen her dentist at least twice since then for almost identical symptoms.  She usually gets treated with antibiotics and then her symptoms improved.    Patient also has a lesion on the bottom of her left foot that was removed previously and now she feels like there is something stuck in there and its more painful.  She is wondering if I could take a look at it.    Allergies:  Allergies   Allergen Reactions     Cats Other (See Comments)     Sneezing, runny nose     Codeine Sulfate Nausea and Vomiting and GI Disturbance     Fexofenadine Hcl      Allegra      Rosuvastatin Other (See Comments)     Dizziness - Crestor        Problem List:    Patient Active Problem List    Diagnosis Date Noted     Endometrial adenocarcinoma (H) 12/03/2018     Priority: Medium     Malignant neoplasm of uterus, unspecified site (H) 12/03/2018     Priority: Medium     Atrial fibrillation (H) 11/26/2018     Priority: Medium     History of coronary artery disease 10/30/2018     Priority: Medium     Hyponatremia 10/30/2018     Priority: Medium     Moderate mitral regurgitation 10/30/2018     Priority: Medium     Adenocarcinoma of the endometrium/uterus (H) 10/09/2018     Priority: Medium     Discovered on 02/18 biopsy. Pt lost to f/u       Chronic diastolic congestive heart failure (H) 09/20/2018     Priority: Medium     S/P CABG x 4 09/10/2018     Priority: Medium     History of non-ST elevation myocardial infarction (NSTEMI) 09/06/2018      Priority: Medium     Endometrial cancer (H) 09/04/2018     Priority: Medium     Cerebrovascular accident (H) 05/14/2018     Priority: Medium     Hypertensive urgency 05/14/2018     Priority: Medium     CVA (cerebral vascular accident) (H) 05/14/2018     Priority: Medium     Chest pain 05/14/2018     Priority: Medium     Endometrial hyperplasia with atypia 05/09/2018     Priority: Medium     Metrorrhagia 02/18/2018     Priority: Medium     ACP (advance care planning) 05/20/2016     Priority: Medium     Advance Care Planning 5/20/2016: ACP Review of Chart / Resources Provided:  Reviewed chart for advance care plan.  Heather Rosas has no plan or code status on file. Discussed available resources and provided with information. Confirmed code status reflects current choices pending further ACP discussions.  Confirmed/documented legally designated decision makers.  Added by Margot Shannon             HTN (hypertension)      Priority: Medium     Major depressive disorder, recurrent episode, moderate (H) 01/01/2011     Priority: Medium     ANNA (generalized anxiety disorder) 01/01/2011     Priority: Medium     GERD (Gastroesophageal reflux disease) 01/01/2011     Priority: Medium     Obesity (H) 01/01/2011     Priority: Medium     Schizophrenia (H) 01/01/2011     Priority: Medium     Seasonal allergic rhinitis 01/01/2011     Priority: Medium     Fibromyalgia 06/14/2004     Priority: Medium     Dyslipidemia 11/26/2003     Priority: Medium        Past Medical History:    Past Medical History:   Diagnosis Date     Allergic rhinitis 01/01/2011     Anxiety 01/01/2011     Anxiety state, unspecified      Dyslipidemia 11/26/2003     fibromyalgia 06/14/2004     GERD, Esophageal reflux 01/01/2011     HTN (hypertension)      Major depression, recurrent (H) 1/1/2011     Nonorganic sleep disorder, unspecified      Obesity 01/01/2011     Schizophrenia (H) 01/01/2011     Toxic shock syndrome (H) 2006       Past Surgical History:     Past Surgical History:   Procedure Laterality Date     ANGIOGRAM  02/2005    Normal      BIOPSY BREAST  1984    LT, normal     BYPASS GRAFT ARTERY CORONARY  09/10/2018     CARPAL TUNNEL RELEASE RT/LT  2005    RT, carpal tunnel     COLONOSCOPY  2011    Repeat in ten years      COLONOSCOPY  1996     COLONOSCOPY  2004     DILATION AND CURETTAGE, HYSTEROSCOPY, ABLATE ENDOMETRIUM, COMBINED N/A 2/19/2018    Procedure: COMBINED DILATION AND CURETTAGE, HYSTEROSCOPY, ABLATE ENDOMETRIUM;  HYSTEROSCOPY DILATION AND CURETTAGE, ENDOMETRIAL ABLATION;  Surgeon: Jose Nettles MD;  Location: [facility]     HYSTERECTOMY TOTAL ABD, NICK SALPINGO-OOPHORECTOMY, NODE DISSECTION, TUMOR DEBULKING, COMBINED  10/30/2018     INSERT PORT VASCULAR ACCESS N/A 12/11/2018    Procedure: PORT-A-CATH PLACEMENT;  Surgeon: Rafi Trinidad MD;  Location: [facility] OR     placement of central line  2005       Family History:    Family History   Problem Relation Age of Onset     C.A.D. Father 45        (cause of death)      Other - See Comments Father         rheumatic fever      Allergies Father      Cancer Sister 56        Esophageal to bone cancer     Gastrointestinal Disease Mother         GERD     Cancer Mother         pancreatic ca (cause of death) /liver ca     Breast Cancer Maternal Aunt      Breast Cancer Maternal Aunt      Colon Cancer Paternal Aunt         (cause of death)      Crohn's Disease Other      Depression Maternal Uncle      Depression Maternal Aunt      Diabetes Paternal Grandmother         type 2     Neurologic Disorder Brother         neuropathy     Cancer Brother 58        Tonsilular cancer/ lymph node in neck     Allergies Brother      Breast Cancer Cousin      Breast Cancer Cousin      Breast Cancer Cousin        Social History:  Marital Status:   [4]  Social History     Tobacco Use     Smoking status: Current Some Day Smoker     Packs/day: 0.25     Types: Cigarettes     Start date: 1/24/1971     Last attempt to quit: 12/1/2013  "    Years since quittin.7     Smokeless tobacco: Never Used     Tobacco comment: pt declined, stated she would use, \"the patch\"   Substance Use Topics     Alcohol use: No     Comment: rare     Drug use: No        Medications:    amoxicillin-clavulanate (AUGMENTIN) 875-125 MG tablet  IBUPROFEN PO  Metoprolol Succinate 25 MG CS24          Review of Systems   HENT: Positive for dental problem.    Skin:        Thickened area on the bottom of her left foot.       Physical Exam   BP: (!) 213/110  Pulse: 68  Temp: 97.7  F (36.5  C)  Resp: 22  Height: 152.4 cm (5')  Weight: 81.6 kg (180 lb)  SpO2: 95 %      Physical Exam  Vitals signs and nursing note reviewed.   Constitutional:       Appearance: Normal appearance.   HENT:      Head: Normocephalic and atraumatic.      Comments: Slight swelling on the right lower jaw.     Nose: Nose normal.      Mouth/Throat:      Mouth: Mucous membranes are moist.   Cardiovascular:      Rate and Rhythm: Normal rate and regular rhythm.      Pulses: Normal pulses.      Heart sounds: Normal heart sounds.   Pulmonary:      Effort: Pulmonary effort is normal.      Breath sounds: Normal breath sounds.   Abdominal:      General: Abdomen is flat. Bowel sounds are normal.      Palpations: Abdomen is soft.   Musculoskeletal:         General: Tenderness present.      Comments: On the base of her left heel she has evidence of a small corn that is quite tender to palpation.   Skin:     General: Skin is warm and dry.      Capillary Refill: Capillary refill takes less than 2 seconds.   Neurological:      Mental Status: She is alert.         ED Course   Patient seen and examined.  Patient has symptoms consistent with a previous dental infection with dental pain.  No obvious dental caries are noted.  There is no obvious swelling present in the lower jaw all she is although she is tender in the first molar.  ReCommended that she sees seen by her dentist as soon as possible but but did start her on " Augmentin 875 1 p.o. twice daily for 10 days.  Patient was given Toradol 30 mg IM as well.    Evaluation of her foot showed that she has evidence of a corn.  This was shaved off using a 15 blade without difficulty patient was advised to use moleskin or corn pads to help protect that area.  She also may use an exfoliating such as pretty hands pretty feet or a pumice stone to help take off some of the dead skin on her foot.  She was comfortable with that plan.  She will follow-up with his any worsening of her symptoms.     Procedures       No results found for this or any previous visit (from the past 24 hour(s)).    Medications   ketorolac (TORADOL) injection 30 mg (has no administration in time range)       Assessments & Plan (with Medical Decision Making)     I have reviewed the nursing notes.    I have reviewed the findings, diagnosis, plan and need for follow up with the patient.    New Prescriptions    AMOXICILLIN-CLAVULANATE (AUGMENTIN) 875-125 MG TABLET    Take 1 tablet by mouth 2 times daily for 10 days       Final diagnoses:   Dental caries   Corn or callus       9/1/2020   [facility]     Nany Valderrama MD  09/01/20 0426

## 2020-09-01 NOTE — ED AVS SNAPSHOT
[facility]  [address]  [place] [address]  Phone:  [phone]                                    Heather Rosas   MRN: [id number]    Department:  [facility]   Date of Visit:  9/1/2020           After Visit Summary Signature Page    I have received my discharge instructions, and my questions have been answered. I have discussed any challenges I see with this plan with the nurse or doctor.    ..........................................................................................................................................  Patient/Patient Representative Signature      ..........................................................................................................................................  Patient Representative Print Name and Relationship to Patient    ..................................................               ................................................  Date                                   Time    ..........................................................................................................................................  Reviewed by Signature/Title    ...................................................              ..............................................  Date                                               Time          22EPIC Rev 08/18

## 2020-09-01 NOTE — DISCHARGE INSTRUCTIONS
"1) You can use \"pretty hands pretty feet\" as an exfoliant for that area on your feet. A pumice stone may also work.  2 consider using a corn pad or moleskin patches to help protect that area.   Please see your dentist for recheck!   "

## 2020-09-16 ENCOUNTER — TELEPHONE (OUTPATIENT)
Dept: ONCOLOGY | Facility: OTHER | Age: 70
End: 2020-09-16

## 2020-09-16 DIAGNOSIS — C54.1 ADENOCARCINOMA OF THE ENDOMETRIUM/UTERUS (H): Primary | ICD-10-CM

## 2020-09-16 NOTE — TELEPHONE ENCOUNTER
Patient called to request a referral to General Surgery to have her port removed.  Routed to Oncology pool for nurse/provider review.

## 2020-09-16 NOTE — TELEPHONE ENCOUNTER
Per Dr Jaimes, port removal should be ok.    Called pt; left her a message explaining that her request for port removal was approved by Dr Jaimes.  Referral will be made to surgery; and she will be contacted by nurse from surgery department.  Naomi Diehl LPN

## 2020-09-25 ENCOUNTER — PREP FOR PROCEDURE (OUTPATIENT)
Dept: SURGERY | Facility: OTHER | Age: 70
End: 2020-09-25

## 2020-09-25 ENCOUNTER — OFFICE VISIT (OUTPATIENT)
Dept: SURGERY | Facility: OTHER | Age: 70
End: 2020-09-25
Attending: INTERNAL MEDICINE
Payer: MEDICARE

## 2020-09-25 VITALS
OXYGEN SATURATION: 96 % | HEART RATE: 62 BPM | TEMPERATURE: 97.4 F | WEIGHT: 180 LBS | BODY MASS INDEX: 35.15 KG/M2 | SYSTOLIC BLOOD PRESSURE: 144 MMHG | DIASTOLIC BLOOD PRESSURE: 78 MMHG

## 2020-09-25 DIAGNOSIS — Z92.21 STATUS POST CHEMOTHERAPY: Primary | ICD-10-CM

## 2020-09-25 DIAGNOSIS — C54.1 ADENOCARCINOMA OF THE ENDOMETRIUM/UTERUS (H): ICD-10-CM

## 2020-09-25 PROCEDURE — G0463 HOSPITAL OUTPT CLINIC VISIT: HCPCS

## 2020-09-25 PROCEDURE — 99212 OFFICE O/P EST SF 10 MIN: CPT | Performed by: SURGERY

## 2020-09-25 PROCEDURE — G0463 HOSPITAL OUTPT CLINIC VISIT: HCPCS | Mod: 25

## 2020-09-25 ASSESSMENT — PAIN SCALES - GENERAL: PAINLEVEL: NO PAIN (0)

## 2020-09-25 NOTE — PATIENT INSTRUCTIONS
Thank you for allowing Dr. Trinidad and our surgical team to participate in your care.  If you have a scheduling or an appointment question please contact [facility] [place] [facility] Coordinator at her direct line [phone].   ALL nursing questions or concerns can be directed to your surgical nurse at: [phone]-[place]    You are scheduled for a: port a cath removal    Your procedure date is: Tuesday, October 6, 2020    Your time is yet to be determined.  Same day surgery will call you the day prior to your procedure with your time to present in admitting.     Your covid-19 test is schedule for:   Friday, October 2, 2020 at 10 AM    COVID-19 test is needed 4 days before procedure in the morning. This is done as drive-up curbside testing in the white tent on the West side of the [facility] parking lot in your vehicle. Follow the signage and bring your mobile phone if you have one to call the phone number on the sign outside the tent for check-in. If you do not have a cell phone, please call the nurse for instructions on checking in.     HOW TO PREPARE-      You need to call our Surgery Education Nurses 1-2 weeks prior to your surgery date at  [phone] or toll free [phone]. Please have you medication and allergy lists ready.      Stop your aspirin or other NSAIDs(Ibuprofen, Motrin, Aleve, Celebrex, Naproxen, etc...) 7 days before your surgery.   Tylenol is safe to take.      Same day surgery will call you the day before your surgery with your arrival time. If you are scheduled on a Monday admitting will call you the Friday before.      Please call your primary care physician if you should become ill within 24 hours of scheduled surgery. (ex.vomiting, diarrhea, fever)        You will need to wash the night before AND the morning of you procedure with the supplied Hibiclens. Wash your Surgical area with your bare hands, apply friction and rinse. KEEP IT AWAY FROM YOUR EYES, EARS, NOSE AND MOUTH.   Thank  you for allowing  and our surgical team to participate in your care.  Please call with any scheduling questions to [phone] or any nursing questions at [phone].

## 2020-09-25 NOTE — NURSING NOTE
Chief Complaint   Patient presents with     Consult     Consult for port removal-Dr Jaimes is referring       Initial BP (!) 144/78 (BP Location: Left arm, Patient Position: Chair, Cuff Size: Adult Large)   Pulse 62   Temp 97.4  F (36.3  C) (Tympanic)   Wt 81.6 kg (180 lb)   SpO2 96%   BMI 35.15 kg/m   Estimated body mass index is 35.15 kg/m  as calculated from the following:    Height as of 9/1/20: 1.524 m (5').    Weight as of this encounter: 81.6 kg (180 lb).  Medication Reconciliation: complete  Britta Wiley LPN

## 2020-09-28 NOTE — PROGRESS NOTES
[facility] Progress Note    HPI: Returns to clinic following completion of chemotherapy for endometrial cancer.       S: She is doing well no concerns, she no longer needs her port and would like it removed.     O:   Vitals:  BP (!) 144/78 (BP Location: Left arm, Patient Position: Chair, Cuff Size: Adult Large)   Pulse 62   Temp 97.4  F (36.3  C) (Tympanic)   Wt 81.6 kg (180 lb)   SpO2 96%   BMI 35.15 kg/m        Physical Exam:  G: alert oriented, no acute distress   ENT: sclera non-icteric   Chest: right upper chest port in place.   CVS: RRR  ABD: soft non-tender non-distended   Ext: WWP     Assessment/Plan:  69 y.o. female no longer in need of her chemotherapy port. Discussed both taking it out under local and MAC anesthesia she may decide the day of. Will update h & p if she decides to use sedation.     Rafi Trinidad MD

## 2020-09-29 ENCOUNTER — ANESTHESIA EVENT (OUTPATIENT)
Dept: SURGERY | Facility: HOSPITAL | Age: 70
End: 2020-09-29
Payer: MEDICARE

## 2020-09-29 NOTE — ANESTHESIA PREPROCEDURE EVALUATION
Anesthesia Pre-Procedure Evaluation    Patient: Heather Rosas   MRN: [id number] : 1950          Preoperative Diagnosis: Status post chemotherapy [Z92.21]    Procedure(s):  port a cath removal    Past Medical History:   Diagnosis Date     Allergic rhinitis 2011     Anxiety 2011     Anxiety state, unspecified     Anxiety state     Dyslipidemia 2003     fibromyalgia 2004     GERD, Esophageal reflux 2011     HTN (hypertension)     Essential hypertension     Major depression, recurrent (H) 2011     Nonorganic sleep disorder, unspecified     Non-org. 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, ARDS, renal failure     Past Surgical History:   Procedure Laterality Date     ANGIOGRAM  2005    Normal      BIOPSY BREAST  1984    LT, normal     BYPASS GRAFT ARTERY CORONARY  09/10/2018     CARPAL TUNNEL RELEASE RT/LT      RT, carpal tunnel     COLONOSCOPY      Repeat in ten years      COLONOSCOPY       COLONOSCOPY       DILATION AND CURETTAGE, HYSTEROSCOPY, ABLATE ENDOMETRIUM, COMBINED N/A 2018    Procedure: COMBINED DILATION AND CURETTAGE, HYSTEROSCOPY, ABLATE ENDOMETRIUM;  HYSTEROSCOPY DILATION AND CURETTAGE, ENDOMETRIAL ABLATION;  Surgeon: Jose Nettles MD;  Location: [facility]     HYSTERECTOMY TOTAL ABD, NICK SALPINGO-OOPHORECTOMY, NODE DISSECTION, TUMOR DEBULKING, COMBINED  10/30/2018     INSERT PORT VASCULAR ACCESS N/A 2018    Procedure: PORT-A-CATH PLACEMENT;  Surgeon: Rafi Trinidad MD;  Location: [facility] OR     placement of central line         Anesthesia Evaluation     . Pt has had prior anesthetic. Type: MAC and General    No history of anesthetic complications          ROS/MED HX    ENT/Pulmonary:     (+)MERRY risk factors hypertension, obese, allergic rhinitis, tobacco use, Current use 1/2 packs/day  , . Other pulmonary disease Hx ARDs.    Neurologic:     (+)CVA    "  Cardiovascular:     (+) Dyslipidemia, hypertension-range: took scheduled metoprolol today, -CAD, -past MI,CABG-date: 9/2018 4V, . : . CHF . . :. dysrhythmias (resolved; pt states \"it went away after 2 or 3 months\" after CABG) a-fib, .       METS/Exercise Tolerance:  >4 METS   Hematologic:  - neg hematologic  ROS       Musculoskeletal:  - neg musculoskeletal ROS       GI/Hepatic:     (+) GERD Asymptomatic on medication,       Renal/Genitourinary:  - ROS Renal section negative       Endo:     (+) Obesity, .      Psychiatric:     (+) psychiatric history anxiety, schizophrenia and depression      Infectious Disease:   (+) MRSA,      (-) Recent Fever   Malignancy:   (+) Malignancy History of Other  Other CA Endometrial  Active status post Chemo         Other:    (+) H/O Chronic Pain,                        Physical Exam  Normal systems: cardiovascular and dental    Airway   Mallampati: II  TM distance: >3 FB  Neck ROM: full    Dental     Cardiovascular   Rhythm and rate: regular and normal      Pulmonary (+) wheezes       Other findings: Slight expiratory wheeze        Lab Results   Component Value Date    WBC 9.2 08/13/2019    HGB 14.6 08/13/2019    HCT 42.6 08/13/2019     08/13/2019     08/13/2019    POTASSIUM 3.3 (L) 08/13/2019    CHLORIDE 103 08/13/2019    CO2 29 08/13/2019    BUN 22 08/13/2019    CR 0.87 08/13/2019    GLC 96 08/13/2019    CARINA 9.2 08/13/2019    MAG 2.1 04/12/2019    ALBUMIN 3.5 08/13/2019    PROTTOTAL 7.3 08/13/2019    ALT 19 08/13/2019    AST 14 08/13/2019    ALKPHOS 156 (H) 08/13/2019    BILITOTAL 0.2 08/13/2019    PTT 26 05/14/2018    INR 1.01 05/14/2018    TSH 1.39 09/01/2018    T4 0.58 (L) 11/13/2013       Preop Vitals  BP Readings from Last 3 Encounters:   09/25/20 (!) 144/78   09/01/20 (!) 213/110   07/12/20 (!) 184/108    Pulse Readings from Last 3 Encounters:   09/25/20 62   09/01/20 68   07/12/20 59      Resp Readings from Last 3 Encounters:   09/01/20 20   07/12/20 16 "   09/04/19 18    SpO2 Readings from Last 3 Encounters:   09/25/20 96%   09/01/20 96%   07/12/20 94%      Temp Readings from Last 1 Encounters:   09/25/20 97.4  F (36.3  C) (Tympanic)    Ht Readings from Last 1 Encounters:   09/01/20 1.524 m (5')      Wt Readings from Last 1 Encounters:   09/25/20 81.6 kg (180 lb)    Estimated body mass index is 35.15 kg/m  as calculated from the following:    Height as of 9/1/20: 1.524 m (5').    Weight as of 9/25/20: 81.6 kg (180 lb).       Anesthesia Plan      History & Physical Review  History and physical reviewed and following examination; no interval change.    ASA Status:  4 .    NPO Status:  > 8 hours    Plan for MAC with Other (pt requests no sedation) induction. Reason for MAC:  Deep or markedly invasive procedure (G8) and Procedure to face, neck, head or breast    Pt requires at least MAC due to ASA 4 status (see previous anesthesia records).  Pt requests no sedation, will monitor.  H&P update needed        Postoperative Care      Consents  Anesthetic plan, risks, benefits and alternatives discussed with:  Patient.  Use of blood products discussed: No .   .                 AUTUMN Faustin CRNA

## 2020-10-02 ENCOUNTER — OFFICE VISIT (OUTPATIENT)
Dept: FAMILY MEDICINE | Facility: OTHER | Age: 70
End: 2020-10-02
Attending: SURGERY
Payer: MEDICARE

## 2020-10-02 DIAGNOSIS — C54.1 ADENOCARCINOMA OF THE ENDOMETRIUM/UTERUS (H): ICD-10-CM

## 2020-10-02 PROCEDURE — U0003 INFECTIOUS AGENT DETECTION BY NUCLEIC ACID (DNA OR RNA); SEVERE ACUTE RESPIRATORY SYNDROME CORONAVIRUS 2 (SARS-COV-2) (CORONAVIRUS DISEASE [COVID-19]), AMPLIFIED PROBE TECHNIQUE, MAKING USE OF HIGH THROUGHPUT TECHNOLOGIES AS DESCRIBED BY CMS-2020-01-R: HCPCS | Mod: ZL | Performed by: SURGERY

## 2020-10-03 LAB
SARS-COV-2 RNA SPEC QL NAA+PROBE: NOT DETECTED
SPECIMEN SOURCE: NORMAL

## 2020-10-06 ENCOUNTER — HOSPITAL ENCOUNTER (OUTPATIENT)
Facility: HOSPITAL | Age: 70
Discharge: LEFT BEFORE DISCHARGE | End: 2020-10-06
Attending: SURGERY | Admitting: SURGERY
Payer: MEDICARE

## 2020-10-06 ENCOUNTER — ANESTHESIA (OUTPATIENT)
Dept: SURGERY | Facility: HOSPITAL | Age: 70
End: 2020-10-06
Payer: MEDICARE

## 2020-10-06 VITALS
DIASTOLIC BLOOD PRESSURE: 105 MMHG | HEIGHT: 59 IN | WEIGHT: 202 LBS | SYSTOLIC BLOOD PRESSURE: 187 MMHG | HEART RATE: 81 BPM | BODY MASS INDEX: 40.72 KG/M2 | RESPIRATION RATE: 16 BRPM | OXYGEN SATURATION: 95 % | TEMPERATURE: 95 F

## 2020-10-06 DIAGNOSIS — Z92.21 STATUS POST CHEMOTHERAPY: ICD-10-CM

## 2020-10-06 PROCEDURE — 370N000002 HC ANESTHESIA TECHNICAL FEE, EACH ADDTL 15 MIN: Performed by: SURGERY

## 2020-10-06 PROCEDURE — 258N000003 HC RX IP 258 OP 636: Performed by: NURSE ANESTHETIST, CERTIFIED REGISTERED

## 2020-10-06 PROCEDURE — 370N000001 HC ANESTHESIA TECHNICAL FEE, 1ST 30 MIN: Performed by: SURGERY

## 2020-10-06 PROCEDURE — 360N000014 HC SURGERY LEVEL 2 1ST 30 MIN: Performed by: SURGERY

## 2020-10-06 PROCEDURE — 999N000136 HC STATISTIC PRE PROC ASSESS II: Performed by: SURGERY

## 2020-10-06 PROCEDURE — 250N000011 HC RX IP 250 OP 636

## 2020-10-06 PROCEDURE — 250N000009 HC RX 250: Performed by: SURGERY

## 2020-10-06 PROCEDURE — 272N000001 HC OR GENERAL SUPPLY STERILE: Performed by: SURGERY

## 2020-10-06 PROCEDURE — 761N000007 HC RECOVERY PHASE 2 EACH 15 MINS: Performed by: SURGERY

## 2020-10-06 PROCEDURE — 36590 REMOVAL TUNNELED CV CATH: CPT | Performed by: SURGERY

## 2020-10-06 PROCEDURE — 36590 REMOVAL TUNNELED CV CATH: CPT | Performed by: NURSE ANESTHETIST, CERTIFIED REGISTERED

## 2020-10-06 PROCEDURE — 250N000009 HC RX 250: Performed by: NURSE ANESTHETIST, CERTIFIED REGISTERED

## 2020-10-06 PROCEDURE — 250N000011 HC RX IP 250 OP 636: Performed by: NURSE ANESTHETIST, CERTIFIED REGISTERED

## 2020-10-06 PROCEDURE — 360N000015 HC SURGERY LEVEL 2 EA 15 ADDTL MIN: Performed by: SURGERY

## 2020-10-06 PROCEDURE — 250N000011 HC RX IP 250 OP 636: Performed by: SURGERY

## 2020-10-06 RX ORDER — LIDOCAINE 40 MG/G
CREAM TOPICAL
Status: DISCONTINUED | OUTPATIENT
Start: 2020-10-06 | End: 2020-10-06 | Stop reason: HOSPADM

## 2020-10-06 RX ORDER — SODIUM CHLORIDE, SODIUM LACTATE, POTASSIUM CHLORIDE, CALCIUM CHLORIDE 600; 310; 30; 20 MG/100ML; MG/100ML; MG/100ML; MG/100ML
INJECTION, SOLUTION INTRAVENOUS CONTINUOUS
Status: DISCONTINUED | OUTPATIENT
Start: 2020-10-06 | End: 2020-10-06 | Stop reason: HOSPADM

## 2020-10-06 RX ORDER — HYDRALAZINE HYDROCHLORIDE 20 MG/ML
INJECTION INTRAMUSCULAR; INTRAVENOUS
Status: COMPLETED
Start: 2020-10-06 | End: 2020-10-06

## 2020-10-06 RX ORDER — FENTANYL CITRATE 50 UG/ML
INJECTION, SOLUTION INTRAMUSCULAR; INTRAVENOUS PRN
Status: DISCONTINUED | OUTPATIENT
Start: 2020-10-06 | End: 2020-10-06

## 2020-10-06 RX ORDER — METOPROLOL TARTRATE 1 MG/ML
1-2 INJECTION, SOLUTION INTRAVENOUS EVERY 5 MIN PRN
Status: DISCONTINUED | OUTPATIENT
Start: 2020-10-06 | End: 2020-10-06 | Stop reason: HOSPADM

## 2020-10-06 RX ORDER — MEPERIDINE HYDROCHLORIDE 25 MG/ML
12.5 INJECTION INTRAMUSCULAR; INTRAVENOUS; SUBCUTANEOUS
Status: DISCONTINUED | OUTPATIENT
Start: 2020-10-06 | End: 2020-10-06 | Stop reason: HOSPADM

## 2020-10-06 RX ORDER — PROPOFOL 10 MG/ML
INJECTION, EMULSION INTRAVENOUS PRN
Status: DISCONTINUED | OUTPATIENT
Start: 2020-10-06 | End: 2020-10-06

## 2020-10-06 RX ORDER — ONDANSETRON 2 MG/ML
4 INJECTION INTRAMUSCULAR; INTRAVENOUS EVERY 30 MIN PRN
Status: DISCONTINUED | OUTPATIENT
Start: 2020-10-06 | End: 2020-10-06 | Stop reason: HOSPADM

## 2020-10-06 RX ORDER — LIDOCAINE HYDROCHLORIDE 20 MG/ML
INJECTION, SOLUTION INFILTRATION; PERINEURAL PRN
Status: DISCONTINUED | OUTPATIENT
Start: 2020-10-06 | End: 2020-10-06

## 2020-10-06 RX ORDER — ONDANSETRON 4 MG/1
4 TABLET, ORALLY DISINTEGRATING ORAL EVERY 30 MIN PRN
Status: DISCONTINUED | OUTPATIENT
Start: 2020-10-06 | End: 2020-10-06 | Stop reason: HOSPADM

## 2020-10-06 RX ORDER — HYDRALAZINE HYDROCHLORIDE 20 MG/ML
2.5-5 INJECTION INTRAMUSCULAR; INTRAVENOUS EVERY 10 MIN PRN
Status: DISCONTINUED | OUTPATIENT
Start: 2020-10-06 | End: 2020-10-06 | Stop reason: HOSPADM

## 2020-10-06 RX ORDER — NALOXONE HYDROCHLORIDE 0.4 MG/ML
.1-.4 INJECTION, SOLUTION INTRAMUSCULAR; INTRAVENOUS; SUBCUTANEOUS
Status: DISCONTINUED | OUTPATIENT
Start: 2020-10-06 | End: 2020-10-06 | Stop reason: HOSPADM

## 2020-10-06 RX ORDER — ONDANSETRON 2 MG/ML
INJECTION INTRAMUSCULAR; INTRAVENOUS PRN
Status: DISCONTINUED | OUTPATIENT
Start: 2020-10-06 | End: 2020-10-06

## 2020-10-06 RX ADMIN — HYDRALAZINE HYDROCHLORIDE 5 MG: 20 INJECTION INTRAMUSCULAR; INTRAVENOUS at 12:54

## 2020-10-06 RX ADMIN — FENTANYL CITRATE 25 MCG: 50 INJECTION, SOLUTION INTRAMUSCULAR; INTRAVENOUS at 11:04

## 2020-10-06 RX ADMIN — PROPOFOL 30 MG: 10 INJECTION, EMULSION INTRAVENOUS at 11:04

## 2020-10-06 RX ADMIN — HYDRALAZINE HYDROCHLORIDE 5 MG: 20 INJECTION INTRAMUSCULAR; INTRAVENOUS at 13:14

## 2020-10-06 RX ADMIN — FENTANYL CITRATE 50 MCG: 50 INJECTION, SOLUTION INTRAMUSCULAR; INTRAVENOUS at 11:10

## 2020-10-06 RX ADMIN — PROPOFOL 20 MG: 10 INJECTION, EMULSION INTRAVENOUS at 11:06

## 2020-10-06 RX ADMIN — PROPOFOL 20 MG: 10 INJECTION, EMULSION INTRAVENOUS at 11:17

## 2020-10-06 RX ADMIN — PROPOFOL 50 MG: 10 INJECTION, EMULSION INTRAVENOUS at 11:13

## 2020-10-06 RX ADMIN — HYDRALAZINE HYDROCHLORIDE 5 MG: 20 INJECTION INTRAMUSCULAR; INTRAVENOUS at 13:04

## 2020-10-06 RX ADMIN — PROPOFOL 30 MG: 10 INJECTION, EMULSION INTRAVENOUS at 11:11

## 2020-10-06 RX ADMIN — FENTANYL CITRATE 25 MCG: 50 INJECTION, SOLUTION INTRAMUSCULAR; INTRAVENOUS at 11:06

## 2020-10-06 RX ADMIN — SODIUM CHLORIDE, POTASSIUM CHLORIDE, SODIUM LACTATE AND CALCIUM CHLORIDE: 600; 310; 30; 20 INJECTION, SOLUTION INTRAVENOUS at 09:28

## 2020-10-06 RX ADMIN — PROPOFOL 50 MG: 10 INJECTION, EMULSION INTRAVENOUS at 11:15

## 2020-10-06 RX ADMIN — LIDOCAINE HYDROCHLORIDE 40 MG: 20 INJECTION, SOLUTION INFILTRATION; PERINEURAL at 11:04

## 2020-10-06 RX ADMIN — ONDANSETRON 4 MG: 2 INJECTION INTRAMUSCULAR; INTRAVENOUS at 11:14

## 2020-10-06 RX ADMIN — FENTANYL CITRATE 50 MCG: 50 INJECTION, SOLUTION INTRAMUSCULAR; INTRAVENOUS at 11:13

## 2020-10-06 ASSESSMENT — MIFFLIN-ST. JEOR: SCORE: 1356.9

## 2020-10-06 ASSESSMENT — LIFESTYLE VARIABLES: TOBACCO_USE: 1

## 2020-10-06 ASSESSMENT — ENCOUNTER SYMPTOMS: DYSRHYTHMIAS: 1

## 2020-10-06 NOTE — ANESTHESIA CARE TRANSFER NOTE
Patient: Heather Rosas    Procedure(s):  port a cath removal    Diagnosis: Status post chemotherapy [Z92.21]  Diagnosis Additional Information: No value filed.    Anesthesia Type:   MAC     Note:  Airway :Nasal Cannula  Patient transferred to:Phase II  Handoff Report: Identifed the Patient, Identified the Reponsible Provider, Reviewed the pertinent medical history, Discussed the surgical course, Reviewed Intra-OP anesthesia mangement and issues during anesthesia, Set expectations for post-procedure period and Allowed opportunity for questions and acknowledgement of understanding      Vitals: (Last set prior to Anesthesia Care Transfer)    CRNA VITALS  10/6/2020 1056 - 10/6/2020 1127      10/6/2020             Resp Rate (observed):  (!) 1    Resp Rate (set):  8                Electronically Signed By: AUTUMN Bowser CRNA  October 6, 2020  11:27 AM

## 2020-10-06 NOTE — DISCHARGE INSTRUCTIONS
Post-Anesthesia Patient Instructions    IMMEDIATELY FOLLOWING SURGERY:  Do not drive or operate machinery for the first twenty four hours after surgery.  Do not make any important decisions for twenty four hours after surgery or while taking narcotic pain medications or sedatives.  If you develop intractable nausea and vomiting or a severe headache please notify your doctor immediately.    FOLLOW-UP:  Please make an appointment with your surgeon as instructed. You do not need to follow up with anesthesia unless specifically instructed to do so.    WOUND CARE INSTRUCTIONS (if applicable):  Keep a dry clean dressing on the anesthesia/puncture wound site if there is drainage.  Once the wound has quit draining you may leave it open to air.  Generally you should leave the bandage intact for twenty four hours unless there is drainage.  If the epidural site drains for more than 36-48 hours please call the anesthesia department.    PER DR DAVIS; YOU HAVE SKIN GLUE, YOU MAY SHOWER IN 24 HOURS, YOU DO NOT NEED TO COVER INCISIONAL SITE.     QUESTIONS?:  Please feel free to call your physician or the hospital  if you have any questions, and they will be happy to assist you.

## 2020-10-06 NOTE — OP NOTE
[facility]   Operative Note    Pre-operative diagnosis: Status post chemotherapy [Z92.21]   Post-operative diagnosis Same    Procedure: Procedure(s):  port a cath removal   Surgeon(s): Surgeon(s) and Role:     * Rafi Trinidad MD - Primary     * Dahiana Cochran APRN CNS - Assisting   Estimated blood loss: * No values recorded between 10/6/2020 11:11 AM and 10/6/2020 11:26 AM *    Specimens: * No specimens in log *   Findings: Port removed intact      Description of procedure:   With the patient in the supine position on the operating table.  The above mentioned side was prepped and draped sterilely.  Local anesthesia was obtained with infiltration of 0.25% marcaine with 1% lidocaine as well as a MAC.   The prior insertion scar was excised and the incision was taken through full thickness skin and subcutaneous tissue with hemostasis obtained with electrocautery.   The Port-A-Cath catheter, reservoir were grasped and dissected free from the subcutaneous tissue with electrocautery.  The port and catheter were retrieved in total. The tip of the catheter was intact.  A U stitch was placed in the prior tract.   The subcutaneous tissues were closed with subdermal 4-0 monocryl reinforced with Dermabond. She was returned to day surgery in good condition without suggestion of complication upon confirmation that the final sponge needle and instrument counts were correct.  Blood loss was 2cc.       MD Angie Alvarez actively first assisted during this operation providing necessary retraction and exposure as well as maintaining hemostasis and a clear operative field. This was helpful in allowing the operation to proceed in a safe and expeditious manner. She was present for the entire duration of the operation.     Rafi Trinidad MD

## 2020-10-06 NOTE — OR NURSING
"PT REFUSING FURTHER BP TREATMENT.  PT STATES \"TAKE MY IV OUT IM LEAVING RIGHT NOW.\"  PIYUSH CRNA HERE TO TALK TO PT, PT REFUSES FURTHER TREATMENT, WANTS TO LEAVE AGAINST MEDICAL ADVICE.  PT RAISING VOICE AT STAFF.  TALKED WITH PT REGARDING /105, PT ABLE TO DISCHARGE WITH SYSTOLIC <180 & DIASTOLIC <100, PT INFORMED OF THIS AND WHAT HER BP CURRENTLY IS.  PT STATES \"I'M LEAVING GET ME OUT OF HERE.\" IV DC'D. PT ASKED IF SHE WANTS TO TALK TO A SUPERVISOR, REPORTS NO SHE JUST WANTS TO GO.\"  Procedure care marked complete @ 1330 because pt left AMA, form signed.   "

## 2020-10-06 NOTE — ANESTHESIA POSTPROCEDURE EVALUATION
Patient: Heather Rosas    Procedure(s):  port a cath removal    Diagnosis:Status post chemotherapy [Z92.21]  Diagnosis Additional Information: No value filed.    Anesthesia Type:  MAC    Note:  Anesthesia Post Evaluation    Patient location during evaluation: Phase 2  Patient participation: Able to fully participate in evaluation  Level of consciousness: agitated  Pain management: adequate  Airway patency: patent  Cardiovascular status: /101.  Respiratory status: acceptable  Hydration status: acceptable  PONV: none     Anesthetic complications: None    Comments: Patient decided to leave against medical advice.  BP was being treated; last recorded BP was 187/101.  She was under the impression that the BP would continue to decrease as long as she left the building.  She was irritated that the treatment was taking as long as it had.  It was explained to her on several occassions that leaving against medical advice may incur financial charges that otherwise would not occur.  She verbalized understanding of the physical and financial risk involved.  Her IV was removed by 1325 and she got herself dressed.  Will monitor as able.        Last vitals:  Vitals:    10/06/20 1311 10/06/20 1316 10/06/20 1321   BP: (!) 191/107 (!) 191/107 (!) 187/105   Pulse: 88 84 81   Resp: 16 16 16   Temp:      SpO2: 97% 97% 95%         Electronically Signed By: AUTUMN Bowser CRNA  October 6, 2020  1:26 PM

## 2020-10-06 NOTE — H&P
[facility] Surgery Consultation    CC:  Port removal     HPI:  This 69 year old year old female is seen at the request of Dr. Degroot  for evaluation of port removal. She has completed her chemotherapy. She is feeling well today.     Past Medical History:   Diagnosis Date     Allergic rhinitis 01/01/2011     Anxiety 01/01/2011     Anxiety state, unspecified     Anxiety state     Dyslipidemia 11/26/2003     fibromyalgia 06/14/2004     GERD, Esophageal reflux 01/01/2011     HTN (hypertension)     Essential hypertension     Major depression, recurrent (H) 1/1/2011     Nonorganic sleep disorder, unspecified     Non-org. 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, ARDS, renal failure       Past Surgical History:   Procedure Laterality Date     ANGIOGRAM  02/2005    Normal      BIOPSY BREAST  1984    LT, normal     BYPASS GRAFT ARTERY CORONARY  09/10/2018     CARPAL TUNNEL RELEASE RT/LT  2005    RT, carpal tunnel     COLONOSCOPY  2011    Repeat in ten years      COLONOSCOPY  1996     COLONOSCOPY  2004     DILATION AND CURETTAGE, HYSTEROSCOPY, ABLATE ENDOMETRIUM, COMBINED N/A 2/19/2018    Procedure: COMBINED DILATION AND CURETTAGE, HYSTEROSCOPY, ABLATE ENDOMETRIUM;  HYSTEROSCOPY DILATION AND CURETTAGE, ENDOMETRIAL ABLATION;  Surgeon: Jose Nettles MD;  Location: [facility] OR     HYSTERECTOMY TOTAL ABD, NICK SALPINGO-OOPHORECTOMY, NODE DISSECTION, TUMOR DEBULKING, COMBINED  10/30/2018     INSERT PORT VASCULAR ACCESS N/A 12/11/2018    Procedure: PORT-A-CATH PLACEMENT;  Surgeon: Rafi Trinidad MD;  Location: [facility] OR     placement of central line  2005       Allergies   Allergen Reactions     Cats Other (See Comments)     Sneezing, runny nose     Codeine Sulfate Nausea and Vomiting and GI Disturbance     Fexofenadine Hcl      Allegra      Rosuvastatin Other (See Comments)     Dizziness - Crestor        Current Facility-Administered Medications  "  Medication     lactated ringers infusion     lidocaine (LMX4) kit     lidocaine 1 % 0.1-1 mL     PRE OP antibiotics NOT needed for this surgical procedure.     sodium chloride (PF) 0.9% PF flush 3 mL     sodium chloride (PF) 0.9% PF flush 3 mL       HABITS:    Social History     Tobacco Use     Smoking status: Current Some Day Smoker     Packs/day: 0.25     Types: Cigarettes     Start date: 1971     Last attempt to quit: 2013     Years since quittin.8     Smokeless tobacco: Never Used     Tobacco comment: pt declined, stated she would use, \"the patch\"   Substance Use Topics     Alcohol use: No     Comment: rare     Drug use: No       Family History   Problem Relation Age of Onset     C.A.D. Father 45        (cause of death)      Other - See Comments Father         rheumatic fever      Allergies Father      Cancer Sister 56        Esophageal to bone cancer     Gastrointestinal Disease Mother         GERD     Cancer Mother         pancreatic ca (cause of death) /liver ca     Breast Cancer Maternal Aunt      Breast Cancer Maternal Aunt      Colon Cancer Paternal Aunt         (cause of death)      Crohn's Disease Other      Depression Maternal Uncle      Depression Maternal Aunt      Diabetes Paternal Grandmother         type 2     Neurologic Disorder Brother         neuropathy     Cancer Brother 58        Tonsilular cancer/ lymph node in neck     Allergies Brother      Breast Cancer Cousin      Breast Cancer Cousin      Breast Cancer Cousin        REVIEW OF SYSTEMS:  Ten point review of systems negative except those mentioned in the HPI.     OBJECTIVE:    BP (!) 196/109 (Cuff Size: Adult Large)   Pulse 84   Temp (!) 95  F (35  C) (Tympanic)   Resp 18   Ht 1.499 m (4' 11\")   Wt 91.6 kg (202 lb)   SpO2 96%   BMI 40.80 kg/m      GENERAL: Generally appears well, in no distress with appropriate affect.  HEENT:   Sclerae anicteric - normocephalic atraumatic   Respiratory:  No acute distress, no " splinting, CTAB    Cardiovascular:  Regular Rate and Rhythm, 3/6 murmur    Abdomen: non-distended   :  deferred  Extremities:  Extremities normal. No deformities, edema, or skin discoloration.  Skin:  no suspicious lesions or rashes  Neurological: grossly intact  Psych:  Alert, oriented, affect appropriate with normal decision making ability.    IMPRESSION:    69 y.o. female no longer needs port. Will remove for her.     PLAN:    Port removal.     Rafi Trinidad MD,     10/6/2020  10:25 AM

## 2021-10-11 ENCOUNTER — HOSPITAL ENCOUNTER (EMERGENCY)
Facility: HOSPITAL | Age: 71
Discharge: ANOTHER HOSPITAL | End: 2021-10-12
Attending: INTERNAL MEDICINE | Admitting: NURSE PRACTITIONER
Payer: MEDICARE

## 2021-10-11 ENCOUNTER — APPOINTMENT (OUTPATIENT)
Dept: GENERAL RADIOLOGY | Facility: HOSPITAL | Age: 71
End: 2021-10-11
Attending: NURSE PRACTITIONER
Payer: MEDICARE

## 2021-10-11 DIAGNOSIS — I50.9 ACUTE EXACERBATION OF CHF (CONGESTIVE HEART FAILURE) (H): Primary | ICD-10-CM

## 2021-10-11 DIAGNOSIS — I48.92 ATRIAL FLUTTER WITH RAPID VENTRICULAR RESPONSE (H): ICD-10-CM

## 2021-10-11 LAB
ALBUMIN SERPL-MCNC: 3.1 G/DL (ref 3.4–5)
ALBUMIN UR-MCNC: NEGATIVE MG/DL
ALP SERPL-CCNC: 117 U/L (ref 40–150)
ALT SERPL W P-5'-P-CCNC: 48 U/L (ref 0–50)
ANION GAP SERPL CALCULATED.3IONS-SCNC: 5 MMOL/L (ref 3–14)
APPEARANCE UR: CLEAR
AST SERPL W P-5'-P-CCNC: 39 U/L (ref 0–45)
BACTERIA #/AREA URNS HPF: ABNORMAL /HPF
BASOPHILS # BLD AUTO: 0.1 10E3/UL (ref 0–0.2)
BASOPHILS NFR BLD AUTO: 1 %
BILIRUB SERPL-MCNC: 0.3 MG/DL (ref 0.2–1.3)
BILIRUB UR QL STRIP: NEGATIVE
BUN SERPL-MCNC: 11 MG/DL (ref 7–30)
CALCIUM SERPL-MCNC: 8.8 MG/DL (ref 8.5–10.1)
CHLORIDE BLD-SCNC: 109 MMOL/L (ref 94–109)
CO2 SERPL-SCNC: 26 MMOL/L (ref 20–32)
COLOR UR AUTO: ABNORMAL
CREAT SERPL-MCNC: 0.8 MG/DL (ref 0.52–1.04)
EOSINOPHIL # BLD AUTO: 0.3 10E3/UL (ref 0–0.7)
EOSINOPHIL NFR BLD AUTO: 2 %
ERYTHROCYTE [DISTWIDTH] IN BLOOD BY AUTOMATED COUNT: 13.3 % (ref 10–15)
GFR SERPL CREATININE-BSD FRML MDRD: 76 ML/MIN/1.73M2
GLUCOSE BLD-MCNC: 100 MG/DL (ref 70–99)
GLUCOSE UR STRIP-MCNC: NEGATIVE MG/DL
HCT VFR BLD AUTO: 42.3 % (ref 35–47)
HGB BLD-MCNC: 14.1 G/DL (ref 11.7–15.7)
HGB UR QL STRIP: NEGATIVE
IMM GRANULOCYTES # BLD: 0.1 10E3/UL
IMM GRANULOCYTES NFR BLD: 1 %
KETONES UR STRIP-MCNC: NEGATIVE MG/DL
LEUKOCYTE ESTERASE UR QL STRIP: NEGATIVE
LYMPHOCYTES # BLD AUTO: 1.5 10E3/UL (ref 0.8–5.3)
LYMPHOCYTES NFR BLD AUTO: 12 %
MCH RBC QN AUTO: 30.3 PG (ref 26.5–33)
MCHC RBC AUTO-ENTMCNC: 33.3 G/DL (ref 31.5–36.5)
MCV RBC AUTO: 91 FL (ref 78–100)
MONOCYTES # BLD AUTO: 0.9 10E3/UL (ref 0–1.3)
MONOCYTES NFR BLD AUTO: 7 %
MUCOUS THREADS #/AREA URNS LPF: PRESENT /LPF
NEUTROPHILS # BLD AUTO: 9.5 10E3/UL (ref 1.6–8.3)
NEUTROPHILS NFR BLD AUTO: 77 %
NITRATE UR QL: NEGATIVE
NRBC # BLD AUTO: 0 10E3/UL
NRBC BLD AUTO-RTO: 0 /100
NT-PROBNP SERPL-MCNC: 2021 PG/ML (ref 0–900)
PH UR STRIP: 7 [PH] (ref 4.7–8)
PLATELET # BLD AUTO: 215 10E3/UL (ref 150–450)
POTASSIUM BLD-SCNC: 4 MMOL/L (ref 3.4–5.3)
PROT SERPL-MCNC: 7 G/DL (ref 6.8–8.8)
RBC # BLD AUTO: 4.66 10E6/UL (ref 3.8–5.2)
RBC URINE: <1 /HPF
SARS-COV-2 RNA RESP QL NAA+PROBE: NEGATIVE
SODIUM SERPL-SCNC: 140 MMOL/L (ref 133–144)
SP GR UR STRIP: 1 (ref 1–1.03)
SQUAMOUS EPITHELIAL: 0 /HPF
TROPONIN I SERPL-MCNC: <0.015 UG/L (ref 0–0.04)
UROBILINOGEN UR STRIP-MCNC: NORMAL MG/DL
WBC # BLD AUTO: 12.3 10E3/UL (ref 4–11)
WBC URINE: <1 /HPF

## 2021-10-11 PROCEDURE — C9803 HOPD COVID-19 SPEC COLLECT: HCPCS

## 2021-10-11 PROCEDURE — 82040 ASSAY OF SERUM ALBUMIN: CPT | Performed by: NURSE PRACTITIONER

## 2021-10-11 PROCEDURE — 83880 ASSAY OF NATRIURETIC PEPTIDE: CPT | Performed by: NURSE PRACTITIONER

## 2021-10-11 PROCEDURE — 84484 ASSAY OF TROPONIN QUANT: CPT | Performed by: NURSE PRACTITIONER

## 2021-10-11 PROCEDURE — 96376 TX/PRO/DX INJ SAME DRUG ADON: CPT

## 2021-10-11 PROCEDURE — 99205 OFFICE O/P NEW HI 60 MIN: CPT | Performed by: INTERNAL MEDICINE

## 2021-10-11 PROCEDURE — 71045 X-RAY EXAM CHEST 1 VIEW: CPT

## 2021-10-11 PROCEDURE — 81001 URINALYSIS AUTO W/SCOPE: CPT | Performed by: NURSE PRACTITIONER

## 2021-10-11 PROCEDURE — 94640 AIRWAY INHALATION TREATMENT: CPT

## 2021-10-11 PROCEDURE — 93005 ELECTROCARDIOGRAM TRACING: CPT

## 2021-10-11 PROCEDURE — 96375 TX/PRO/DX INJ NEW DRUG ADDON: CPT

## 2021-10-11 PROCEDURE — 99284 EMERGENCY DEPT VISIT MOD MDM: CPT | Performed by: NURSE PRACTITIONER

## 2021-10-11 PROCEDURE — 250N000009 HC RX 250: Performed by: NURSE PRACTITIONER

## 2021-10-11 PROCEDURE — 999N000157 HC STATISTIC RCP TIME EA 10 MIN

## 2021-10-11 PROCEDURE — 85025 COMPLETE CBC W/AUTO DIFF WBC: CPT | Performed by: NURSE PRACTITIONER

## 2021-10-11 PROCEDURE — 36415 COLL VENOUS BLD VENIPUNCTURE: CPT | Performed by: NURSE PRACTITIONER

## 2021-10-11 PROCEDURE — 93010 ELECTROCARDIOGRAM REPORT: CPT | Mod: 76 | Performed by: INTERNAL MEDICINE

## 2021-10-11 PROCEDURE — U0005 INFEC AGEN DETEC AMPLI PROBE: HCPCS | Performed by: NURSE PRACTITIONER

## 2021-10-11 PROCEDURE — 250N000011 HC RX IP 250 OP 636: Performed by: NURSE PRACTITIONER

## 2021-10-11 PROCEDURE — 99285 EMERGENCY DEPT VISIT HI MDM: CPT | Mod: 25

## 2021-10-11 RX ORDER — IPRATROPIUM BROMIDE AND ALBUTEROL SULFATE 2.5; .5 MG/3ML; MG/3ML
3 SOLUTION RESPIRATORY (INHALATION) ONCE
Status: COMPLETED | OUTPATIENT
Start: 2021-10-11 | End: 2021-10-11

## 2021-10-11 RX ORDER — DILTIAZEM HYDROCHLORIDE 5 MG/ML
25 INJECTION INTRAVENOUS ONCE
Status: COMPLETED | OUTPATIENT
Start: 2021-10-11 | End: 2021-10-11

## 2021-10-11 RX ORDER — METHYLPREDNISOLONE SODIUM SUCCINATE 125 MG/2ML
60 INJECTION, POWDER, LYOPHILIZED, FOR SOLUTION INTRAMUSCULAR; INTRAVENOUS ONCE
Status: COMPLETED | OUTPATIENT
Start: 2021-10-11 | End: 2021-10-11

## 2021-10-11 RX ORDER — DIPHENHYDRAMINE HYDROCHLORIDE 50 MG/ML
25 INJECTION INTRAMUSCULAR; INTRAVENOUS ONCE
Status: COMPLETED | OUTPATIENT
Start: 2021-10-11 | End: 2021-10-11

## 2021-10-11 RX ORDER — FUROSEMIDE 10 MG/ML
60 INJECTION INTRAMUSCULAR; INTRAVENOUS ONCE
Status: COMPLETED | OUTPATIENT
Start: 2021-10-11 | End: 2021-10-11

## 2021-10-11 RX ADMIN — FUROSEMIDE 60 MG: 10 INJECTION, SOLUTION INTRAMUSCULAR; INTRAVENOUS at 22:03

## 2021-10-11 RX ADMIN — IPRATROPIUM BROMIDE AND ALBUTEROL SULFATE 3 ML: .5; 3 SOLUTION RESPIRATORY (INHALATION) at 20:50

## 2021-10-11 RX ADMIN — DILTIAZEM HYDROCHLORIDE 25 MG: 5 INJECTION, SOLUTION INTRAVENOUS at 22:54

## 2021-10-11 RX ADMIN — METHYLPREDNISOLONE SODIUM SUCCINATE 62.5 MG: 125 INJECTION, POWDER, FOR SOLUTION INTRAMUSCULAR; INTRAVENOUS at 20:48

## 2021-10-11 RX ADMIN — DIPHENHYDRAMINE HYDROCHLORIDE 25 MG: 50 INJECTION, SOLUTION INTRAMUSCULAR; INTRAVENOUS at 20:48

## 2021-10-11 ASSESSMENT — ENCOUNTER SYMPTOMS
ABDOMINAL PAIN: 0
HEADACHES: 0
MYALGIAS: 0
DIZZINESS: 0
SHORTNESS OF BREATH: 1
DYSURIA: 0
RHINORRHEA: 0
COUGH: 0
FREQUENCY: 0
SORE THROAT: 0
FEVER: 0
PALPITATIONS: 0
ARTHRALGIAS: 0
NAUSEA: 0
DIARRHEA: 0
VOMITING: 0
DIFFICULTY URINATING: 0
LIGHT-HEADEDNESS: 0
CHILLS: 0

## 2021-10-12 VITALS
HEART RATE: 130 BPM | TEMPERATURE: 98.9 F | BODY MASS INDEX: 41.86 KG/M2 | SYSTOLIC BLOOD PRESSURE: 138 MMHG | WEIGHT: 207.23 LBS | OXYGEN SATURATION: 93 % | RESPIRATION RATE: 24 BRPM | DIASTOLIC BLOOD PRESSURE: 113 MMHG

## 2021-10-12 PROBLEM — I50.31 ACUTE DIASTOLIC CONGESTIVE HEART FAILURE (H): Status: ACTIVE | Noted: 2021-10-12

## 2021-10-12 PROBLEM — I48.92 ATRIAL FLUTTER WITH RAPID VENTRICULAR RESPONSE (H): Status: ACTIVE | Noted: 2021-10-12

## 2021-10-12 LAB — INR (EXTERNAL): 1 (ref 2–3)

## 2021-10-12 PROCEDURE — 250N000013 HC RX MED GY IP 250 OP 250 PS 637: Performed by: INTERNAL MEDICINE

## 2021-10-12 PROCEDURE — 96376 TX/PRO/DX INJ SAME DRUG ADON: CPT

## 2021-10-12 PROCEDURE — 250N000011 HC RX IP 250 OP 636

## 2021-10-12 PROCEDURE — 96372 THER/PROPH/DIAG INJ SC/IM: CPT | Performed by: INTERNAL MEDICINE

## 2021-10-12 PROCEDURE — 250N000009 HC RX 250

## 2021-10-12 PROCEDURE — 250N000011 HC RX IP 250 OP 636: Performed by: INTERNAL MEDICINE

## 2021-10-12 PROCEDURE — 96365 THER/PROPH/DIAG IV INF INIT: CPT | Mod: XU

## 2021-10-12 PROCEDURE — 250N000009 HC RX 250: Performed by: INTERNAL MEDICINE

## 2021-10-12 RX ORDER — NICOTINE POLACRILEX 4 MG
15-30 LOZENGE BUCCAL
Status: CANCELLED | OUTPATIENT
Start: 2021-10-12

## 2021-10-12 RX ORDER — DILTIAZEM HYDROCHLORIDE 30 MG/1
60 TABLET, FILM COATED ORAL EVERY 6 HOURS SCHEDULED
Status: DISCONTINUED | OUTPATIENT
Start: 2021-10-12 | End: 2021-10-12 | Stop reason: HOSPADM

## 2021-10-12 RX ORDER — FUROSEMIDE 10 MG/ML
60 INJECTION INTRAMUSCULAR; INTRAVENOUS EVERY 8 HOURS
Status: CANCELLED | OUTPATIENT
Start: 2021-10-12

## 2021-10-12 RX ORDER — DILTIAZEM HCL IN NACL,ISO-OSM 125 MG/125
5-20 PLASTIC BAG, INJECTION (ML) INTRAVENOUS CONTINUOUS
Status: DISCONTINUED | OUTPATIENT
Start: 2021-10-12 | End: 2021-10-12 | Stop reason: HOSPADM

## 2021-10-12 RX ORDER — DILTIAZEM HYDROCHLORIDE 5 MG/ML
15 INJECTION INTRAVENOUS ONCE
Status: COMPLETED | OUTPATIENT
Start: 2021-10-12 | End: 2021-10-12

## 2021-10-12 RX ORDER — DEXTROSE MONOHYDRATE 25 G/50ML
25-50 INJECTION, SOLUTION INTRAVENOUS
Status: CANCELLED | OUTPATIENT
Start: 2021-10-12

## 2021-10-12 RX ORDER — DILTIAZEM HYDROCHLORIDE 5 MG/ML
INJECTION INTRAVENOUS
Status: COMPLETED
Start: 2021-10-12 | End: 2021-10-12

## 2021-10-12 RX ORDER — ONDANSETRON 2 MG/ML
4 INJECTION INTRAMUSCULAR; INTRAVENOUS EVERY 6 HOURS PRN
Status: CANCELLED | OUTPATIENT
Start: 2021-10-12

## 2021-10-12 RX ORDER — ONDANSETRON 4 MG/1
4 TABLET, ORALLY DISINTEGRATING ORAL EVERY 6 HOURS PRN
Status: CANCELLED | OUTPATIENT
Start: 2021-10-12

## 2021-10-12 RX ADMIN — ENOXAPARIN SODIUM 40 MG: 80 INJECTION SUBCUTANEOUS at 00:27

## 2021-10-12 RX ADMIN — Medication 5 MG/HR: at 02:19

## 2021-10-12 RX ADMIN — DILTIAZEM HYDROCHLORIDE 15 MG: 5 INJECTION, SOLUTION INTRAVENOUS at 00:23

## 2021-10-12 RX ADMIN — DILTIAZEM HYDROCHLORIDE 60 MG: 30 TABLET, FILM COATED ORAL at 00:54

## 2021-10-12 RX ADMIN — ENOXAPARIN SODIUM 40 MG: 80 INJECTION SUBCUTANEOUS at 00:58

## 2021-10-12 RX ADMIN — DILTIAZEM HYDROCHLORIDE 15 MG: 5 INJECTION, SOLUTION INTRAVENOUS at 00:27

## 2021-10-12 NOTE — PROGRESS NOTES
Pt continued to have Aflutter RVR, Dr Porter recommended transfer to center to provide Cardizem drip , we did not ICU bed for cardiac monitoring   I spoke to Dr Guerrero in [facility], accepted for transfer.

## 2021-10-12 NOTE — ED NOTES
Went in to talk to patient again and recheck vital signs and she states that she wants to go home. States she is feeling fine. Discussed with her about heart rate and she states that she will call ambulance if she starts to feel bad. Informed her that her heart rhythm was normal when she first came in and it changed when the Hospitalist came in to see her. She denied any new symptoms when heart rhythm changed to A-flutter. She again states that shew would like to go home as she has a dog to take care of.  Then a couple minutes later she states she can call her son to see if he would take care of her dog. She then again states she doesn't want to go. Hospitalist notified

## 2021-10-12 NOTE — ED NOTES
States that a couple hours ago she developed trouble breathing. States no activity when this started. Denies history of asthma or COPD. Denies chest pain. States that nebulizer treatment given by EMS has helped.

## 2021-10-12 NOTE — ED PROVIDER NOTES
"  History     Chief Complaint   Patient presents with     Shortness of Breath     started around 1400     HPI     Heather Rosas is a 70 year old female with history of CAD S/P NSTEMI with CABG x 4 vessel 9/10/18 Dr. Oconnell, atrial fibrillation, HTN, endometrial carcinoma s/p hysterectomy and chemotherapy treatment who presents ambulatory for evaluation of dyspnea that started around 1100 today. She went to her lower level where it is easier to breathe and took a nap. When she woke up she was feeling better and was going to go up and make dinner. She started feeling really short of breath and called 911. \"It feels like I'm allergic to something but I've never been allergic to my pets\" (has 1 dog and 2 birds). She is wondering if possible allergies- the last 2 days \"it felt a little stifling inside the house.\" She then goes out and about and feels better.     She is a current 0.75 ppd smoker. Denies ever being told she has COPD. Denies any daily inhaler use. EMS did give a nebulizer which has helped.     Allergies:  Allergies   Allergen Reactions     Cats Other (See Comments)     Sneezing, runny nose     Codeine Sulfate Nausea and Vomiting and GI Disturbance     Fexofenadine Hcl      Allegra      Rosuvastatin Other (See Comments)     Dizziness - Crestor        Problem List:    Patient Active Problem List    Diagnosis Date Noted     Coronary artery disease involving native coronary artery 10/12/2021     Priority: Medium     Acute diastolic congestive heart failure (H) 10/12/2021     Priority: Medium     Atrial flutter with rapid ventricular response (H) 10/12/2021     Priority: Medium     Acute exacerbation of CHF (congestive heart failure) (H) 10/11/2021     Priority: Medium     Status post chemotherapy 09/25/2020     Priority: Medium     Added automatically from request for surgery [id number]       Endometrial adenocarcinoma (H) 12/03/2018     Priority: Medium     Malignant neoplasm of uterus, unspecified site (H) " 12/03/2018     Priority: Medium     Atrial fibrillation (H) 11/26/2018     Priority: Medium     History of coronary artery disease 10/30/2018     Priority: Medium     Hyponatremia 10/30/2018     Priority: Medium     Moderate mitral regurgitation 10/30/2018     Priority: Medium     Adenocarcinoma of the endometrium/uterus (H) 10/09/2018     Priority: Medium     Discovered on 02/18 biopsy. Pt lost to f/u       Chronic diastolic congestive heart failure (H) 09/20/2018     Priority: Medium     S/P CABG x 4 09/10/2018     Priority: Medium     History of non-ST elevation myocardial infarction (NSTEMI) 09/06/2018     Priority: Medium     Endometrial cancer (H) 09/04/2018     Priority: Medium     Cerebrovascular accident (H) 05/14/2018     Priority: Medium     Hypertensive urgency 05/14/2018     Priority: Medium     CVA (cerebral vascular accident) (H) 05/14/2018     Priority: Medium     Chest pain 05/14/2018     Priority: Medium     Endometrial hyperplasia with atypia 05/09/2018     Priority: Medium     Metrorrhagia 02/18/2018     Priority: Medium     ACP (advance care planning) 05/20/2016     Priority: Medium     Advance Care Planning 5/20/2016: ACP Review of Chart / Resources Provided:  Reviewed chart for advance care plan.  Heather Rosas has no plan or code status on file. Discussed available resources and provided with information. Confirmed code status reflects current choices pending further ACP discussions.  Confirmed/documented legally designated decision makers.  Added by Margot Shannon             HTN (hypertension)      Priority: Medium     Major depressive disorder, recurrent episode, moderate (H) 01/01/2011     Priority: Medium     ANNA (generalized anxiety disorder) 01/01/2011     Priority: Medium     GERD (Gastroesophageal reflux disease) 01/01/2011     Priority: Medium     Obesity (H) 01/01/2011     Priority: Medium     Schizophrenia (H) 01/01/2011     Priority: Medium     Seasonal allergic rhinitis  01/01/2011     Priority: Medium     Fibromyalgia 06/14/2004     Priority: Medium     Dyslipidemia 11/26/2003     Priority: Medium        Past Medical History:    Past Medical History:   Diagnosis Date     Allergic rhinitis 01/01/2011     Anxiety 01/01/2011     Anxiety state, unspecified      Dyslipidemia 11/26/2003     fibromyalgia 06/14/2004     GERD, Esophageal reflux 01/01/2011     HTN (hypertension)      Major depression, recurrent (H) 1/1/2011     Nonorganic sleep disorder, unspecified      Obesity 01/01/2011     Schizophrenia (H) 01/01/2011     Toxic shock syndrome (H) 2006       Past Surgical History:    Past Surgical History:   Procedure Laterality Date     ANGIOGRAM  02/2005    Normal      BIOPSY BREAST  1984    LT, normal     BYPASS GRAFT ARTERY CORONARY  09/10/2018     CARPAL TUNNEL RELEASE RT/LT  2005    RT, carpal tunnel     COLONOSCOPY  2011    Repeat in ten years      COLONOSCOPY  1996     COLONOSCOPY  2004     DILATION AND CURETTAGE, HYSTEROSCOPY, ABLATE ENDOMETRIUM, COMBINED N/A 2/19/2018    Procedure: COMBINED DILATION AND CURETTAGE, HYSTEROSCOPY, ABLATE ENDOMETRIUM;  HYSTEROSCOPY DILATION AND CURETTAGE, ENDOMETRIAL ABLATION;  Surgeon: Jose Nettles MD;  Location: [facility] OR     HYSTERECTOMY TOTAL ABD, NICK SALPINGO-OOPHORECTOMY, NODE DISSECTION, TUMOR DEBULKING, COMBINED  10/30/2018     INSERT PORT VASCULAR ACCESS N/A 12/11/2018    Procedure: PORT-A-CATH PLACEMENT;  Surgeon: Rafi Trinidad MD;  Location: [facility] OR     placement of central line  2005     REMOVE PORT VASCULAR ACCESS N/A 10/6/2020    Procedure: port a cath removal;  Surgeon: Rafi Trinidad MD;  Location: [facility] OR       Family History:    Family History   Problem Relation Age of Onset     C.A.D. Father 45        (cause of death)      Other - See Comments Father         rheumatic fever      Allergies Father      Cancer Sister 56        Esophageal to bone cancer     Gastrointestinal Disease Mother         GERD     Cancer Mother          "pancreatic ca (cause of death) /liver ca     Breast Cancer Maternal Aunt      Breast Cancer Maternal Aunt      Colon Cancer Paternal Aunt         (cause of death)      Crohn's Disease Other      Depression Maternal Uncle      Depression Maternal Aunt      Diabetes Paternal Grandmother         type 2     Neurologic Disorder Brother         neuropathy     Cancer Brother 58        Tonsilular cancer/ lymph node in neck     Allergies Brother      Breast Cancer Cousin      Breast Cancer Cousin      Breast Cancer Cousin        Social History:  Marital Status:   [4]  Social History     Tobacco Use     Smoking status: Current Some Day Smoker     Packs/day: 0.25     Types: Cigarettes     Start date: 1971     Last attempt to quit: 2013     Years since quittin.8     Smokeless tobacco: Never Used     Tobacco comment: pt declined, stated she would use, \"the patch\"   Substance Use Topics     Alcohol use: No     Comment: rare     Drug use: No        Medications:    IBUPROFEN PO          Review of Systems   Constitutional: Negative for chills and fever.   HENT: Negative for congestion, ear pain, rhinorrhea and sore throat.    Respiratory: Positive for shortness of breath. Negative for cough.    Cardiovascular: Negative for chest pain, palpitations and leg swelling.   Gastrointestinal: Negative for abdominal pain, diarrhea, nausea and vomiting.   Genitourinary: Negative for difficulty urinating, dysuria, frequency and urgency.   Musculoskeletal: Negative for arthralgias and myalgias.   Skin: Negative for rash.   Neurological: Negative for dizziness, light-headedness and headaches.       Physical Exam   BP: (!) 202/120  Pulse: 99  Temp: 98.9  F (37.2  C)  Resp: 26  Weight: 94 kg (207 lb 3.7 oz)  SpO2: 92 %      Physical Exam  Constitutional:       General: She is in acute distress.      Appearance: She is not ill-appearing or toxic-appearing.   HENT:      Head: Normocephalic.      Right Ear: Tympanic membrane, " "ear canal and external ear normal.      Left Ear: Tympanic membrane, ear canal and external ear normal.      Nose: Nose normal.      Mouth/Throat:      Lips: Pink.      Mouth: Mucous membranes are moist.      Pharynx: Oropharynx is clear. Uvula midline.   Eyes:      General: Lids are normal.      Conjunctiva/sclera: Conjunctivae normal.   Cardiovascular:      Rate and Rhythm: Normal rate and regular rhythm.      Pulses:           Dorsalis pedis pulses are 2+ on the right side and 2+ on the left side.        Posterior tibial pulses are 2+ on the right side and 2+ on the left side.      Heart sounds: S1 normal and S2 normal. No murmur heard.     Pulmonary:      Effort: Tachypnea and respiratory distress present.      Breath sounds: Wheezing present.   Abdominal:      Palpations: Abdomen is soft.      Tenderness: There is no abdominal tenderness.   Musculoskeletal:      Right lower leg: No edema.      Left lower leg: No edema.      Comments: FROM of upper and lower extremities   Skin:     General: Skin is warm and dry.      Coloration: Skin is not pale.   Neurological:      Mental Status: She is alert and oriented to person, place, and time.      GCS: GCS eye subscore is 4. GCS verbal subscore is 5. GCS motor subscore is 6.      Gait: Gait is intact.   Psychiatric:         Mood and Affect: Mood is anxious.         Speech: Speech normal.         Behavior: Behavior is cooperative.         ED Course     ED Course as of Oct 12 0958   Mon Oct 11, 2021   2155 Re-evaluated. Dyspnea on exertion \"I can't take a deep breath.\" SAO2 decreased to 86% on room air, tachycardic 120s-130s. Improved with rest.   Feels most comfortable upright, tripod position versus laid back.   Wheezing resolved, bibasilar crackles. She does not feel she will be able to be discharged home and thinks she will need to call an ambulance if she were to return home due to dyspnea.  Plan to consult with hospitalist for admission  Leelee Castro CNP on " 10/11/2021 at 9:56 PM        2251 Atrial flutter, rate 140s, asymptomatic, hypertensive. Cardizem bolus ordered.   Leelee Castro CNP on 10/11/2021 at 10:52 PM        2256 Dr. Porter at bedside. Plan to try cardizem bolus to control rate. No ICU beds available at this time. If rate control- can admit to medical/telemetry bed. If needing cardizem drip will need ICU level of care.   Leelee Castro CNP on 10/11/2021 at 10:56 PM        2257      EKG Interpretation:     EKG Number: 2  Interpreted by Leelee Castro CNP  Symptoms at time of EKG: dyspnea   Rhythm: atrial flutter  Rate: tachycardia  Axis: NORMAL  Ectopy: none  Conduction: prolonged  ms, normal  ms, prolonged QTc 539 ms  ST Segments/ T Waves: No ST-T wave changes  Q Waves: none  Comparison to prior: atrial flutter is new from EKG #1    Clinical Impression: atrial flutter with 2:1 conduction    Leelee Castro CNP on 10/11/2021 at 11:00 PM            Procedures              EKG Interpretation: #1     Interpreted by Leelee Castro CNP  Symptoms at time of EKG: dyspnea   Rhythm: normal sinus   Rate: normal  Axis: normal  Ectopy: none  Conduction: normal KANDICE 150 ms, prolonged 126 ms, normal , normal QTc 492 ms  ST Segments/ T Waves: No ST-T wave changes  Q Waves: none  Comparison to prior: Unchanged    Clinical Impression: sinus rhythm with RBBB no ST-T wave concordance         Results for orders placed or performed during the hospital encounter of 10/11/21 (from the past 24 hour(s))   Symptomatic COVID-19 Virus (Coronavirus) by PCR Nasopharyngeal    Specimen: Nasopharyngeal; Swab   Result Value Ref Range    SARS CoV2 PCR Negative Negative    Narrative    Testing was performed using the Xpert Xpress SARS-CoV-2 Assay on the   [employer]. Additional information about   this Emergency Use Authorization (EUA) assay can be found via the Lab   Guide. This test should be ordered for the detection of SARS-CoV-2  in   individuals who meet SARS-CoV-2 clinical and/or epidemiological   criteria. Test performance is unknown in asymptomatic patients. This   test is for in vitro diagnostic use under the FDA EUA for   laboratories certified under CLIA to perform high complexity testing.   This test has not been FDA cleared or approved. A negative result   does not rule out the presence of PCR inhibitors in the specimen or   target RNA in concentration below the limit of detection for the   assay. The possibility of a false negative should be considered if   the patient's recent exposure or clinical presentation suggests   COVID-19. This test was validated by [facility]. This laboratory is certified under the Clinical Laboratory Improve  ment Amendments (CLIA) as qualified to perform high complexity testing.   CBC with platelets differential    Narrative    The following orders were created for panel order CBC with platelets differential.  Procedure                               Abnormality         Status                     ---------                               -----------         ------                     CBC with platelets and d...[[id number]]  Abnormal            Final result                 Please view results for these tests on the individual orders.   Comprehensive metabolic panel   Result Value Ref Range    Sodium 140 133 - 144 mmol/L    Potassium 4.0 3.4 - 5.3 mmol/L    Chloride 109 94 - 109 mmol/L    Carbon Dioxide (CO2) 26 20 - 32 mmol/L    Anion Gap 5 3 - 14 mmol/L    Urea Nitrogen 11 7 - 30 mg/dL    Creatinine 0.80 0.52 - 1.04 mg/dL    Calcium 8.8 8.5 - 10.1 mg/dL    Glucose 100 (H) 70 - 99 mg/dL    Alkaline Phosphatase 117 40 - 150 U/L    AST 39 0 - 45 U/L    ALT 48 0 - 50 U/L    Protein Total 7.0 6.8 - 8.8 g/dL    Albumin 3.1 (L) 3.4 - 5.0 g/dL    Bilirubin Total 0.3 0.2 - 1.3 mg/dL    GFR Estimate 76 >60 mL/min/1.73m2   CBC with platelets and differential   Result Value Ref Range     WBC Count 12.3 (H) 4.0 - 11.0 10e3/uL    RBC Count 4.66 3.80 - 5.20 10e6/uL    Hemoglobin 14.1 11.7 - 15.7 g/dL    Hematocrit 42.3 35.0 - 47.0 %    MCV 91 78 - 100 fL    MCH 30.3 26.5 - 33.0 pg    MCHC 33.3 31.5 - 36.5 g/dL    RDW 13.3 10.0 - 15.0 %    Platelet Count 215 150 - 450 10e3/uL    % Neutrophils 77 %    % Lymphocytes 12 %    % Monocytes 7 %    % Eosinophils 2 %    % Basophils 1 %    % Immature Granulocytes 1 %    NRBCs per 100 WBC 0 <1 /100    Absolute Neutrophils 9.5 (H) 1.6 - 8.3 10e3/uL    Absolute Lymphocytes 1.5 0.8 - 5.3 10e3/uL    Absolute Monocytes 0.9 0.0 - 1.3 10e3/uL    Absolute Eosinophils 0.3 0.0 - 0.7 10e3/uL    Absolute Basophils 0.1 0.0 - 0.2 10e3/uL    Absolute Immature Granulocytes 0.1 (H) <=0.0 10e3/uL    Absolute NRBCs 0.0 10e3/uL   Nt probnp inpatient (BNP)   Result Value Ref Range    N terminal Pro BNP Inpatient 2,021 (H) 0 - 900 pg/mL   Troponin I   Result Value Ref Range    Troponin I <0.015 0.000 - 0.045 ug/L   XR Chest Port 1 View    Narrative    Procedure:XR CHEST PORT 1 VIEW    Clinical history:Female, 70 years, Shortness of breath    Technique: Single view was obtained.    Comparison: 5/29/2019    Findings: The cardiac silhouette is enlarged and difficult to  appreciate secondary to opacification in both lungs. The pulmonary  vasculature is mildly distended and indistinct in certain areas.    The lungs demonstrate streaky perihilar opacities, partially  silhouetting the left heart border. Bony structures are unremarkable.      Impression    Impression:   Interstitial thickening and slight vascular distention suggesting CHF  with perihilar and infrahilar pulmonary edema. No distinct evidence of  pleural effusion.    STONE COOK MD         SYSTEM ID:  [id number]   UA with Microscopic reflex to Culture    Specimen: Urine, Clean Catch   Result Value Ref Range    Color Urine Straw Colorless, Straw, Light Yellow, Yellow    Appearance Urine Clear Clear    Glucose Urine Negative  Negative mg/dL    Bilirubin Urine Negative Negative    Ketones Urine Negative Negative mg/dL    Specific Gravity Urine 1.004 1.003 - 1.035    Blood Urine Negative Negative    pH Urine 7.0 4.7 - 8.0    Protein Albumin Urine Negative Negative mg/dL    Urobilinogen Urine Normal Normal, 2.0 mg/dL    Nitrite Urine Negative Negative    Leukocyte Esterase Urine Negative Negative    Bacteria Urine Few (A) None Seen /HPF    Mucus Urine Present (A) None Seen /LPF    RBC Urine <1 <=2 /HPF    WBC Urine <1 <=5 /HPF    Squamous Epithelials Urine 0 <=1 /HPF    Narrative    Urine Culture not indicated       Medications   ipratropium - albuterol 0.5 mg/2.5 mg/3 mL (DUONEB) neb solution 3 mL (3 mLs Nebulization Given 10/11/21 2050)   methylPREDNISolone sodium succinate (solu-MEDROL) injection 62.5 mg (62.5 mg Intravenous Given 10/11/21 2048)   diphenhydrAMINE (BENADRYL) injection 25 mg (25 mg Intravenous Given 10/11/21 2048)   furosemide (LASIX) injection 60 mg (60 mg Intravenous Given 10/11/21 2203)   diltiazem (CARDIZEM) injection 25 mg (25 mg Intravenous Given 10/11/21 2254)   diltiazem (CARDIZEM) injection 15 mg (15 mg Intravenous Given 10/12/21 0027)       Assessments & Plan (with Medical Decision Making)     I have reviewed the nursing notes.    I have reviewed the findings, diagnosis, plan and need for follow up with the patient.  (I50.9) Acute exacerbation of CHF (congestive heart failure) (H)  (primary encounter diagnosis)  (I48.92) Atrial flutter with rapid ventricular response (H)  Alert 70-year-old female with history of CABG x4 in 2018, atrial fibrillation postoperatively, hypertension presents on stretcher via EMS tachypneic and dyspneic for evaluation of dyspnea, dyspnea on exertion.  She endorses that this started around 11 AM this morning but wonders if it actually has been going on for couple days.  She denies any associated symptoms of recent illness including fever, chills, rhinorrhea, congestion, pharyngitis,  cough.  She also denies any associated chest pain, exertional chest pain, new lower extremity edema.  She is a three-quarter pack per day smoker.  She states that this is a reduction from what she used to smoke.  Initial presentation concerning for possible COPD exacerbation.  She is treated with IV Solu-Medrol and DuoNeb.  After wheezing resolved Rales auscultated to lower half of posterior lung fields.  Dyspnea on exertion with ambulation around ED room.  She does become hypoxic with oxygen saturation 86% on room air after exertion.  She also became tachycardic, bedside monitor showing a sinus tachycardia 120s to 130s.  Oxygen saturation improved to 90 to 93% on room air with rest and heart rate improved to 80s to 90s sinus rhythm on bedside monitor.  EKG shows a sinus rhythm with a right bundle branch block unchanged from prior EKGs.  Her troponin is negative.  Has mild leukocytosis, afebrile.  No acute anemia.  No acute electrolyte, renal, hepatic abnormalities.  X-ray does show cardiomegaly and concern for pulmonary edema.  Given her symptoms of dyspnea, dyspnea on exertion and exam findings of Rales throughout lower half of posterior lung fields, elevated BNP, chest x-ray showing concerns of cardiomegaly and pulmonary edema she is given IV Lasix 60 mg and plan for inpatient admission.  The hospitalist in the room evaluating patient she went into 2-1 a flutter.  She is completely asymptomatic with heart rate in the 130s to 150s.  She is given loading dose of Cardizem 25 mg.  Signout report given to EDMD Dr. Burnham at 2300. Leelee Castro CNP         Discharge Medication List as of 10/12/2021  4:20 AM          Final diagnoses:   Acute exacerbation of CHF (congestive heart failure) (H)   Atrial flutter with rapid ventricular response (H)       10/11/2021   [facility]     Leelee Castro CNP  10/12/21 1007

## 2021-10-12 NOTE — H&P
[facility]    History and Physical - Hospitalist Service       Date of Admission:  10/11/2021    Assessment & Plan      Heather Rosas is a 70 year old female admitted on 10/11/2021. She presents with acute onset of dyspnea. H/o CAD, S/p MI, s/p CABG, diastolic heart failure. Diagnosed with afltr with RVR. Given boluses of cardizem, PO and started on drip (difficult to control heart rate). No beds available. Will treat and look for transfer.    Hospital problem:   1. Aflutter with RVR. H/o afib post CABG. Given cardizem boluses 20 + 15 mg, po cardizem. Still with -140. Will start drip. No ICU beds. Will continue drip and look for transfer.   2. CAD, s/p CABG.   3. Acute on chronic diastolic heart failure. ED bedside showed ED preserved, apical hypokinesia. Unable to assess diastolic function. Will get Echo in AM  4. Hypertension. Will monitor. Treat with afltr with cardizem and monitor BP. No home meds listed to treat hypertension.     Diet:   cardiac, Na 2 gr  DVT Prophylaxis: Enoxaparin (Lovenox) subcutaneous 1 mg / kg q 12 hours (afltr and h/o CVA; she is high risk for recurrent CVA)  Martin Catheter: Not present  Central Lines: None  Code Status:   Full    Clinically Significant Risk Factors Present on Admission                   Disposition Plan   Expected discharge: in 2-3 days, when heart rhythm and rate controlled and she is appropriately diuresed.    The patient's care was discussed with the ED nursing Team.    Vicki Porter MD  [facility]  Securely message with the Vocera Web Console (learn more here)  Text page via [employer] Paging/Directory      ______________________________________________________________________    Chief Complaint   Shortness of breath since 11 AM of the day of admission.     History is obtained from the patient, electronic health record and emergency department physician    History of Present Illness   Heather Rosas is a 70 year old female who has PMH  diastolic heart failure, HTN, CAD, CVA, s/p CABG presents to ED with acute onset of dyspnea since 11 AM. Denies fever, chest pain.   On presentation to ED she was hypertensive, tachypneic and tachycardic. Saturating 91%. On exam she was wheezy. COPD suspected. Nebulizers and solumedrol given. When reexamined, more crackles than wheezing. XR showed possible pulmonary edema.lasix given. EKG done and was consistent with afltr.   She was given 2 x cardizem boluses (20 and 15 mg, given po 60 mg cardizem). HR remains fast. Started on drip 2 AM. No ICU beds available. ED will start looking for transfer. In the mean time ill treat in ED. Hospitalist accepted her with ED boarding.   Denies fever, chills, chest pain, productive cough. The rest is also negative. Only complaint is dyspnea. She doesn't feel fast and irregular heart beat.       Review of Systems    12 points of ROS obtained. Pertinent positives and negatives included in HPI. The rest is negative.     Past Medical History    I have reviewed this patient's medical history and updated it with pertinent information if needed.   Past Medical History:   Diagnosis Date     Allergic rhinitis 01/01/2011     Anxiety 01/01/2011     Anxiety state, unspecified     Anxiety state     Dyslipidemia 11/26/2003     fibromyalgia 06/14/2004     GERD, Esophageal reflux 01/01/2011     HTN (hypertension)     Essential hypertension     Major depression, recurrent (H) 1/1/2011     Nonorganic sleep disorder, unspecified     Non-org. 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  Past Surgical History:   Procedure Laterality Date     ANGIOGRAM  02/2005    Normal      BIOPSY BREAST  1984    LT, normal     BYPASS GRAFT ARTERY CORONARY  09/10/2018     CARPAL TUNNEL  "RELEASE RT/LT      RT, carpal tunnel     COLONOSCOPY      Repeat in ten years      COLONOSCOPY       COLONOSCOPY       DILATION AND CURETTAGE, HYSTEROSCOPY, ABLATE ENDOMETRIUM, COMBINED N/A 2018    Procedure: COMBINED DILATION AND CURETTAGE, HYSTEROSCOPY, ABLATE ENDOMETRIUM;  HYSTEROSCOPY DILATION AND CURETTAGE, ENDOMETRIAL ABLATION;  Surgeon: Jose Nettles MD;  Location: [facility] OR     HYSTERECTOMY TOTAL ABD, NICK SALPINGO-OOPHORECTOMY, NODE DISSECTION, TUMOR DEBULKING, COMBINED  10/30/2018     INSERT PORT VASCULAR ACCESS N/A 2018    Procedure: PORT-A-CATH PLACEMENT;  Surgeon: Rafi Trinidad MD;  Location: [facility] OR     placement of central line       REMOVE PORT VASCULAR ACCESS N/A 10/6/2020    Procedure: port a cath removal;  Surgeon: Rafi Trinidad MD;  Location: [facility] OR       Social History   I have reviewed this patient's social history and updated it with pertinent information if needed.  Social History     Tobacco Use     Smoking status: Current Some Day Smoker     Packs/day: 0.25     Types: Cigarettes     Start date: 1971     Last attempt to quit: 2013     Years since quittin.8     Smokeless tobacco: Never Used     Tobacco comment: pt declined, stated she would use, \"the patch\"   Substance Use Topics     Alcohol use: No     Comment: rare     Drug use: No       Family History   I have reviewed this patient's family history and updated it with pertinent information if needed.  Family History   Problem Relation Age of Onset     C.A.D. Father 45        (cause of death)      Other - See Comments Father         rheumatic fever      Allergies Father      Cancer Sister 56        Esophageal to bone cancer     Gastrointestinal Disease Mother         GERD     Cancer Mother         pancreatic ca (cause of death) /liver ca     Breast Cancer Maternal Aunt      Breast Cancer Maternal Aunt      Colon Cancer Paternal Aunt         (cause of death)      Crohn's Disease Other      " Depression Maternal Uncle      Depression Maternal Aunt      Diabetes Paternal Grandmother         type 2     Neurologic Disorder Brother         neuropathy     Cancer Brother 58        Tonsilular cancer/ lymph node in neck     Allergies Brother      Breast Cancer Cousin      Breast Cancer Cousin      Breast Cancer Cousin        Prior to Admission Medications   Prior to Admission Medications   Prescriptions Last Dose Informant Patient Reported? Taking?   IBUPROFEN PO  Self Yes Yes   Sig: Take 800 mg by mouth every 6 hours as needed for moderate pain      Facility-Administered Medications: None     Allergies   Allergies   Allergen Reactions     Cats Other (See Comments)     Sneezing, runny nose     Codeine Sulfate Nausea and Vomiting and GI Disturbance     Fexofenadine Hcl      Allegra      Rosuvastatin Other (See Comments)     Dizziness - Crestor        Physical Exam   Vital Signs: Temp: 98.9  F (37.2  C) Temp src: Tympanic BP: (!) 150/105 Pulse: (!) 142   Resp: 24 SpO2: 92 % O2 Device: None (Room air)    Weight: 207 lbs 3.72 oz    Physical exam:   General not in distress.   Neck: JVP elevated = 10 cm  Cardiac; irregularly irregular, fast.   Lungs: crackles up to 1/3 of lower lung ausculation fields.   Abd; soft, not tender  : [place], bladder not palpable.   NS: no joint deformity  Neurological: non-focal.   Psychiatric: pleasant and alert      Data   Data reviewed today: I reviewed all medications, new labs and imaging results over the last 24 hours. I personally reviewed the EKG tracing showing afltr and the chest x-ray image(s) showing pulmonary edema..    Recent Labs   Lab 10/11/21  2012   WBC 12.3*   HGB 14.1   MCV 91         POTASSIUM 4.0   CHLORIDE 109   CO2 26   BUN 11   CR 0.80   ANIONGAP 5   CARINA 8.8   *   ALBUMIN 3.1*   PROTTOTAL 7.0   BILITOTAL 0.3   ALKPHOS 117   ALT 48   AST 39   TROPONIN <0.015     Recent Results (from the past 24 hour(s))   XR Chest Port 1 View    Narrative     Procedure:XR CHEST PORT 1 VIEW    Clinical history:Female, 70 years, Shortness of breath    Technique: Single view was obtained.    Comparison: 5/29/2019    Findings: The cardiac silhouette is enlarged and difficult to  appreciate secondary to opacification in both lungs. The pulmonary  vasculature is mildly distended and indistinct in certain areas.    The lungs demonstrate streaky perihilar opacities, partially  silhouetting the left heart border. Bony structures are unremarkable.      Impression    Impression:   Interstitial thickening and slight vascular distention suggesting CHF  with perihilar and infrahilar pulmonary edema. No distinct evidence of  pleural effusion.    STONE COOK MD         SYSTEM ID:  [id number]

## 2021-10-13 ENCOUNTER — ANTICOAGULATION THERAPY VISIT (OUTPATIENT)
Dept: ANTICOAGULATION | Facility: OTHER | Age: 71
End: 2021-10-13

## 2021-10-13 DIAGNOSIS — I48.92 ATRIAL FLUTTER WITH RAPID VENTRICULAR RESPONSE (H): Primary | ICD-10-CM

## 2021-10-13 DIAGNOSIS — I63.9 CVA (CEREBRAL VASCULAR ACCIDENT) (H): Primary | ICD-10-CM

## 2021-10-13 DIAGNOSIS — I48.91 ATRIAL FIBRILLATION (H): ICD-10-CM

## 2021-10-13 LAB — INR (EXTERNAL): 1.1 (ref 2–3)

## 2021-10-14 NOTE — PROGRESS NOTES
Called placed to [facility] from [facility] stating that patient is a new Warfarin start and will need to have her INR checked in two days after discharge. She was prescribed Warfarin for atrial flutter with RVR. Goal range at 2.0-3.0.  Patient does not have a PCP here as of yet, but does have an appointment to establish care with Selena Cheung on 10/28. Coumadin Clinic put in anticoagulation referral for her signature, INR POCT standing orders were placed, and anticoagulation calender was updated with patients dosing while hospitalized. Patient's most recent INRs were documented as well. Patient was prescribed 2 mg tablets (purple). [facility] nurse added to patient's care team. Patient added to Coumadin Clinic's patient list. Patient will be sent resources that the Coumadin Clinic uses and will be encouraged to call with any questions once she receives them. She was given Warfarin education/review from Juve Amor, Pharm. D. Will follow through with patient after she has her INR drawn on 10/15.

## 2021-10-15 ENCOUNTER — MEDICAL CORRESPONDENCE (OUTPATIENT)
Dept: HEALTH INFORMATION MANAGEMENT | Facility: CLINIC | Age: 71
End: 2021-10-15

## 2021-10-15 ENCOUNTER — ANTICOAGULATION THERAPY VISIT (OUTPATIENT)
Dept: FAMILY MEDICINE | Facility: OTHER | Age: 71
End: 2021-10-15

## 2021-10-15 DIAGNOSIS — I63.9 CVA (CEREBRAL VASCULAR ACCIDENT) (H): ICD-10-CM

## 2021-10-15 DIAGNOSIS — I48.91 ATRIAL FIBRILLATION (H): Primary | ICD-10-CM

## 2021-10-18 ENCOUNTER — TELEPHONE (OUTPATIENT)
Dept: ANTICOAGULATION | Facility: OTHER | Age: 71
End: 2021-10-18

## 2021-10-18 NOTE — TELEPHONE ENCOUNTER
Spoke with anticoagulation pharmacist, Leelee Crawford, regarding patient not having an established PCP through [facility]. Patient does have an appointment to establish care with Selena Cheung on 10/28. Leelee stated that as long as Selena has signed the anticoagulation referral that [facility]madin Clinic may manage until she is seen. Call placed to patient to see if she has continue taking her Coumadin after discharge and if we could set up an appointment to have INR drawn as she was a no show for her appointment last week. Patient did not answer and voicemail was left for her with Coumadin Clinic's contact information.       Patient called back stating that she is waiting to hear back from [facility] regarding if she needs to be on the Coumadin. Requested to update Coumadin Clinic once she hears back from them. She has not started taking Coumadin at home.

## 2021-10-28 ENCOUNTER — TELEPHONE (OUTPATIENT)
Dept: FAMILY MEDICINE | Facility: OTHER | Age: 71
End: 2021-10-28
Payer: MEDICARE

## 2021-10-28 NOTE — TELEPHONE ENCOUNTER
Please call patient. She was supposed to establish with [facility] clinic and myself. Please notify via phone and letter that a clinic visit is required to have these services. Recommend she follow up with her previous primary if wanting to receive these services elsewhere.

## 2021-10-29 ENCOUNTER — DOCUMENTATION ONLY (OUTPATIENT)
Dept: ANTICOAGULATION | Facility: OTHER | Age: 71
End: 2021-10-29

## 2021-10-29 NOTE — PROGRESS NOTES
Call placed to patient and left voicemail requesting she call back to discuss her Coumadin. She stated that she was going to be calling [facility]'s to see if she needs to be on the Coumadin or not and was going to return call. She was a no show for establishing care with Selena Cheung as well.

## 2021-10-29 NOTE — TELEPHONE ENCOUNTER
Please schedule for an establish care visit, if pt does not want to establish her please advise to follow up with previous PCP for anticoagulation services.    Sunshine Webber LPN

## 2021-11-11 ENCOUNTER — TELEPHONE (OUTPATIENT)
Dept: ANTICOAGULATION | Facility: OTHER | Age: 71
End: 2021-11-11
Payer: MEDICARE

## 2021-11-11 NOTE — TELEPHONE ENCOUNTER
Patient was to start on Coumadin on 10/13 after discharging from [facility] with atrial flutter with RVR. Coumadin Clinic set up lab appointment for patient to have her INR drawn per [facility]. However, patient did not show for this appointment. Call placed to patient and she stated that she will be contacting [facility] see if she needs to be on the Coumadin or not. Requested that she call Coumadin Clinic back, but she did not. She was due to establish care with Selena Cheung and have INR drawn that day as well. However, she was a no show for this appointment as well. Coumadin Clinic called and left voicemail for patient to return call on 10/29 to determine if she has been taking the Coumadin or not, but she never returned call. Coumadin Clinic will attempt to reach her once more today. Voicemail was left for patient requesting she call Coumadin Clinic back.

## 2021-11-17 ENCOUNTER — ANTICOAGULATION THERAPY VISIT (OUTPATIENT)
Dept: ANTICOAGULATION | Facility: OTHER | Age: 71
End: 2021-11-17
Payer: MEDICARE

## 2021-11-17 DIAGNOSIS — I48.91 ATRIAL FIBRILLATION (H): Primary | ICD-10-CM

## 2021-11-17 DIAGNOSIS — I63.9 CVA (CEREBRAL VASCULAR ACCIDENT) (H): ICD-10-CM

## 2021-11-17 NOTE — LETTER
[facility] [place]  [address]  [place] [address]  [phone]          [facility]    Anticoagulation Management Services          Dear Heather Rosas,      [facility] has attempted to reach you several times regarding your Coumadin therapy management. Due to your lack of cooperation in contacting Coumadin Clin and having regularly scheduled blood testing for purposes of monitoring your INR, we can no longer share with you the responsibility of managing your Warfarin therapy.     Please realize that there are potentially serious risks involved with taking Warfarin without having careful monitoring of you INR.       Please call us at [phone] if you have any questions or concerns.      Thank you.    Fartun Dey, RN          Anticoagulation Management Services

## 2021-11-17 NOTE — Clinical Note
I have attempted to reach Heather several times. Just letting you know! I will be discharging her today from Coumadin Clinic. If she actually comes in to establish care and is still on Coumadin I will gladly manage again.

## 2021-11-17 NOTE — PROGRESS NOTES
ANTICOAGULATION  MANAGEMENT    Heather Rosas is being discharged from the [facility]ticoagulation Management Program ([facility]). Coumadin Clinic has attempted to reach patient several times and have left voicemail requesting that she call [facility]madin Clinic back. The one time [facility] Clinic was able to speak with her she had stated that she is not taking her Coumadin and is trying to get a hold of [facility] to see if she even needs to take it. She was due to establish care with PCP through [facility], but was a no show for that appointment as well. Will notify Selena Cheung just to make her aware that she will be discharged from [facility] Clinic even though she is not her established PCP due to no show.     Reason for discharge: non-compliance with Anticoagulation Management Program; care returned to primary care provider    Anticoagulation episode resolved, ACC referral closed and INR Standing order discontinued    If patient needs warfarin management in the future, please send a new referral    Fartun Dey RN

## 2022-04-17 ENCOUNTER — HOSPITAL ENCOUNTER (INPATIENT)
Facility: HOSPITAL | Age: 72
LOS: 1 days | Discharge: HOME | DRG: 291 | End: 2022-04-19
Attending: EMERGENCY MEDICINE | Admitting: INTERNAL MEDICINE
Payer: MEDICARE

## 2022-04-17 DIAGNOSIS — I50.9 ACUTE ON CHRONIC CONGESTIVE HEART FAILURE, UNSPECIFIED HEART FAILURE TYPE (H): ICD-10-CM

## 2022-04-17 DIAGNOSIS — I10 BENIGN ESSENTIAL HYPERTENSION: ICD-10-CM

## 2022-04-17 DIAGNOSIS — J45.901 MODERATE ASTHMA WITH EXACERBATION, UNSPECIFIED WHETHER PERSISTENT: Primary | ICD-10-CM

## 2022-04-17 PROCEDURE — 80053 COMPREHEN METABOLIC PANEL: CPT | Performed by: EMERGENCY MEDICINE

## 2022-04-17 PROCEDURE — 82805 BLOOD GASES W/O2 SATURATION: CPT | Performed by: EMERGENCY MEDICINE

## 2022-04-17 PROCEDURE — 85730 THROMBOPLASTIN TIME PARTIAL: CPT | Performed by: EMERGENCY MEDICINE

## 2022-04-17 PROCEDURE — 84443 ASSAY THYROID STIM HORMONE: CPT | Performed by: INTERNAL MEDICINE

## 2022-04-17 PROCEDURE — 83880 ASSAY OF NATRIURETIC PEPTIDE: CPT | Performed by: EMERGENCY MEDICINE

## 2022-04-17 PROCEDURE — 85025 COMPLETE CBC W/AUTO DIFF WBC: CPT | Performed by: EMERGENCY MEDICINE

## 2022-04-17 PROCEDURE — 36415 COLL VENOUS BLD VENIPUNCTURE: CPT | Performed by: EMERGENCY MEDICINE

## 2022-04-17 PROCEDURE — 93308 TTE F-UP OR LMTD: CPT | Mod: 26 | Performed by: EMERGENCY MEDICINE

## 2022-04-17 PROCEDURE — 93010 ELECTROCARDIOGRAM REPORT: CPT | Performed by: INTERNAL MEDICINE

## 2022-04-17 PROCEDURE — 87637 SARSCOV2&INF A&B&RSV AMP PRB: CPT | Performed by: EMERGENCY MEDICINE

## 2022-04-17 PROCEDURE — 99291 CRITICAL CARE FIRST HOUR: CPT | Mod: 25 | Performed by: EMERGENCY MEDICINE

## 2022-04-17 PROCEDURE — 83605 ASSAY OF LACTIC ACID: CPT | Performed by: EMERGENCY MEDICINE

## 2022-04-17 PROCEDURE — 82077 ASSAY SPEC XCP UR&BREATH IA: CPT | Performed by: INTERNAL MEDICINE

## 2022-04-17 PROCEDURE — 84145 PROCALCITONIN (PCT): CPT | Performed by: EMERGENCY MEDICINE

## 2022-04-17 PROCEDURE — 84484 ASSAY OF TROPONIN QUANT: CPT | Performed by: EMERGENCY MEDICINE

## 2022-04-17 PROCEDURE — 99285 EMERGENCY DEPT VISIT HI MDM: CPT | Mod: 25

## 2022-04-17 RX ORDER — METOPROLOL TARTRATE 50 MG
50 TABLET ORAL 2 TIMES DAILY
COMMUNITY
Start: 2021-10-13 | End: 2022-10-27

## 2022-04-18 ENCOUNTER — APPOINTMENT (OUTPATIENT)
Dept: GENERAL RADIOLOGY | Facility: HOSPITAL | Age: 72
DRG: 291 | End: 2022-04-18
Attending: EMERGENCY MEDICINE
Payer: MEDICARE

## 2022-04-18 ENCOUNTER — APPOINTMENT (OUTPATIENT)
Dept: ULTRASOUND IMAGING | Facility: HOSPITAL | Age: 72
DRG: 291 | End: 2022-04-18
Attending: EMERGENCY MEDICINE
Payer: MEDICARE

## 2022-04-18 ENCOUNTER — APPOINTMENT (OUTPATIENT)
Dept: OCCUPATIONAL THERAPY | Facility: HOSPITAL | Age: 72
DRG: 291 | End: 2022-04-18
Attending: INTERNAL MEDICINE
Payer: MEDICARE

## 2022-04-18 ENCOUNTER — APPOINTMENT (OUTPATIENT)
Dept: PHYSICAL THERAPY | Facility: HOSPITAL | Age: 72
DRG: 291 | End: 2022-04-18
Payer: MEDICARE

## 2022-04-18 PROBLEM — I50.9 ACUTE EXACERBATION OF CHF (CONGESTIVE HEART FAILURE) (H): Status: ACTIVE | Noted: 2021-10-11

## 2022-04-18 LAB
ALBUMIN SERPL-MCNC: 3.5 G/DL (ref 3.4–5)
ALBUMIN UR-MCNC: NEGATIVE MG/DL
ALP SERPL-CCNC: 132 U/L (ref 40–150)
ALT SERPL W P-5'-P-CCNC: 48 U/L (ref 0–50)
AMPHETAMINES UR QL: NOT DETECTED
ANION GAP SERPL CALCULATED.3IONS-SCNC: 5 MMOL/L (ref 3–14)
APPEARANCE UR: CLEAR
APTT PPP: 34 SECONDS (ref 22–38)
AST SERPL W P-5'-P-CCNC: 56 U/L (ref 0–45)
BARBITURATES UR QL SCN: NOT DETECTED
BASE EXCESS BLDV CALC-SCNC: 1.2 MMOL/L (ref -7.7–1.9)
BASOPHILS # BLD AUTO: 0.1 10E3/UL (ref 0–0.2)
BASOPHILS NFR BLD AUTO: 1 %
BENZODIAZ UR QL SCN: NOT DETECTED
BILIRUB SERPL-MCNC: 0.6 MG/DL (ref 0.2–1.3)
BILIRUB UR QL STRIP: NEGATIVE
BUN SERPL-MCNC: 17 MG/DL (ref 7–30)
BUPRENORPHINE UR QL: NOT DETECTED
CALCIUM SERPL-MCNC: 9 MG/DL (ref 8.5–10.1)
CANNABINOIDS UR QL: NOT DETECTED
CHLORIDE BLD-SCNC: 106 MMOL/L (ref 94–109)
CO2 SERPL-SCNC: 27 MMOL/L (ref 20–32)
COCAINE UR QL SCN: NOT DETECTED
COLOR UR AUTO: NORMAL
CREAT SERPL-MCNC: 0.91 MG/DL (ref 0.52–1.04)
D-METHAMPHET UR QL: NOT DETECTED
EOSINOPHIL # BLD AUTO: 0.2 10E3/UL (ref 0–0.7)
EOSINOPHIL NFR BLD AUTO: 2 %
ERYTHROCYTE [DISTWIDTH] IN BLOOD BY AUTOMATED COUNT: 13.6 % (ref 10–15)
ETHANOL SERPL-MCNC: <0.01 G/DL
FLUAV RNA SPEC QL NAA+PROBE: NEGATIVE
FLUBV RNA RESP QL NAA+PROBE: NEGATIVE
GFR SERPL CREATININE-BSD FRML MDRD: 68 ML/MIN/1.73M2
GLUCOSE BLD-MCNC: 103 MG/DL (ref 70–99)
GLUCOSE UR STRIP-MCNC: NEGATIVE MG/DL
HCO3 BLDV-SCNC: 28 MMOL/L (ref 21–28)
HCT VFR BLD AUTO: 46.5 % (ref 35–47)
HGB BLD-MCNC: 15 G/DL (ref 11.7–15.7)
HGB UR QL STRIP: NEGATIVE
HOLD SPECIMEN: NORMAL
IMM GRANULOCYTES # BLD: 0.1 10E3/UL
IMM GRANULOCYTES NFR BLD: 1 %
KETONES UR STRIP-MCNC: NEGATIVE MG/DL
LACTATE SERPL-SCNC: 1.3 MMOL/L (ref 0.7–2)
LACTATE SERPL-SCNC: 1.7 MMOL/L (ref 0.7–2)
LEUKOCYTE ESTERASE UR QL STRIP: NEGATIVE
LYMPHOCYTES # BLD AUTO: 1.4 10E3/UL (ref 0.8–5.3)
LYMPHOCYTES NFR BLD AUTO: 10 %
MCH RBC QN AUTO: 29.9 PG (ref 26.5–33)
MCHC RBC AUTO-ENTMCNC: 32.3 G/DL (ref 31.5–36.5)
MCV RBC AUTO: 93 FL (ref 78–100)
METHADONE UR QL SCN: NOT DETECTED
MONOCYTES # BLD AUTO: 0.9 10E3/UL (ref 0–1.3)
MONOCYTES NFR BLD AUTO: 6 %
NEUTROPHILS # BLD AUTO: 11.5 10E3/UL (ref 1.6–8.3)
NEUTROPHILS NFR BLD AUTO: 80 %
NITRATE UR QL: NEGATIVE
NRBC # BLD AUTO: 0 10E3/UL
NRBC BLD AUTO-RTO: 0 /100
NT-PROBNP SERPL-MCNC: 2075 PG/ML (ref 0–900)
NT-PROBNP SERPL-MCNC: 3145 PG/ML (ref 0–900)
O2/TOTAL GAS SETTING VFR VENT: 0 %
OPIATES UR QL SCN: NOT DETECTED
OXYCODONE UR QL SCN: NOT DETECTED
OXYHGB MFR BLDV: 57 % (ref 70–75)
PCO2 BLDV: 52 MM HG (ref 40–50)
PCP UR QL SCN: NOT DETECTED
PH BLDV: 7.34 [PH] (ref 7.32–7.43)
PH UR STRIP: 7 [PH] (ref 4.7–8)
PLATELET # BLD AUTO: 193 10E3/UL (ref 150–450)
PO2 BLDV: 33 MM HG (ref 25–47)
POTASSIUM BLD-SCNC: 3.8 MMOL/L (ref 3.4–5.3)
PROCALCITONIN SERPL-MCNC: <0.05 NG/ML
PROCALCITONIN SERPL-MCNC: <0.05 NG/ML
PROPOXYPH UR QL: NOT DETECTED
PROT SERPL-MCNC: 7.4 G/DL (ref 6.8–8.8)
RBC # BLD AUTO: 5.02 10E6/UL (ref 3.8–5.2)
RSV RNA SPEC NAA+PROBE: NEGATIVE
SARS-COV-2 RNA RESP QL NAA+PROBE: NEGATIVE
SODIUM SERPL-SCNC: 138 MMOL/L (ref 133–144)
SP GR UR STRIP: 1 (ref 1–1.03)
TRICYCLICS UR QL SCN: NOT DETECTED
TROPONIN I SERPL HS-MCNC: 28 NG/L
TSH SERPL DL<=0.005 MIU/L-ACNC: 1.39 MU/L (ref 0.4–4)
UROBILINOGEN UR STRIP-MCNC: NORMAL MG/DL
WBC # BLD AUTO: 14.3 10E3/UL (ref 4–11)

## 2022-04-18 PROCEDURE — 71045 X-RAY EXAM CHEST 1 VIEW: CPT

## 2022-04-18 PROCEDURE — 999N000157 HC STATISTIC RCP TIME EA 10 MIN

## 2022-04-18 PROCEDURE — 93005 ELECTROCARDIOGRAM TRACING: CPT

## 2022-04-18 PROCEDURE — 250N000013 HC RX MED GY IP 250 OP 250 PS 637: Performed by: INTERNAL MEDICINE

## 2022-04-18 PROCEDURE — 80306 DRUG TEST PRSMV INSTRMNT: CPT | Performed by: INTERNAL MEDICINE

## 2022-04-18 PROCEDURE — 250N000009 HC RX 250: Performed by: INTERNAL MEDICINE

## 2022-04-18 PROCEDURE — 97535 SELF CARE MNGMENT TRAINING: CPT | Mod: GO

## 2022-04-18 PROCEDURE — 97530 THERAPEUTIC ACTIVITIES: CPT | Mod: GP

## 2022-04-18 PROCEDURE — 83880 ASSAY OF NATRIURETIC PEPTIDE: CPT | Performed by: INTERNAL MEDICINE

## 2022-04-18 PROCEDURE — 250N000011 HC RX IP 250 OP 636

## 2022-04-18 PROCEDURE — 250N000011 HC RX IP 250 OP 636: Performed by: INTERNAL MEDICINE

## 2022-04-18 PROCEDURE — 120N000001 HC R&B MED SURG/OB

## 2022-04-18 PROCEDURE — 250N000009 HC RX 250: Performed by: EMERGENCY MEDICINE

## 2022-04-18 PROCEDURE — 99223 1ST HOSP IP/OBS HIGH 75: CPT | Mod: AI | Performed by: INTERNAL MEDICINE

## 2022-04-18 PROCEDURE — 93308 TTE F-UP OR LMTD: CPT | Mod: TC

## 2022-04-18 PROCEDURE — 83605 ASSAY OF LACTIC ACID: CPT | Performed by: INTERNAL MEDICINE

## 2022-04-18 PROCEDURE — 84145 PROCALCITONIN (PCT): CPT | Performed by: INTERNAL MEDICINE

## 2022-04-18 PROCEDURE — 94640 AIRWAY INHALATION TREATMENT: CPT | Mod: 76

## 2022-04-18 PROCEDURE — 97165 OT EVAL LOW COMPLEX 30 MIN: CPT | Mod: GO

## 2022-04-18 PROCEDURE — 97110 THERAPEUTIC EXERCISES: CPT | Mod: GO

## 2022-04-18 PROCEDURE — 96374 THER/PROPH/DIAG INJ IV PUSH: CPT

## 2022-04-18 PROCEDURE — 94640 AIRWAY INHALATION TREATMENT: CPT

## 2022-04-18 PROCEDURE — 81003 URINALYSIS AUTO W/O SCOPE: CPT | Performed by: INTERNAL MEDICINE

## 2022-04-18 PROCEDURE — 96375 TX/PRO/DX INJ NEW DRUG ADDON: CPT

## 2022-04-18 PROCEDURE — 36415 COLL VENOUS BLD VENIPUNCTURE: CPT | Performed by: INTERNAL MEDICINE

## 2022-04-18 PROCEDURE — 250N000013 HC RX MED GY IP 250 OP 250 PS 637: Performed by: EMERGENCY MEDICINE

## 2022-04-18 PROCEDURE — 250N000011 HC RX IP 250 OP 636: Performed by: EMERGENCY MEDICINE

## 2022-04-18 PROCEDURE — 97161 PT EVAL LOW COMPLEX 20 MIN: CPT | Mod: GP

## 2022-04-18 RX ORDER — CALCIUM CARBONATE 500 MG/1
1000 TABLET, CHEWABLE ORAL 4 TIMES DAILY PRN
Status: DISCONTINUED | OUTPATIENT
Start: 2022-04-18 | End: 2022-04-19 | Stop reason: HOSPADM

## 2022-04-18 RX ORDER — OLANZAPINE 5 MG/1
5 TABLET, ORALLY DISINTEGRATING ORAL ONCE
Status: DISCONTINUED | OUTPATIENT
Start: 2022-04-18 | End: 2022-04-18

## 2022-04-18 RX ORDER — IPRATROPIUM BROMIDE AND ALBUTEROL SULFATE 2.5; .5 MG/3ML; MG/3ML
3 SOLUTION RESPIRATORY (INHALATION)
Status: DISCONTINUED | OUTPATIENT
Start: 2022-04-18 | End: 2022-04-19 | Stop reason: HOSPADM

## 2022-04-18 RX ORDER — CEFTRIAXONE SODIUM 1 G/50ML
1 INJECTION, SOLUTION INTRAVENOUS
Status: DISCONTINUED | OUTPATIENT
Start: 2022-04-18 | End: 2022-04-18 | Stop reason: DRUGHIGH

## 2022-04-18 RX ORDER — POLYETHYLENE GLYCOL 3350 17 G
2 POWDER IN PACKET (EA) ORAL
Status: DISCONTINUED | OUTPATIENT
Start: 2022-04-18 | End: 2022-04-19 | Stop reason: HOSPADM

## 2022-04-18 RX ORDER — HYDRALAZINE HYDROCHLORIDE 20 MG/ML
5 INJECTION INTRAMUSCULAR; INTRAVENOUS EVERY 4 HOURS PRN
Status: DISCONTINUED | OUTPATIENT
Start: 2022-04-18 | End: 2022-04-19 | Stop reason: HOSPADM

## 2022-04-18 RX ORDER — WARFARIN SODIUM 2 MG/1
2 TABLET ORAL DAILY
Status: ON HOLD | COMMUNITY
End: 2022-04-18

## 2022-04-18 RX ORDER — BENZONATATE 100 MG/1
100 CAPSULE ORAL 3 TIMES DAILY PRN
Status: DISCONTINUED | OUTPATIENT
Start: 2022-04-18 | End: 2022-04-19 | Stop reason: HOSPADM

## 2022-04-18 RX ORDER — DEXAMETHASONE SODIUM PHOSPHATE 10 MG/ML
10 INJECTION, SOLUTION INTRAMUSCULAR; INTRAVENOUS ONCE
Status: COMPLETED | OUTPATIENT
Start: 2022-04-18 | End: 2022-04-18

## 2022-04-18 RX ORDER — CEFTRIAXONE SODIUM 2 G/50ML
2 INJECTION, SOLUTION INTRAVENOUS EVERY 24 HOURS
Status: DISCONTINUED | OUTPATIENT
Start: 2022-04-18 | End: 2022-04-19 | Stop reason: HOSPADM

## 2022-04-18 RX ORDER — FUROSEMIDE 10 MG/ML
40 INJECTION INTRAMUSCULAR; INTRAVENOUS DAILY
Status: DISCONTINUED | OUTPATIENT
Start: 2022-04-18 | End: 2022-04-19 | Stop reason: HOSPADM

## 2022-04-18 RX ORDER — LIDOCAINE 40 MG/G
CREAM TOPICAL
Status: DISCONTINUED | OUTPATIENT
Start: 2022-04-18 | End: 2022-04-19 | Stop reason: HOSPADM

## 2022-04-18 RX ORDER — LACTOBACILLUS RHAMNOSUS GG 10B CELL
1 CAPSULE ORAL 2 TIMES DAILY
Status: DISCONTINUED | OUTPATIENT
Start: 2022-04-18 | End: 2022-04-19 | Stop reason: HOSPADM

## 2022-04-18 RX ORDER — ATORVASTATIN CALCIUM 20 MG/1
20 TABLET, FILM COATED ORAL
COMMUNITY
End: 2022-10-27

## 2022-04-18 RX ORDER — IPRATROPIUM BROMIDE AND ALBUTEROL SULFATE 2.5; .5 MG/3ML; MG/3ML
3 SOLUTION RESPIRATORY (INHALATION) ONCE
Status: COMPLETED | OUTPATIENT
Start: 2022-04-18 | End: 2022-04-18

## 2022-04-18 RX ORDER — ACETAMINOPHEN 650 MG/1
650 SUPPOSITORY RECTAL EVERY 6 HOURS PRN
Status: DISCONTINUED | OUTPATIENT
Start: 2022-04-18 | End: 2022-04-19 | Stop reason: HOSPADM

## 2022-04-18 RX ORDER — ONDANSETRON 4 MG/1
4 TABLET, ORALLY DISINTEGRATING ORAL EVERY 6 HOURS PRN
Status: DISCONTINUED | OUTPATIENT
Start: 2022-04-18 | End: 2022-04-19 | Stop reason: HOSPADM

## 2022-04-18 RX ORDER — DOXYCYCLINE 100 MG/1
100 CAPSULE ORAL EVERY 12 HOURS SCHEDULED
Status: DISCONTINUED | OUTPATIENT
Start: 2022-04-18 | End: 2022-04-19 | Stop reason: HOSPADM

## 2022-04-18 RX ORDER — FUROSEMIDE 20 MG
20 TABLET ORAL DAILY
COMMUNITY
End: 2022-10-27

## 2022-04-18 RX ORDER — ONDANSETRON 2 MG/ML
4 INJECTION INTRAMUSCULAR; INTRAVENOUS EVERY 6 HOURS PRN
Status: DISCONTINUED | OUTPATIENT
Start: 2022-04-18 | End: 2022-04-19 | Stop reason: HOSPADM

## 2022-04-18 RX ORDER — AMLODIPINE BESYLATE 5 MG/1
5 TABLET ORAL DAILY
Status: DISCONTINUED | OUTPATIENT
Start: 2022-04-18 | End: 2022-04-19 | Stop reason: HOSPADM

## 2022-04-18 RX ORDER — ACETAMINOPHEN 325 MG/1
650 TABLET ORAL EVERY 6 HOURS PRN
Status: DISCONTINUED | OUTPATIENT
Start: 2022-04-18 | End: 2022-04-19 | Stop reason: HOSPADM

## 2022-04-18 RX ORDER — FUROSEMIDE 10 MG/ML
80 INJECTION INTRAMUSCULAR; INTRAVENOUS ONCE
Status: COMPLETED | OUTPATIENT
Start: 2022-04-18 | End: 2022-04-18

## 2022-04-18 RX ORDER — AMLODIPINE BESYLATE 5 MG/1
10 TABLET ORAL ONCE
Status: COMPLETED | OUTPATIENT
Start: 2022-04-18 | End: 2022-04-18

## 2022-04-18 RX ORDER — CEFTRIAXONE SODIUM 1 G/50ML
INJECTION, SOLUTION INTRAVENOUS
Status: COMPLETED
Start: 2022-04-18 | End: 2022-04-18

## 2022-04-18 RX ORDER — LORAZEPAM 2 MG/ML
1 INJECTION INTRAMUSCULAR ONCE
Status: COMPLETED | OUTPATIENT
Start: 2022-04-18 | End: 2022-04-18

## 2022-04-18 RX ORDER — METOPROLOL TARTRATE 50 MG
50 TABLET ORAL 2 TIMES DAILY
Status: DISCONTINUED | OUTPATIENT
Start: 2022-04-18 | End: 2022-04-19 | Stop reason: HOSPADM

## 2022-04-18 RX ORDER — NICOTINE 21 MG/24HR
1 PATCH, TRANSDERMAL 24 HOURS TRANSDERMAL DAILY PRN
Status: DISCONTINUED | OUTPATIENT
Start: 2022-04-18 | End: 2022-04-19 | Stop reason: HOSPADM

## 2022-04-18 RX ORDER — ALBUTEROL SULFATE 5 MG/ML
2.5 SOLUTION RESPIRATORY (INHALATION)
Status: DISCONTINUED | OUTPATIENT
Start: 2022-04-18 | End: 2022-04-19 | Stop reason: HOSPADM

## 2022-04-18 RX ADMIN — METOPROLOL TARTRATE 50 MG: 50 TABLET, FILM COATED ORAL at 08:42

## 2022-04-18 RX ADMIN — DOXYCYCLINE HYCLATE 100 MG: 100 CAPSULE ORAL at 08:44

## 2022-04-18 RX ADMIN — DOXYCYCLINE HYCLATE 100 MG: 100 CAPSULE ORAL at 21:15

## 2022-04-18 RX ADMIN — LORAZEPAM 1 MG: 2 INJECTION INTRAMUSCULAR; INTRAVENOUS at 00:22

## 2022-04-18 RX ADMIN — Medication 1 CAPSULE: at 08:58

## 2022-04-18 RX ADMIN — IPRATROPIUM BROMIDE AND ALBUTEROL SULFATE 3 ML: .5; 3 SOLUTION RESPIRATORY (INHALATION) at 00:36

## 2022-04-18 RX ADMIN — IPRATROPIUM BROMIDE AND ALBUTEROL SULFATE 3 ML: .5; 3 SOLUTION RESPIRATORY (INHALATION) at 20:34

## 2022-04-18 RX ADMIN — IPRATROPIUM BROMIDE AND ALBUTEROL SULFATE 3 ML: .5; 3 SOLUTION RESPIRATORY (INHALATION) at 12:15

## 2022-04-18 RX ADMIN — IPRATROPIUM BROMIDE AND ALBUTEROL SULFATE 3 ML: .5; 3 SOLUTION RESPIRATORY (INHALATION) at 07:28

## 2022-04-18 RX ADMIN — DEXAMETHASONE SODIUM PHOSPHATE 10 MG: 10 INJECTION, SOLUTION INTRAMUSCULAR; INTRAVENOUS at 00:23

## 2022-04-18 RX ADMIN — CEFTRIAXONE SODIUM 2 G: 2 INJECTION, SOLUTION INTRAVENOUS at 21:14

## 2022-04-18 RX ADMIN — FUROSEMIDE 40 MG: 10 INJECTION, SOLUTION INTRAVENOUS at 08:47

## 2022-04-18 RX ADMIN — HYDRALAZINE HYDROCHLORIDE 5 MG: 20 INJECTION, SOLUTION INTRAMUSCULAR; INTRAVENOUS at 04:06

## 2022-04-18 RX ADMIN — METOPROLOL TARTRATE 50 MG: 50 TABLET, FILM COATED ORAL at 21:15

## 2022-04-18 RX ADMIN — DEXAMETHASONE 6 MG: 2 TABLET ORAL at 08:42

## 2022-04-18 RX ADMIN — CEFTRIAXONE SODIUM 1 G: 1 INJECTION, SOLUTION INTRAVENOUS at 03:12

## 2022-04-18 RX ADMIN — FUROSEMIDE 80 MG: 10 INJECTION, SOLUTION INTRAVENOUS at 01:08

## 2022-04-18 RX ADMIN — AMLODIPINE BESYLATE 5 MG: 5 TABLET ORAL at 08:42

## 2022-04-18 RX ADMIN — AMLODIPINE BESYLATE 10 MG: 5 TABLET ORAL at 01:08

## 2022-04-18 RX ADMIN — METOPROLOL TARTRATE 50 MG: 50 TABLET, FILM COATED ORAL at 02:50

## 2022-04-18 RX ADMIN — Medication 1 CAPSULE: at 21:15

## 2022-04-18 RX ADMIN — IPRATROPIUM BROMIDE AND ALBUTEROL SULFATE 3 ML: .5; 3 SOLUTION RESPIRATORY (INHALATION) at 16:55

## 2022-04-18 ASSESSMENT — ACTIVITIES OF DAILY LIVING (ADL)
ADLS_ACUITY_SCORE: 3
ADLS_ACUITY_SCORE: 3
ADLS_ACUITY_SCORE: 12
ADLS_ACUITY_SCORE: 3
BADLS,_PREVIOUS_FUNCTIONAL_LEVEL: INDEPENDENT
IADLS,_PREVIOUS_FUNCTIONAL_LEVEL: INDEPENDENT
ADLS_ACUITY_SCORE: 3

## 2022-04-18 NOTE — PHARMACY-MEDICATION REGIMEN REVIEW
Pharmacy Antimicrobial Stewardship Assessment     Current Antimicrobial Therapy:  Anti-infectives (From now, onward)    Start     Dose/Rate Route Frequency Ordered Stop    22 0800  doxycycline hyclate (VIBRAMYCIN) capsule 100 mg         100 mg Oral EVERY 12 HOURS SCHEDULED 22 0217      22 0300  cefTRIAXone in d5w (ROCEPHIN) intermittent infusion 1 g         1 g  over 30 Minutes Intravenous EVERY 24 HOURS SCHEDULED 22 021            Indication: COPD    Days of Therapy: 1     Pertinent Labs:  Recent Labs   Lab Test 22  2356 10/11/21  2012 08/13/19  0034   WBC 14.3* 12.3* 9.2     Recent Labs   Lab Test 22  0607 22  2356   LACT 1.7 1.3   PCAL <0.05 <0.05        Temperature:  Temp (24hrs), Av  F (36.7  C), Min:97.5  F (36.4  C), Max:98.5  F (36.9  C)    Culture Results:   22: Influenza, RSV, COVID = negative     Recommendations/Interventions:  Adjusting ceftriaxone dose to 2g daily per Automatic Medication Dose Adjustment policy. No other recommendations at this time. Will continue to monitor.     Oralia Suarez RPH  2022

## 2022-04-18 NOTE — ED NOTES
Arrived to ED room 5 via [facility] with c/o increasing SOB for 3 days. Denies fever or pain. Reports history of hypertension that she is on medication for but is not currently taking the medication. Has furosemide on medication list but states she is not currently taking. Harsh cough noted. States she is too claustrophobic to wear a mask but refuses to cover mouth when coughing. Cardiac monitor in place. Call light within reach.

## 2022-04-18 NOTE — PROGRESS NOTES
Assessment completed by face to face visit with Heathre.    LOC: alert & oriented x 4.     Dx: CHF  Chronic Disease Management: COPD, CHF, HTN    Lives with: Alone  Living at:  Home  Transportation: YES Drives self    Primary PCP: Patient does not have a PCP. Writer offered to set patient up with an establish care appointment with a new PCP, but patient declined and said she would do it herself. Writer ensured patient that it was no trouble and writer could set up the appointment at any clinic that patient wanted. Patient again declined and said she would find a PCP on her own.   Insurance:  Medicare  Medicare IMM Admit letter reviewed with Heather.    Support System:  Son  Homecare/PCA: No  /[facility] Services:   No  [place]: NO     Health Care Directive: No  Guardian: No  POA: No    Pharmacy: [facility] [place]  Meds management: Independent    Adequate Resources for needs (housing, utilities, food/med): YES  Household chores: Independent  Work/community/social activity: YES Unemployed, but social activities as desired    ADLs: Independent  Ambulation:Independent, without assistive device  Falls: Denies  Nutrition: No concerns reported  Sleep: No concerns reported    Equipment used: None       Mental health: Denies concerns  Substance abuse: Deneis  Exposure to violence/abuse: Denies  Stressors: Denies    Able to Return to Prior Living Arrangements: YES    Choice of Vendor: N/A    Barriers: Patient has been frequently no showing her appointments at the coumadin clinic. Patient does not have a primary care provider. Patient is not compliant with her medications. Patient declined any form of help with the above concerns when writer offered. Patient reports that she will take care of these things on her own. She also reports that she will be deciding where she wants to live and will be moving soon, so she does not want to get involved with any new providers or services before she goes. She reports that she will  be moving to [facility] or [place].   Writer did advise patient to reconsider the offer to set up appointments with a new PCP and with the coumadin clinic and let any staff member know if she would like those to be scheduled.     TOBI: Low    Plan: Anticipate discharge home when medically ready.

## 2022-04-18 NOTE — PROGRESS NOTES
04/18/22 1100   Quick Adds   Type of Visit Initial Occupational Therapy Evaluation       Present no   Language english   Living Environment   People in Home alone   Current Living Arrangements house   Home Accessibility stairs within home   Number of Stairs, Within Home, Primary greater than 10 stairs   Stair Railings, Within Home, Primary railings safe and in good condition   Transportation Anticipated car, drives self   Living Environment Comments Patient has grab bars in the bathroom. Main living area is in the finished basement. Patient sleeps in own bed, has a bathroom with grab bars, sponge bathes at home. 2 bedrooms located upstairs but patient doesn't access that area often.   Self-Care   Usual Activity Tolerance good   Current Activity Tolerance good   Regular Exercise Other (see comments)  (intermittent, patient reports engaging in exercise.)   Fall history within last six months no   Activity/Exercise/Self-Care Comment Indep with dressing, bathing, medication management, and money management. Indep with cooking.   Instrumental Activities of Daily Living (IADL)   Previous Responsibilities meal prep;housekeeping;yardwork;shopping;medication management;finances;driving;laundry   Post-Acute Assessment Only   Post-Acute Functional Assessment See IP Rehab Daily Documentation Flowsheet for Functional Mobility/ADL Assessment   Previous Level of Function/Home Environm   Bathing/Grooming, Premorbid Functional Level independent   Dressing, Premorbid Functional Level independent   Eating/Feeding, Premorbid Functional Level independent   Toileting, Premorbid Functional Level independent   BADLs, Premorbid Functional Level independent  (modified bathing to sponge bath)   IADLs, Premorbid Functional Level independent   Bed Mobility, Premorbid Functional Level independent   Transfers, Premorbid Functional Level independent   Household Ambulation, Premorbid Functional Level independent   Stairs,  Premorbid Functional Level independent   Community Ambulation, Premorbid Functional Level independent   Functional Cognition, Premorbid Functional Level independent   General Information   Onset of Illness/Injury or Date of Surgery 04/17/22   Referring Physician Gavin Gonzalez DO   Patient/Family Therapy Goal Statement (OT) Return to home at prior level of functioning.   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 [employer]   Existing Precautions/Restrictions no known precautions/restrictions   Left Upper Extremity (Weight-bearing Status) full weight-bearing (FWB)   Right Upper Extremity (Weight-bearing Status) full weight-bearing (FWB)   Left Lower Extremity (Weight-bearing Status) full weight-bearing (FWB)   Right Lower Extremity (Weight-bearing Status) full weight-bearing (FWB)   Cognitive Status Examination   Orientation Status orientation to person, place and time   Behavioral Issues   (none)   Affect/Mental Status (Cognitive) WFL  (SLUMS completed with a score of 28/30)   Follows Commands WFL   Memory Deficit other (see comments)  (some short term issues noted on test scored 3/5)   Executive Function Deficit   (no)   Cognitive Status Comments SLUMS completed with a score of 28/30 which indicates normal cognition. did lose points for STM 3/5. Discussed compensatory techniques to assist with this.   Visual Perception   Visual Impairment/Limitations WFL   Sensory   Sensory Quick Adds No deficits were identified   Pain Assessment   Patient Currently in Pain No   Integumentary/Edema   Integumentary/Edema other (describe)  (LE edema)   Integumentary/Edema Comments Discussed elevation and AROM for dependent edema management   Range of Motion Comprehensive   General Range of Motion no range of motion deficits identified   Strength Comprehensive (MMT)   General Manual Muscle Testing (MMT) Assessment no strength deficits identified   Comment,  General Manual Muscle Testing (MMT) Assessment (R) shoulder flexion and abduction 4+/5, (L) shoulder flexion and abduction 4+/5, elbow flexion/extension (B) 5/5.   Muscle Tone Assessment   Muscle Tone Quick Adds No deficits were identified   Coordination   Upper Extremity Coordination No deficits were identified   Transfers   Transfers No deficits identified   Activities of Daily Living   BADL Assessment/Intervention no deficits identified   Clinical Impression   Criteria for Skilled Therapeutic Interventions Met (OT) Current level of function same as previous level of function;Other (see comments)  (Could benefit from HEP for prevention of decline.)   OT Diagnosis Acute CHF exacerbation.   Influenced by the following impairments CHF   OT Problem List-Impairments impacting ADL other (see comments)  (Patient could benefit from HEP to prevent decline and patient in agreement.)   ADL comments/analysis Patient is able to complete dressing. TT with sup. shower transfer with Sup, Able to access closet and gather items indep.   Assessment of Occupational Performance 1-3 Performance Deficits   Identified Performance Deficits Patient not indep with HEP.   Planned Therapy Interventions (OT) strengthening;other (see comments)  (HEP development)   Intervention Comments Patient appears to be at functional baseline. O2 sats remained 92% or better with activity and exercise.   Clinical Decision Making Complexity (OT) low complexity   Anticipated Equipment Needs Upon Discharge (OT) other (see comments)  (none)   Risk & Benefits of therapy have been explained evaluation/treatment results reviewed   Clinical Impression Comments Patient is pleasant and willing to work with OT. Discussed findings and plan of care. Patient in agreement at this time.   OT Discharge Planning   OT Discharge Recommendation (DC Rec) home   OT Rationale for DC Rec Based on functional ability.   Therapy Certification   Start of Care Date 04/18/22    Certification date from 04/18/22   Certification date to 05/02/22   Medical Diagnosis Acute exacerbation of CHF   Total Evaluation Time (Minutes)   Total Evaluation Time (Minutes) 23   OT Goals   Therapy Frequency (OT) 5 times/wk   OT Predicted Duration/Target Date for Goal Attainment 04/25/22   OT Goals OT Goal 1   OT: Goal 1 Patient to demonstrate indep with HEP.   Psychosocial Support   Trust Relationship/Rapport care explained;choices provided;questions answered;questions encouraged   Family/Support System Care involvement promoted

## 2022-04-18 NOTE — H&P
[facility]    History and Physical - Hospitalist Service       Date of Admission:  4/17/2022    Assessment & Plan      Heather Rosas is a 71 year old female admitted on 4/17/2022.      Acute CHF: will update ECHO to characterize. 2018 ECHO shows normal diastolic and systolic function. Given smoking, age and poorly controlled HTN, this likely is acute on chronic diastolic CHF.    Associated HTN urgency: Metoprolol, add Amlodipine, Lasix and PRN Hydralazine    Possible acute on chronic COPD: RT treatments, empiric antibiotics, attempt sputum, short course of steroids    Associated acute hypoxic respiratory failure: oxygen support with weaning attempts    Associated sepsis: sepsis criteria met (WBC 14.3 + tachypnea RR 46). Lactic acid (1.3) and procalcitonin (<0.05) are normal. Will avoid IV fluids because of CHF and will trend lactic acid and procalcitonin     Tobacco dependence: Nicotine is available    General: check TSH, UA, drug screen, ETOH level     Diet:   Cardiac  DVT Prophylaxis: Pneumatic Compression Devices  Martin Catheter: Not present  Central Lines: PRESENT     Cardiac Monitoring: None  Code Status:   Full        Disposition Plan   Expected Discharge:>2 days  Anticipated discharge location:  Awaiting care coordination huddle          The patient's care was discussed with the Patient.    Gavin Gonzalez DO  Hospitalist Service  [facility]  Securely message with the Vocera Web Console (learn more here)  Text page via [employer] Paging/Directory         ______________________________________________________________________    Chief Complaint   Shortness of breath and bilateral lower leg swelling, productive cough and wheezing over the last two days    History is obtained from the patient, ER Provider, EHR review    History of Present Illness   Heather Rosas is a 71 year old female who has a substantial list of medical diagnoses, but a very short list of medications, pointing to non  compliance. She continues to smoke.    She noticed a rapid worsening of her shortness of breath and lower leg swelling; her lungs felt tight and she heard wheezing and she developed a cough producing white phlegm over the last two days    ER Course: vitals showed HTN (192/110) and tachypnea (RR 46); she was agitated because of shortness of breath and wheezes were audible with cough and expiration. Lab diagnostics resulted an elevated WBC (14.3) with left shift. The chemistry panel had an elevated AST (56) and normal ALT. BNP was elevated (2075), troponin was normal and the chest film showed CHF and airspace disease. HTN medication, RT treatment, steroids and Lasix were given    Review of Systems       Constitutional: No fever or chills, some generalized weakness, diminished appetite  Ears, Nose & Throat: no sore throat, no nasal drainage, no congestion. No ear pain, no ear drainage, no particular change in hearing  Eyes: no particular change in vision, no redness, no drainage  Cardiovascular: No chest pain at rest, no chest pain with familiar activities.  Pulmonary: tight, wheezing cough,increase in work of breathing and in shortness of breath with position changes or familiar activities  Gastrointestinal: No abdominal pain, no nausea, no vomiting, no diarrhea. No particular change in bowel movement pattern, no black stools, no bloody stools  Genitourinary: No particular change in incontinence, no pain with urination, no particular change in stream, no particular change in amount urinated with urge, no discharge  Skin: No particular change in bruising, no rashes, but both lower legs felt tight and swollen  Musculoskeletal: no particular change in strength, no particular change in muscle development  Neurological: no numbness and tingling, no headache, no particular change in balance, no dizziness  Psychologic: No particular change in depression and/or anxiety  Endocrine: No particular change in heat or cold  intolerance        Past Medical History    I have reviewed this patient's medical history and updated it with pertinent information if needed.   Past Medical History:   Diagnosis Date     Allergic rhinitis 01/01/2011     Anxiety 01/01/2011     Anxiety state, unspecified     Anxiety state     Dyslipidemia 11/26/2003     fibromyalgia 06/14/2004     GERD, Esophageal reflux 01/01/2011     HTN (hypertension)     Essential hypertension     Major depression, recurrent (H) 1/1/2011     Nonorganic sleep disorder, unspecified     Non-org. 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  Past Surgical History:   Procedure Laterality Date     ANGIOGRAM  02/2005    Normal      BIOPSY BREAST  1984    LT, normal     BYPASS GRAFT ARTERY CORONARY  09/10/2018     CARPAL TUNNEL RELEASE RT/LT  2005    RT, carpal tunnel     COLONOSCOPY  2011    Repeat in ten years      COLONOSCOPY  1996     COLONOSCOPY  2004     DILATION AND CURETTAGE, HYSTEROSCOPY, ABLATE ENDOMETRIUM, COMBINED N/A 2/19/2018    Procedure: COMBINED DILATION AND CURETTAGE, HYSTEROSCOPY, ABLATE ENDOMETRIUM;  HYSTEROSCOPY DILATION AND CURETTAGE, ENDOMETRIAL ABLATION;  Surgeon: Jose Nettles MD;  Location: [facility] OR     HYSTERECTOMY TOTAL ABD, NICK SALPINGO-OOPHORECTOMY, NODE DISSECTION, TUMOR DEBULKING, COMBINED  10/30/2018     INSERT PORT VASCULAR ACCESS N/A 12/11/2018    Procedure: PORT-A-CATH PLACEMENT;  Surgeon: Rafi Trinidad MD;  Location: [facility] OR     placement of central line  2005     REMOVE PORT VASCULAR ACCESS N/A 10/6/2020    Procedure: port a cath removal;  Surgeon: Rafi Trinidad MD;  Location: [facility] OR       Social History   I have reviewed this patient's social history and updated it with pertinent information if needed.  Social History  "    Tobacco Use     Smoking status: Current Some Day Smoker     Packs/day: 0.25     Types: Cigarettes     Start date: 1971     Last attempt to quit: 2013     Years since quittin.3     Smokeless tobacco: Never Used     Tobacco comment: pt declined, stated she would use, \"the patch\"   Substance Use Topics     Alcohol use: No     Comment: rare     Drug use: No       Family History   I have reviewed this patient's family history and updated it with pertinent information if needed.  Family History   Problem Relation Age of Onset     C.A.D. Father 45        (cause of death)      Other - See Comments Father         rheumatic fever      Allergies Father      Cancer Sister 56        Esophageal to bone cancer     Gastrointestinal Disease Mother         GERD     Cancer Mother         pancreatic ca (cause of death) /liver ca     Breast Cancer Maternal Aunt      Breast Cancer Maternal Aunt      Colon Cancer Paternal Aunt         (cause of death)      Crohn's Disease Other      Depression Maternal Uncle      Depression Maternal Aunt      Diabetes Paternal Grandmother         type 2     Neurologic Disorder Brother         neuropathy     Cancer Brother 58        Tonsilular cancer/ lymph node in neck     Allergies Brother      Breast Cancer Cousin      Breast Cancer Cousin      Breast Cancer Cousin        Prior to Admission Medications   Prior to Admission Medications   Prescriptions Last Dose Informant Patient Reported? Taking?   IBUPROFEN PO 2022 at Unknown time Self Yes Yes   Sig: Take 800 mg by mouth every 6 hours as needed for moderate pain   metoprolol tartrate (LOPRESSOR) 50 MG tablet 2022 at Unknown time  Yes Yes   Sig: Take 50 mg by mouth 2 times daily      Facility-Administered Medications: None     Allergies   Allergies   Allergen Reactions     Cats Other (See Comments)     Sneezing, runny nose     Codeine Sulfate Nausea and Vomiting and GI Disturbance     Fexofenadine Hcl      Allegra      " "Rosuvastatin Other (See Comments)     Dizziness - Crestor        Physical Exam   Vital Signs: Temp: 97.5  F (36.4  C) Temp src: Tympanic BP: (!) 193/107 Pulse: 66   Resp: 25 SpO2: 93 % O2 Device: Nasal cannula Oxygen Delivery: 3 LPM  Weight: 218 lbs 3.2 oz     Case reviewed with the ER Provider, EHR reviewed; patient seen in ER room 5    Vital signs:  Temp: 97.8  F (36.6  C) Temp src: Tympanic BP: (!) 189/86 Pulse: 68   Resp: 22 SpO2: 92 % O2 Device: None (Room air) Oxygen Delivery: 3 LPM   Weight: 99 kg (218 lb 3.2 oz)  Estimated body mass index is 44.07 kg/m  as calculated from the following:    Height as of 10/6/20: 1.499 m (4' 11\").    Weight as of this encounter: 99 kg (218 lb 3.2 oz).      General: No distress, interactive  Head: normocephalic, no obvious trauma  Eyes: Gaze directed normally, sclera clear, no discharge, no abnormal ocular movements  Ears: Normal appearing age-related external ears, no discharge, stable hearing acuity loss  Nose: Normal age-related appearance  Mouth: Normal appearing oral mucosa, Gums and throat appear age-related normal  Neck: Normal age-related appearance, age-related range of motion, supple, no adenopathy  Pulmonary: Normal work of breathing at rest with 2 LPM NC, some expiratory delay, scattered coarseness, loud wheezing with expiration and coughing  Cardiovascular: Distant heart sounds, regular rhythm   Abdomen: No obvious distention, soft, bowel sounds present with normal frequency and pitch  Rectal: Deferred  Back: Age-related normal  Skin: Age-related normal, no rashes  Extremities: Not tender, bilateral lower extremity edema to knees. Moving upper and lower extremities  Neurological: Grossly in tact  Psychiatric: Mood is stable, appropriately interactive        Data   Data reviewed today: I reviewed all medications, new labs and imaging results over the last 24 hours.   "

## 2022-04-18 NOTE — ED NOTES
Pt significantly anxious and agitated stating she can't breathe. O2 sats stable at baseline. Pt refused to keep mask on and was demanding that the door be left open. Due to covid test not being resulted, she was told that if she kept her mask on, the door would be opened. She was agreeable to this. Shortly afterwards, she was standing in her doorway with her mask off. Pt was escorted back to her bed and the door was closed. She was getting increasingly more anxious and O2 sats began to drop. Pt encouraged to sit down in bed so O2 therapy could be initiated. DI came to room to get a chest XR, but she refused to sit in bed. Pt was informed that we were not able to provide care for her if she did not cooperate. She was informed that she was welcome to leave if she was not willing to be cared for. She agreed to cooperate and sat down on bed.

## 2022-04-18 NOTE — PLAN OF CARE
"Most recent vitals: /77 (BP Location: Right arm, Patient Position: Sitting)   Pulse 64   Temp 98.5  F (36.9  C) (Tympanic)   Resp 22   Ht 1.499 m (4' 11\")   Wt 92.6 kg (204 lb 2.3 oz)   SpO2 94%   BMI 41.23 kg/m    Pt has been awake, A&O for majority of this shift. Denies pain. BP's improved w/ IV Lasix and BP meds. Denies HTN c/o. No change to BLE. Pt did agree to shower and wash hair this shift. Up to chair for majority of this shift. IV SL. PO abx this shift. Adequate intake. Did work with PT/ OT this shift.     Safety:  Bed in lowest position, call button within reach. Although, does not call appropriately and needs reminding w/ call button and TV remote.     Discharge care conference held.   Attendees: Nursing, , Physical Therapy, Occupational Therapy, Pharmacy, Physician, and NP  Comments:    Face to face report given with opportunity to observe patient.    Report given to LORENA Summers RN   4/18/2022  3:28 PM        "

## 2022-04-18 NOTE — PLAN OF CARE
[facility] Inpatient Admission Note:    Patient admitted to 3114/3114-1 at approximately 0207 via wheel chair accompanied by transport tech from emergency room . Report received from LORENA Ferrer in SBAR format at 0153 via telephone. Patient ambulated to bed via self.. Patient is alert and oriented X 3, denies pain; rates at 0 on 0-10 scale.  Patient oriented to room, unit, hourly rounding, and plan of care. Explained admission packet and patient handbook with patient bill of rights brochure. Will continue to monitor and document as needed.     Inpatient Nursing criteria listed below was met:    Health care directives status obtained and documented: Yes    Patient identifies a surrogate decision maker: No If yes, who: N/A Contact Information: N/A     If initial lactic acid greater than 2.0, repeat lactic acid drawn within one hour of arrival to unit: N/A. If no, state reason: Lactic 1.3    Clergy visit ordered if patient requests: N/A    Skin issues/needs documented: Yes    Isolation Patient: no Education given, correct sign in place and documentation row added to PCS:  No    Fall Prevention Yes: Care plan updated, education given and documented, sticker and magnet in place: Yes    Care Plan initiated: Yes    Education Documented (including assessment): Yes    Patient has discharge needs : unsure at this time If yes, please explain: N/A

## 2022-04-18 NOTE — CARE PLAN
Prior to Admission Medication Reconciliation:     Medications added:   [] None  [x] As listed below:    Asa- pt takes 325 mg daily    Vitamin e- pt reports using up to twice monthly for irregularity- dose unknown    Medications deleted:   [] None  [x] As listed below:    Coumadin- discharged from coumadin clinic on 11/17/21 due to no shows     Medications marked for review/removal by attending:  [x] None  [] As listed below:    Changes made to existing medications:   [] None  [] Updated strengths and frequencies to most current  [x] As listed below:    lipitor and lasix- supposed to be taking but is not    Metoprolol- reports she was taking 1 tablet every couple of days up until a couple days ago she started taking BID    Last times/dates taken verified with patient:  [x] Yes- completed myself  [] Prepared PTA medlist for review only. (will not be available to review personally)  [] Did not review with patient. Rx verification only. Review completed by nursing.    [] Nurse completed no changes made (double checked entries)  [] Unable to review with patient at this time:  [] Nurse completed/changes made:     Allergies listed at another location:  []Allergies match allergies listed in Epic  []Other allergies listed:    Allergy review:    [x]Did not review: reviewed by nursing  []Did not review: pt unable at this time  []Patient/MAR verified NKDA  []Patient/MAR verified current existing allergies: no changes made  []Patient confirmed current existing allergies and new allergies added:    Medication reconciliation sources:   [x]Patient  []Patient family member/emergency contact: **  [][facility] Report Review  []Epic Chart Review  [x]Care Everywhere review:  Medication Sig Dispensed Refills Start Date End Date Status   perphenazine (TRILAFON) 2 MG tablet   Take 1 Tab by mouth at bedtime.   0 09/18/2018   Active    MG capsule   TAKE 2 CAPSULES BY MOUTH AT BEDTIME AS NEEDED FOR CONSTIPATION 50 Cap   9 04/18/2019    Active   senna (SENOKOT) 8.6 MG tablet   Take 1 Tab by mouth as needed for Constipation. Once daily doses should be taken at bedtime. 90 Tab   3 04/16/2019   Active   Omega-3 Fatty Acids (Fish Oil) 1000 MG Capsule   Take by mouth.   0     Active   metoprolol tartrate (Lopressor) 50 MG tablet   Take 1 Tablet by mouth two times a day. 60 Tablet   0 10/13/2021   Active   furosemide (Lasix) 20 MG tablet   Take 1 Tablet by mouth one time a day. 30 Tablet   0 10/14/2021   Active   aspirin EC 81 MG tablet   Take 1 Tablet by mouth one time a day. Do not split or crush. 100 Tablet   0 10/13/2021   Active   atorvaSTATin (Lipitor) 20 MG tablet   Take 1 Tablet by mouth with supper. 90 Tablet   0 10/13/2021   Active   warfarin (Coumadin) 2 MG tablet   Take as directed by [facility]. 90 Tablet   1 10/14/2021   Active     []Pharmacy med list: **  []Pharmacy phone call  [x]Outside meds dispense report: see below  []Nursing home or Assisted Living MAR:  []Other: **    Pharmacy desired at discharge: [facility] [place]    Is patient on coumadin?  [x]No      Requests for consultation by provider or pharmacist:   [x] Patient understands why all of their meds were prescribed and how to take them. No questions.   [] Managing party has no questions.   [] Patient/ managing party has questions about the following:  [] Did not review with patient. Cannot assess.     Fill dates and reported compliancy:  [] Fill dates coincide with reported compliancy for all/most maintenance meds.   [x] Fill dates do not coincide with compliancy with maintenance meds. See notes in PTA medlist and in comments.    [] Fill dates do not coincide with the following medications but pt reports compliancy:  [] Did not review with patient. Cannot assess.     Historian accuracy:  [] Excellent- alert and oriented, understands why meds were prescribed and how to take, able to answer specifics  [x] Good- alert and oriented, understands why meds were prescribed and how to  take, some confusion   [] Fair- alert and oriented, doesn't know medications without list, cannot answer specifics about medications, but has a decent process for which to take at home  [] Poor- does not know medications, may not have a process to take at home, may be cognitively unable to review at this time  []Medication management done by family member or facility, no concerns about historian accuracy.   [] Did not review with patient. Cannot assess.     Medication Management:  [x] Manages meds independently  [] Family member/ other party manages meds/assists:  [] Meds managed by staff at facility  [] Meds set up by home care, family/other party helps administer  [] Meds set up by home care, self administers  [] Did not review with patient. Cannot assess.     Other medications aside from PTA:  [x] Denies taking any medications aside from those listed in PTA meds  [] Reports taking another medication(s) but cannot recall the name(s)  [] Refuses to say.  [] Did not review with patient. Cannot assess.     Comments: non-compliant    Beth Nails on 4/18/2022 at 7:23 AM       Discrepancies: [x] No []Not Applicable []Yes: listed below    Issues completing PTA medication reconciliation:  [] On hold for a long time  [] Waited for a call back  [] Fax didn't come through  [] Fax took a long time  [] Other:    Notifying appropriate party of changes/additions/discrepancies:  []No pertinent changes made, notification not necessary.   [x] Notified attending provider via text page/phone call  [] Notified attending provider in person  [] Notified pharmacy  [] Notified nurse  [] Medications have not been reconciled by a provider yet, notification not necessary  [] Pt is not admitted to floor yet, PTA meds completed before admission.     Medications Prior to Admission   Medication Sig Dispense Refill Last Dose     aspirin (ASA) 325 MG EC tablet Take 325 mg by mouth daily in the afternoon.   4/15/2022 at PM     ibuprofen  (ADVIL/MOTRIN) 200 MG tablet Take 400-600 mg by mouth 2 times daily as needed for moderate pain up to three times weekly.   4/16/2022 at Unknown time     metoprolol tartrate (LOPRESSOR) 50 MG tablet Take 50 mg by mouth 2 times daily   4/17/2022 at Unknown time     VITAMIN E PO Take 1 capsule by mouth as needed twice monthly for regularity.   Past Month at Unknown time     atorvastatin (LIPITOR) 20 MG tablet Take 20 mg by mouth daily (with dinner) (Patient not taking: Reported on 4/18/2022)   Not Taking at Unknown time     furosemide (LASIX) 20 MG tablet Take 20 mg by mouth daily (Patient not taking: Reported on 4/18/2022)   Not Taking at Unknown time               Medication Dispense History (from 4/18/2021 to 4/17/2022)  Expand All  Collapse All    Atorvastatin Calcium     Dispensed Days Supply Quantity Provider Pharmacy   ATORVASTATIN TAB 20M 10/13/2021 90 90 tablet [address]        Furosemide     Dispensed Days Supply Quantity Provider Pharmacy   FUROSEMIDE   TAB 20M 10/13/2021 30 30 tablet [address]        Metoprolol Succinate     Dispensed Days Supply Quantity Provider Pharmacy   METOPROL SUC TAB 50M 07/12/2021 14 14 tablet [facility] [place]   METOPROLOL ER SUCCINATE 50MG TABS 05/18/2021 [facility]        Metoprolol Tartrate     Dispensed Days Supply Quantity Provider Pharmacy   METOPROL TAR TAB 50M 10/13/2021 30 60 tablet [address]        Warfarin Sodium     Dispensed Days Supply Quantity Provider Pharmacy   WARFARIN     TAB 2MG 10/13/2021 30 90 tablet [address]        Disclaimer    Certain dispenses may not be available or accurate in this report, including over-the-counter medications, low cost prescriptions, prescriptions paid for by the patient or non-participating sources, or errors in insurance claims information. The  provider should independently verify medication history with the patient.    External Sources

## 2022-04-18 NOTE — PROGRESS NOTES
04/18/22 1130   Signing Clinician's Name / Credentials   Signing clinician's name / credentials Simi EVANS OTR/L   Maikel Adds   Rehab Discipline OT   Therapeutic Exercise   Minutes of Treatment 13   Symptoms Noted During/After Treatment shortness of breath   Treatment Detail Completed (B) UE resisted exercises with orange t-band for shoulder flexion, hor abd, abd, and triceps. 1 x 10 for shoulder exercises and 1 x 15 for tricep. O2 sats on room air with resisted exercises 96%.   OT: Cognitive   Minutes of Treatment 10 Minutes   Symptoms Noted During/After Treatment None   Treatment Detail SLUMS completed with a score of 28/30 which indicates normal cognition.   OT Discharge Planning   OT Discharge Recommendation (DC Rec) home   OT Rationale for DC Rec Based on functional ability.   Additional Documentation   Rehab Comments Patient participated well and verbalized understanding of session.   OT Plan work on HEP.   Total Session Time   Total Session Time (minutes) 23 minutes

## 2022-04-18 NOTE — PROGRESS NOTES
04/18/22 1400   Quick Adds   Type of Visit Initial PT Evaluation   Living Environment   People in Home alone   Current Living Arrangements house   Home Accessibility stairs to enter home   Number of Stairs, Main Entrance 2   Stair Railings, Within Home, Primary railings safe and in good condition   Transportation Anticipated car, drives self   Self-Care   Usual Activity Tolerance good   Current Activity Tolerance good   Equipment Currently Used at Home none   Fall history within last six months no   General Information   Onset of Illness/Injury or Date of Surgery 04/17/22   Referring Physician Dr. Gonzalez   Pertinent History of Current Problem (include personal factors and/or comorbidities that impact the POC) Pt hospitalized c CHF   Existing Precautions/Restrictions no known precautions/restrictions   Cognition   Affect/Mental Status (Cognition) WNL   Orientation Status (Cognition) oriented x 4   Follows Commands (Cognition) WNL   Pain Assessment   Patient Currently in Pain No   Integumentary/Edema   Integumentary/Edema no deficits were identifed   Posture    Posture Not impaired   Range of Motion (ROM)   Range of Motion ROM is WNL   Strength (Manual Muscle Testing)   Strength (Manual Muscle Testing) strength is WFL   Transfers   Transfers sit-stand transfer   Sit-Stand Transfer   Sit-Stand [place] (Transfers) independent   Gait/Stairs (Locomotion)   [place] Level (Gait) independent   Distance in Feet (Required for LE Total Joints) 250'   Pattern (Gait) swing-through   Deviations/Abnormal Patterns (Gait) base of support, wide   Sensory Examination   Sensory Perception patient reports no sensory changes   Coordination   Coordination no deficits were identified   Muscle Tone   Muscle Tone no deficits were identified   Clinical Impression   Criteria for Skilled Therapeutic Intervention Evaluation only;Current level of function same as previous level of function   PT Diagnosis (PT) Decreased activity  tolerance   Influenced by the following impairments Mildy decreased activity tolerance   Functional limitations due to impairments Pt is at baseline with mobility   Clinical Presentation (PT Evaluation Complexity) Stable/Uncomplicated   Clinical Presentation Rationale Clinical judgement   Clinical Decision Making (Complexity) low complexity   Planned Therapy Interventions (PT) home exercise program   Risk & Benefits of therapy have been explained evaluation/treatment results reviewed;patient   Clinical Impression Comments PT evaluation completed to assess needs. Pt completed mobility independently today without AD. Provided pt with HEP as she is interested in strengthening lower extremities. Pt not interested in outpatient PT for strengthening at this time and plans to work on HEP.   PT Discharge Planning   PT Discharge Recommendation (DC Rec) home   PT Rationale for DC Rec PT evaluation completed to assess needs. Pt completed mobility independently today without AD. Provided pt with HEP as she is interested in strengthening lower extremities. Pt not interested in outpatient PT for strengthening at this time and plans to work on HEP.   PT Brief overview of current status Independent with mobility   Total Evaluation Time   Total Evaluation Time (Minutes) 10   Physical Therapy Goals   PT Frequency One time eval and treatment only

## 2022-04-18 NOTE — PLAN OF CARE
"/87 (BP Location: Right arm, Patient Position: Supine)   Pulse 67   Temp 97.8  F (36.6  C) (Tympanic)   Resp 22   Ht 1.499 m (4' 11\")   Wt 92.6 kg (204 lb 2.3 oz)   SpO2 95%   BMI 41.23 kg/m      Pt has been on and off drowsy, oriented, SBA to bedside commode, afebrile, free of falls or injury. Lots of pale UO, 3000+ ml. Crackles to lung bases, dim, sats stable on RA. Sats drop into mid 80s with activity. BP has been elevated - 189/86, given PO metoprolol, later 197/86 and given prn hydralazine, recheck 172/87. On tele - SB/SR 50s-60s first degree AVB. Frequent cough with no production this shift. IV SL, continues on IV ABX. Pt has gotten up a few times without calling.       Pt is unaware of own home meds besides the fact that she takes metoprolol, lipitor, ibuprofen, and others she does not know the name of. Pt unaware of doses as well. Some home meds were brought in in her bag and put into PTA rec before being placed in med room bin.     Face to face report given with opportunity to observe patient.    Report given to Zack LOUIS and Ricky DURAN, Cinda Lee RN   4/18/2022  7:44 AM      "

## 2022-04-19 VITALS
WEIGHT: 204.15 LBS | BODY MASS INDEX: 41.16 KG/M2 | DIASTOLIC BLOOD PRESSURE: 86 MMHG | HEIGHT: 59 IN | HEART RATE: 60 BPM | RESPIRATION RATE: 18 BRPM | TEMPERATURE: 98 F | SYSTOLIC BLOOD PRESSURE: 167 MMHG | OXYGEN SATURATION: 96 %

## 2022-04-19 LAB
ALBUMIN SERPL-MCNC: 3.2 G/DL (ref 3.4–5)
ALP SERPL-CCNC: 110 U/L (ref 40–150)
ALT SERPL W P-5'-P-CCNC: 31 U/L (ref 0–50)
ANION GAP SERPL CALCULATED.3IONS-SCNC: 6 MMOL/L (ref 3–14)
AST SERPL W P-5'-P-CCNC: 19 U/L (ref 0–45)
BILIRUB SERPL-MCNC: 0.3 MG/DL (ref 0.2–1.3)
BUN SERPL-MCNC: 26 MG/DL (ref 7–30)
CALCIUM SERPL-MCNC: 9 MG/DL (ref 8.5–10.1)
CHLORIDE BLD-SCNC: 102 MMOL/L (ref 94–109)
CO2 SERPL-SCNC: 29 MMOL/L (ref 20–32)
CREAT SERPL-MCNC: 0.67 MG/DL (ref 0.52–1.04)
ERYTHROCYTE [DISTWIDTH] IN BLOOD BY AUTOMATED COUNT: 13.3 % (ref 10–15)
GFR SERPL CREATININE-BSD FRML MDRD: >90 ML/MIN/1.73M2
GLUCOSE BLD-MCNC: 125 MG/DL (ref 70–99)
HCT VFR BLD AUTO: 42.1 % (ref 35–47)
HGB BLD-MCNC: 14 G/DL (ref 11.7–15.7)
MCH RBC QN AUTO: 29.7 PG (ref 26.5–33)
MCHC RBC AUTO-ENTMCNC: 33.3 G/DL (ref 31.5–36.5)
MCV RBC AUTO: 89 FL (ref 78–100)
PLATELET # BLD AUTO: 212 10E3/UL (ref 150–450)
POTASSIUM BLD-SCNC: 3.5 MMOL/L (ref 3.4–5.3)
PROT SERPL-MCNC: 7 G/DL (ref 6.8–8.8)
RBC # BLD AUTO: 4.72 10E6/UL (ref 3.8–5.2)
SODIUM SERPL-SCNC: 137 MMOL/L (ref 133–144)
WBC # BLD AUTO: 17.7 10E3/UL (ref 4–11)

## 2022-04-19 PROCEDURE — 36415 COLL VENOUS BLD VENIPUNCTURE: CPT | Performed by: INTERNAL MEDICINE

## 2022-04-19 PROCEDURE — 94640 AIRWAY INHALATION TREATMENT: CPT

## 2022-04-19 PROCEDURE — 250N000009 HC RX 250: Performed by: INTERNAL MEDICINE

## 2022-04-19 PROCEDURE — 250N000011 HC RX IP 250 OP 636: Performed by: INTERNAL MEDICINE

## 2022-04-19 PROCEDURE — 999N000157 HC STATISTIC RCP TIME EA 10 MIN

## 2022-04-19 PROCEDURE — 250N000013 HC RX MED GY IP 250 OP 250 PS 637: Performed by: INTERNAL MEDICINE

## 2022-04-19 PROCEDURE — 99239 HOSP IP/OBS DSCHRG MGMT >30: CPT | Performed by: INTERNAL MEDICINE

## 2022-04-19 PROCEDURE — 80053 COMPREHEN METABOLIC PANEL: CPT | Performed by: INTERNAL MEDICINE

## 2022-04-19 PROCEDURE — 85027 COMPLETE CBC AUTOMATED: CPT | Performed by: INTERNAL MEDICINE

## 2022-04-19 RX ORDER — DOXYCYCLINE 100 MG/1
100 CAPSULE ORAL EVERY 12 HOURS
Qty: 10 CAPSULE | Refills: 0 | Status: SHIPPED | OUTPATIENT
Start: 2022-04-19 | End: 2022-04-24

## 2022-04-19 RX ORDER — AMLODIPINE BESYLATE 5 MG/1
5 TABLET ORAL DAILY
Qty: 30 TABLET | Refills: 0 | Status: SHIPPED | OUTPATIENT
Start: 2022-04-19 | End: 2022-10-27

## 2022-04-19 RX ORDER — DEXAMETHASONE 6 MG/1
6 TABLET ORAL DAILY
Qty: 5 TABLET | Refills: 0 | Status: SHIPPED | OUTPATIENT
Start: 2022-04-19 | End: 2022-10-27

## 2022-04-19 RX ORDER — CEFDINIR 300 MG/1
300 CAPSULE ORAL 2 TIMES DAILY
Qty: 10 CAPSULE | Refills: 0 | Status: SHIPPED | OUTPATIENT
Start: 2022-04-19 | End: 2022-04-24

## 2022-04-19 RX ORDER — LACTOBACILLUS RHAMNOSUS GG 10B CELL
1 CAPSULE ORAL 2 TIMES DAILY
Qty: 20 CAPSULE | Refills: 0 | Status: SHIPPED | OUTPATIENT
Start: 2022-04-19 | End: 2022-04-29

## 2022-04-19 RX ADMIN — AMLODIPINE BESYLATE 5 MG: 5 TABLET ORAL at 09:14

## 2022-04-19 RX ADMIN — DOXYCYCLINE HYCLATE 100 MG: 100 CAPSULE ORAL at 09:15

## 2022-04-19 RX ADMIN — DEXAMETHASONE 6 MG: 2 TABLET ORAL at 09:16

## 2022-04-19 RX ADMIN — Medication 1 CAPSULE: at 09:15

## 2022-04-19 RX ADMIN — IPRATROPIUM BROMIDE AND ALBUTEROL SULFATE 3 ML: .5; 3 SOLUTION RESPIRATORY (INHALATION) at 07:46

## 2022-04-19 RX ADMIN — METOPROLOL TARTRATE 50 MG: 50 TABLET, FILM COATED ORAL at 09:15

## 2022-04-19 ASSESSMENT — ENCOUNTER SYMPTOMS
FEVER: 0
CHILLS: 0
COUGH: 0
SHORTNESS OF BREATH: 1

## 2022-04-19 ASSESSMENT — ACTIVITIES OF DAILY LIVING (ADL)
ADLS_ACUITY_SCORE: 3

## 2022-04-19 NOTE — PLAN OF CARE
Goal Outcome Evaluation:     Patient discharged at 10:55 AM via wheel chair accompanied by staff. Prescriptions sent to patients preferred pharmacy.     Discharge instructions reviewed with patient. Listed belongings gathered and returned to patient. All belongings left with patient including clothing, purse, shoes, and pt confirms belongings are with her.     Patient discharged to home.     [facility] [place]  Follow up appointment made:  Yes  Home medications returned to patient: N/A  Patient reports pain was well managed at discharge: Yes

## 2022-04-19 NOTE — DISCHARGE INSTRUCTIONS
Occupational Therapy Discharge Summary    Reason for therapy discharge:    All goals and outcomes met, no further needs identified.    Progress towards therapy goal(s). See goals on Care Plan in [facility] electronic health record for goal details.  Goals met    Therapy recommendation(s):    No further therapy is recommended. Continue home exercise program

## 2022-04-19 NOTE — DISCHARGE SUMMARY
[facility]    Discharge Summary  Hospitalist    Date of Admission:  4/17/2022  Date of Discharge:  4/19/2022  Discharging Provider: Katie Denson MD  Date of Service (when I saw the patient): 04/19/22    Discharge Diagnoses   Active Problems:    Acute exacerbation of CHF (congestive heart failure) (H)  Acute diastolic CHF  Hypertensive urgency  Acute exacerbation of asthma  Mild persistent asthma  Acute hypoxic respiratory failure  Acute on chronic COPD  Tobacco dependence  Rule out sepsis    History of Present Illness   Heather Rosas is an 71 year old female who presented with shortness of breath.  Please see admission H+P for additional details.    Hospital Course   Heather Rosas was admitted on 4/17/2022.  71-year-old female with known history of hypertension noticed a rapid worsening of her shortness of breath and lower leg swelling prior to admission.  She was found to have actively elevated blood pressure over 200 systolic, in conjunction with lower leg swelling was consistent with acute heart failure.  In addition, she had elevated WBC as well as wheezing on exam.  Cardiac asthma was consideration, however patient was also treated for possible URI induced asthma exacerbation.  Patient improved quickly, able to be weaned to room air.  She feels at her baseline at this time.  She was treated with ceftriaxone, azithromycin, Decadron, and Lasix.  She is on metoprolol 50 twice daily at home, we added amlodipine 5 mg to her regimen, her blood pressures seemed better controlled with systolics in the 160s.  We will discharge her with amlodipine.  We will also give her 5-day course of Decadron, empiric antibiotics.  We will have her follow-up with her primary care physician within 7 days to assess for continued wellbeing.  Of note, repeat echocardiogram cannot be obtained due to lack of availability of the service at this hospital.  Patient should have a repeat echocardiogram done as an  outpatient.    Katie Denson MD      Significant Results and Procedures   See below.    Pending Results   These results will be followed up by No Ref-Primary, Physician    Unresulted Labs Ordered in the Past 30 Days of this Admission     No orders found from 3/18/2022 to 4/18/2022.          Code Status   Full Code       Primary Care Physician   Physician No Ref-Primary    Physical Exam   Temp: 98  F (36.7  C) Temp src: Tympanic BP: 167/86 Pulse: 60   Resp: 18 SpO2: 96 % O2 Device: None (Room air)    Vitals:    04/17/22 2349 04/18/22 0444   Weight: 99 kg (218 lb 3.2 oz) 92.6 kg (204 lb 2.3 oz)     Vital Signs with Ranges  Temp:  [97.1  F (36.2  C)-98.5  F (36.9  C)] 98  F (36.7  C)  Pulse:  [60-73] 60  Resp:  [18-22] 18  BP: (144-167)/(59-86) 167/86  SpO2:  [93 %-96 %] 96 %  I/O last 3 completed shifts:  In: 886 [P.O.:880; I.V.:6]  Out: -     Constitutional: AA, NAD, obese  Eyes: PERRLA, no injection, no icterus  HEENT: atraumatic, normocephalic  Respiratory: diminished breath sounds b/l  Cardiovascular: S1 S2 RRR  GI: soft, NT, ND, + bowel sounds  Lymph/Hematologic: no palpable lymphadenopathy  Skin: no rashes, no lesions  Musculoskeletal: No edema, good tone, no deformities  Neurologic: oriented x 3, no focal deficits  Psychiatric: appropriate affect    Discharge Disposition   Discharged to home  Condition at discharge: Stable    Consultations This Hospital Stay   PHYSICAL THERAPY ADULT IP CONSULT  OCCUPATIONAL THERAPY ADULT IP CONSULT    Time Spent on this Encounter   I, Katie Denson MD, personally saw the patient today and spent greater than 30 minutes discharging this patient.    Discharge Orders      Reason for your hospital stay    Asthma/COPD exacebation     Follow-up and recommended labs and tests     Follow up with primary care provider, Physician No Ref-Primary, within 7 days for hospital follow- up.  No follow up labs or test are needed.     Activity    Your activity upon discharge: activity as  tolerated     Diet    Follow this diet upon discharge: Orders Placed This Encounter      Combination Diet Low Saturated Fat Na <2400mg Diet     Discharge Medications   Current Discharge Medication List      START taking these medications    Details   amLODIPine (NORVASC) 5 MG tablet Take 1 tablet (5 mg) by mouth daily  Qty: 30 tablet, Refills: 0    Associated Diagnoses: Benign essential hypertension      cefdinir (OMNICEF) 300 MG capsule Take 1 capsule (300 mg) by mouth 2 times daily for 5 days  Qty: 10 capsule, Refills: 0    Associated Diagnoses: Moderate asthma with exacerbation, unspecified whether persistent      dexamethasone (DECADRON) 6 MG tablet Take 1 tablet (6 mg) by mouth daily for 5 days  Qty: 5 tablet, Refills: 0    Associated Diagnoses: Moderate asthma with exacerbation, unspecified whether persistent      doxycycline hyclate (VIBRAMYCIN) 100 MG capsule Take 1 capsule (100 mg) by mouth every 12 hours for 5 days  Qty: 10 capsule, Refills: 0    Associated Diagnoses: Moderate asthma with exacerbation, unspecified whether persistent      lactobacillus rhamnosus, GG, (CULTURELL) capsule Take 1 capsule by mouth 2 times daily for 10 days  Qty: 20 capsule, Refills: 0    Associated Diagnoses: Moderate asthma with exacerbation, unspecified whether persistent         CONTINUE these medications which have NOT CHANGED    Details   aspirin (ASA) 325 MG EC tablet Take 325 mg by mouth daily in the afternoon.      ibuprofen (ADVIL/MOTRIN) 200 MG tablet Take 400-600 mg by mouth 2 times daily as needed for moderate pain up to three times weekly.      metoprolol tartrate (LOPRESSOR) 50 MG tablet Take 50 mg by mouth 2 times daily      VITAMIN E PO Take 1 capsule by mouth as needed twice monthly for regularity.      atorvastatin (LIPITOR) 20 MG tablet Take 20 mg by mouth daily (with dinner)      furosemide (LASIX) 20 MG tablet Take 20 mg by mouth daily           Allergies   Allergies   Allergen Reactions     Cats Other (See  Comments)     Sneezing, runny nose     Codeine Sulfate Nausea and Vomiting and GI Disturbance     Fexofenadine Hcl      Allegra      Rosuvastatin Other (See Comments)     Dizziness - Crestor      Data   Most Recent 3 CBC's:  Recent Labs   Lab Test 04/19/22  0504 04/17/22  2356 10/11/21  2012   WBC 17.7* 14.3* 12.3*   HGB 14.0 15.0 14.1   MCV 89 93 91    193 215      Most Recent 3 BMP's:  Recent Labs   Lab Test 04/19/22  0504 04/17/22  2356 10/11/21  2012    138 140   POTASSIUM 3.5 3.8 4.0   CHLORIDE 102 106 109   CO2 29 27 26   BUN 26 17 11   CR 0.67 0.91 0.80   ANIONGAP 6 5 5   CARINA 9.0 9.0 8.8   * 103* 100*     Most Recent 2 LFT's:  Recent Labs   Lab Test 04/19/22  0504 04/17/22 2356   AST 19 56*   ALT 31 48   ALKPHOS 110 132   BILITOTAL 0.3 0.6     Most Recent INR's and Anticoagulation Dosing History:  Anticoagulation Dose History     Recent Dosing and Labs Latest Ref Rng & Units 7/22/2017 5/14/2018 10/12/2021 10/13/2021    INR 2 - 3 0.98 1.01 1.0(A) 1.1(A)        Most Recent 3 Troponin's:  Recent Labs   Lab Test 10/11/21  2012 09/01/18  0950 05/14/18  1814 02/20/18  0449   TROPI  --  5.776* <0.015 1.994*   TROPONIN <0.015  --   --   --      Most Recent Cholesterol Panel:  Recent Labs   Lab Test 05/16/18  0538   CHOL 186   *   HDL 44*   TRIG 127     Most Recent 6 Bacteria Isolates From Any Culture (See EPIC Reports for Culture Details):  Recent Labs   Lab Test 05/15/18  0045 07/23/17  0032 07/23/17  0026   CULT No MRSA isolated No growth after 6 days No growth after 6 days     Most Recent TSH, T4 and A1c Labs:  Recent Labs   Lab Test 04/17/22  2358   TSH 1.39     Results for orders placed or performed during the hospital encounter of 04/17/22   XR Chest Port 1 View    Narrative    PROCEDURE:  XR CHEST PORT 1 VIEW    HISTORY:  dyspnea x 3 days.     COMPARISON:  None.    FINDINGS:   Heart is enlarged. Postoperative changes are seen in the mediastinum.  Widespread interstitial thickening  is noted. This is worsened from  October 2021 there are no pleural effusions.       Impression    IMPRESSION:Cardiomegaly, widespread interstitial thickening suspicious  for interstitial pulmonary edema    JEFERSON NEGRO MD         SYSTEM ID:  [id number]   POC US ECHO LIMITED    Impression    [facility] Procedure Note      Limited Bedside ED Cardiac Ultrasound:    PROCEDURE: PERFORMED BY: Dr. Carloz Ibarra MD  INDICATIONS/SYMPTOM:  Shortness of Breath  PROBE: Cardiac phased array probe  BODY LOCATION: Chest  FINDINGS:   The ultrasound was performed utilizing the apical 4 chamber views.  Cardiac contractility:  Present  Gross estimation of cardiac kinesis: depressed  Pericardial Effusion:  None  RV:LV ratio: Equal  INTERPRETATION:    Very difficulty study as pt would not lay flat, b/l b-lines, probable decreased EF  IMAGE DOCUMENTATION: Images were archived to PACs system.

## 2022-04-19 NOTE — PHARMACY-MEDICATION REGIMEN REVIEW
Pharmacy Antimicrobial Stewardship Assessment     Current Antimicrobial Therapy:  Anti-infectives (From now, onward)    Start     Dose/Rate Route Frequency Ordered Stop    22 0000  doxycycline hyclate (VIBRAMYCIN) 100 MG capsule         100 mg Oral EVERY 12 HOURS 22 0842 22 2359    22 0000  cefdinir (OMNICEF) 300 MG capsule         300 mg Oral 2 TIMES DAILY 22 0842 22 2359    22 2200  cefTRIAXone IN D5W (ROCEPHIN) intermittent infusion 2 g         2 g  100 mL/hr over 30 Minutes Intravenous EVERY 24 HOURS 22 1509      22 0800  doxycycline hyclate (VIBRAMYCIN) capsule 100 mg         100 mg Oral EVERY 12 HOURS SCHEDULED 22 0217            Indication: COPD Exacerbation    Days of Therapy: 3     Pertinent Labs:  Recent Labs   Lab Test 22  0504 22  2356   WBC 17.7* 14.3*     Recent Labs   Lab Test 22  0607 22  2356   LACT 1.7 1.3   PCAL <0.05 <0.05        Temperature:  Temp (24hrs), Av.9  F (36.6  C), Min:97.1  F (36.2  C), Max:98.5  F (36.9  C)    Culture Results:   (22) COVID/Influenza/RSV = negative    Recommendations/Interventions:  1. Patient is discharging today on 5 more days antibiotics    Dale Lovell, [facility]  2022

## 2022-04-19 NOTE — PLAN OF CARE
"Goal Outcome Evaluation: Unchanged       Reason for hospital stay:  Acute exacerbation of CHF   Living situation PTA: Lives at home by herself.   Most recent vitals: /78 (BP Location: Left arm, Patient Position: Semi-Gaspar's, Cuff Size: Adult Regular)   Pulse 62   Temp 97.7  F (36.5  C) (Tympanic)   Resp 20   Ht 1.499 m (4' 11\")   Wt 92.6 kg (204 lb 2.3 oz)   SpO2 95%   BMI 41.23 kg/m      Pain Management:  Pt denies having pain.   LOC:  Alert and orientated to person, place, time and situation.   Cardiac:  On telemetry. SR 70's per ICU nurse. Apical pulse regular.   Respiratory:  KHANG and RUL clear. LLL. RLL< and RML fine crackles. Shallow, unlabored breathing. Frequent, non-productive cough.   GI:  All quadrants audible and normoactive.   :  Voids spontaneously without difficulty.   Skin Issues:  Skin is pale, warm, and dry. Scattered bruises and abrasions throughout. No adjustments needed at this time.     IVF:  None. IV access left upper forearm. IV SL.   ABX:  IV Rocephin , PO Doxycyline.     Nutrition: Adequate. Regular diet.   ADL's:  Up in the chair for most of the shift.   Ambulation: Assist, Stand by.   Safety:  Bed in the low position, call light within reach, ID band in place, personal items within reach, free from falls, steady on feet, use of call light appropriately, and makes needs known.     Comments: Vitally stable and afebrile. Pt has  independently been using the bathroom in her room. HX: COPD, uncontrolled HTN, major depression, and schizophrenia.   Face to face report given with opportunity to observe patient.    Report given to LORENA Pan and [facility] Student.     Melina Jordan RN   4/19/2022  7:15 AM                 "

## 2022-04-19 NOTE — ED PROVIDER NOTES
History     Chief Complaint   Patient presents with     Cough     Shortness of Breath     HPI  Heather Rosas is a 71 year old female who is here w/ difficulty breathing. Has been present for a few days. Got worse this evening prompting her to call 911. Has not been taking prescribed amlodipine or lasix. No cough, fever or chills. No chest pain. Is not on home oxygen. Has smoked for at least around ten years.     Allergies:  Allergies   Allergen Reactions     Cats Other (See Comments)     Sneezing, runny nose     Codeine Sulfate Nausea and Vomiting and GI Disturbance     Fexofenadine Hcl      Allegra      Rosuvastatin Other (See Comments)     Dizziness - Crestor        Problem List:    Patient Active Problem List    Diagnosis Date Noted     Coronary artery disease involving native coronary artery 10/12/2021     Priority: Medium     Acute diastolic congestive heart failure (H) 10/12/2021     Priority: Medium     Atrial flutter with rapid ventricular response (H) 10/12/2021     Priority: Medium     Acute exacerbation of CHF (congestive heart failure) (H) 10/11/2021     Priority: Medium     Status post chemotherapy 09/25/2020     Priority: Medium     Added automatically from request for surgery [id number]       Endometrial adenocarcinoma (H) 12/03/2018     Priority: Medium     Malignant neoplasm of uterus, unspecified site (H) 12/03/2018     Priority: Medium     Atrial fibrillation (H) 11/26/2018     Priority: Medium     History of coronary artery disease 10/30/2018     Priority: Medium     Hyponatremia 10/30/2018     Priority: Medium     Moderate mitral regurgitation 10/30/2018     Priority: Medium     Adenocarcinoma of the endometrium/uterus (H) 10/09/2018     Priority: Medium     Discovered on 02/18 biopsy. Pt lost to f/u       Chronic diastolic congestive heart failure (H) 09/20/2018     Priority: Medium     S/P CABG x 4 09/10/2018     Priority: Medium     History of non-ST elevation myocardial infarction (NSTEMI)  09/06/2018     Priority: Medium     Endometrial cancer (H) 09/04/2018     Priority: Medium     Cerebrovascular accident (H) 05/14/2018     Priority: Medium     Hypertensive urgency 05/14/2018     Priority: Medium     CVA (cerebral vascular accident) (H) 05/14/2018     Priority: Medium     Chest pain 05/14/2018     Priority: Medium     Endometrial hyperplasia with atypia 05/09/2018     Priority: Medium     Metrorrhagia 02/18/2018     Priority: Medium     ACP (advance care planning) 05/20/2016     Priority: Medium     Advance Care Planning 5/20/2016: ACP Review of Chart / Resources Provided:  Reviewed chart for advance care plan.  Heather MATTHEW Rosas has no plan or code status on file. Discussed available resources and provided with information. Confirmed code status reflects current choices pending further ACP discussions.  Confirmed/documented legally designated decision makers.  Added by Margot Shannon             HTN (hypertension)      Priority: Medium     Major depressive disorder, recurrent episode, moderate (H) 01/01/2011     Priority: Medium     ANNA (generalized anxiety disorder) 01/01/2011     Priority: Medium     GERD (Gastroesophageal reflux disease) 01/01/2011     Priority: Medium     Obesity (H) 01/01/2011     Priority: Medium     Schizophrenia (H) 01/01/2011     Priority: Medium     Seasonal allergic rhinitis 01/01/2011     Priority: Medium     Fibromyalgia 06/14/2004     Priority: Medium     Dyslipidemia 11/26/2003     Priority: Medium        Past Medical History:    Past Medical History:   Diagnosis Date     Allergic rhinitis 01/01/2011     Anxiety 01/01/2011     Anxiety state, unspecified      Dyslipidemia 11/26/2003     fibromyalgia 06/14/2004     GERD, Esophageal reflux 01/01/2011     HTN (hypertension)      Major depression, recurrent (H) 1/1/2011     Nonorganic sleep disorder, unspecified      Obesity 01/01/2011     Schizophrenia (H) 01/01/2011     Toxic shock syndrome (H) 2006       Past Surgical  History:    Past Surgical History:   Procedure Laterality Date     ANGIOGRAM  02/2005    Normal      BIOPSY BREAST  1984    LT, normal     BYPASS GRAFT ARTERY CORONARY  09/10/2018     CARPAL TUNNEL RELEASE RT/LT  2005    RT, carpal tunnel     COLONOSCOPY  2011    Repeat in ten years      COLONOSCOPY  1996     COLONOSCOPY  2004     DILATION AND CURETTAGE, HYSTEROSCOPY, ABLATE ENDOMETRIUM, COMBINED N/A 2/19/2018    Procedure: COMBINED DILATION AND CURETTAGE, HYSTEROSCOPY, ABLATE ENDOMETRIUM;  HYSTEROSCOPY DILATION AND CURETTAGE, ENDOMETRIAL ABLATION;  Surgeon: Jose Nettles MD;  Location: [facility] OR     HYSTERECTOMY TOTAL ABD, NICK SALPINGO-OOPHORECTOMY, NODE DISSECTION, TUMOR DEBULKING, COMBINED  10/30/2018     INSERT PORT VASCULAR ACCESS N/A 12/11/2018    Procedure: PORT-A-CATH PLACEMENT;  Surgeon: Rafi Trinidad MD;  Location: [facility] OR     placement of central line  2005     REMOVE PORT VASCULAR ACCESS N/A 10/6/2020    Procedure: port a cath removal;  Surgeon: Rafi Trinidad MD;  Location: [facility] OR       Family History:    Family History   Problem Relation Age of Onset     C.A.D. Father 45        (cause of death)      Other - See Comments Father         rheumatic fever      Allergies Father      Cancer Sister 56        Esophageal to bone cancer     Gastrointestinal Disease Mother         GERD     Cancer Mother         pancreatic ca (cause of death) /liver ca     Breast Cancer Maternal Aunt      Breast Cancer Maternal Aunt      Colon Cancer Paternal Aunt         (cause of death)      Crohn's Disease Other      Depression Maternal Uncle      Depression Maternal Aunt      Diabetes Paternal Grandmother         type 2     Neurologic Disorder Brother         neuropathy     Cancer Brother 58        Tonsilular cancer/ lymph node in neck     Allergies Brother      Breast Cancer Cousin      Breast Cancer Cousin      Breast Cancer Cousin        Social History:  Marital Status:   [4]  Social History     Tobacco Use      "Smoking status: Current Some Day Smoker     Packs/day: 0.25     Types: Cigarettes     Start date: 1971     Last attempt to quit: 2013     Years since quittin.3     Smokeless tobacco: Never Used     Tobacco comment: pt declined, stated she would use, \"the patch\"   Substance Use Topics     Alcohol use: No     Comment: rare     Drug use: No        Medications:    No current outpatient medications on file.        Review of Systems   Constitutional: Negative for chills and fever.   Respiratory: Positive for shortness of breath. Negative for cough.    All other systems reviewed and are negative.      Physical Exam   BP: (!) 192/110  Pulse: 61  Temp: 97.5  F (36.4  C)  Resp: 20  Height: 149.9 cm (4' 11\")  Weight: 99 kg (218 lb 3.2 oz)  SpO2: 93 %      Physical Exam  Constitutional:       General: She is not in acute distress.     Appearance: She is not diaphoretic.   HENT:      Head: Normocephalic and atraumatic.      Right Ear: External ear normal.      Left Ear: External ear normal.      Nose: No congestion or rhinorrhea.      Mouth/Throat:      Pharynx: Oropharynx is clear. No oropharyngeal exudate.   Eyes:      General: No scleral icterus.     Pupils: Pupils are equal, round, and reactive to light.   Cardiovascular:      Rate and Rhythm: Normal rate and regular rhythm.      Heart sounds: Normal heart sounds.   Pulmonary:      Effort: No respiratory distress.      Breath sounds: Decreased breath sounds and wheezing present. No rhonchi or rales.   Abdominal:      General: Bowel sounds are normal.      Palpations: Abdomen is soft.      Tenderness: There is no abdominal tenderness.   Musculoskeletal:         General: No tenderness.      Cervical back: Normal range of motion and neck supple.      Right lower leg: No edema.      Left lower leg: No edema.   Skin:     General: Skin is warm.      Capillary Refill: Capillary refill takes less than 2 seconds.      Findings: No rash.   Neurological:      Mental " Status: Mental status is at baseline.      Cranial Nerves: No cranial nerve deficit.   Psychiatric:         Mood and Affect: Mood normal.         Behavior: Behavior normal.         ED Course                 Procedures    Results for orders placed during the hospital encounter of 04/17/22    POC US ECHO LIMITED    [facility] Procedure Note    Limited Bedside ED Cardiac Ultrasound:    PROCEDURE: PERFORMED BY: Dr. Carloz Ibarra MD  INDICATIONS/SYMPTOM:  Shortness of Breath  PROBE: Cardiac phased array probe  BODY LOCATION: Chest  FINDINGS:  The ultrasound was performed utilizing the apical 4 chamber views.  Cardiac contractility:  Present  Gross estimation of cardiac kinesis: depressed  Pericardial Effusion:  None  RV:LV ratio: Equal  INTERPRETATION:    Very difficulty study as pt would not lay flat, b/l b-lines, probable decreased EF  IMAGE DOCUMENTATION: Images were archived to PACs system.           Critical Care time:  was 69 minutes for this patient excluding procedures.             Results for orders placed or performed during the hospital encounter of 04/17/22 (from the past 24 hour(s))   Comprehensive metabolic panel   Result Value Ref Range    Sodium 137 133 - 144 mmol/L    Potassium 3.5 3.4 - 5.3 mmol/L    Chloride 102 94 - 109 mmol/L    Carbon Dioxide (CO2) 29 20 - 32 mmol/L    Anion Gap 6 3 - 14 mmol/L    Urea Nitrogen 26 7 - 30 mg/dL    Creatinine 0.67 0.52 - 1.04 mg/dL    Calcium 9.0 8.5 - 10.1 mg/dL    Glucose 125 (H) 70 - 99 mg/dL    Alkaline Phosphatase 110 40 - 150 U/L    AST 19 0 - 45 U/L    ALT 31 0 - 50 U/L    Protein Total 7.0 6.8 - 8.8 g/dL    Albumin 3.2 (L) 3.4 - 5.0 g/dL    Bilirubin Total 0.3 0.2 - 1.3 mg/dL    GFR Estimate >90 >60 mL/min/1.73m2   CBC with platelets   Result Value Ref Range    WBC Count 17.7 (H) 4.0 - 11.0 10e3/uL    RBC Count 4.72 3.80 - 5.20 10e6/uL    Hemoglobin 14.0 11.7 - 15.7 g/dL    Hematocrit 42.1 35.0 - 47.0 %    MCV 89 78 - 100 fL    MCH 29.7  26.5 - 33.0 pg    MCHC 33.3 31.5 - 36.5 g/dL    RDW 13.3 10.0 - 15.0 %    Platelet Count 212 150 - 450 10e3/uL       Medications   metoprolol tartrate (LOPRESSOR) tablet 50 mg (50 mg Oral Given 4/18/22 2115)   amLODIPine (NORVASC) tablet 5 mg (5 mg Oral Given 4/18/22 0842)   hydrALAZINE (APRESOLINE) injection 5 mg (5 mg Intravenous Given 4/18/22 0406)   doxycycline hyclate (VIBRAMYCIN) capsule 100 mg (100 mg Oral Given 4/18/22 2115)   lactobacillus rhamnosus (GG) (CULTURELL) capsule 1 capsule (1 capsule Oral Given 4/18/22 2115)   ipratropium - albuterol 0.5 mg/2.5 mg/3 mL (DUONEB) neb solution 3 mL (3 mLs Nebulization Given 4/18/22 2034)   albuterol (PROVENTIL) neb solution 2.5 mg (has no administration in time range)   dexamethasone (DECADRON) tablet 6 mg (6 mg Oral Given 4/18/22 0842)   furosemide (LASIX) injection 40 mg (40 mg Intravenous Given 4/18/22 0847)   lidocaine 1 % 0.1-1 mL (has no administration in time range)   lidocaine (LMX4) cream (has no administration in time range)   sodium chloride (PF) 0.9% PF flush 3 mL (3 mLs Intracatheter Given 4/19/22 0154)   sodium chloride (PF) 0.9% PF flush 3 mL (has no administration in time range)   melatonin tablet 5 mg (has no administration in time range)   acetaminophen (TYLENOL) tablet 650 mg (has no administration in time range)     Or   acetaminophen (TYLENOL) Suppository 650 mg (has no administration in time range)   magnesium hydroxide (MILK OF MAGNESIA) suspension 30 mL (has no administration in time range)   benzonatate (TESSALON) capsule 100 mg (has no administration in time range)   ondansetron (ZOFRAN-ODT) ODT tab 4 mg (has no administration in time range)     Or   ondansetron (ZOFRAN) injection 4 mg (has no administration in time range)   calcium carbonate (TUMS) chewable tablet 1,000 mg (has no administration in time range)   nicotine (COMMIT) lozenge 2 mg (has no administration in time range)   nicotine Patch in Place ( Transdermal Patch Free Period  4/19/22 0230)   nicotine (NICODERM CQ) 21 MG/24HR 24 hr patch 1 patch (has no administration in time range)   cefTRIAXone IN D5W (ROCEPHIN) intermittent infusion 2 g (2 g Intravenous New Bag 4/18/22 2114)   dexamethasone PF (DECADRON) injection 10 mg (10 mg Intravenous Given 4/18/22 0023)   ipratropium - albuterol 0.5 mg/2.5 mg/3 mL (DUONEB) neb solution 3 mL (3 mLs Nebulization Given 4/18/22 0036)   LORazepam (ATIVAN) injection 1 mg (1 mg Intravenous Given 4/18/22 0022)   furosemide (LASIX) injection 80 mg (80 mg Intravenous Given 4/18/22 0108)   amLODIPine (NORVASC) tablet 10 mg (10 mg Oral Given 4/18/22 0108)       Assessments & Plan (with Medical Decision Making)     I have reviewed the nursing notes.    I have reviewed the findings, diagnosis, plan and need for follow up with the patient.     Critical Care Addendum    My initial assessment, based on my review of prehospital provider report, review of nursing observations, review of vital signs, focused history, physical exam, review of cardiac rhythm monitor and 12 lead ECG analysis, established that Heather Rosas has respiratory failure, which requires immediate intervention, and therefore she is critically ill.     After the initial assessment, the care team initiated multiple lab tests, initiated IV fluid administration and initiated medication therapy with lasix, amlodipine to provide stabilization care. Due to the critical nature of this patient, I reassessed nursing observations, vital signs, physical exam, review of cardiac rhythm monitor, 12 lead ECG analysis, mental status, neurologic status and respiratory status multiple times prior to her disposition.     Time also spent performing documentation, reviewing test results and coordination of care.     Critical care time (excluding teaching time and procedures): 69 minutes.     70 yo f here w/ dyspnea. Probable dx of copd by smoking history (10 ppd years, minimum), so given seroids and duoneb w/  minimal improvement in sx. Echo = b/l blines. Given non-compliance w/ meds, probable chf exacerbation. Needs oxygen and needs cardiac optimization so would benefit from admission, pt agrees.    Current Discharge Medication List          Final diagnoses:   Acute on chronic congestive heart failure, unspecified heart failure type (H)       4/17/2022   [facility]     Carloz Ibarra MD  04/19/22 0703

## 2022-04-20 ENCOUNTER — TELEPHONE (OUTPATIENT)
Dept: CASE MANAGEMENT | Facility: HOSPITAL | Age: 72
End: 2022-04-20
Payer: MEDICARE

## 2022-04-20 NOTE — TELEPHONE ENCOUNTER
Attempted to make follow up phone call with patient. Unable to reach patient at this time. First attempt. Will attempt again.    Arti Hernández RN on 4/20/2022 at 3:32 PM

## 2022-04-25 ENCOUNTER — TELEPHONE (OUTPATIENT)
Dept: CASE MANAGEMENT | Facility: HOSPITAL | Age: 72
End: 2022-04-25
Payer: MEDICARE

## 2022-04-25 NOTE — TELEPHONE ENCOUNTER
Second/final attempt for follow up phone call. Unable to reach patient at this time.     Arti Hernández RN on 4/25/2022 at 1:54 PM

## 2022-04-27 ENCOUNTER — TELEPHONE (OUTPATIENT)
Dept: ANTICOAGULATION | Facility: OTHER | Age: 72
End: 2022-04-27
Payer: MEDICARE

## 2022-04-27 ENCOUNTER — TELEPHONE (OUTPATIENT)
Dept: FAMILY MEDICINE | Facility: OTHER | Age: 72
End: 2022-04-27
Payer: MEDICARE

## 2022-04-27 NOTE — TELEPHONE ENCOUNTER
"  Patient called back. She is not interested in taking the recommended medication due to her Atrial fib. She is not planning to establish care at this time. She would like to discuss taking a \"blood thinner\" with Monona. Discussed a few of the risks of not taking a anticoagulant. She was in denial that she has a-flutter, or any atrial fib. Discussed that this can come and go. She again stated that she would follow up with some one in [place] or the [facility]. Mere Mayo RN on 4/27/2022 at 4:26 PM    "

## 2022-04-27 NOTE — TELEPHONE ENCOUNTER
Call to son (adolfo) after verification of consent to talk with son. Concerned that patient has not answered phone or called back to clinic contacts and messages that have been left. Patient also no showed to an appointment yesterday to establish care to take warfarin that was prescribed to her.  Chart routed to anticoagulation and medication was discontinued on 4/18/22. Will close chart at this time.

## 2022-05-05 ENCOUNTER — TELEPHONE (OUTPATIENT)
Dept: MEDSURG UNIT | Facility: HOSPITAL | Age: 72
End: 2022-05-05
Payer: MEDICARE

## 2022-05-23 ENCOUNTER — APPOINTMENT (OUTPATIENT)
Dept: ULTRASOUND IMAGING | Facility: HOSPITAL | Age: 72
End: 2022-05-23
Attending: STUDENT IN AN ORGANIZED HEALTH CARE EDUCATION/TRAINING PROGRAM
Payer: MEDICARE

## 2022-05-23 ENCOUNTER — HOSPITAL ENCOUNTER (EMERGENCY)
Facility: HOSPITAL | Age: 72
Discharge: ANOTHER HOSPITAL | End: 2022-05-23
Attending: STUDENT IN AN ORGANIZED HEALTH CARE EDUCATION/TRAINING PROGRAM | Admitting: STUDENT IN AN ORGANIZED HEALTH CARE EDUCATION/TRAINING PROGRAM
Payer: MEDICARE

## 2022-05-23 ENCOUNTER — APPOINTMENT (OUTPATIENT)
Dept: GENERAL RADIOLOGY | Facility: HOSPITAL | Age: 72
End: 2022-05-23
Attending: STUDENT IN AN ORGANIZED HEALTH CARE EDUCATION/TRAINING PROGRAM
Payer: MEDICARE

## 2022-05-23 ENCOUNTER — APPOINTMENT (OUTPATIENT)
Dept: CT IMAGING | Facility: HOSPITAL | Age: 72
End: 2022-05-23
Attending: STUDENT IN AN ORGANIZED HEALTH CARE EDUCATION/TRAINING PROGRAM
Payer: MEDICARE

## 2022-05-23 VITALS
WEIGHT: 220.46 LBS | DIASTOLIC BLOOD PRESSURE: 63 MMHG | RESPIRATION RATE: 18 BRPM | TEMPERATURE: 99 F | BODY MASS INDEX: 44.53 KG/M2 | HEART RATE: 125 BPM | SYSTOLIC BLOOD PRESSURE: 105 MMHG | OXYGEN SATURATION: 99 %

## 2022-05-23 DIAGNOSIS — I50.9 ACUTE ON CHRONIC CONGESTIVE HEART FAILURE, UNSPECIFIED HEART FAILURE TYPE (H): ICD-10-CM

## 2022-05-23 DIAGNOSIS — J81.0 FLASH PULMONARY EDEMA (H): ICD-10-CM

## 2022-05-23 LAB
ALBUMIN SERPL-MCNC: 3.3 G/DL (ref 3.4–5)
ALBUMIN UR-MCNC: 100 MG/DL
ALP SERPL-CCNC: 115 U/L (ref 40–150)
ALT SERPL W P-5'-P-CCNC: 24 U/L (ref 0–50)
ANION GAP SERPL CALCULATED.3IONS-SCNC: 11 MMOL/L (ref 3–14)
APPEARANCE UR: CLEAR
AST SERPL W P-5'-P-CCNC: 29 U/L (ref 0–45)
BASE EXCESS BLDA CALC-SCNC: -4.2 MMOL/L (ref -9–1.8)
BASE EXCESS BLDV CALC-SCNC: -6.6 MMOL/L (ref -7.7–1.9)
BASOPHILS # BLD AUTO: 0.1 10E3/UL (ref 0–0.2)
BASOPHILS NFR BLD AUTO: 1 %
BILIRUB SERPL-MCNC: 1 MG/DL (ref 0.2–1.3)
BILIRUB UR QL STRIP: NEGATIVE
BUN SERPL-MCNC: 17 MG/DL (ref 7–30)
CALCIUM SERPL-MCNC: 8.7 MG/DL (ref 8.5–10.1)
CHLORIDE BLD-SCNC: 102 MMOL/L (ref 94–109)
CO2 SERPL-SCNC: 22 MMOL/L (ref 20–32)
COLOR UR AUTO: ABNORMAL
CREAT SERPL-MCNC: 0.83 MG/DL (ref 0.52–1.04)
EOSINOPHIL # BLD AUTO: 0.1 10E3/UL (ref 0–0.7)
EOSINOPHIL NFR BLD AUTO: 1 %
ERYTHROCYTE [DISTWIDTH] IN BLOOD BY AUTOMATED COUNT: 14.3 % (ref 10–15)
FLUAV RNA SPEC QL NAA+PROBE: NEGATIVE
FLUBV RNA RESP QL NAA+PROBE: NEGATIVE
GFR SERPL CREATININE-BSD FRML MDRD: 76 ML/MIN/1.73M2
GLUCOSE BLD-MCNC: 334 MG/DL (ref 70–99)
GLUCOSE UR STRIP-MCNC: 50 MG/DL
HCO3 BLD-SCNC: 22 MMOL/L (ref 21–28)
HCO3 BLDV-SCNC: 21 MMOL/L (ref 21–28)
HCT VFR BLD AUTO: 47.1 % (ref 35–47)
HGB BLD-MCNC: 14.8 G/DL (ref 11.7–15.7)
HGB UR QL STRIP: ABNORMAL
HOLD SPECIMEN: NORMAL
HOLD SPECIMEN: NORMAL
IMM GRANULOCYTES # BLD: 0.2 10E3/UL
IMM GRANULOCYTES NFR BLD: 1 %
KETONES UR STRIP-MCNC: NEGATIVE MG/DL
LACTATE SERPL-SCNC: 2.1 MMOL/L (ref 0.7–2)
LEUKOCYTE ESTERASE UR QL STRIP: NEGATIVE
LYMPHOCYTES # BLD AUTO: 1.7 10E3/UL (ref 0.8–5.3)
LYMPHOCYTES NFR BLD AUTO: 12 %
MAGNESIUM SERPL-MCNC: 2.1 MG/DL (ref 1.6–2.3)
MCH RBC QN AUTO: 29.4 PG (ref 26.5–33)
MCHC RBC AUTO-ENTMCNC: 31.4 G/DL (ref 31.5–36.5)
MCV RBC AUTO: 94 FL (ref 78–100)
MONOCYTES # BLD AUTO: 0.9 10E3/UL (ref 0–1.3)
MONOCYTES NFR BLD AUTO: 6 %
MUCOUS THREADS #/AREA URNS LPF: PRESENT /LPF
NEUTROPHILS # BLD AUTO: 11 10E3/UL (ref 1.6–8.3)
NEUTROPHILS NFR BLD AUTO: 79 %
NITRATE UR QL: NEGATIVE
NRBC # BLD AUTO: 0 10E3/UL
NRBC BLD AUTO-RTO: 0 /100
NT-PROBNP SERPL-MCNC: 2878 PG/ML (ref 0–900)
O2/TOTAL GAS SETTING VFR VENT: 70 %
O2/TOTAL GAS SETTING VFR VENT: 70 %
OXYHGB MFR BLD: 91 % (ref 92–100)
OXYHGB MFR BLDV: 89 % (ref 70–75)
PCO2 BLD: 44 MM HG (ref 35–45)
PCO2 BLDV: 50 MM HG (ref 40–50)
PH BLD: 7.31 [PH] (ref 7.35–7.45)
PH BLDV: 7.24 [PH] (ref 7.32–7.43)
PH UR STRIP: 7 [PH] (ref 4.7–8)
PLATELET # BLD AUTO: 240 10E3/UL (ref 150–450)
PO2 BLD: 79 MM HG (ref 80–105)
PO2 BLDV: 77 MM HG (ref 25–47)
POTASSIUM BLD-SCNC: 3.4 MMOL/L (ref 3.4–5.3)
PROT SERPL-MCNC: 6.8 G/DL (ref 6.8–8.8)
RBC # BLD AUTO: 5.04 10E6/UL (ref 3.8–5.2)
RBC URINE: 8 /HPF
RSV RNA SPEC NAA+PROBE: NEGATIVE
SARS-COV-2 RNA RESP QL NAA+PROBE: NEGATIVE
SODIUM SERPL-SCNC: 135 MMOL/L (ref 133–144)
SP GR UR STRIP: 1.01 (ref 1–1.03)
SQUAMOUS EPITHELIAL: 0 /HPF
TROPONIN I SERPL HS-MCNC: 55 NG/L
TROPONIN I SERPL HS-MCNC: 62 NG/L
TSH SERPL DL<=0.005 MIU/L-ACNC: 3 MU/L (ref 0.4–4)
UROBILINOGEN UR STRIP-MCNC: NORMAL MG/DL
WBC # BLD AUTO: 13.9 10E3/UL (ref 4–11)
WBC URINE: 5 /HPF

## 2022-05-23 PROCEDURE — 71275 CT ANGIOGRAPHY CHEST: CPT | Mod: MG

## 2022-05-23 PROCEDURE — 85025 COMPLETE CBC W/AUTO DIFF WBC: CPT | Performed by: STUDENT IN AN ORGANIZED HEALTH CARE EDUCATION/TRAINING PROGRAM

## 2022-05-23 PROCEDURE — 36600 WITHDRAWAL OF ARTERIAL BLOOD: CPT

## 2022-05-23 PROCEDURE — 81001 URINALYSIS AUTO W/SCOPE: CPT | Performed by: STUDENT IN AN ORGANIZED HEALTH CARE EDUCATION/TRAINING PROGRAM

## 2022-05-23 PROCEDURE — 83735 ASSAY OF MAGNESIUM: CPT | Performed by: STUDENT IN AN ORGANIZED HEALTH CARE EDUCATION/TRAINING PROGRAM

## 2022-05-23 PROCEDURE — G1004 CDSM NDSC: HCPCS

## 2022-05-23 PROCEDURE — 99291 CRITICAL CARE FIRST HOUR: CPT | Mod: 25 | Performed by: STUDENT IN AN ORGANIZED HEALTH CARE EDUCATION/TRAINING PROGRAM

## 2022-05-23 PROCEDURE — 93010 ELECTROCARDIOGRAM REPORT: CPT | Mod: 76 | Performed by: INTERNAL MEDICINE

## 2022-05-23 PROCEDURE — 99292 CRITICAL CARE ADDL 30 MIN: CPT

## 2022-05-23 PROCEDURE — 84443 ASSAY THYROID STIM HORMONE: CPT | Performed by: STUDENT IN AN ORGANIZED HEALTH CARE EDUCATION/TRAINING PROGRAM

## 2022-05-23 PROCEDURE — 82805 BLOOD GASES W/O2 SATURATION: CPT | Performed by: STUDENT IN AN ORGANIZED HEALTH CARE EDUCATION/TRAINING PROGRAM

## 2022-05-23 PROCEDURE — 250N000009 HC RX 250: Performed by: STUDENT IN AN ORGANIZED HEALTH CARE EDUCATION/TRAINING PROGRAM

## 2022-05-23 PROCEDURE — 250N000011 HC RX IP 250 OP 636

## 2022-05-23 PROCEDURE — 999N000157 HC STATISTIC RCP TIME EA 10 MIN

## 2022-05-23 PROCEDURE — 84484 ASSAY OF TROPONIN QUANT: CPT | Performed by: STUDENT IN AN ORGANIZED HEALTH CARE EDUCATION/TRAINING PROGRAM

## 2022-05-23 PROCEDURE — 94660 CPAP INITIATION&MGMT: CPT

## 2022-05-23 PROCEDURE — 51702 INSERT TEMP BLADDER CATH: CPT

## 2022-05-23 PROCEDURE — 36415 COLL VENOUS BLD VENIPUNCTURE: CPT | Performed by: STUDENT IN AN ORGANIZED HEALTH CARE EDUCATION/TRAINING PROGRAM

## 2022-05-23 PROCEDURE — 99292 CRITICAL CARE ADDL 30 MIN: CPT | Mod: 25 | Performed by: STUDENT IN AN ORGANIZED HEALTH CARE EDUCATION/TRAINING PROGRAM

## 2022-05-23 PROCEDURE — 96367 TX/PROPH/DG ADDL SEQ IV INF: CPT

## 2022-05-23 PROCEDURE — 87637 SARSCOV2&INF A&B&RSV AMP PRB: CPT | Performed by: STUDENT IN AN ORGANIZED HEALTH CARE EDUCATION/TRAINING PROGRAM

## 2022-05-23 PROCEDURE — 99291 CRITICAL CARE FIRST HOUR: CPT | Mod: 25

## 2022-05-23 PROCEDURE — 94640 AIRWAY INHALATION TREATMENT: CPT

## 2022-05-23 PROCEDURE — 83605 ASSAY OF LACTIC ACID: CPT | Performed by: STUDENT IN AN ORGANIZED HEALTH CARE EDUCATION/TRAINING PROGRAM

## 2022-05-23 PROCEDURE — 71045 X-RAY EXAM CHEST 1 VIEW: CPT

## 2022-05-23 PROCEDURE — 250N000013 HC RX MED GY IP 250 OP 250 PS 637: Performed by: STUDENT IN AN ORGANIZED HEALTH CARE EDUCATION/TRAINING PROGRAM

## 2022-05-23 PROCEDURE — 87040 BLOOD CULTURE FOR BACTERIA: CPT | Performed by: STUDENT IN AN ORGANIZED HEALTH CARE EDUCATION/TRAINING PROGRAM

## 2022-05-23 PROCEDURE — 80053 COMPREHEN METABOLIC PANEL: CPT | Performed by: STUDENT IN AN ORGANIZED HEALTH CARE EDUCATION/TRAINING PROGRAM

## 2022-05-23 PROCEDURE — 250N000011 HC RX IP 250 OP 636: Performed by: STUDENT IN AN ORGANIZED HEALTH CARE EDUCATION/TRAINING PROGRAM

## 2022-05-23 PROCEDURE — 96375 TX/PRO/DX INJ NEW DRUG ADDON: CPT | Mod: XU

## 2022-05-23 PROCEDURE — 96365 THER/PROPH/DIAG IV INF INIT: CPT | Mod: XS

## 2022-05-23 PROCEDURE — 96376 TX/PRO/DX INJ SAME DRUG ADON: CPT | Mod: XU

## 2022-05-23 PROCEDURE — 83880 ASSAY OF NATRIURETIC PEPTIDE: CPT | Performed by: STUDENT IN AN ORGANIZED HEALTH CARE EDUCATION/TRAINING PROGRAM

## 2022-05-23 PROCEDURE — 93308 TTE F-UP OR LMTD: CPT | Mod: 26 | Performed by: STUDENT IN AN ORGANIZED HEALTH CARE EDUCATION/TRAINING PROGRAM

## 2022-05-23 PROCEDURE — C9803 HOPD COVID-19 SPEC COLLECT: HCPCS

## 2022-05-23 PROCEDURE — 93005 ELECTROCARDIOGRAM TRACING: CPT | Mod: XU,76

## 2022-05-23 PROCEDURE — 93308 TTE F-UP OR LMTD: CPT | Mod: TC

## 2022-05-23 RX ORDER — DILTIAZEM HCL IN NACL,ISO-OSM 125 MG/125
5-15 PLASTIC BAG, INJECTION (ML) INTRAVENOUS CONTINUOUS
Status: DISCONTINUED | OUTPATIENT
Start: 2022-05-23 | End: 2022-05-23 | Stop reason: HOSPADM

## 2022-05-23 RX ORDER — VANCOMYCIN HYDROCHLORIDE 1 G/20ML
INJECTION, POWDER, LYOPHILIZED, FOR SOLUTION INTRAVENOUS
Status: DISCONTINUED
Start: 2022-05-23 | End: 2022-05-23 | Stop reason: HOSPADM

## 2022-05-23 RX ORDER — NITROGLYCERIN 20 MG/100ML
10-200 INJECTION INTRAVENOUS CONTINUOUS
Status: DISCONTINUED | OUTPATIENT
Start: 2022-05-23 | End: 2022-05-23 | Stop reason: HOSPADM

## 2022-05-23 RX ORDER — LORAZEPAM 2 MG/ML
INJECTION INTRAMUSCULAR
Status: COMPLETED
Start: 2022-05-23 | End: 2022-05-23

## 2022-05-23 RX ORDER — DILTIAZEM HYDROCHLORIDE 5 MG/ML
10 INJECTION INTRAVENOUS ONCE
Status: COMPLETED | OUTPATIENT
Start: 2022-05-23 | End: 2022-05-23

## 2022-05-23 RX ORDER — FUROSEMIDE 10 MG/ML
40 INJECTION INTRAMUSCULAR; INTRAVENOUS ONCE
Status: COMPLETED | OUTPATIENT
Start: 2022-05-23 | End: 2022-05-23

## 2022-05-23 RX ORDER — HEPARIN SODIUM 10000 [USP'U]/100ML
0-5000 INJECTION, SOLUTION INTRAVENOUS CONTINUOUS
Status: DISCONTINUED | OUTPATIENT
Start: 2022-05-23 | End: 2022-05-23 | Stop reason: HOSPADM

## 2022-05-23 RX ORDER — ALBUTEROL SULFATE 0.83 MG/ML
2.5 SOLUTION RESPIRATORY (INHALATION)
Status: DISCONTINUED | OUTPATIENT
Start: 2022-05-23 | End: 2022-05-23 | Stop reason: HOSPADM

## 2022-05-23 RX ORDER — LORAZEPAM 2 MG/ML
1 INJECTION INTRAMUSCULAR ONCE
Status: COMPLETED | OUTPATIENT
Start: 2022-05-23 | End: 2022-05-23

## 2022-05-23 RX ORDER — ASPIRIN 81 MG/1
324 TABLET, CHEWABLE ORAL ONCE
Status: COMPLETED | OUTPATIENT
Start: 2022-05-23 | End: 2022-05-23

## 2022-05-23 RX ORDER — IPRATROPIUM BROMIDE AND ALBUTEROL SULFATE 2.5; .5 MG/3ML; MG/3ML
3 SOLUTION RESPIRATORY (INHALATION) ONCE
Status: COMPLETED | OUTPATIENT
Start: 2022-05-23 | End: 2022-05-23

## 2022-05-23 RX ORDER — DILTIAZEM HYDROCHLORIDE 5 MG/ML
5 INJECTION INTRAVENOUS ONCE
Status: COMPLETED | OUTPATIENT
Start: 2022-05-23 | End: 2022-05-23

## 2022-05-23 RX ORDER — IOPAMIDOL 755 MG/ML
74 INJECTION, SOLUTION INTRAVASCULAR ONCE
Status: COMPLETED | OUTPATIENT
Start: 2022-05-23 | End: 2022-05-23

## 2022-05-23 RX ADMIN — IPRATROPIUM BROMIDE AND ALBUTEROL SULFATE 3 ML: .5; 3 SOLUTION RESPIRATORY (INHALATION) at 03:35

## 2022-05-23 RX ADMIN — LORAZEPAM 1 MG: 2 INJECTION INTRAMUSCULAR; INTRAVENOUS at 03:19

## 2022-05-23 RX ADMIN — DILTIAZEM HYDROCHLORIDE 5 MG: 5 INJECTION INTRAVENOUS at 03:52

## 2022-05-23 RX ADMIN — IOPAMIDOL 74 ML: 755 INJECTION, SOLUTION INTRAVENOUS at 05:06

## 2022-05-23 RX ADMIN — FUROSEMIDE 40 MG: 10 INJECTION, SOLUTION INTRAVENOUS at 03:45

## 2022-05-23 RX ADMIN — DILTIAZEM HYDROCHLORIDE 10 MG: 5 INJECTION INTRAVENOUS at 03:29

## 2022-05-23 RX ADMIN — NITROGLYCERIN 10 MCG/MIN: 20 INJECTION INTRAVENOUS at 03:22

## 2022-05-23 RX ADMIN — LORAZEPAM 1 MG: 2 INJECTION INTRAMUSCULAR at 03:19

## 2022-05-23 RX ADMIN — Medication 5 MG/HR: at 04:21

## 2022-05-23 RX ADMIN — TAZOBACTAM SODIUM AND PIPERACILLIN SODIUM 4.5 G: 500; 4 INJECTION, SOLUTION INTRAVENOUS at 04:04

## 2022-05-23 RX ADMIN — ASPIRIN 81 MG CHEWABLE TABLET 324 MG: 81 TABLET CHEWABLE at 05:19

## 2022-05-23 NOTE — ED NOTES
Report given to Marisela CARRILLO at [facility] . Confirmed room number .    Rafael Davis, MSN, RN on 5/23/2022 at 5:05 AM

## 2022-05-23 NOTE — ED NOTES
Patient arrives via [facility] with [facility] intercept from home. Patient called 911 after having worsening shortness of breath. Upon [facility], patient was given 2 neb treatments, 2.5mg terbutaline, 10 mg dexamethasone, and 1mg versed. Upon arrival into the ED patient was diaphoretic, extreme work of breathing, verbalized feelings of anxiety, respiratory rate in the 50's. Patient moved to ED cot, cardiac and vitals monitors placed. Dr. Araiza at bedside upon patient's arrival.

## 2022-05-23 NOTE — DISCHARGE INSTRUCTIONS

## 2022-05-23 NOTE — ED NOTES
Patient gave this nurse permission to call her son, Valentin. Attempted to call Valentin at number in chart. No answer, message left to call back.

## 2022-05-23 NOTE — ED NOTES
DATE:  5/23/2022   TIME OF RECEIPT FROM LAB:  0354  LAB TEST:  Lactic  LAB VALUE:  2.1  RESULTS GIVEN WITH READ-BACK TO (PROVIDER):  Dr. Araiza  TIME LAB VALUE REPORTED TO PROVIDER:   0354

## 2022-05-23 NOTE — ED NOTES
Due to patient's pulmonary edema, Dr. Araiza would like the vancomycin infusion, concentrated. This writer called [facility] and spoke with Jose Manuel in conjunction with Dr. Araiza. Vancomycin order to be 2,500mg in 250 ml of normal saline. Per Jose Manuel, reconstitute each 1 gram vial with 10 ml of sterile water. Remove 25 ml of normal saline from 250 ml bag. 25 ml removed from reconstituted vials of vancomycin for a total dose of 2,500 mg and put into normal saline bag. The making of the vancomycin infusion was reviewed with OLAYINKA Wells RN.

## 2022-05-23 NOTE — ED PROVIDER NOTES
History     Chief Complaint   Patient presents with     Shortness of Breath     HPI  Heather Rosas is a 71 year old female presents to the emergency department today in acute respiratory distress.  She states it came on rather suddenly this evening around 11:00 to 1130, EMS was dispatched and they arrived and found her oxygenating in the upper 80s, placed her on oxygen and noted significant anxiety, they also noted some expiratory wheezing, they gave terbutaline, and albuterol neb and a DuoNeb without any real improvement in her symptoms it sounds like she also received some Decadron.  She notes that she stopped taking her Lasix about 2 weeks ago because she ran out.  Denies chest pain or abdominal pain, does note that she has a cough, no reported fevers.    Allergies:  Allergies   Allergen Reactions     Cats Other (See Comments)     Sneezing, runny nose     Codeine Sulfate Nausea and Vomiting and GI Disturbance     Fexofenadine Hcl      Allegra      Rosuvastatin Other (See Comments)     Dizziness - Crestor        Problem List:    Patient Active Problem List    Diagnosis Date Noted     Coronary artery disease involving native coronary artery 10/12/2021     Priority: Medium     Acute diastolic congestive heart failure (H) 10/12/2021     Priority: Medium     Atrial flutter with rapid ventricular response (H) 10/12/2021     Priority: Medium     Acute exacerbation of CHF (congestive heart failure) (H) 10/11/2021     Priority: Medium     Status post chemotherapy 09/25/2020     Priority: Medium     Added automatically from request for surgery [id number]       Endometrial adenocarcinoma (H) 12/03/2018     Priority: Medium     Malignant neoplasm of uterus, unspecified site (H) 12/03/2018     Priority: Medium     Atrial fibrillation (H) 11/26/2018     Priority: Medium     History of coronary artery disease 10/30/2018     Priority: Medium     Hyponatremia 10/30/2018     Priority: Medium     Moderate mitral regurgitation  10/30/2018     Priority: Medium     Adenocarcinoma of the endometrium/uterus (H) 10/09/2018     Priority: Medium     Discovered on 02/18 biopsy. Pt lost to f/u       Chronic diastolic congestive heart failure (H) 09/20/2018     Priority: Medium     S/P CABG x 4 09/10/2018     Priority: Medium     History of non-ST elevation myocardial infarction (NSTEMI) 09/06/2018     Priority: Medium     Endometrial cancer (H) 09/04/2018     Priority: Medium     Cerebrovascular accident (H) 05/14/2018     Priority: Medium     Hypertensive urgency 05/14/2018     Priority: Medium     CVA (cerebral vascular accident) (H) 05/14/2018     Priority: Medium     Chest pain 05/14/2018     Priority: Medium     Endometrial hyperplasia with atypia 05/09/2018     Priority: Medium     Metrorrhagia 02/18/2018     Priority: Medium     ACP (advance care planning) 05/20/2016     Priority: Medium     Advance Care Planning 5/20/2016: ACP Review of Chart / Resources Provided:  Reviewed chart for advance care plan.  Heather Rosas has no plan or code status on file. Discussed available resources and provided with information. Confirmed code status reflects current choices pending further ACP discussions.  Confirmed/documented legally designated decision makers.  Added by Margot Shannon             HTN (hypertension)      Priority: Medium     Major depressive disorder, recurrent episode, moderate (H) 01/01/2011     Priority: Medium     ANNA (generalized anxiety disorder) 01/01/2011     Priority: Medium     GERD (Gastroesophageal reflux disease) 01/01/2011     Priority: Medium     Obesity (H) 01/01/2011     Priority: Medium     Schizophrenia (H) 01/01/2011     Priority: Medium     Seasonal allergic rhinitis 01/01/2011     Priority: Medium     Fibromyalgia 06/14/2004     Priority: Medium     Dyslipidemia 11/26/2003     Priority: Medium        Past Medical History:    Past Medical History:   Diagnosis Date     Allergic rhinitis 01/01/2011     Anxiety  01/01/2011     Anxiety state, unspecified      Dyslipidemia 11/26/2003     fibromyalgia 06/14/2004     GERD, Esophageal reflux 01/01/2011     HTN (hypertension)      Major depression, recurrent (H) 1/1/2011     Nonorganic sleep disorder, unspecified      Obesity 01/01/2011     Schizophrenia (H) 01/01/2011     Toxic shock syndrome (H) 2006       Past Surgical History:    Past Surgical History:   Procedure Laterality Date     ANGIOGRAM  02/2005    Normal      BIOPSY BREAST  1984    LT, normal     BYPASS GRAFT ARTERY CORONARY  09/10/2018     CARPAL TUNNEL RELEASE RT/LT  2005    RT, carpal tunnel     COLONOSCOPY  2011    Repeat in ten years      COLONOSCOPY  1996     COLONOSCOPY  2004     DILATION AND CURETTAGE, HYSTEROSCOPY, ABLATE ENDOMETRIUM, COMBINED N/A 2/19/2018    Procedure: COMBINED DILATION AND CURETTAGE, HYSTEROSCOPY, ABLATE ENDOMETRIUM;  HYSTEROSCOPY DILATION AND CURETTAGE, ENDOMETRIAL ABLATION;  Surgeon: Jose Nettles MD;  Location: [facility] OR     HYSTERECTOMY TOTAL ABD, NICK SALPINGO-OOPHORECTOMY, NODE DISSECTION, TUMOR DEBULKING, COMBINED  10/30/2018     INSERT PORT VASCULAR ACCESS N/A 12/11/2018    Procedure: PORT-A-CATH PLACEMENT;  Surgeon: Rafi Trinidad MD;  Location: [facility] OR     placement of central line  2005     REMOVE PORT VASCULAR ACCESS N/A 10/6/2020    Procedure: port a cath removal;  Surgeon: Rafi Trinidad MD;  Location: [facility] OR       Family History:    Family History   Problem Relation Age of Onset     C.A.D. Father 45        (cause of death)      Other - See Comments Father         rheumatic fever      Allergies Father      Cancer Sister 56        Esophageal to bone cancer     Gastrointestinal Disease Mother         GERD     Cancer Mother         pancreatic ca (cause of death) /liver ca     Breast Cancer Maternal Aunt      Breast Cancer Maternal Aunt      Colon Cancer Paternal Aunt         (cause of death)      Crohn's Disease Other      Depression Maternal Uncle      Depression Maternal Aunt   "    Diabetes Paternal Grandmother         type 2     Neurologic Disorder Brother         neuropathy     Cancer Brother 58        Tonsilular cancer/ lymph node in neck     Allergies Brother      Breast Cancer Cousin      Breast Cancer Cousin      Breast Cancer Cousin        Social History:  Marital Status:   [4]  Social History     Tobacco Use     Smoking status: Current Some Day Smoker     Packs/day: 0.25     Types: Cigarettes     Start date: 1971     Last attempt to quit: 2013     Years since quittin.4     Smokeless tobacco: Never Used     Tobacco comment: pt declined, stated she would use, \"the patch\"   Substance Use Topics     Alcohol use: No     Comment: rare     Drug use: No        Medications:    aspirin (ASA) 325 MG EC tablet  atorvastatin (LIPITOR) 20 MG tablet  furosemide (LASIX) 20 MG tablet  ibuprofen (ADVIL/MOTRIN) 200 MG tablet  metoprolol tartrate (LOPRESSOR) 50 MG tablet  VITAMIN E PO  amLODIPine (NORVASC) 5 MG tablet  dexamethasone (DECADRON) 6 MG tablet          Review of Systems  A complete review of systems was performed and is otherwise negative.     Physical Exam   BP: (!) 179/116  Pulse: (!) 152  Temp: 99  F (37.2  C)  Resp: (!) 49  Weight: 100 kg (220 lb 7.4 oz)  SpO2: 97 %      Physical Exam  Constitutional: Alert and in acute distress, panicking, saying she thinks she is going to die  HENT: NCAT   Eyes: Normal pupils   Neck: supple   CV: Tachycardic rate, irregular rhythm, no murmur   Pulmonary/Chest: labored respirations, accessory muscle use, slight expiratory wheeze  Abdominal: Soft, non-tender, distended   MSK: KERR.  Extensive bilateral pitting edema extending up beyond the knees  Neuro: Alert and appropriate   Skin: Warm and dry. No diaphoresis. No rashes on exposed skin    Psych: Appropriate mood and affect     ED Course              ED Course as of 22 0636   Mon May 23, 2022   0414 Dr. Cardenas:    0437 71-year-old female here in acute respiratory distress, " on arrival she was breathing with rates in the 30s, tripoding, grunting respirations using accessory muscles.  She looked very ill.  I immediately asked if she would be willing to be intubated if needed and she was willing.  We gained IV access, she did have some mild expiratory wheezing, point-of-care ultrasound demonstrated B-lines throughout the lungs with bilateral pleural effusions, no evidence of cardiac tamponade, a very full IVC.  Blood pressure initially was 179/116, she appeared clinically fluid overloaded with substantial edema in the lower extremities going all the way up her legs as far as I could see.  With no available labs or imaging yet, clinical picture appeared most consistent with a flash pulmonary edema in the setting of congestive heart failure exacerbation, we did start BiPAP and a nitro drip immediately.  Nitro drip was started at 60, we placed a Martin and gave 40 megs of IV Lasix.  Her heart rate was in the 160s to 170s upon arrival, it appeared irregular, EKG demonstrated A. fib RVR with a right bundle branch block.  Due to the excessive rate, it is extremely difficult to evaluate for signs of ischemia.  IV access was obtained, labs were ordered.  COVID testing was negative, she had a white count of 13.9, chest x-ray taken shortly after arrival demonstrated copious opacities throughout her lungs consistent with flash pulmonary edema, however, based on what I am seeing I cannot rule out the possibility of a superimposed pneumonia and given the degree to which she is ill-appearing upon arrival, Zosyn and vancomycin was ordered.  I am concerned about fluid overload, we are concentrating her vancomycin.  ABG demonstrated pH of 7.3 with PCO2 of 44 and arterial PO2 of 79.  Her work of breathing decreased, she maintained consciousness, because of her excessive rate, I did give diltiazem, started at 10 mg IV push, followed by another 5 mg IV push, her heart rate did decrease into the 130s.     0443  No ICU beds available here or in [place].  No beds available at [facility] and [place].  I did speak with the intensivist at   0443 [facility] and he excepted the patient for transfer to their cardiac ICU.  Her troponin did return at 55 and her BNP at 2878.  I will give aspirin, however, in this setting, no specific indication for a heparin drip unless a repeat troponin which I will get 1 hour later is rising rapidly. I was asked to switch her to amiodarone and off diltiazem, which I did   0552 Patient flown out in stable condition.  I did discuss the troponin increased from 54-62, we agreed holding off on heparin was reasonable.  I did get a CTA.  On my read I do not see a pulmonary embolism.  Again reasonable to hold off on heparin     Procedures         Results for orders placed or performed during the hospital encounter of 05/23/22 (from the past 24 hour(s))   CBC with platelets differential    Narrative    The following orders were created for panel order CBC with platelets differential.  Procedure                               Abnormality         Status                     ---------                               -----------         ------                     CBC with platelets and d...[[id number]]  Abnormal            Final result                 Please view results for these tests on the individual orders.   Lactic acid whole blood   Result Value Ref Range    Lactic Acid 2.1 (H) 0.7 - 2.0 mmol/L   CBC with platelets and differential   Result Value Ref Range    WBC Count 13.9 (H) 4.0 - 11.0 10e3/uL    RBC Count 5.04 3.80 - 5.20 10e6/uL    Hemoglobin 14.8 11.7 - 15.7 g/dL    Hematocrit 47.1 (H) 35.0 - 47.0 %    MCV 94 78 - 100 fL    MCH 29.4 26.5 - 33.0 pg    MCHC 31.4 (L) 31.5 - 36.5 g/dL    RDW 14.3 10.0 - 15.0 %    Platelet Count 240 150 - 450 10e3/uL    % Neutrophils 79 %    % Lymphocytes 12 %    % Monocytes 6 %    % Eosinophils 1 %    % Basophils 1 %    % Immature Granulocytes 1 %    NRBCs per  100 WBC 0 <1 /100    Absolute Neutrophils 11.0 (H) 1.6 - 8.3 10e3/uL    Absolute Lymphocytes 1.7 0.8 - 5.3 10e3/uL    Absolute Monocytes 0.9 0.0 - 1.3 10e3/uL    Absolute Eosinophils 0.1 0.0 - 0.7 10e3/uL    Absolute Basophils 0.1 0.0 - 0.2 10e3/uL    Absolute Immature Granulocytes 0.2 <=0.4 10e3/uL    Absolute NRBCs 0.0 10e3/uL   POC US RESUSCITATION    Impression    [facility] Procedure Note      Limited Bedside ED Cardiac Ultrasound:    PROCEDURE: PERFORMED BY: Dr. Ilir Araiza MD  INDICATIONS/SYMPTOM:  Shortness of Breath  PROBE: Low frequency convex probe and Cardiac phased array probe  BODY LOCATION: Chest  FINDINGS:   The ultrasound was performed utilizing the subcostal, parasternal long axis, parasternal short axis and apical 4 chamber views, lungs.  Cardiac contractility:  Present  Gross estimation of cardiac kinesis: fast, but depressed  Pericardial Effusion:  None  Pleural effusion: bilatearl  RV:LV ratio: LV > RV  IVC: really big                                               Collapsibility:  No collapsibility  Lungs: B-lines throughout the lung fields everywhere  INTERPRETATION:    Chamber size and motion were grossly normal with LV > RV, depressed cardiac kinesis.  No pericardial effusion was found.  IVC visualized and findings indicate fluid overload.  IMAGE DOCUMENTATION: Images were archived to PACs system.       Blood gas arterial and oxyhgb   Result Value Ref Range    pH Arterial 7.31 (L) 7.35 - 7.45    pCO2 Arterial 44 35 - 45 mm Hg    pO2 Arterial 79 (L) 80 - 105 mm Hg    Bicarbonate Arterial 22 21 - 28 mmol/L    Oxyhemoglobin Arterial 91 (L) 92 - 100 %    Base Excess/Deficit (+/-) -4.2 -9.0 - 1.8 mmol/L    FIO2 70    UA with Microscopic reflex to Culture    Specimen: Urine, Catheter   Result Value Ref Range    Color Urine Light Yellow Colorless, Straw, Light Yellow, Yellow    Appearance Urine Clear Clear    Glucose Urine 50  (A) Negative mg/dL    Bilirubin Urine Negative Negative     Ketones Urine Negative Negative mg/dL    Specific Gravity Urine 1.014 1.003 - 1.035    Blood Urine Trace (A) Negative    pH Urine 7.0 4.7 - 8.0    Protein Albumin Urine 100  (A) Negative mg/dL    Urobilinogen Urine Normal Normal, 2.0 mg/dL    Nitrite Urine Negative Negative    Leukocyte Esterase Urine Negative Negative    Mucus Urine Present (A) None Seen /LPF    RBC Urine 8 (H) <=2 /HPF    WBC Urine 5 <=5 /HPF    Squamous Epithelials Urine 0 <=1 /HPF    Narrative    Urine Culture not indicated   Symptomatic; Unknown Influenza A/B & SARS-CoV2 (COVID-19) Virus PCR Multiplex Nasopharyngeal    Specimen: Nasopharyngeal; Swab   Result Value Ref Range    Influenza A PCR Negative Negative    Influenza B PCR Negative Negative    RSV PCR Negative Negative    SARS CoV2 PCR Negative Negative    Narrative    Testing was performed using the Xpert Xpress CoV2/Flu/RSV Assay on the Cepheid GeneXpert Instrument. This test should be ordered for the detection of SARS-CoV-2 and influenza viruses in individuals who meet clinical and/or epidemiological criteria. Test performance is unknown in asymptomatic patients. This test is for in vitro diagnostic use under the FDA EUA for laboratories certified under CLIA to perform high or moderate complexity testing. This test has not been FDA cleared or approved. A negative result does not rule out the presence of PCR inhibitors in the specimen or target RNA in concentration below the limit of detection for the assay. If only one viral target is positive but coinfection with multiple targets is suspected, the sample should be re-tested with another FDA cleared, approved, or authorized test, if coinfection would change clinical management. This test was validated by the [facility] [employer]. These laboratories are certified under the Clinical  Laboratory Improvement Amendments of 1988 (CLIA-88) as qualified to perform high complexity laboratory testing.   Comprehensive metabolic panel    Result Value Ref Range    Sodium 135 133 - 144 mmol/L    Potassium 3.4 3.4 - 5.3 mmol/L    Chloride 102 94 - 109 mmol/L    Carbon Dioxide (CO2) 22 20 - 32 mmol/L    Anion Gap 11 3 - 14 mmol/L    Urea Nitrogen 17 7 - 30 mg/dL    Creatinine 0.83 0.52 - 1.04 mg/dL    Calcium 8.7 8.5 - 10.1 mg/dL    Glucose 334 (H) 70 - 99 mg/dL    Alkaline Phosphatase 115 40 - 150 U/L    AST 29 0 - 45 U/L    ALT 24 0 - 50 U/L    Protein Total 6.8 6.8 - 8.8 g/dL    Albumin 3.3 (L) 3.4 - 5.0 g/dL    Bilirubin Total 1.0 0.2 - 1.3 mg/dL    GFR Estimate 76 >60 mL/min/1.73m2   Troponin I   Result Value Ref Range    Troponin I High Sensitivity 55 (H) <54 ng/L   Magnesium   Result Value Ref Range    Magnesium 2.1 1.6 - 2.3 mg/dL   TSH with free T4 reflex   Result Value Ref Range    TSH 3.00 0.40 - 4.00 mU/L   Nt probnp inpatient (BNP)   Result Value Ref Range    N terminal Pro BNP Inpatient 2,878 (H) 0 - 900 pg/mL   Blood gas venous and oxyhgb   Result Value Ref Range    pH Venous 7.24 (L) 7.32 - 7.43    pCO2 Venous 50 40 - 50 mm Hg    pO2 Venous 77 (H) 25 - 47 mm Hg    Bicarbonate Venous 21 21 - 28 mmol/L    FIO2 70     Oxyhemoglobin Venous 89 (H) 70 - 75 %    Base Excess/Deficit (+/-) -6.6 -7.7 - 1.9 mmol/L   Troponin I   Result Value Ref Range    Troponin I High Sensitivity 62 (H) <54 ng/L   Extra Tube    Narrative    The following orders were created for panel order Extra Tube.  Procedure                               Abnormality         Status                     ---------                               -----------         ------                     Extra Heparinized Syringe[[id number]]                        Final result                 Please view results for these tests on the individual orders.   Extra Heparinized Syringe   Result Value Ref Range    Hold Specimen     Urine Drugs of Abuse Screen *Canceled*    Narrative    The following orders were created for panel order Urine Drugs of Abuse Screen.  Procedure                                Abnormality         Status                     ---------                               -----------         ------                       Please view results for these tests on the individual orders.       Medications   albuterol (PROVENTIL) neb solution 2.5 mg (has no administration in time range)   nitroGLYcerin 50 mg in D5W 250 mL (adult std) infusion CENTRAL (30 mcg/min Intravenous Rate/Dose Change 5/23/22 0420)   diltiazem (CARDIZEM) 125 mg in sodium chloride 0.7 % 125 mL infusion (0 mg/hr Intravenous Stopped 5/23/22 0431)   vancomycin (VANCOCIN) 100 mg/mL injection (  Handoff 5/23/22 0520)   vancomycin (VANCOCIN) 2,500 mg in sodium chloride 0.9 % 250 mL intermittent infusion (has no administration in time range)   amiodarone in NS adult drip std conc 1.8 mg/mL infusion (1 mg/min Intravenous Handoff 5/23/22 0519)   heparin ANTICOAGULANT loading dose for  LOW INTENSITY TREATMENT* Give BEFORE starting heparin infusion (6,000 Units Intravenous Handoff 5/23/22 0526)   heparin 25,000 units in 0.45% NaCl 250 mL ANTICOAGULANT infusion (0 Units/hr Intravenous Handoff 5/23/22 0529)   LORazepam (ATIVAN) injection 1 mg (1 mg Intravenous Given 5/23/22 0319)   ipratropium - albuterol 0.5 mg/2.5 mg/3 mL (DUONEB) neb solution 3 mL (3 mLs Nebulization Given 5/23/22 0335)   diltiazem (CARDIZEM) injection 10 mg (10 mg Intravenous Given 5/23/22 0329)   furosemide (LASIX) injection 40 mg (40 mg Intravenous Given 5/23/22 0345)   piperacillin-tazobactam (ZOSYN) intermittent infusion 4.5 g (0 g Intravenous Stopped 5/23/22 0437)   iopamidol (ISOVUE-370) solution 74 mL (74 mLs Intravenous Given 5/23/22 0506)   sodium chloride (PF) 0.9% PF flush 100 mL (100 mLs Intravenous Given 5/23/22 0507)   diltiazem (CARDIZEM) injection 5 mg (5 mg Intravenous Given 5/23/22 0352)   aspirin (ASA) chewable tablet 324 mg (324 mg Oral Given 5/23/22 0519)       Assessments & Plan (with Medical Decision Making)     I have reviewed the nursing  notes.    I have reviewed the findings, diagnosis, plan and need for follow up with the patient.     Critical Care Addendum    My initial assessment, based on my review of prehospital provider report, review of nursing observations, review of vital signs, focused history, physical exam, review of cardiac rhythm monitor, 12 lead ECG analysis and discussion with EMS, established that Heather Rosas has respiratory failure, which requires immediate intervention, and therefore she is critically ill.     After the initial assessment, the care team initiated multiple lab tests, initiated medication therapy with above and initiated intensive non-invasive respiratory support to provide stabilization care. Due to the critical nature of this patient, I reassessed nursing observations, vital signs, physical exam, review of cardiac rhythm monitor, 12 lead ECG analysis, interpretation of labs and imaging and respiratory status multiple times prior to her disposition.     Time also spent performing documentation, discussion with family to obtain medical information for decision making, reviewing test results, discussion with consultants and coordination of care.     Critical care time (excluding teaching time and procedures): 120 minutes.     New Prescriptions    No medications on file       Final diagnoses:   Flash pulmonary edema (H)   Acute on chronic congestive heart failure, unspecified heart failure type (H)       5/23/2022   [facility]     Ilir Araiza MD  05/23/22 0608       Ilir Araiza MD  05/23/22 0637

## 2022-05-28 LAB
BACTERIA BLD CULT: NO GROWTH
BACTERIA BLD CULT: NO GROWTH

## 2022-10-27 ENCOUNTER — HOSPITAL ENCOUNTER (EMERGENCY)
Facility: HOSPITAL | Age: 72
Discharge: HOME | End: 2022-10-28
Attending: INTERNAL MEDICINE | Admitting: INTERNAL MEDICINE
Payer: MEDICARE

## 2022-10-27 ENCOUNTER — APPOINTMENT (OUTPATIENT)
Dept: GENERAL RADIOLOGY | Facility: HOSPITAL | Age: 72
End: 2022-10-27
Attending: INTERNAL MEDICINE
Payer: MEDICARE

## 2022-10-27 DIAGNOSIS — I50.9 ACUTE ON CHRONIC CONGESTIVE HEART FAILURE, UNSPECIFIED HEART FAILURE TYPE (H): ICD-10-CM

## 2022-10-27 DIAGNOSIS — I10 UNCONTROLLED HYPERTENSION: ICD-10-CM

## 2022-10-27 LAB
ALBUMIN SERPL BCG-MCNC: 4 G/DL (ref 3.5–5.2)
ALP SERPL-CCNC: 144 U/L (ref 35–104)
ALT SERPL W P-5'-P-CCNC: 12 U/L (ref 10–35)
ANION GAP SERPL CALCULATED.3IONS-SCNC: 10 MMOL/L (ref 7–15)
AST SERPL W P-5'-P-CCNC: 21 U/L (ref 10–35)
BASOPHILS # BLD AUTO: 0.1 10E3/UL (ref 0–0.2)
BASOPHILS NFR BLD AUTO: 1 %
BILIRUB SERPL-MCNC: 0.2 MG/DL
BUN SERPL-MCNC: 12.3 MG/DL (ref 8–23)
CALCIUM SERPL-MCNC: 9.3 MG/DL (ref 8.8–10.2)
CHLORIDE SERPL-SCNC: 103 MMOL/L (ref 98–107)
CREAT SERPL-MCNC: 0.66 MG/DL (ref 0.51–0.95)
CRP SERPL-MCNC: 5.68 MG/L
DEPRECATED HCO3 PLAS-SCNC: 26 MMOL/L (ref 22–29)
EOSINOPHIL # BLD AUTO: 0.3 10E3/UL (ref 0–0.7)
EOSINOPHIL NFR BLD AUTO: 4 %
ERYTHROCYTE [DISTWIDTH] IN BLOOD BY AUTOMATED COUNT: 13.2 % (ref 10–15)
FLUAV RNA SPEC QL NAA+PROBE: NEGATIVE
FLUBV RNA RESP QL NAA+PROBE: NEGATIVE
GFR SERPL CREATININE-BSD FRML MDRD: >90 ML/MIN/1.73M2
GLUCOSE SERPL-MCNC: 94 MG/DL (ref 70–99)
HCT VFR BLD AUTO: 45 % (ref 35–47)
HGB BLD-MCNC: 14.9 G/DL (ref 11.7–15.7)
IMM GRANULOCYTES # BLD: 0.1 10E3/UL
IMM GRANULOCYTES NFR BLD: 1 %
LYMPHOCYTES # BLD AUTO: 1.4 10E3/UL (ref 0.8–5.3)
LYMPHOCYTES NFR BLD AUTO: 16 %
MCH RBC QN AUTO: 29.9 PG (ref 26.5–33)
MCHC RBC AUTO-ENTMCNC: 33.1 G/DL (ref 31.5–36.5)
MCV RBC AUTO: 90 FL (ref 78–100)
MONOCYTES # BLD AUTO: 0.8 10E3/UL (ref 0–1.3)
MONOCYTES NFR BLD AUTO: 8 %
NEUTROPHILS # BLD AUTO: 6.5 10E3/UL (ref 1.6–8.3)
NEUTROPHILS NFR BLD AUTO: 70 %
NRBC # BLD AUTO: 0 10E3/UL
NRBC BLD AUTO-RTO: 0 /100
NT-PROBNP SERPL-MCNC: 934 PG/ML (ref 0–900)
PLATELET # BLD AUTO: 191 10E3/UL (ref 150–450)
POTASSIUM SERPL-SCNC: 3.8 MMOL/L (ref 3.4–5.3)
PROT SERPL-MCNC: 7.3 G/DL (ref 6.4–8.3)
RBC # BLD AUTO: 4.98 10E6/UL (ref 3.8–5.2)
RSV RNA SPEC NAA+PROBE: NEGATIVE
SARS-COV-2 RNA RESP QL NAA+PROBE: NEGATIVE
SODIUM SERPL-SCNC: 139 MMOL/L (ref 136–145)
TROPONIN T SERPL HS-MCNC: 17 NG/L
WBC # BLD AUTO: 9.1 10E3/UL (ref 4–11)

## 2022-10-27 PROCEDURE — 83880 ASSAY OF NATRIURETIC PEPTIDE: CPT | Performed by: INTERNAL MEDICINE

## 2022-10-27 PROCEDURE — 96376 TX/PRO/DX INJ SAME DRUG ADON: CPT

## 2022-10-27 PROCEDURE — 85025 COMPLETE CBC W/AUTO DIFF WBC: CPT | Performed by: INTERNAL MEDICINE

## 2022-10-27 PROCEDURE — 80053 COMPREHEN METABOLIC PANEL: CPT | Performed by: INTERNAL MEDICINE

## 2022-10-27 PROCEDURE — 93010 ELECTROCARDIOGRAM REPORT: CPT | Performed by: INTERNAL MEDICINE

## 2022-10-27 PROCEDURE — 86140 C-REACTIVE PROTEIN: CPT | Performed by: INTERNAL MEDICINE

## 2022-10-27 PROCEDURE — 36415 COLL VENOUS BLD VENIPUNCTURE: CPT | Performed by: INTERNAL MEDICINE

## 2022-10-27 PROCEDURE — 99284 EMERGENCY DEPT VISIT MOD MDM: CPT | Performed by: INTERNAL MEDICINE

## 2022-10-27 PROCEDURE — 84484 ASSAY OF TROPONIN QUANT: CPT | Performed by: INTERNAL MEDICINE

## 2022-10-27 PROCEDURE — 99285 EMERGENCY DEPT VISIT HI MDM: CPT | Mod: 25

## 2022-10-27 PROCEDURE — C9803 HOPD COVID-19 SPEC COLLECT: HCPCS

## 2022-10-27 PROCEDURE — 93005 ELECTROCARDIOGRAM TRACING: CPT

## 2022-10-27 PROCEDURE — 71045 X-RAY EXAM CHEST 1 VIEW: CPT

## 2022-10-27 PROCEDURE — 87637 SARSCOV2&INF A&B&RSV AMP PRB: CPT | Performed by: INTERNAL MEDICINE

## 2022-10-27 RX ORDER — ATORVASTATIN CALCIUM 40 MG/1
TABLET, FILM COATED ORAL
Status: ON HOLD | COMMUNITY
Start: 2022-06-01 | End: 2023-07-07

## 2022-10-27 ASSESSMENT — ACTIVITIES OF DAILY LIVING (ADL): ADLS_ACUITY_SCORE: 35

## 2022-10-28 ENCOUNTER — CARE COORDINATION (OUTPATIENT)
Dept: EMERGENCY MEDICINE | Facility: HOSPITAL | Age: 72
End: 2022-10-28

## 2022-10-28 VITALS
BODY MASS INDEX: 37.89 KG/M2 | HEIGHT: 60 IN | OXYGEN SATURATION: 97 % | WEIGHT: 193 LBS | HEART RATE: 64 BPM | SYSTOLIC BLOOD PRESSURE: 151 MMHG | DIASTOLIC BLOOD PRESSURE: 76 MMHG | RESPIRATION RATE: 18 BRPM | TEMPERATURE: 98.9 F

## 2022-10-28 LAB — TROPONIN T SERPL HS-MCNC: 19 NG/L

## 2022-10-28 PROCEDURE — 250N000013 HC RX MED GY IP 250 OP 250 PS 637: Performed by: INTERNAL MEDICINE

## 2022-10-28 PROCEDURE — 96374 THER/PROPH/DIAG INJ IV PUSH: CPT

## 2022-10-28 PROCEDURE — 36415 COLL VENOUS BLD VENIPUNCTURE: CPT | Performed by: INTERNAL MEDICINE

## 2022-10-28 PROCEDURE — 84484 ASSAY OF TROPONIN QUANT: CPT | Performed by: INTERNAL MEDICINE

## 2022-10-28 PROCEDURE — 250N000011 HC RX IP 250 OP 636: Performed by: INTERNAL MEDICINE

## 2022-10-28 PROCEDURE — 96375 TX/PRO/DX INJ NEW DRUG ADDON: CPT

## 2022-10-28 RX ORDER — LABETALOL 20 MG/4 ML (5 MG/ML) INTRAVENOUS SYRINGE
20 ONCE
Status: COMPLETED | OUTPATIENT
Start: 2022-10-28 | End: 2022-10-28

## 2022-10-28 RX ORDER — LABETALOL 20 MG/4 ML (5 MG/ML) INTRAVENOUS SYRINGE
10 ONCE
Status: CANCELLED | OUTPATIENT
Start: 2022-10-28 | End: 2022-10-28

## 2022-10-28 RX ORDER — AMLODIPINE BESYLATE 5 MG/1
5 TABLET ORAL DAILY
Qty: 30 TABLET | Refills: 0 | Status: ON HOLD | OUTPATIENT
Start: 2022-10-28 | End: 2023-07-10

## 2022-10-28 RX ORDER — AMLODIPINE BESYLATE 5 MG/1
5 TABLET ORAL ONCE
Status: COMPLETED | OUTPATIENT
Start: 2022-10-28 | End: 2022-10-28

## 2022-10-28 RX ORDER — FUROSEMIDE 10 MG/ML
40 INJECTION INTRAMUSCULAR; INTRAVENOUS ONCE
Status: COMPLETED | OUTPATIENT
Start: 2022-10-28 | End: 2022-10-28

## 2022-10-28 RX ORDER — OLMESARTAN MEDOXOMIL AND HYDROCHLOROTHIAZIDE 20/12.5 20; 12.5 MG/1; MG/1
1 TABLET ORAL DAILY
Qty: 30 TABLET | Refills: 0 | Status: ON HOLD | OUTPATIENT
Start: 2022-10-28 | End: 2023-07-10

## 2022-10-28 RX ADMIN — LABETALOL HYDROCHLORIDE 20 MG: 5 INJECTION, SOLUTION INTRAVENOUS at 03:21

## 2022-10-28 RX ADMIN — AMLODIPINE BESYLATE 5 MG: 5 TABLET ORAL at 05:39

## 2022-10-28 RX ADMIN — FUROSEMIDE 40 MG: 10 INJECTION, SOLUTION INTRAVENOUS at 00:30

## 2022-10-28 RX ADMIN — LABETALOL HYDROCHLORIDE 20 MG: 5 INJECTION, SOLUTION INTRAVENOUS at 01:50

## 2022-10-28 ASSESSMENT — ENCOUNTER SYMPTOMS
DIAPHORESIS: 0
LIGHT-HEADEDNESS: 0
PALPITATIONS: 0
VOICE CHANGE: 0
VOMITING: 0
ARTHRALGIAS: 0
FEVER: 1
ABDOMINAL PAIN: 0
CONFUSION: 0
MYALGIAS: 0
BLOOD IN STOOL: 0
CHILLS: 0
HEMATURIA: 0
NECK PAIN: 0
BACK PAIN: 0
NECK STIFFNESS: 0
CHEST TIGHTNESS: 0
ANAL BLEEDING: 0
HEADACHES: 0
ABDOMINAL DISTENTION: 0
DYSURIA: 0
COUGH: 0
NUMBNESS: 0
SHORTNESS OF BREATH: 1
NAUSEA: 0
WOUND: 0
FLANK PAIN: 0
DIZZINESS: 0
WHEEZING: 0
COLOR CHANGE: 0

## 2022-10-28 ASSESSMENT — ACTIVITIES OF DAILY LIVING (ADL)
ADLS_ACUITY_SCORE: 35

## 2022-10-28 NOTE — ED TRIAGE NOTES
Pt in for evaluation of shortness of breath ongoing the last 4 days with intermittent temps of 100. Pt reports similar sx in the past with a pneumonia diagnosis.

## 2022-10-28 NOTE — ED PROVIDER NOTES
History     Chief Complaint   Patient presents with     Shortness of Breath     The history is provided by the patient.   Shortness of Breath  Severity:  Moderate  Onset quality:  Gradual  Duration:  4 days  Timing:  Constant  Chronicity:  New  Associated symptoms: fever    Associated symptoms: no abdominal pain, no chest pain, no cough, no diaphoresis, no headaches, no neck pain, no rash, no vomiting and no wheezing          Allergies:  Allergies   Allergen Reactions     Cats Other (See Comments)     Sneezing, runny nose     Codeine Sulfate Nausea and Vomiting and GI Disturbance     Fexofenadine Hcl      Allegra      Rosuvastatin Other (See Comments)     Dizziness - Crestor        Problem List:    Patient Active Problem List    Diagnosis Date Noted     Coronary artery disease involving native coronary artery 10/12/2021     Priority: Medium     Acute diastolic congestive heart failure (H) 10/12/2021     Priority: Medium     Atrial flutter with rapid ventricular response (H) 10/12/2021     Priority: Medium     Acute exacerbation of CHF (congestive heart failure) (H) 10/11/2021     Priority: Medium     Status post chemotherapy 09/25/2020     Priority: Medium     Added automatically from request for surgery [id number]       Endometrial adenocarcinoma (H) 12/03/2018     Priority: Medium     Malignant neoplasm of uterus, unspecified site (H) 12/03/2018     Priority: Medium     Atrial fibrillation (H) 11/26/2018     Priority: Medium     History of coronary artery disease 10/30/2018     Priority: Medium     Hyponatremia 10/30/2018     Priority: Medium     Moderate mitral regurgitation 10/30/2018     Priority: Medium     Adenocarcinoma of the endometrium/uterus (H) 10/09/2018     Priority: Medium     Discovered on 02/18 biopsy. Pt lost to f/u       Chronic diastolic congestive heart failure (H) 09/20/2018     Priority: Medium     S/P CABG x 4 09/10/2018     Priority: Medium     History of non-ST elevation myocardial  infarction (NSTEMI) 09/06/2018     Priority: Medium     Endometrial cancer (H) 09/04/2018     Priority: Medium     Cerebrovascular accident (H) 05/14/2018     Priority: Medium     Hypertensive urgency 05/14/2018     Priority: Medium     CVA (cerebral vascular accident) (H) 05/14/2018     Priority: Medium     Chest pain 05/14/2018     Priority: Medium     Endometrial hyperplasia with atypia 05/09/2018     Priority: Medium     Metrorrhagia 02/18/2018     Priority: Medium     ACP (advance care planning) 05/20/2016     Priority: Medium     Advance Care Planning 5/20/2016: ACP Review of Chart / Resources Provided:  Reviewed chart for advance care plan.  Heather Rosas has no plan or code status on file. Discussed available resources and provided with information. Confirmed code status reflects current choices pending further ACP discussions.  Confirmed/documented legally designated decision makers.  Added by Margot Shannon             HTN (hypertension)      Priority: Medium     Major depressive disorder, recurrent episode, moderate (H) 01/01/2011     Priority: Medium     ANNA (generalized anxiety disorder) 01/01/2011     Priority: Medium     GERD (Gastroesophageal reflux disease) 01/01/2011     Priority: Medium     Obesity (H) 01/01/2011     Priority: Medium     Schizophrenia (H) 01/01/2011     Priority: Medium     Seasonal allergic rhinitis 01/01/2011     Priority: Medium     Fibromyalgia 06/14/2004     Priority: Medium     Dyslipidemia 11/26/2003     Priority: Medium        Past Medical History:    Past Medical History:   Diagnosis Date     Allergic rhinitis 01/01/2011     Anxiety 01/01/2011     Anxiety state, unspecified      Dyslipidemia 11/26/2003     fibromyalgia 06/14/2004     GERD, Esophageal reflux 01/01/2011     HTN (hypertension)      Major depression, recurrent (H) 1/1/2011     Nonorganic sleep disorder, unspecified      Obesity 01/01/2011     Schizophrenia (H) 01/01/2011     Toxic shock syndrome (H) 2006        Past Surgical History:    Past Surgical History:   Procedure Laterality Date     ANGIOGRAM  02/2005    Normal      BIOPSY BREAST  1984    LT, normal     BYPASS GRAFT ARTERY CORONARY  09/10/2018     CARPAL TUNNEL RELEASE RT/LT  2005    RT, carpal tunnel     COLONOSCOPY  2011    Repeat in ten years      COLONOSCOPY  1996     COLONOSCOPY  2004     DILATION AND CURETTAGE, HYSTEROSCOPY, ABLATE ENDOMETRIUM, COMBINED N/A 2/19/2018    Procedure: COMBINED DILATION AND CURETTAGE, HYSTEROSCOPY, ABLATE ENDOMETRIUM;  HYSTEROSCOPY DILATION AND CURETTAGE, ENDOMETRIAL ABLATION;  Surgeon: Jose Nettles MD;  Location: [facility]     HYSTERECTOMY TOTAL ABD, NICK SALPINGO-OOPHORECTOMY, NODE DISSECTION, TUMOR DEBULKING, COMBINED  10/30/2018     INSERT PORT VASCULAR ACCESS N/A 12/11/2018    Procedure: PORT-A-CATH PLACEMENT;  Surgeon: Rafi Trinidad MD;  Location: [facility] OR     placement of central line  2005     REMOVE PORT VASCULAR ACCESS N/A 10/6/2020    Procedure: port a cath removal;  Surgeon: Rafi Trinidad MD;  Location: [facility]       Family History:    Family History   Problem Relation Age of Onset     C.A.D. Father 45        (cause of death)      Other - See Comments Father         rheumatic fever      Allergies Father      Cancer Sister 56        Esophageal to bone cancer     Gastrointestinal Disease Mother         GERD     Cancer Mother         pancreatic ca (cause of death) /liver ca     Breast Cancer Maternal Aunt      Breast Cancer Maternal Aunt      Colon Cancer Paternal Aunt         (cause of death)      Crohn's Disease Other      Depression Maternal Uncle      Depression Maternal Aunt      Diabetes Paternal Grandmother         type 2     Neurologic Disorder Brother         neuropathy     Cancer Brother 58        Tonsilular cancer/ lymph node in neck     Allergies Brother      Breast Cancer Cousin      Breast Cancer Cousin      Breast Cancer Cousin        Social History:  Marital Status:   [4]  Social History  "    Tobacco Use     Smoking status: Some Days     Packs/day: 0.25     Types: Cigarettes     Start date: 1971     Last attempt to quit: 2013     Years since quittin.9     Smokeless tobacco: Never     Tobacco comments:     pt declined, stated she would use, \"the patch\"   Substance Use Topics     Alcohol use: No     Comment: rare     Drug use: No        Medications:    amLODIPine (NORVASC) 5 MG tablet  atorvastatin (LIPITOR) 40 MG tablet  olmesartan-hydrochlorothiazide (BENICAR HCT) 20-12.5 MG tablet  ibuprofen (ADVIL/MOTRIN) 200 MG tablet  VITAMIN E PO          Review of Systems   Constitutional: Positive for fever. Negative for chills and diaphoresis.   HENT: Negative for voice change.    Eyes: Negative for visual disturbance.   Respiratory: Positive for shortness of breath. Negative for cough, chest tightness and wheezing.    Cardiovascular: Negative for chest pain, palpitations and leg swelling.   Gastrointestinal: Negative for abdominal distention, abdominal pain, anal bleeding, blood in stool, nausea and vomiting.   Genitourinary: Negative for decreased urine volume, dysuria, flank pain and hematuria.   Musculoskeletal: Negative for arthralgias, back pain, gait problem, myalgias, neck pain and neck stiffness.   Skin: Negative for color change, pallor, rash and wound.   Neurological: Negative for dizziness, syncope, light-headedness, numbness and headaches.   Psychiatric/Behavioral: Negative for confusion and suicidal ideas.       Physical Exam   BP:  (unable to obtain in triage after multiple attempts.)  Pulse: 96  Temp: 98.9  F (37.2  C) (tylenol last night)  Resp: 24  Height: 152.4 cm (5')  Weight: 87.5 kg (193 lb)  SpO2: 96 %      Physical Exam  Vitals and nursing note reviewed.   Constitutional:       Appearance: She is well-developed and well-nourished.   HENT:      Head: Normocephalic and atraumatic.      Mouth/Throat:      Pharynx: No oropharyngeal exudate.   Eyes:      Conjunctiva/sclera: " Conjunctivae normal.      Pupils: Pupils are equal, round, and reactive to light.   Neck:      Thyroid: No thyromegaly.      Vascular: No JVD.      Trachea: No tracheal deviation.   Cardiovascular:      Rate and Rhythm: Normal rate and regular rhythm.      Pulses: Intact distal pulses.      Heart sounds: Normal heart sounds. No murmur heard.    No friction rub. No gallop.   Pulmonary:      Effort: Pulmonary effort is normal. No respiratory distress.      Breath sounds: Normal breath sounds. No stridor. No wheezing or rales.   Chest:      Chest wall: No tenderness.   Abdominal:      General: Bowel sounds are normal. There is no distension.      Palpations: Abdomen is soft. There is no mass.      Tenderness: There is no abdominal tenderness. There is no guarding or rebound.   Musculoskeletal:         General: No tenderness or edema. Normal range of motion.      Cervical back: Normal range of motion and neck supple.   Lymphadenopathy:      Cervical: No cervical adenopathy.   Skin:     General: Skin is warm and dry.      Coloration: Skin is not pale.      Findings: No erythema or rash.   Neurological:      Mental Status: She is alert and oriented to person, place, and time.   Psychiatric:         Behavior: Behavior normal.         ED Course                 Procedures             No results found for this or any previous visit (from the past 24 hour(s)).    Medications   furosemide (LASIX) injection 40 mg (40 mg Intravenous Given 10/28/22 0030)   labetalol (NORMODYNE/TRANDATE) syringe 20 mg (20 mg Intravenous Given 10/28/22 0150)   labetalol (NORMODYNE/TRANDATE) syringe 20 mg (20 mg Intravenous Given 10/28/22 0321)   amLODIPine (NORVASC) tablet 5 mg (5 mg Oral Given 10/28/22 9576)       Assessments & Plan (with Medical Decision Making)   Exertional SOB for 4 days since ran out of her BP medication     Labs reviewed  CXR: b/l congetion/ pulmonary Edema    After BP control and diuresis in ER, felt much better, walked in ER  and spo2 stayed above 95 without feeling SOB    I advised importance of follow up with PCP and adherence to medication for optimum control of CHF  She is not on anticoagulant and refused to start it now , want to discuss with PCP for decision making but agreed to fill out the BP medication  SW consulted for follow-up to help pt get to a PCP  D C HOme      I have reviewed the nursing notes.    I have reviewed the findings, diagnosis, plan and need for follow up with the patient.      Discharge Medication List as of 10/28/2022  5:40 AM      START taking these medications    Details   amLODIPine (NORVASC) 5 MG tablet Take 1 tablet (5 mg) by mouth daily for 30 days, Disp-30 tablet, R-0, E-Prescribe      olmesartan-hydrochlorothiazide (BENICAR HCT) 20-12.5 MG tablet Take 1 tablet by mouth daily for 30 days, Disp-30 tablet, R-0, E-Prescribe             Final diagnoses:   Acute on chronic congestive heart failure, unspecified heart failure type (H)   Uncontrolled hypertension       10/27/2022   [facility]     Adriel Burnham MD  10/29/22 7243

## 2023-01-01 ENCOUNTER — TELEPHONE (OUTPATIENT)
Dept: FAMILY MEDICINE | Facility: OTHER | Age: 73
End: 2023-01-01

## 2023-01-01 DIAGNOSIS — I50.33 ACUTE ON CHRONIC DIASTOLIC CONGESTIVE HEART FAILURE (H): ICD-10-CM

## 2023-01-01 RX ORDER — POTASSIUM CHLORIDE 750 MG/1
20 CAPSULE, EXTENDED RELEASE ORAL DAILY
Qty: 30 CAPSULE | Refills: 9 | Status: SHIPPED | OUTPATIENT
Start: 2023-01-01 | End: 2024-01-01

## 2023-05-09 ENCOUNTER — APPOINTMENT (OUTPATIENT)
Dept: CT IMAGING | Facility: HOSPITAL | Age: 73
End: 2023-05-09
Attending: NURSE PRACTITIONER
Payer: MEDICARE

## 2023-05-09 ENCOUNTER — HOSPITAL ENCOUNTER (EMERGENCY)
Facility: HOSPITAL | Age: 73
Discharge: HOME | End: 2023-05-09
Attending: NURSE PRACTITIONER | Admitting: NURSE PRACTITIONER
Payer: MEDICARE

## 2023-05-09 VITALS
WEIGHT: 198.4 LBS | HEART RATE: 87 BPM | RESPIRATION RATE: 18 BRPM | BODY MASS INDEX: 38.75 KG/M2 | OXYGEN SATURATION: 94 % | TEMPERATURE: 97.4 F | DIASTOLIC BLOOD PRESSURE: 106 MMHG | SYSTOLIC BLOOD PRESSURE: 189 MMHG

## 2023-05-09 DIAGNOSIS — R10.12 ABDOMINAL PAIN, LEFT UPPER QUADRANT: ICD-10-CM

## 2023-05-09 LAB
ALBUMIN SERPL BCG-MCNC: 3.9 G/DL (ref 3.5–5.2)
ALBUMIN UR-MCNC: 20 MG/DL
ALP SERPL-CCNC: 136 U/L (ref 35–104)
ALT SERPL W P-5'-P-CCNC: 14 U/L (ref 10–35)
ANION GAP SERPL CALCULATED.3IONS-SCNC: 10 MMOL/L (ref 7–15)
APPEARANCE UR: CLEAR
AST SERPL W P-5'-P-CCNC: 16 U/L (ref 10–35)
BACTERIA #/AREA URNS HPF: ABNORMAL /HPF
BASOPHILS # BLD AUTO: 0.1 10E3/UL (ref 0–0.2)
BASOPHILS NFR BLD AUTO: 1 %
BILIRUB SERPL-MCNC: 0.2 MG/DL
BILIRUB UR QL STRIP: NEGATIVE
BUN SERPL-MCNC: 17.1 MG/DL (ref 8–23)
CALCIUM SERPL-MCNC: 9.4 MG/DL (ref 8.8–10.2)
CHLORIDE SERPL-SCNC: 105 MMOL/L (ref 98–107)
COLOR UR AUTO: ABNORMAL
CREAT SERPL-MCNC: 0.75 MG/DL (ref 0.51–0.95)
CRP SERPL-MCNC: 5.84 MG/L
DEPRECATED HCO3 PLAS-SCNC: 25 MMOL/L (ref 22–29)
EOSINOPHIL # BLD AUTO: 0.4 10E3/UL (ref 0–0.7)
EOSINOPHIL NFR BLD AUTO: 4 %
ERYTHROCYTE [DISTWIDTH] IN BLOOD BY AUTOMATED COUNT: 13.2 % (ref 10–15)
GFR SERPL CREATININE-BSD FRML MDRD: 85 ML/MIN/1.73M2
GLUCOSE SERPL-MCNC: 106 MG/DL (ref 70–99)
GLUCOSE UR STRIP-MCNC: NEGATIVE MG/DL
HCT VFR BLD AUTO: 45.2 % (ref 35–47)
HGB BLD-MCNC: 15.1 G/DL (ref 11.7–15.7)
HGB UR QL STRIP: NEGATIVE
HOLD SPECIMEN: NORMAL
IMM GRANULOCYTES # BLD: 0.1 10E3/UL
IMM GRANULOCYTES NFR BLD: 1 %
KETONES UR STRIP-MCNC: NEGATIVE MG/DL
LEUKOCYTE ESTERASE UR QL STRIP: NEGATIVE
LIPASE SERPL-CCNC: 20 U/L (ref 13–60)
LYMPHOCYTES # BLD AUTO: 1.7 10E3/UL (ref 0.8–5.3)
LYMPHOCYTES NFR BLD AUTO: 14 %
MCH RBC QN AUTO: 29.4 PG (ref 26.5–33)
MCHC RBC AUTO-ENTMCNC: 33.4 G/DL (ref 31.5–36.5)
MCV RBC AUTO: 88 FL (ref 78–100)
MONOCYTES # BLD AUTO: 0.8 10E3/UL (ref 0–1.3)
MONOCYTES NFR BLD AUTO: 7 %
NEUTROPHILS # BLD AUTO: 9.1 10E3/UL (ref 1.6–8.3)
NEUTROPHILS NFR BLD AUTO: 73 %
NITRATE UR QL: NEGATIVE
NRBC # BLD AUTO: 0 10E3/UL
NRBC BLD AUTO-RTO: 0 /100
PH UR STRIP: 6 [PH] (ref 4.7–8)
PLATELET # BLD AUTO: 208 10E3/UL (ref 150–450)
POTASSIUM SERPL-SCNC: 3.9 MMOL/L (ref 3.4–5.3)
PROT SERPL-MCNC: 7.3 G/DL (ref 6.4–8.3)
RBC # BLD AUTO: 5.14 10E6/UL (ref 3.8–5.2)
RBC URINE: 0 /HPF
SODIUM SERPL-SCNC: 140 MMOL/L (ref 136–145)
SP GR UR STRIP: 1 (ref 1–1.03)
SQUAMOUS EPITHELIAL: 1 /HPF
UROBILINOGEN UR STRIP-MCNC: NORMAL MG/DL
WBC # BLD AUTO: 12.3 10E3/UL (ref 4–11)
WBC URINE: 0 /HPF

## 2023-05-09 PROCEDURE — 99285 EMERGENCY DEPT VISIT HI MDM: CPT | Mod: 25

## 2023-05-09 PROCEDURE — 85025 COMPLETE CBC W/AUTO DIFF WBC: CPT | Performed by: NURSE PRACTITIONER

## 2023-05-09 PROCEDURE — 80053 COMPREHEN METABOLIC PANEL: CPT | Performed by: NURSE PRACTITIONER

## 2023-05-09 PROCEDURE — G1010 CDSM STANSON: HCPCS

## 2023-05-09 PROCEDURE — 99284 EMERGENCY DEPT VISIT MOD MDM: CPT | Performed by: NURSE PRACTITIONER

## 2023-05-09 PROCEDURE — 86140 C-REACTIVE PROTEIN: CPT | Performed by: NURSE PRACTITIONER

## 2023-05-09 PROCEDURE — 83690 ASSAY OF LIPASE: CPT | Performed by: NURSE PRACTITIONER

## 2023-05-09 PROCEDURE — 81001 URINALYSIS AUTO W/SCOPE: CPT | Performed by: NURSE PRACTITIONER

## 2023-05-09 PROCEDURE — 250N000011 HC RX IP 250 OP 636: Performed by: RADIOLOGY

## 2023-05-09 PROCEDURE — 36415 COLL VENOUS BLD VENIPUNCTURE: CPT | Performed by: NURSE PRACTITIONER

## 2023-05-09 RX ORDER — IOPAMIDOL 755 MG/ML
95 INJECTION, SOLUTION INTRAVASCULAR ONCE
Status: COMPLETED | OUTPATIENT
Start: 2023-05-09 | End: 2023-05-09

## 2023-05-09 RX ADMIN — IOPAMIDOL 95 ML: 755 INJECTION, SOLUTION INTRAVENOUS at 12:47

## 2023-05-09 ASSESSMENT — ENCOUNTER SYMPTOMS
NEUROLOGICAL NEGATIVE: 1
FLANK PAIN: 1
NAUSEA: 0
HEMATOLOGIC/LYMPHATIC NEGATIVE: 1
CARDIOVASCULAR NEGATIVE: 1
RESPIRATORY NEGATIVE: 1
CONSTITUTIONAL NEGATIVE: 1
DIARRHEA: 0
CONSTIPATION: 1
ALLERGIC/IMMUNOLOGIC NEGATIVE: 1
EYES NEGATIVE: 1
ENDOCRINE NEGATIVE: 1
VOMITING: 0
PSYCHIATRIC NEGATIVE: 1
ABDOMINAL PAIN: 1

## 2023-05-09 ASSESSMENT — ACTIVITIES OF DAILY LIVING (ADL): ADLS_ACUITY_SCORE: 35

## 2023-05-09 NOTE — ED NOTES
Patient given discharge instructions to follow up with PCP as needed. Given discharge instructions about bowel cleanout as needed.     Patient verbalized understanding. Patient condition improved upon discharge.

## 2023-05-09 NOTE — DISCHARGE INSTRUCTIONS
"Miralax Home Bowel Cleanout for Constipation for Adults:   You will need:    32, 48, or 64 oz. of flavored PowerAde or Gatorade (see below)   One 238 gram bottle of Miralax   2 or 3 bisacodyl (Dulcolax) tablets    These are all available without a prescription.   Plan to stay home the afternoon/evening of the cleanout.   Start a clear liquid diet after breakfast. A clear liquid diet consists of soda, juices without pulp, broth, Jell-O, popsicles, Italian ice, hard.  Pretty much anything you can see through. No dairy products or solid foods.   Around 12 noon on day of cleanout, mix the PowerAde/Gatorade and Miralax as directed below based on your child's weight. Leave this mixture in the refrigerator for one hour to help the Miralax dissolve and to help the mixture taste better. Note, the dose we're suggesting is for a bowel \"cleanout\". It is not the dose written on the bottle, which is designed for the daily softening of stool. We need this higher dose so that the cleanout will work.   Mix 14 capfuls (238 grams) of Miralax into 64 oz. of PowerAde/Gatorade.   Any time before 4pm, have your child start drinking the Miralax solution. Drink 4-10 oz. of the solution every 15-20 minutes until the solution is gone. It is very important to drink all of it. If your child becomes nauseated, it is ok to slow down.  Within 30 minutes of finishing the Miralax solution, take 2 bisacodyl (Dulcolax) tablets.        Follow-up with your primary care provider for reevaluation.  Contact your primary care provider if you have any questions or concerns.  Do not hesitate to return to the ER if any new or worsening symptoms.     Please read the attached instructions (if any).  They highlight more specific treatments and interventions for you at home.              Thank you for letting me participate in your care and wish you a fast and uneventful recovery,    Pardeep LEYVA CNP    Do not hesitate to contact me with questions or " concerns.  [email or web address]  [email or web address]

## 2023-05-09 NOTE — ED TRIAGE NOTES
Patient presents with c/o left lower quad abdominal pain, that started 3 days ago. Patient denies any urinary symptoms. Patient reports normal BM today. Patient denies any N/V/D.

## 2023-05-09 NOTE — ED PROVIDER NOTES
History     Chief Complaint   Patient presents with     Abdominal Pain     HPI   Heather Rosas is a 72 year old individual with history of fibromyalgia, hypertension, major depressive disorder, generalized anxiety disorder, GERD, schizophrenia, CVA, atrial fibrillation, chronic diastolic heart failure, CABG x4, comes in with complaints of left flank/abdominal pain.   Patient states has been having constant left-sided abdominal pain for the past 3 days.  States that sometimes laying down and putting pressure on her abdomen improves the pain.  States that she did take milk of mag and Ex-Lax as she thought she was constipated and had large bowel movement but continues to have the pain.   Patient denies fever, chills, chest pain, shortness of breath.  Denies any melena or hematochezia.  Denies any urinary issues.         Allergies:  Allergies   Allergen Reactions     Cats Other (See Comments)     Sneezing, runny nose     Codeine Sulfate Nausea and Vomiting and GI Disturbance     Fexofenadine Hcl      Allegra      Rosuvastatin Other (See Comments)     Dizziness - Crestor        Problem List:    Patient Active Problem List    Diagnosis Date Noted     Coronary artery disease involving native coronary artery 10/12/2021     Priority: Medium     Acute diastolic congestive heart failure (H) 10/12/2021     Priority: Medium     Atrial flutter with rapid ventricular response (H) 10/12/2021     Priority: Medium     Acute exacerbation of CHF (congestive heart failure) (H) 10/11/2021     Priority: Medium     Status post chemotherapy 09/25/2020     Priority: Medium     Added automatically from request for surgery [id number]       Endometrial adenocarcinoma (H) 12/03/2018     Priority: Medium     Malignant neoplasm of uterus, unspecified site (H) 12/03/2018     Priority: Medium     Atrial fibrillation (H) 11/26/2018     Priority: Medium     History of coronary artery disease 10/30/2018     Priority: Medium     Hyponatremia  10/30/2018     Priority: Medium     Moderate mitral regurgitation 10/30/2018     Priority: Medium     Adenocarcinoma of the endometrium/uterus (H) 10/09/2018     Priority: Medium     Discovered on 02/18 biopsy. Pt lost to f/u       Chronic diastolic congestive heart failure (H) 09/20/2018     Priority: Medium     S/P CABG x 4 09/10/2018     Priority: Medium     History of non-ST elevation myocardial infarction (NSTEMI) 09/06/2018     Priority: Medium     Endometrial cancer (H) 09/04/2018     Priority: Medium     Cerebrovascular accident (H) 05/14/2018     Priority: Medium     Hypertensive urgency 05/14/2018     Priority: Medium     CVA (cerebral vascular accident) (H) 05/14/2018     Priority: Medium     Chest pain 05/14/2018     Priority: Medium     Endometrial hyperplasia with atypia 05/09/2018     Priority: Medium     Metrorrhagia 02/18/2018     Priority: Medium     ACP (advance care planning) 05/20/2016     Priority: Medium     Advance Care Planning 5/20/2016: ACP Review of Chart / Resources Provided:  Reviewed chart for advance care plan.  Heather Rosas has no plan or code status on file. Discussed available resources and provided with information. Confirmed code status reflects current choices pending further ACP discussions.  Confirmed/documented legally designated decision makers.  Added by Margot Shannon             HTN (hypertension)      Priority: Medium     Major depressive disorder, recurrent episode, moderate (H) 01/01/2011     Priority: Medium     ANNA (generalized anxiety disorder) 01/01/2011     Priority: Medium     GERD (Gastroesophageal reflux disease) 01/01/2011     Priority: Medium     Obesity (H) 01/01/2011     Priority: Medium     Schizophrenia (H) 01/01/2011     Priority: Medium     Seasonal allergic rhinitis 01/01/2011     Priority: Medium     Fibromyalgia 06/14/2004     Priority: Medium     Dyslipidemia 11/26/2003     Priority: Medium        Past Medical History:    Past Medical History:    Diagnosis Date     Allergic rhinitis 01/01/2011     Anxiety 01/01/2011     Anxiety state, unspecified      Dyslipidemia 11/26/2003     fibromyalgia 06/14/2004     GERD, Esophageal reflux 01/01/2011     HTN (hypertension)      Major depression, recurrent (H) 1/1/2011     Nonorganic sleep disorder, unspecified      Obesity 01/01/2011     Schizophrenia (H) 01/01/2011     Toxic shock syndrome (H) 2006       Past Surgical History:    Past Surgical History:   Procedure Laterality Date     ANGIOGRAM  02/2005    Normal      BIOPSY BREAST  1984    LT, normal     BYPASS GRAFT ARTERY CORONARY  09/10/2018     CARPAL TUNNEL RELEASE RT/LT  2005    RT, carpal tunnel     COLONOSCOPY  2011    Repeat in ten years      COLONOSCOPY  1996     COLONOSCOPY  2004     DILATION AND CURETTAGE, HYSTEROSCOPY, ABLATE ENDOMETRIUM, COMBINED N/A 2/19/2018    Procedure: COMBINED DILATION AND CURETTAGE, HYSTEROSCOPY, ABLATE ENDOMETRIUM;  HYSTEROSCOPY DILATION AND CURETTAGE, ENDOMETRIAL ABLATION;  Surgeon: Jose Nettles MD;  Location: [facility] OR     HYSTERECTOMY TOTAL ABD, NICK SALPINGO-OOPHORECTOMY, NODE DISSECTION, TUMOR DEBULKING, COMBINED  10/30/2018     INSERT PORT VASCULAR ACCESS N/A 12/11/2018    Procedure: PORT-A-CATH PLACEMENT;  Surgeon: Rafi Trinidad MD;  Location: [facility] OR     placement of central line  2005     REMOVE PORT VASCULAR ACCESS N/A 10/6/2020    Procedure: port a cath removal;  Surgeon: Rafi Trinidad MD;  Location: [facility] OR       Family History:    Family History   Problem Relation Age of Onset     C.A.D. Father 45        (cause of death)      Other - See Comments Father         rheumatic fever      Allergies Father      Cancer Sister 56        Esophageal to bone cancer     Gastrointestinal Disease Mother         GERD     Cancer Mother         pancreatic ca (cause of death) /liver ca     Breast Cancer Maternal Aunt      Breast Cancer Maternal Aunt      Colon Cancer Paternal Aunt         (cause of death)      Crohn's Disease Other   "    Depression Maternal Uncle      Depression Maternal Aunt      Diabetes Paternal Grandmother         type 2     Neurologic Disorder Brother         neuropathy     Cancer Brother 58        Tonsilular cancer/ lymph node in neck     Allergies Brother      Breast Cancer Cousin      Breast Cancer Cousin      Breast Cancer Cousin        Social History:  Marital Status:   [4]  Social History     Tobacco Use     Smoking status: Some Days     Packs/day: 0.50     Types: Cigarettes     Start date: 1971     Last attempt to quit: 2013     Years since quittin.4     Smokeless tobacco: Never     Tobacco comments:     pt declined, stated she would use, \"the patch\"   Substance Use Topics     Alcohol use: No     Comment: rare     Drug use: No        Medications:    amLODIPine (NORVASC) 5 MG tablet  atorvastatin (LIPITOR) 40 MG tablet  ibuprofen (ADVIL/MOTRIN) 200 MG tablet  olmesartan-hydrochlorothiazide (BENICAR HCT) 20-12.5 MG tablet  VITAMIN E PO          Review of Systems   Constitutional: Negative.    HENT: Negative.    Eyes: Negative.    Respiratory: Negative.    Cardiovascular: Negative.    Gastrointestinal: Positive for abdominal pain and constipation. Negative for diarrhea, nausea and vomiting.   Endocrine: Negative.    Genitourinary: Positive for flank pain.   Skin: Negative.    Allergic/Immunologic: Negative.    Neurological: Negative.    Hematological: Negative.    Psychiatric/Behavioral: Negative.        Physical Exam     Vitals:    23 1146 23 1200 23 1216 23 1240   BP: (!) 144/113   178/96   Pulse: 88   82   Resp:       Temp:       TempSrc:       SpO2: 94% 94%     Weight:   90 kg (198 lb 6.4 oz)        Physical Exam  Vitals and nursing note reviewed.   Constitutional:       Appearance: She is well-developed.   Cardiovascular:      Rate and Rhythm: Normal rate. Rhythm irregular.      Heart sounds: Murmur heard.       Systolic murmur is present with a grade of 3/6.     " Comments: Murmur heard best on right sternal border.  Pulmonary:      Effort: Pulmonary effort is normal.      Breath sounds: Normal breath sounds.   Abdominal:      General: Abdomen is protuberant. Bowel sounds are normal. There is no distension or abdominal bruit.      Palpations: Abdomen is soft. There is no mass.      Tenderness: There is abdominal tenderness in the left upper quadrant and left lower quadrant. There is left CVA tenderness. There is no right CVA tenderness, guarding or rebound.      Hernia: No hernia is present.   Skin:     General: Skin is warm and dry.   Neurological:      General: No focal deficit present.      Mental Status: She is alert and oriented to person, place, and time.   Psychiatric:         Mood and Affect: Mood normal.         Behavior: Behavior normal.         ED Course              ED Course as of 05/09/23 1325   Tue May 09, 2023   1139 In to see patient and history/physical completed.    1144 IV established.  Lab work ordered.  Patient denies pain at this time.   1210 CRP inflammation(!)  CRP elevated with mild elevation of WBC of 12.3.  CT abdomen pelvis with contrast ordered for this reason.   1211 WBC(!): 12.3   1320 Discussed benign work-up including lab work and CT scan.  Personal review of the CT scan did show large amounts of stool in colon.  For this reason will have patient try cleanout regimen at home with over-the-counter products.  Patient in agreement with this.              Results for orders placed or performed during the hospital encounter of 05/09/23 (from the past 24 hour(s))   UA with Microscopic reflex to Culture    Specimen: Urine, Clean Catch   Result Value Ref Range    Color Urine Straw Colorless, Straw, Light Yellow, Yellow    Appearance Urine Clear Clear    Glucose Urine Negative Negative mg/dL    Bilirubin Urine Negative Negative    Ketones Urine Negative Negative mg/dL    Specific Gravity Urine 1.003 1.003 - 1.035    Blood Urine Negative Negative    pH  Urine 6.0 4.7 - 8.0    Protein Albumin Urine 20 (A) Negative mg/dL    Urobilinogen Urine Normal Normal, 2.0 mg/dL    Nitrite Urine Negative Negative    Leukocyte Esterase Urine Negative Negative    Bacteria Urine Few (A) None Seen /HPF    RBC Urine 0 <=2 /HPF    WBC Urine 0 <=5 /HPF    Squamous Epithelials Urine 1 <=1 /HPF    Narrative    Urine Culture not indicated   CBC with platelets differential    Narrative    The following orders were created for panel order CBC with platelets differential.  Procedure                               Abnormality         Status                     ---------                               -----------         ------                     CBC with platelets and d...[[id number]]  Abnormal            Final result                 Please view results for these tests on the individual orders.   Comprehensive metabolic panel   Result Value Ref Range    Sodium 140 136 - 145 mmol/L    Potassium 3.9 3.4 - 5.3 mmol/L    Chloride 105 98 - 107 mmol/L    Carbon Dioxide (CO2) 25 22 - 29 mmol/L    Anion Gap 10 7 - 15 mmol/L    Urea Nitrogen 17.1 8.0 - 23.0 mg/dL    Creatinine 0.75 0.51 - 0.95 mg/dL    Calcium 9.4 8.8 - 10.2 mg/dL    Glucose 106 (H) 70 - 99 mg/dL    Alkaline Phosphatase 136 (H) 35 - 104 U/L    AST 16 10 - 35 U/L    ALT 14 10 - 35 U/L    Protein Total 7.3 6.4 - 8.3 g/dL    Albumin 3.9 3.5 - 5.2 g/dL    Bilirubin Total 0.2 <=1.2 mg/dL    GFR Estimate 85 >60 mL/min/1.73m2   Lipase   Result Value Ref Range    Lipase 20 13 - 60 U/L   CRP inflammation   Result Value Ref Range    CRP Inflammation 5.84 (H) <5.00 mg/L   CBC with platelets and differential   Result Value Ref Range    WBC Count 12.3 (H) 4.0 - 11.0 10e3/uL    RBC Count 5.14 3.80 - 5.20 10e6/uL    Hemoglobin 15.1 11.7 - 15.7 g/dL    Hematocrit 45.2 35.0 - 47.0 %    MCV 88 78 - 100 fL    MCH 29.4 26.5 - 33.0 pg    MCHC 33.4 31.5 - 36.5 g/dL    RDW 13.2 10.0 - 15.0 %    Platelet Count 208 150 - 450 10e3/uL    % Neutrophils 73 %    %  Lymphocytes 14 %    % Monocytes 7 %    % Eosinophils 4 %    % Basophils 1 %    % Immature Granulocytes 1 %    NRBCs per 100 WBC 0 <1 /100    Absolute Neutrophils 9.1 (H) 1.6 - 8.3 10e3/uL    Absolute Lymphocytes 1.7 0.8 - 5.3 10e3/uL    Absolute Monocytes 0.8 0.0 - 1.3 10e3/uL    Absolute Eosinophils 0.4 0.0 - 0.7 10e3/uL    Absolute Basophils 0.1 0.0 - 0.2 10e3/uL    Absolute Immature Granulocytes 0.1 <=0.4 10e3/uL    Absolute NRBCs 0.0 10e3/uL   Extra Tube    Narrative    The following orders were created for panel order Extra Tube.  Procedure                               Abnormality         Status                     ---------                               -----------         ------                     Extra Blue Top Tube[[id number]]                              Final result               Extra Red Top Tube[[id number]]                               Final result               Extra Heparinized Syringe[[id number]]                        Final result                 Please view results for these tests on the individual orders.   Extra Blue Top Tube   Result Value Ref Range    Hold Specimen JIC    Extra Red Top Tube   Result Value Ref Range    Hold Specimen JIC    Extra Heparinized Syringe   Result Value Ref Range    Hold Specimen JIC    CT Abdomen Pelvis w Contrast    Narrative    EXAMINATION: CT ABDOMEN PELVIS W CONTRAST, 5/9/2023 12:56 PM    TECHNIQUE:  Helical CT images from the lung bases through the  symphysis pubis were obtained  with IV contrast. Contrast dose: Isovue  370 95ml Given    COMPARISON: none    HISTORY: Left abdominal/flank pain    FINDINGS:    There is an 8 mm diameter nodular density in the left lower lobe on  series 2 axial image 12    The liver is free of masses or biliary ductal enlargement. No  calcified gallstones are seen.    The the spleen and pancreas appear normal.    The adrenal glands are normal.    The right and left kidneys are free of masses or hydronephrosis.    The periaortic lymph  nodes are normal in caliber.    No intraperitoneal masses or inflammatory changes are noted.    In the pelvis the bladder and rectum appear normal.    Degenerative changes are present in the thoracic and lumbar spine.      Impression    IMPRESSION: 8 mm diameter nodule in the left lower lobe.    No intraabdominal masses or inflammatory changes are noted     JEFERSON NEGRO MD         SYSTEM ID:  [id number]       Medications   iopamidol (ISOVUE-370) solution 95 mL (95 mLs Intravenous $Given 5/9/23 1247)   sodium chloride (PF) 0.9% PF flush 50 mL (50 mLs Intravenous $Given 5/9/23 1247)       Assessments & Plan (with Medical Decision Making)     I have reviewed the nursing notes.    I have reviewed the findings, diagnosis, plan and need for follow up with the patient.      Summary:  Patient presents to the ER today for abdominal pain.  Potential diagnosis which have been considered and evaluated include pancreatitis, ureteral stone, UTI, pyelonephritis, constipation, peritonitis, appendicitis, cholecystitis, as well as others. Many of these have been excluded using the various modalities and assessment as noted on the chart. At the present time, the diagnosis given seems to be the most likely abdominal pain with unknown etiology.  Upon arrival, vitals signs show blood pressure 210/102 with a pulse of 94.  Temperature 36.7  C.  Respirations 18 with oxygenation 96% on room air.  The patient is alert and oriented no distress.  Denies pain at this time.  Physical examination does show slight left CVA tenderness and left upper quadrant tenderness.  No hernia or mass present.  IV established but patient denies pain when palpation is not occurring.  For this reason lab work obtained showing mild leukocytosis 12.3 with CRP of 5.84.  Rest of lab work is benign.  For 12.3 white count and CRP of 5.84, did do CT scan of abdomen pelvis which did not show any acute abnormalities.  Patient continues to be pain-free.  Personal review  of CT scan does show moderate colonic stool burden.  For this reason we will discharge patient home on MiraLAX bowel cleanout.  Advised patient to follow-up with PCP for reevaluation.  Return to ER if any new or worsening symptoms.  Patient verbalized understand agrees with plan of care.  Patient discharged home.        Impression and plan discussed with patient. Questions answered, concerns addressed, indications for urgent re-evaluation reviewed, and  given. Patient/Parent/Caregiver agree with treatment plan and have no further questions at this time.  AVS provided at discharge.    This note was created by the Dragon Voice Dictation System. Inadvertent typographical errors, due to software recognition problems, may still exist.      New Prescriptions    No medications on file       Final diagnoses:   Abdominal pain, left upper quadrant       5/9/2023   [facility]     Pardeep Huston APRN CNP  05/09/23 3910

## 2023-05-09 NOTE — ED NOTES
Patient reports abdominal pain started Saturday or Sunday. Also reports she was constipated and took milk of magnesia and exlax yesterday and pain started after that. Denies pain currently, reports it resolved on the car ride to the ER. Reports the pain was on the LL side of abdomen and radiated to the left side of abdomen and left lower back. Denies nausea, vomiting, diarrhea. No blood in stool or urine. No urinary symptoms. Also reports feeling a little more SOB than normal. Denies CP or other symptoms.

## 2023-07-07 ENCOUNTER — APPOINTMENT (OUTPATIENT)
Dept: GENERAL RADIOLOGY | Facility: HOSPITAL | Age: 73
DRG: 291 | End: 2023-07-07
Attending: INTERNAL MEDICINE
Payer: MEDICARE

## 2023-07-07 ENCOUNTER — HOSPITAL ENCOUNTER (INPATIENT)
Facility: HOSPITAL | Age: 73
LOS: 3 days | Discharge: HOME | DRG: 291 | End: 2023-07-10
Attending: INTERNAL MEDICINE | Admitting: INTERNAL MEDICINE
Payer: MEDICARE

## 2023-07-07 DIAGNOSIS — I50.9 ACUTE ON CHRONIC CONGESTIVE HEART FAILURE, UNSPECIFIED HEART FAILURE TYPE (H): ICD-10-CM

## 2023-07-07 DIAGNOSIS — I48.91 ATRIAL FIBRILLATION WITH RVR (H): Primary | ICD-10-CM

## 2023-07-07 DIAGNOSIS — I48.91 ATRIAL FIBRILLATION WITH RAPID VENTRICULAR RESPONSE (H): ICD-10-CM

## 2023-07-07 PROBLEM — Z85.42 HISTORY OF ENDOMETRIAL CANCER: Status: ACTIVE | Noted: 2023-07-07

## 2023-07-07 PROBLEM — Z91.199 NON COMPLIANCE WITH MEDICAL TREATMENT: Status: ACTIVE | Noted: 2023-07-07

## 2023-07-07 PROBLEM — I50.33 ACUTE ON CHRONIC DIASTOLIC HEART FAILURE (H): Status: ACTIVE | Noted: 2023-07-07

## 2023-07-07 LAB
ALBUMIN SERPL BCG-MCNC: 3.6 G/DL (ref 3.5–5.2)
ALBUMIN SERPL BCG-MCNC: 3.6 G/DL (ref 3.5–5.2)
ALP SERPL-CCNC: 143 U/L (ref 35–104)
ALT SERPL W P-5'-P-CCNC: 28 U/L (ref 0–50)
ANION GAP SERPL CALCULATED.3IONS-SCNC: 14 MMOL/L (ref 7–15)
ANION GAP SERPL CALCULATED.3IONS-SCNC: 15 MMOL/L (ref 7–15)
ANION GAP SERPL CALCULATED.3IONS-SCNC: 15 MMOL/L (ref 7–15)
AST SERPL W P-5'-P-CCNC: 27 U/L (ref 0–45)
BASE EXCESS BLDV CALC-SCNC: 1.1 MMOL/L (ref -7.7–1.9)
BASOPHILS # BLD AUTO: 0.1 10E3/UL (ref 0–0.2)
BASOPHILS NFR BLD AUTO: 1 %
BILIRUB SERPL-MCNC: 0.5 MG/DL
BUN SERPL-MCNC: 18.9 MG/DL (ref 8–23)
BUN SERPL-MCNC: 20.4 MG/DL (ref 8–23)
BUN SERPL-MCNC: 21.2 MG/DL (ref 8–23)
CALCIUM SERPL-MCNC: 8.9 MG/DL (ref 8.8–10.2)
CALCIUM SERPL-MCNC: 8.9 MG/DL (ref 8.8–10.2)
CALCIUM SERPL-MCNC: 9.2 MG/DL (ref 8.8–10.2)
CHLORIDE SERPL-SCNC: 101 MMOL/L (ref 98–107)
CHLORIDE SERPL-SCNC: 102 MMOL/L (ref 98–107)
CHLORIDE SERPL-SCNC: 99 MMOL/L (ref 98–107)
CREAT SERPL-MCNC: 0.72 MG/DL (ref 0.51–0.95)
CREAT SERPL-MCNC: 0.78 MG/DL (ref 0.51–0.95)
CREAT SERPL-MCNC: 0.85 MG/DL (ref 0.51–0.95)
CRP SERPL-MCNC: 9.67 MG/L
DEPRECATED HCO3 PLAS-SCNC: 21 MMOL/L (ref 22–29)
DEPRECATED HCO3 PLAS-SCNC: 22 MMOL/L (ref 22–29)
DEPRECATED HCO3 PLAS-SCNC: 22 MMOL/L (ref 22–29)
EOSINOPHIL # BLD AUTO: 0.2 10E3/UL (ref 0–0.7)
EOSINOPHIL NFR BLD AUTO: 2 %
ERYTHROCYTE [DISTWIDTH] IN BLOOD BY AUTOMATED COUNT: 14.9 % (ref 10–15)
ERYTHROCYTE [DISTWIDTH] IN BLOOD BY AUTOMATED COUNT: 15 % (ref 10–15)
GFR SERPL CREATININE-BSD FRML MDRD: 73 ML/MIN/1.73M2
GFR SERPL CREATININE-BSD FRML MDRD: 81 ML/MIN/1.73M2
GFR SERPL CREATININE-BSD FRML MDRD: 89 ML/MIN/1.73M2
GLUCOSE BLDC GLUCOMTR-MCNC: 134 MG/DL (ref 70–99)
GLUCOSE BLDC GLUCOMTR-MCNC: 167 MG/DL (ref 70–99)
GLUCOSE SERPL-MCNC: 121 MG/DL (ref 70–99)
GLUCOSE SERPL-MCNC: 132 MG/DL (ref 70–99)
GLUCOSE SERPL-MCNC: 181 MG/DL (ref 70–99)
HCO3 BLDV-SCNC: 25 MMOL/L (ref 21–28)
HCT VFR BLD AUTO: 38.5 % (ref 35–47)
HCT VFR BLD AUTO: 39.5 % (ref 35–47)
HGB BLD-MCNC: 12.5 G/DL (ref 11.7–15.7)
HGB BLD-MCNC: 12.6 G/DL (ref 11.7–15.7)
HOLD SPECIMEN: NORMAL
IMM GRANULOCYTES # BLD: 0.1 10E3/UL
IMM GRANULOCYTES NFR BLD: 1 %
LYMPHOCYTES # BLD AUTO: 1.2 10E3/UL (ref 0.8–5.3)
LYMPHOCYTES NFR BLD AUTO: 12 %
MAGNESIUM SERPL-MCNC: 2.1 MG/DL (ref 1.7–2.3)
MCH RBC QN AUTO: 29.2 PG (ref 26.5–33)
MCH RBC QN AUTO: 29.3 PG (ref 26.5–33)
MCHC RBC AUTO-ENTMCNC: 31.9 G/DL (ref 31.5–36.5)
MCHC RBC AUTO-ENTMCNC: 32.5 G/DL (ref 31.5–36.5)
MCV RBC AUTO: 90 FL (ref 78–100)
MCV RBC AUTO: 92 FL (ref 78–100)
MONOCYTES # BLD AUTO: 0.8 10E3/UL (ref 0–1.3)
MONOCYTES NFR BLD AUTO: 8 %
NEUTROPHILS # BLD AUTO: 7.8 10E3/UL (ref 1.6–8.3)
NEUTROPHILS NFR BLD AUTO: 76 %
NRBC # BLD AUTO: 0 10E3/UL
NRBC BLD AUTO-RTO: 0 /100
NT-PROBNP SERPL-MCNC: 2546 PG/ML (ref 0–900)
O2/TOTAL GAS SETTING VFR VENT: 0 %
OXYHGB MFR BLDV: 89 % (ref 70–75)
PCO2 BLDV: 38 MM HG (ref 40–50)
PH BLDV: 7.43 [PH] (ref 7.32–7.43)
PHOSPHATE SERPL-MCNC: 3.7 MG/DL (ref 2.5–4.5)
PLATELET # BLD AUTO: 200 10E3/UL (ref 150–450)
PLATELET # BLD AUTO: 205 10E3/UL (ref 150–450)
PO2 BLDV: 59 MM HG (ref 25–47)
POTASSIUM SERPL-SCNC: 3.3 MMOL/L (ref 3.4–5.3)
POTASSIUM SERPL-SCNC: 3.3 MMOL/L (ref 3.4–5.3)
POTASSIUM SERPL-SCNC: 3.5 MMOL/L (ref 3.4–5.3)
POTASSIUM SERPL-SCNC: 3.7 MMOL/L (ref 3.4–5.3)
PROT SERPL-MCNC: 6.1 G/DL (ref 6.4–8.3)
RBC # BLD AUTO: 4.27 10E6/UL (ref 3.8–5.2)
RBC # BLD AUTO: 4.31 10E6/UL (ref 3.8–5.2)
SODIUM SERPL-SCNC: 136 MMOL/L (ref 136–145)
SODIUM SERPL-SCNC: 137 MMOL/L (ref 136–145)
SODIUM SERPL-SCNC: 138 MMOL/L (ref 136–145)
TROPONIN T SERPL HS-MCNC: 20 NG/L
TROPONIN T SERPL HS-MCNC: 21 NG/L
TROPONIN T SERPL HS-MCNC: 27 NG/L
WBC # BLD AUTO: 10.1 10E3/UL (ref 4–11)
WBC # BLD AUTO: 7.8 10E3/UL (ref 4–11)

## 2023-07-07 PROCEDURE — 250N000013 HC RX MED GY IP 250 OP 250 PS 637: Performed by: INTERNAL MEDICINE

## 2023-07-07 PROCEDURE — 250N000009 HC RX 250: Performed by: INTERNAL MEDICINE

## 2023-07-07 PROCEDURE — 84484 ASSAY OF TROPONIN QUANT: CPT | Performed by: INTERNAL MEDICINE

## 2023-07-07 PROCEDURE — 83880 ASSAY OF NATRIURETIC PEPTIDE: CPT | Performed by: INTERNAL MEDICINE

## 2023-07-07 PROCEDURE — 999N000157 HC STATISTIC RCP TIME EA 10 MIN

## 2023-07-07 PROCEDURE — 94640 AIRWAY INHALATION TREATMENT: CPT

## 2023-07-07 PROCEDURE — 93010 ELECTROCARDIOGRAM REPORT: CPT | Performed by: INTERNAL MEDICINE

## 2023-07-07 PROCEDURE — 36415 COLL VENOUS BLD VENIPUNCTURE: CPT | Performed by: INTERNAL MEDICINE

## 2023-07-07 PROCEDURE — 80053 COMPREHEN METABOLIC PANEL: CPT | Performed by: INTERNAL MEDICINE

## 2023-07-07 PROCEDURE — 99223 1ST HOSP IP/OBS HIGH 75: CPT | Mod: AI | Performed by: INTERNAL MEDICINE

## 2023-07-07 PROCEDURE — 85027 COMPLETE CBC AUTOMATED: CPT | Performed by: INTERNAL MEDICINE

## 2023-07-07 PROCEDURE — 84100 ASSAY OF PHOSPHORUS: CPT | Performed by: INTERNAL MEDICINE

## 2023-07-07 PROCEDURE — 250N000011 HC RX IP 250 OP 636: Mod: JZ | Performed by: INTERNAL MEDICINE

## 2023-07-07 PROCEDURE — 94640 AIRWAY INHALATION TREATMENT: CPT | Mod: 76

## 2023-07-07 PROCEDURE — 99285 EMERGENCY DEPT VISIT HI MDM: CPT | Mod: 25

## 2023-07-07 PROCEDURE — 93005 ELECTROCARDIOGRAM TRACING: CPT

## 2023-07-07 PROCEDURE — 85014 HEMATOCRIT: CPT | Performed by: INTERNAL MEDICINE

## 2023-07-07 PROCEDURE — 250N000011 HC RX IP 250 OP 636: Performed by: INTERNAL MEDICINE

## 2023-07-07 PROCEDURE — 83735 ASSAY OF MAGNESIUM: CPT | Performed by: INTERNAL MEDICINE

## 2023-07-07 PROCEDURE — 99291 CRITICAL CARE FIRST HOUR: CPT | Performed by: INTERNAL MEDICINE

## 2023-07-07 PROCEDURE — 82805 BLOOD GASES W/O2 SATURATION: CPT | Performed by: INTERNAL MEDICINE

## 2023-07-07 PROCEDURE — 71045 X-RAY EXAM CHEST 1 VIEW: CPT

## 2023-07-07 PROCEDURE — 96375 TX/PRO/DX INJ NEW DRUG ADDON: CPT

## 2023-07-07 PROCEDURE — 82310 ASSAY OF CALCIUM: CPT | Performed by: INTERNAL MEDICINE

## 2023-07-07 PROCEDURE — 86140 C-REACTIVE PROTEIN: CPT | Performed by: INTERNAL MEDICINE

## 2023-07-07 PROCEDURE — 99285 EMERGENCY DEPT VISIT HI MDM: CPT | Performed by: INTERNAL MEDICINE

## 2023-07-07 PROCEDURE — 84132 ASSAY OF SERUM POTASSIUM: CPT | Performed by: INTERNAL MEDICINE

## 2023-07-07 PROCEDURE — 200N000001 HC R&B ICU

## 2023-07-07 PROCEDURE — 96374 THER/PROPH/DIAG INJ IV PUSH: CPT

## 2023-07-07 RX ORDER — DILTIAZEM HCL/D5W 125 MG/125
5-15 PLASTIC BAG, INJECTION (ML) INTRAVENOUS CONTINUOUS
Status: DISCONTINUED | OUTPATIENT
Start: 2023-07-07 | End: 2023-07-09

## 2023-07-07 RX ORDER — DEXTROSE MONOHYDRATE 25 G/50ML
25-50 INJECTION, SOLUTION INTRAVENOUS
Status: DISCONTINUED | OUTPATIENT
Start: 2023-07-07 | End: 2023-07-10 | Stop reason: HOSPADM

## 2023-07-07 RX ORDER — POTASSIUM CHLORIDE 1500 MG/1
40 TABLET, EXTENDED RELEASE ORAL ONCE
Status: COMPLETED | OUTPATIENT
Start: 2023-07-07 | End: 2023-07-07

## 2023-07-07 RX ORDER — METOPROLOL SUCCINATE 50 MG/1
50 TABLET, EXTENDED RELEASE ORAL DAILY
Status: DISCONTINUED | OUTPATIENT
Start: 2023-07-07 | End: 2023-07-07

## 2023-07-07 RX ORDER — ENOXAPARIN SODIUM 100 MG/ML
40 INJECTION SUBCUTANEOUS EVERY 24 HOURS
Status: DISCONTINUED | OUTPATIENT
Start: 2023-07-08 | End: 2023-07-08

## 2023-07-07 RX ORDER — ONDANSETRON 2 MG/ML
4 INJECTION INTRAMUSCULAR; INTRAVENOUS EVERY 6 HOURS PRN
Status: DISCONTINUED | OUTPATIENT
Start: 2023-07-07 | End: 2023-07-10 | Stop reason: HOSPADM

## 2023-07-07 RX ORDER — ENOXAPARIN SODIUM 100 MG/ML
1 INJECTION SUBCUTANEOUS EVERY 12 HOURS
Status: DISCONTINUED | OUTPATIENT
Start: 2023-07-07 | End: 2023-07-07

## 2023-07-07 RX ORDER — IPRATROPIUM BROMIDE AND ALBUTEROL SULFATE 2.5; .5 MG/3ML; MG/3ML
3 SOLUTION RESPIRATORY (INHALATION)
Status: DISCONTINUED | OUTPATIENT
Start: 2023-07-07 | End: 2023-07-10 | Stop reason: HOSPADM

## 2023-07-07 RX ORDER — NICOTINE POLACRILEX 4 MG
15-30 LOZENGE BUCCAL
Status: DISCONTINUED | OUTPATIENT
Start: 2023-07-07 | End: 2023-07-10 | Stop reason: HOSPADM

## 2023-07-07 RX ORDER — ONDANSETRON 4 MG/1
4 TABLET, ORALLY DISINTEGRATING ORAL EVERY 6 HOURS PRN
Status: DISCONTINUED | OUTPATIENT
Start: 2023-07-07 | End: 2023-07-10 | Stop reason: HOSPADM

## 2023-07-07 RX ORDER — METOPROLOL SUCCINATE 100 MG/1
100 TABLET, EXTENDED RELEASE ORAL DAILY
Status: DISCONTINUED | OUTPATIENT
Start: 2023-07-08 | End: 2023-07-10 | Stop reason: HOSPADM

## 2023-07-07 RX ORDER — ATORVASTATIN CALCIUM 10 MG/1
20 TABLET, FILM COATED ORAL EVERY EVENING
Status: DISCONTINUED | OUTPATIENT
Start: 2023-07-07 | End: 2023-07-10 | Stop reason: HOSPADM

## 2023-07-07 RX ORDER — FUROSEMIDE 10 MG/ML
60 INJECTION INTRAMUSCULAR; INTRAVENOUS ONCE
Status: COMPLETED | OUTPATIENT
Start: 2023-07-07 | End: 2023-07-07

## 2023-07-07 RX ORDER — ALBUTEROL SULFATE 1.25 MG/3ML
2.5 SOLUTION RESPIRATORY (INHALATION) EVERY 4 HOURS PRN
Status: DISCONTINUED | OUTPATIENT
Start: 2023-07-07 | End: 2023-07-10 | Stop reason: HOSPADM

## 2023-07-07 RX ORDER — IPRATROPIUM BROMIDE AND ALBUTEROL SULFATE 2.5; .5 MG/3ML; MG/3ML
3 SOLUTION RESPIRATORY (INHALATION) ONCE
Status: COMPLETED | OUTPATIENT
Start: 2023-07-07 | End: 2023-07-07

## 2023-07-07 RX ORDER — FUROSEMIDE 10 MG/ML
40 INJECTION INTRAMUSCULAR; INTRAVENOUS
Status: DISCONTINUED | OUTPATIENT
Start: 2023-07-07 | End: 2023-07-10 | Stop reason: HOSPADM

## 2023-07-07 RX ORDER — LISINOPRIL 10 MG/1
10 TABLET ORAL DAILY
Status: DISCONTINUED | OUTPATIENT
Start: 2023-07-07 | End: 2023-07-10 | Stop reason: HOSPADM

## 2023-07-07 RX ORDER — METOPROLOL TARTRATE 50 MG
50 TABLET ORAL ONCE
Status: COMPLETED | OUTPATIENT
Start: 2023-07-07 | End: 2023-07-07

## 2023-07-07 RX ORDER — METHYLPREDNISOLONE SODIUM SUCCINATE 125 MG/2ML
125 INJECTION, POWDER, LYOPHILIZED, FOR SOLUTION INTRAMUSCULAR; INTRAVENOUS ONCE
Status: COMPLETED | OUTPATIENT
Start: 2023-07-07 | End: 2023-07-07

## 2023-07-07 RX ORDER — ROPINIROLE 0.25 MG/1
0.25 TABLET, FILM COATED ORAL EVERY EVENING
Status: DISCONTINUED | OUTPATIENT
Start: 2023-07-07 | End: 2023-07-10 | Stop reason: HOSPADM

## 2023-07-07 RX ORDER — ENOXAPARIN SODIUM 100 MG/ML
40 INJECTION SUBCUTANEOUS EVERY 24 HOURS
Status: DISCONTINUED | OUTPATIENT
Start: 2023-07-07 | End: 2023-07-07

## 2023-07-07 RX ADMIN — Medication 5 MG/HR: at 03:04

## 2023-07-07 RX ADMIN — ATORVASTATIN CALCIUM 20 MG: 10 TABLET, FILM COATED ORAL at 20:11

## 2023-07-07 RX ADMIN — IPRATROPIUM BROMIDE AND ALBUTEROL SULFATE 3 ML: .5; 3 SOLUTION RESPIRATORY (INHALATION) at 19:47

## 2023-07-07 RX ADMIN — FUROSEMIDE 40 MG: 10 INJECTION, SOLUTION INTRAVENOUS at 08:43

## 2023-07-07 RX ADMIN — METOPROLOL SUCCINATE 50 MG: 50 TABLET, EXTENDED RELEASE ORAL at 06:23

## 2023-07-07 RX ADMIN — ROPINIROLE HYDROCHLORIDE 0.25 MG: 0.25 TABLET, FILM COATED ORAL at 20:11

## 2023-07-07 RX ADMIN — FUROSEMIDE 60 MG: 10 INJECTION, SOLUTION INTRAVENOUS at 01:57

## 2023-07-07 RX ADMIN — POTASSIUM CHLORIDE 40 MEQ: 1500 TABLET, EXTENDED RELEASE ORAL at 14:29

## 2023-07-07 RX ADMIN — IPRATROPIUM BROMIDE AND ALBUTEROL SULFATE 3 ML: .5; 3 SOLUTION RESPIRATORY (INHALATION) at 16:49

## 2023-07-07 RX ADMIN — ENOXAPARIN SODIUM 100 MG: 100 INJECTION SUBCUTANEOUS at 08:55

## 2023-07-07 RX ADMIN — METOPROLOL TARTRATE 50 MG: 50 TABLET, FILM COATED ORAL at 17:49

## 2023-07-07 RX ADMIN — IPRATROPIUM BROMIDE AND ALBUTEROL SULFATE 3 ML: .5; 3 SOLUTION RESPIRATORY (INHALATION) at 08:51

## 2023-07-07 RX ADMIN — LISINOPRIL 10 MG: 10 TABLET ORAL at 09:37

## 2023-07-07 RX ADMIN — FUROSEMIDE 40 MG: 10 INJECTION, SOLUTION INTRAVENOUS at 14:23

## 2023-07-07 RX ADMIN — IPRATROPIUM BROMIDE AND ALBUTEROL SULFATE 3 ML: .5; 3 SOLUTION RESPIRATORY (INHALATION) at 00:51

## 2023-07-07 RX ADMIN — METHYLPREDNISOLONE SODIUM SUCCINATE 125 MG: 125 INJECTION, POWDER, FOR SOLUTION INTRAMUSCULAR; INTRAVENOUS at 00:59

## 2023-07-07 ASSESSMENT — ACTIVITIES OF DAILY LIVING (ADL)
ADLS_ACUITY_SCORE: 30
DEPENDENT_IADLS:: INDEPENDENT
ADLS_ACUITY_SCORE: 30
ADLS_ACUITY_SCORE: 30
ADLS_ACUITY_SCORE: 35
ADLS_ACUITY_SCORE: 18
ADLS_ACUITY_SCORE: 30
ADLS_ACUITY_SCORE: 18
ADLS_ACUITY_SCORE: 28
ADLS_ACUITY_SCORE: 35
ADLS_ACUITY_SCORE: 30

## 2023-07-07 NOTE — PLAN OF CARE
[facility] Inpatient Admission Note:    Patient admitted to 3124/3124-1 at approximately 0400 via cart accompanied by nurse from emergency room . Report received from LORENA Long in SBAR format at 0328 via telephone. Patient ambulated to bed via self.. Patient is alert and oriented X 3, denies pain; rates at 0 on 0-10 scale.  Patient oriented to room, unit, hourly rounding, and plan of care. Explained admission packet and patient handbook with patient bill of rights brochure. Will continue to monitor and document as needed.     Inpatient Nursing criteria listed below was met:    Health care directives status obtained and documented: Yes    Patient identifies a surrogate decision maker: No     If initial lactic acid greater than 2.0, repeat lactic acid drawn within one hour of arrival to unit: NA.    Clergy visit ordered if patient requests: Yes    Skin issues/needs documented: Yes    Isolation Patient: no     Fall Prevention Yes: Care plan updated, education given and documented, sticker and magnet in place: Yes    Care Plan initiated: Yes    Education Documented (including assessment): Yes    Patient has discharge needs : Yes If yes, please explain: Social service consult for medication non-compliance.

## 2023-07-07 NOTE — PROGRESS NOTES
07/07/23 1500   General Information   Assessment completed with: Patient   Type of CM/SW Visit Initial Assessment   Primary Care Provider verified and updated as needed? Yes  (Establish care appt scheduled with Nicolette Huffman and included in discharge instructions.)   Readmission Within the Last 30 Days no previous admission in last 30 days   Arrived From home   Reason for Consult discharge planning   Communication Assessment   Patient / Family communication style spoken language (English or Bilingual)   Advance Directives   Scanned docmt in ACP Activity? No scanned doc   Pt offered more information Pt declined   Living Environment   People in Home alone   Current Living Arrangements house   Care Provided by self   Provides Care For no one   Able to Return to Prior Arrangements yes   Assessment of Family/Social Support   Marital Status    Who is your support system? Children   Description of Support System Involved   Current Resources   Patient receiving home care services: No   Community Resources None   Assessment of Functional Status   Dependent ADLs: Independent   Dependent IADLs: Independent   Mental Status   Mental Health Status No Current Concerns   Chemical Dependency Status No Current Concerns   Chemical Dependency Management   (Patient is a smoker. Reports that she has cut down to 5 cogarettes a day.)   Personal Safety   Feels Unsafe at Home or Work/School no   Feels Threatened by Someone no   Does Anyone Try to Keep You From Having Contact with Others or Doing Things Outside Your Home? no   Physical Signs of Abuse Present no

## 2023-07-07 NOTE — PLAN OF CARE
Goal Outcome Evaluation:       A&O, makes needs known. Cardizem gtt at 15mg/hr. HR remains Afib 110-130s. PO toprol XL 50mg given early per MD order. BP's 140's/90's. LS diminished with expiratory wheezing. Infrequent dry cough. BS active. Patient voiding large amounts with bed pan frequently. Excoriation to groin folds, baby powder applied. Second PIV SL. Call light within reach.    Face to face report given with opportunity to observe patient.    Report given to LORENA Glover RN   7/7/2023  7:01 AM

## 2023-07-07 NOTE — ED TRIAGE NOTES
Pt brought to ED by EMS for SOB for 4 weeks. Pt has swollen legs, SOB, nom chest pain or nausea. EMS 12 lead says A flutter. Pt A/Ox4.     Triage Assessment     Row Name 07/07/23 0036       Triage Assessment (Adult)    Airway WDL WDL    Additional Documentation Breath Sounds (Group)       Respiratory WDL    Respiratory WDL all    Rhythm/Pattern, Respiratory shortness of breath    Expansion/Accessory Muscles/Retractions no retractions;expansion symmetric;no use of accessory muscles    Nailbeds no discoloration    Mucous Membranes moist;intact    Cough Frequency frequent    Cough Type no productive sputum       Breath Sounds    Breath Sounds All Fields    All Lung Fields Breath Sounds wheezes, high-pitched;Anterior:;Posterior:       Skin Circulation/Temperature WDL    Skin Circulation/Temperature WDL WDL       Cardiac WDL    Cardiac WDL rhythm    Cardiac Rhythm Atrial flutter       Peripheral/Neurovascular WDL    Peripheral Neurovascular WDL WDL       Cognitive/Neuro/Behavioral WDL    Cognitive/Neuro/Behavioral WDL WDL

## 2023-07-07 NOTE — PROGRESS NOTES
"[facility]    Medicine Progress Note - Hospitalist Service    Date of Admission:  7/7/2023    Assessment & Plan      Heather Rosas is a 72 year old female admitted on 7/7/2023. She presents to ED with 4 weeks worsening edema on her legs, diarrhea and worsening dyspnea.  Admitted for acute on chronic diastolic heart failure exacerbation. Known non-compliant patient (\"I didn't need meds, I was healthy \"like a horse\", this just started 4 weeks ago).     Hospital problems:   # Acute on chronic diastolic heart failure  - takes no medications  - EF bedside shows mildly depressed LV function (but this could be due to afib RVR lasting ? Time)  - start lasix  - rate control  - start lisinopril 10 mg  - start toprol XR 50 mg 9 AM  -July 7: Patient symptomatically improving with diuresis.  Echocardiogram is not available today; will have to wait until Monday.  Continue with diuresis and follow patient's symptomatically.    #EKG changes  -Patient had 1 EKG with poor baseline that was read as acute ischemia.  Repeat EKG did not show any ischemic changes and troponin has been trending down.    # Hypertension  Systolic blood pressure ranging from 112-152.  Blood pressure appears to be improving once metoprolol and lisinopril as well as diltiazem drip were started.    # afib with RVR  Patient is in sinus rhythm    #CAD, s/p CABG x 4  - no ischemic symptoms and troponin not elevated  - continue statins (labeled as allergic but is is false allergy)  -July 7: Troponin trending down    # medication non-compliance  - social service consult    # history of endometrial cancer  - remote    #Hypokalemia  -We will replace per protocol       Diet: Combination Diet 2 gm NA Diet; Low Saturated Fat Diet    DVT Prophylaxis: Enoxaparin (Lovenox) SQ  Martin Catheter: PRESENT, indication: Strict 1-2 Hour I&O  Lines: None     Cardiac Monitoring: ACTIVE order. Indication: ICU  Code Status: Full Code      Clinically Significant Risk " Factors Present on Admission        # Hypokalemia: Lowest K = 3.3 mmol/L in last 2 days, will replace as needed           # Hypertension: Noted on problem list      # Severe Obesity: Estimated body mass index is 43.96 kg/m  as calculated from the following:    Height as of this encounter: 1.524 m (5').    Weight as of this encounter: 102.1 kg (225 lb 1.6 oz).            Disposition Plan      Expected Discharge Date: 07/09/2023                  Papito Aiken MD  Hospitalist Service  [facility]  Securely message with [employer] (more info)  Text page via [facility] Paging/Directory   ______________________________________________________________________    Interval History   Patient was diuresed today and she reports feeling better in terms of her breathing.  Patient had an episode of chest tightness with EKG that read as ST elevation but repeat EKG showed normal sinus rhythm with no ischemic changes.  Troponin has trended down throughout the day also.  Patient denies any fever, chills, nausea, vomiting, abdominal pain, dysuria, diarrhea.  Patient reports restless leg at night and request ropinirole, which she has tried in the past.    Physical Exam   Vital Signs: Temp: 97.4  F (36.3  C) Temp src: Tympanic BP: 128/88 Pulse: 91   Resp: 21 SpO2: 94 % O2 Device: None (Room air)    Weight: 225 lbs 1.6 oz    General Appearance: Lying in bed in mild respiratory distress  Respiratory: Diffuse expiratory wheezing bilaterally  Cardiovascular: S1-S2, regular rhythm and rate, no murmurs or gallops, lower extremity edema bilaterally  GI: Soft, nontender, nondistended  Skin: Intact  Other: Neuro: Nonfocal    Medical Decision Making       55 critical care MINUTES SPENT BY ME on the date of service doing chart review, history, exam, documentation & further activities per the note.      Data     I have personally reviewed the following data over the past 24 hrs:    7.8  \   12.5   / 205     136 99 21.2 /  134 (H)   3.3 (L) 22 0.85 \        ALT: 28 AST: 27 AP: 143 (H) TBILI: 0.5   ALB: 3.6 TOT PROTEIN: 6.1 (L) LIPASE: N/A       Trop: 20 (H) BNP: 2,546 (H)       Procal: N/A CRP: 9.67 (H) Lactic Acid: N/A         Imaging results reviewed over the past 24 hrs:   Recent Results (from the past 24 hour(s))   XR Chest Port 1 View    Narrative    PROCEDURE: XR CHEST PORT 1 VIEW 7/7/2023 1:24 AM    HISTORY: sob    COMPARISONS: 10/27/2022.    TECHNIQUE: Single portable view.    FINDINGS: There has been a median sternotomy and CABG.    Heart is enlarged as it was previously. There is mild vascular  congestion with increasing patchy density at the right lung base.  There is no definite effusion.         Impression    IMPRESSION: Cardiomegaly with probable congestive heart failure.  Patchy density at the right lung base may represent pulmonary edema or  more focal patchy atelectasis or infiltrate.    MIS BRADY MD         SYSTEM ID:  [id number]

## 2023-07-07 NOTE — DISCHARGE INSTRUCTIONS
Hospital follow up appointment scheduled for Tuesday, July 18th with Nicolette Huffman at 12:45 PM. Please call [phone] if you need to reschedule.    Establish care appointment scheduled for Monday, July 24th with Nicolette Huffman at 3:45 PM.  Please call [phone] if you need to reschedule.

## 2023-07-07 NOTE — MEDICATION SCRIBE - ADMISSION MEDICATION HISTORY
Medication Scribe Admission Medication History    Admission medication history is complete. The information provided in this note is only as accurate as the sources available at the time of the update.    Medication reconciliation/reorder completed by provider prior to medication history? Yes    Information Source(s): Patient and CareEverywhere/SureScripts via phone    Pertinent Information:   Patient is not taking any medications at this time aside from occasional ibuprofen. Medications in PTA meds reflect most recent medications patient was on last year.     Changes made to PTA medication list:    Added: None    Deleted: lipitor- d/c'd last year due to patient non-compliance    Changed: None    Medication Affordability:  Not including over the counter (OTC) medications, was there a time in the past 3 months when you did not take your medications as prescribed because of cost?: No (not on any rx medications at this time)    Allergies reviewed with patient and updates made in EHR: yes    Medication History Completed By: Beth Nails 7/7/2023 10:10 AM    Prior to Admission medications    Medication Sig Last Dose Taking? Auth Provider Long Term End Date   ibuprofen (ADVIL/MOTRIN) 200 MG tablet Take 400-600 mg by mouth 2 times daily as needed for moderate pain up to three times weekly. More than a month Yes Reported, Patient     amLODIPine (NORVASC) 5 MG tablet Take 1 tablet (5 mg) by mouth daily for 30 days  Patient not taking: Reported on 7/7/2023 Not Taking  Adriel Burnham MD Yes 11/27/22   olmesartan-hydrochlorothiazide (BENICAR HCT) 20-12.5 MG tablet Take 1 tablet by mouth daily for 30 days  Patient not taking: Reported on 7/7/2023 Not Taking  Adriel Burnham MD Yes 11/27/22

## 2023-07-07 NOTE — ED NOTES
Pt states for 4 weeks she has had SOB, chest tightness, and swelling in bilateral legs. Pt denies nausea or diaphoresis. Pt states she has hx of cabbage, no history of COPD or CHF. Her lungs are wheezy in bilateral upper and lower lobes. Pt is in afib which pt states she can go in and out. Pt is A/Ox4.

## 2023-07-07 NOTE — H&P
"[facility]    History and Physical - Hospitalist Service       Date of Admission:  7/7/2023    Assessment & Plan      Heather Rosas is a 72 year old female admitted on 7/7/2023. She presents to ED with 4 weeks worsening edema on her legs, diarrhea and worsening dyspnea.  Admitted for acute on chronic diastolic heart failure exacerbation. Known non-compliant patient (\"I didn't need meds, I was healthy \"like a horse\", this just started 4 weeks ago).     Hospital problems:   # Acute on chronic diastolic heart failure  - takes no medications  - EF bedside shows mildly depressed LV function (but this could be due to afib RVR lasting ? Time)  - start lasix  - rate control  - start lisinopril 10 mg  - start toprol XR 50 mg 9 AM    # Wheezing  - due to pulmonary edema, doubt asthma or cOPD exacerbation  - hold antibiotics  - diurese, rate control and nebs    # Hypertension  - will monitor  - treat with above    # afib with RVR  - ICU admission  - cardizem drip  - start toprol XR 50 mg in AM    # s/p CABG x 4  - no ischemic symptoms and troponin not elevated  - continue statins (labeled as allergic but is is false allergy)    # medication non-compliance  - social service consult    # history of endometrial cancer  - remote    Diet:   cardiac, low fat and 2 gr sodium  DVT Prophylaxis: Enoxaparin (Lovenox) SQ  Martin Catheter: Not present  Lines: PRESENT        Cardiac Monitoring: None  Code Status:   Full code      Clinically Significant Risk Factors Present on Admission                  # Hypertension: Noted on problem list               Disposition Plan home when stable     Expected Discharge Date: 07/09/2023                  Vicki Porter MD  Hospitalist Service  [facility]  Securely message with [place] (more info)  Text page via [facility] Paging/Directory     ______________________________________________________________________    Chief Complaint   LE edema, dyspnea, diarrhea (diarrhea resolved " "the day prior admission)    History is obtained from the patient, electronic health record and emergency department physician    History of Present Illness   Heather Rosas is a 72 year old female who has extensive PMH but non-compliant with medications and treatment recommendations, most importantly CAD, s/p CABG x 4 (2019), endometrial cancer (2018, s/p chemotherapy), HTN, obesity, medication noncompliance, respiratory failure, asthma, presents to ED with 4 weeks lasting SOB, chest heaviness, leg swelling and diarrhea. Takes no medication because she is \"healthy like a horse and all those things just started 4 weeks ago\". Admits nausea, chest fluttering. Denies orthopnea, PND. Also denies fever, dysuria. Admits mild abdominal discomfort.  Presents very tachycardic, normotensive and wheezy.   ED workup consistent with heart failure exacerbation, pulmonary edema. Was hemodynamically stable and required no O2 supplementation.   Labs grossly unremarkable.   EKG shows afib/flutter with RVR  XR: cardiomegaly and pulmonary edema  Full code. Will admit to ICU for rate control and diuresis.         Past Medical History    Past Medical History:   Diagnosis Date     Allergic rhinitis 01/01/2011     Anxiety 01/01/2011     Anxiety state, unspecified     Anxiety state     Dyslipidemia 11/26/2003     fibromyalgia 06/14/2004     GERD, Esophageal reflux 01/01/2011     HTN (hypertension)     Essential hypertension     Major depression, recurrent (H) 1/1/2011     Nonorganic sleep disorder, unspecified     Non-org. 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, ARDS, renal failure       Past Surgical History   Past Surgical History:   Procedure Laterality Date     ANGIOGRAM  02/2005    Normal      BIOPSY BREAST  1984    LT, normal     BYPASS GRAFT ARTERY CORONARY  09/10/2018     CARPAL TUNNEL RELEASE RT/LT  2005    RT, carpal tunnel     COLONOSCOPY  2011    " Repeat in ten years      COLONOSCOPY  1996     COLONOSCOPY  2004     DILATION AND CURETTAGE, HYSTEROSCOPY, ABLATE ENDOMETRIUM, COMBINED N/A 2/19/2018    Procedure: COMBINED DILATION AND CURETTAGE, HYSTEROSCOPY, ABLATE ENDOMETRIUM;  HYSTEROSCOPY DILATION AND CURETTAGE, ENDOMETRIAL ABLATION;  Surgeon: Jose Nettles MD;  Location: [facility] OR     HYSTERECTOMY TOTAL ABD, NICK SALPINGO-OOPHORECTOMY, NODE DISSECTION, TUMOR DEBULKING, COMBINED  10/30/2018     INSERT PORT VASCULAR ACCESS N/A 12/11/2018    Procedure: PORT-A-CATH PLACEMENT;  Surgeon: Rafi Trinidad MD;  Location: [facility] OR     placement of central line  2005     REMOVE PORT VASCULAR ACCESS N/A 10/6/2020    Procedure: port a cath removal;  Surgeon: Rafi Trinidad MD;  Location: [facility] OR       Prior to Admission Medications   Prior to Admission Medications   Prescriptions Last Dose Informant Patient Reported? Taking?   VITAMIN E PO More than a month  Yes Yes   Sig: Take 1 capsule by mouth as needed twice monthly for regularity.   amLODIPine (NORVASC) 5 MG tablet   No No   Sig: Take 1 tablet (5 mg) by mouth daily for 30 days   atorvastatin (LIPITOR) 40 MG tablet More than a month  Yes Yes   ibuprofen (ADVIL/MOTRIN) 200 MG tablet More than a month Self Yes Yes   Sig: Take 400-600 mg by mouth 2 times daily as needed for moderate pain up to three times weekly.   olmesartan-hydrochlorothiazide (BENICAR HCT) 20-12.5 MG tablet   No No   Sig: Take 1 tablet by mouth daily for 30 days      Facility-Administered Medications: None        Review of Systems    10 points of ROS obtained. Pertinent positives and negatives included in HPI. The rest is negative.       Physical Exam   Vital Signs: Temp: 97.6  F (36.4  C) Temp src: Oral BP: 121/92 Pulse: (!) 137   Resp: 22 SpO2: 93 % O2 Device: None (Room air)    Weight: 225 lbs 1.6 oz    General Appearance: not in distress but uncomfortable.   Eyes: no icterus  HEENT: JvP elevated  Respiratory: crackles at bases and bilateral wheezing    Cardiovascular: fast irregularly irregular with pulse deficit. No mumurs  GI: Soft, not tender no pulsatile masses.   Lymph/Hematologic: no LAD, no ecchymoses.   Genitourinary: Bladder not palpable.   Skin: Mild stasis dermatitis both legs, 2+ edema  Musculoskeletal: no major joint effusion.   Neurologic: Non-focal exam.   Psychiatric: Awake, alert, oriented x4    Medical Decision Making       80 MINUTES SPENT BY ME on the date of service doing chart review, history, exam, documentation & further activities per the note.      Data     I have personally reviewed the following data over the past 24 hrs:    10.1  \   12.6   / 200     137 102 20.4 /  121 (H)   3.7 21 (L) 0.78 \       ALT: 28 AST: 27 AP: 143 (H) TBILI: 0.5   ALB: 3.6 TOT PROTEIN: 6.1 (L) LIPASE: N/A       Trop: 27 (H) BNP: 2,546 (H)       Procal: N/A CRP: 9.67 (H) Lactic Acid: N/A         Imaging results reviewed over the past 24 hrs:   No results found for this or any previous visit (from the past 24 hour(s)).

## 2023-07-07 NOTE — PLAN OF CARE
Blood pressure 120/73, pulse 93, temperature 97.4  F (36.3  C), temperature source Tympanic, resp. rate 21, height 1.524 m (5'), weight 102.1 kg (225 lb 1.6 oz), SpO2 94 %.    A&O, VSS, neuro intact, afebrile, Ivs to L AC and R Hand SL,  telemetry [id number], cardizem stopped at 1020, 0832 ekg ordered due to rhythm change, ekg read acute MI, hospitalist notified and present at beside, ordered repeat ekg and trop, denies chest pain/SOB, repeat ekg done and shows SR with BBB, pt remains HR 80-90s, lungs dim/exp wheeze at times, remains on RA, infreq nonproductive cough, canada placed this shift 2275 output, no BM bowel sounds active, good appetite, up in chair end of shift and tolerating well, edema 1-2+ as charted,redness to bilateral groin folds powder applied, ,167, and 134 this shift, free from falls, call light within reach, using call light appropriately, bed alarm on and audible.     Face to face report given with opportunity to observe patient.    Report given to LORENA Mckoy RN   7/7/2023  7:18 PM

## 2023-07-07 NOTE — PROGRESS NOTES
"Received consult to educate patient on 2 gm sodium diet.  72 yof admitted with CHF.    Reviewed rationale of low sodium diet.  Discussion notes that pt could make changes to her diet that would be beneficial - seems to consume a lot of high sodium food items \"once in awhile\".   Reviewed high sodium items to limit along with acceptable seasonings for foods.  Pt listened during discussion.  Written educational materials along with name and number of RD provided.      "

## 2023-07-08 LAB
ALBUMIN SERPL BCG-MCNC: 3.5 G/DL (ref 3.5–5.2)
ANION GAP SERPL CALCULATED.3IONS-SCNC: 16 MMOL/L (ref 7–15)
BUN SERPL-MCNC: 26.6 MG/DL (ref 8–23)
CALCIUM SERPL-MCNC: 9.2 MG/DL (ref 8.8–10.2)
CHLORIDE SERPL-SCNC: 99 MMOL/L (ref 98–107)
CREAT SERPL-MCNC: 0.97 MG/DL (ref 0.51–0.95)
DEPRECATED HCO3 PLAS-SCNC: 22 MMOL/L (ref 22–29)
GFR SERPL CREATININE-BSD FRML MDRD: 63 ML/MIN/1.73M2
GLUCOSE BLDC GLUCOMTR-MCNC: 100 MG/DL (ref 70–99)
GLUCOSE BLDC GLUCOMTR-MCNC: 144 MG/DL (ref 70–99)
GLUCOSE SERPL-MCNC: 131 MG/DL (ref 70–99)
MAGNESIUM SERPL-MCNC: 1.9 MG/DL (ref 1.7–2.3)
PHOSPHATE SERPL-MCNC: 3.8 MG/DL (ref 2.5–4.5)
POTASSIUM SERPL-SCNC: 4 MMOL/L (ref 3.4–5.3)
SODIUM SERPL-SCNC: 137 MMOL/L (ref 136–145)

## 2023-07-08 PROCEDURE — 80069 RENAL FUNCTION PANEL: CPT | Performed by: INTERNAL MEDICINE

## 2023-07-08 PROCEDURE — 94640 AIRWAY INHALATION TREATMENT: CPT | Mod: 76

## 2023-07-08 PROCEDURE — 99233 SBSQ HOSP IP/OBS HIGH 50: CPT | Performed by: INTERNAL MEDICINE

## 2023-07-08 PROCEDURE — 250N000011 HC RX IP 250 OP 636: Mod: JZ | Performed by: INTERNAL MEDICINE

## 2023-07-08 PROCEDURE — 83735 ASSAY OF MAGNESIUM: CPT | Performed by: INTERNAL MEDICINE

## 2023-07-08 PROCEDURE — 36415 COLL VENOUS BLD VENIPUNCTURE: CPT | Performed by: INTERNAL MEDICINE

## 2023-07-08 PROCEDURE — 250N000013 HC RX MED GY IP 250 OP 250 PS 637: Performed by: INTERNAL MEDICINE

## 2023-07-08 PROCEDURE — 94640 AIRWAY INHALATION TREATMENT: CPT

## 2023-07-08 PROCEDURE — 999N000157 HC STATISTIC RCP TIME EA 10 MIN

## 2023-07-08 PROCEDURE — 250N000009 HC RX 250: Performed by: INTERNAL MEDICINE

## 2023-07-08 PROCEDURE — 200N000001 HC R&B ICU

## 2023-07-08 RX ORDER — DILTIAZEM HYDROCHLORIDE 5 MG/ML
10 INJECTION INTRAVENOUS ONCE
Status: COMPLETED | OUTPATIENT
Start: 2023-07-08 | End: 2023-07-08

## 2023-07-08 RX ORDER — ENOXAPARIN SODIUM 100 MG/ML
40 INJECTION SUBCUTANEOUS 2 TIMES DAILY
Status: DISCONTINUED | OUTPATIENT
Start: 2023-07-08 | End: 2023-07-10 | Stop reason: HOSPADM

## 2023-07-08 RX ORDER — DILTIAZEM HYDROCHLORIDE 30 MG/1
30 TABLET, FILM COATED ORAL EVERY 6 HOURS SCHEDULED
Status: DISCONTINUED | OUTPATIENT
Start: 2023-07-08 | End: 2023-07-10 | Stop reason: HOSPADM

## 2023-07-08 RX ADMIN — DILTIAZEM HYDROCHLORIDE 30 MG: 30 TABLET, FILM COATED ORAL at 23:21

## 2023-07-08 RX ADMIN — ENOXAPARIN SODIUM 40 MG: 40 INJECTION SUBCUTANEOUS at 20:11

## 2023-07-08 RX ADMIN — FUROSEMIDE 40 MG: 10 INJECTION, SOLUTION INTRAVENOUS at 06:44

## 2023-07-08 RX ADMIN — METOPROLOL SUCCINATE 100 MG: 100 TABLET, EXTENDED RELEASE ORAL at 09:11

## 2023-07-08 RX ADMIN — DILTIAZEM HYDROCHLORIDE 10 MG: 5 INJECTION, SOLUTION INTRAVENOUS at 16:34

## 2023-07-08 RX ADMIN — IPRATROPIUM BROMIDE AND ALBUTEROL SULFATE 3 ML: .5; 3 SOLUTION RESPIRATORY (INHALATION) at 14:41

## 2023-07-08 RX ADMIN — DILTIAZEM HYDROCHLORIDE 30 MG: 30 TABLET, FILM COATED ORAL at 18:10

## 2023-07-08 RX ADMIN — LISINOPRIL 10 MG: 10 TABLET ORAL at 09:11

## 2023-07-08 RX ADMIN — ENOXAPARIN SODIUM 40 MG: 40 INJECTION SUBCUTANEOUS at 09:12

## 2023-07-08 RX ADMIN — ROPINIROLE HYDROCHLORIDE 0.25 MG: 0.25 TABLET, FILM COATED ORAL at 20:11

## 2023-07-08 RX ADMIN — IPRATROPIUM BROMIDE AND ALBUTEROL SULFATE 3 ML: .5; 3 SOLUTION RESPIRATORY (INHALATION) at 07:35

## 2023-07-08 RX ADMIN — FUROSEMIDE 40 MG: 10 INJECTION, SOLUTION INTRAVENOUS at 14:21

## 2023-07-08 RX ADMIN — IPRATROPIUM BROMIDE AND ALBUTEROL SULFATE 3 ML: .5; 3 SOLUTION RESPIRATORY (INHALATION) at 19:43

## 2023-07-08 RX ADMIN — ALBUTEROL SULFATE 2.5 MG: 1.25 SOLUTION RESPIRATORY (INHALATION) at 03:13

## 2023-07-08 RX ADMIN — ATORVASTATIN CALCIUM 20 MG: 10 TABLET, FILM COATED ORAL at 20:11

## 2023-07-08 ASSESSMENT — ACTIVITIES OF DAILY LIVING (ADL)
ADLS_ACUITY_SCORE: 30
ADLS_ACUITY_SCORE: 22
ADLS_ACUITY_SCORE: 30
ADLS_ACUITY_SCORE: 26
ADLS_ACUITY_SCORE: 25
ADLS_ACUITY_SCORE: 22
ADLS_ACUITY_SCORE: 22
ADLS_ACUITY_SCORE: 25
ADLS_ACUITY_SCORE: 22
ADLS_ACUITY_SCORE: 25

## 2023-07-08 NOTE — PROVIDER NOTIFICATION
Provider notified. Bp elevated manual check 144/102, HR tachy 90-110s, hospitalist ordered 10mg iv now one time dose of cardizem then scheduled cardizem per MAR

## 2023-07-08 NOTE — PROGRESS NOTES
"[facility]    Medicine Progress Note - Hospitalist Service    Date of Admission:  7/7/2023    Assessment & Plan   Heather Rosas is a 72 year old female admitted on 7/7/2023. She presents to ED with 4 weeks worsening edema on her legs, diarrhea and worsening dyspnea.  Admitted for acute on chronic diastolic heart failure exacerbation. Known non-compliant patient (\"I didn't need meds, I was healthy \"like a horse\", this just started 4 weeks ago).     Hospital problems:   # Acute on chronic diastolic heart failure  - takes no medications  - EF bedside shows mildly depressed LV function (but this could be due to afib RVR lasting ? Time)  - start lasix  - rate control  - start lisinopril 10 mg  - start toprol XR 50 mg 9 AM  -July 7: Patient symptomatically improving with diuresis.  Echocardiogram is not available today; will have to wait until Monday.  Continue with diuresis and follow patient's symptomatically.  -July 8: Patient is continuing to improve with furosemide diuresis.    #EKG changes  -Patient had 1 EKG with poor baseline that was read as acute ischemia.  Repeat EKG did not show any ischemic changes and troponin has been trending down.  -July 8: Patient remains asymptomatic and hemodynamically stable.  Echocardiogram is pending tomorrow    # Hypertension  Systolic blood pressure ranging from 112-152.  Blood pressure appears to be improving once metoprolol and lisinopril as well as diltiazem drip were started.    # afib with RVR  Patient is in sinus rhythm    #CAD, s/p CABG x 4  - no ischemic symptoms and troponin not elevated  - continue statins (labeled as allergic but is is false allergy)  -July 7: Troponin trending down  -July 8: Patient asymptomatic    # medication non-compliance  - social service consult    # history of endometrial cancer  - remote    #Hypokalemia  -We will replace per protocol         Diet: Combination Diet 2 gm NA Diet; Low Saturated Fat Diet    DVT Prophylaxis: " Enoxaparin (Lovenox) SQ  Martin Catheter: PRESENT, indication: Strict 1-2 Hour I&O  Lines: None     Cardiac Monitoring: ACTIVE order. Indication: ICU  Code Status: Full Code      Clinically Significant Risk Factors        # Hypokalemia: Lowest K = 3.3 mmol/L in last 2 days, will replace as needed           # Hypertension: Noted on problem list        # Severe Obesity: Estimated body mass index is 42.32 kg/m  as calculated from the following:    Height as of this encounter: 1.524 m (5').    Weight as of this encounter: 98.3 kg (216 lb 11.4 oz)., PRESENT ON ADMISSION          Disposition Plan     Expected Discharge Date: 07/09/2023                  Papito Aiken MD  Hospitalist Service  [facility]  Securely message with [employer] (more info)  Text page via [facility] Paging/Directory   ______________________________________________________________________    Interval History   Reports slightly improved respiration with diuresis.  Denies chest pain, palpitation, lightheadedness, nausea, vomiting, abdominal pain, diarrhea, dysuria.    Physical Exam   Vital Signs: Temp: 97.4  F (36.3  C) Temp src: Tympanic BP: (!) 140/113 Pulse: 112   Resp: 19 SpO2: 92 % O2 Device: None (Room air)    Weight: 216 lbs 11.39 oz    General Appearance: Sitting in chair in mild respiratory distress  Respiratory: Diffuse wheezing bilaterally  Cardiovascular: S1-S2, irregular rhythm, normal rate, no murmurs rubs gallops  GI: Obese, nontender, nondistended  Skin: Mild stasis dermatitis in both legs,  Other: 1+ edema in both the lower extremities    Medical Decision Making       55 MINUTES SPENT BY ME on the date of service doing chart review, history, exam, documentation & further activities per the note.      Data     I have personally reviewed the following data over the past 24 hrs:    7.8  \   12.5   / 205     137 99 26.6 (H) /  131 (H)   4.0 22 0.97 (H) \       ALT: N/A AST: N/A AP: N/A TBILI: N/A   ALB: 3.5 TOT PROTEIN: N/A LIPASE: N/A        Imaging results reviewed over the past 24 hrs:   No results found for this or any previous visit (from the past 24 hour(s)).

## 2023-07-08 NOTE — PLAN OF CARE
Blood pressure (!) 140/102, pulse 117, temperature 97.8  F (36.6  C), temperature source Tympanic, resp. rate 21, height 1.524 m (5'), weight 98.3 kg (216 lb 11.4 oz), SpO2 92 %.    A&O, BP elevated towards mid to end of shift, HR 90-110s, provider notified and one time dose of cardizem IV given, MD ordered po scheduled cardizem per MAR, afebrile, telemetry ST, denies pain, new IV placed to L hand, IV to L ac SL, lungs dim/clear with exp wheeze at times receiving scheduled nebs per MAR, remains on RA, canada in place with 2900 output yellow, , 144, 100 good appetite, BM as charted incont, free from falls, up in chair majority of shift, makes needs known, call light within reach.     Face to face report given with opportunity to observe patient.    Report given to LORENA Mckoy RN   7/8/2023  6:56 PM

## 2023-07-08 NOTE — PLAN OF CARE
Goal Outcome Evaluation:  Patient woke feeling short of breath, relived with sitting up and sleeping in chair. Prn neb given per MAR with noted relief. VSS.    Neuro  A&Ox4, PERRL, no numbness or tingling, moves all extremities equally    Cardio  Aib 90s, aflutter 50s at times, SBP 130s, pulses palpable    Resp  RA, LS diminished with expiratory wheeze.    GI/  Martin in place with low UO, 1 moderate BM, tolerating Regular diet    Skin  No new skin issues, up to the chair and bathroom SBA    Lines  2PIVS saline locked    Face to face report given with opportunity to observe patient.    Report given to Vickie Andrews, RN   7/8/2023  6:56 AM

## 2023-07-09 LAB
ALBUMIN SERPL BCG-MCNC: 3.7 G/DL (ref 3.5–5.2)
ANION GAP SERPL CALCULATED.3IONS-SCNC: 15 MMOL/L (ref 7–15)
BASE EXCESS BLDV CALC-SCNC: 6 MMOL/L (ref -7.7–1.9)
BUN SERPL-MCNC: 29 MG/DL (ref 8–23)
CALCIUM SERPL-MCNC: 9.2 MG/DL (ref 8.8–10.2)
CHLORIDE SERPL-SCNC: 98 MMOL/L (ref 98–107)
CHOLEST SERPL-MCNC: 130 MG/DL
CREAT SERPL-MCNC: 0.95 MG/DL (ref 0.51–0.95)
DEPRECATED HCO3 PLAS-SCNC: 25 MMOL/L (ref 22–29)
EST. AVERAGE GLUCOSE BLD GHB EST-MCNC: 105 MG/DL
GFR SERPL CREATININE-BSD FRML MDRD: 64 ML/MIN/1.73M2
GLUCOSE SERPL-MCNC: 94 MG/DL (ref 70–99)
HBA1C MFR BLD: 5.3 %
HCO3 BLDV-SCNC: 32 MMOL/L (ref 21–28)
HDLC SERPL-MCNC: 34 MG/DL
LDLC SERPL CALC-MCNC: 74 MG/DL
MAGNESIUM SERPL-MCNC: 1.9 MG/DL (ref 1.7–2.3)
NONHDLC SERPL-MCNC: 96 MG/DL
NT-PROBNP SERPL-MCNC: 2657 PG/ML (ref 0–900)
O2/TOTAL GAS SETTING VFR VENT: 21 %
OXYHGB MFR BLDV: 67 % (ref 70–75)
PCO2 BLDV: 51 MM HG (ref 40–50)
PH BLDV: 7.41 [PH] (ref 7.32–7.43)
PHOSPHATE SERPL-MCNC: 3.7 MG/DL (ref 2.5–4.5)
PO2 BLDV: 37 MM HG (ref 25–47)
POTASSIUM SERPL-SCNC: 3.5 MMOL/L (ref 3.4–5.3)
POTASSIUM SERPL-SCNC: 3.8 MMOL/L (ref 3.4–5.3)
POTASSIUM SERPL-SCNC: 3.9 MMOL/L (ref 3.4–5.3)
SODIUM SERPL-SCNC: 138 MMOL/L (ref 136–145)
TRIGL SERPL-MCNC: 110 MG/DL

## 2023-07-09 PROCEDURE — 250N000011 HC RX IP 250 OP 636: Mod: JZ | Performed by: INTERNAL MEDICINE

## 2023-07-09 PROCEDURE — 200N000001 HC R&B ICU

## 2023-07-09 PROCEDURE — 83880 ASSAY OF NATRIURETIC PEPTIDE: CPT | Performed by: INTERNAL MEDICINE

## 2023-07-09 PROCEDURE — 250N000013 HC RX MED GY IP 250 OP 250 PS 637: Performed by: INTERNAL MEDICINE

## 2023-07-09 PROCEDURE — 250N000009 HC RX 250: Performed by: INTERNAL MEDICINE

## 2023-07-09 PROCEDURE — 80069 RENAL FUNCTION PANEL: CPT | Performed by: INTERNAL MEDICINE

## 2023-07-09 PROCEDURE — 84132 ASSAY OF SERUM POTASSIUM: CPT | Performed by: INTERNAL MEDICINE

## 2023-07-09 PROCEDURE — 94640 AIRWAY INHALATION TREATMENT: CPT | Mod: 76

## 2023-07-09 PROCEDURE — 94640 AIRWAY INHALATION TREATMENT: CPT

## 2023-07-09 PROCEDURE — 80061 LIPID PANEL: CPT | Performed by: INTERNAL MEDICINE

## 2023-07-09 PROCEDURE — 83735 ASSAY OF MAGNESIUM: CPT | Performed by: INTERNAL MEDICINE

## 2023-07-09 PROCEDURE — 83036 HEMOGLOBIN GLYCOSYLATED A1C: CPT | Performed by: INTERNAL MEDICINE

## 2023-07-09 PROCEDURE — 36415 COLL VENOUS BLD VENIPUNCTURE: CPT | Performed by: INTERNAL MEDICINE

## 2023-07-09 PROCEDURE — 99233 SBSQ HOSP IP/OBS HIGH 50: CPT | Performed by: INTERNAL MEDICINE

## 2023-07-09 PROCEDURE — 82805 BLOOD GASES W/O2 SATURATION: CPT | Performed by: INTERNAL MEDICINE

## 2023-07-09 RX ORDER — POTASSIUM CHLORIDE 1500 MG/1
20 TABLET, EXTENDED RELEASE ORAL ONCE
Status: COMPLETED | OUTPATIENT
Start: 2023-07-09 | End: 2023-07-09

## 2023-07-09 RX ADMIN — IPRATROPIUM BROMIDE AND ALBUTEROL SULFATE 3 ML: .5; 3 SOLUTION RESPIRATORY (INHALATION) at 15:16

## 2023-07-09 RX ADMIN — DILTIAZEM HYDROCHLORIDE 30 MG: 30 TABLET, FILM COATED ORAL at 18:01

## 2023-07-09 RX ADMIN — DILTIAZEM HYDROCHLORIDE 30 MG: 30 TABLET, FILM COATED ORAL at 12:03

## 2023-07-09 RX ADMIN — FUROSEMIDE 40 MG: 10 INJECTION, SOLUTION INTRAVENOUS at 06:26

## 2023-07-09 RX ADMIN — IPRATROPIUM BROMIDE AND ALBUTEROL SULFATE 3 ML: .5; 3 SOLUTION RESPIRATORY (INHALATION) at 07:39

## 2023-07-09 RX ADMIN — ROPINIROLE HYDROCHLORIDE 0.25 MG: 0.25 TABLET, FILM COATED ORAL at 20:20

## 2023-07-09 RX ADMIN — METOPROLOL SUCCINATE 100 MG: 100 TABLET, EXTENDED RELEASE ORAL at 09:02

## 2023-07-09 RX ADMIN — ATORVASTATIN CALCIUM 20 MG: 10 TABLET, FILM COATED ORAL at 20:20

## 2023-07-09 RX ADMIN — LISINOPRIL 10 MG: 10 TABLET ORAL at 09:02

## 2023-07-09 RX ADMIN — FUROSEMIDE 40 MG: 10 INJECTION, SOLUTION INTRAVENOUS at 14:39

## 2023-07-09 RX ADMIN — Medication: at 15:43

## 2023-07-09 RX ADMIN — DILTIAZEM HYDROCHLORIDE 30 MG: 30 TABLET, FILM COATED ORAL at 23:23

## 2023-07-09 RX ADMIN — ENOXAPARIN SODIUM 40 MG: 40 INJECTION SUBCUTANEOUS at 09:02

## 2023-07-09 RX ADMIN — POTASSIUM CHLORIDE 20 MEQ: 1500 TABLET, EXTENDED RELEASE ORAL at 12:03

## 2023-07-09 RX ADMIN — ENOXAPARIN SODIUM 40 MG: 40 INJECTION SUBCUTANEOUS at 20:20

## 2023-07-09 RX ADMIN — IPRATROPIUM BROMIDE AND ALBUTEROL SULFATE 3 ML: .5; 3 SOLUTION RESPIRATORY (INHALATION) at 19:54

## 2023-07-09 RX ADMIN — POTASSIUM CHLORIDE 20 MEQ: 1500 TABLET, EXTENDED RELEASE ORAL at 06:26

## 2023-07-09 RX ADMIN — IPRATROPIUM BROMIDE AND ALBUTEROL SULFATE 3 ML: .5; 3 SOLUTION RESPIRATORY (INHALATION) at 11:41

## 2023-07-09 RX ADMIN — DILTIAZEM HYDROCHLORIDE 30 MG: 30 TABLET, FILM COATED ORAL at 06:26

## 2023-07-09 RX ADMIN — ALBUTEROL SULFATE 2.5 MG: 1.25 SOLUTION RESPIRATORY (INHALATION) at 23:30

## 2023-07-09 ASSESSMENT — ACTIVITIES OF DAILY LIVING (ADL)
ADLS_ACUITY_SCORE: 22
ADLS_ACUITY_SCORE: 20
ADLS_ACUITY_SCORE: 22
ADLS_ACUITY_SCORE: 20
ADLS_ACUITY_SCORE: 22
ADLS_ACUITY_SCORE: 20
ADLS_ACUITY_SCORE: 20

## 2023-07-09 NOTE — PLAN OF CARE
Blood pressure 130/94, pulse 92, temperature 97.4  F (36.3  C), temperature source Tympanic, resp. rate 18, height 1.524 m (5'), weight 96.9 kg (213 lb 10 oz), SpO2 95 %.    A&O, VSS, HR 70-110s, afebrile, remains on RA lungs dim/clear with exp wheeze, did c/o pain to R knee, md notified and bengay ordered and applied with relief, IV SL to L hand and L AC, ambulated in hallx1 this shift, 1-2+ edema as charted,  freq urination 3700 output, using bedside commode independently, 2 Bms this shift, 560 intake, good appetite, free from falls, up in chair whole shift, makes needs known, call light within reach.     Face to face report given with opportunity to observe patient.    Report given to LORENA Mckoy RN   7/9/2023  7:00 PM

## 2023-07-09 NOTE — PLAN OF CARE
Goal Outcome Evaluation:  No acute changes overnight. VSS, now on a 1500 ml fluid restriction.    Neuro  A&Ox4, PERRL, no numbness or tingling, moves all extremities equally     Cardio  Aib 90s-100s SBP 150s, pulses palpable     Resp  RA, LS diminished with expiratory wheeze, occasional congested nonproductive cough     GI/  Martin removed, voiding adequate UO, no BM, tolerating 2g Na low fat diet     Skin  No new skin issues, up to the chair and bathroom SBA     Lines  2PIVS saline locked       Face to face report given with opportunity to observe patient.    Report given to Vickie Andrews, RN   7/9/2023  7:02 AM

## 2023-07-09 NOTE — PROVIDER NOTIFICATION
Notified hospitalist pt c/o R knee pain, states uses Icy hot at home for relief, Md ordered Icy hot patch PRN

## 2023-07-09 NOTE — PROGRESS NOTES
"[facility]    Medicine Progress Note - Hospitalist Service    Date of Admission:  7/7/2023    Assessment & Plan   Heather Rosas is a 72 year old female admitted on 7/7/2023. She presents to ED with 4 weeks worsening edema on her legs, diarrhea and worsening dyspnea.  Admitted for acute on chronic diastolic heart failure exacerbation. Known non-compliant patient (\"I didn't need meds, I was healthy \"like a horse\", this just started 4 weeks ago).     Hospital problems:   # Acute on chronic diastolic heart failure  - takes no medications  - EF bedside shows mildly depressed LV function (but this could be due to afib RVR lasting ? Time)  - start lasix  - rate control  - start lisinopril 10 mg  - start toprol XR 50 mg 9 AM  -July 7: Patient symptomatically improving with diuresis.  Echocardiogram is not available today; will have to wait until Monday.  Continue with diuresis and follow patient's symptomatically.  -July 8: Patient is continuing to improve with furosemide diuresis.  -July 9: Patient is tolerating diuresis well and her weight has decreased from 102kg to 96.9 kg today.  Respiratory status has improved greatly with diuresis.    #EKG changes, CAD, s/p CABG x 4  -Patient had 1 EKG with poor baseline that was read as acute ischemia.  Repeat EKG did not show any ischemic changes and troponin has been trending down.  -July 8: Patient remains asymptomatic and hemodynamically stable.  Echocardiogram is pending on Monday  -July 9: Stable without any ischemic symptoms.  Echocardiogram pending for tomorrow    # Hypertension  Systolic blood pressure ranging from 112-152.  Blood pressure appears to be improving once metoprolol and lisinopril as well as diltiazem drip were started.  -July 9: Blood pressures controlled today.  Yesterday, diltiazem 30 mg 4 times daily was started to better control patient's blood pressure instead of amlodipine, her usual PTA antihypertensive, due to her atrial " fibrillation    # afib with RVR  Patient is in sinus rhythm  -July 9: Yesterday patient was started on diltiazem 30 mg 4 times a day to better control heart rate as well as hypertension.  Patient's heart rate is better controlled today and will continue metoprolol and diltiazem    # medication non-compliance  - social service consult    # history of endometrial cancer  - remote    #Hypokalemia  -We will replace per protocol         Diet: Combination Diet 2 gm NA Diet; Low Saturated Fat Diet  Fluid restriction 1500 ML FLUID    DVT Prophylaxis: Enoxaparin (Lovenox) SQ  Martin Catheter: Not present  Lines: None     Cardiac Monitoring: ACTIVE order. Indication: ICU  Code Status: Full Code      Clinically Significant Risk Factors                  # Hypertension: Noted on problem list        # Severe Obesity: Estimated body mass index is 41.72 kg/m  as calculated from the following:    Height as of this encounter: 1.524 m (5').    Weight as of this encounter: 96.9 kg (213 lb 10 oz)., PRESENT ON ADMISSION          Disposition Plan     Expected Discharge Date: 07/09/2023                  Papito Aiken MD  Hospitalist Service  [facility]  Securely message with [employer] (more info)  Text page via [facility] Paging/Directory   ______________________________________________________________________    Interval History   Reports slightly improved respiration with diuresis.  Denies chest pain, palpitation, lightheadedness, nausea, vomiting, abdominal pain, diarrhea, dysuria.    Physical Exam   Vital Signs: Temp: 97.4  F (36.3  C) Temp src: Tympanic BP: 127/95 Pulse: 82   Resp: 20 SpO2: 92 % O2 Device: None (Room air)    Weight: 213 lbs 10.01 oz    General Appearance: Sitting in chair in mild respiratory distress  Respiratory: Mild wheezing bilaterally  Cardiovascular: S1-S2, irregular rhythm, normal rate, no murmurs rubs gallops  GI: Obese, nontender, nondistended  Skin: Mild stasis dermatitis in both legs  Other: 1+ edema in  both the lower extremities    Medical Decision Making       55 MINUTES SPENT BY ME on the date of service doing chart review, history, exam, documentation & further activities per the note.      Data     I have personally reviewed the following data over the past 24 hrs:    N/A  \   N/A   / N/A     138 98 29.0 (H) /  94   3.5 25 0.95 \       ALT: N/A AST: N/A AP: N/A TBILI: N/A   ALB: 3.7 TOT PROTEIN: N/A LIPASE: N/A       Trop: N/A BNP: 2,657 (H)       TSH: N/A T4: N/A A1C: 5.3       Imaging results reviewed over the past 24 hrs:   No results found for this or any previous visit (from the past 24 hour(s)).

## 2023-07-10 ENCOUNTER — APPOINTMENT (OUTPATIENT)
Dept: PHYSICAL THERAPY | Facility: HOSPITAL | Age: 73
DRG: 291 | End: 2023-07-10
Attending: INTERNAL MEDICINE
Payer: MEDICARE

## 2023-07-10 ENCOUNTER — APPOINTMENT (OUTPATIENT)
Dept: CARDIOLOGY | Facility: HOSPITAL | Age: 73
DRG: 291 | End: 2023-07-10
Attending: INTERNAL MEDICINE
Payer: MEDICARE

## 2023-07-10 VITALS
OXYGEN SATURATION: 96 % | WEIGHT: 206.35 LBS | TEMPERATURE: 97.7 F | BODY MASS INDEX: 40.51 KG/M2 | HEART RATE: 97 BPM | SYSTOLIC BLOOD PRESSURE: 128 MMHG | HEIGHT: 60 IN | DIASTOLIC BLOOD PRESSURE: 99 MMHG | RESPIRATION RATE: 20 BRPM

## 2023-07-10 LAB
ALBUMIN SERPL BCG-MCNC: 3.9 G/DL (ref 3.5–5.2)
ANION GAP SERPL CALCULATED.3IONS-SCNC: 13 MMOL/L (ref 7–15)
BUN SERPL-MCNC: 25 MG/DL (ref 8–23)
CALCIUM SERPL-MCNC: 9.1 MG/DL (ref 8.8–10.2)
CHLORIDE SERPL-SCNC: 97 MMOL/L (ref 98–107)
CREAT SERPL-MCNC: 0.84 MG/DL (ref 0.51–0.95)
DEPRECATED HCO3 PLAS-SCNC: 29 MMOL/L (ref 22–29)
ERYTHROCYTE [DISTWIDTH] IN BLOOD BY AUTOMATED COUNT: 14.7 % (ref 10–15)
GFR SERPL CREATININE-BSD FRML MDRD: 74 ML/MIN/1.73M2
GLUCOSE SERPL-MCNC: 107 MG/DL (ref 70–99)
HCT VFR BLD AUTO: 42.5 % (ref 35–47)
HGB BLD-MCNC: 13.7 G/DL (ref 11.7–15.7)
LVEF ECHO: NORMAL
MCH RBC QN AUTO: 29.5 PG (ref 26.5–33)
MCHC RBC AUTO-ENTMCNC: 32.2 G/DL (ref 31.5–36.5)
MCV RBC AUTO: 92 FL (ref 78–100)
PHOSPHATE SERPL-MCNC: 3.7 MG/DL (ref 2.5–4.5)
PLATELET # BLD AUTO: 233 10E3/UL (ref 150–450)
POTASSIUM SERPL-SCNC: 3.7 MMOL/L (ref 3.4–5.3)
RBC # BLD AUTO: 4.64 10E6/UL (ref 3.8–5.2)
SODIUM SERPL-SCNC: 139 MMOL/L (ref 136–145)
WBC # BLD AUTO: 10.2 10E3/UL (ref 4–11)

## 2023-07-10 PROCEDURE — 250N000011 HC RX IP 250 OP 636: Mod: JZ | Performed by: INTERNAL MEDICINE

## 2023-07-10 PROCEDURE — 94640 AIRWAY INHALATION TREATMENT: CPT

## 2023-07-10 PROCEDURE — 99239 HOSP IP/OBS DSCHRG MGMT >30: CPT | Performed by: INTERNAL MEDICINE

## 2023-07-10 PROCEDURE — 97530 THERAPEUTIC ACTIVITIES: CPT | Mod: GP | Performed by: PHYSICAL THERAPIST

## 2023-07-10 PROCEDURE — 97161 PT EVAL LOW COMPLEX 20 MIN: CPT | Mod: GP | Performed by: PHYSICAL THERAPIST

## 2023-07-10 PROCEDURE — 999N000157 HC STATISTIC RCP TIME EA 10 MIN

## 2023-07-10 PROCEDURE — 36415 COLL VENOUS BLD VENIPUNCTURE: CPT | Performed by: INTERNAL MEDICINE

## 2023-07-10 PROCEDURE — 250N000013 HC RX MED GY IP 250 OP 250 PS 637: Performed by: INTERNAL MEDICINE

## 2023-07-10 PROCEDURE — 80069 RENAL FUNCTION PANEL: CPT | Performed by: INTERNAL MEDICINE

## 2023-07-10 PROCEDURE — 93306 TTE W/DOPPLER COMPLETE: CPT | Mod: 26 | Performed by: INTERNAL MEDICINE

## 2023-07-10 PROCEDURE — 85027 COMPLETE CBC AUTOMATED: CPT | Performed by: INTERNAL MEDICINE

## 2023-07-10 PROCEDURE — 250N000009 HC RX 250: Performed by: INTERNAL MEDICINE

## 2023-07-10 PROCEDURE — 999N000208 ECHOCARDIOGRAM COMPLETE

## 2023-07-10 RX ORDER — METOPROLOL SUCCINATE 100 MG/1
100 TABLET, EXTENDED RELEASE ORAL DAILY
Qty: 30 TABLET | Refills: 0 | Status: SHIPPED | OUTPATIENT
Start: 2023-07-10 | End: 2023-07-28

## 2023-07-10 RX ORDER — POTASSIUM CHLORIDE 1500 MG/1
20 TABLET, EXTENDED RELEASE ORAL ONCE
Status: COMPLETED | OUTPATIENT
Start: 2023-07-10 | End: 2023-07-10

## 2023-07-10 RX ORDER — FUROSEMIDE 40 MG
40 TABLET ORAL DAILY
Qty: 7 TABLET | Refills: 0 | Status: SHIPPED | OUTPATIENT
Start: 2023-07-10 | End: 2023-07-24

## 2023-07-10 RX ORDER — DILTIAZEM HYDROCHLORIDE 120 MG/1
120 CAPSULE, EXTENDED RELEASE ORAL DAILY
Qty: 30 CAPSULE | Refills: 0 | Status: SHIPPED | OUTPATIENT
Start: 2023-07-10 | End: 2023-07-28

## 2023-07-10 RX ADMIN — ENOXAPARIN SODIUM 40 MG: 40 INJECTION SUBCUTANEOUS at 07:57

## 2023-07-10 RX ADMIN — DILTIAZEM HYDROCHLORIDE 30 MG: 30 TABLET, FILM COATED ORAL at 11:26

## 2023-07-10 RX ADMIN — DILTIAZEM HYDROCHLORIDE 30 MG: 30 TABLET, FILM COATED ORAL at 06:23

## 2023-07-10 RX ADMIN — POTASSIUM CHLORIDE 20 MEQ: 1500 TABLET, EXTENDED RELEASE ORAL at 06:23

## 2023-07-10 RX ADMIN — FUROSEMIDE 40 MG: 10 INJECTION, SOLUTION INTRAVENOUS at 06:23

## 2023-07-10 RX ADMIN — LISINOPRIL 10 MG: 10 TABLET ORAL at 07:57

## 2023-07-10 RX ADMIN — METOPROLOL SUCCINATE 100 MG: 100 TABLET, EXTENDED RELEASE ORAL at 07:57

## 2023-07-10 RX ADMIN — IPRATROPIUM BROMIDE AND ALBUTEROL SULFATE 3 ML: .5; 3 SOLUTION RESPIRATORY (INHALATION) at 07:10

## 2023-07-10 ASSESSMENT — ACTIVITIES OF DAILY LIVING (ADL)
ADLS_ACUITY_SCORE: 20

## 2023-07-10 NOTE — PROGRESS NOTES
07/10/23 0942   Appointment Info   Signing Clinician's Name / Credentials (PT) Karyn [place], DPT   Living Environment   People in Home alone   Current Living Arrangements house   Home Accessibility stairs within home   Number of Stairs, Within Home, Primary four   Stair Railings, Within Home, Primary railings on both sides of stairs   Transportation Anticipated car, drives self   Living Environment Comments Pt reports no steps to enter, 4 up to main living area, full flight down to basement for laundry   Self-Care   Usual Activity Tolerance good   Current Activity Tolerance good   Equipment Currently Used at Home none   Fall history within last six months no   Activity/Exercise/Self-Care Comment Pt reports she is independent with all ADLs and functional mobility prior to admit.   General Information   Onset of Illness/Injury or Date of Surgery 07/07/23   Referring Physician Dr. Aiken   Patient/Family Therapy Goals Statement (PT) to go home, would like a walker   Pertinent History of Current Problem (include personal factors and/or comorbidities that impact the POC) Pt admitted with heart failure.   General Observations Pt up in room independently upon PT arrival   Cognition   Affect/Mental Status (Cognition) WFL   Orientation Status (Cognition) oriented x 3   Follows Commands (Cognition) WFL   Pain Assessment   Patient Currently in Pain No   Posture    Posture Forward head position   Range of Motion (ROM)   Range of Motion ROM is WFL   Strength (Manual Muscle Testing)   Strength (Manual Muscle Testing) strength is WFL   Bed Mobility   Comment, (Bed Mobility) NT, up independently in room upon PT arrival   Transfers   Transfers sit-stand transfer   Sit-Stand Transfer   Sit-Stand [place] (Transfers) supervision   Gait/Stairs (Locomotion)   [place] Level (Gait) supervision   Distance in Feet 50'   Pattern (Gait) step-to   Comment, (Gait/Stairs) Pt demonstrates slow gait without use of AD, no LOB   Balance    Balance Comments fair- without support   Clinical Impression   Criteria for Skilled Therapeutic Intervention Yes, treatment indicated   PT Diagnosis (PT) gait disturbance   Influenced by the following impairments decreased activity tolerance, decreased balance   Functional limitations due to impairments decreased tolerance for functional mobility   Clinical Presentation (PT Evaluation Complexity) Stable/Uncomplicated   Clinical Presentation Rationale clinical judgement   Clinical Decision Making (Complexity) low complexity   Planned Therapy Interventions (PT) gait training;patient/family education;progressive activity/exercise   Risk & Benefits of therapy have been explained evaluation/treatment results reviewed;care plan/treatment goals reviewed;risks/benefits reviewed;participants included;patient   Clinical Impression Comments PT evaluation completed.  Pt lives at home alone and was independent with all ADLs and mobility at baseline.  Pt tolerated activity well, did do better with use of FWW and requests one for home.  Otherwise not further PT beyond todays session.   PT Total Evaluation Time   PT Eval, Low Complexity Minutes (49588) 13   Physical Therapy Goals   PT Frequency One time eval and treatment only   PT Predicted Duration/Target Date for Goal Attainment 07/10/23   PT Goals Transfers;Gait;Stairs   PT: Transfers Supervision/stand-by assist;Sit to/from stand;Bed to/from chair;Goal Met   PT: Gait Supervision/stand-by assist;Rolling walker;150 feet;Goal Met   PT: Stairs Supervision/stand-by assist;8 stairs;Rail on both sides;Goal Met   Interventions   Interventions Quick Adds Therapeutic Activity   Therapeutic Activity   Therapeutic Activities: dynamic activities to improve functional performance Minutes (93843) 11   Symptoms Noted During/After Treatment Fatigue   Treatment Detail/Skilled Intervention Pt managed 8 steps with bilateral rails SBA with step to pattern.  Pt ambulated additional 150' with FWW  SBA per pt's request.  Pt reports she would like to have a walker for home, will issue upon discharge.   PT Discharge Planning   PT Plan DC skilled PT, continue to mobilize with nursing   PT Discharge Recommendation (DC Rec) home   PT Rationale for DC Rec PT evaluation completed.  Pt lives at home alone and was independent with all ADLs and mobility at baseline.  Pt tolerated activity well, did do better with use of FWW and requests one for home.  Otherwise not further PT beyond todays session.   PT Brief overview of current status sit<>stand SBA, ambulated 50' without AD SBA, 150' with FWW SBA with improved speed, managed 8 steps with bilateral rails SBA   Total Session Time   Timed Code Treatment Minutes 11   Total Session Time (sum of timed and untimed services) 24

## 2023-07-10 NOTE — PLAN OF CARE
Goal Outcome Evaluation:  No acute changes overnight. VSS, plan for echo today with possible discharge     Neuro  A&Ox4, PERRL, no numbness or tingling, moves all extremities equally     Cardio  Aib 90s-100s SBP 140s, pulses palpable     Resp  RA, LS diminished with expiratory wheeze, occasional congested nonproductive cough     GI/  Voiding adequate UO, no BM, tolerating 2g Na low fat diet, 1500 ml fluid restriction.     Skin  No new skin issues, up to the chair and bathroom SBA     Lines  2PIVS saline locked    Face to face report given with opportunity to observe patient.    Report given to Shaniqua Andrews, LORENA   7/10/2023  7:00 AM

## 2023-07-10 NOTE — DISCHARGE SUMMARY
[facility]    Discharge Summary  Hospitalist    Date of Admission:  7/7/2023  Date of Discharge:  7/10/2023  Discharging Provider: Katie Denson MD, MD  Date of Service (when I saw the patient): 07/10/23    Discharge Diagnoses   Principal Problem:    Acute on chronic diastolic heart failure (H)  Active Problems:    HTN (hypertension)    Obesity (H)    Atrial fibrillation (H)    S/P CABG x 4    History of endometrial cancer    Diarrhea    Non compliance with medical treatment    Atrial fibrillation with RVR (H)      History of Present Illness   Heather Rosas is an 72 year old female who presented with shortness of breath, increased edema.  Please see admission H+P for additional details.    Hospital Course   Heather Rosas was admitted on 7/7/2023.  72-year-old historically noncompliant female, has history of coronary disease status post CABG x4, history of chronic diastolic heart failure, as well as history of atrial fibrillation with RVR not on anticoagulation presented with 3 to 4 days of increasing shortness of breath and edema.  She was found to be in A-fib/flutter with RVR as well as volume overloaded.  She was treated with rate control medications diltiazem and metoprolol, and diuresed with IV Lasix with good response.  She was noncompliant with her outpatient hypertensive medications anyway including amlodipine.  As mentioned, she responded well to diuresis, losing about 20 pounds of water weight.  Clinically now she feels much better, and is anxious to go home.  As far as her heart rate, it has been mostly controlled with diltiazem and metoprolol.  We will start her on these medications at doses she was receiving here, which is 120 mg diltiazem daily as well as 100 mg metoprolol XL.  We will also discharge her with Lasix 40 mg for approximately 7 days.  She agrees to establish primary care, as she has been noncompliant in the past.  Her [id number] score is greater than 5, however due to  her noncompliance, we will defer anticoagulation decision to an outpatient basis.  She has establish primary care appointment scheduled prior to discharge.  Echocardiogram was scanned while she was here, reading not finalized by time of discharge.  Last echocardiogram in 2018 showed preserved ejection fraction.  Decision should be made about a ACE/ARB at follow-up appointment as well.    Katie Denson MD      Significant Results and Procedures   See below    Pending Results   These results will be followed up by No Ref-Primary, Physician    Unresulted Labs Ordered in the Past 30 Days of this Admission     No orders found from 6/7/2023 to 7/8/2023.          Code Status   Full Code       Primary Care Physician   Physician No Ref-Primary    Physical Exam   Temp: 97.7  F (36.5  C) Temp src: Tympanic BP: 128/99 Pulse: 97   Resp: 20 SpO2: 96 % O2 Device: None (Room air)    Vitals:    07/08/23 0400 07/09/23 0516 07/10/23 0449   Weight: 98.3 kg (216 lb 11.4 oz) 96.9 kg (213 lb 10 oz) 93.6 kg (206 lb 5.6 oz)     Vital Signs with Ranges  Temp:  [97.4  F (36.3  C)-97.7  F (36.5  C)] 97.7  F (36.5  C)  Pulse:  [] 97  Resp:  [18-20] 20  BP: (127-167)/() 128/99  Cuff Mean (mmHg):  [111] 111  SpO2:  [92 %-96 %] 96 %  I/O last 3 completed shifts:  In: 800 [P.O.:800]  Out: 4950 [Urine:4950]    Constitutional: AA, NAD, obese  Eyes: PERRLA, no injection, no icterus  HEENT: atraumatic, normocephalic  Respiratory: CTA b/l  Cardiovascular: S1 S2 RRR  GI: soft, NT, ND, + bowel sounds  Lymph/Hematologic: no palpable lymphadenopathy  Skin: no rashes, no lesions  Musculoskeletal: Trace to 1+ edema in LEs b/l, good tone, no deformities  Neurologic: oriented x 3, no focal deficits  Psychiatric: appropriate affect      Discharge Disposition   Discharged to home  Condition at discharge: Stable    Consultations This Hospital Stay   CARE MANAGEMENT / SOCIAL WORK IP CONSULT  NUTRITION SERVICES ADULT IP CONSULT  PHYSICAL THERAPY ADULT  IP CONSULT  OCCUPATIONAL THERAPY ADULT IP CONSULT    Time Spent on this Encounter   Katie ADKINS MD, personally saw the patient today and spent greater than 30 minutes discharging this patient.    Discharge Orders      Reason for your hospital stay    CHF exacerbation, aflutter/fib RVR     Follow-up and recommended labs and tests     Follow up with primary care provider, establish primary care, within 7 days for hospital follow- up.  No follow up labs or test are needed.     Activity    Your activity upon discharge: activity as tolerated     Diet    Follow this diet upon discharge: Orders Placed This Encounter      Fluid restriction 1500 ML FLUID      Combination Diet 2 gm NA Diet; Low Saturated Fat Diet     Discharge Medications   Current Discharge Medication List      START taking these medications    Details   diltiazem ER (DILT-XR) 120 MG 24 hr capsule Take 1 capsule (120 mg) by mouth daily for 30 days  Qty: 30 capsule, Refills: 0    Associated Diagnoses: Atrial fibrillation with RVR (H)      furosemide (LASIX) 40 MG tablet Take 1 tablet (40 mg) by mouth daily for 7 days  Qty: 7 tablet, Refills: 0    Associated Diagnoses: Acute on chronic congestive heart failure, unspecified heart failure type (H)      metoprolol succinate ER (TOPROL XL) 100 MG 24 hr tablet Take 1 tablet (100 mg) by mouth daily for 30 days  Qty: 30 tablet, Refills: 0    Associated Diagnoses: Atrial fibrillation with RVR (H)         CONTINUE these medications which have NOT CHANGED    Details   ibuprofen (ADVIL/MOTRIN) 200 MG tablet Take 400-600 mg by mouth 2 times daily as needed for moderate pain up to three times weekly.         STOP taking these medications       amLODIPine (NORVASC) 5 MG tablet Comments:   Reason for Stopping:         atorvastatin (LIPITOR) 40 MG tablet Comments:   Reason for Stopping:         olmesartan-hydrochlorothiazide (BENICAR HCT) 20-12.5 MG tablet Comments:   Reason for Stopping:             Allergies    Allergies   Allergen Reactions     Fexofenadine Hcl Nausea and Vomiting     Allegra      Rosuvastatin Other (See Comments)     Dizziness - Crestor      Cats Other (See Comments)     Sneezing, runny nose     Codeine Sulfate Nausea and Vomiting and GI Disturbance     Data   Most Recent 3 CBC's:  Recent Labs   Lab Test 07/10/23  0504 07/07/23  1314 07/07/23  0049   WBC 10.2 7.8 10.1   HGB 13.7 12.5 12.6   MCV 92 90 92    205 200      Most Recent 3 BMP's:  Recent Labs   Lab Test 07/10/23  0504 07/09/23  1048 07/09/23  0437 07/08/23  1623 07/08/23  1209 07/08/23  0440     --  138  --   --  137   POTASSIUM 3.7 3.5 3.9  3.8  --   --  4.0   CHLORIDE 97*  --  98  --   --  99   CO2 29  --  25  --   --  22   BUN 25.0*  --  29.0*  --   --  26.6*   CR 0.84  --  0.95  --   --  0.97*   ANIONGAP 13  --  15  --   --  16*   CARINA 9.1  --  9.2  --   --  9.2   *  --  94 100*   < > 131*    < > = values in this interval not displayed.     Most Recent 2 LFT's:  Recent Labs   Lab Test 07/07/23  0049 05/09/23  1147   AST 27 16   ALT 28 14   ALKPHOS 143* 136*   BILITOTAL 0.5 0.2     Most Recent INR's and Anticoagulation Dosing History:  Anticoagulation Dose History        Latest Ref Rng & Units 7/22/2017 5/14/2018 10/12/2021 10/13/2021   Recent Dosing and Labs   INR 2 - 3 0.98  1.01  1.0     1.1        Details          This result is from an external source.           Most Recent 3 Troponin's:  Recent Labs   Lab Test 10/11/21  2012 09/01/18  0950 05/14/18  1814 02/20/18  0449   TROPI  --  5.776* <0.015 1.994*   TROPONIN <0.015  --   --   --      Most Recent Cholesterol Panel:  Recent Labs   Lab Test 07/09/23  0437   CHOL 130   LDL 74   HDL 34*   TRIG 110     Most Recent 6 Bacteria Isolates From Any Culture (See EPIC Reports for Culture Details):  Recent Labs   Lab Test 05/15/18  0045 07/23/17  0032 07/23/17  0026   CULT No MRSA isolated No growth after 6 days No growth after 6 days     Most Recent TSH, T4 and A1c  Labs:  Recent Labs   Lab Test 07/09/23  0437 05/23/22  0400   TSH  --  3.00   A1C 5.3  --      Results for orders placed or performed during the hospital encounter of 07/07/23   XR Chest Port 1 View    Narrative    PROCEDURE: XR CHEST PORT 1 VIEW 7/7/2023 1:24 AM    HISTORY: sob    COMPARISONS: 10/27/2022.    TECHNIQUE: Single portable view.    FINDINGS: There has been a median sternotomy and CABG.    Heart is enlarged as it was previously. There is mild vascular  congestion with increasing patchy density at the right lung base.  There is no definite effusion.         Impression    IMPRESSION: Cardiomegaly with probable congestive heart failure.  Patchy density at the right lung base may represent pulmonary edema or  more focal patchy atelectasis or infiltrate.    MIS BRADY MD         SYSTEM ID:  [id number]

## 2023-07-10 NOTE — PLAN OF CARE
Goal Outcome Evaluation:       A&O, independent in room. VSS, afebrile. Denied pain. LS clear. HR irregular. Afib/Aflutter 90s. Good appetite. Great amount of urine output.    Patient discharged at 10:45 AM via wheel chair accompanied by son and staff. Prescriptions sent to patients preferred pharmacy. All belongings sent with patient.     Discharge instructions reviewed with patient. Listed belongings gathered and returned to patient.     Patient discharged to home.     Surgical Patient   Surgical Procedures during stay: None  Did patient receive discharge instruction on wound care and recognition of infection symptoms? N/A    MISC  Follow up appointment made:  Yes  Home medications returned to patient: N/A  Patient reports pain was well managed at discharge: Yes

## 2023-07-12 ASSESSMENT — ENCOUNTER SYMPTOMS
BLOOD IN STOOL: 0
DYSURIA: 0
SHORTNESS OF BREATH: 1
ABDOMINAL DISTENTION: 0
LIGHT-HEADEDNESS: 0
ANAL BLEEDING: 0
ABDOMINAL PAIN: 0
FLANK PAIN: 0
FEVER: 0
VOICE CHANGE: 0
BACK PAIN: 0
HEMATURIA: 0
VOMITING: 0
WOUND: 0
CONFUSION: 0
MYALGIAS: 0
DIZZINESS: 0
CHEST TIGHTNESS: 0
DIAPHORESIS: 0
HEADACHES: 0
CHILLS: 0
ARTHRALGIAS: 0
NECK PAIN: 0
COLOR CHANGE: 0
PALPITATIONS: 0
WHEEZING: 0
NAUSEA: 0
NUMBNESS: 0
NECK STIFFNESS: 0
COUGH: 0

## 2023-07-12 NOTE — ED PROVIDER NOTES
History     Chief Complaint   Patient presents with     Shortness of Breath     The history is provided by the patient.   Shortness of Breath  Severity:  Moderate  Onset quality:  Gradual  Duration:  4 weeks  Timing:  Constant  Progression:  Worsening  Chronicity:  Chronic  Associated symptoms: no abdominal pain, no chest pain, no cough, no diaphoresis, no fever, no headaches, no neck pain, no rash, no vomiting and no wheezing        Allergies:  Allergies   Allergen Reactions     Fexofenadine Hcl Nausea and Vomiting     Allegra      Rosuvastatin Other (See Comments)     Dizziness - Crestor      Cats Other (See Comments)     Sneezing, runny nose     Codeine Sulfate Nausea and Vomiting and GI Disturbance       Problem List:    Patient Active Problem List    Diagnosis Date Noted     Acute on chronic diastolic heart failure (H) 07/07/2023     Priority: Medium     History of endometrial cancer 07/07/2023     Priority: Medium     Diarrhea 07/07/2023     Priority: Medium     Non compliance with medical treatment 07/07/2023     Priority: Medium     Atrial fibrillation with RVR (H) 07/07/2023     Priority: Medium     Atrial fibrillation with rapid ventricular response (H) 07/07/2023     Priority: Medium     Acute on chronic congestive heart failure, unspecified heart failure type (H) 07/07/2023     Priority: Medium     Coronary artery disease involving native coronary artery 10/12/2021     Priority: Medium     Acute diastolic congestive heart failure (H) 10/12/2021     Priority: Medium     Atrial flutter with rapid ventricular response (H) 10/12/2021     Priority: Medium     Acute exacerbation of CHF (congestive heart failure) (H) 10/11/2021     Priority: Medium     Status post chemotherapy 09/25/2020     Priority: Medium     Added automatically from request for surgery [id number]       Endometrial adenocarcinoma (H) 12/03/2018     Priority: Medium     Malignant neoplasm of uterus, unspecified site (H) 12/03/2018      Priority: Medium     Atrial fibrillation (H) 11/26/2018     Priority: Medium     History of coronary artery disease 10/30/2018     Priority: Medium     Hyponatremia 10/30/2018     Priority: Medium     Moderate mitral regurgitation 10/30/2018     Priority: Medium     Adenocarcinoma of the endometrium/uterus (H) 10/09/2018     Priority: Medium     Discovered on 02/18 biopsy. Pt lost to f/u       Chronic diastolic congestive heart failure (H) 09/20/2018     Priority: Medium     S/P CABG x 4 09/10/2018     Priority: Medium     History of non-ST elevation myocardial infarction (NSTEMI) 09/06/2018     Priority: Medium     Endometrial cancer (H) 09/04/2018     Priority: Medium     Cerebrovascular accident (H) 05/14/2018     Priority: Medium     Hypertensive urgency 05/14/2018     Priority: Medium     CVA (cerebral vascular accident) (H) 05/14/2018     Priority: Medium     Chest pain 05/14/2018     Priority: Medium     Endometrial hyperplasia with atypia 05/09/2018     Priority: Medium     Metrorrhagia 02/18/2018     Priority: Medium     ACP (advance care planning) 05/20/2016     Priority: Medium     Advance Care Planning 5/20/2016: ACP Review of Chart / Resources Provided:  Reviewed chart for advance care plan.  Heather Rosas has no plan or code status on file. Discussed available resources and provided with information. Confirmed code status reflects current choices pending further ACP discussions.  Confirmed/documented legally designated decision makers.  Added by Margot Shannon             HTN (hypertension)      Priority: Medium     Major depressive disorder, recurrent episode, moderate (H) 01/01/2011     Priority: Medium     ANNA (generalized anxiety disorder) 01/01/2011     Priority: Medium     GERD (Gastroesophageal reflux disease) 01/01/2011     Priority: Medium     Obesity (H) 01/01/2011     Priority: Medium     Schizophrenia (H) 01/01/2011     Priority: Medium     Seasonal allergic rhinitis 01/01/2011      Priority: Medium     Fibromyalgia 06/14/2004     Priority: Medium     Dyslipidemia 11/26/2003     Priority: Medium        Past Medical History:    Past Medical History:   Diagnosis Date     Allergic rhinitis 01/01/2011     Anxiety 01/01/2011     Anxiety state, unspecified      Dyslipidemia 11/26/2003     fibromyalgia 06/14/2004     GERD, Esophageal reflux 01/01/2011     HTN (hypertension)      Major depression, recurrent (H) 1/1/2011     Nonorganic sleep disorder, unspecified      Obesity 01/01/2011     Schizophrenia (H) 01/01/2011     Toxic shock syndrome (H) 2006       Past Surgical History:    Past Surgical History:   Procedure Laterality Date     ANGIOGRAM  02/2005    Normal      BIOPSY BREAST  1984    LT, normal     BYPASS GRAFT ARTERY CORONARY  09/10/2018     CARPAL TUNNEL RELEASE RT/LT  2005    RT, carpal tunnel     COLONOSCOPY  2011    Repeat in ten years      COLONOSCOPY  1996     COLONOSCOPY  2004     DILATION AND CURETTAGE, HYSTEROSCOPY, ABLATE ENDOMETRIUM, COMBINED N/A 2/19/2018    Procedure: COMBINED DILATION AND CURETTAGE, HYSTEROSCOPY, ABLATE ENDOMETRIUM;  HYSTEROSCOPY DILATION AND CURETTAGE, ENDOMETRIAL ABLATION;  Surgeon: Jose Nettles MD;  Location: [facility] OR     HYSTERECTOMY TOTAL ABD, NICK SALPINGO-OOPHORECTOMY, NODE DISSECTION, TUMOR DEBULKING, COMBINED  10/30/2018     INSERT PORT VASCULAR ACCESS N/A 12/11/2018    Procedure: PORT-A-CATH PLACEMENT;  Surgeon: Rafi Trinidad MD;  Location: [facility] OR     placement of central line  2005     REMOVE PORT VASCULAR ACCESS N/A 10/6/2020    Procedure: port a cath removal;  Surgeon: Rafi Trinidad MD;  Location: [facility] OR       Family History:    Family History   Problem Relation Age of Onset     C.A.D. Father 45        (cause of death)      Other - See Comments Father         rheumatic fever      Allergies Father      Cancer Sister 56        Esophageal to bone cancer     Gastrointestinal Disease Mother         GERD     Cancer Mother         pancreatic ca (cause of  "death) /liver ca     Breast Cancer Maternal Aunt      Breast Cancer Maternal Aunt      Colon Cancer Paternal Aunt         (cause of death)      Crohn's Disease Other      Depression Maternal Uncle      Depression Maternal Aunt      Diabetes Paternal Grandmother         type 2     Neurologic Disorder Brother         neuropathy     Cancer Brother 58        Tonsilular cancer/ lymph node in neck     Allergies Brother      Breast Cancer Cousin      Breast Cancer Cousin      Breast Cancer Cousin        Social History:  Marital Status:   [4]  Social History     Tobacco Use     Smoking status: Some Days     Packs/day: 0.50     Types: Cigarettes     Start date: 1971     Last attempt to quit: 2013     Years since quittin.6     Smokeless tobacco: Never     Tobacco comments:     pt declined, stated she would use, \"the patch\"   Substance Use Topics     Alcohol use: No     Comment: rare     Drug use: No        Medications:    diltiazem ER (DILT-XR) 120 MG 24 hr capsule  furosemide (LASIX) 40 MG tablet  ibuprofen (ADVIL/MOTRIN) 200 MG tablet  metoprolol succinate ER (TOPROL XL) 100 MG 24 hr tablet          Review of Systems   Constitutional: Negative for chills, diaphoresis and fever.   HENT: Negative for voice change.    Eyes: Negative for visual disturbance.   Respiratory: Positive for shortness of breath. Negative for cough, chest tightness and wheezing.    Cardiovascular: Positive for leg swelling. Negative for chest pain and palpitations.   Gastrointestinal: Negative for abdominal distention, abdominal pain, anal bleeding, blood in stool, nausea and vomiting.   Genitourinary: Negative for decreased urine volume, dysuria, flank pain and hematuria.   Musculoskeletal: Negative for arthralgias, back pain, gait problem, myalgias, neck pain and neck stiffness.   Skin: Negative for color change, pallor, rash and wound.   Neurological: Negative for dizziness, syncope, light-headedness, numbness and headaches. "   Psychiatric/Behavioral: Negative for confusion and suicidal ideas.       Physical Exam   BP: 141/99  Pulse: (!) 133  Temp: 97.6  F (36.4  C)  Resp: 22  Height: 152.4 cm (5')  Weight: 102.1 kg (225 lb 1.6 oz)  SpO2: 96 %      Physical Exam  Vitals and nursing note reviewed.   Constitutional:       Appearance: She is well-developed.   HENT:      Head: Normocephalic and atraumatic.      Mouth/Throat:      Pharynx: No oropharyngeal exudate.   Eyes:      Conjunctiva/sclera: Conjunctivae normal.      Pupils: Pupils are equal, round, and reactive to light.   Neck:      Thyroid: No thyromegaly.      Vascular: No JVD.      Trachea: No tracheal deviation.   Cardiovascular:      Rate and Rhythm: Tachycardia present. Rhythm regularly irregular.      Heart sounds: Normal heart sounds. No murmur heard.     No friction rub. No gallop.   Pulmonary:      Effort: Pulmonary effort is normal. No respiratory distress.      Breath sounds: Normal breath sounds. No stridor. No wheezing or rales.   Chest:      Chest wall: No tenderness.   Abdominal:      General: Bowel sounds are normal. There is no distension.      Palpations: Abdomen is soft. There is no mass.      Tenderness: There is no abdominal tenderness. There is no guarding or rebound.   Musculoskeletal:         General: No tenderness. Normal range of motion.      Cervical back: Normal range of motion and neck supple.      Right lower leg: Edema present.      Left lower leg: Edema present.   Lymphadenopathy:      Cervical: No cervical adenopathy.   Skin:     General: Skin is warm and dry.      Coloration: Skin is not pale.      Findings: No erythema or rash.   Neurological:      Mental Status: She is alert and oriented to person, place, and time.   Psychiatric:         Behavior: Behavior normal.         ED Course                 Procedures                  No results found for this or any previous visit (from the past 24 hour(s)).    Medications   ipratropium - albuterol 0.5  mg/2.5 mg/3 mL (DUONEB) neb solution 3 mL (3 mLs Nebulization $Given 7/7/23 0051)   methylPREDNISolone sodium succinate (solu-MEDROL) injection 125 mg (125 mg Intravenous $Given 7/7/23 0059)   furosemide (LASIX) injection 60 mg (60 mg Intravenous $Given 7/7/23 0157)   potassium chloride ER (KLOR-CON M) CR tablet 40 mEq (40 mEq Oral $Given 7/7/23 1429)   metoprolol tartrate (LOPRESSOR) tablet 50 mg (50 mg Oral $Given 7/7/23 1749)   diltiazem (CARDIZEM) injection 10 mg (10 mg Intravenous $Given 7/8/23 1634)   potassium chloride ER (KLOR-CON M) CR tablet 20 mEq (20 mEq Oral $Given 7/9/23 0626)   potassium chloride ER (KLOR-CON M) CR tablet 20 mEq (20 mEq Oral $Given 7/9/23 1203)   potassium chloride ER (KLOR-CON M) CR tablet 20 mEq (20 mEq Oral $Given 7/10/23 0623)   sodium chloride (PF) 0.9% PF flush 10 mL (10 mLs Intravenous $Given 7/10/23 1226)       Assessments & Plan (with Medical Decision Making)   4 wk of progressive SOB, and leg swelling  Hx of CHF and AFib but not taking any medication at home and did not follow with her PCP    EKG : afib @ 128  Labs sreviewed  CXR : bilatera congression    CHF exacerbation , Afib RVR due to non compliance with medicaiotn   IV lasix given  Not reliable for outpatient follow up  I spoke to  [facility] for  Admission   I have reviewed the nursing notes.    I have reviewed the findings, diagnosis, plan and need for follow up with the patient.        Discharge Medication List as of 7/10/2023 11:34 AM      START taking these medications    Details   diltiazem ER (DILT-XR) 120 MG 24 hr capsule Take 1 capsule (120 mg) by mouth daily for 30 days, Disp-30 capsule, R-0, E-Prescribe      furosemide (LASIX) 40 MG tablet Take 1 tablet (40 mg) by mouth daily for 7 days, Disp-7 tablet, R-0, E-Prescribe      metoprolol succinate ER (TOPROL XL) 100 MG 24 hr tablet Take 1 tablet (100 mg) by mouth daily for 30 days, Disp-30 tablet, R-0, E-Prescribe             Final diagnoses:   Atrial  fibrillation with rapid ventricular response (H)   Acute on chronic congestive heart failure, unspecified heart failure type (H)       7/7/2023   14 INTENSIVE CARE UNIT     Adriel Burnham MD  07/12/23 0046

## 2023-07-17 PROBLEM — I50.33 ACUTE ON CHRONIC DIASTOLIC HEART FAILURE (H): Status: ACTIVE | Noted: 2018-09-20

## 2023-07-18 PROBLEM — Z85.42 HISTORY OF ENDOMETRIAL CANCER: Chronic | Status: ACTIVE | Noted: 2023-07-07

## 2023-07-18 PROBLEM — N92.1 METRORRHAGIA: Status: RESOLVED | Noted: 2018-02-18 | Resolved: 2023-07-18

## 2023-07-18 PROBLEM — I63.9 CVA (CEREBRAL VASCULAR ACCIDENT) (H): Status: RESOLVED | Noted: 2018-05-14 | Resolved: 2023-07-18

## 2023-07-18 PROBLEM — I50.9 ACUTE EXACERBATION OF CHF (CONGESTIVE HEART FAILURE) (H): Status: RESOLVED | Noted: 2021-10-11 | Resolved: 2023-07-18

## 2023-07-18 PROBLEM — I50.31 ACUTE DIASTOLIC CONGESTIVE HEART FAILURE (H): Status: RESOLVED | Noted: 2021-10-12 | Resolved: 2023-07-18

## 2023-07-18 PROBLEM — Z92.21 STATUS POST CHEMOTHERAPY: Status: ACTIVE | Noted: 2020-09-25

## 2023-07-18 PROBLEM — Z91.199 NON COMPLIANCE WITH MEDICAL TREATMENT: Status: RESOLVED | Noted: 2023-07-07 | Resolved: 2023-07-18

## 2023-07-18 PROBLEM — Z86.79 HISTORY OF CORONARY ARTERY DISEASE: Chronic | Status: ACTIVE | Noted: 2018-10-30

## 2023-07-18 PROBLEM — I48.92 ATRIAL FLUTTER WITH RAPID VENTRICULAR RESPONSE (H): Status: RESOLVED | Noted: 2021-10-12 | Resolved: 2023-07-18

## 2023-07-18 PROBLEM — I48.91 ATRIAL FIBRILLATION WITH RVR (H): Status: RESOLVED | Noted: 2023-07-07 | Resolved: 2023-07-18

## 2023-07-18 PROBLEM — C54.1 ADENOCARCINOMA OF THE ENDOMETRIUM/UTERUS (H): Status: ACTIVE | Noted: 2018-10-09

## 2023-07-18 PROBLEM — I25.2 HISTORY OF NON-ST ELEVATION MYOCARDIAL INFARCTION (NSTEMI): Chronic | Status: ACTIVE | Noted: 2018-09-06

## 2023-07-24 ENCOUNTER — APPOINTMENT (OUTPATIENT)
Dept: GENERAL RADIOLOGY | Facility: HOSPITAL | Age: 73
End: 2023-07-24
Attending: STUDENT IN AN ORGANIZED HEALTH CARE EDUCATION/TRAINING PROGRAM
Payer: MEDICARE

## 2023-07-24 ENCOUNTER — HOSPITAL ENCOUNTER (EMERGENCY)
Facility: HOSPITAL | Age: 73
Discharge: HOME | End: 2023-07-24
Attending: STUDENT IN AN ORGANIZED HEALTH CARE EDUCATION/TRAINING PROGRAM | Admitting: STUDENT IN AN ORGANIZED HEALTH CARE EDUCATION/TRAINING PROGRAM
Payer: MEDICARE

## 2023-07-24 VITALS
HEIGHT: 59 IN | DIASTOLIC BLOOD PRESSURE: 99 MMHG | TEMPERATURE: 98 F | HEART RATE: 73 BPM | OXYGEN SATURATION: 97 % | WEIGHT: 216.38 LBS | RESPIRATION RATE: 26 BRPM | SYSTOLIC BLOOD PRESSURE: 157 MMHG | BODY MASS INDEX: 43.62 KG/M2

## 2023-07-24 DIAGNOSIS — I50.813 ACUTE ON CHRONIC RIGHT-SIDED CONGESTIVE HEART FAILURE (H): ICD-10-CM

## 2023-07-24 DIAGNOSIS — D72.829 LEUKOCYTOSIS, UNSPECIFIED TYPE: ICD-10-CM

## 2023-07-24 PROBLEM — I35.8 AORTIC VALVE SCLEROSIS: Chronic | Status: ACTIVE | Noted: 2023-07-24

## 2023-07-24 PROBLEM — I35.8 AORTIC VALVE SCLEROSIS: Status: ACTIVE | Noted: 2023-07-24

## 2023-07-24 LAB
ANION GAP SERPL CALCULATED.3IONS-SCNC: 11 MMOL/L (ref 7–15)
BUN SERPL-MCNC: 16.4 MG/DL (ref 8–23)
CALCIUM SERPL-MCNC: 9.5 MG/DL (ref 8.8–10.2)
CHLORIDE SERPL-SCNC: 102 MMOL/L (ref 98–107)
CREAT SERPL-MCNC: 0.86 MG/DL (ref 0.51–0.95)
DEPRECATED HCO3 PLAS-SCNC: 22 MMOL/L (ref 22–29)
ERYTHROCYTE [DISTWIDTH] IN BLOOD BY AUTOMATED COUNT: 14.6 % (ref 10–15)
GFR SERPL CREATININE-BSD FRML MDRD: 72 ML/MIN/1.73M2
GLUCOSE SERPL-MCNC: 104 MG/DL (ref 70–99)
HCT VFR BLD AUTO: 43.4 % (ref 35–47)
HGB BLD-MCNC: 13.9 G/DL (ref 11.7–15.7)
HOLD SPECIMEN: NORMAL
MAGNESIUM SERPL-MCNC: 2 MG/DL (ref 1.7–2.3)
MCH RBC QN AUTO: 29 PG (ref 26.5–33)
MCHC RBC AUTO-ENTMCNC: 32 G/DL (ref 31.5–36.5)
MCV RBC AUTO: 91 FL (ref 78–100)
NT-PROBNP SERPL-MCNC: 2324 PG/ML (ref 0–900)
PLATELET # BLD AUTO: 230 10E3/UL (ref 150–450)
POTASSIUM SERPL-SCNC: 4.5 MMOL/L (ref 3.4–5.3)
RBC # BLD AUTO: 4.79 10E6/UL (ref 3.8–5.2)
SODIUM SERPL-SCNC: 135 MMOL/L (ref 136–145)
TROPONIN T SERPL HS-MCNC: 17 NG/L
WBC # BLD AUTO: 11.3 10E3/UL (ref 4–11)

## 2023-07-24 PROCEDURE — 93010 ELECTROCARDIOGRAM REPORT: CPT | Performed by: HOSPITALIST

## 2023-07-24 PROCEDURE — 82310 ASSAY OF CALCIUM: CPT | Performed by: STUDENT IN AN ORGANIZED HEALTH CARE EDUCATION/TRAINING PROGRAM

## 2023-07-24 PROCEDURE — 250N000011 HC RX IP 250 OP 636: Mod: JZ | Performed by: STUDENT IN AN ORGANIZED HEALTH CARE EDUCATION/TRAINING PROGRAM

## 2023-07-24 PROCEDURE — 96374 THER/PROPH/DIAG INJ IV PUSH: CPT

## 2023-07-24 PROCEDURE — 71046 X-RAY EXAM CHEST 2 VIEWS: CPT

## 2023-07-24 PROCEDURE — 84484 ASSAY OF TROPONIN QUANT: CPT | Performed by: STUDENT IN AN ORGANIZED HEALTH CARE EDUCATION/TRAINING PROGRAM

## 2023-07-24 PROCEDURE — 36415 COLL VENOUS BLD VENIPUNCTURE: CPT | Performed by: STUDENT IN AN ORGANIZED HEALTH CARE EDUCATION/TRAINING PROGRAM

## 2023-07-24 PROCEDURE — 99284 EMERGENCY DEPT VISIT MOD MDM: CPT | Performed by: STUDENT IN AN ORGANIZED HEALTH CARE EDUCATION/TRAINING PROGRAM

## 2023-07-24 PROCEDURE — 83880 ASSAY OF NATRIURETIC PEPTIDE: CPT | Performed by: STUDENT IN AN ORGANIZED HEALTH CARE EDUCATION/TRAINING PROGRAM

## 2023-07-24 PROCEDURE — 93005 ELECTROCARDIOGRAM TRACING: CPT

## 2023-07-24 PROCEDURE — 99285 EMERGENCY DEPT VISIT HI MDM: CPT | Mod: 25

## 2023-07-24 PROCEDURE — 83735 ASSAY OF MAGNESIUM: CPT | Performed by: STUDENT IN AN ORGANIZED HEALTH CARE EDUCATION/TRAINING PROGRAM

## 2023-07-24 PROCEDURE — 85027 COMPLETE CBC AUTOMATED: CPT | Performed by: STUDENT IN AN ORGANIZED HEALTH CARE EDUCATION/TRAINING PROGRAM

## 2023-07-24 RX ORDER — FUROSEMIDE 10 MG/ML
40 INJECTION INTRAMUSCULAR; INTRAVENOUS ONCE
Status: COMPLETED | OUTPATIENT
Start: 2023-07-24 | End: 2023-07-24

## 2023-07-24 RX ORDER — FUROSEMIDE 20 MG
20 TABLET ORAL DAILY
Qty: 14 TABLET | Refills: 0 | Status: SHIPPED | OUTPATIENT
Start: 2023-07-24 | End: 2023-07-28

## 2023-07-24 RX ADMIN — FUROSEMIDE 40 MG: 10 INJECTION, SOLUTION INTRAVENOUS at 14:08

## 2023-07-24 ASSESSMENT — ACTIVITIES OF DAILY LIVING (ADL): ADLS_ACUITY_SCORE: 35

## 2023-07-24 NOTE — DISCHARGE INSTRUCTIONS
It was a pleasure taking care of you today. We saw you for shortness of breath. We recommend that you take a water pill for your symptoms.    Please follow up with your primary care provider later today for a recheck. Please return to the emergency department if you develop symptoms like worsening shortness of breath, chest pain, vomiting, fever, or if your symptoms persist or get worse. Take care. Feel better.

## 2023-07-24 NOTE — ED NOTES
Ambulated patient in hallway with oximeter.  Oxygen saturations remained @ 98% during ambulation trial on room air.

## 2023-07-24 NOTE — ED PROVIDER NOTES
History     Chief Complaint   Patient presents with    Shortness of Breath    Cough     HPI  Heather Rosas is a 72 year old female with schizophrenia, heart failure, adenocarcinoma of the uterus, atrial fibrillation, fibromyalgia, depressive disorder, hyponatremia, and a recent diagnosis of diastolic heart failure and recent admission presenting for concerns regarding shortness of breath. The patient reports that she feels like she has been short of breath for the past 2 days.  She reports she feels like she has gained weight since her last admission.  She is on medications for her heart, but denies any history of heart failure.  It is listed as a primary concern during her last hospitalization.    She denies any vomiting.  She reports she has not had any recent fevers or chills.  She does report that she has noticed some worsening leg swelling, but denies any orthopnea, or any trouble sleeping related to her shortness of breath.    She does report that she has been taking her medications.  She will orts that she has had heart failure in the past, but attributes this to her previous history of a CABG.  She denies any persistent history of heart failure, but does report that she has been taking her medications.    Allergies:  Allergies   Allergen Reactions    Fexofenadine Hcl Nausea and Vomiting     Allegra     Rosuvastatin Other (See Comments)     Dizziness - Crestor     Cats Other (See Comments)     Sneezing, runny nose    Codeine Sulfate Nausea and Vomiting and GI Disturbance     Problem List:    Patient Active Problem List    Diagnosis Date Noted    Aortic valve sclerosis (7/2023) 07/24/2023     Priority: Medium    History of endometrial cancer 07/07/2023     Priority: Medium    Diarrhea 07/07/2023     Priority: Medium    Coronary artery disease involving native coronary artery 10/12/2021     Priority: Medium    Status post chemotherapy 09/25/2020     Priority: Medium     Added automatically from request for  surgery [id number]      Atrial fibrillation (H) 11/26/2018     Priority: Medium    Hyponatremia 10/30/2018     Priority: Medium    Moderate mitral regurgitation 10/30/2018     Priority: Medium    Adenocarcinoma of the endometrium/uterus (H) 10/09/2018     Priority: Medium     Discovered on 02/18 biopsy. Pt lost to f/u      Chronic diastolic congestive heart failure (H) 09/20/2018     Priority: Medium    S/P CABG x 4 09/10/2018     Priority: Medium    History of non-ST elevation myocardial infarction (NSTEMI) 09/06/2018     Priority: Medium    Cerebrovascular accident (H) 05/14/2018     Priority: Medium    HTN (hypertension)      Priority: Medium    Major depressive disorder, recurrent episode, moderate (H) 01/01/2011     Priority: Medium    ANNA (generalized anxiety disorder) 01/01/2011     Priority: Medium    GERD (Gastroesophageal reflux disease) 01/01/2011     Priority: Medium    Schizophrenia (H) 01/01/2011     Priority: Medium    Seasonal allergic rhinitis 01/01/2011     Priority: Medium    Fibromyalgia 06/14/2004     Priority: Medium    Dyslipidemia 11/26/2003     Priority: Medium      Past Medical History:    Past Medical History:   Diagnosis Date    Acute diastolic congestive heart failure (H) 10/12/2021    Acute exacerbation of CHF (congestive heart failure) (H) 10/11/2021    Allergic rhinitis 01/01/2011    Anxiety 01/01/2011    Anxiety state, unspecified     Atrial fibrillation with RVR (H) 7/7/2023    Atrial flutter with rapid ventricular response (H) 10/12/2021    CVA (cerebral vascular accident) (H) 5/14/2018    Dyslipidemia 11/26/2003    fibromyalgia 06/14/2004    GERD, Esophageal reflux 01/01/2011    HTN (hypertension)     Major depression, recurrent (H) 1/1/2011    Metrorrhagia 2/18/2018    Non compliance with medical treatment 7/7/2023    Nonorganic sleep disorder, unspecified     Obesity 01/01/2011    Obesity (H) 1/1/2011    Schizophrenia (H) 01/01/2011    Toxic shock syndrome (H) 2006     Past  Surgical History:    Past Surgical History:   Procedure Laterality Date    ANGIOGRAM  02/2005    Normal     BIOPSY BREAST  1984    LT, normal    BYPASS GRAFT ARTERY CORONARY  09/10/2018    CARPAL TUNNEL RELEASE RT/LT  2005    RT, carpal tunnel    COLONOSCOPY  2011    Repeat in ten years     COLONOSCOPY  1996    COLONOSCOPY  2004    DILATION AND CURETTAGE, HYSTEROSCOPY, ABLATE ENDOMETRIUM, COMBINED N/A 2/19/2018    Procedure: COMBINED DILATION AND CURETTAGE, HYSTEROSCOPY, ABLATE ENDOMETRIUM;  HYSTEROSCOPY DILATION AND CURETTAGE, ENDOMETRIAL ABLATION;  Surgeon: Jose Nettles MD;  Location: [facility] OR    HYSTERECTOMY TOTAL ABD, NICK SALPINGO-OOPHORECTOMY, NODE DISSECTION, TUMOR DEBULKING, COMBINED  10/30/2018    INSERT PORT VASCULAR ACCESS N/A 12/11/2018    Procedure: PORT-A-CATH PLACEMENT;  Surgeon: Rafi Trinidad MD;  Location: [facility] OR    placement of central line  2005    REMOVE PORT VASCULAR ACCESS N/A 10/6/2020    Procedure: port a cath removal;  Surgeon: Rafi Trinidad MD;  Location: [facility] OR     Family History:    Family History   Problem Relation Age of Onset    C.A.D. Father 45        (cause of death)     Other - See Comments Father         rheumatic fever     Allergies Father     Cancer Sister 56        Esophageal to bone cancer    Gastrointestinal Disease Mother         GERD    Cancer Mother         pancreatic ca (cause of death) /liver ca    Breast Cancer Maternal Aunt     Breast Cancer Maternal Aunt     Colon Cancer Paternal Aunt         (cause of death)     Crohn's Disease Other     Depression Maternal Uncle     Depression Maternal Aunt     Diabetes Paternal Grandmother         type 2    Neurologic Disorder Brother         neuropathy    Cancer Brother 58        Tonsilular cancer/ lymph node in neck    Allergies Brother     Breast Cancer Cousin     Breast Cancer Cousin     Breast Cancer Cousin      Social History:  Marital Status:   [4]  Social History     Tobacco Use    Smoking status: Some Days      "Packs/day: 0.50     Types: Cigarettes     Start date: 1971     Last attempt to quit: 2013     Years since quittin.6    Smokeless tobacco: Never    Tobacco comments:     pt declined, stated she would use, \"the patch\"   Substance Use Topics    Alcohol use: No     Comment: rare    Drug use: No      Medications:    furosemide (LASIX) 20 MG tablet  diltiazem ER (DILT-XR) 120 MG 24 hr capsule  ibuprofen (ADVIL/MOTRIN) 200 MG tablet  metoprolol succinate ER (TOPROL XL) 100 MG 24 hr tablet    Review of Systems   See HPI    Physical Exam   BP: 167/90  Pulse: 74  Temp: 98  F (36.7  C)  Resp: 26  Height: 149.9 cm (4' 11\")  Weight: 98.1 kg (216 lb 6.1 oz)  SpO2: 93 %    Physical Exam   General: Nontoxic appearing.  Sitting upright in bed.  Tripoding intermittently.  Head: No trauma.  Eyes: No conjunctival pallor.  ENT: Normal posterior pharynx oropharynx.  Moist oral mucosa.  Neck: There is moderate JVD noted.  Cardiovascular: There is a soft murmur appreciated across the precordium.  Regular rate and rhythm otherwise.  Pulmonary: Unlabored respirations.  No crackles appreciated in the bilateral bases.  Normal expiratory phase without wheeze.  Abdomen: Soft, nontender.  Extremities: 3+ bilateral pitting edema that is symmetric.  Calves are symmetric and nontender.  Skin: Warm, dry, without diaphoresis.  Neuro: Alert and appropriate.    ED Course        ED Course as of 23 0716      1330 EKG 12 lead  Normal sinus rhythm rate of 70.  No ST elevations or depressions suggestive of ischemia.  There is a right bundle branch block with T wave inversions noted in anterior precordial leads.  No STEMI.   1420 Chest XR,  PA & LAT  IMPRESSION: Interval improvement in the appearance of the chest.   1445 Troponin T, High Sensitivity(!): 17  Chronic and at baseline.   1450 Patient's labs are at baseline.  Reevaluated patient.  Discussed with her the weight gain that was appreciated on chart review.  " Discussed with her the importance of diuresis.  She reports that she had an appointment at 11:30 AM, but the clinic was contacted, and the clinic appointment is at 330 today.  Patient to discharge to clinic.  Discussed with the clinic doctor who reports that the patient has missed her previous appointment on 7/18.  Discussed further the plan for diuresis, will plan for 14 days of 20 mg Lasix.  Patient is currently on metoprolol, and other cardioprotective medications.  She not requiring any oxygen with ambulation, and ambulates with a steady gait without hypoxia.  Return precautions reviewed in detail.  Patient provided information regarding heart failure via AVS.  All questions and concerns otherwise addressed and answered.        Results for orders placed or performed during the hospital encounter of 07/24/23 (from the past 24 hour(s))   Extra Tube ([place] Draw)    Narrative    The following orders were created for panel order Extra Tube ([place] Draw).  Procedure                               Abnormality         Status                     ---------                               -----------         ------                     Extra Urine Collection[[id number]]                           Final result                 Please view results for these tests on the individual orders.   Extra Urine Collection   Result Value Ref Range    Hold Specimen [facility]    Basic metabolic panel   Result Value Ref Range    Sodium 135 (L) 136 - 145 mmol/L    Potassium 4.5 3.4 - 5.3 mmol/L    Chloride 102 98 - 107 mmol/L    Carbon Dioxide (CO2) 22 22 - 29 mmol/L    Anion Gap 11 7 - 15 mmol/L    Urea Nitrogen 16.4 8.0 - 23.0 mg/dL    Creatinine 0.86 0.51 - 0.95 mg/dL    Calcium 9.5 8.8 - 10.2 mg/dL    Glucose 104 (H) 70 - 99 mg/dL    GFR Estimate 72 >60 mL/min/1.73m2   NT pro BNP   Result Value Ref Range    N terminal Pro BNP Inpatient 2,324 (H) 0 - 900 pg/mL   Troponin T, High Sensitivity   Result Value Ref Range    Troponin T, High  Sensitivity 17 (H) <=14 ng/L   CBC with platelets   Result Value Ref Range    WBC Count 11.3 (H) 4.0 - 11.0 10e3/uL    RBC Count 4.79 3.80 - 5.20 10e6/uL    Hemoglobin 13.9 11.7 - 15.7 g/dL    Hematocrit 43.4 35.0 - 47.0 %    MCV 91 78 - 100 fL    MCH 29.0 26.5 - 33.0 pg    MCHC 32.0 31.5 - 36.5 g/dL    RDW 14.6 10.0 - 15.0 %    Platelet Count 230 150 - 450 10e3/uL   Extra Tube    Narrative    The following orders were created for panel order Extra Tube.  Procedure                               Abnormality         Status                     ---------                               -----------         ------                     Extra Blue Top Tube[[id number]]                              Final result               Extra Red Top Tube[[id number]]                               Final result               Extra Heparinized Syringe[[id number]]                        Final result                 Please view results for these tests on the individual orders.   Extra Blue Top Tube   Result Value Ref Range    Hold Specimen [facility]    Extra Red Top Tube   Result Value Ref Range    Hold Specimen [facility]    Extra Heparinized Syringe   Result Value Ref Range    Hold Specimen [facility]    Magnesium   Result Value Ref Range    Magnesium 2.0 1.7 - 2.3 mg/dL   Chest XR,  PA & LAT    Narrative    PROCEDURE: XR CHEST 2 VIEWS 7/24/2023 1:49 PM    HISTORY: shortness of breath with recent weight gain and leg swelling.  History of heart failure    COMPARISONS: 7/7/2023.    TECHNIQUE: 2 views.    FINDINGS: There has been a median sternotomy and CABG. Heart is  enlarged but is similar to the prior exam. There has been some  clearing of previously seen right basilar density. Some minimal linear  density persists. No confluent infiltrate is now seen and there is no  pleural effusion.         Impression    IMPRESSION: Interval improvement in the appearance of the chest.    MIS BRADY MD         SYSTEM ID:  [id number]       Medications   furosemide (LASIX)  injection 40 mg (40 mg Intravenous $Given 7/24/23 8865)       Assessments & Plan (with Medical Decision Making)     I have reviewed the nursing notes.    I have reviewed the findings, diagnosis, plan and need for follow up with the patient.    Medical Decision Making  The patient's presentation was of moderate complexity (an acute illness with systemic symptoms).    The patient's evaluation involved:  an assessment requiring an independent historian (see separate area of note for details)  ordering and/or review of 3+ test(s) in this encounter (see separate area of note for details)    The patient's management necessitated moderate risk (prescription drug management including medications given in the ED).    In summary, this is a 72-year-old female who is presenting for concerns regarding shortness of breath.  She is hypervolemic on exam.  She is not requiring oxygen on initial evaluation.  Her vitals are reassuring.  She is not very hypertensive at the time of initial evaluation.    We will plan for the remainder of a cardiac work-up, although the concern for acute decompensated heart failure.  Her weight in the emergency department today is 98 kg.  Her dry weight upon discharge during her previous admission is 93.6 kg.    We will plan for BMP, BNP, CBC, troponin, ECG, and chest x-ray to further evaluate for any pulmonary edema, although the pretest probability is quite high for heart failure, we will plan for a dose of diuretics in the emergency department given that the patient does not have any hypertension, and does not require any afterload reduction.      I do suspect that the patient may be able to increase her diuretic and discharge from the emergency department, pending shared decision making, and further education of the patient regarding her heart failure diagnosis.  Additionally, electrolytes would need to be stable.  Discussed with the patient this plan and she was agreeable.  All questions and  concerns otherwise addressed and answered.    Discharge Medication List as of 7/24/2023  2:52 PM        Final diagnoses:   Acute on chronic right-sided congestive heart failure (H)   Leukocytosis, unspecified type     7/24/2023   [facility]     Johny Constantino MD  07/24/23 1356       Johny Constantino MD  07/25/23 0740

## 2023-07-24 NOTE — ED TRIAGE NOTES
Patient presents to the emergency room with complaints of shortness of breath. Increased work of breathing noted in triage. Audible wheezing. Pt reports increase in SOB and cough over the past 24 hours.

## 2023-07-25 ENCOUNTER — TELEPHONE (OUTPATIENT)
Dept: FAMILY MEDICINE | Facility: OTHER | Age: 73
End: 2023-07-25

## 2023-07-25 NOTE — TELEPHONE ENCOUNTER
Patient called today wanting to move her Establish Care appointment up. When speaking to patient she states she is having a lot of trouble breathing, that she had to go by ambulance yesterday as she had trouble breathing then and they let her go home. Advised patient to call 911, that I would call for her, and she declined. Patient states she may wait til later but I stressed how important it was to call 911 immediately.

## 2023-07-25 NOTE — TELEPHONE ENCOUNTER
Patient states her breathing has improved since discharge from the ED yesterday.  States lasix has been effective to shed excess fluid weight reported yesterday.  States her purpose for the call earlier was to ask for an earlier ivet't to est care with Dr. Huffman.  States she will be fine to wait until ivet't Thursday  Has adult son very close by if she needs help with anything.  Recommended ED/UC with any acute/rapid decline in condition.  Call back to the clinic with any further questions/concerns  Patient relayed understanding  No further concerns at calls end.

## 2023-07-27 PROBLEM — Z86.73 HISTORY OF CVA (CEREBROVASCULAR ACCIDENT): Status: ACTIVE | Noted: 2023-07-27

## 2023-07-27 PROBLEM — Z86.73 HISTORY OF CVA (CEREBROVASCULAR ACCIDENT): Chronic | Status: ACTIVE | Noted: 2023-07-27

## 2023-07-27 PROBLEM — I63.9 CEREBROVASCULAR ACCIDENT (H): Status: RESOLVED | Noted: 2018-05-14 | Resolved: 2023-07-27

## 2023-07-28 ENCOUNTER — TELEPHONE (OUTPATIENT)
Dept: FAMILY MEDICINE | Facility: OTHER | Age: 73
End: 2023-07-28

## 2023-07-28 DIAGNOSIS — I48.91 ATRIAL FIBRILLATION WITH RVR (H): ICD-10-CM

## 2023-07-28 RX ORDER — DILTIAZEM HYDROCHLORIDE 120 MG/1
120 CAPSULE, EXTENDED RELEASE ORAL DAILY
Qty: 30 CAPSULE | Refills: 0 | Status: SHIPPED | OUTPATIENT
Start: 2023-07-28 | End: 2023-08-03

## 2023-07-28 RX ORDER — METOPROLOL SUCCINATE 100 MG/1
100 TABLET, EXTENDED RELEASE ORAL DAILY
Qty: 30 TABLET | Refills: 0 | Status: SHIPPED | OUTPATIENT
Start: 2023-07-28 | End: 2023-08-03

## 2023-07-28 RX ORDER — FUROSEMIDE 20 MG
20 TABLET ORAL DAILY
Qty: 14 TABLET | Refills: 0 | Status: SHIPPED | OUTPATIENT
Start: 2023-07-28 | End: 2023-08-03 | Stop reason: ALTCHOICE

## 2023-07-28 NOTE — TELEPHONE ENCOUNTER
Spoke with patient regarding refills. States that she is out of Lasix, but has approx. 4-5 pills ofToprolol, and diltiazem. Mentioned to her that she has an appointment on 08/03/2023 and she should have enough until she is seen by provider. Told her PCP will discuss medication at that time. She understands and aware.

## 2023-07-28 NOTE — TELEPHONE ENCOUNTER
----- Message from Nicolette Huffman MD sent at 7/27/2023 12:03 PM CDT -----  Can we please reach out to reschedule her follow up and establish care? Thank you! She missed her appointment today. Should be set up in the next week.

## 2023-07-29 ENCOUNTER — HOSPITAL ENCOUNTER (EMERGENCY)
Facility: HOSPITAL | Age: 73
Discharge: ANOTHER HOSPITAL | End: 2023-07-29
Attending: EMERGENCY MEDICINE | Admitting: EMERGENCY MEDICINE
Payer: MEDICARE

## 2023-07-29 ENCOUNTER — APPOINTMENT (OUTPATIENT)
Dept: GENERAL RADIOLOGY | Facility: HOSPITAL | Age: 73
End: 2023-07-29
Attending: EMERGENCY MEDICINE
Payer: MEDICARE

## 2023-07-29 VITALS
RESPIRATION RATE: 18 BRPM | BODY MASS INDEX: 45.73 KG/M2 | WEIGHT: 226.85 LBS | DIASTOLIC BLOOD PRESSURE: 80 MMHG | TEMPERATURE: 97.8 F | OXYGEN SATURATION: 96 % | SYSTOLIC BLOOD PRESSURE: 143 MMHG | HEART RATE: 78 BPM | HEIGHT: 59 IN

## 2023-07-29 DIAGNOSIS — I50.9 ACUTE ON CHRONIC CONGESTIVE HEART FAILURE, UNSPECIFIED HEART FAILURE TYPE (H): ICD-10-CM

## 2023-07-29 LAB
ALBUMIN SERPL BCG-MCNC: 3.7 G/DL (ref 3.5–5.2)
ALBUMIN UR-MCNC: 70 MG/DL
ALP SERPL-CCNC: 146 U/L (ref 35–104)
ALT SERPL W P-5'-P-CCNC: 18 U/L (ref 0–50)
ANION GAP SERPL CALCULATED.3IONS-SCNC: 9 MMOL/L (ref 7–15)
APPEARANCE UR: CLEAR
AST SERPL W P-5'-P-CCNC: 19 U/L (ref 0–45)
BACTERIA #/AREA URNS HPF: ABNORMAL /HPF
BASE EXCESS BLDV CALC-SCNC: -1.8 MMOL/L (ref -7.7–1.9)
BASOPHILS # BLD AUTO: 0.1 10E3/UL (ref 0–0.2)
BASOPHILS NFR BLD AUTO: 1 %
BILIRUB SERPL-MCNC: 0.4 MG/DL
BILIRUB UR QL STRIP: NEGATIVE
BUN SERPL-MCNC: 20.9 MG/DL (ref 8–23)
CALCIUM SERPL-MCNC: 9.3 MG/DL (ref 8.8–10.2)
CHLORIDE SERPL-SCNC: 102 MMOL/L (ref 98–107)
COLOR UR AUTO: ABNORMAL
CREAT SERPL-MCNC: 0.94 MG/DL (ref 0.51–0.95)
DEPRECATED HCO3 PLAS-SCNC: 23 MMOL/L (ref 22–29)
EOSINOPHIL # BLD AUTO: 0.3 10E3/UL (ref 0–0.7)
EOSINOPHIL NFR BLD AUTO: 3 %
ERYTHROCYTE [DISTWIDTH] IN BLOOD BY AUTOMATED COUNT: 14.6 % (ref 10–15)
GFR SERPL CREATININE-BSD FRML MDRD: 65 ML/MIN/1.73M2
GLUCOSE SERPL-MCNC: 98 MG/DL (ref 70–99)
GLUCOSE UR STRIP-MCNC: NEGATIVE MG/DL
HCO3 BLDV-SCNC: 25 MMOL/L (ref 21–28)
HCT VFR BLD AUTO: 42.6 % (ref 35–47)
HGB BLD-MCNC: 13.8 G/DL (ref 11.7–15.7)
HGB UR QL STRIP: NEGATIVE
HYALINE CASTS: 1 /LPF
IMM GRANULOCYTES # BLD: 0.1 10E3/UL
IMM GRANULOCYTES NFR BLD: 1 %
INR PPP: 1.04 (ref 0.85–1.15)
KETONES UR STRIP-MCNC: NEGATIVE MG/DL
LEUKOCYTE ESTERASE UR QL STRIP: NEGATIVE
LYMPHOCYTES # BLD AUTO: 0.8 10E3/UL (ref 0.8–5.3)
LYMPHOCYTES NFR BLD AUTO: 8 %
MAGNESIUM SERPL-MCNC: 1.8 MG/DL (ref 1.7–2.3)
MCH RBC QN AUTO: 28.9 PG (ref 26.5–33)
MCHC RBC AUTO-ENTMCNC: 32.4 G/DL (ref 31.5–36.5)
MCV RBC AUTO: 89 FL (ref 78–100)
MONOCYTES # BLD AUTO: 1.1 10E3/UL (ref 0–1.3)
MONOCYTES NFR BLD AUTO: 10 %
NEUTROPHILS # BLD AUTO: 8.7 10E3/UL (ref 1.6–8.3)
NEUTROPHILS NFR BLD AUTO: 77 %
NITRATE UR QL: NEGATIVE
NRBC # BLD AUTO: 0 10E3/UL
NRBC BLD AUTO-RTO: 0 /100
NT-PROBNP SERPL-MCNC: 3421 PG/ML (ref 0–900)
O2/TOTAL GAS SETTING VFR VENT: 21 %
OXYHGB MFR BLDV: 57 % (ref 70–75)
PCO2 BLDV: 51 MM HG (ref 40–50)
PH BLDV: 7.3 [PH] (ref 7.32–7.43)
PH UR STRIP: 6 [PH] (ref 4.7–8)
PLATELET # BLD AUTO: 240 10E3/UL (ref 150–450)
PO2 BLDV: 34 MM HG (ref 25–47)
POTASSIUM SERPL-SCNC: 4 MMOL/L (ref 3.4–5.3)
PROT SERPL-MCNC: 6.6 G/DL (ref 6.4–8.3)
RBC # BLD AUTO: 4.77 10E6/UL (ref 3.8–5.2)
RBC URINE: 0 /HPF
SODIUM SERPL-SCNC: 134 MMOL/L (ref 136–145)
SP GR UR STRIP: 1.01 (ref 1–1.03)
SQUAMOUS EPITHELIAL: 2 /HPF
TROPONIN T SERPL HS-MCNC: 18 NG/L
UROBILINOGEN UR STRIP-MCNC: NORMAL MG/DL
WBC # BLD AUTO: 11.1 10E3/UL (ref 4–11)
WBC URINE: 1 /HPF

## 2023-07-29 PROCEDURE — 83880 ASSAY OF NATRIURETIC PEPTIDE: CPT | Performed by: EMERGENCY MEDICINE

## 2023-07-29 PROCEDURE — 71045 X-RAY EXAM CHEST 1 VIEW: CPT

## 2023-07-29 PROCEDURE — 96374 THER/PROPH/DIAG INJ IV PUSH: CPT

## 2023-07-29 PROCEDURE — 83735 ASSAY OF MAGNESIUM: CPT | Performed by: EMERGENCY MEDICINE

## 2023-07-29 PROCEDURE — 999N000157 HC STATISTIC RCP TIME EA 10 MIN

## 2023-07-29 PROCEDURE — 93005 ELECTROCARDIOGRAM TRACING: CPT

## 2023-07-29 PROCEDURE — 250N000009 HC RX 250: Performed by: EMERGENCY MEDICINE

## 2023-07-29 PROCEDURE — 80053 COMPREHEN METABOLIC PANEL: CPT | Performed by: EMERGENCY MEDICINE

## 2023-07-29 PROCEDURE — 82805 BLOOD GASES W/O2 SATURATION: CPT | Performed by: EMERGENCY MEDICINE

## 2023-07-29 PROCEDURE — 85610 PROTHROMBIN TIME: CPT | Performed by: EMERGENCY MEDICINE

## 2023-07-29 PROCEDURE — 84484 ASSAY OF TROPONIN QUANT: CPT | Performed by: EMERGENCY MEDICINE

## 2023-07-29 PROCEDURE — 93010 ELECTROCARDIOGRAM REPORT: CPT | Performed by: HOSPITALIST

## 2023-07-29 PROCEDURE — 99285 EMERGENCY DEPT VISIT HI MDM: CPT | Mod: 25

## 2023-07-29 PROCEDURE — 94640 AIRWAY INHALATION TREATMENT: CPT

## 2023-07-29 PROCEDURE — 85025 COMPLETE CBC W/AUTO DIFF WBC: CPT | Performed by: EMERGENCY MEDICINE

## 2023-07-29 PROCEDURE — 36415 COLL VENOUS BLD VENIPUNCTURE: CPT | Performed by: EMERGENCY MEDICINE

## 2023-07-29 PROCEDURE — 250N000011 HC RX IP 250 OP 636: Mod: JZ | Performed by: EMERGENCY MEDICINE

## 2023-07-29 PROCEDURE — 81003 URINALYSIS AUTO W/O SCOPE: CPT | Performed by: EMERGENCY MEDICINE

## 2023-07-29 PROCEDURE — 99285 EMERGENCY DEPT VISIT HI MDM: CPT | Performed by: EMERGENCY MEDICINE

## 2023-07-29 RX ORDER — FUROSEMIDE 10 MG/ML
40 INJECTION INTRAMUSCULAR; INTRAVENOUS ONCE
Status: COMPLETED | OUTPATIENT
Start: 2023-07-29 | End: 2023-07-29

## 2023-07-29 RX ORDER — IPRATROPIUM BROMIDE AND ALBUTEROL SULFATE 2.5; .5 MG/3ML; MG/3ML
3 SOLUTION RESPIRATORY (INHALATION) ONCE
Status: COMPLETED | OUTPATIENT
Start: 2023-07-29 | End: 2023-07-29

## 2023-07-29 RX ADMIN — IPRATROPIUM BROMIDE AND ALBUTEROL SULFATE 3 ML: .5; 3 SOLUTION RESPIRATORY (INHALATION) at 08:43

## 2023-07-29 RX ADMIN — FUROSEMIDE 40 MG: 10 INJECTION, SOLUTION INTRAVENOUS at 09:30

## 2023-07-29 ASSESSMENT — ACTIVITIES OF DAILY LIVING (ADL): ADLS_ACUITY_SCORE: 35

## 2023-07-29 ASSESSMENT — ENCOUNTER SYMPTOMS
SHORTNESS OF BREATH: 1
MUSCULOSKELETAL NEGATIVE: 1
HEMATOLOGIC/LYMPHATIC NEGATIVE: 1
EYES NEGATIVE: 1
COUGH: 1
GASTROINTESTINAL NEGATIVE: 1
CONSTITUTIONAL NEGATIVE: 1
NEUROLOGICAL NEGATIVE: 1

## 2023-07-29 NOTE — ED NOTES
Patient is transferring to [place] for further treatment and evaluation of CHF.  Patient is being transported by [facility].  Report called to  nurse will call back patient in route.

## 2023-07-29 NOTE — ED PROVIDER NOTES
History     Chief Complaint   Patient presents with    Shortness of Breath    Leg Swelling     HPI  Heather Rosas is a 72 year old female who is transported to the emergency department by EMS complaining of shortness of breath.  Patient has had gradually worsening shortness of breath for about 3 weeks.  She is also had worsening edema in her lower extremities for about 4 weeks.  Patient does have a history of congestive heart failure.  She states at 4:00 this morning she became quite short of breath.  She states she has a mild nonproductive cough.  She currently does not have pain or pressure in her chest.  She denies any abdominal pain.  She has not had any nausea or vomiting.  She states she has been taking Lasix for about 3 weeks.  Patient does have a history of coronary artery disease.  She states she had 4 vessel bypass at [facility] in the past.    Allergies:  Allergies   Allergen Reactions    Fexofenadine Hcl Nausea and Vomiting     Allegra     Rosuvastatin Other (See Comments)     Dizziness - Crestor     Cats Other (See Comments)     Sneezing, runny nose    Codeine Sulfate Nausea and Vomiting and GI Disturbance       Problem List:    Patient Active Problem List    Diagnosis Date Noted    History of CVA (cerebrovascular accident) 07/27/2023     Priority: Medium    Aortic valve sclerosis (7/2023) 07/24/2023     Priority: Medium    History of endometrial cancer 07/07/2023     Priority: Medium    Diarrhea 07/07/2023     Priority: Medium    Coronary artery disease involving native coronary artery 10/12/2021     Priority: Medium     CAD S/P NSTEMI with CABG x 4 vessel 9/10/18 Dr. Oconnell.       Status post chemotherapy 09/25/2020     Priority: Medium     Added automatically from request for surgery [id number]      Atrial fibrillation (H) 11/26/2018     Priority: Medium    Hyponatremia 10/30/2018     Priority: Medium    Moderate mitral regurgitation 10/30/2018     Priority: Medium    Adenocarcinoma of the  endometrium/uterus (H) 10/09/2018     Priority: Medium     Discovered on 02/18 biopsy. S/p resection and chemo. Pt lost to follow up.   High-grade endometrial adenocarcinoma of the uterus with staging consistent with pathologic T1b N0 I plus or stage 1B with isolated tumor cells involving one of the periaortic lymph nodes.       Chronic diastolic congestive heart failure (H) 09/20/2018     Priority: Medium    S/P CABG x 4 09/10/2018     Priority: Medium    History of non-ST elevation myocardial infarction (NSTEMI) 09/06/2018     Priority: Medium    HTN (hypertension)      Priority: Medium    Major depressive disorder, recurrent episode, moderate (H) 01/01/2011     Priority: Medium    ANNA (generalized anxiety disorder) 01/01/2011     Priority: Medium    GERD (Gastroesophageal reflux disease) 01/01/2011     Priority: Medium    Schizophrenia (H) 01/01/2011     Priority: Medium    Seasonal allergic rhinitis 01/01/2011     Priority: Medium    Fibromyalgia 06/14/2004     Priority: Medium    Dyslipidemia 11/26/2003     Priority: Medium        Past Medical History:    Past Medical History:   Diagnosis Date    Acute diastolic congestive heart failure (H) 10/12/2021    Acute exacerbation of CHF (congestive heart failure) (H) 10/11/2021    Allergic rhinitis 01/01/2011    Anxiety 01/01/2011    Anxiety state, unspecified     Atrial fibrillation with RVR (H) 7/7/2023    Atrial flutter with rapid ventricular response (H) 10/12/2021    CVA (cerebral vascular accident) (H) 5/14/2018    Dyslipidemia 11/26/2003    fibromyalgia 06/14/2004    GERD, Esophageal reflux 01/01/2011    HTN (hypertension)     Major depression, recurrent (H) 1/1/2011    Metrorrhagia 2/18/2018    Non compliance with medical treatment 7/7/2023    Nonorganic sleep disorder, unspecified     Obesity 01/01/2011    Obesity (H) 1/1/2011    Schizophrenia (H) 01/01/2011    Toxic shock syndrome (H) 2006       Past Surgical History:    Past Surgical History:   Procedure  Laterality Date    ANGIOGRAM  02/2005    Normal     BIOPSY BREAST  1984    LT, normal    BYPASS GRAFT ARTERY CORONARY  09/10/2018    CARPAL TUNNEL RELEASE RT/LT  2005    RT, carpal tunnel    COLONOSCOPY  2011    Repeat in ten years     COLONOSCOPY  1996    COLONOSCOPY  2004    DILATION AND CURETTAGE, HYSTEROSCOPY, ABLATE ENDOMETRIUM, COMBINED N/A 2/19/2018    Procedure: COMBINED DILATION AND CURETTAGE, HYSTEROSCOPY, ABLATE ENDOMETRIUM;  HYSTEROSCOPY DILATION AND CURETTAGE, ENDOMETRIAL ABLATION;  Surgeon: Jose Nettles MD;  Location: [facility] OR    HYSTERECTOMY TOTAL ABD, NICK SALPINGO-OOPHORECTOMY, NODE DISSECTION, TUMOR DEBULKING, COMBINED  10/30/2018    INSERT PORT VASCULAR ACCESS N/A 12/11/2018    Procedure: PORT-A-CATH PLACEMENT;  Surgeon: Rafi Trinidad MD;  Location: [facility] OR    placement of central line  2005    REMOVE PORT VASCULAR ACCESS N/A 10/6/2020    Procedure: port a cath removal;  Surgeon: Rafi Trinidad MD;  Location: [facility] OR       Family History:    Family History   Problem Relation Age of Onset    C.A.D. Father 45        (cause of death)     Other - See Comments Father         rheumatic fever     Allergies Father     Cancer Sister 56        Esophageal to bone cancer    Gastrointestinal Disease Mother         GERD    Cancer Mother         pancreatic ca (cause of death) /liver ca    Breast Cancer Maternal Aunt     Breast Cancer Maternal Aunt     Colon Cancer Paternal Aunt         (cause of death)     Crohn's Disease Other     Depression Maternal Uncle     Depression Maternal Aunt     Diabetes Paternal Grandmother         type 2    Neurologic Disorder Brother         neuropathy    Cancer Brother 58        Tonsilular cancer/ lymph node in neck    Allergies Brother     Breast Cancer Cousin     Breast Cancer Cousin     Breast Cancer Cousin        Social History:  Marital Status:   [4]  Social History     Tobacco Use    Smoking status: Some Days     Packs/day: 0.50     Types: Cigarettes     Start date:  "1971     Last attempt to quit: 2013     Years since quittin.6    Smokeless tobacco: Never    Tobacco comments:     pt declined, stated she would use, \"the patch\"   Substance Use Topics    Alcohol use: No     Comment: rare    Drug use: No        Medications:    diltiazem ER (DILT-XR) 120 MG 24 hr capsule  furosemide (LASIX) 20 MG tablet  ibuprofen (ADVIL/MOTRIN) 200 MG tablet  metoprolol succinate ER (TOPROL XL) 100 MG 24 hr tablet          Review of Systems   Constitutional: Negative.    HENT: Negative.     Eyes: Negative.    Respiratory:  Positive for cough and shortness of breath.    Cardiovascular:  Positive for leg swelling.   Gastrointestinal: Negative.    Genitourinary: Negative.    Musculoskeletal: Negative.    Neurological: Negative.    Hematological: Negative.    All other systems reviewed and found unremarkable    Physical Exam   BP: 144/87  Pulse: 75  Temp: 97.8  F (36.6  C)  Resp: 22  Height: 149.9 cm (4' 11\")  Weight: 102.9 kg (226 lb 13.7 oz)  SpO2: 99 %      Physical Exam 72-year-old female who is awake alert oriented person place and time very pleasant and cooperative with my exam.  She is able to speak in complete sentences.  She is mildly dyspneic at rest however.  HEENT normocephalic extraocular muscles intact pupils equally round and active light.  Tongue midline palate intact oropharynx is clear.  Neck is supple his range of motion without pain.  Pulm exam patient is mild end expiratory wheeze.  She also has bibasilar inspiratory crackles.  Heart maintains regular rate and rhythm S1 and S2 sounds are appreciated.  The abdomen is soft its nontender no rebound or involuntary guarding.  Extremities a full range of motion 5/5 strength.  Patient has 3+ edema in her lower extremities.  She also has stasis dermatitis.  Neurologic exam no facial asymmetry and no focal cranial nerve deficit appreciated.    ED Course              ED Course as of 23 1001   Sat 2023   0945 X-rays " "interpreted by V rad radiology as \"cardiomegaly and vascular prominence may represent interstitial edema or mild congestive heart failure.  No consolidation.\"  There are no inpatient beds available here at [facility].  I discussed the case with Dr. Yu the on-call hospitalist at [facility].  The patient will be waitlisted there.  They will reach out to their facilities and [facility] to see if there is bed availability.  I also discussed the case with intake personnel at [facility] and the patient will also be waitlisted there.   0953 Bryan the case with Randi Wilcox who is the provider at [facility] who very graciously agreed to accept the patient in transfer.  We will arrange ALS transport.              EKG is obtained and interpreted by myself.  Shows atrial fibrillation with a ventricular sponsor of 74 bpm.  QRS durations 144 ms.  Corrected QT 4 and 70 ms.  There is a right bundle branch block configuration which does not appear to be acute change when compared to previous EKGs.  Other ST segment changes that 1 would anticipate with right bundle branch block.  There does not appear to be evidence of acute ischemic change               Results for orders placed or performed during the hospital encounter of 07/29/23 (from the past 24 hour(s))   CBC with platelets differential    Narrative    The following orders were created for panel order CBC with platelets differential.  Procedure                               Abnormality         Status                     ---------                               -----------         ------                     CBC with platelets and d...[[id number]]  Abnormal            Final result                 Please view results for these tests on the individual orders.   INR   Result Value Ref Range    INR 1.04 0.85 - 1.15   Comprehensive metabolic panel   Result Value Ref Range    Sodium 134 (L) 136 - 145 mmol/L    Potassium 4.0 3.4 - 5.3 mmol/L "    Chloride 102 98 - 107 mmol/L    Carbon Dioxide (CO2) 23 22 - 29 mmol/L    Anion Gap 9 7 - 15 mmol/L    Urea Nitrogen 20.9 8.0 - 23.0 mg/dL    Creatinine 0.94 0.51 - 0.95 mg/dL    Calcium 9.3 8.8 - 10.2 mg/dL    Glucose 98 70 - 99 mg/dL    Alkaline Phosphatase 146 (H) 35 - 104 U/L    AST 19 0 - 45 U/L    ALT 18 0 - 50 U/L    Protein Total 6.6 6.4 - 8.3 g/dL    Albumin 3.7 3.5 - 5.2 g/dL    Bilirubin Total 0.4 <=1.2 mg/dL    GFR Estimate 65 >60 mL/min/1.73m2   Troponin T, High Sensitivity   Result Value Ref Range    Troponin T, High Sensitivity 18 (H) <=14 ng/L   Magnesium   Result Value Ref Range    Magnesium 1.8 1.7 - 2.3 mg/dL   Blood gas venous and oxyhgb   Result Value Ref Range    pH Venous 7.30 (L) 7.32 - 7.43    pCO2 Venous 51 (H) 40 - 50 mm Hg    pO2 Venous 34 25 - 47 mm Hg    Bicarbonate Venous 25 21 - 28 mmol/L    FIO2 21     Oxyhemoglobin Venous 57 (L) 70 - 75 %    Base Excess/Deficit (+/-) -1.8 -7.7 - 1.9 mmol/L   Nt probnp inpatient (BNP)   Result Value Ref Range    N terminal Pro BNP Inpatient 3,421 (H) 0 - 900 pg/mL   CBC with platelets and differential   Result Value Ref Range    WBC Count 11.1 (H) 4.0 - 11.0 10e3/uL    RBC Count 4.77 3.80 - 5.20 10e6/uL    Hemoglobin 13.8 11.7 - 15.7 g/dL    Hematocrit 42.6 35.0 - 47.0 %    MCV 89 78 - 100 fL    MCH 28.9 26.5 - 33.0 pg    MCHC 32.4 31.5 - 36.5 g/dL    RDW 14.6 10.0 - 15.0 %    Platelet Count 240 150 - 450 10e3/uL    % Neutrophils 77 %    % Lymphocytes 8 %    % Monocytes 10 %    % Eosinophils 3 %    % Basophils 1 %    % Immature Granulocytes 1 %    NRBCs per 100 WBC 0 <1 /100    Absolute Neutrophils 8.7 (H) 1.6 - 8.3 10e3/uL    Absolute Lymphocytes 0.8 0.8 - 5.3 10e3/uL    Absolute Monocytes 1.1 0.0 - 1.3 10e3/uL    Absolute Eosinophils 0.3 0.0 - 0.7 10e3/uL    Absolute Basophils 0.1 0.0 - 0.2 10e3/uL    Absolute Immature Granulocytes 0.1 <=0.4 10e3/uL    Absolute NRBCs 0.0 10e3/uL   UA with Microscopic reflex to Culture    Specimen: Urine,  Midstream   Result Value Ref Range    Color Urine Light Yellow Colorless, Straw, Light Yellow, Yellow    Appearance Urine Clear Clear    Glucose Urine Negative Negative mg/dL    Bilirubin Urine Negative Negative    Ketones Urine Negative Negative mg/dL    Specific Gravity Urine 1.007 1.003 - 1.035    Blood Urine Negative Negative    pH Urine 6.0 4.7 - 8.0    Protein Albumin Urine 70 (A) Negative mg/dL    Urobilinogen Urine Normal Normal, 2.0 mg/dL    Nitrite Urine Negative Negative    Leukocyte Esterase Urine Negative Negative    Bacteria Urine Moderate (A) None Seen /HPF    RBC Urine 0 <=2 /HPF    WBC Urine 1 <=5 /HPF    Squamous Epithelials Urine 2 (H) <=1 /HPF    Hyaline Casts Urine 1 <=2 /LPF    Narrative    Urine Culture not indicated       Medications   ipratropium - albuterol 0.5 mg/2.5 mg/3 mL (DUONEB) neb solution 3 mL (3 mLs Nebulization $Given 7/29/23 0875)   furosemide (LASIX) injection 40 mg (40 mg Intravenous $Given 7/29/23 0922)       Assessments & Plan (with Medical Decision Making)     I have reviewed the nursing notes.    The plan is to transfer the patient to [facility] for admission      Medical Decision Making  The patient's presentation was of moderate complexity (an acute illness with systemic symptoms).    The patient's evaluation involved:  ordering and/or review of 3+ test(s) in this encounter (see separate area of note for details)    The patient's management necessitated high risk (a decision regarding hospitalization).        New Prescriptions    No medications on file       Final diagnoses:   Acute on chronic congestive heart failure, unspecified heart failure type (H)       7/29/2023   [facility]       Cristino Buck MD  07/29/23 1001

## 2023-07-29 NOTE — ED TRIAGE NOTES
PATIENT ARRIVED BY [facility] WITH C/O sob, LOWER EXTREMITY SWELLING 4 + EDEMA BILATERALLY , COUGH AND WHEEZE. PATIENT STATED ITS BEEN GOING ON FOR 3 WEEKS AND HAS BEEN SEEN MULTIPLE TIMES DURING THAT PERIOD, SOB BEGAN TO WORSEN AT 0400 THIS AM. PATIENT AMBULATING AND LIVING INDEPENDENTLY. DENIES CHEST PAIN

## 2023-08-02 DIAGNOSIS — I48.91 ATRIAL FIBRILLATION WITH RVR (H): ICD-10-CM

## 2023-08-02 NOTE — PROGRESS NOTES
Assessment & Plan     Encounter to establish care  Medical, surgical, family, and social histories discussed updated. Reviewed active healthcare problems. Medications reviewed.     Longstanding persistent atrial fibrillation (H)  Longstanding atrial fibrillation, status post prior ablation.  2nd hospitalization this month - thought to be due to uncontrolled rates.   Currently rate controlled with no symptoms.  Continues on metoprolol and diltiazem.  Did just start Coumadin in the hospital, referral placed to her anticoagulation clinic to continue monitoring.  Next INR check due Monday, INR today on hospital discharge was within range.  - Anticoagulation Clinic Referral  - INR; Future    Chronic diastolic congestive heart failure (H)  Echo 7/31/2023 with enlarged left atrium, ejection fraction 55 to 60%, mildly sclerotic aortic valve and moderate mitral regurg with mild tricuspid regurg.  Still does have quite a bit of lower extremity edema but reports any dyspnea has improved.  Weight today 199 pounds.  Encouraged obtaining a scale at home, closely monitoring.  Continues on beta-blocker, newly initiated ACE, and torsemide 30 mg which is a new addition and changed from Lasix.  Has cardiology appointment arranged at the end of August in [place].    Coronary artery disease involving native coronary artery of native heart without angina pectoris  S/P CABG x 4  CABG in 2018.  Recently was hospitalized and was initiated on Lipitor and aspirin.  No angina symptoms.  Has follow-up with cardiology end of August but has not seen cardiology since her CABG.    HTN (hypertension)  Well-controlled, actually on the lower end today.  If were to have issues with hypotension, may need to consider lowering lisinopril to 5 mg.    Schizophrenia (H)  Unclear if true diagnosis.  Has had issues with paranoia in the past.  Not currently on any daily medications nor has she been in the past.  We will continue to assess.    Tobacco  dependence  Wheezing  Encouraged cessation.  No formal diagnosis of COPD but will obtain pulmonary function testing.  There is some wheezing on exam today so she would likely benefit from a daily inhaler regimen.  At this time, we will give her an albuterol inhaler to use if she feels short of breath and to help with any wheezing.  - General PFT Lab (Please always keep checked); Future  - Pulmonary Function Test; Future  - General PFT Lab (Please always keep checked)  - albuterol (PROAIR HFA/PROVENTIL HFA/VENTOLIN HFA) 108 (90 Base) MCG/ACT inhaler; Inhale 2 puffs into the lungs every 6 hours as needed for shortness of breath, wheezing or cough    Adenocarcinoma of the endometrium/uterus (H)  History of endometrial cancer, February 2018 and is status post resection and chemotherapy but was then lost to follow-up.  Referral placed to reengage with oncology services.  - Adult Oncology/Hematology  Referral; Future    Thyroid nodule  Noted in late 2018 at 1cm in left lobe with no follow up. Will reassess with ultrasound. TSH recently normal.   - US Thyroid; Future      Follow up in one week.     45 minutes spent by me on the date of the encounter doing chart review, history and exam, documentation and further activities per the note  Reviewed [facility] and recent discharge summary.        MED REC REQUIRED  Post Medication Reconciliation Status:  Discharge medications reconciled, continue medications without change  Nicotine/Tobacco Cessation:  She reports that she has been smoking cigarettes. She has a 30.00 pack-year smoking history. She has never used smokeless tobacco.  Nicotine/Tobacco Cessation Plan:   Information offered: Patient not interested at this time        Nicolette Huffman MD  [facility] - TANIKA Romero is a 72 year old, presenting for the following health issues:  Establish Care    HPI   Establish care  From [address]. 2 sons.      Hypertension  Follow-up    Do you check your blood pressure regularly outside of the clinic? No   Are you following a low salt diet? Yes  Are your blood pressures ever more than 140 on the top number (systolic) OR more   than 90 on the bottom number (diastolic), for example 140/90? Unknown as patient does not check BP outside of clinic    Atrial Fibrillation Follow-up    Symptoms: no recent chest pain, significant palpitations, dizziness/lightheadedness, dyspnea, or increased peripheral edema.  Stroke prevention: Coumadin (Warfarin)        7/29/2023     9:30 AM 7/29/2023     9:50 AM 7/29/2023    10:00 AM 7/29/2023    10:21 AM 8/3/2023     1:59 PM   Date   Pulse 73 79 78 78 74     Current      [id number]    Heart Failure Follow-up  Are you experiencing any shortness of breath? No  Are you experiencing any swelling in your legs or feet?  No  Are you using more pillows than usual? No  Do you cough at night?  No  Do you check your weight daily?  Yes  Have you had a weight change recently?  Weight decrease  Are you having any of the following side effects from your medications? (Select all that apply)  The patient does not report symptoms of dizziness, fatigue, cough, swelling, or slow heart beat.  Since your last visit, how many times have you gone to the cardiologist, urgent care, emergency room, or hospital because of your heart failure?   1 time    Has appointment 8/29/2023 with cardiology.   Started coumadin for afib.   Currently on 5mg every day.   Started lisinopril 10mg.   Weight 226lbs on 7/29; now 199lbs. Weight was 197lbs on discharge yesterday.     Depression and Anxiety Follow-Up  How are you doing with your depression since your last visit? Improved   How are you doing with your anxiety since your last visit?  Improved   Are you having other symptoms that might be associated with depression or anxiety? No  Have you had a significant life event? No   Do you have any concerns with your use of alcohol or other drugs?  "No    Many years ago.   Was on medication in the past.   ?history of paranoia - son does endorse this     Schizophrenia - ?diagnosis. History of thinking people were after her. No medications. Last major episode was 10+ years ago.     Social History     Tobacco Use    Smoking status: Every Day     Packs/day: 1.00     Years: 30.00     Pack years: 30.00     Types: Cigarettes    Smokeless tobacco: Never    Tobacco comments:     pt declined, stated she would use, \"the patch\". Patient has not had a cigarette in 1 week because she was in the hospital   Vaping Use    Vaping Use: Never used   Substance Use Topics    Alcohol use: No     Comment: rare    Drug use: No         3/18/2019     2:00 PM 4/12/2019     8:00 AM 8/3/2023     1:51 PM   PHQ   PHQ-9 Total Score 0 0 0   Q9: Thoughts of better off dead/self-harm past 2 weeks Not at all Not at all Not at all         8/3/2023     1:53 PM   ANNA-7 SCORE   Total Score 0 (minimal anxiety)   Total Score 0         8/3/2023     1:51 PM   Last PHQ-9   1.  Little interest or pleasure in doing things 0   2.  Feeling down, depressed, or hopeless 0   3.  Trouble falling or staying asleep, or sleeping too much 0   4.  Feeling tired or having little energy 0   5.  Poor appetite or overeating 0   6.  Feeling bad about yourself 0   7.  Trouble concentrating 0   8.  Moving slowly or restless 0   Q9: Thoughts of better off dead/self-harm past 2 weeks 0   PHQ-9 Total Score 0         8/3/2023     1:53 PM   ANNA-7    1. Feeling nervous, anxious, or on edge 0   2. Not being able to stop or control worrying 0   3. Worrying too much about different things 0   4. Trouble relaxing 0   5. Being so restless that it is hard to sit still 0   6. Becoming easily annoyed or irritable 0   7. Feeling afraid, as if something awful might happen 0   ANNA-7 Total Score 0       Suicide Assessment Five-step Evaluation and Treatment (SAFE-T)    How many servings of fruits and vegetables do you eat daily?  2-3  On " average, how many sweetened beverages do you drink each day (Examples: soda, juice, sweet tea, etc.  Do NOT count diet or artificially sweetened beverages)?   1  How many days per week do you exercise enough to make your heart beat faster? 4  How many minutes a day do you exercise enough to make your heart beat faster? 30 - 60  How many days per week do you miss taking your medication? 0        Objective    /70 (BP Location: Left arm, Patient Position: Sitting, Cuff Size: Adult Large)   Pulse 74   Temp 98.6  F (37  C) (Tympanic)   Wt 90.4 kg (199 lb 6.4 oz)   SpO2 95%   BMI 40.27 kg/m    Body mass index is 40.27 kg/m .    Physical Exam  Constitutional:       General: She is not in acute distress.     Appearance: Normal appearance.   Cardiovascular:      Rate and Rhythm: Normal rate and regular rhythm.      Heart sounds: Murmur (systolic, RSB) heard.   Pulmonary:      Effort: Pulmonary effort is normal.      Breath sounds: Wheezing present. No rhonchi or rales.   Musculoskeletal:      Right lower leg: Edema (2+ to knee) present.      Left lower leg: Edema (2+ to knee) present.   Neurological:      General: No focal deficit present.      Mental Status: She is alert and oriented to person, place, and time.   Psychiatric:         Mood and Affect: Mood normal.         Behavior: Behavior normal.                 Answers submitted by the patient for this visit:  Patient Health Questionnaire (Submitted on 8/3/2023)  If you checked off any problems, how difficult have these problems made it for you to do your work, take care of things at home, or get along with other people?: Not difficult at all  PHQ9 TOTAL SCORE: 0  ANNA-7 (Submitted on 8/3/2023)  ANNA 7 TOTAL SCORE: 0

## 2023-08-02 NOTE — TELEPHONE ENCOUNTER
Toprol XL      Last Written Prescription Date:  7/28/2023  Last Fill Quantity: 30,   # refills: 0  Last Office Visit: 10/02/2020  Future Office visit:         Diltiazem ER      Last Written Prescription Date:  7/28/2023  Last Fill Quantity: 30,   # refills: 0  Last Office Visit: 10/02/2020  Future Office visit:

## 2023-08-03 ENCOUNTER — TELEPHONE (OUTPATIENT)
Dept: FAMILY MEDICINE | Facility: OTHER | Age: 73
End: 2023-08-03

## 2023-08-03 ENCOUNTER — OFFICE VISIT (OUTPATIENT)
Dept: FAMILY MEDICINE | Facility: OTHER | Age: 73
End: 2023-08-03
Attending: STUDENT IN AN ORGANIZED HEALTH CARE EDUCATION/TRAINING PROGRAM
Payer: MEDICARE

## 2023-08-03 VITALS
OXYGEN SATURATION: 95 % | WEIGHT: 199.4 LBS | TEMPERATURE: 98.6 F | DIASTOLIC BLOOD PRESSURE: 70 MMHG | HEART RATE: 74 BPM | BODY MASS INDEX: 40.27 KG/M2 | SYSTOLIC BLOOD PRESSURE: 107 MMHG

## 2023-08-03 DIAGNOSIS — E04.1 THYROID NODULE: Chronic | ICD-10-CM

## 2023-08-03 DIAGNOSIS — I48.11 LONGSTANDING PERSISTENT ATRIAL FIBRILLATION (H): Chronic | ICD-10-CM

## 2023-08-03 DIAGNOSIS — Z95.1 S/P CABG X 4: Chronic | ICD-10-CM

## 2023-08-03 DIAGNOSIS — I25.10 CORONARY ARTERY DISEASE INVOLVING NATIVE CORONARY ARTERY OF NATIVE HEART WITHOUT ANGINA PECTORIS: Chronic | ICD-10-CM

## 2023-08-03 DIAGNOSIS — I50.32 CHRONIC DIASTOLIC CONGESTIVE HEART FAILURE (H): Chronic | ICD-10-CM

## 2023-08-03 DIAGNOSIS — C54.1 ADENOCARCINOMA OF THE ENDOMETRIUM/UTERUS (H): Chronic | ICD-10-CM

## 2023-08-03 DIAGNOSIS — F20.3 UNDIFFERENTIATED SCHIZOPHRENIA (H): Chronic | ICD-10-CM

## 2023-08-03 DIAGNOSIS — R06.2 WHEEZING: ICD-10-CM

## 2023-08-03 DIAGNOSIS — Z76.89 ENCOUNTER TO ESTABLISH CARE: Primary | ICD-10-CM

## 2023-08-03 DIAGNOSIS — F17.200 TOBACCO DEPENDENCE: ICD-10-CM

## 2023-08-03 DIAGNOSIS — I10 BENIGN ESSENTIAL HYPERTENSION: Chronic | ICD-10-CM

## 2023-08-03 PROBLEM — I25.2 HISTORY OF NON-ST ELEVATION MYOCARDIAL INFARCTION (NSTEMI): Chronic | Status: RESOLVED | Noted: 2018-09-06 | Resolved: 2023-08-03

## 2023-08-03 PROBLEM — R91.8 PULMONARY NODULES: Chronic | Status: ACTIVE | Noted: 2023-08-03

## 2023-08-03 PROBLEM — R91.8 PULMONARY NODULES: Status: ACTIVE | Noted: 2023-08-03

## 2023-08-03 PROCEDURE — G0463 HOSPITAL OUTPT CLINIC VISIT: HCPCS | Performed by: STUDENT IN AN ORGANIZED HEALTH CARE EDUCATION/TRAINING PROGRAM

## 2023-08-03 PROCEDURE — 99204 OFFICE O/P NEW MOD 45 MIN: CPT | Performed by: STUDENT IN AN ORGANIZED HEALTH CARE EDUCATION/TRAINING PROGRAM

## 2023-08-03 RX ORDER — WARFARIN SODIUM 5 MG/1
TABLET ORAL
Status: ON HOLD | COMMUNITY
Start: 2023-08-03 | End: 2023-09-22

## 2023-08-03 RX ORDER — NYSTATIN 100000 [USP'U]/G
1 POWDER TOPICAL 3 TIMES DAILY PRN
Status: ON HOLD | COMMUNITY
Start: 2023-08-03 | End: 2024-01-01

## 2023-08-03 RX ORDER — PANTOPRAZOLE SODIUM 40 MG/1
40 TABLET, DELAYED RELEASE ORAL DAILY
Status: ON HOLD | COMMUNITY
Start: 2022-05-28 | End: 2023-09-22

## 2023-08-03 RX ORDER — ALBUTEROL SULFATE 90 UG/1
2 AEROSOL, METERED RESPIRATORY (INHALATION) EVERY 6 HOURS PRN
Qty: 18 G | Refills: 1 | Status: ON HOLD | OUTPATIENT
Start: 2023-08-03 | End: 2024-01-01

## 2023-08-03 RX ORDER — TORSEMIDE 10 MG/1
30 TABLET ORAL DAILY
Status: ON HOLD | COMMUNITY
Start: 2023-08-03 | End: 2023-09-22

## 2023-08-03 RX ORDER — ATORVASTATIN CALCIUM 40 MG/1
1 TABLET, FILM COATED ORAL AT BEDTIME
Status: ON HOLD | COMMUNITY
Start: 2023-08-03 | End: 2023-09-22

## 2023-08-03 RX ORDER — ASPIRIN 81 MG/1
81 TABLET ORAL DAILY
COMMUNITY
Start: 2023-08-03 | End: 2024-01-01

## 2023-08-03 RX ORDER — METOPROLOL SUCCINATE 100 MG/1
TABLET, EXTENDED RELEASE ORAL
Qty: 90 TABLET | Refills: 0 | Status: ON HOLD | OUTPATIENT
Start: 2023-08-03 | End: 2023-09-22

## 2023-08-03 RX ORDER — DILTIAZEM HYDROCHLORIDE 120 MG/1
CAPSULE, EXTENDED RELEASE ORAL
Qty: 90 CAPSULE | Refills: 0 | Status: ON HOLD | OUTPATIENT
Start: 2023-08-03 | End: 2023-09-22

## 2023-08-03 RX ORDER — NICOTINE 21 MG/24HR
1 PATCH, TRANSDERMAL 24 HOURS TRANSDERMAL EVERY 24 HOURS
Status: ON HOLD | COMMUNITY
Start: 2023-08-03 | End: 2024-01-01

## 2023-08-03 RX ORDER — LISINOPRIL 10 MG/1
1 TABLET ORAL DAILY
Status: ON HOLD | COMMUNITY
Start: 2023-08-03 | End: 2023-09-22

## 2023-08-03 ASSESSMENT — PATIENT HEALTH QUESTIONNAIRE - PHQ9
SUM OF ALL RESPONSES TO PHQ QUESTIONS 1-9: 0
10. IF YOU CHECKED OFF ANY PROBLEMS, HOW DIFFICULT HAVE THESE PROBLEMS MADE IT FOR YOU TO DO YOUR WORK, TAKE CARE OF THINGS AT HOME, OR GET ALONG WITH OTHER PEOPLE: NOT DIFFICULT AT ALL
SUM OF ALL RESPONSES TO PHQ QUESTIONS 1-9: 0

## 2023-08-03 ASSESSMENT — ANXIETY QUESTIONNAIRES
GAD7 TOTAL SCORE: 0
4. TROUBLE RELAXING: NOT AT ALL
7. FEELING AFRAID AS IF SOMETHING AWFUL MIGHT HAPPEN: NOT AT ALL
1. FEELING NERVOUS, ANXIOUS, OR ON EDGE: NOT AT ALL
6. BECOMING EASILY ANNOYED OR IRRITABLE: NOT AT ALL
2. NOT BEING ABLE TO STOP OR CONTROL WORRYING: NOT AT ALL
GAD7 TOTAL SCORE: 0
3. WORRYING TOO MUCH ABOUT DIFFERENT THINGS: NOT AT ALL
5. BEING SO RESTLESS THAT IT IS HARD TO SIT STILL: NOT AT ALL

## 2023-08-03 ASSESSMENT — PAIN SCALES - GENERAL: PAINLEVEL: NO PAIN (0)

## 2023-08-03 ASSESSMENT — ASTHMA QUESTIONNAIRES: ACT_TOTALSCORE: 18

## 2023-08-03 NOTE — PATIENT INSTRUCTIONS
A referral was placed for you to see hematology/oncology. If you do not hear to schedule in 4 business days, please reach out to our clinic at [phone] so we can look into it further.      The imaging (radiology) department will call to schedule your thyroid ultrasound. If you do not hear to schedule, please call [phone] to schedule your imaging.      The coumadin clinic will be reaching out to you.   Recheck INR on Monday.     Monitor weights at home.   Keep salt to a minimum, less 2g.     They will call to schedule your lung testing.

## 2023-08-04 ENCOUNTER — TELEPHONE (OUTPATIENT)
Dept: FAMILY MEDICINE | Facility: OTHER | Age: 73
End: 2023-08-04

## 2023-08-07 ENCOUNTER — TELEPHONE (OUTPATIENT)
Dept: FAMILY MEDICINE | Facility: OTHER | Age: 73
End: 2023-08-07

## 2023-08-07 ENCOUNTER — ANTICOAGULATION THERAPY VISIT (OUTPATIENT)
Dept: ANTICOAGULATION | Facility: OTHER | Age: 73
End: 2023-08-07
Attending: STUDENT IN AN ORGANIZED HEALTH CARE EDUCATION/TRAINING PROGRAM
Payer: MEDICARE

## 2023-08-07 ENCOUNTER — LAB (OUTPATIENT)
Dept: LAB | Facility: OTHER | Age: 73
End: 2023-08-07
Payer: MEDICARE

## 2023-08-07 DIAGNOSIS — I48.11 LONGSTANDING PERSISTENT ATRIAL FIBRILLATION (H): Chronic | ICD-10-CM

## 2023-08-07 DIAGNOSIS — I48.11 LONGSTANDING PERSISTENT ATRIAL FIBRILLATION (H): Primary | Chronic | ICD-10-CM

## 2023-08-07 LAB — INR PPP: 3.52 (ref 0.85–1.15)

## 2023-08-07 PROCEDURE — 85610 PROTHROMBIN TIME: CPT | Mod: ZL

## 2023-08-07 PROCEDURE — 36415 COLL VENOUS BLD VENIPUNCTURE: CPT | Mod: ZL

## 2023-08-07 NOTE — PROGRESS NOTES
"ANTICOAGULATION  MANAGEMENT: NEW REFERRAL      SUBJECTIVE/OBJECTIVE     Heather Rosas, a 72 year old female  is newly referred to [facility].    Anticoagulation:    Previously on warfarin: No, new to anticoagulation  Warfarin initiation date (approximate): 7/31/23   Indication(s): Atrial Fibrillation   Goal Range: 2.0-3.0   Anticoagulation Bridge/Overlap: No   Referring provider: from PCP    General Dietary/Social Hx:    Typical vitamin K intake: moderate; consistent     Other dietary considerations: None     Social History:   Social History     Tobacco Use    Smoking status: Every Day     Packs/day: 1.00     Years: 30.00     Pack years: 30.00     Types: Cigarettes    Smokeless tobacco: Never    Tobacco comments:     pt declined, stated she would use, \"the patch\". Patient has not had a cigarette in 1 week because she was in the hospital   Vaping Use    Vaping Use: Never used   Substance Use Topics    Alcohol use: No     Comment: rare    Drug use: No       In the past 2 weeks, patient estimates taking medications as instructed % of time: 100%    Results:        Recent labs: (last 7 days)     08/07/23  1246   INR 3.52*       Wt Readings from Last 2 Encounters:   08/03/23 199 lb 6.4 oz (90.4 kg)   07/29/23 226 lb 13.7 oz (102.9 kg)      Estimated body mass index is 40.27 kg/m  as calculated from the following:    Height as of 7/29/23: 4' 11\" (1.499 m).    Weight as of 8/3/23: 199 lb 6.4 oz (90.4 kg).  Lab Results   Component Value Date    AST 19 07/29/2023     Lab Results   Component Value Date    CR 0.94 07/29/2023     Estimated Creatinine Clearance: 79.5 mL/min (based on SCr of 0.94 mg/dL).    ASSESSMENT     Goal INR 2-3, standard for indication(s) above    PLAN     Dosing Instructions: decrease your warfarin dose (10% change) with INR in 4 days       Summary  As of 8/7/2023      Full warfarin instructions:  8/7: 5 mg; 8/8: Hold; Otherwise 2.5 mg every Mon, Wed, Fri; 5 mg all other " Patient went to the Er on 3/14/18 for knee pain  And will be seeing Dr Cyn Ponce on 3/26/18  They are asking for a copy of her discharge notes to be faxed to [phone].     done days   Next INR check:  8/11/2023               Education provided:   Please call back if any changes to your diet, medications or how you've been taking warfarin  Taking warfarin: purpose of warfarin and how it works and take warfarin at same time each day; preferably in the evening  Goal range and lab monitoring: goal range and significance of current result, Importance of therapeutic range, and Importance of following up at instructed interval  Dietary considerations: importance of consistent vitamin K intake  Healthy lifestyle considerations: potential interaction between warfarin and alcohol, limit alcohol intake to no more than 1 to 2 drinks in 24 hours if you choose to drink alcohol, and impact of smoking or tobacco on INR  Importance of notifying anticoagulation clinic for: changes in medications; a sooner lab recheck maybe needed, diarrhea, nausea/vomiting, reduced intake, cold/flu, and/or infections; a sooner lab recheck maybe needed, and upcoming surgeries and procedures 2 weeks in advance    Education still needed:   None required      Telephone call with Heather who verbalizes understanding and agrees to plan    Lab visit scheduled    Standing orders placed in Epic: Point of Care INR (Lab 5000)    Plan made per [facility] anticoagulation protocol    Naya Barajas, RN  Anticoagulation Clinic  8/7/2023

## 2023-08-11 ENCOUNTER — APPOINTMENT (OUTPATIENT)
Dept: ANTICOAGULATION | Facility: OTHER | Age: 73
End: 2023-08-11
Attending: STUDENT IN AN ORGANIZED HEALTH CARE EDUCATION/TRAINING PROGRAM
Payer: MEDICARE

## 2023-08-14 ENCOUNTER — TELEPHONE (OUTPATIENT)
Dept: FAMILY MEDICINE | Facility: OTHER | Age: 73
End: 2023-08-14

## 2023-08-14 NOTE — TELEPHONE ENCOUNTER
ANTICOAGULATION     Heather Rosas is overdue for INR check.      Left message for patient to call and schedule lab appointment as soon as possible. If returning call, please schedule.     Berkley Meraz RN

## 2023-09-21 ENCOUNTER — APPOINTMENT (OUTPATIENT)
Dept: GENERAL RADIOLOGY | Facility: HOSPITAL | Age: 73
End: 2023-09-21
Attending: EMERGENCY MEDICINE
Payer: MEDICARE

## 2023-09-21 ENCOUNTER — APPOINTMENT (OUTPATIENT)
Dept: ULTRASOUND IMAGING | Facility: HOSPITAL | Age: 73
End: 2023-09-21
Attending: EMERGENCY MEDICINE
Payer: MEDICARE

## 2023-09-21 ENCOUNTER — HOSPITAL ENCOUNTER (OUTPATIENT)
Facility: HOSPITAL | Age: 73
Setting detail: OBSERVATION
Discharge: HOME | End: 2023-09-22
Attending: EMERGENCY MEDICINE | Admitting: INTERNAL MEDICINE
Payer: MEDICARE

## 2023-09-21 DIAGNOSIS — I50.9 ACUTE ON CHRONIC CONGESTIVE HEART FAILURE, UNSPECIFIED HEART FAILURE TYPE (H): ICD-10-CM

## 2023-09-21 DIAGNOSIS — I48.91 ATRIAL FIBRILLATION WITH RVR (H): ICD-10-CM

## 2023-09-21 DIAGNOSIS — K21.9 GASTROESOPHAGEAL REFLUX DISEASE WITHOUT ESOPHAGITIS: Chronic | ICD-10-CM

## 2023-09-21 DIAGNOSIS — I50.33 ACUTE ON CHRONIC DIASTOLIC CONGESTIVE HEART FAILURE (H): Primary | ICD-10-CM

## 2023-09-21 LAB
ALBUMIN SERPL BCG-MCNC: 3.9 G/DL (ref 3.5–5.2)
ALP SERPL-CCNC: 159 U/L (ref 35–104)
ALT SERPL W P-5'-P-CCNC: 19 U/L (ref 0–50)
ANION GAP SERPL CALCULATED.3IONS-SCNC: 12 MMOL/L (ref 7–15)
AST SERPL W P-5'-P-CCNC: 26 U/L (ref 0–45)
BASE EXCESS BLDV CALC-SCNC: -2 MMOL/L (ref -7.7–1.9)
BASOPHILS # BLD AUTO: 0.1 10E3/UL (ref 0–0.2)
BASOPHILS NFR BLD AUTO: 1 %
BILIRUB SERPL-MCNC: 1.3 MG/DL
BUN SERPL-MCNC: 11.6 MG/DL (ref 8–23)
CALCIUM SERPL-MCNC: 9.4 MG/DL (ref 8.8–10.2)
CHLORIDE SERPL-SCNC: 102 MMOL/L (ref 98–107)
CREAT SERPL-MCNC: 0.72 MG/DL (ref 0.51–0.95)
DEPRECATED HCO3 PLAS-SCNC: 21 MMOL/L (ref 22–29)
EGFRCR SERPLBLD CKD-EPI 2021: 89 ML/MIN/1.73M2
EOSINOPHIL # BLD AUTO: 0.1 10E3/UL (ref 0–0.7)
EOSINOPHIL NFR BLD AUTO: 1 %
ERYTHROCYTE [DISTWIDTH] IN BLOOD BY AUTOMATED COUNT: 17.2 % (ref 10–15)
GLUCOSE SERPL-MCNC: 95 MG/DL (ref 70–99)
HCO3 BLDV-SCNC: 24 MMOL/L (ref 21–28)
HCT VFR BLD AUTO: 39.7 % (ref 35–47)
HGB BLD-MCNC: 13.1 G/DL (ref 11.7–15.7)
HOLD SPECIMEN: NORMAL
HOLD SPECIMEN: NORMAL
IMM GRANULOCYTES # BLD: 0.1 10E3/UL
IMM GRANULOCYTES NFR BLD: 1 %
INR PPP: 1.09 (ref 0.85–1.15)
INR PPP: 1.22 (ref 0.85–1.15)
LACTATE SERPL-SCNC: 1.5 MMOL/L (ref 0.7–2)
LIPASE SERPL-CCNC: 19 U/L (ref 13–60)
LYMPHOCYTES # BLD AUTO: 1.3 10E3/UL (ref 0.8–5.3)
LYMPHOCYTES NFR BLD AUTO: 12 %
MCH RBC QN AUTO: 29 PG (ref 26.5–33)
MCHC RBC AUTO-ENTMCNC: 33 G/DL (ref 31.5–36.5)
MCV RBC AUTO: 88 FL (ref 78–100)
MONOCYTES # BLD AUTO: 0.8 10E3/UL (ref 0–1.3)
MONOCYTES NFR BLD AUTO: 8 %
NEUTROPHILS # BLD AUTO: 7.8 10E3/UL (ref 1.6–8.3)
NEUTROPHILS NFR BLD AUTO: 77 %
NRBC # BLD AUTO: 0 10E3/UL
NRBC BLD AUTO-RTO: 0 /100
NT-PROBNP SERPL-MCNC: 3629 PG/ML (ref 0–900)
O2/TOTAL GAS SETTING VFR VENT: 21 %
OXYHGB MFR BLDV: 79 % (ref 70–75)
PCO2 BLDV: 42 MM HG (ref 40–50)
PH BLDV: 7.36 [PH] (ref 7.32–7.43)
PLATELET # BLD AUTO: 252 10E3/UL (ref 150–450)
PO2 BLDV: 51 MM HG (ref 25–47)
POTASSIUM SERPL-SCNC: 4 MMOL/L (ref 3.4–5.3)
POTASSIUM SERPL-SCNC: 4.2 MMOL/L (ref 3.4–5.3)
PROCALCITONIN SERPL IA-MCNC: 0.06 NG/ML
PROT SERPL-MCNC: 6.4 G/DL (ref 6.4–8.3)
RBC # BLD AUTO: 4.51 10E6/UL (ref 3.8–5.2)
SARS-COV-2 RNA RESP QL NAA+PROBE: NEGATIVE
SODIUM SERPL-SCNC: 135 MMOL/L (ref 136–145)
TROPONIN T SERPL HS-MCNC: 20 NG/L
TROPONIN T SERPL HS-MCNC: 23 NG/L
WBC # BLD AUTO: 10.1 10E3/UL (ref 4–11)

## 2023-09-21 PROCEDURE — 82374 ASSAY BLOOD CARBON DIOXIDE: CPT | Performed by: EMERGENCY MEDICINE

## 2023-09-21 PROCEDURE — 85610 PROTHROMBIN TIME: CPT | Performed by: EMERGENCY MEDICINE

## 2023-09-21 PROCEDURE — 87635 SARS-COV-2 COVID-19 AMP PRB: CPT | Performed by: NURSE PRACTITIONER

## 2023-09-21 PROCEDURE — 250N000011 HC RX IP 250 OP 636: Performed by: EMERGENCY MEDICINE

## 2023-09-21 PROCEDURE — 93308 TTE F-UP OR LMTD: CPT | Mod: 26 | Performed by: EMERGENCY MEDICINE

## 2023-09-21 PROCEDURE — 85025 COMPLETE CBC W/AUTO DIFF WBC: CPT | Performed by: EMERGENCY MEDICINE

## 2023-09-21 PROCEDURE — 96376 TX/PRO/DX INJ SAME DRUG ADON: CPT

## 2023-09-21 PROCEDURE — 82805 BLOOD GASES W/O2 SATURATION: CPT | Performed by: EMERGENCY MEDICINE

## 2023-09-21 PROCEDURE — 36415 COLL VENOUS BLD VENIPUNCTURE: CPT | Performed by: EMERGENCY MEDICINE

## 2023-09-21 PROCEDURE — 84132 ASSAY OF SERUM POTASSIUM: CPT | Mod: XU | Performed by: NURSE PRACTITIONER

## 2023-09-21 PROCEDURE — 84145 PROCALCITONIN (PCT): CPT | Performed by: EMERGENCY MEDICINE

## 2023-09-21 PROCEDURE — 84484 ASSAY OF TROPONIN QUANT: CPT | Performed by: EMERGENCY MEDICINE

## 2023-09-21 PROCEDURE — 93308 TTE F-UP OR LMTD: CPT | Mod: TC

## 2023-09-21 PROCEDURE — 83690 ASSAY OF LIPASE: CPT | Performed by: EMERGENCY MEDICINE

## 2023-09-21 PROCEDURE — 99291 CRITICAL CARE FIRST HOUR: CPT | Mod: 25

## 2023-09-21 PROCEDURE — 71045 X-RAY EXAM CHEST 1 VIEW: CPT

## 2023-09-21 PROCEDURE — 96375 TX/PRO/DX INJ NEW DRUG ADDON: CPT | Mod: XU

## 2023-09-21 PROCEDURE — 250N000013 HC RX MED GY IP 250 OP 250 PS 637: Performed by: NURSE PRACTITIONER

## 2023-09-21 PROCEDURE — 93005 ELECTROCARDIOGRAM TRACING: CPT

## 2023-09-21 PROCEDURE — 85610 PROTHROMBIN TIME: CPT | Performed by: NURSE PRACTITIONER

## 2023-09-21 PROCEDURE — 93010 ELECTROCARDIOGRAM REPORT: CPT | Mod: 76 | Performed by: INTERNAL MEDICINE

## 2023-09-21 PROCEDURE — 96365 THER/PROPH/DIAG IV INF INIT: CPT

## 2023-09-21 PROCEDURE — 83880 ASSAY OF NATRIURETIC PEPTIDE: CPT | Performed by: EMERGENCY MEDICINE

## 2023-09-21 PROCEDURE — 96375 TX/PRO/DX INJ NEW DRUG ADDON: CPT

## 2023-09-21 PROCEDURE — C9803 HOPD COVID-19 SPEC COLLECT: HCPCS

## 2023-09-21 PROCEDURE — 250N000011 HC RX IP 250 OP 636: Mod: JZ | Performed by: NURSE PRACTITIONER

## 2023-09-21 PROCEDURE — 36415 COLL VENOUS BLD VENIPUNCTURE: CPT | Performed by: NURSE PRACTITIONER

## 2023-09-21 PROCEDURE — 99291 CRITICAL CARE FIRST HOUR: CPT | Mod: 25 | Performed by: EMERGENCY MEDICINE

## 2023-09-21 PROCEDURE — 200N000001 HC R&B ICU

## 2023-09-21 PROCEDURE — 83605 ASSAY OF LACTIC ACID: CPT | Performed by: EMERGENCY MEDICINE

## 2023-09-21 PROCEDURE — 99222 1ST HOSP IP/OBS MODERATE 55: CPT | Mod: AI | Performed by: NURSE PRACTITIONER

## 2023-09-21 PROCEDURE — 250N000013 HC RX MED GY IP 250 OP 250 PS 637: Performed by: HOSPITALIST

## 2023-09-21 PROCEDURE — 84484 ASSAY OF TROPONIN QUANT: CPT | Performed by: NURSE PRACTITIONER

## 2023-09-21 PROCEDURE — 250N000009 HC RX 250: Performed by: EMERGENCY MEDICINE

## 2023-09-21 RX ORDER — AMOXICILLIN 250 MG
2 CAPSULE ORAL 2 TIMES DAILY PRN
Status: DISCONTINUED | OUTPATIENT
Start: 2023-09-21 | End: 2023-09-22 | Stop reason: HOSPADM

## 2023-09-21 RX ORDER — AMOXICILLIN 250 MG
1 CAPSULE ORAL 2 TIMES DAILY PRN
Status: DISCONTINUED | OUTPATIENT
Start: 2023-09-21 | End: 2023-09-22 | Stop reason: HOSPADM

## 2023-09-21 RX ORDER — PANTOPRAZOLE SODIUM 40 MG/1
40 TABLET, DELAYED RELEASE ORAL DAILY
Status: DISCONTINUED | OUTPATIENT
Start: 2023-09-21 | End: 2023-09-22 | Stop reason: HOSPADM

## 2023-09-21 RX ORDER — DEXTROSE MONOHYDRATE 25 G/50ML
25-50 INJECTION, SOLUTION INTRAVENOUS
Status: DISCONTINUED | OUTPATIENT
Start: 2023-09-21 | End: 2023-09-22 | Stop reason: HOSPADM

## 2023-09-21 RX ORDER — WARFARIN SODIUM 2.5 MG/1
5 TABLET ORAL
Status: COMPLETED | OUTPATIENT
Start: 2023-09-21 | End: 2023-09-21

## 2023-09-21 RX ORDER — ONDANSETRON 4 MG/1
4 TABLET, ORALLY DISINTEGRATING ORAL EVERY 6 HOURS PRN
Status: DISCONTINUED | OUTPATIENT
Start: 2023-09-21 | End: 2023-09-22 | Stop reason: HOSPADM

## 2023-09-21 RX ORDER — FUROSEMIDE 10 MG/ML
40 INJECTION INTRAMUSCULAR; INTRAVENOUS EVERY 8 HOURS
Status: DISCONTINUED | OUTPATIENT
Start: 2023-09-21 | End: 2023-09-22 | Stop reason: HOSPADM

## 2023-09-21 RX ORDER — ATORVASTATIN CALCIUM 40 MG/1
40 TABLET, FILM COATED ORAL AT BEDTIME
Status: DISCONTINUED | OUTPATIENT
Start: 2023-09-21 | End: 2023-09-22 | Stop reason: HOSPADM

## 2023-09-21 RX ORDER — ACETAMINOPHEN 650 MG/20.3ML
650 LIQUID ORAL EVERY 4 HOURS PRN
Status: DISCONTINUED | OUTPATIENT
Start: 2023-09-21 | End: 2023-09-22 | Stop reason: HOSPADM

## 2023-09-21 RX ORDER — METOPROLOL TARTRATE 1 MG/ML
5 INJECTION, SOLUTION INTRAVENOUS ONCE
Status: COMPLETED | OUTPATIENT
Start: 2023-09-21 | End: 2023-09-21

## 2023-09-21 RX ORDER — FUROSEMIDE 10 MG/ML
80 INJECTION INTRAMUSCULAR; INTRAVENOUS ONCE
Status: COMPLETED | OUTPATIENT
Start: 2023-09-21 | End: 2023-09-21

## 2023-09-21 RX ORDER — DILTIAZEM HYDROCHLORIDE 120 MG/1
120 CAPSULE, COATED, EXTENDED RELEASE ORAL DAILY
Status: DISCONTINUED | OUTPATIENT
Start: 2023-09-21 | End: 2023-09-22 | Stop reason: HOSPADM

## 2023-09-21 RX ORDER — NITROGLYCERIN 20 MG/100ML
10-200 INJECTION INTRAVENOUS CONTINUOUS
Status: DISCONTINUED | OUTPATIENT
Start: 2023-09-21 | End: 2023-09-21

## 2023-09-21 RX ORDER — NICOTINE POLACRILEX 4 MG
15-30 LOZENGE BUCCAL
Status: DISCONTINUED | OUTPATIENT
Start: 2023-09-21 | End: 2023-09-22 | Stop reason: HOSPADM

## 2023-09-21 RX ORDER — ONDANSETRON 2 MG/ML
4 INJECTION INTRAMUSCULAR; INTRAVENOUS EVERY 6 HOURS PRN
Status: DISCONTINUED | OUTPATIENT
Start: 2023-09-21 | End: 2023-09-22 | Stop reason: HOSPADM

## 2023-09-21 RX ORDER — LISINOPRIL 10 MG/1
10 TABLET ORAL DAILY
Status: DISCONTINUED | OUTPATIENT
Start: 2023-09-21 | End: 2023-09-22 | Stop reason: HOSPADM

## 2023-09-21 RX ORDER — ACETAMINOPHEN 325 MG/1
650 TABLET ORAL EVERY 4 HOURS PRN
Status: DISCONTINUED | OUTPATIENT
Start: 2023-09-21 | End: 2023-09-22 | Stop reason: HOSPADM

## 2023-09-21 RX ORDER — METOPROLOL SUCCINATE 100 MG/1
100 TABLET, EXTENDED RELEASE ORAL DAILY
Status: DISCONTINUED | OUTPATIENT
Start: 2023-09-21 | End: 2023-09-22 | Stop reason: HOSPADM

## 2023-09-21 RX ORDER — DILTIAZEM HYDROCHLORIDE 5 MG/ML
10 INJECTION INTRAVENOUS ONCE
Status: COMPLETED | OUTPATIENT
Start: 2023-09-21 | End: 2023-09-21

## 2023-09-21 RX ORDER — ASPIRIN 81 MG/1
81 TABLET ORAL DAILY
Status: DISCONTINUED | OUTPATIENT
Start: 2023-09-21 | End: 2023-09-22 | Stop reason: HOSPADM

## 2023-09-21 RX ORDER — POTASSIUM CHLORIDE 750 MG/1
40 CAPSULE, EXTENDED RELEASE ORAL DAILY
Status: DISCONTINUED | OUTPATIENT
Start: 2023-09-21 | End: 2023-09-22 | Stop reason: HOSPADM

## 2023-09-21 RX ADMIN — ATORVASTATIN CALCIUM 40 MG: 40 TABLET, FILM COATED ORAL at 21:34

## 2023-09-21 RX ADMIN — POTASSIUM CHLORIDE 40 MEQ: 10 CAPSULE, COATED, EXTENDED RELEASE ORAL at 12:27

## 2023-09-21 RX ADMIN — DILTIAZEM HYDROCHLORIDE 10 MG: 5 INJECTION, SOLUTION INTRAVENOUS at 10:38

## 2023-09-21 RX ADMIN — FUROSEMIDE 40 MG: 10 INJECTION, SOLUTION INTRAVENOUS at 21:34

## 2023-09-21 RX ADMIN — FUROSEMIDE 40 MG: 10 INJECTION, SOLUTION INTRAVENOUS at 13:25

## 2023-09-21 RX ADMIN — FUROSEMIDE 80 MG: 10 INJECTION, SOLUTION INTRAMUSCULAR; INTRAVENOUS at 07:09

## 2023-09-21 RX ADMIN — NITROGLYCERIN 50 MCG/MIN: 20 INJECTION INTRAVENOUS at 06:57

## 2023-09-21 RX ADMIN — METOPROLOL TARTRATE 5 MG: 1 INJECTION, SOLUTION INTRAVENOUS at 07:54

## 2023-09-21 RX ADMIN — LISINOPRIL 10 MG: 10 TABLET ORAL at 10:54

## 2023-09-21 RX ADMIN — PANTOPRAZOLE SODIUM 40 MG: 40 TABLET, DELAYED RELEASE ORAL at 12:27

## 2023-09-21 RX ADMIN — METOPROLOL SUCCINATE 100 MG: 100 TABLET, EXTENDED RELEASE ORAL at 10:54

## 2023-09-21 RX ADMIN — ASPIRIN 81 MG: 81 TABLET, COATED ORAL at 12:27

## 2023-09-21 RX ADMIN — WARFARIN SODIUM 5 MG: 2.5 TABLET ORAL at 18:04

## 2023-09-21 RX ADMIN — DILTIAZEM HYDROCHLORIDE 120 MG: 120 CAPSULE, COATED, EXTENDED RELEASE ORAL at 11:12

## 2023-09-21 RX ADMIN — Medication 5 MG: at 21:34

## 2023-09-21 ASSESSMENT — ACTIVITIES OF DAILY LIVING (ADL)
ADLS_ACUITY_SCORE: 35
ADLS_ACUITY_SCORE: 20
ADLS_ACUITY_SCORE: 22
ADLS_ACUITY_SCORE: 20
ADLS_ACUITY_SCORE: 35
ADLS_ACUITY_SCORE: 20
ADLS_ACUITY_SCORE: 20
ADLS_ACUITY_SCORE: 18
ADLS_ACUITY_SCORE: 20

## 2023-09-21 ASSESSMENT — ENCOUNTER SYMPTOMS
SHORTNESS OF BREATH: 1
FEVER: 0
CHILLS: 0
COUGH: 1

## 2023-09-21 NOTE — PHARMACY-ANTICOAGULATION SERVICE
Pharmacy Consult-Warfarin Assessment Day #1    Heather Rosas is a 72 year old female admitted on 9/21/2023 with Acute on chronic diastolic congestive heart failure (H)    Primary Indication(s) for Anticoagulation: A-fib    Goal INR: 2-3    FYI, patient is followed by the Anticoagulation/Protime Clinic at: [facility]    Patient previously anticoagulated on Coumadin at a dose of 2.5 mg MWF, 5mg ROW     Per medication scribe notes:  Patient last took warfarin on 9/18/23  Patient should be out of medication at home (non-compliancy suspected)  Unsure if patient was taking as prescribed. Patient has not followed through with INR appointments. Last INR check was on 8/7/23 and has not followed up since then    Home tablet strength(s): 5 mg    Factors that may increase patient's bleeding risk and/or sensitivity to warfarin (Coumadin) include:   Advanced age, aspirin    Anticoagulation Dose History  More data may exist         Latest Ref Rng & Units 7/22/2017 5/14/2018 10/12/2021 10/13/2021 7/29/2023 8/7/2023 9/21/2023   Recent Dosing and Labs   INR 0.85 - 1.15 0.98  1.01  1.0     1.1     1.04  3.52  1.22       Details          This result is from an external source.             CBC RESULTS:   Recent Labs   Lab Test 09/21/23  0638   HGB 13.1        Assessment/Plan: INR today is 1.22 (subtherapeutic). Dose warfarin 5 mg today. Recheck INR tomorrow with am labs.    Thank You for the Consult. Will continue to follow.    Amy Ling [facility] ....................  9/21/2023   12:01 PM

## 2023-09-21 NOTE — ED TRIAGE NOTES
Pt brought in by EMS due to body aches and SOB. Pt given ASA and nitroglycerin by EMS. Pt HR reported to be 140 and SBP >200. Pt denies chest pain, nausea. Pt is A/Ox4.    Triage Assessment       Row Name 09/21/23 0643       Triage Assessment (Adult)    Airway WDL WDL       Respiratory WDL    Respiratory WDL --  SOB       Skin Circulation/Temperature WDL    Skin Circulation/Temperature WDL WDL       Cardiac WDL    Cardiac WDL rhythm    Cardiac Rhythm SVT       Peripheral/Neurovascular WDL    Peripheral Neurovascular WDL WDL       Cognitive/Neuro/Behavioral WDL    Cognitive/Neuro/Behavioral WDL WDL

## 2023-09-21 NOTE — H&P
"[facility]    History and Physical  Hospitalist       Date of Admission:  9/21/2023  Date of Service (when I saw the patient): 09/21/23    Assessment & Plan       Acute on chronic diastolic congestive heart failure: Likely due to uncontrolled A-fib from not taking medications and not taking torsemide also contributing. Will get her back on her home medications- Cardizem, Metoprolol and add IV if needed. Will stop nitroglycerin gtt in order to start home medications.       Chronic Atrial fibrillation (H): rate uncontrolled in 140's but no chest pain, does have mild dyspnea. Restart meds as above.       HTN (hypertension): Uncontrolled due to noncompliance.      Tobacco dependence: Nicotine patch      Schizophrenia (H)    Adenocarcinoma of the endometrium/uterus (H)                  # Drug Induced Coagulation Defect: home medication list includes an anticoagulant medication  # Drug Induced Platelet Defect: home medication list includes an antiplatelet medication   # Hypertension: Noted on problem list  # Acute heart failure with preserved ejection fraction: heart failure noted on problem list, last echo with EF >50%, and receiving IV diuretics     # Obesity: Estimated body mass index is 39.41 kg/m  as calculated from the following:    Height as of this encounter: 1.499 m (4' 11\").    Weight as of this encounter: 88.5 kg (195 lb 1.7 oz).        # History of CABG: noted on surgical history         DVT Prophylaxis: Warfarin  Code Status: Full Code    Disposition: Expected discharge in 1-2 days once  HR controlled.    Claudia Chino,CNP    Primary Care Physician   Nicolette Huffman    Chief Complaint   Shortness of breath    History is obtained from the patient    History of Present Illness   Heather Rosas is a 72 year old female who presents with shortness of breath that has been progressive over the last several days. She states she has not taken any of her medications for 4 days because she ran out. She " "has also been taking on 20mg torsemide to \"stretch it out\" for the last few wakes. She states the only medications she still has been taking is her Coumadin. Per fill history she should have needed medications refilled early this month. She states she did not get her medications because she needs to be seen by PCP in order to continue prescribing them and has an appointment next month to do so. She does feel tachycardia but denies chest pain, abdominal pain, nausea, diuresis. She was started on nitroglycerin gtt primarily to reduce blood pressure which was 173/128, given single does of IV lopressor and lasix. Admit for further care.     Past Medical History    I have reviewed this patient's medical history and updated it with pertinent information if needed.   Past Medical History:   Diagnosis Date    Acute diastolic congestive heart failure (H) 10/12/2021    Acute exacerbation of CHF (congestive heart failure) (H) 10/11/2021    Allergic rhinitis 01/01/2011    Anxiety 01/01/2011    Anxiety state, unspecified     Anxiety state    Atrial fibrillation with RVR (H) 07/07/2023    Atrial flutter with rapid ventricular response (H) 10/12/2021    CVA (cerebral vascular accident) (H) 05/14/2018    Dyslipidemia 11/26/2003    fibromyalgia 06/14/2004    GERD, Esophageal reflux 01/01/2011    History of non-ST elevation myocardial infarction (NSTEMI) 09/06/2018    HTN (hypertension)     Essential hypertension    Major depression, recurrent (H) 01/01/2011    Major depressive disorder, recurrent episode, moderate (H) 01/01/2011    Metrorrhagia 02/18/2018    Non compliance with medical treatment 07/07/2023    Nonorganic sleep disorder, unspecified     Non-org. 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  Past Surgical History:   Procedure Laterality Date    ANGIOGRAM  02/2005    Normal     BIOPSY BREAST  1984    LT, normal    BYPASS GRAFT ARTERY CORONARY  09/10/2018    CARPAL TUNNEL RELEASE RT/LT  2005    RT, carpal tunnel    COLONOSCOPY  2011    Repeat in ten years     COLONOSCOPY  1996    COLONOSCOPY  2004    DILATION AND CURETTAGE, HYSTEROSCOPY, ABLATE ENDOMETRIUM, COMBINED N/A 2/19/2018    Procedure: COMBINED DILATION AND CURETTAGE, HYSTEROSCOPY, ABLATE ENDOMETRIUM;  HYSTEROSCOPY DILATION AND CURETTAGE, ENDOMETRIAL ABLATION;  Surgeon: Jose Nettles MD;  Location: [facility] OR    HYSTERECTOMY TOTAL ABD, NICK SALPINGO-OOPHORECTOMY, NODE DISSECTION, TUMOR DEBULKING, COMBINED  10/30/2018    INSERT PORT VASCULAR ACCESS N/A 12/11/2018    Procedure: PORT-A-CATH PLACEMENT;  Surgeon: Rafi Trinidad MD;  Location: [facility] OR    placement of central line  2005    REMOVE PORT VASCULAR ACCESS N/A 10/6/2020    Procedure: port a cath removal;  Surgeon: Rafi Trinidad MD;  Location: [facility] OR       Prior to Admission Medications   Prior to Admission Medications   Prescriptions Last Dose Informant Patient Reported? Taking?   DILT- MG 24 hr capsule Past Week at 1100  No Yes   Sig: TAKE 1 CAPSULE(120 MG) BY MOUTH DAILY   albuterol (PROAIR HFA/PROVENTIL HFA/VENTOLIN HFA) 108 (90 Base) MCG/ACT inhaler Unknown  No Yes   Sig: Inhale 2 puffs into the lungs every 6 hours as needed for shortness of breath, wheezing or cough   aspirin 81 MG EC tablet 9/20/2023 at 1100  Yes Yes   Sig: Take 81 mg by mouth daily   atorvastatin (LIPITOR) 40 MG tablet Past Week at HS  Yes Yes   Sig: Take 1 tablet by mouth At Bedtime   lisinopril (ZESTRIL) 10 MG tablet Past Week at 1100  Yes Yes   Sig: Take 1 tablet by mouth daily   metoprolol succinate ER (TOPROL XL) 100 MG 24 hr tablet Past Week  No Yes   Sig: TAKE 1 TABLET(100 MG) BY MOUTH DAILY   nicotine (NICODERM CQ) 14 MG/24HR 24 hr patch Not Taking  Yes No   Sig: Place 1 patch onto the  skin every 24 hours   Patient not taking: Reported on 9/21/2023   nystatin (MYCOSTATIN) 050870 UNIT/GM external powder More than a month  Yes Yes   Sig: Apply 1 g topically 3 times daily Apply to affected area: groin Indications: Infection   pantoprazole (PROTONIX) 40 MG EC tablet Not Taking  Yes No   Sig: Take 40 mg by mouth daily   Patient not taking: Reported on 9/21/2023   torsemide (DEMADEX) 10 MG tablet Past Week at 1100  Yes Yes   Sig: Take 30 mg by mouth daily   warfarin ANTICOAGULANT (COUMADIN) 5 MG tablet 9/18/2023 at 1100  Yes No   Sig: Take 1/2 tablet (2.5 mg) by mouth on Monday, Wednesday and Friday. Take 1 tablet (5 mg) all other days or as directed by anticoagulation. Takes at 11:00 AM.      Facility-Administered Medications: None     Allergies   Allergies   Allergen Reactions    Fexofenadine Hcl Nausea and Vomiting     Allegra     Rosuvastatin Other (See Comments)     Dizziness - Crestor     Cats Other (See Comments)     Sneezing, runny nose    Codeine Sulfate Nausea and Vomiting and GI Disturbance       Social History   I have reviewed this patient's social history and updated it with pertinent information if needed. Heather CASTRO Ralph  reports that she has been smoking cigarettes. She has a 30.00 pack-year smoking history. She has never used smokeless tobacco. She reports that she does not drink alcohol and does not use drugs.    Family History   I have reviewed this patient's family history and updated it with pertinent information if needed.   Family History   Problem Relation Age of Onset    C.A.D. Father 45        (cause of death)     Other - See Comments Father         rheumatic fever     Allergies Father     Cancer Sister 56        Esophageal to bone cancer    Gastrointestinal Disease Mother         GERD    Cancer Mother         pancreatic ca (cause of death) /liver ca    Breast Cancer Maternal Aunt     Breast Cancer Maternal Aunt     Colon Cancer Paternal Aunt         (cause of death)      Crohn's Disease Other     Depression Maternal Uncle     Depression Maternal Aunt     Diabetes Paternal Grandmother         type 2    Neurologic Disorder Brother         neuropathy    Cancer Brother 58        Tonsilular cancer/ lymph node in neck    Allergies Brother     Breast Cancer Cousin     Breast Cancer Cousin     Breast Cancer Cousin        Review of Systems   CONSTITUTIONAL:  negative for  fevers, chills, sweats, and fatigue  HEENT:  negative for  ear drainage, earaches, nasal congestion, and sore throat  RESPIRATORY:  positive for  dyspnea  negative for  dry cough, cough with sputum, hemoptysis, chest pain, and pleuritic pain  CARDIOVASCULAR:  positive for  dyspnea, palpitations, orthopnea, edema  negative for  chest pain  GASTROINTESTINAL:  negative for nausea, vomiting, diarrhea, constipation, and abdominal pain  GENITOURINARY:  negative for frequency, dysuria, and nocturia  MUSCULOSKELETAL:  negative for  myalgias, arthralgias, and stiff joints  NEUROLOGICAL:  negative for headaches, dizziness, tremor, dysphagia, weakness, numbness, and syncope    Physical Exam   Temp: 97.6  F (36.4  C) Temp src: Tympanic BP: (!) 133/110 Pulse: 105   Resp: 24 SpO2: 98 % O2 Device: Nasal cannula Oxygen Delivery: 2 LPM  Vital Signs with Ranges  Temp:  [97.6  F (36.4  C)] 97.6  F (36.4  C)  Pulse:  [105-149] 105  Resp:  [12-48] 24  BP: (123-180)/() 133/110  SpO2:  [90 %-99 %] 98 %  195 lbs 1.71 oz    Constitutional: Awake,alert, no acute distress  HEENT: Normocephalic, mucous membranes pink and moist  Respiratory: Diminished bilateral bases with course crackles, no wheezes, rhonchi, cough.  Cardiovascular: HR irregular, no murmurs, rubs, thrills.   GI: Soft,nontender, bowel sounds hypoactive   Skin: No rashes, open areas or bruising  Musculoskeletal: Moves all extremities well  Neurologic: AOx3  Psychiatric: Calm and cooperative     Data   Data reviewed today:  I personally reviewed .  Recent Labs   Lab 09/21/23  2621    WBC 10.1   HGB 13.1   MCV 88      INR 1.22*   *   POTASSIUM 4.2   CHLORIDE 102   CO2 21*   BUN 11.6   CR 0.72   ANIONGAP 12   CARINA 9.4   GLC 95   ALBUMIN 3.9   PROTTOTAL 6.4   BILITOTAL 1.3*   ALKPHOS 159*   ALT 19   AST 26   LIPASE 19       Recent Results (from the past 24 hour(s))   POC US ECHO LIMITED    Impression    [facility] Procedure Note      Limited Bedside ED Cardiac Ultrasound:    PROCEDURE: PERFORMED BY: Dr. Carloz Ibarra MD  INDICATIONS/SYMPTOM:  Shortness of Breath  PROBE: Cardiac phased array probe  BODY LOCATION: Chest  FINDINGS:   The ultrasound was performed utilizing the subcostal, parasternal long axis, parasternal short axis, and apical 4 chamber views.  Cardiac contractility:  Present  Gross estimation of cardiac kinesis: severely depressed  Pericardial Effusion:  None  RV:LV ratio: LV > RV  INTERPRETATION:    Decrease in LVEF, No pericardial effusion was found.  IVC visualized and findings indicate unable to estimate.  IMAGE DOCUMENTATION: Images were archived to PACs system.         XR Chest Port 1 View    Narrative    PROCEDURE:  XR CHEST PORT 1 VIEW    HISTORY: dyspnea, suspect chf exacerbation    COMPARISON:  Radiographs 7/29/2023, 7/24/2023    FINDINGS: Single view chest radiograph  Postsurgical changes of the chest.  Cardiomediastinal silhouette is enlarged, stable. There is calcific  aortic atherosclerosis.  Small pleural effusions. Interstitial and patchy bibasilar pulmonary  opacities. No pneumothorax.    No suspicious osseous lesion or subdiaphragmatic free air.      Impression    IMPRESSION:   Changes in the chest which are likely due to heart failure, including  small effusions, cardiomegaly and bilateral pulmonary opacities.  Pneumonia cannot be excluded.    TETE WYLIE MD         SYSTEM ID:  [id number]

## 2023-09-21 NOTE — ED PROVIDER NOTES
History     Chief Complaint   Patient presents with    Shortness of Breath     HPI  Heather Rosas is a 72 year old female who is here with difficulty breathing.  Onset 1 week ago, has been getting worse.  Off her meds the last 4 days.  History of heart failure, 4 stents.  Coughing.    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 Oconnell.       Status post chemotherapy 09/25/2020     Priority: Medium     Added automatically from request for surgery [id number]      Atrial fibrillation (H) 11/26/2018     Priority: Medium    Hyponatremia 10/30/2018     Priority: Medium    Moderate mitral regurgitation 10/30/2018     Priority: Medium    Adenocarcinoma of the endometrium/uterus (H) 10/09/2018     Priority: Medium     Discovered on 02/18 biopsy. S/p resection and chemo. Pt lost to follow up.   High-grade endometrial adenocarcinoma of the uterus with staging consistent with pathologic T1b N0 I plus or stage 1B with isolated tumor cells involving one of the periaortic lymph nodes.       Chronic diastolic congestive heart failure (H) 09/20/2018     Priority: Medium    S/P CABG x 4 09/10/2018     Priority: Medium    HTN (hypertension)      Priority: Medium    ANNA (generalized anxiety  disorder) 01/01/2011     Priority: Medium    GERD (Gastroesophageal reflux disease) 01/01/2011     Priority: Medium    Schizophrenia (H) 01/01/2011     Priority: Medium    Seasonal allergic rhinitis 01/01/2011     Priority: Medium    Fibromyalgia 06/14/2004     Priority: Medium    Dyslipidemia 11/26/2003     Priority: Medium        Past Medical History:    Past Medical History:   Diagnosis Date    Acute diastolic congestive heart failure (H) 10/12/2021    Acute exacerbation of CHF (congestive heart failure) (H) 10/11/2021    Allergic rhinitis 01/01/2011    Anxiety 01/01/2011    Anxiety state, unspecified     Atrial fibrillation with RVR (H) 07/07/2023    Atrial flutter with rapid ventricular response (H) 10/12/2021    CVA (cerebral vascular accident) (H) 05/14/2018    Dyslipidemia 11/26/2003    fibromyalgia 06/14/2004    GERD, Esophageal reflux 01/01/2011    History of non-ST elevation myocardial infarction (NSTEMI) 09/06/2018    HTN (hypertension)     Major depression, recurrent (H) 01/01/2011    Major depressive disorder, recurrent episode, moderate (H) 01/01/2011    Metrorrhagia 02/18/2018    Non compliance with medical treatment 07/07/2023    Nonorganic sleep disorder, unspecified     Obesity 01/01/2011    Obesity (H) 01/01/2011    Schizophrenia (H) 01/01/2011    Toxic shock syndrome (H) 2006       Past Surgical History:    Past Surgical History:   Procedure Laterality Date    ANGIOGRAM  02/2005    Normal     BIOPSY BREAST  1984    LT, normal    BYPASS GRAFT ARTERY CORONARY  09/10/2018    CARPAL TUNNEL RELEASE RT/LT  2005    RT, carpal tunnel    COLONOSCOPY  2011    Repeat in ten years     COLONOSCOPY  1996    COLONOSCOPY  2004    DILATION AND CURETTAGE, HYSTEROSCOPY, ABLATE ENDOMETRIUM, COMBINED N/A 2/19/2018    Procedure: COMBINED DILATION AND CURETTAGE, HYSTEROSCOPY, ABLATE ENDOMETRIUM;  HYSTEROSCOPY DILATION AND CURETTAGE, ENDOMETRIAL ABLATION;  Surgeon: Jose Nettles MD;  Location: [facility] OR    HYSTERECTOMY  "TOTAL ABD, NICK SALPINGO-OOPHORECTOMY, NODE DISSECTION, TUMOR DEBULKING, COMBINED  10/30/2018    INSERT PORT VASCULAR ACCESS N/A 12/11/2018    Procedure: PORT-A-CATH PLACEMENT;  Surgeon: Rafi Trinidad MD;  Location: [facility] OR    placement of central line  2005    REMOVE PORT VASCULAR ACCESS N/A 10/6/2020    Procedure: port a cath removal;  Surgeon: Rafi Trinidad MD;  Location: [facility] OR       Family History:    Family History   Problem Relation Age of Onset    C.A.D. Father 45        (cause of death)     Other - See Comments Father         rheumatic fever     Allergies Father     Cancer Sister 56        Esophageal to bone cancer    Gastrointestinal Disease Mother         GERD    Cancer Mother         pancreatic ca (cause of death) /liver ca    Breast Cancer Maternal Aunt     Breast Cancer Maternal Aunt     Colon Cancer Paternal Aunt         (cause of death)     Crohn's Disease Other     Depression Maternal Uncle     Depression Maternal Aunt     Diabetes Paternal Grandmother         type 2    Neurologic Disorder Brother         neuropathy    Cancer Brother 58        Tonsilular cancer/ lymph node in neck    Allergies Brother     Breast Cancer Cousin     Breast Cancer Cousin     Breast Cancer Cousin        Social History:  Marital Status:   [4]  Social History     Tobacco Use    Smoking status: Every Day     Packs/day: 1.00     Years: 30.00     Pack years: 30.00     Types: Cigarettes    Smokeless tobacco: Never    Tobacco comments:     pt declined, stated she would use, \"the patch\". Patient has not had a cigarette in 1 week because she was in the hospital   Vaping Use    Vaping Use: Never used   Substance Use Topics    Alcohol use: No     Comment: rare    Drug use: No        Medications:    albuterol (PROAIR HFA/PROVENTIL HFA/VENTOLIN HFA) 108 (90 Base) MCG/ACT inhaler  aspirin 81 MG EC tablet  atorvastatin (LIPITOR) 40 MG tablet  DILT- MG 24 hr capsule  lisinopril (ZESTRIL) 10 MG tablet  metoprolol " succinate ER (TOPROL XL) 100 MG 24 hr tablet  nicotine (NICODERM CQ) 14 MG/24HR 24 hr patch  nystatin (MYCOSTATIN) 136328 UNIT/GM external powder  pantoprazole (PROTONIX) 40 MG EC tablet  torsemide (DEMADEX) 10 MG tablet  warfarin ANTICOAGULANT (COUMADIN) 5 MG tablet          Review of Systems   Constitutional:  Negative for chills and fever.   Respiratory:  Positive for cough and shortness of breath.    All other systems reviewed and are negative.      Physical Exam   BP: (!) 171/128  Pulse: (!) 146  Temp: 97.6  F (36.4  C)  Resp: (!) 32  Weight: 94.3 kg (207 lb 14.3 oz)  SpO2: (!) 90 %      Physical Exam  Constitutional:       General: She is in acute distress.      Appearance: She is not diaphoretic.   HENT:      Head: Normocephalic and atraumatic.      Right Ear: External ear normal.      Left Ear: External ear normal.      Nose: No congestion or rhinorrhea.      Mouth/Throat:      Pharynx: Oropharynx is clear. No oropharyngeal exudate.   Eyes:      General: No scleral icterus.     Pupils: Pupils are equal, round, and reactive to light.   Cardiovascular:      Rate and Rhythm: Regular rhythm. Tachycardia present.      Heart sounds: Normal heart sounds.   Pulmonary:      Effort: Tachypnea and respiratory distress present. No accessory muscle usage.      Breath sounds: Decreased breath sounds and rales present.   Abdominal:      General: Bowel sounds are normal.      Palpations: Abdomen is soft.      Tenderness: There is no abdominal tenderness.   Musculoskeletal:         General: No tenderness.      Cervical back: Normal range of motion and neck supple.      Right lower leg: Edema present.      Left lower leg: Edema present.   Skin:     General: Skin is warm.      Capillary Refill: Capillary refill takes less than 2 seconds.      Findings: No rash.   Neurological:      Mental Status: Mental status is at baseline.      Cranial Nerves: No cranial nerve deficit.   Psychiatric:         Mood and Affect: Mood normal.          Behavior: Behavior normal.         ED Course              ED Course as of 09/21/23 0754   Thu Sep 21, 2023   0753 Discussed the case with Dr. Aiken who came to the ED to evaluate the patient and she will be admitted to intensive care.  Her care was turned over to me by Dr. Layne at 7 AM.  Ozzy Buck     Procedures    Results for orders placed during the hospital encounter of 09/21/23    POC US ECHO LIMITED    Impression  [facility] Procedure Note    Limited Bedside ED Cardiac Ultrasound:    PROCEDURE: PERFORMED BY: Dr. Carloz Ibarra MD  INDICATIONS/SYMPTOM:  Shortness of Breath  PROBE: Cardiac phased array probe  BODY LOCATION: Chest  FINDINGS:  The ultrasound was performed utilizing the subcostal, parasternal long axis, parasternal short axis, and apical 4 chamber views.  Cardiac contractility:  Present  Gross estimation of cardiac kinesis: severely depressed  Pericardial Effusion:  None  RV:LV ratio: LV > RV  INTERPRETATION:    Decrease in LVEF, No pericardial effusion was found.  IVC visualized and findings indicate unable to estimate.  IMAGE DOCUMENTATION: Images were archived to PACs system.            EKG Interpretation:      Interpreted by Carloz Ibarra MD  Time reviewed:   Symptoms at time of EKG: dyspnea   Rhythm: sinus tachycardia  Rate: Tachycardia  Axis: Right Axis Deviation  Ectopy: none  Conduction: right bundle branch block (complete)  ST Segments/ T Waves: No acute ischemic changes  Q Waves: nonspecific  Comparison to prior:     Clinical Impression: sinus tach, rbbb            Critical Care time:  was 69 minutes for this patient excluding procedures.             Results for orders placed or performed during the hospital encounter of 09/21/23 (from the past 24 hour(s))   POC US ECHO LIMITED    Impression    [facility] Procedure Note      Limited Bedside ED Cardiac Ultrasound:    PROCEDURE: PERFORMED BY: Dr. Carloz Ibarra MD  INDICATIONS/SYMPTOM:  Shortness of Breath  PROBE:  Cardiac phased array probe  BODY LOCATION: Chest  FINDINGS:   The ultrasound was performed utilizing the subcostal, parasternal long axis, parasternal short axis, and apical 4 chamber views.  Cardiac contractility:  Present  Gross estimation of cardiac kinesis: severely depressed  Pericardial Effusion:  None  RV:LV ratio: LV > RV  INTERPRETATION:    Decrease in LVEF, No pericardial effusion was found.  IVC visualized and findings indicate unable to estimate.  IMAGE DOCUMENTATION: Images were archived to PACs system.         EKG 12 lead   Result Value Ref Range    Systolic Blood Pressure  mmHg    Diastolic Blood Pressure  mmHg    Ventricular Rate 146 BPM    Atrial Rate 146 BPM    UT Interval 112 ms    QRS Duration 124 ms     ms    QTc 498 ms    P Axis  degrees    R AXIS 99 degrees    T Axis 38 degrees    Interpretation ECG       Sinus tachycardia  Right bundle branch block  Abnormal ECG  No previous ECGs available     CBC with platelets differential    Narrative    The following orders were created for panel order CBC with platelets differential.  Procedure                               Abnormality         Status                     ---------                               -----------         ------                     CBC with platelets and d...[[id number]]  Abnormal            Final result                 Please view results for these tests on the individual orders.   INR   Result Value Ref Range    INR 1.22 (H) 0.85 - 1.15   Comprehensive metabolic panel   Result Value Ref Range    Sodium 135 (L) 136 - 145 mmol/L    Potassium 4.2 3.4 - 5.3 mmol/L    Chloride 102 98 - 107 mmol/L    Carbon Dioxide (CO2) 21 (L) 22 - 29 mmol/L    Anion Gap 12 7 - 15 mmol/L    Urea Nitrogen 11.6 8.0 - 23.0 mg/dL    Creatinine 0.72 0.51 - 0.95 mg/dL    Calcium 9.4 8.8 - 10.2 mg/dL    Glucose 95 70 - 99 mg/dL    Alkaline Phosphatase 159 (H) 35 - 104 U/L    AST 26 0 - 45 U/L    ALT 19 0 - 50 U/L    Protein Total 6.4 6.4 - 8.3 g/dL     Albumin 3.9 3.5 - 5.2 g/dL    Bilirubin Total 1.3 (H) <=1.2 mg/dL    GFR Estimate 89 >60 mL/min/1.73m2   Lipase   Result Value Ref Range    Lipase 19 13 - 60 U/L   Lactic acid whole blood   Result Value Ref Range    Lactic Acid 1.5 0.7 - 2.0 mmol/L   Procalcitonin   Result Value Ref Range    Procalcitonin 0.06 (H) <0.05 ng/mL   Troponin T, High Sensitivity   Result Value Ref Range    Troponin T, High Sensitivity 23 (H) <=14 ng/L   Blood gas venous and oxyhgb   Result Value Ref Range    pH Venous 7.36 7.32 - 7.43    pCO2 Venous 42 40 - 50 mm Hg    pO2 Venous 51 (H) 25 - 47 mm Hg    Bicarbonate Venous 24 21 - 28 mmol/L    FIO2 21     Oxyhemoglobin Venous 79 (H) 70 - 75 %    Base Excess/Deficit -2.0 -7.7 - 1.9 mmol/L   Nt probnp inpatient (BNP)   Result Value Ref Range    N terminal Pro BNP Inpatient 3,629 (H) 0 - 900 pg/mL   CBC with platelets and differential   Result Value Ref Range    WBC Count 10.1 4.0 - 11.0 10e3/uL    RBC Count 4.51 3.80 - 5.20 10e6/uL    Hemoglobin 13.1 11.7 - 15.7 g/dL    Hematocrit 39.7 35.0 - 47.0 %    MCV 88 78 - 100 fL    MCH 29.0 26.5 - 33.0 pg    MCHC 33.0 31.5 - 36.5 g/dL    RDW 17.2 (H) 10.0 - 15.0 %    Platelet Count 252 150 - 450 10e3/uL    % Neutrophils 77 %    % Lymphocytes 12 %    % Monocytes 8 %    % Eosinophils 1 %    % Basophils 1 %    % Immature Granulocytes 1 %    NRBCs per 100 WBC 0 <1 /100    Absolute Neutrophils 7.8 1.6 - 8.3 10e3/uL    Absolute Lymphocytes 1.3 0.8 - 5.3 10e3/uL    Absolute Monocytes 0.8 0.0 - 1.3 10e3/uL    Absolute Eosinophils 0.1 0.0 - 0.7 10e3/uL    Absolute Basophils 0.1 0.0 - 0.2 10e3/uL    Absolute Immature Granulocytes 0.1 <=0.4 10e3/uL    Absolute NRBCs 0.0 10e3/uL   XR Chest Port 1 View    Narrative    PROCEDURE:  XR CHEST PORT 1 VIEW    HISTORY: dyspnea, suspect chf exacerbation    COMPARISON:  Radiographs 7/29/2023, 7/24/2023    FINDINGS: Single view chest radiograph  Postsurgical changes of the chest.  Cardiomediastinal silhouette is enlarged,  stable. There is calcific  aortic atherosclerosis.  Small pleural effusions. Interstitial and patchy bibasilar pulmonary  opacities. No pneumothorax.    No suspicious osseous lesion or subdiaphragmatic free air.      Impression    IMPRESSION:   Changes in the chest which are likely due to heart failure, including  small effusions, cardiomegaly and bilateral pulmonary opacities.  Pneumonia cannot be excluded.    TETE WYLIE MD         SYSTEM ID:  [id number]       Medications   nitroGLYcerin 50 mg in D5W 250 mL (adult std) infusion CENTRAL (50 mcg/min Intravenous $New Bag 9/21/23 0657)   metoprolol (LOPRESSOR) injection 5 mg (has no administration in time range)   furosemide (LASIX) injection 80 mg (80 mg Intravenous $Given 9/21/23 0709)       Assessments & Plan (with Medical Decision Making)     I have reviewed the nursing notes.    I have reviewed the findings, diagnosis, plan and need for follow up with the patient.    Critical Care Addendum  My initial assessment, based on my review of prehospital provider report, review of nursing observations, review of vital signs, focused history, physical exam, and review of cardiac rhythm monitor, established a high suspicion that Heather Rosas has respiratory distress, which requires immediate intervention, and therefore she is critically ill.     After the initial assessment, the care team initiated multiple lab tests and initiated medication therapy with nitro drip  to provide stabilization care. Due to the critical nature of this patient, I reassessed nursing observations, vital signs, physical exam, review of cardiac rhythm monitor, 12 lead ECG analysis, mental status, neurologic status, and respiratory status multiple times prior to her disposition.     Time also spent performing documentation, reviewing test results, and coordination of care.     Critical care time (excluding teaching time and procedures): 69 minutes.         Medical Decision Making  The patient's  presentation was of high complexity (an acute health issue posing potential threat to life or bodily function).    The patient's evaluation involved:  ordering and/or review of 3+ test(s) in this encounter (see separate area of note for details)    The patient's management necessitated high risk (a decision regarding hospitalization).    72-year-old female here with dyspnea.  Off her meds for 4 days, suspect CHF exacerbation.  Markedly hypertensive, tacky.  We will start nitro drip.  Will need admission.  Will also need diuresis.    New Prescriptions    No medications on file       Final diagnoses:   Acute on chronic congestive heart failure, unspecified heart failure type (H)       9/21/2023   [facility]       Carloz Ibarra MD  09/21/23 0659       Cristino Buck MD  09/21/23 2952

## 2023-09-21 NOTE — PROGRESS NOTES
Chart reviewed    History and CXR consistent with CHF and hypertension with known diastolic dysfunction.    Currently on NTG gtt    Would additonal metoprolol for HR and BP  Lasix IV for pulmonary edema    WBC and CXR not consistent with pneumonia    Olena Blackwood

## 2023-09-21 NOTE — ED NOTES
Pt SOB, denies chest pain or pressure. Crackles in lungs. Pt hx of CHF and AFIB. Pt states that she has been out of all her cardiac meds for a few days. Pt denies chest pain or nausea. Pt is A/Ox4. Bilateral +1 pitting in ankles. EMS reports after giving nitroglycerin she dropped 100 pt systolic.

## 2023-09-21 NOTE — MEDICATION SCRIBE - ADMISSION MEDICATION HISTORY
Medication Scribe Admission Medication History    Admission medication history is complete. The information provided in this note is only as accurate as the sources available at the time of the update.    Medication reconciliation/reorder completed by provider prior to medication history? Yes    Information Source(s): Patient, Patient's pharmacy, and CareEverywhere/SureScripts via in-person    Coumadin information:  INR date: 8/7/23  INR result: 3.52  Next INR date: 8/11/23  Range: 2.0-3.0  Indication: a-fib    Coumadin strength/instructions: 2.5 mg MWF, 5mg ROW  Tablet strength: 5 mg    Time of day patient takes coumadin at home: 11 AM  Exact last dose/time patient took PTA: Monday 9/18/23 11 AM pt reports taking 5 mg (pt should be out of medication at home-suspected non-compliancy)    Patient's coumadin clinic facility and contact:   Fax number for discharge instructions (if applicable): NA    Pertinent Information:   Patient manages her own medications and is a fair historian.   Pt last filled all medications on 8/3/23 and should be have been out of all medications at home at the beginning of the month. Pt reports she ran out 4 days ago. Possible non-compliancy.   Torsemide- pt reports she decreased her dose to 20 mg daily to stretch out her tablets and make them last.   Coumadin- unsure if pt was taking as prescribed. Pt has not followed through with INR appointments.   Protonix- no fill hx- pt reports not on any medications for stomach related issues.   Nicotine cessation- reports she quit smoking on August 1st and does not need patches ordered.   Pt requested requip for RLS. Attending notified.       Changes made to PTA medication list:  Added: None  Deleted: None  Changed: updated missing information    Medication Affordability: did not assess     Allergies reviewed with patient and updates made in EHR: yes    Medication History Completed By: Beth Nails 9/21/2023 10:42 AM    Prior to Admission medications     Medication Sig Last Dose Taking? Auth Provider Long Term End Date   albuterol (PROAIR HFA/PROVENTIL HFA/VENTOLIN HFA) 108 (90 Base) MCG/ACT inhaler Inhale 2 puffs into the lungs every 6 hours as needed for shortness of breath, wheezing or cough Unknown Yes Nicolette Huffman MD Yes    aspirin 81 MG EC tablet Take 81 mg by mouth daily 9/20/2023 at 1100 Yes Reported, Patient     atorvastatin (LIPITOR) 40 MG tablet Take 1 tablet by mouth At Bedtime Past Week at HS Yes Reported, Patient No    DILT- MG 24 hr capsule TAKE 1 CAPSULE(120 MG) BY MOUTH DAILY Past Week at 1100 Yes Nicolette Huffman MD Yes    lisinopril (ZESTRIL) 10 MG tablet Take 1 tablet by mouth daily Past Week at 1100 Yes Reported, Patient No    metoprolol succinate ER (TOPROL XL) 100 MG 24 hr tablet TAKE 1 TABLET(100 MG) BY MOUTH DAILY Past Week Yes Nicolette Huffman MD Yes    nystatin (MYCOSTATIN) 595771 UNIT/GM external powder Apply 1 g topically 3 times daily Apply to affected area: groin Indications: Infection More than a month Yes Reported, Patient     torsemide (DEMADEX) 10 MG tablet Take 30 mg by mouth daily Past Week at 1100 Yes Reported, Patient Yes    nicotine (NICODERM CQ) 14 MG/24HR 24 hr patch Place 1 patch onto the skin every 24 hours  Patient not taking: Reported on 9/21/2023 Not Taking  Reported, Patient     pantoprazole (PROTONIX) 40 MG EC tablet Take 40 mg by mouth daily  Patient not taking: Reported on 9/21/2023 Not Taking  Reported, Patient     warfarin ANTICOAGULANT (COUMADIN) 5 MG tablet Take 1/2 tablet (2.5 mg) by mouth on Monday, Wednesday and Friday. Take 1 tablet (5 mg) all other days or as directed by anticoagulation. Takes at 11:00 AM. 9/18/2023 at 1100  Reported, Patient

## 2023-09-21 NOTE — CARE PLAN
[facility] Inpatient Admission Note:    Patient admitted to 3124/3124-1 at approximately 0955 via cart accompanied by nurse from emergency room . Report received from LORENA Martin in SBAR format at 0935 via telephone. Patient ambulated to bed via self.. Patient is alert and oriented X 3, denies pain; rates at 0 on 0-10 scale.  Patient oriented to room, unit, hourly rounding, and plan of care. Explained admission packet and patient handbook with patient bill of rights brochure. Will continue to monitor and document as needed.     Inpatient Nursing criteria listed below was met:    Health care directives status obtained and documented: No    Patient identifies a surrogate decision maker: Yes If yes, who:Montrell-son Contact Information:[phone]     If initial lactic acid greater than 2.0, repeat lactic acid drawn within one hour of arrival to unit: NA. If no, state reason: no    Clergy visit ordered if patient requests: Yes    Skin issues/needs documented: N/A    Isolation Patient: no Education given, correct sign in place and documentation row added to PCS:  No    Fall Prevention Yes: Care plan updated, education given and documented, sticker and magnet in place: Yes    Care Plan initiated: Yes    Education Documented (including assessment): Yes    Patient has discharge needs : No If yes, please explain:unknown any at this time

## 2023-09-21 NOTE — PLAN OF CARE
Goal Outcome Evaluation:         A&O. BP stable, HR ranging 80-120s aflutter. Afebrile. Remains on 2L d/t desatting to 90% and increased WOB when attempted to wean. Denies pain. PIV x2 SL. Receiving IV lasix every 8 hr, output 4075ml since ICU admit. Low sodium diet, good appetite. Up with SBA to BSC.      Face to face report given with opportunity to observe patient.    Report given to LORENA Breaux RN   9/21/2023  7:04 PM

## 2023-09-22 VITALS
SYSTOLIC BLOOD PRESSURE: 122 MMHG | OXYGEN SATURATION: 93 % | TEMPERATURE: 98.2 F | RESPIRATION RATE: 20 BRPM | BODY MASS INDEX: 37.64 KG/M2 | DIASTOLIC BLOOD PRESSURE: 74 MMHG | HEIGHT: 59 IN | WEIGHT: 186.73 LBS | HEART RATE: 108 BPM

## 2023-09-22 PROBLEM — I48.91 ATRIAL FIBRILLATION WITH RVR (H): Status: ACTIVE | Noted: 2023-09-22

## 2023-09-22 LAB
ANION GAP SERPL CALCULATED.3IONS-SCNC: 12 MMOL/L (ref 7–15)
BUN SERPL-MCNC: 13.4 MG/DL (ref 8–23)
CALCIUM SERPL-MCNC: 9.2 MG/DL (ref 8.8–10.2)
CHLORIDE SERPL-SCNC: 97 MMOL/L (ref 98–107)
CREAT SERPL-MCNC: 0.87 MG/DL (ref 0.51–0.95)
DEPRECATED HCO3 PLAS-SCNC: 29 MMOL/L (ref 22–29)
EGFRCR SERPLBLD CKD-EPI 2021: 71 ML/MIN/1.73M2
ERYTHROCYTE [DISTWIDTH] IN BLOOD BY AUTOMATED COUNT: 17.2 % (ref 10–15)
GLUCOSE SERPL-MCNC: 95 MG/DL (ref 70–99)
HCT VFR BLD AUTO: 40.3 % (ref 35–47)
HGB BLD-MCNC: 12.8 G/DL (ref 11.7–15.7)
INR PPP: 1.16 (ref 0.85–1.15)
MCH RBC QN AUTO: 28.6 PG (ref 26.5–33)
MCHC RBC AUTO-ENTMCNC: 31.8 G/DL (ref 31.5–36.5)
MCV RBC AUTO: 90 FL (ref 78–100)
PLATELET # BLD AUTO: 251 10E3/UL (ref 150–450)
POTASSIUM SERPL-SCNC: 3.6 MMOL/L (ref 3.4–5.3)
RBC # BLD AUTO: 4.48 10E6/UL (ref 3.8–5.2)
SODIUM SERPL-SCNC: 138 MMOL/L (ref 136–145)
WBC # BLD AUTO: 10 10E3/UL (ref 4–11)

## 2023-09-22 PROCEDURE — G0378 HOSPITAL OBSERVATION PER HR: HCPCS

## 2023-09-22 PROCEDURE — 99239 HOSP IP/OBS DSCHRG MGMT >30: CPT | Performed by: NURSE PRACTITIONER

## 2023-09-22 PROCEDURE — 85610 PROTHROMBIN TIME: CPT | Performed by: NURSE PRACTITIONER

## 2023-09-22 PROCEDURE — 250N000011 HC RX IP 250 OP 636: Mod: JZ | Performed by: NURSE PRACTITIONER

## 2023-09-22 PROCEDURE — 96376 TX/PRO/DX INJ SAME DRUG ADON: CPT

## 2023-09-22 PROCEDURE — 80048 BASIC METABOLIC PNL TOTAL CA: CPT | Performed by: NURSE PRACTITIONER

## 2023-09-22 PROCEDURE — 36415 COLL VENOUS BLD VENIPUNCTURE: CPT | Performed by: NURSE PRACTITIONER

## 2023-09-22 PROCEDURE — 250N000013 HC RX MED GY IP 250 OP 250 PS 637: Performed by: NURSE PRACTITIONER

## 2023-09-22 PROCEDURE — 85027 COMPLETE CBC AUTOMATED: CPT | Performed by: NURSE PRACTITIONER

## 2023-09-22 RX ORDER — WARFARIN SODIUM 2.5 MG/1
5 TABLET ORAL
Status: DISCONTINUED | OUTPATIENT
Start: 2023-09-22 | End: 2023-09-22 | Stop reason: HOSPADM

## 2023-09-22 RX ORDER — WARFARIN SODIUM 2.5 MG/1
7.5 TABLET ORAL
Status: DISCONTINUED | OUTPATIENT
Start: 2023-09-22 | End: 2023-09-22

## 2023-09-22 RX ORDER — DILTIAZEM HYDROCHLORIDE 120 MG/1
120 CAPSULE, EXTENDED RELEASE ORAL DAILY
Qty: 30 CAPSULE | Refills: 0 | Status: ON HOLD | OUTPATIENT
Start: 2023-09-22 | End: 2024-01-01

## 2023-09-22 RX ORDER — POTASSIUM CHLORIDE 750 MG/1
20 CAPSULE, EXTENDED RELEASE ORAL DAILY
Qty: 30 CAPSULE | Refills: 0 | Status: SHIPPED | OUTPATIENT
Start: 2023-09-22 | End: 2023-01-01

## 2023-09-22 RX ORDER — PANTOPRAZOLE SODIUM 40 MG/1
40 TABLET, DELAYED RELEASE ORAL
Qty: 30 TABLET | Refills: 0 | Status: ON HOLD | OUTPATIENT
Start: 2023-09-22 | End: 2024-01-01

## 2023-09-22 RX ORDER — METOPROLOL SUCCINATE 100 MG/1
100 TABLET, EXTENDED RELEASE ORAL DAILY
Qty: 30 TABLET | Refills: 0 | Status: ON HOLD | OUTPATIENT
Start: 2023-09-22 | End: 2024-01-01

## 2023-09-22 RX ORDER — TORSEMIDE 20 MG/1
60 TABLET ORAL DAILY
Qty: 90 TABLET | Refills: 0 | Status: ON HOLD | OUTPATIENT
Start: 2023-09-22 | End: 2024-01-01

## 2023-09-22 RX ORDER — WARFARIN SODIUM 5 MG/1
TABLET ORAL
Qty: 30 TABLET | Refills: 0 | Status: ON HOLD | COMMUNITY
Start: 2023-09-22 | End: 2024-01-01

## 2023-09-22 RX ORDER — LISINOPRIL 10 MG/1
10 TABLET ORAL DAILY
Qty: 30 TABLET | Refills: 0 | Status: ON HOLD | OUTPATIENT
Start: 2023-09-22 | End: 2024-01-01

## 2023-09-22 RX ORDER — ATORVASTATIN CALCIUM 40 MG/1
40 TABLET, FILM COATED ORAL AT BEDTIME
Qty: 30 TABLET | Refills: 0 | Status: ON HOLD | OUTPATIENT
Start: 2023-09-22 | End: 2024-01-01

## 2023-09-22 RX ADMIN — FUROSEMIDE 40 MG: 10 INJECTION, SOLUTION INTRAVENOUS at 05:22

## 2023-09-22 RX ADMIN — PANTOPRAZOLE SODIUM 40 MG: 40 TABLET, DELAYED RELEASE ORAL at 07:48

## 2023-09-22 RX ADMIN — LISINOPRIL 10 MG: 10 TABLET ORAL at 07:48

## 2023-09-22 RX ADMIN — DILTIAZEM HYDROCHLORIDE 120 MG: 120 CAPSULE, COATED, EXTENDED RELEASE ORAL at 07:49

## 2023-09-22 RX ADMIN — POTASSIUM CHLORIDE 40 MEQ: 10 CAPSULE, COATED, EXTENDED RELEASE ORAL at 07:49

## 2023-09-22 RX ADMIN — SENNOSIDES AND DOCUSATE SODIUM 1 TABLET: 50; 8.6 TABLET ORAL at 02:43

## 2023-09-22 RX ADMIN — ASPIRIN 81 MG: 81 TABLET, COATED ORAL at 07:49

## 2023-09-22 RX ADMIN — METOPROLOL SUCCINATE 100 MG: 100 TABLET, EXTENDED RELEASE ORAL at 07:48

## 2023-09-22 ASSESSMENT — ACTIVITIES OF DAILY LIVING (ADL)
ADLS_ACUITY_SCORE: 20
ADLS_ACUITY_SCORE: 22
DEPENDENT_IADLS:: INDEPENDENT
ADLS_ACUITY_SCORE: 20
ADLS_ACUITY_SCORE: 20
ADLS_ACUITY_SCORE: 22
ADLS_ACUITY_SCORE: 22

## 2023-09-22 NOTE — PHARMACY-ANTICOAGULATION SERVICE
Pharmacy Consult-Warfarin Assessment Day #2    Heather Rosas is a 72 year old female admitted on 9/21/2023 with Acute on chronic diastolic congestive heart failure (H)    Primary Indication(s) for Anticoagulation: A-fib      Goal INR: 2-3    FYI, patient is followed by the Anticoagulation/Protime Clinic at: [facility]    Patient previously anticoagulated on Coumadin at a dose of 2.5 mg MWF, 5mg ROW      Per medication scribe notes:  Patient last took warfarin on 9/18/23  Patient should be out of medication at home (non-compliancy suspected)  Unsure if patient was taking as prescribed. Patient has not followed through with INR appointments. Last INR check was on 8/7/23 and has not followed up since then     Home tablet strength(s): 5 mg     Factors that may increase patient's bleeding risk and/or sensitivity to warfarin (Coumadin) include:   Advanced age, aspirin    Anticoagulation Dose History  More data exists         Latest Ref Rng & Units 5/14/2018 10/12/2021 10/13/2021 7/29/2023 8/7/2023 9/21/2023 9/22/2023   Recent Dosing and Labs   warfarin ANTICOAGULANT (COUMADIN) tablet 5 mg - - - - - - 5 mg, $Given -   INR 0.85 - 1.15 1.01  1.0     1.1     1.04  3.52  1.09  1.22  1.16       Details          This result is from an external source.    Multiple values from one day are sorted in reverse-chronological order             CBC RESULTS:   Recent Labs   Lab Test 09/22/23  0437   HGB 12.8        Assessment/Plan: INR subtherapeutic. Patient is discharging today with discharge instructions to take warfarin 5 mg by mouth on 9/22/23, 5 mg on 9/23/23, and 5 mg on 9/24/23. Follow up with anticoagulation clinic on 9/25/23 for INR and further dosing instructions.    Amy Ling [facility] ....................  9/22/2023   7:21 AM

## 2023-09-22 NOTE — DISCHARGE SUMMARY
"[facility]    Discharge Summary  Hospitalist    Date of Admission:  9/21/2023  Date of Discharge:  9/22/2023 12:10 PM  Discharging Provider: Claudia Chino NP  Date of Service (when I saw the patient): 09/22/23    Discharge Diagnoses     Principal Problem:    Acute on chronic diastolic congestive heart failure (H)  Active Problems:    Atrial fibrillation (H)    HTN (hypertension)    Schizophrenia (H)    Adenocarcinoma of the endometrium/uterus (H)    Chronic diastolic congestive heart failure (H)    Tobacco dependence      History of Present Illness   Heather Rosas is a 72 year old female who presents with shortness of breath that has been progressive over the last several days. She states she has not taken any of her medications for 4 days because she ran out. She has also been taking on 20mg torsemide to \"stretch it out\" for the last few wakes. She states the only medications she still has been taking is her Coumadin. Per fill history she should have needed medications refilled early this month. She states she did not get her medications because she needs to be seen by PCP in order to continue prescribing them and has an appointment next month to do so. She does feel tachycardia but denies chest pain, abdominal pain, nausea, diuresis. She was started on nitroglycerin gtt primarily to reduce blood pressure which was 173/128, given single does of IV lopressor and lasix. Admit for further care.     Hospital Course     Acute on chronic diastolic congestive heart failure: Likely due to uncontrolled A-fib from not taking medications and not taking torsemide also contributing. Will get her back on her home medications- Cardizem, Metoprolol and add IV if needed. Will stop nitroglycerin gtt in order to start home medications.   -9/22: After restarting her home medications her hr has been stable , blood pressure has been stable, has lost 7 liters and 9 pounds. Maintaining >90% o2 SATS on room air. " "Discharge home. Did provide her with 30 days worth of prescriptions until she can return to be seen by PCP which is already scheduled.        Chronic Atrial fibrillation (H): rate uncontrolled in 140's but no chest pain, does have mild dyspnea. Restart meds as above.        HTN (hypertension): Uncontrolled due to noncompliance.       Tobacco dependence: Nicotine patch       Schizophrenia (H)    Adenocarcinoma of the endometrium/uterus (H)                    # Drug Induced Coagulation Defect: home medication list includes an anticoagulant medication  # Drug Induced Platelet Defect: home medication list includes an antiplatelet medication   # Hypertension: Noted on problem list  # Acute heart failure with preserved ejection fraction: heart failure noted on problem list, last echo with EF >50%, and receiving IV diuretics     # Obesity: Estimated body mass index is 39.41 kg/m  as calculated from the following:    Height as of this encounter: 1.499 m (4' 11\").    Weight as of this encounter: 88.5 kg (195 lb 1.7 oz).        # History of CABG: noted on surgical history    Claudia Chino CNP      Pending Results     Unresulted Labs Ordered in the Past 30 Days of this Admission       No orders found for last 31 day(s).            Code Status   Full Code       Primary Care Physician   Nicolette Huffman           Discharge Disposition   Discharged to home  Condition at discharge: Stable    Consultations This Hospital Stay   CARE MANAGEMENT / SOCIAL WORK IP CONSULT  NUTRITION SERVICES ADULT IP CONSULT  SPIRITUAL HEALTH SERVICES IP CONSULT  PHARMACY TO DOSE WARFARIN    Time Spent on this Encounter   I, Claudia Chino NP, personally saw the patient today and spent greater than 30 minutes discharging this patient.    Discharge Orders      Reason for your hospital stay    CHF exacerbation     Follow-up and recommended labs and tests     Follow up with primary care provider, Nicolette Huffman, within 7 days for hospital " follow- up.  The following labs/tests are recommended: BMP.     Activity    Your activity upon discharge: activity as tolerated     Monitor and record    weight every day and write it down. Notify your doctor if you gain 2 pounds overnight or 5 pounds in a week as this may be water weight gain due to congestive heart failure.     Brief Discharge Instructions    Take warfarin 5 mg by mouth on 9/22/23, 5 mg on 9/23/23, and 5 mg on 9/24/23. Follow up with anticoagulation clinic on 9/25/23 for INR and further dosing instructions.     Diet    Follow this diet upon discharge: Orders Placed This Encounter      Combination Diet 2 gm NA Diet     Discharge Medications   Current Discharge Medication List        START taking these medications    Details   potassium chloride ER (MICRO-K) 10 MEQ CR capsule Take 2 capsules (20 mEq) by mouth daily  Qty: 30 capsule, Refills: 0    Associated Diagnoses: Acute on chronic diastolic congestive heart failure (H)           CONTINUE these medications which have CHANGED    Details   atorvastatin (LIPITOR) 40 MG tablet Take 1 tablet (40 mg) by mouth At Bedtime  Qty: 30 tablet, Refills: 0    Associated Diagnoses: Acute on chronic diastolic congestive heart failure (H)      diltiazem ER (DILT-XR) 120 MG 24 hr capsule Take 1 capsule (120 mg) by mouth daily for 30 days  Qty: 30 capsule, Refills: 0    Associated Diagnoses: Atrial fibrillation with RVR (H)      lisinopril (ZESTRIL) 10 MG tablet Take 1 tablet (10 mg) by mouth daily  Qty: 30 tablet, Refills: 0    Associated Diagnoses: Acute on chronic diastolic congestive heart failure (H)      metoprolol succinate ER (TOPROL XL) 100 MG 24 hr tablet Take 1 tablet (100 mg) by mouth daily for 30 days  Qty: 30 tablet, Refills: 0    Associated Diagnoses: Atrial fibrillation with RVR (H)      pantoprazole (PROTONIX) 40 MG EC tablet Take 1 tablet (40 mg) by mouth daily before breakfast  Qty: 30 tablet, Refills: 0    Associated Diagnoses: Gastroesophageal  reflux disease without esophagitis      torsemide (DEMADEX) 20 MG tablet Take 3 tablets (60 mg) by mouth daily  Qty: 90 tablet, Refills: 0    Associated Diagnoses: Acute on chronic diastolic congestive heart failure (H)      warfarin ANTICOAGULANT (COUMADIN) 5 MG tablet Take warfarin 5 mg by mouth on 9/22/23, 5 mg on 9/23/23, and 5 mg on 9/24/23. Follow up with anticoagulation clinic on 9/25/23 for INR and further dosing instructions.  Qty: 30 tablet, Refills: 0           CONTINUE these medications which have NOT CHANGED    Details   albuterol (PROAIR HFA/PROVENTIL HFA/VENTOLIN HFA) 108 (90 Base) MCG/ACT inhaler Inhale 2 puffs into the lungs every 6 hours as needed for shortness of breath, wheezing or cough  Qty: 18 g, Refills: 1    Comments: Pharmacy may dispense brand covered by insurance (Proair, or proventil or ventolin or generic albuterol inhaler)  Associated Diagnoses: Wheezing      aspirin 81 MG EC tablet Take 81 mg by mouth daily      nystatin (MYCOSTATIN) 486528 UNIT/GM external powder Apply 1 g topically 3 times daily Apply to affected area: groin Indications: Infection      nicotine (NICODERM CQ) 14 MG/24HR 24 hr patch Place 1 patch onto the skin every 24 hours           Allergies   Allergies   Allergen Reactions    Fexofenadine Hcl Nausea and Vomiting     Allegra     Rosuvastatin Other (See Comments)     Dizziness - Crestor     Cats Other (See Comments)     Sneezing, runny nose    Codeine Sulfate Nausea and Vomiting and GI Disturbance     Data   Most Recent 3 CBC's:  Recent Labs   Lab Test 09/22/23  0437 09/21/23  0638 07/29/23  0841   WBC 10.0 10.1 11.1*   HGB 12.8 13.1 13.8   MCV 90 88 89    252 240      Most Recent 3 BMP's:  Recent Labs   Lab Test 09/22/23  0437 09/21/23  1151 09/21/23  0638 07/29/23  0841     --  135* 134*   POTASSIUM 3.6 4.0 4.2 4.0   CHLORIDE 97*  --  102 102   CO2 29  --  21* 23   BUN 13.4  --  11.6 20.9   CR 0.87  --  0.72 0.94   ANIONGAP 12  --  12 9   CARINA 9.2  --   9.4 9.3   GLC 95  --  95 98     Most Recent 2 LFT's:  Recent Labs   Lab Test 09/21/23  0638 07/29/23  0841   AST 26 19   ALT 19 18   ALKPHOS 159* 146*   BILITOTAL 1.3* 0.4     Most Recent INR's and Anticoagulation Dosing History:  Anticoagulation Dose History  More data exists         Latest Ref Rng & Units 5/14/2018 10/12/2021 10/13/2021 7/29/2023 8/7/2023 9/21/2023 9/22/2023   Recent Dosing and Labs   warfarin ANTICOAGULANT (COUMADIN) tablet 5 mg - - - - - - 5 mg, $Given -   INR 0.85 - 1.15 1.01  1.0     1.1     1.04  3.52  1.09  1.22  1.16       Details          This result is from an external source.    Multiple values from one day are sorted in reverse-chronological order             Most Recent 3 Troponin's:  Recent Labs   Lab Test 10/11/21  2012 09/01/18  0950 05/14/18  1814 02/20/18  0449   TROPI  --  5.776* <0.015 1.994*   TROPONIN <0.015  --   --   --      Most Recent Cholesterol Panel:  Recent Labs   Lab Test 07/09/23  0437   CHOL 130   LDL 74   HDL 34*   TRIG 110     Most Recent 6 Bacteria Isolates From Any Culture (See EPIC Reports for Culture Details):  Recent Labs   Lab Test 05/15/18  0045 07/23/17  0032 07/23/17  0026   CULT No MRSA isolated No growth after 6 days No growth after 6 days     Most Recent TSH, T4 and A1c Labs:  Recent Labs   Lab Test 07/09/23  0437 05/23/22  0400   TSH  --  3.00   A1C 5.3  --      Results for orders placed or performed during the hospital encounter of 09/21/23   POC US ECHO LIMITED    [facility] Procedure Note      Limited Bedside ED Cardiac Ultrasound:    PROCEDURE: PERFORMED BY: Dr. Carloz Ibarra MD  INDICATIONS/SYMPTOM:  Shortness of Breath  PROBE: Cardiac phased array probe  BODY LOCATION: Chest  FINDINGS:   The ultrasound was performed utilizing the subcostal, parasternal long axis, parasternal short axis, and apical 4 chamber views.  Cardiac contractility:  Present  Gross estimation of cardiac kinesis: severely depressed  Pericardial  Effusion:  None  RV:LV ratio: LV > RV  INTERPRETATION:    Decrease in LVEF, No pericardial effusion was found.  IVC visualized and findings indicate unable to estimate.  IMAGE DOCUMENTATION: Images were archived to PACs system.         XR Chest Port 1 View    Narrative    PROCEDURE:  XR CHEST PORT 1 VIEW    HISTORY: dyspnea, suspect chf exacerbation    COMPARISON:  Radiographs 7/29/2023, 7/24/2023    FINDINGS: Single view chest radiograph  Postsurgical changes of the chest.  Cardiomediastinal silhouette is enlarged, stable. There is calcific  aortic atherosclerosis.  Small pleural effusions. Interstitial and patchy bibasilar pulmonary  opacities. No pneumothorax.    No suspicious osseous lesion or subdiaphragmatic free air.      Impression    IMPRESSION:   Changes in the chest which are likely due to heart failure, including  small effusions, cardiomegaly and bilateral pulmonary opacities.  Pneumonia cannot be excluded.    TETE WYLIE MD         SYSTEM ID:  [id number]

## 2023-09-22 NOTE — PROVIDER NOTIFICATION
Writer updated provider that blood pressures have been reading on the lower end this evening. Provider advise to hold IV lasix if SBP is under 100.    BP currently reading at 116/79, will administer the scheduled lasix and continue to assess blood pressures closely.

## 2023-09-22 NOTE — PROGRESS NOTES
09/22/23 1200   General Information   Assessment completed with: Patient   Type of CM/SW Visit Initial Assessment   Primary Care Provider verified and updated as needed? Yes   Readmission Within the Last 30 Days no previous admission in last 30 days   Arrived From home   Reason for Consult discharge planning   Communication Assessment   Patient / Family communication style spoken language (English or Bilingual)   Living Environment   People in Home alone   Current Living Arrangements house   Care Provided by self   Provides Care For no one   Able to Return to Prior Arrangements yes   Current Resources   Patient receiving home care services: No   Assessment of Functional Status   Dependent ADLs: Independent   Dependent IADLs: Independent   Personal Safety   Feels Unsafe at Home or Work/School no   Feels Threatened by Someone no   Does Anyone Try to Keep You From Having Contact with Others or Doing Things Outside Your Home? no   Physical Signs of Abuse Present no

## 2023-09-22 NOTE — PROVIDER NOTIFICATION
Writer updated provider that patient was asking for something to assist her with sleep tonight. Provider ordered Melatonin 5mg at bedtime.

## 2023-09-22 NOTE — PLAN OF CARE
Patient discharged at 12:15 PM via wheel chair accompanied by son and staff. Prescriptions sent to patients preferred pharmacy. All belongings sent with patient.     Discharge instructions reviewed with son and patient. Listed belongings gathered and returned to patient.     [place]     [facility] [place] made:  Yes  Home medications returned to patient: N/A  Patient reports pain was well managed at discharge: Yes

## 2023-09-22 NOTE — UTILIZATION REVIEW
"  Admission Status; Secondary Review Determination         Under the authority of the Utilization Management Committee, the utilization review process indicated a secondary review on the above patient.  The review outcome is based on review of the medical records, discussions with staff, and applying clinical experience noted on the date of the review.        ()      Inpatient Status Appropriate - This patient's medical care is consistent with medical management for inpatient care and reasonable inpatient medical practice.      (X) Observation Status Appropriate - This patient does not meet hospital inpatient criteria and is placed in observation status. If this patient's primary payer is Medicare and was admitted as an inpatient, Condition Code 44 should be used and patient status changed to \"observation\".   () Admission Status NOT Appropriate - This patient's medical care is not consistent with medical management for Inpatient or Observation Status.          RATIONALE FOR DETERMINATION     72 year old female who presents with progressive shortness of breath over the 4 days prior to admission.  Patient's blood pressure was elevated and she was started on nitroglycerin to lower it.  She was also given IV Lasix.  Her vital signs have normalized and her breathing has improved.  It is anticipated she will be discharged today.  She is appropriate for observation status.  I spoke to Claudia Chino CNP.    The severity of illness, intensity of service provided, expected LOS and risk for adverse outcome make the care complex, high risk and appropriate for hospital admission.        The information on this document is developed by the utilization review team in order for the business office to ensure compliance.  This only denotes the appropriateness of proper admission status and does not reflect the quality of care rendered.         The definitions of Inpatient Status and Observation Status used in making the " determination above are those provided in the CMS Coverage Manual, Chapter 1 and Chapter 6, section 70.4.      Sincerely,     Osvaldo Stern MD  Physician Advisor  Utilization Review/ Case Management  [facility].

## 2023-09-22 NOTE — CONSULTS
Reason for consult: Heart Failure - Dietitian to instruct patient on 2 gram sodium diet     Pt has been educated on a low sodium diet in the recent past. Pt had questions regarding packaged foods. Guidance provided on looking at food labels and having small portions. Pt provided with list of home delivered meal options that can provide lower salt meals- MOW, Mom's meals, Homestyle direct. Written education material and RD's contact info was given to patient.

## 2023-09-22 NOTE — PLAN OF CARE
"Most recent vitals: /73   Pulse 85   Temp 98.5  F (36.9  C) (Temporal)   Resp 20   Ht 1.499 m (4' 11\")   Wt 84.7 kg (186 lb 11.7 oz)   SpO2 94%   BMI 37.71 kg/m      Pain Management:  Denied pain during shift. PRN medication available if needed  LOC:  A/Ox4. Requested a sleep aid. Provider ordered Melatonin 5mg at HS  Cardiac:  Afib with BBB on the monitor. Slightly soft blood pressures at start of shift, provider update. Per provider, if SBP is under 100 prior to Lasix administration, to hold the medication. Afebrile  Respiratory: Remains on 2 liters nc, dyspnea on exertion, fine crackles heard through out bilaterally. +2-3 edema  GI:  c/o constipation, PRN senna given. Bowel sounds are active and present  :  Frequency d/t lasix, using bedside commode  Skin Issues:  Scattered bruising, no other concerns    IVF:  PIV SL  ABX:  n/a    Nutrition: 2gm Sodium diet. Patient was curious about the Mediterranean diet, provided resources  ADL's:  Assist of 1   Ambulation:Stand by assist  Safety:  Call light within reach, remains free from falls, bed in lowest position, ID band in place    Face to face report given with opportunity to observe patient.     Report given to LORENA Mcgovern RN  9/22/23       "

## 2023-09-23 LAB
ATRIAL RATE - MUSE: 146 BPM
ATRIAL RATE - MUSE: 286 BPM
DIASTOLIC BLOOD PRESSURE - MUSE: NORMAL MMHG
DIASTOLIC BLOOD PRESSURE - MUSE: NORMAL MMHG
INTERPRETATION ECG - MUSE: NORMAL
INTERPRETATION ECG - MUSE: NORMAL
P AXIS - MUSE: 57 DEGREES
P AXIS - MUSE: NORMAL DEGREES
PR INTERVAL - MUSE: 112 MS
PR INTERVAL - MUSE: NORMAL MS
QRS DURATION - MUSE: 124 MS
QRS DURATION - MUSE: 134 MS
QT - MUSE: 320 MS
QT - MUSE: 398 MS
QTC - MUSE: 498 MS
QTC - MUSE: 521 MS
R AXIS - MUSE: 89 DEGREES
R AXIS - MUSE: 99 DEGREES
SYSTOLIC BLOOD PRESSURE - MUSE: NORMAL MMHG
SYSTOLIC BLOOD PRESSURE - MUSE: NORMAL MMHG
T AXIS - MUSE: 38 DEGREES
T AXIS - MUSE: 48 DEGREES
VENTRICULAR RATE- MUSE: 103 BPM
VENTRICULAR RATE- MUSE: 146 BPM

## 2023-09-26 ENCOUNTER — TELEPHONE (OUTPATIENT)
Dept: FAMILY MEDICINE | Facility: OTHER | Age: 73
End: 2023-09-26

## 2023-10-03 PROBLEM — I50.9 ACUTE ON CHRONIC CONGESTIVE HEART FAILURE, UNSPECIFIED HEART FAILURE TYPE (H): Status: RESOLVED | Noted: 2023-09-21 | Resolved: 2023-10-03

## 2023-10-03 PROBLEM — I48.91 ATRIAL FIBRILLATION WITH RVR (H): Status: RESOLVED | Noted: 2023-09-22 | Resolved: 2023-10-03

## 2023-10-09 ENCOUNTER — TELEPHONE (OUTPATIENT)
Dept: FAMILY MEDICINE | Facility: OTHER | Age: 73
End: 2023-10-09

## 2023-10-30 NOTE — TELEPHONE ENCOUNTER
Potassium Chloride ER (Micro-K) 10 MEQ CR capsule   Last Written Prescription Date:  09/22/2023  Last Fill Quantity: 30,   # refills: 0  Last Office Visit: 08/03/2023

## 2023-11-06 NOTE — TELEPHONE ENCOUNTER
ANTICOAGULATION     Heather Rosas is overdue for an INR check.     Left message for patient to call and schedule lab appointment as soon as possible. If returning call, please schedule.     Berkley Meraz RN

## 2023-12-11 NOTE — TELEPHONE ENCOUNTER
ANTICOAGULATION MANAGEMENT PROGRAM    Dr Huffman,     Our records indicate that Heather Rosas remains past due to check an INR. Heather Rosas was contacted multiple times over at least the last 8 weeks to attempt to arrange a follow up appointment.    Heather Rosas last had an an INR checked on 8/7/23 and was due for follow up on 8/11/23     Called patient,Left message for patient to call and schedule lab appointment as soon as possible. If returning call, please schedule.      At this time Heather Rosas will be moved to noncompliant status within the program until their referral expires on 8/3/23. While in noncompliant status the patient would continue to be contacted every 6 weeks by the anticoagulation program to attempt to schedule patient. You will be notified of each contact attempt to make you aware of patient's ongoing noncompliance.        Thank you,     [facility] Anticoagulation Management Program

## 2024-01-01 ENCOUNTER — APPOINTMENT (OUTPATIENT)
Dept: GENERAL RADIOLOGY | Facility: HOSPITAL | Age: 74
DRG: 291 | End: 2024-01-01
Attending: NURSE PRACTITIONER
Payer: MEDICARE

## 2024-01-01 ENCOUNTER — APPOINTMENT (OUTPATIENT)
Dept: GENERAL RADIOLOGY | Facility: HOSPITAL | Age: 74
DRG: 441 | End: 2024-01-01
Attending: SURGERY
Payer: MEDICARE

## 2024-01-01 ENCOUNTER — HOSPITAL ENCOUNTER (INPATIENT)
Facility: HOSPITAL | Age: 74
LOS: 2 days | Discharge: HOME | DRG: 291 | End: 2024-04-18
Attending: INTERNAL MEDICINE | Admitting: HOSPITALIST
Payer: MEDICARE

## 2024-01-01 ENCOUNTER — TELEPHONE (OUTPATIENT)
Dept: FAMILY MEDICINE | Facility: OTHER | Age: 74
End: 2024-01-01

## 2024-01-01 ENCOUNTER — LAB (OUTPATIENT)
Dept: LAB | Facility: OTHER | Age: 74
End: 2024-01-01
Payer: MEDICARE

## 2024-01-01 ENCOUNTER — DOCUMENTATION ONLY (OUTPATIENT)
Dept: OTHER | Facility: CLINIC | Age: 74
End: 2024-01-01
Payer: MEDICARE

## 2024-01-01 ENCOUNTER — DOCUMENTATION ONLY (OUTPATIENT)
Dept: FAMILY MEDICINE | Facility: OTHER | Age: 74
End: 2024-01-01

## 2024-01-01 ENCOUNTER — HOSPITAL ENCOUNTER (OUTPATIENT)
Facility: OTHER | Age: 74
Setting detail: OBSERVATION
Discharge: NURSING, REHAB OR LONG-TERM CARE | End: 2024-06-16
Attending: PHYSICIAN ASSISTANT | Admitting: PHYSICIAN ASSISTANT
Payer: MEDICARE

## 2024-01-01 ENCOUNTER — APPOINTMENT (OUTPATIENT)
Dept: GENERAL RADIOLOGY | Facility: HOSPITAL | Age: 74
DRG: 441 | End: 2024-01-01
Attending: EMERGENCY MEDICINE
Payer: MEDICARE

## 2024-01-01 ENCOUNTER — HOSPITAL ENCOUNTER (INPATIENT)
Facility: HOSPITAL | Age: 74
LOS: 4 days | Discharge: HOME HEALTH | DRG: 291 | End: 2024-05-24
Attending: STUDENT IN AN ORGANIZED HEALTH CARE EDUCATION/TRAINING PROGRAM | Admitting: HOSPITALIST
Payer: MEDICARE

## 2024-01-01 ENCOUNTER — APPOINTMENT (OUTPATIENT)
Dept: CT IMAGING | Facility: OTHER | Age: 74
End: 2024-01-01
Attending: PHYSICIAN ASSISTANT
Payer: MEDICARE

## 2024-01-01 ENCOUNTER — HOSPITAL ENCOUNTER (EMERGENCY)
Facility: HOSPITAL | Age: 74
Discharge: HOME | End: 2024-05-16
Attending: FAMILY MEDICINE | Admitting: FAMILY MEDICINE
Payer: MEDICARE

## 2024-01-01 ENCOUNTER — APPOINTMENT (OUTPATIENT)
Dept: CT IMAGING | Facility: HOSPITAL | Age: 74
DRG: 441 | End: 2024-01-01
Attending: EMERGENCY MEDICINE
Payer: MEDICARE

## 2024-01-01 ENCOUNTER — APPOINTMENT (OUTPATIENT)
Dept: CARDIOLOGY | Facility: HOSPITAL | Age: 74
DRG: 291 | End: 2024-01-01
Attending: HOSPITALIST
Payer: MEDICARE

## 2024-01-01 ENCOUNTER — APPOINTMENT (OUTPATIENT)
Dept: PHYSICAL THERAPY | Facility: HOSPITAL | Age: 74
DRG: 291 | End: 2024-01-01
Payer: MEDICARE

## 2024-01-01 ENCOUNTER — APPOINTMENT (OUTPATIENT)
Dept: ULTRASOUND IMAGING | Facility: HOSPITAL | Age: 74
DRG: 291 | End: 2024-01-01
Attending: NURSE PRACTITIONER
Payer: MEDICARE

## 2024-01-01 ENCOUNTER — PATIENT OUTREACH (OUTPATIENT)
Dept: CARE COORDINATION | Facility: OTHER | Age: 74
End: 2024-01-01

## 2024-01-01 ENCOUNTER — ANTICOAGULATION THERAPY VISIT (OUTPATIENT)
Dept: ANTICOAGULATION | Facility: OTHER | Age: 74
End: 2024-01-01
Attending: STUDENT IN AN ORGANIZED HEALTH CARE EDUCATION/TRAINING PROGRAM
Payer: MEDICARE

## 2024-01-01 ENCOUNTER — APPOINTMENT (OUTPATIENT)
Dept: PHYSICAL THERAPY | Facility: HOSPITAL | Age: 74
DRG: 291 | End: 2024-01-01
Attending: HOSPITALIST
Payer: MEDICARE

## 2024-01-01 ENCOUNTER — APPOINTMENT (OUTPATIENT)
Dept: ULTRASOUND IMAGING | Facility: HOSPITAL | Age: 74
DRG: 291 | End: 2024-01-01
Attending: HOSPITALIST
Payer: MEDICARE

## 2024-01-01 ENCOUNTER — ANTICOAGULATION THERAPY VISIT (OUTPATIENT)
Dept: ANTICOAGULATION | Facility: OTHER | Age: 74
End: 2024-01-01
Payer: MEDICARE

## 2024-01-01 ENCOUNTER — APPOINTMENT (OUTPATIENT)
Dept: GENERAL RADIOLOGY | Facility: HOSPITAL | Age: 74
DRG: 441 | End: 2024-01-01
Attending: INTERNAL MEDICINE
Payer: MEDICARE

## 2024-01-01 ENCOUNTER — APPOINTMENT (OUTPATIENT)
Dept: ULTRASOUND IMAGING | Facility: HOSPITAL | Age: 74
End: 2024-01-01
Attending: HOSPITALIST
Payer: MEDICARE

## 2024-01-01 ENCOUNTER — HOSPITAL ENCOUNTER (EMERGENCY)
Facility: HOSPITAL | Age: 74
Discharge: HOME | DRG: 291 | End: 2024-05-17
Attending: STUDENT IN AN ORGANIZED HEALTH CARE EDUCATION/TRAINING PROGRAM | Admitting: STUDENT IN AN ORGANIZED HEALTH CARE EDUCATION/TRAINING PROGRAM
Payer: MEDICARE

## 2024-01-01 ENCOUNTER — HOSPITAL ENCOUNTER (INPATIENT)
Facility: HOSPITAL | Age: 74
LOS: 9 days | Discharge: HOME | DRG: 291 | End: 2024-02-14
Attending: STUDENT IN AN ORGANIZED HEALTH CARE EDUCATION/TRAINING PROGRAM | Admitting: INTERNAL MEDICINE
Payer: MEDICARE

## 2024-01-01 ENCOUNTER — PATIENT OUTREACH (OUTPATIENT)
Dept: FAMILY MEDICINE | Facility: OTHER | Age: 74
End: 2024-01-01

## 2024-01-01 ENCOUNTER — APPOINTMENT (OUTPATIENT)
Dept: CARDIOLOGY | Facility: HOSPITAL | Age: 74
DRG: 291 | End: 2024-01-01
Attending: INTERNAL MEDICINE
Payer: MEDICARE

## 2024-01-01 ENCOUNTER — APPOINTMENT (OUTPATIENT)
Dept: GENERAL RADIOLOGY | Facility: HOSPITAL | Age: 74
DRG: 291 | End: 2024-01-01
Attending: STUDENT IN AN ORGANIZED HEALTH CARE EDUCATION/TRAINING PROGRAM
Payer: MEDICARE

## 2024-01-01 ENCOUNTER — APPOINTMENT (OUTPATIENT)
Dept: ULTRASOUND IMAGING | Facility: OTHER | Age: 74
End: 2024-01-01
Attending: PHYSICIAN ASSISTANT
Payer: MEDICARE

## 2024-01-01 ENCOUNTER — APPOINTMENT (OUTPATIENT)
Dept: OCCUPATIONAL THERAPY | Facility: HOSPITAL | Age: 74
DRG: 291 | End: 2024-01-01
Attending: HOSPITALIST
Payer: MEDICARE

## 2024-01-01 ENCOUNTER — TRANSFERRED RECORDS (OUTPATIENT)
Dept: HEALTH INFORMATION MANAGEMENT | Facility: CLINIC | Age: 74
End: 2024-01-01
Payer: MEDICARE

## 2024-01-01 ENCOUNTER — APPOINTMENT (OUTPATIENT)
Dept: GENERAL RADIOLOGY | Facility: HOSPITAL | Age: 74
End: 2024-01-01
Attending: INTERNAL MEDICINE
Payer: MEDICARE

## 2024-01-01 ENCOUNTER — APPOINTMENT (OUTPATIENT)
Dept: PHYSICAL THERAPY | Facility: HOSPITAL | Age: 74
End: 2024-01-01
Attending: HOSPITALIST
Payer: MEDICARE

## 2024-01-01 ENCOUNTER — TELEPHONE (OUTPATIENT)
Dept: CARE COORDINATION | Facility: OTHER | Age: 74
End: 2024-01-01

## 2024-01-01 ENCOUNTER — APPOINTMENT (OUTPATIENT)
Dept: OCCUPATIONAL THERAPY | Facility: HOSPITAL | Age: 74
DRG: 291 | End: 2024-01-01
Payer: MEDICARE

## 2024-01-01 ENCOUNTER — APPOINTMENT (OUTPATIENT)
Dept: GENERAL RADIOLOGY | Facility: OTHER | Age: 74
DRG: 444 | End: 2024-01-01
Attending: PHYSICIAN ASSISTANT
Payer: MEDICARE

## 2024-01-01 ENCOUNTER — APPOINTMENT (OUTPATIENT)
Dept: ULTRASOUND IMAGING | Facility: HOSPITAL | Age: 74
DRG: 291 | End: 2024-01-01
Attending: STUDENT IN AN ORGANIZED HEALTH CARE EDUCATION/TRAINING PROGRAM
Payer: MEDICARE

## 2024-01-01 ENCOUNTER — PATIENT OUTREACH (OUTPATIENT)
Dept: FAMILY MEDICINE | Facility: OTHER | Age: 74
End: 2024-01-01
Payer: MEDICARE

## 2024-01-01 ENCOUNTER — OFFICE VISIT (OUTPATIENT)
Dept: FAMILY MEDICINE | Facility: OTHER | Age: 74
End: 2024-01-01
Attending: STUDENT IN AN ORGANIZED HEALTH CARE EDUCATION/TRAINING PROGRAM
Payer: MEDICARE

## 2024-01-01 ENCOUNTER — TRANSFERRED RECORDS (OUTPATIENT)
Dept: HEALTH INFORMATION MANAGEMENT | Facility: OTHER | Age: 74
End: 2024-01-01

## 2024-01-01 ENCOUNTER — HOSPITAL ENCOUNTER (OUTPATIENT)
Facility: HOSPITAL | Age: 74
Setting detail: OBSERVATION
Discharge: NURSING, REHAB OR LONG-TERM CARE | End: 2024-06-13
Attending: INTERNAL MEDICINE | Admitting: HOSPITALIST
Payer: MEDICARE

## 2024-01-01 ENCOUNTER — APPOINTMENT (OUTPATIENT)
Dept: GENERAL RADIOLOGY | Facility: HOSPITAL | Age: 74
End: 2024-01-01
Attending: FAMILY MEDICINE
Payer: MEDICARE

## 2024-01-01 ENCOUNTER — HOSPITAL ENCOUNTER (OUTPATIENT)
Age: 74
End: 2024-01-01
Attending: STUDENT IN AN ORGANIZED HEALTH CARE EDUCATION/TRAINING PROGRAM
Payer: MEDICARE

## 2024-01-01 ENCOUNTER — APPOINTMENT (OUTPATIENT)
Dept: CT IMAGING | Facility: OTHER | Age: 74
DRG: 444 | End: 2024-01-01
Attending: FAMILY MEDICINE
Payer: MEDICARE

## 2024-01-01 ENCOUNTER — HOSPITAL ENCOUNTER (EMERGENCY)
Facility: HOSPITAL | Age: 74
Discharge: HOME | DRG: 291 | End: 2024-05-18
Attending: STUDENT IN AN ORGANIZED HEALTH CARE EDUCATION/TRAINING PROGRAM | Admitting: STUDENT IN AN ORGANIZED HEALTH CARE EDUCATION/TRAINING PROGRAM
Payer: MEDICARE

## 2024-01-01 ENCOUNTER — APPOINTMENT (OUTPATIENT)
Dept: CT IMAGING | Facility: HOSPITAL | Age: 74
DRG: 291 | End: 2024-01-01
Attending: STUDENT IN AN ORGANIZED HEALTH CARE EDUCATION/TRAINING PROGRAM
Payer: MEDICARE

## 2024-01-01 ENCOUNTER — APPOINTMENT (OUTPATIENT)
Dept: GENERAL RADIOLOGY | Facility: HOSPITAL | Age: 74
DRG: 291 | End: 2024-01-01
Attending: INTERNAL MEDICINE
Payer: MEDICARE

## 2024-01-01 ENCOUNTER — APPOINTMENT (OUTPATIENT)
Dept: GENERAL RADIOLOGY | Facility: OTHER | Age: 74
End: 2024-01-01
Attending: PHYSICIAN ASSISTANT
Payer: MEDICARE

## 2024-01-01 ENCOUNTER — HOSPITAL ENCOUNTER (INPATIENT)
Facility: OTHER | Age: 74
LOS: 1 days | Discharge: ANOTHER HOSPITAL | DRG: 444 | End: 2024-06-26
Attending: PHYSICIAN ASSISTANT | Admitting: INTERNAL MEDICINE
Payer: MEDICARE

## 2024-01-01 ENCOUNTER — APPOINTMENT (OUTPATIENT)
Dept: NUCLEAR MEDICINE | Facility: HOSPITAL | Age: 74
DRG: 291 | End: 2024-01-01
Attending: HOSPITALIST
Payer: MEDICARE

## 2024-01-01 ENCOUNTER — HOSPITAL ENCOUNTER (INPATIENT)
Facility: HOSPITAL | Age: 74
LOS: 1 days | Discharge: OTHER INSTITUTION | DRG: 441 | End: 2024-10-20
Attending: EMERGENCY MEDICINE | Admitting: INTERNAL MEDICINE
Payer: MEDICARE

## 2024-01-01 ENCOUNTER — APPOINTMENT (OUTPATIENT)
Dept: CT IMAGING | Facility: HOSPITAL | Age: 74
DRG: 291 | End: 2024-01-01
Attending: INTERNAL MEDICINE
Payer: MEDICARE

## 2024-01-01 ENCOUNTER — APPOINTMENT (OUTPATIENT)
Dept: PHYSICAL THERAPY | Facility: HOSPITAL | Age: 74
End: 2024-01-01
Payer: MEDICARE

## 2024-01-01 ENCOUNTER — HOSPITAL ENCOUNTER (INPATIENT)
Facility: HOSPITAL | Age: 74
LOS: 2 days | Discharge: HOME | DRG: 291 | End: 2024-02-26
Attending: NURSE PRACTITIONER | Admitting: INTERNAL MEDICINE
Payer: MEDICARE

## 2024-01-01 VITALS
TEMPERATURE: 96.8 F | BODY MASS INDEX: 40.8 KG/M2 | WEIGHT: 202.38 LBS | HEIGHT: 59 IN | SYSTOLIC BLOOD PRESSURE: 117 MMHG | RESPIRATION RATE: 20 BRPM | DIASTOLIC BLOOD PRESSURE: 83 MMHG | OXYGEN SATURATION: 95 % | HEART RATE: 74 BPM

## 2024-01-01 VITALS
BODY MASS INDEX: 39.47 KG/M2 | SYSTOLIC BLOOD PRESSURE: 107 MMHG | WEIGHT: 195.77 LBS | HEART RATE: 70 BPM | DIASTOLIC BLOOD PRESSURE: 72 MMHG | RESPIRATION RATE: 19 BRPM | TEMPERATURE: 97.2 F | HEIGHT: 59 IN | OXYGEN SATURATION: 94 %

## 2024-01-01 VITALS
RESPIRATION RATE: 20 BRPM | HEART RATE: 86 BPM | TEMPERATURE: 98.1 F | SYSTOLIC BLOOD PRESSURE: 116 MMHG | WEIGHT: 193.78 LBS | DIASTOLIC BLOOD PRESSURE: 59 MMHG | OXYGEN SATURATION: 95 % | BODY MASS INDEX: 39.14 KG/M2

## 2024-01-01 VITALS
RESPIRATION RATE: 18 BRPM | DIASTOLIC BLOOD PRESSURE: 69 MMHG | TEMPERATURE: 98.4 F | HEART RATE: 98 BPM | HEIGHT: 59 IN | SYSTOLIC BLOOD PRESSURE: 113 MMHG | BODY MASS INDEX: 37.01 KG/M2 | OXYGEN SATURATION: 93 % | WEIGHT: 183.6 LBS

## 2024-01-01 VITALS
HEIGHT: 59 IN | SYSTOLIC BLOOD PRESSURE: 125 MMHG | TEMPERATURE: 97.5 F | BODY MASS INDEX: 39.47 KG/M2 | OXYGEN SATURATION: 93 % | WEIGHT: 195.77 LBS | DIASTOLIC BLOOD PRESSURE: 94 MMHG | RESPIRATION RATE: 20 BRPM | HEART RATE: 101 BPM

## 2024-01-01 VITALS
DIASTOLIC BLOOD PRESSURE: 84 MMHG | OXYGEN SATURATION: 92 % | RESPIRATION RATE: 20 BRPM | SYSTOLIC BLOOD PRESSURE: 117 MMHG | HEART RATE: 97 BPM | TEMPERATURE: 97.5 F

## 2024-01-01 VITALS
HEART RATE: 93 BPM | RESPIRATION RATE: 19 BRPM | DIASTOLIC BLOOD PRESSURE: 70 MMHG | OXYGEN SATURATION: 96 % | BODY MASS INDEX: 39.31 KG/M2 | WEIGHT: 195 LBS | HEIGHT: 59 IN | TEMPERATURE: 97 F | SYSTOLIC BLOOD PRESSURE: 106 MMHG

## 2024-01-01 VITALS
SYSTOLIC BLOOD PRESSURE: 121 MMHG | HEIGHT: 59 IN | WEIGHT: 188.71 LBS | OXYGEN SATURATION: 96 % | HEART RATE: 86 BPM | DIASTOLIC BLOOD PRESSURE: 64 MMHG | RESPIRATION RATE: 24 BRPM | BODY MASS INDEX: 38.04 KG/M2 | TEMPERATURE: 97.2 F

## 2024-01-01 VITALS
DIASTOLIC BLOOD PRESSURE: 83 MMHG | HEART RATE: 83 BPM | TEMPERATURE: 96.8 F | RESPIRATION RATE: 18 BRPM | OXYGEN SATURATION: 94 % | SYSTOLIC BLOOD PRESSURE: 101 MMHG

## 2024-01-01 VITALS
OXYGEN SATURATION: 94 % | RESPIRATION RATE: 18 BRPM | HEART RATE: 103 BPM | DIASTOLIC BLOOD PRESSURE: 84 MMHG | SYSTOLIC BLOOD PRESSURE: 120 MMHG | TEMPERATURE: 97.2 F

## 2024-01-01 VITALS
WEIGHT: 181.51 LBS | HEIGHT: 58 IN | RESPIRATION RATE: 14 BRPM | HEART RATE: 95 BPM | TEMPERATURE: 98.2 F | DIASTOLIC BLOOD PRESSURE: 56 MMHG | SYSTOLIC BLOOD PRESSURE: 83 MMHG | OXYGEN SATURATION: 96 % | BODY MASS INDEX: 38.1 KG/M2

## 2024-01-01 VITALS
WEIGHT: 154.32 LBS | TEMPERATURE: 90.9 F | OXYGEN SATURATION: 100 % | HEIGHT: 60 IN | SYSTOLIC BLOOD PRESSURE: 107 MMHG | RESPIRATION RATE: 21 BRPM | DIASTOLIC BLOOD PRESSURE: 60 MMHG | BODY MASS INDEX: 30.3 KG/M2 | HEART RATE: 85 BPM

## 2024-01-01 DIAGNOSIS — E87.6 HYPOKALEMIA: ICD-10-CM

## 2024-01-01 DIAGNOSIS — R53.1 WEAKNESS: ICD-10-CM

## 2024-01-01 DIAGNOSIS — I48.11 LONGSTANDING PERSISTENT ATRIAL FIBRILLATION (H): Chronic | ICD-10-CM

## 2024-01-01 DIAGNOSIS — Z91.148 NON COMPLIANCE W MEDICATION REGIMEN: ICD-10-CM

## 2024-01-01 DIAGNOSIS — I25.2 OLD MYOCARDIAL INFARCTION: ICD-10-CM

## 2024-01-01 DIAGNOSIS — I50.9 HEART FAILURE, UNSPECIFIED HF CHRONICITY, UNSPECIFIED HEART FAILURE TYPE (H): ICD-10-CM

## 2024-01-01 DIAGNOSIS — N17.9 ACUTE KIDNEY INJURY (H): ICD-10-CM

## 2024-01-01 DIAGNOSIS — R79.89 ABNORMAL LFTS: ICD-10-CM

## 2024-01-01 DIAGNOSIS — K63.9 LESION OF COLON: ICD-10-CM

## 2024-01-01 DIAGNOSIS — I48.20 CHRONIC ATRIAL FIBRILLATION (H): Chronic | ICD-10-CM

## 2024-01-01 DIAGNOSIS — E83.42 HYPOMAGNESEMIA: ICD-10-CM

## 2024-01-01 DIAGNOSIS — R60.1 GENERALIZED EDEMA DUE TO FLUID OVERLOAD: ICD-10-CM

## 2024-01-01 DIAGNOSIS — E87.1 HYPONATREMIA: ICD-10-CM

## 2024-01-01 DIAGNOSIS — K52.9 GASTROENTERITIS: ICD-10-CM

## 2024-01-01 DIAGNOSIS — J44.1 COPD EXACERBATION (H): ICD-10-CM

## 2024-01-01 DIAGNOSIS — Z86.73 PERSONAL HISTORY OF TRANSIENT CEREBRAL ISCHEMIA: ICD-10-CM

## 2024-01-01 DIAGNOSIS — R79.1 SUBTHERAPEUTIC INTERNATIONAL NORMALIZED RATIO (INR): ICD-10-CM

## 2024-01-01 DIAGNOSIS — E78.2 MIXED HYPERLIPIDEMIA: Chronic | ICD-10-CM

## 2024-01-01 DIAGNOSIS — I50.32 CHRONIC DIASTOLIC CONGESTIVE HEART FAILURE (H): ICD-10-CM

## 2024-01-01 DIAGNOSIS — Z09 HOSPITAL DISCHARGE FOLLOW-UP: Primary | ICD-10-CM

## 2024-01-01 DIAGNOSIS — I50.31 ACUTE DIASTOLIC (CONGESTIVE) HEART FAILURE (H): Primary | ICD-10-CM

## 2024-01-01 DIAGNOSIS — I50.33 ACUTE ON CHRONIC DIASTOLIC HEART FAILURE (H): Primary | ICD-10-CM

## 2024-01-01 DIAGNOSIS — J44.1 COPD WITH ACUTE EXACERBATION (H): ICD-10-CM

## 2024-01-01 DIAGNOSIS — I50.30 HEART FAILURE WITH PRESERVED EJECTION FRACTION, NYHA CLASS I (H): ICD-10-CM

## 2024-01-01 DIAGNOSIS — I25.10 CORONARY ARTERY DISEASE INVOLVING NATIVE CORONARY ARTERY OF NATIVE HEART WITHOUT ANGINA PECTORIS: Chronic | ICD-10-CM

## 2024-01-01 DIAGNOSIS — Z79.01 LONG TERM (CURRENT) USE OF ANTICOAGULANTS: ICD-10-CM

## 2024-01-01 DIAGNOSIS — G47.00 INSOMNIA, UNSPECIFIED: ICD-10-CM

## 2024-01-01 DIAGNOSIS — H04.123 DRY EYES: ICD-10-CM

## 2024-01-01 DIAGNOSIS — N32.9 LESION OF BLADDER: ICD-10-CM

## 2024-01-01 DIAGNOSIS — F17.210 CIGARETTE SMOKER: Primary | ICD-10-CM

## 2024-01-01 DIAGNOSIS — J96.02 ACUTE RESPIRATORY ACIDOSIS (H): ICD-10-CM

## 2024-01-01 DIAGNOSIS — I50.9 ACUTE ON CHRONIC CONGESTIVE HEART FAILURE, UNSPECIFIED HEART FAILURE TYPE (H): ICD-10-CM

## 2024-01-01 DIAGNOSIS — R79.89 ELEVATED TROPONIN: ICD-10-CM

## 2024-01-01 DIAGNOSIS — E87.70 FLUID OVERLOAD: ICD-10-CM

## 2024-01-01 DIAGNOSIS — I48.91 RAPID ATRIAL FIBRILLATION (H): ICD-10-CM

## 2024-01-01 DIAGNOSIS — E87.70 GENERALIZED EDEMA DUE TO FLUID OVERLOAD: ICD-10-CM

## 2024-01-01 DIAGNOSIS — I50.33 ACUTE ON CHRONIC DIASTOLIC CONGESTIVE HEART FAILURE (H): ICD-10-CM

## 2024-01-01 DIAGNOSIS — R06.02 SOB (SHORTNESS OF BREATH): ICD-10-CM

## 2024-01-01 DIAGNOSIS — K21.9 GASTROESOPHAGEAL REFLUX DISEASE WITHOUT ESOPHAGITIS: Chronic | ICD-10-CM

## 2024-01-01 DIAGNOSIS — G25.81 RESTLESS LEG SYNDROME: ICD-10-CM

## 2024-01-01 DIAGNOSIS — I10 BENIGN ESSENTIAL HYPERTENSION: Chronic | ICD-10-CM

## 2024-01-01 DIAGNOSIS — K59.01 SLOW TRANSIT CONSTIPATION: ICD-10-CM

## 2024-01-01 DIAGNOSIS — I48.20 CHRONIC ATRIAL FIBRILLATION (H): Primary | Chronic | ICD-10-CM

## 2024-01-01 DIAGNOSIS — J44.9 VANISHING LUNG (H): ICD-10-CM

## 2024-01-01 DIAGNOSIS — F20.3 UNDIFFERENTIATED SCHIZOPHRENIA (H): Chronic | ICD-10-CM

## 2024-01-01 DIAGNOSIS — I48.11 LONGSTANDING PERSISTENT ATRIAL FIBRILLATION (H): ICD-10-CM

## 2024-01-01 DIAGNOSIS — Z95.1 POSTSURGICAL AORTOCORONARY BYPASS STATUS: ICD-10-CM

## 2024-01-01 DIAGNOSIS — I48.20 CHRONIC ATRIAL FIBRILLATION (H): ICD-10-CM

## 2024-01-01 DIAGNOSIS — F41.1 GAD (GENERALIZED ANXIETY DISORDER): Chronic | ICD-10-CM

## 2024-01-01 DIAGNOSIS — G25.81 RESTLESS LEGS SYNDROME (RLS): ICD-10-CM

## 2024-01-01 DIAGNOSIS — N30.00 ACUTE CYSTITIS WITHOUT HEMATURIA: Primary | ICD-10-CM

## 2024-01-01 DIAGNOSIS — Z09 HOSPITAL DISCHARGE FOLLOW-UP: ICD-10-CM

## 2024-01-01 DIAGNOSIS — I48.91 ATRIAL FIBRILLATION, UNSPECIFIED TYPE (H): ICD-10-CM

## 2024-01-01 DIAGNOSIS — K21.9 GASTROESOPHAGEAL REFLUX DISEASE WITHOUT ESOPHAGITIS: ICD-10-CM

## 2024-01-01 DIAGNOSIS — R06.2 WHEEZING: ICD-10-CM

## 2024-01-01 DIAGNOSIS — R19.7 DIARRHEA OF PRESUMED INFECTIOUS ORIGIN: ICD-10-CM

## 2024-01-01 DIAGNOSIS — I50.9 CHF (CONGESTIVE HEART FAILURE) (H): ICD-10-CM

## 2024-01-01 DIAGNOSIS — I07.1 SEVERE TRICUSPID REGURGITATION: ICD-10-CM

## 2024-01-01 DIAGNOSIS — I10 BENIGN ESSENTIAL HYPERTENSION: ICD-10-CM

## 2024-01-01 DIAGNOSIS — I50.812 CHRONIC RIGHT-SIDED CONGESTIVE HEART FAILURE (H): Chronic | ICD-10-CM

## 2024-01-01 DIAGNOSIS — J81.0 ACUTE PULMONARY EDEMA (H): ICD-10-CM

## 2024-01-01 DIAGNOSIS — R79.89 ELEVATED SERUM CREATININE: ICD-10-CM

## 2024-01-01 DIAGNOSIS — Z20.822 LAB TEST NEGATIVE FOR COVID-19 VIRUS: ICD-10-CM

## 2024-01-01 DIAGNOSIS — Z91.148 NONCOMPLIANCE WITH MEDICATION REGIMEN: ICD-10-CM

## 2024-01-01 DIAGNOSIS — F41.1 GAD (GENERALIZED ANXIETY DISORDER): ICD-10-CM

## 2024-01-01 DIAGNOSIS — I50.32 CHRONIC DIASTOLIC CONGESTIVE HEART FAILURE (H): Chronic | ICD-10-CM

## 2024-01-01 DIAGNOSIS — K81.0 ACUTE CHOLECYSTITIS: ICD-10-CM

## 2024-01-01 DIAGNOSIS — R29.898 WEAKNESS OF BOTH LOWER EXTREMITIES: ICD-10-CM

## 2024-01-01 DIAGNOSIS — Z92.21 STATUS POST CHEMOTHERAPY: ICD-10-CM

## 2024-01-01 DIAGNOSIS — I50.813 ACUTE ON CHRONIC RIGHT-SIDED CONGESTIVE HEART FAILURE (H): Primary | ICD-10-CM

## 2024-01-01 LAB
ABO/RH(D): ABNORMAL
ACANTHOCYTES BLD QL SMEAR: ABNORMAL
ALBUMIN SERPL BCG-MCNC: 3.2 G/DL (ref 3.5–5.2)
ALBUMIN SERPL BCG-MCNC: 3.2 G/DL (ref 3.5–5.2)
ALBUMIN SERPL BCG-MCNC: 3.4 G/DL (ref 3.5–5.2)
ALBUMIN SERPL BCG-MCNC: 3.5 G/DL (ref 3.5–5.2)
ALBUMIN SERPL BCG-MCNC: 3.6 G/DL (ref 3.5–5.2)
ALBUMIN SERPL BCG-MCNC: 3.6 G/DL (ref 3.5–5.2)
ALBUMIN SERPL BCG-MCNC: 3.7 G/DL (ref 3.5–5.2)
ALBUMIN SERPL BCG-MCNC: 3.8 G/DL (ref 3.5–5.2)
ALBUMIN SERPL BCG-MCNC: 3.9 G/DL (ref 3.5–5.2)
ALBUMIN SERPL BCG-MCNC: 4 G/DL (ref 3.5–5.2)
ALBUMIN SERPL BCG-MCNC: 4.1 G/DL (ref 3.5–5.2)
ALBUMIN UR-MCNC: 10 MG/DL
ALBUMIN UR-MCNC: 20 MG/DL
ALBUMIN UR-MCNC: 30 MG/DL
ALBUMIN UR-MCNC: 50 MG/DL
ALBUMIN UR-MCNC: 70 MG/DL
ALBUMIN UR-MCNC: 70 MG/DL
ALBUMIN UR-MCNC: NEGATIVE MG/DL
ALLEN'S TEST: NO
ALP SERPL-CCNC: 150 U/L (ref 40–150)
ALP SERPL-CCNC: 159 U/L (ref 40–150)
ALP SERPL-CCNC: 160 U/L (ref 40–150)
ALP SERPL-CCNC: 174 U/L (ref 40–150)
ALP SERPL-CCNC: 177 U/L (ref 40–150)
ALP SERPL-CCNC: 178 U/L (ref 40–150)
ALP SERPL-CCNC: 181 U/L (ref 40–150)
ALP SERPL-CCNC: 184 U/L (ref 40–150)
ALP SERPL-CCNC: 187 U/L (ref 40–150)
ALP SERPL-CCNC: 192 U/L (ref 40–150)
ALP SERPL-CCNC: 194 U/L (ref 40–150)
ALP SERPL-CCNC: 199 U/L (ref 40–150)
ALP SERPL-CCNC: 207 U/L (ref 40–150)
ALP SERPL-CCNC: 208 U/L (ref 40–150)
ALP SERPL-CCNC: 217 U/L (ref 40–150)
ALP SERPL-CCNC: 222 U/L (ref 40–150)
ALP SERPL-CCNC: 229 U/L (ref 40–150)
ALP SERPL-CCNC: 236 U/L (ref 40–150)
ALP SERPL-CCNC: 241 U/L (ref 40–150)
ALT SERPL W P-5'-P-CCNC: 11 U/L (ref 0–50)
ALT SERPL W P-5'-P-CCNC: 14 U/L (ref 0–50)
ALT SERPL W P-5'-P-CCNC: 146 U/L (ref 0–50)
ALT SERPL W P-5'-P-CCNC: 16 U/L (ref 0–50)
ALT SERPL W P-5'-P-CCNC: 16 U/L (ref 0–50)
ALT SERPL W P-5'-P-CCNC: 176 U/L (ref 0–50)
ALT SERPL W P-5'-P-CCNC: 18 U/L (ref 0–50)
ALT SERPL W P-5'-P-CCNC: 208 U/L (ref 0–50)
ALT SERPL W P-5'-P-CCNC: 22 U/L (ref 0–50)
ALT SERPL W P-5'-P-CCNC: 24 U/L (ref 0–50)
ALT SERPL W P-5'-P-CCNC: 25 U/L (ref 0–50)
ALT SERPL W P-5'-P-CCNC: 263 U/L (ref 0–50)
ALT SERPL W P-5'-P-CCNC: 306 U/L (ref 0–50)
ALT SERPL W P-5'-P-CCNC: 328 U/L (ref 0–50)
ALT SERPL W P-5'-P-CCNC: 341 U/L (ref 0–50)
ALT SERPL W P-5'-P-CCNC: 597 U/L (ref 0–50)
ALT SERPL W P-5'-P-CCNC: 68 U/L (ref 0–50)
AMPHETAMINES UR QL SCN: NORMAL
ANION GAP SERPL CALCULATED.3IONS-SCNC: 10 MMOL/L (ref 7–15)
ANION GAP SERPL CALCULATED.3IONS-SCNC: 11 MMOL/L (ref 7–15)
ANION GAP SERPL CALCULATED.3IONS-SCNC: 12 MMOL/L (ref 7–15)
ANION GAP SERPL CALCULATED.3IONS-SCNC: 13 MMOL/L (ref 7–15)
ANION GAP SERPL CALCULATED.3IONS-SCNC: 14 MMOL/L (ref 7–15)
ANION GAP SERPL CALCULATED.3IONS-SCNC: 14 MMOL/L (ref 7–15)
ANION GAP SERPL CALCULATED.3IONS-SCNC: 15 MMOL/L (ref 7–15)
ANION GAP SERPL CALCULATED.3IONS-SCNC: 15 MMOL/L (ref 7–15)
ANION GAP SERPL CALCULATED.3IONS-SCNC: 16 MMOL/L (ref 7–15)
ANION GAP SERPL CALCULATED.3IONS-SCNC: 17 MMOL/L (ref 7–15)
ANION GAP SERPL CALCULATED.3IONS-SCNC: 20 MMOL/L (ref 7–15)
ANION GAP SERPL CALCULATED.3IONS-SCNC: 22 MMOL/L (ref 7–15)
ANION GAP SERPL CALCULATED.3IONS-SCNC: 24 MMOL/L (ref 7–15)
ANION GAP SERPL CALCULATED.3IONS-SCNC: 28 MMOL/L (ref 7–15)
ANION GAP SERPL CALCULATED.3IONS-SCNC: 8 MMOL/L (ref 7–15)
ANION GAP SERPL CALCULATED.3IONS-SCNC: 8 MMOL/L (ref 7–15)
ANION GAP SERPL CALCULATED.3IONS-SCNC: 9 MMOL/L (ref 7–15)
ANTIBODY ID: NORMAL
ANTIBODY SCREEN: POSITIVE
APPEARANCE UR: ABNORMAL
APPEARANCE UR: CLEAR
AST SERPL W P-5'-P-CCNC: 1127 U/L (ref 0–45)
AST SERPL W P-5'-P-CCNC: 114 U/L (ref 0–45)
AST SERPL W P-5'-P-CCNC: 117 U/L (ref 0–45)
AST SERPL W P-5'-P-CCNC: 18 U/L (ref 0–45)
AST SERPL W P-5'-P-CCNC: 19 U/L (ref 0–45)
AST SERPL W P-5'-P-CCNC: 211 U/L (ref 0–45)
AST SERPL W P-5'-P-CCNC: 23 U/L (ref 0–45)
AST SERPL W P-5'-P-CCNC: 24 U/L (ref 0–45)
AST SERPL W P-5'-P-CCNC: 25 U/L (ref 0–45)
AST SERPL W P-5'-P-CCNC: 26 U/L (ref 0–45)
AST SERPL W P-5'-P-CCNC: 27 U/L (ref 0–45)
AST SERPL W P-5'-P-CCNC: 30 U/L (ref 0–45)
AST SERPL W P-5'-P-CCNC: 32 U/L (ref 0–45)
AST SERPL W P-5'-P-CCNC: 344 U/L (ref 0–45)
AST SERPL W P-5'-P-CCNC: 347 U/L (ref 0–45)
AST SERPL W P-5'-P-CCNC: 43 U/L (ref 0–45)
AST SERPL W P-5'-P-CCNC: 437 U/L (ref 0–45)
AST SERPL W P-5'-P-CCNC: 452 U/L (ref 0–45)
AST SERPL W P-5'-P-CCNC: 81 U/L (ref 0–45)
ATRIAL RATE - MUSE: 103 BPM
ATRIAL RATE - MUSE: 104 BPM
ATRIAL RATE - MUSE: 104 BPM
ATRIAL RATE - MUSE: 267 BPM
ATRIAL RATE - MUSE: 267 BPM
ATRIAL RATE - MUSE: 277 BPM
ATRIAL RATE - MUSE: 277 BPM
ATRIAL RATE - MUSE: NORMAL BPM
BACTERIA #/AREA URNS HPF: ABNORMAL /HPF
BACTERIA BLD CULT: NO GROWTH
BACTERIA BLD CULT: NO GROWTH
BACTERIA UR CULT: ABNORMAL
BACTERIA UR CULT: ABNORMAL
BACTERIA UR CULT: NORMAL
BARBITURATES UR QL SCN: NORMAL
BASE EXCESS BLDA CALC-SCNC: -8.1 MMOL/L (ref -3–3)
BASE EXCESS BLDV CALC-SCNC: -1.2 MMOL/L (ref -3–3)
BASE EXCESS BLDV CALC-SCNC: -1.2 MMOL/L (ref -3–3)
BASE EXCESS BLDV CALC-SCNC: -27.2 MMOL/L (ref -3–3)
BASE EXCESS BLDV CALC-SCNC: -4.7 MMOL/L (ref -3–3)
BASE EXCESS BLDV CALC-SCNC: 2 MMOL/L (ref -3–3)
BASE EXCESS BLDV CALC-SCNC: 4.6 MMOL/L (ref -3–3)
BASOPHILS # BLD AUTO: 0 10E3/UL (ref 0–0.2)
BASOPHILS # BLD AUTO: 0.1 10E3/UL (ref 0–0.2)
BASOPHILS # BLD MANUAL: 0 10E3/UL (ref 0–0.2)
BASOPHILS NFR BLD AUTO: 0 %
BASOPHILS NFR BLD AUTO: 1 %
BASOPHILS NFR BLD MANUAL: 0 %
BENZODIAZ UR QL SCN: NORMAL
BILIRUB DIRECT SERPL-MCNC: 1.26 MG/DL (ref 0–0.3)
BILIRUB DIRECT SERPL-MCNC: 1.43 MG/DL (ref 0–0.3)
BILIRUB DIRECT SERPL-MCNC: 1.72 MG/DL (ref 0–0.3)
BILIRUB DIRECT SERPL-MCNC: 2.64 MG/DL (ref 0–0.3)
BILIRUB SERPL-MCNC: 0.6 MG/DL
BILIRUB SERPL-MCNC: 1.3 MG/DL
BILIRUB SERPL-MCNC: 1.4 MG/DL
BILIRUB SERPL-MCNC: 1.4 MG/DL
BILIRUB SERPL-MCNC: 1.5 MG/DL
BILIRUB SERPL-MCNC: 1.6 MG/DL
BILIRUB SERPL-MCNC: 1.6 MG/DL
BILIRUB SERPL-MCNC: 1.7 MG/DL
BILIRUB SERPL-MCNC: 1.8 MG/DL
BILIRUB SERPL-MCNC: 1.9 MG/DL
BILIRUB SERPL-MCNC: 1.9 MG/DL
BILIRUB SERPL-MCNC: 2 MG/DL
BILIRUB SERPL-MCNC: 2.1 MG/DL
BILIRUB SERPL-MCNC: 2.2 MG/DL
BILIRUB SERPL-MCNC: 2.5 MG/DL
BILIRUB SERPL-MCNC: 2.5 MG/DL
BILIRUB SERPL-MCNC: 2.6 MG/DL
BILIRUB SERPL-MCNC: 3.9 MG/DL
BILIRUB UR QL STRIP: ABNORMAL
BILIRUB UR QL STRIP: ABNORMAL
BILIRUB UR QL STRIP: NEGATIVE
BLD PROD TYP BPU: NORMAL
BLD PROD TYP BPU: NORMAL
BLOOD COMPONENT TYPE: NORMAL
BLOOD COMPONENT TYPE: NORMAL
BUN SERPL-MCNC: 14.8 MG/DL (ref 8–23)
BUN SERPL-MCNC: 15.2 MG/DL (ref 8–23)
BUN SERPL-MCNC: 15.9 MG/DL (ref 8–23)
BUN SERPL-MCNC: 17.1 MG/DL (ref 8–23)
BUN SERPL-MCNC: 19.2 MG/DL (ref 8–23)
BUN SERPL-MCNC: 19.7 MG/DL (ref 8–23)
BUN SERPL-MCNC: 20.4 MG/DL (ref 8–23)
BUN SERPL-MCNC: 20.9 MG/DL (ref 8–23)
BUN SERPL-MCNC: 22.3 MG/DL (ref 8–23)
BUN SERPL-MCNC: 22.7 MG/DL (ref 8–23)
BUN SERPL-MCNC: 24.2 MG/DL (ref 8–23)
BUN SERPL-MCNC: 24.2 MG/DL (ref 8–23)
BUN SERPL-MCNC: 25.4 MG/DL (ref 8–23)
BUN SERPL-MCNC: 25.6 MG/DL (ref 8–23)
BUN SERPL-MCNC: 25.9 MG/DL (ref 8–23)
BUN SERPL-MCNC: 26.2 MG/DL (ref 8–23)
BUN SERPL-MCNC: 27.8 MG/DL (ref 8–23)
BUN SERPL-MCNC: 29.2 MG/DL (ref 8–23)
BUN SERPL-MCNC: 29.3 MG/DL (ref 8–23)
BUN SERPL-MCNC: 30.8 MG/DL (ref 8–23)
BUN SERPL-MCNC: 31.5 MG/DL (ref 8–23)
BUN SERPL-MCNC: 33.6 MG/DL (ref 8–23)
BUN SERPL-MCNC: 33.8 MG/DL (ref 8–23)
BUN SERPL-MCNC: 34.2 MG/DL (ref 8–23)
BUN SERPL-MCNC: 35.4 MG/DL (ref 8–23)
BUN SERPL-MCNC: 37.8 MG/DL (ref 8–23)
BUN SERPL-MCNC: 40.5 MG/DL (ref 8–23)
BUN SERPL-MCNC: 41.4 MG/DL (ref 8–23)
BUN SERPL-MCNC: 42.1 MG/DL (ref 8–23)
BUN SERPL-MCNC: 42.2 MG/DL (ref 8–23)
BUN SERPL-MCNC: 43.5 MG/DL (ref 8–23)
BUN SERPL-MCNC: 44.3 MG/DL (ref 8–23)
BUN SERPL-MCNC: 46.1 MG/DL (ref 8–23)
BUN SERPL-MCNC: 46.1 MG/DL (ref 8–23)
BUN SERPL-MCNC: 47.2 MG/DL (ref 8–23)
BUN SERPL-MCNC: 48.3 MG/DL (ref 8–23)
BUN SERPL-MCNC: 49.6 MG/DL (ref 8–23)
BUN SERPL-MCNC: 52.4 MG/DL (ref 8–23)
BUN SERPL-MCNC: 52.7 MG/DL (ref 8–23)
BUN SERPL-MCNC: 56.8 MG/DL (ref 8–23)
BURR CELLS BLD QL SMEAR: ABNORMAL
BZE UR QL SCN: NORMAL
CALCIUM SERPL-MCNC: 8.1 MG/DL (ref 8.8–10.2)
CALCIUM SERPL-MCNC: 8.3 MG/DL (ref 8.8–10.2)
CALCIUM SERPL-MCNC: 8.3 MG/DL (ref 8.8–10.4)
CALCIUM SERPL-MCNC: 8.4 MG/DL (ref 8.8–10.2)
CALCIUM SERPL-MCNC: 8.6 MG/DL (ref 8.8–10.2)
CALCIUM SERPL-MCNC: 8.7 MG/DL (ref 8.8–10.2)
CALCIUM SERPL-MCNC: 8.8 MG/DL (ref 8.8–10.2)
CALCIUM SERPL-MCNC: 8.8 MG/DL (ref 8.8–10.2)
CALCIUM SERPL-MCNC: 8.8 MG/DL (ref 8.8–10.4)
CALCIUM SERPL-MCNC: 8.9 MG/DL (ref 8.8–10.2)
CALCIUM SERPL-MCNC: 8.9 MG/DL (ref 8.8–10.2)
CALCIUM SERPL-MCNC: 9 MG/DL (ref 8.8–10.2)
CALCIUM SERPL-MCNC: 9.1 MG/DL (ref 8.8–10.2)
CALCIUM SERPL-MCNC: 9.2 MG/DL (ref 8.8–10.2)
CALCIUM SERPL-MCNC: 9.2 MG/DL (ref 8.8–10.4)
CALCIUM SERPL-MCNC: 9.3 MG/DL (ref 8.8–10.2)
CALCIUM SERPL-MCNC: 9.4 MG/DL (ref 8.8–10.2)
CALCIUM SERPL-MCNC: 9.5 MG/DL (ref 8.8–10.2)
CALCIUM SERPL-MCNC: 9.6 MG/DL (ref 8.8–10.2)
CALCIUM SERPL-MCNC: 9.7 MG/DL (ref 8.8–10.2)
CANNABINOIDS UR QL SCN: NORMAL
CELLULAR CAST: 13 /LPF
CHLORIDE SERPL-SCNC: 100 MMOL/L (ref 98–107)
CHLORIDE SERPL-SCNC: 102 MMOL/L (ref 98–107)
CHLORIDE SERPL-SCNC: 88 MMOL/L (ref 98–107)
CHLORIDE SERPL-SCNC: 90 MMOL/L (ref 98–107)
CHLORIDE SERPL-SCNC: 91 MMOL/L (ref 98–107)
CHLORIDE SERPL-SCNC: 92 MMOL/L (ref 98–107)
CHLORIDE SERPL-SCNC: 93 MMOL/L (ref 98–107)
CHLORIDE SERPL-SCNC: 94 MMOL/L (ref 98–107)
CHLORIDE SERPL-SCNC: 95 MMOL/L (ref 98–107)
CHLORIDE SERPL-SCNC: 96 MMOL/L (ref 98–107)
CHLORIDE SERPL-SCNC: 97 MMOL/L (ref 98–107)
CHLORIDE SERPL-SCNC: 98 MMOL/L (ref 98–107)
CHLORIDE SERPL-SCNC: 99 MMOL/L (ref 98–107)
CHLORIDE UR-SCNC: 26 MMOL/L
CHLORIDE UR-SCNC: 65 MMOL/L
CK SERPL-CCNC: 375 U/L (ref 26–192)
CODING SYSTEM: NORMAL
CODING SYSTEM: NORMAL
COHGB MFR BLD: 97.5 % (ref 95–96)
COLOR UR AUTO: ABNORMAL
COLOR UR AUTO: YELLOW
CREAT SERPL-MCNC: 0.8 MG/DL (ref 0.51–0.95)
CREAT SERPL-MCNC: 0.81 MG/DL (ref 0.51–0.95)
CREAT SERPL-MCNC: 0.88 MG/DL (ref 0.51–0.95)
CREAT SERPL-MCNC: 0.89 MG/DL (ref 0.51–0.95)
CREAT SERPL-MCNC: 0.9 MG/DL (ref 0.51–0.95)
CREAT SERPL-MCNC: 0.93 MG/DL (ref 0.51–0.95)
CREAT SERPL-MCNC: 0.96 MG/DL (ref 0.51–0.95)
CREAT SERPL-MCNC: 0.96 MG/DL (ref 0.51–0.95)
CREAT SERPL-MCNC: 0.97 MG/DL (ref 0.51–0.95)
CREAT SERPL-MCNC: 0.97 MG/DL (ref 0.51–0.95)
CREAT SERPL-MCNC: 0.99 MG/DL (ref 0.51–0.95)
CREAT SERPL-MCNC: 0.99 MG/DL (ref 0.51–0.95)
CREAT SERPL-MCNC: 1.02 MG/DL (ref 0.51–0.95)
CREAT SERPL-MCNC: 1.03 MG/DL (ref 0.51–0.95)
CREAT SERPL-MCNC: 1.04 MG/DL (ref 0.51–0.95)
CREAT SERPL-MCNC: 1.1 MG/DL (ref 0.51–0.95)
CREAT SERPL-MCNC: 1.12 MG/DL (ref 0.51–0.95)
CREAT SERPL-MCNC: 1.12 MG/DL (ref 0.51–0.95)
CREAT SERPL-MCNC: 1.19 MG/DL (ref 0.51–0.95)
CREAT SERPL-MCNC: 1.19 MG/DL (ref 0.51–0.95)
CREAT SERPL-MCNC: 1.22 MG/DL (ref 0.51–0.95)
CREAT SERPL-MCNC: 1.24 MG/DL (ref 0.51–0.95)
CREAT SERPL-MCNC: 1.28 MG/DL (ref 0.51–0.95)
CREAT SERPL-MCNC: 1.28 MG/DL (ref 0.51–0.95)
CREAT SERPL-MCNC: 1.3 MG/DL (ref 0.51–0.95)
CREAT SERPL-MCNC: 1.31 MG/DL (ref 0.51–0.95)
CREAT SERPL-MCNC: 1.42 MG/DL (ref 0.51–0.95)
CREAT SERPL-MCNC: 1.43 MG/DL (ref 0.51–0.95)
CREAT SERPL-MCNC: 1.45 MG/DL (ref 0.51–0.95)
CREAT SERPL-MCNC: 1.45 MG/DL (ref 0.51–0.95)
CREAT SERPL-MCNC: 1.46 MG/DL (ref 0.51–0.95)
CREAT SERPL-MCNC: 1.5 MG/DL (ref 0.51–0.95)
CREAT SERPL-MCNC: 1.53 MG/DL (ref 0.51–0.95)
CREAT SERPL-MCNC: 1.95 MG/DL (ref 0.51–0.95)
CREAT SERPL-MCNC: 2.24 MG/DL (ref 0.51–0.95)
CREAT SERPL-MCNC: 2.28 MG/DL (ref 0.51–0.95)
CREAT SERPL-MCNC: 2.29 MG/DL (ref 0.51–0.95)
CREAT SERPL-MCNC: 2.36 MG/DL (ref 0.51–0.95)
CREAT SERPL-MCNC: 2.44 MG/DL (ref 0.51–0.95)
CREAT SERPL-MCNC: 2.45 MG/DL (ref 0.51–0.95)
CREAT UR-MCNC: 73.4 MG/DL
CRP SERPL-MCNC: 12.56 MG/L
D DIMER PPP FEU-MCNC: 0.69 UG/ML FEU (ref 0–0.5)
D DIMER PPP FEU-MCNC: 1.92 UG/ML FEU (ref 0–0.5)
DEPRECATED HCO3 PLAS-SCNC: 15 MMOL/L (ref 22–29)
DEPRECATED HCO3 PLAS-SCNC: 19 MMOL/L (ref 22–29)
DEPRECATED HCO3 PLAS-SCNC: 20 MMOL/L (ref 22–29)
DEPRECATED HCO3 PLAS-SCNC: 21 MMOL/L (ref 22–29)
DEPRECATED HCO3 PLAS-SCNC: 21 MMOL/L (ref 22–29)
DEPRECATED HCO3 PLAS-SCNC: 22 MMOL/L (ref 22–29)
DEPRECATED HCO3 PLAS-SCNC: 23 MMOL/L (ref 22–29)
DEPRECATED HCO3 PLAS-SCNC: 24 MMOL/L (ref 22–29)
DEPRECATED HCO3 PLAS-SCNC: 25 MMOL/L (ref 22–29)
DEPRECATED HCO3 PLAS-SCNC: 27 MMOL/L (ref 22–29)
DEPRECATED HCO3 PLAS-SCNC: 28 MMOL/L (ref 22–29)
DEPRECATED HCO3 PLAS-SCNC: 29 MMOL/L (ref 22–29)
DEPRECATED HCO3 PLAS-SCNC: 29 MMOL/L (ref 22–29)
DEPRECATED HCO3 PLAS-SCNC: 30 MMOL/L (ref 22–29)
DEPRECATED HCO3 PLAS-SCNC: 32 MMOL/L (ref 22–29)
DEPRECATED HCO3 PLAS-SCNC: 33 MMOL/L (ref 22–29)
DEPRECATED HCO3 PLAS-SCNC: 34 MMOL/L (ref 22–29)
DEPRECATED HCO3 PLAS-SCNC: 35 MMOL/L (ref 22–29)
DEPRECATED HCO3 PLAS-SCNC: 36 MMOL/L (ref 22–29)
DEPRECATED HCO3 PLAS-SCNC: 38 MMOL/L (ref 22–29)
DIASTOLIC BLOOD PRESSURE - MUSE: NORMAL MMHG
EGFRCR SERPLBLD CKD-EPI 2021: 20 ML/MIN/1.73M2
EGFRCR SERPLBLD CKD-EPI 2021: 21 ML/MIN/1.73M2
EGFRCR SERPLBLD CKD-EPI 2021: 21 ML/MIN/1.73M2
EGFRCR SERPLBLD CKD-EPI 2021: 22 ML/MIN/1.73M2
EGFRCR SERPLBLD CKD-EPI 2021: 22 ML/MIN/1.73M2
EGFRCR SERPLBLD CKD-EPI 2021: 23 ML/MIN/1.73M2
EGFRCR SERPLBLD CKD-EPI 2021: 27 ML/MIN/1.73M2
EGFRCR SERPLBLD CKD-EPI 2021: 36 ML/MIN/1.73M2
EGFRCR SERPLBLD CKD-EPI 2021: 37 ML/MIN/1.73M2
EGFRCR SERPLBLD CKD-EPI 2021: 38 ML/MIN/1.73M2
EGFRCR SERPLBLD CKD-EPI 2021: 39 ML/MIN/1.73M2
EGFRCR SERPLBLD CKD-EPI 2021: 39 ML/MIN/1.73M2
EGFRCR SERPLBLD CKD-EPI 2021: 43 ML/MIN/1.73M2
EGFRCR SERPLBLD CKD-EPI 2021: 44 ML/MIN/1.73M2
EGFRCR SERPLBLD CKD-EPI 2021: 45 ML/MIN/1.73M2
EGFRCR SERPLBLD CKD-EPI 2021: 45 ML/MIN/1.73M2
EGFRCR SERPLBLD CKD-EPI 2021: 46 ML/MIN/1.73M2
EGFRCR SERPLBLD CKD-EPI 2021: 47 ML/MIN/1.73M2
EGFRCR SERPLBLD CKD-EPI 2021: 49 ML/MIN/1.73M2
EGFRCR SERPLBLD CKD-EPI 2021: 49 ML/MIN/1.73M2
EGFRCR SERPLBLD CKD-EPI 2021: 52 ML/MIN/1.73M2
EGFRCR SERPLBLD CKD-EPI 2021: 52 ML/MIN/1.73M2
EGFRCR SERPLBLD CKD-EPI 2021: 53 ML/MIN/1.73M2
EGFRCR SERPLBLD CKD-EPI 2021: 57 ML/MIN/1.73M2
EGFRCR SERPLBLD CKD-EPI 2021: 58 ML/MIN/1.73M2
EGFRCR SERPLBLD CKD-EPI 2021: 59 ML/MIN/1.73M2
EGFRCR SERPLBLD CKD-EPI 2021: 61 ML/MIN/1.73M2
EGFRCR SERPLBLD CKD-EPI 2021: 61 ML/MIN/1.73M2
EGFRCR SERPLBLD CKD-EPI 2021: 62 ML/MIN/1.73M2
EGFRCR SERPLBLD CKD-EPI 2021: 62 ML/MIN/1.73M2
EGFRCR SERPLBLD CKD-EPI 2021: 63 ML/MIN/1.73M2
EGFRCR SERPLBLD CKD-EPI 2021: 63 ML/MIN/1.73M2
EGFRCR SERPLBLD CKD-EPI 2021: 65 ML/MIN/1.73M2
EGFRCR SERPLBLD CKD-EPI 2021: 68 ML/MIN/1.73M2
EGFRCR SERPLBLD CKD-EPI 2021: 69 ML/MIN/1.73M2
EGFRCR SERPLBLD CKD-EPI 2021: 70 ML/MIN/1.73M2
EGFRCR SERPLBLD CKD-EPI 2021: 77 ML/MIN/1.73M2
EGFRCR SERPLBLD CKD-EPI 2021: 78 ML/MIN/1.73M2
EJECTION FRACTION: NORMAL %
EJECTION FRACTION: NORMAL %
EOSINOPHIL # BLD AUTO: 0 10E3/UL (ref 0–0.7)
EOSINOPHIL # BLD AUTO: 0.1 10E3/UL (ref 0–0.7)
EOSINOPHIL # BLD AUTO: 0.2 10E3/UL (ref 0–0.7)
EOSINOPHIL # BLD AUTO: 0.3 10E3/UL (ref 0–0.7)
EOSINOPHIL # BLD AUTO: 0.3 10E3/UL (ref 0–0.7)
EOSINOPHIL # BLD AUTO: 0.4 10E3/UL (ref 0–0.7)
EOSINOPHIL # BLD AUTO: 0.5 10E3/UL (ref 0–0.7)
EOSINOPHIL # BLD MANUAL: 0 10E3/UL (ref 0–0.7)
EOSINOPHIL NFR BLD AUTO: 0 %
EOSINOPHIL NFR BLD AUTO: 1 %
EOSINOPHIL NFR BLD AUTO: 2 %
EOSINOPHIL NFR BLD AUTO: 3 %
EOSINOPHIL NFR BLD AUTO: 3 %
EOSINOPHIL NFR BLD AUTO: 4 %
EOSINOPHIL NFR BLD AUTO: 6 %
EOSINOPHIL NFR BLD MANUAL: 0 %
ERYTHROCYTE [DISTWIDTH] IN BLOOD BY AUTOMATED COUNT: 15.9 % (ref 10–15)
ERYTHROCYTE [DISTWIDTH] IN BLOOD BY AUTOMATED COUNT: 16.7 % (ref 10–15)
ERYTHROCYTE [DISTWIDTH] IN BLOOD BY AUTOMATED COUNT: 16.9 % (ref 10–15)
ERYTHROCYTE [DISTWIDTH] IN BLOOD BY AUTOMATED COUNT: 16.9 % (ref 10–15)
ERYTHROCYTE [DISTWIDTH] IN BLOOD BY AUTOMATED COUNT: 17.1 % (ref 10–15)
ERYTHROCYTE [DISTWIDTH] IN BLOOD BY AUTOMATED COUNT: 17.2 % (ref 10–15)
ERYTHROCYTE [DISTWIDTH] IN BLOOD BY AUTOMATED COUNT: 17.3 % (ref 10–15)
ERYTHROCYTE [DISTWIDTH] IN BLOOD BY AUTOMATED COUNT: 17.3 % (ref 10–15)
ERYTHROCYTE [DISTWIDTH] IN BLOOD BY AUTOMATED COUNT: 17.4 % (ref 10–15)
ERYTHROCYTE [DISTWIDTH] IN BLOOD BY AUTOMATED COUNT: 17.6 % (ref 10–15)
ERYTHROCYTE [DISTWIDTH] IN BLOOD BY AUTOMATED COUNT: 18.4 % (ref 10–15)
ERYTHROCYTE [DISTWIDTH] IN BLOOD BY AUTOMATED COUNT: 18.4 % (ref 10–15)
ERYTHROCYTE [DISTWIDTH] IN BLOOD BY AUTOMATED COUNT: 18.7 % (ref 10–15)
ERYTHROCYTE [DISTWIDTH] IN BLOOD BY AUTOMATED COUNT: 19 % (ref 10–15)
ERYTHROCYTE [DISTWIDTH] IN BLOOD BY AUTOMATED COUNT: 19 % (ref 10–15)
ERYTHROCYTE [DISTWIDTH] IN BLOOD BY AUTOMATED COUNT: 19.1 % (ref 10–15)
ERYTHROCYTE [DISTWIDTH] IN BLOOD BY AUTOMATED COUNT: 19.2 % (ref 10–15)
ERYTHROCYTE [DISTWIDTH] IN BLOOD BY AUTOMATED COUNT: 19.3 % (ref 10–15)
ERYTHROCYTE [DISTWIDTH] IN BLOOD BY AUTOMATED COUNT: 19.4 % (ref 10–15)
ERYTHROCYTE [DISTWIDTH] IN BLOOD BY AUTOMATED COUNT: 19.5 % (ref 10–15)
ERYTHROCYTE [DISTWIDTH] IN BLOOD BY AUTOMATED COUNT: 19.6 % (ref 10–15)
ERYTHROCYTE [DISTWIDTH] IN BLOOD BY AUTOMATED COUNT: 19.6 % (ref 10–15)
ERYTHROCYTE [DISTWIDTH] IN BLOOD BY AUTOMATED COUNT: 19.9 % (ref 10–15)
ERYTHROCYTE [DISTWIDTH] IN BLOOD BY AUTOMATED COUNT: 20.4 % (ref 10–15)
ERYTHROCYTE [DISTWIDTH] IN BLOOD BY AUTOMATED COUNT: 21 % (ref 10–15)
ETHANOL SERPL-MCNC: <0.01 G/DL
ETHANOL SERPL-MCNC: <0.01 G/DL
FENTANYL UR QL: NORMAL
FLUAV RNA SPEC QL NAA+PROBE: NEGATIVE
FLUBV RNA RESP QL NAA+PROBE: NEGATIVE
GLUCOSE BLDC GLUCOMTR-MCNC: 109 MG/DL (ref 70–99)
GLUCOSE BLDC GLUCOMTR-MCNC: 118 MG/DL (ref 70–99)
GLUCOSE BLDC GLUCOMTR-MCNC: 128 MG/DL (ref 70–99)
GLUCOSE BLDC GLUCOMTR-MCNC: 129 MG/DL (ref 70–99)
GLUCOSE BLDC GLUCOMTR-MCNC: 142 MG/DL (ref 70–99)
GLUCOSE BLDC GLUCOMTR-MCNC: 171 MG/DL (ref 70–99)
GLUCOSE BLDC GLUCOMTR-MCNC: 184 MG/DL (ref 70–99)
GLUCOSE BLDC GLUCOMTR-MCNC: 52 MG/DL (ref 70–99)
GLUCOSE BLDC GLUCOMTR-MCNC: <10 MG/DL (ref 70–99)
GLUCOSE SERPL-MCNC: 101 MG/DL (ref 70–99)
GLUCOSE SERPL-MCNC: 101 MG/DL (ref 70–99)
GLUCOSE SERPL-MCNC: 102 MG/DL (ref 70–99)
GLUCOSE SERPL-MCNC: 102 MG/DL (ref 70–99)
GLUCOSE SERPL-MCNC: 103 MG/DL (ref 70–99)
GLUCOSE SERPL-MCNC: 104 MG/DL (ref 70–99)
GLUCOSE SERPL-MCNC: 105 MG/DL (ref 70–99)
GLUCOSE SERPL-MCNC: 105 MG/DL (ref 70–99)
GLUCOSE SERPL-MCNC: 106 MG/DL (ref 70–99)
GLUCOSE SERPL-MCNC: 107 MG/DL (ref 70–99)
GLUCOSE SERPL-MCNC: 107 MG/DL (ref 70–99)
GLUCOSE SERPL-MCNC: 109 MG/DL (ref 70–99)
GLUCOSE SERPL-MCNC: 112 MG/DL (ref 70–99)
GLUCOSE SERPL-MCNC: 112 MG/DL (ref 70–99)
GLUCOSE SERPL-MCNC: 114 MG/DL (ref 70–99)
GLUCOSE SERPL-MCNC: 115 MG/DL (ref 70–99)
GLUCOSE SERPL-MCNC: 120 MG/DL (ref 70–99)
GLUCOSE SERPL-MCNC: 121 MG/DL (ref 70–99)
GLUCOSE SERPL-MCNC: 133 MG/DL (ref 70–99)
GLUCOSE SERPL-MCNC: 136 MG/DL (ref 70–99)
GLUCOSE SERPL-MCNC: 139 MG/DL (ref 70–99)
GLUCOSE SERPL-MCNC: 142 MG/DL (ref 70–99)
GLUCOSE SERPL-MCNC: 175 MG/DL (ref 70–99)
GLUCOSE SERPL-MCNC: 180 MG/DL (ref 70–99)
GLUCOSE SERPL-MCNC: 193 MG/DL (ref 70–99)
GLUCOSE SERPL-MCNC: 55 MG/DL (ref 70–99)
GLUCOSE SERPL-MCNC: 85 MG/DL (ref 70–99)
GLUCOSE SERPL-MCNC: 87 MG/DL (ref 70–99)
GLUCOSE SERPL-MCNC: 92 MG/DL (ref 70–99)
GLUCOSE SERPL-MCNC: 93 MG/DL (ref 70–99)
GLUCOSE SERPL-MCNC: 95 MG/DL (ref 70–99)
GLUCOSE SERPL-MCNC: 96 MG/DL (ref 70–99)
GLUCOSE SERPL-MCNC: 99 MG/DL (ref 70–99)
GLUCOSE UR STRIP-MCNC: NEGATIVE MG/DL
HCO3 BLD-SCNC: 16 MMOL/L (ref 21–28)
HCO3 BLDV-SCNC: 23 MMOL/L (ref 21–28)
HCO3 BLDV-SCNC: 24 MMOL/L (ref 21–28)
HCO3 BLDV-SCNC: 24 MMOL/L (ref 21–28)
HCO3 BLDV-SCNC: 28 MMOL/L (ref 21–28)
HCO3 BLDV-SCNC: 32 MMOL/L (ref 21–28)
HCO3 BLDV-SCNC: 7 MMOL/L (ref 21–28)
HCO3 SERPL-SCNC: 15 MMOL/L (ref 22–29)
HCO3 SERPL-SCNC: 16 MMOL/L (ref 22–29)
HCO3 SERPL-SCNC: 8 MMOL/L (ref 22–29)
HCT VFR BLD AUTO: 28.8 % (ref 35–47)
HCT VFR BLD AUTO: 32.9 % (ref 35–47)
HCT VFR BLD AUTO: 36.5 % (ref 35–47)
HCT VFR BLD AUTO: 37 % (ref 35–47)
HCT VFR BLD AUTO: 37.3 % (ref 35–47)
HCT VFR BLD AUTO: 37.4 % (ref 35–47)
HCT VFR BLD AUTO: 37.5 % (ref 35–47)
HCT VFR BLD AUTO: 37.5 % (ref 35–47)
HCT VFR BLD AUTO: 37.7 % (ref 35–47)
HCT VFR BLD AUTO: 38 % (ref 35–47)
HCT VFR BLD AUTO: 38.3 % (ref 35–47)
HCT VFR BLD AUTO: 38.4 % (ref 35–47)
HCT VFR BLD AUTO: 38.4 % (ref 35–47)
HCT VFR BLD AUTO: 38.8 % (ref 35–47)
HCT VFR BLD AUTO: 39.1 % (ref 35–47)
HCT VFR BLD AUTO: 39.2 % (ref 35–47)
HCT VFR BLD AUTO: 39.5 % (ref 35–47)
HCT VFR BLD AUTO: 40.1 % (ref 35–47)
HCT VFR BLD AUTO: 40.3 % (ref 35–47)
HCT VFR BLD AUTO: 40.6 % (ref 35–47)
HCT VFR BLD AUTO: 40.9 % (ref 35–47)
HCT VFR BLD AUTO: 41.1 % (ref 35–47)
HCT VFR BLD AUTO: 41.3 % (ref 35–47)
HCT VFR BLD AUTO: 41.3 % (ref 35–47)
HCT VFR BLD AUTO: 41.5 % (ref 35–47)
HCT VFR BLD AUTO: 41.6 % (ref 35–47)
HCT VFR BLD AUTO: 41.9 % (ref 35–47)
HCT VFR BLD AUTO: 42.1 % (ref 35–47)
HCT VFR BLD AUTO: 42.3 % (ref 35–47)
HCV AB SERPL QL IA: NONREACTIVE
HGB BLD-MCNC: 10.1 G/DL (ref 11.7–15.7)
HGB BLD-MCNC: 11.5 G/DL (ref 11.7–15.7)
HGB BLD-MCNC: 11.6 G/DL (ref 11.7–15.7)
HGB BLD-MCNC: 11.8 G/DL (ref 11.7–15.7)
HGB BLD-MCNC: 11.8 G/DL (ref 11.7–15.7)
HGB BLD-MCNC: 11.9 G/DL (ref 11.7–15.7)
HGB BLD-MCNC: 12.1 G/DL (ref 11.7–15.7)
HGB BLD-MCNC: 12.3 G/DL (ref 11.7–15.7)
HGB BLD-MCNC: 12.3 G/DL (ref 11.7–15.7)
HGB BLD-MCNC: 12.4 G/DL (ref 11.7–15.7)
HGB BLD-MCNC: 12.5 G/DL (ref 11.7–15.7)
HGB BLD-MCNC: 12.6 G/DL (ref 11.7–15.7)
HGB BLD-MCNC: 12.6 G/DL (ref 11.7–15.7)
HGB BLD-MCNC: 12.7 G/DL (ref 11.7–15.7)
HGB BLD-MCNC: 12.8 G/DL (ref 11.7–15.7)
HGB BLD-MCNC: 13 G/DL (ref 11.7–15.7)
HGB BLD-MCNC: 13.1 G/DL (ref 11.7–15.7)
HGB BLD-MCNC: 13.1 G/DL (ref 11.7–15.7)
HGB BLD-MCNC: 13.2 G/DL (ref 11.7–15.7)
HGB BLD-MCNC: 13.3 G/DL (ref 11.7–15.7)
HGB BLD-MCNC: 8 G/DL (ref 11.7–15.7)
HGB UR QL STRIP: ABNORMAL
HGB UR QL STRIP: NEGATIVE
HOLD SPECIMEN: NORMAL
HYALINE CASTS: 2 /LPF
HYALINE CASTS: 29 /LPF
HYALINE CASTS: 29 /LPF
HYALINE CASTS: 4 /LPF
HYALINE CASTS: 43 /LPF
HYALINE CASTS: 8 /LPF
IMM GRANULOCYTES # BLD: 0 10E3/UL
IMM GRANULOCYTES # BLD: 0 10E3/UL
IMM GRANULOCYTES # BLD: 0.1 10E3/UL
IMM GRANULOCYTES NFR BLD: 0 %
IMM GRANULOCYTES NFR BLD: 0 %
IMM GRANULOCYTES NFR BLD: 1 %
INR (EXTERNAL): 1.8 (ref 0.9–1.1)
INR (EXTERNAL): 3.2 (ref 0.9–1.1)
INR (EXTERNAL): 3.7 (ref 0.9–1.1)
INR (EXTERNAL): 4.3 (ref 0.9–1.1)
INR BLD: 2.7 (ref 0.9–1.1)
INR PPP: 1.14 (ref 0.85–1.15)
INR PPP: 1.21 (ref 0.85–1.15)
INR PPP: 1.25 (ref 0.85–1.15)
INR PPP: 1.27 (ref 0.85–1.15)
INR PPP: 1.29 (ref 0.85–1.15)
INR PPP: 1.33 (ref 0.85–1.15)
INR PPP: 1.33 (ref 0.85–1.15)
INR PPP: 1.34 (ref 0.85–1.15)
INR PPP: 1.35 (ref 0.85–1.15)
INR PPP: 1.38 (ref 0.85–1.15)
INR PPP: 1.38 (ref 0.85–1.15)
INR PPP: 1.41 (ref 0.85–1.15)
INR PPP: 1.46 (ref 0.85–1.15)
INR PPP: 1.5 (ref 0.85–1.15)
INR PPP: 1.55 (ref 0.85–1.15)
INR PPP: 1.57 (ref 0.85–1.15)
INR PPP: 1.6 (ref 0.85–1.15)
INR PPP: 1.65 (ref 0.85–1.15)
INR PPP: 1.67 (ref 0.85–1.15)
INR PPP: 1.7 (ref 0.85–1.15)
INR PPP: 1.82 (ref 0.85–1.15)
INR PPP: 1.84 (ref 0.85–1.15)
INR PPP: 2.02 (ref 0.85–1.15)
INR PPP: 2.15 (ref 0.85–1.15)
INR PPP: 2.16 (ref 0.85–1.15)
INR PPP: 2.16 (ref 0.85–1.15)
INR PPP: 2.37 (ref 0.85–1.15)
INR PPP: 2.53 (ref 0.85–1.15)
INR PPP: 3.77 (ref 0.85–1.15)
INTERPRETATION ECG - MUSE: NORMAL
IRON BINDING CAPACITY (ROCHE): 390 UG/DL (ref 240–430)
IRON SATN MFR SERPL: 18 % (ref 15–46)
IRON SERPL-MCNC: 69 UG/DL (ref 37–145)
ISSUE DATE AND TIME: NORMAL
ISSUE DATE AND TIME: NORMAL
K AG RBC QL: NEGATIVE
KETONES UR STRIP-MCNC: NEGATIVE MG/DL
LACTATE SERPL-SCNC: 1.3 MMOL/L (ref 0.7–2)
LACTATE SERPL-SCNC: 1.5 MMOL/L (ref 0.7–2)
LACTATE SERPL-SCNC: 1.6 MMOL/L (ref 0.7–2)
LACTATE SERPL-SCNC: 10.7 MMOL/L (ref 0.7–2)
LACTATE SERPL-SCNC: 2.6 MMOL/L (ref 0.7–2)
LACTATE SERPL-SCNC: 3.3 MMOL/L (ref 0.7–2)
LACTATE SERPL-SCNC: 5.5 MMOL/L (ref 0.7–2)
LACTATE SERPL-SCNC: 7.7 MMOL/L (ref 0.7–2)
LEUKOCYTE ESTERASE UR QL STRIP: ABNORMAL
LEUKOCYTE ESTERASE UR QL STRIP: NEGATIVE
LIPASE SERPL-CCNC: 12 U/L (ref 13–60)
LIPASE SERPL-CCNC: 23 U/L (ref 13–60)
LIPASE SERPL-CCNC: 28 U/L (ref 13–60)
LIPASE SERPL-CCNC: 29 U/L (ref 13–60)
LVEF ECHO: NORMAL
LYMPHOCYTES # BLD AUTO: 0.5 10E3/UL (ref 0.8–5.3)
LYMPHOCYTES # BLD AUTO: 0.7 10E3/UL (ref 0.8–5.3)
LYMPHOCYTES # BLD AUTO: 0.9 10E3/UL (ref 0.8–5.3)
LYMPHOCYTES # BLD AUTO: 1 10E3/UL (ref 0.8–5.3)
LYMPHOCYTES # BLD AUTO: 1 10E3/UL (ref 0.8–5.3)
LYMPHOCYTES # BLD AUTO: 1.1 10E3/UL (ref 0.8–5.3)
LYMPHOCYTES # BLD AUTO: 1.1 10E3/UL (ref 0.8–5.3)
LYMPHOCYTES # BLD AUTO: 1.2 10E3/UL (ref 0.8–5.3)
LYMPHOCYTES # BLD AUTO: 1.3 10E3/UL (ref 0.8–5.3)
LYMPHOCYTES # BLD AUTO: 1.4 10E3/UL (ref 0.8–5.3)
LYMPHOCYTES # BLD AUTO: 1.5 10E3/UL (ref 0.8–5.3)
LYMPHOCYTES # BLD AUTO: 1.6 10E3/UL (ref 0.8–5.3)
LYMPHOCYTES # BLD MANUAL: 2.2 10E3/UL (ref 0.8–5.3)
LYMPHOCYTES NFR BLD AUTO: 11 %
LYMPHOCYTES NFR BLD AUTO: 12 %
LYMPHOCYTES NFR BLD AUTO: 14 %
LYMPHOCYTES NFR BLD AUTO: 15 %
LYMPHOCYTES NFR BLD AUTO: 16 %
LYMPHOCYTES NFR BLD AUTO: 3 %
LYMPHOCYTES NFR BLD AUTO: 4 %
LYMPHOCYTES NFR BLD AUTO: 5 %
LYMPHOCYTES NFR BLD AUTO: 6 %
LYMPHOCYTES NFR BLD AUTO: 8 %
LYMPHOCYTES NFR BLD MANUAL: 13 %
MAGNESIUM SERPL-MCNC: 1.4 MG/DL (ref 1.7–2.3)
MAGNESIUM SERPL-MCNC: 1.5 MG/DL (ref 1.7–2.3)
MAGNESIUM SERPL-MCNC: 1.7 MG/DL (ref 1.7–2.3)
MAGNESIUM SERPL-MCNC: 1.8 MG/DL (ref 1.7–2.3)
MAGNESIUM SERPL-MCNC: 1.9 MG/DL (ref 1.7–2.3)
MAGNESIUM SERPL-MCNC: 1.9 MG/DL (ref 1.7–2.3)
MAGNESIUM SERPL-MCNC: 2.3 MG/DL (ref 1.7–2.3)
MCH RBC QN AUTO: 26.6 PG (ref 26.5–33)
MCH RBC QN AUTO: 26.9 PG (ref 26.5–33)
MCH RBC QN AUTO: 27.1 PG (ref 26.5–33)
MCH RBC QN AUTO: 27.1 PG (ref 26.5–33)
MCH RBC QN AUTO: 27.2 PG (ref 26.5–33)
MCH RBC QN AUTO: 27.9 PG (ref 26.5–33)
MCH RBC QN AUTO: 27.9 PG (ref 26.5–33)
MCH RBC QN AUTO: 28 PG (ref 26.5–33)
MCH RBC QN AUTO: 28.1 PG (ref 26.5–33)
MCH RBC QN AUTO: 28.2 PG (ref 26.5–33)
MCH RBC QN AUTO: 28.3 PG (ref 26.5–33)
MCH RBC QN AUTO: 28.4 PG (ref 26.5–33)
MCH RBC QN AUTO: 28.5 PG (ref 26.5–33)
MCH RBC QN AUTO: 28.8 PG (ref 26.5–33)
MCH RBC QN AUTO: 28.9 PG (ref 26.5–33)
MCH RBC QN AUTO: 29.1 PG (ref 26.5–33)
MCH RBC QN AUTO: 29.2 PG (ref 26.5–33)
MCH RBC QN AUTO: 29.5 PG (ref 26.5–33)
MCHC RBC AUTO-ENTMCNC: 27.8 G/DL (ref 31.5–36.5)
MCHC RBC AUTO-ENTMCNC: 30.3 G/DL (ref 31.5–36.5)
MCHC RBC AUTO-ENTMCNC: 30.4 G/DL (ref 31.5–36.5)
MCHC RBC AUTO-ENTMCNC: 30.6 G/DL (ref 31.5–36.5)
MCHC RBC AUTO-ENTMCNC: 30.7 G/DL (ref 31.5–36.5)
MCHC RBC AUTO-ENTMCNC: 30.8 G/DL (ref 31.5–36.5)
MCHC RBC AUTO-ENTMCNC: 30.9 G/DL (ref 31.5–36.5)
MCHC RBC AUTO-ENTMCNC: 30.9 G/DL (ref 31.5–36.5)
MCHC RBC AUTO-ENTMCNC: 31 G/DL (ref 31.5–36.5)
MCHC RBC AUTO-ENTMCNC: 31 G/DL (ref 31.5–36.5)
MCHC RBC AUTO-ENTMCNC: 31.3 G/DL (ref 31.5–36.5)
MCHC RBC AUTO-ENTMCNC: 31.3 G/DL (ref 31.5–36.5)
MCHC RBC AUTO-ENTMCNC: 31.4 G/DL (ref 31.5–36.5)
MCHC RBC AUTO-ENTMCNC: 31.5 G/DL (ref 31.5–36.5)
MCHC RBC AUTO-ENTMCNC: 31.6 G/DL (ref 31.5–36.5)
MCHC RBC AUTO-ENTMCNC: 31.6 G/DL (ref 31.5–36.5)
MCHC RBC AUTO-ENTMCNC: 31.7 G/DL (ref 31.5–36.5)
MCHC RBC AUTO-ENTMCNC: 31.7 G/DL (ref 31.5–36.5)
MCHC RBC AUTO-ENTMCNC: 31.8 G/DL (ref 31.5–36.5)
MCHC RBC AUTO-ENTMCNC: 31.9 G/DL (ref 31.5–36.5)
MCHC RBC AUTO-ENTMCNC: 32 G/DL (ref 31.5–36.5)
MCHC RBC AUTO-ENTMCNC: 32.1 G/DL (ref 31.5–36.5)
MCHC RBC AUTO-ENTMCNC: 32.2 G/DL (ref 31.5–36.5)
MCHC RBC AUTO-ENTMCNC: 32.4 G/DL (ref 31.5–36.5)
MCHC RBC AUTO-ENTMCNC: 32.4 G/DL (ref 31.5–36.5)
MCHC RBC AUTO-ENTMCNC: 32.7 G/DL (ref 31.5–36.5)
MCHC RBC AUTO-ENTMCNC: 33.2 G/DL (ref 31.5–36.5)
MCV RBC AUTO: 100 FL (ref 78–100)
MCV RBC AUTO: 85 FL (ref 78–100)
MCV RBC AUTO: 86 FL (ref 78–100)
MCV RBC AUTO: 87 FL (ref 78–100)
MCV RBC AUTO: 87 FL (ref 78–100)
MCV RBC AUTO: 88 FL (ref 78–100)
MCV RBC AUTO: 88 FL (ref 78–100)
MCV RBC AUTO: 89 FL (ref 78–100)
MCV RBC AUTO: 90 FL (ref 78–100)
MCV RBC AUTO: 91 FL (ref 78–100)
MCV RBC AUTO: 92 FL (ref 78–100)
MCV RBC AUTO: 93 FL (ref 78–100)
MCV RBC AUTO: 93 FL (ref 78–100)
MCV RBC AUTO: 94 FL (ref 78–100)
MCV RBC AUTO: 94 FL (ref 78–100)
MONOCYTES # BLD AUTO: 0.2 10E3/UL (ref 0–1.3)
MONOCYTES # BLD AUTO: 0.5 10E3/UL (ref 0–1.3)
MONOCYTES # BLD AUTO: 0.6 10E3/UL (ref 0–1.3)
MONOCYTES # BLD AUTO: 0.7 10E3/UL (ref 0–1.3)
MONOCYTES # BLD AUTO: 0.8 10E3/UL (ref 0–1.3)
MONOCYTES # BLD AUTO: 0.9 10E3/UL (ref 0–1.3)
MONOCYTES # BLD AUTO: 1 10E3/UL (ref 0–1.3)
MONOCYTES # BLD AUTO: 1.1 10E3/UL (ref 0–1.3)
MONOCYTES # BLD AUTO: 1.2 10E3/UL (ref 0–1.3)
MONOCYTES # BLD AUTO: 1.3 10E3/UL (ref 0–1.3)
MONOCYTES # BLD AUTO: 1.3 10E3/UL (ref 0–1.3)
MONOCYTES # BLD AUTO: 1.4 10E3/UL (ref 0–1.3)
MONOCYTES # BLD MANUAL: 0.5 10E3/UL (ref 0–1.3)
MONOCYTES NFR BLD AUTO: 10 %
MONOCYTES NFR BLD AUTO: 11 %
MONOCYTES NFR BLD AUTO: 12 %
MONOCYTES NFR BLD AUTO: 13 %
MONOCYTES NFR BLD AUTO: 14 %
MONOCYTES NFR BLD AUTO: 15 %
MONOCYTES NFR BLD AUTO: 2 %
MONOCYTES NFR BLD AUTO: 3 %
MONOCYTES NFR BLD AUTO: 4 %
MONOCYTES NFR BLD AUTO: 6 %
MONOCYTES NFR BLD AUTO: 9 %
MONOCYTES NFR BLD AUTO: 9 %
MONOCYTES NFR BLD MANUAL: 3 %
MUCOUS THREADS #/AREA URNS LPF: PRESENT /LPF
NEUTROPHILS # BLD AUTO: 10.9 10E3/UL (ref 1.6–8.3)
NEUTROPHILS # BLD AUTO: 11.4 10E3/UL (ref 1.6–8.3)
NEUTROPHILS # BLD AUTO: 12.5 10E3/UL (ref 1.6–8.3)
NEUTROPHILS # BLD AUTO: 15.9 10E3/UL (ref 1.6–8.3)
NEUTROPHILS # BLD AUTO: 5.2 10E3/UL (ref 1.6–8.3)
NEUTROPHILS # BLD AUTO: 5.6 10E3/UL (ref 1.6–8.3)
NEUTROPHILS # BLD AUTO: 5.7 10E3/UL (ref 1.6–8.3)
NEUTROPHILS # BLD AUTO: 5.7 10E3/UL (ref 1.6–8.3)
NEUTROPHILS # BLD AUTO: 6.1 10E3/UL (ref 1.6–8.3)
NEUTROPHILS # BLD AUTO: 6.3 10E3/UL (ref 1.6–8.3)
NEUTROPHILS # BLD AUTO: 6.9 10E3/UL (ref 1.6–8.3)
NEUTROPHILS # BLD AUTO: 7 10E3/UL (ref 1.6–8.3)
NEUTROPHILS # BLD AUTO: 7 10E3/UL (ref 1.6–8.3)
NEUTROPHILS # BLD AUTO: 7.7 10E3/UL (ref 1.6–8.3)
NEUTROPHILS # BLD MANUAL: 14.4 10E3/UL (ref 1.6–8.3)
NEUTROPHILS NFR BLD AUTO: 64 %
NEUTROPHILS NFR BLD AUTO: 65 %
NEUTROPHILS NFR BLD AUTO: 68 %
NEUTROPHILS NFR BLD AUTO: 69 %
NEUTROPHILS NFR BLD AUTO: 73 %
NEUTROPHILS NFR BLD AUTO: 73 %
NEUTROPHILS NFR BLD AUTO: 74 %
NEUTROPHILS NFR BLD AUTO: 75 %
NEUTROPHILS NFR BLD AUTO: 75 %
NEUTROPHILS NFR BLD AUTO: 82 %
NEUTROPHILS NFR BLD AUTO: 88 %
NEUTROPHILS NFR BLD AUTO: 91 %
NEUTROPHILS NFR BLD AUTO: 93 %
NEUTROPHILS NFR BLD AUTO: 93 %
NEUTROPHILS NFR BLD MANUAL: 84 %
NITRATE UR QL: NEGATIVE
NRBC # BLD AUTO: 0 10E3/UL
NRBC # BLD AUTO: 0.3 10E3/UL
NRBC BLD AUTO-RTO: 0 /100
NRBC BLD MANUAL-RTO: 2 %
NT-PROBNP SERPL-MCNC: 3088 PG/ML (ref 0–900)
NT-PROBNP SERPL-MCNC: 3769 PG/ML (ref 0–900)
NT-PROBNP SERPL-MCNC: 3858 PG/ML (ref 0–900)
NT-PROBNP SERPL-MCNC: 4186 PG/ML (ref 0–900)
NT-PROBNP SERPL-MCNC: 4233 PG/ML (ref 0–900)
NT-PROBNP SERPL-MCNC: 4312 PG/ML (ref 0–900)
NT-PROBNP SERPL-MCNC: 5788 PG/ML (ref 0–900)
NT-PROBNP SERPL-MCNC: 5926 PG/ML (ref 0–900)
NT-PROBNP SERPL-MCNC: 6504 PG/ML (ref 0–900)
NT-PROBNP SERPL-MCNC: 6706 PG/ML (ref 0–900)
NT-PROBNP SERPL-MCNC: 9339 PG/ML (ref 0–900)
NT-PROBNP SERPL-MCNC: ABNORMAL PG/ML (ref 0–900)
O2/TOTAL GAS SETTING VFR VENT: 100 %
O2/TOTAL GAS SETTING VFR VENT: 21 %
O2/TOTAL GAS SETTING VFR VENT: 24 %
OPIATES UR QL SCN: NORMAL
OSMOLALITY SERPL: 269 MMOL/KG (ref 280–301)
OSMOLALITY SERPL: 273 MMOL/KG (ref 280–301)
OSMOLALITY UR: 210 MMOL/KG (ref 100–1200)
OSMOLALITY UR: 212 MMOL/KG (ref 100–1200)
OXYHGB MFR BLDV: 10 % (ref 70–75)
OXYHGB MFR BLDV: 11 % (ref 70–75)
OXYHGB MFR BLDV: 32 % (ref 70–75)
OXYHGB MFR BLDV: 42 % (ref 70–75)
OXYHGB MFR BLDV: 44 % (ref 70–75)
OXYHGB MFR BLDV: 76 % (ref 70–75)
P AXIS - MUSE: -73 DEGREES
P AXIS - MUSE: NORMAL DEGREES
PCO2 BLD: 29 MM HG (ref 35–45)
PCO2 BLDV: 39 MM HG (ref 40–50)
PCO2 BLDV: 41 MM HG (ref 40–50)
PCO2 BLDV: 48 MM HG (ref 40–50)
PCO2 BLDV: 51 MM HG (ref 40–50)
PCO2 BLDV: 52 MM HG (ref 40–50)
PCO2 BLDV: 56 MM HG (ref 40–50)
PCP QUAL URINE (ROCHE): NORMAL
PH BLD: 7.35 [PH] (ref 7.35–7.45)
PH BLDV: 6.76 [PH] (ref 7.32–7.43)
PH BLDV: 7.26 [PH] (ref 7.32–7.43)
PH BLDV: 7.36 [PH] (ref 7.32–7.43)
PH BLDV: 7.37 [PH] (ref 7.32–7.43)
PH BLDV: 7.38 [PH] (ref 7.32–7.43)
PH BLDV: 7.39 [PH] (ref 7.32–7.43)
PH UR STRIP: 5.5 [PH] (ref 4.7–8)
PH UR STRIP: 6 [PH] (ref 4.7–8)
PH UR STRIP: 6 [PH] (ref 4.7–8)
PH UR STRIP: 6.5 [PH] (ref 4.7–8)
PH UR STRIP: 6.5 [PH] (ref 4.7–8)
PH UR STRIP: 6.5 [PH] (ref 5–9)
PH UR STRIP: 6.5 [PH] (ref 5–9)
PLAT MORPH BLD: ABNORMAL
PLATELET # BLD AUTO: 103 10E3/UL (ref 150–450)
PLATELET # BLD AUTO: 190 10E3/UL (ref 150–450)
PLATELET # BLD AUTO: 194 10E3/UL (ref 150–450)
PLATELET # BLD AUTO: 195 10E3/UL (ref 150–450)
PLATELET # BLD AUTO: 195 10E3/UL (ref 150–450)
PLATELET # BLD AUTO: 201 10E3/UL (ref 150–450)
PLATELET # BLD AUTO: 205 10E3/UL (ref 150–450)
PLATELET # BLD AUTO: 209 10E3/UL (ref 150–450)
PLATELET # BLD AUTO: 214 10E3/UL (ref 150–450)
PLATELET # BLD AUTO: 215 10E3/UL (ref 150–450)
PLATELET # BLD AUTO: 217 10E3/UL (ref 150–450)
PLATELET # BLD AUTO: 217 10E3/UL (ref 150–450)
PLATELET # BLD AUTO: 218 10E3/UL (ref 150–450)
PLATELET # BLD AUTO: 220 10E3/UL (ref 150–450)
PLATELET # BLD AUTO: 234 10E3/UL (ref 150–450)
PLATELET # BLD AUTO: 236 10E3/UL (ref 150–450)
PLATELET # BLD AUTO: 236 10E3/UL (ref 150–450)
PLATELET # BLD AUTO: 238 10E3/UL (ref 150–450)
PLATELET # BLD AUTO: 243 10E3/UL (ref 150–450)
PLATELET # BLD AUTO: 245 10E3/UL (ref 150–450)
PLATELET # BLD AUTO: 247 10E3/UL (ref 150–450)
PLATELET # BLD AUTO: 257 10E3/UL (ref 150–450)
PLATELET # BLD AUTO: 272 10E3/UL (ref 150–450)
PLATELET # BLD AUTO: 274 10E3/UL (ref 150–450)
PLATELET # BLD AUTO: 275 10E3/UL (ref 150–450)
PLATELET # BLD AUTO: 278 10E3/UL (ref 150–450)
PLATELET # BLD AUTO: 294 10E3/UL (ref 150–450)
PLATELET # BLD AUTO: 312 10E3/UL (ref 150–450)
PLATELET # BLD AUTO: 338 10E3/UL (ref 150–450)
PLATELET # BLD AUTO: 344 10E3/UL (ref 150–450)
PLATELET # BLD AUTO: 389 10E3/UL (ref 150–450)
PO2 BLD: 91 MM HG (ref 80–105)
PO2 BLDV: 13 MM HG (ref 25–47)
PO2 BLDV: 15 MM HG (ref 25–47)
PO2 BLDV: 28 MM HG (ref 25–47)
PO2 BLDV: 29 MM HG (ref 25–47)
PO2 BLDV: 38 MM HG (ref 25–47)
PO2 BLDV: 47 MM HG (ref 25–47)
POTASSIUM SERPL-SCNC: 2.4 MMOL/L (ref 3.4–5.3)
POTASSIUM SERPL-SCNC: 2.6 MMOL/L (ref 3.4–5.3)
POTASSIUM SERPL-SCNC: 2.7 MMOL/L (ref 3.4–5.3)
POTASSIUM SERPL-SCNC: 2.9 MMOL/L (ref 3.4–5.3)
POTASSIUM SERPL-SCNC: 3 MMOL/L (ref 3.4–5.3)
POTASSIUM SERPL-SCNC: 3.1 MMOL/L (ref 3.4–5.3)
POTASSIUM SERPL-SCNC: 3.2 MMOL/L (ref 3.4–5.3)
POTASSIUM SERPL-SCNC: 3.3 MMOL/L (ref 3.4–5.3)
POTASSIUM SERPL-SCNC: 3.4 MMOL/L (ref 3.4–5.3)
POTASSIUM SERPL-SCNC: 3.5 MMOL/L (ref 3.4–5.3)
POTASSIUM SERPL-SCNC: 3.6 MMOL/L (ref 3.4–5.3)
POTASSIUM SERPL-SCNC: 3.6 MMOL/L (ref 3.4–5.3)
POTASSIUM SERPL-SCNC: 3.7 MMOL/L (ref 3.4–5.3)
POTASSIUM SERPL-SCNC: 3.7 MMOL/L (ref 3.4–5.3)
POTASSIUM SERPL-SCNC: 3.8 MMOL/L (ref 3.4–5.3)
POTASSIUM SERPL-SCNC: 3.9 MMOL/L (ref 3.4–5.3)
POTASSIUM SERPL-SCNC: 4 MMOL/L (ref 3.4–5.3)
POTASSIUM SERPL-SCNC: 4.2 MMOL/L (ref 3.4–5.3)
POTASSIUM SERPL-SCNC: 4.2 MMOL/L (ref 3.4–5.3)
POTASSIUM SERPL-SCNC: 4.3 MMOL/L (ref 3.4–5.3)
POTASSIUM SERPL-SCNC: 4.5 MMOL/L (ref 3.4–5.3)
POTASSIUM SERPL-SCNC: 4.6 MMOL/L (ref 3.4–5.3)
POTASSIUM SERPL-SCNC: 4.8 MMOL/L (ref 3.4–5.3)
POTASSIUM SERPL-SCNC: 4.9 MMOL/L (ref 3.4–5.3)
POTASSIUM SERPL-SCNC: 5.1 MMOL/L (ref 3.4–5.3)
POTASSIUM SERPL-SCNC: 5.4 MMOL/L (ref 3.4–5.3)
POTASSIUM SERPL-SCNC: 5.5 MMOL/L (ref 3.4–5.3)
POTASSIUM SERPL-SCNC: 5.5 MMOL/L (ref 3.4–5.3)
POTASSIUM SERPL-SCNC: 5.6 MMOL/L (ref 3.4–5.3)
PR INTERVAL - MUSE: 130 MS
PR INTERVAL - MUSE: 144 MS
PR INTERVAL - MUSE: NORMAL MS
PROCALCITONIN SERPL IA-MCNC: 0.08 NG/ML
PROCALCITONIN SERPL IA-MCNC: 0.09 NG/ML
PROCALCITONIN SERPL IA-MCNC: 0.18 NG/ML
PROT SERPL-MCNC: 5.5 G/DL (ref 6.4–8.3)
PROT SERPL-MCNC: 5.9 G/DL (ref 6.4–8.3)
PROT SERPL-MCNC: 6.1 G/DL (ref 6.4–8.3)
PROT SERPL-MCNC: 6.1 G/DL (ref 6.4–8.3)
PROT SERPL-MCNC: 6.4 G/DL (ref 6.4–8.3)
PROT SERPL-MCNC: 6.5 G/DL (ref 6.4–8.3)
PROT SERPL-MCNC: 6.6 G/DL (ref 6.4–8.3)
PROT SERPL-MCNC: 6.6 G/DL (ref 6.4–8.3)
PROT SERPL-MCNC: 6.7 G/DL (ref 6.4–8.3)
PROT SERPL-MCNC: 6.9 G/DL (ref 6.4–8.3)
PROT SERPL-MCNC: 7 G/DL (ref 6.4–8.3)
PROT SERPL-MCNC: 7.1 G/DL (ref 6.4–8.3)
PROT SERPL-MCNC: 7.3 G/DL (ref 6.4–8.3)
PROT SERPL-MCNC: 7.3 G/DL (ref 6.4–8.3)
PROT SERPL-MCNC: 7.4 G/DL (ref 6.4–8.3)
QRS DURATION - MUSE: 120 MS
QRS DURATION - MUSE: 122 MS
QRS DURATION - MUSE: 130 MS
QRS DURATION - MUSE: 138 MS
QRS DURATION - MUSE: 140 MS
QRS DURATION - MUSE: 142 MS
QRS DURATION - MUSE: 142 MS
QRS DURATION - MUSE: 146 MS
QRS DURATION - MUSE: 150 MS
QRS DURATION - MUSE: 150 MS
QRS DURATION - MUSE: 174 MS
QT - MUSE: 336 MS
QT - MUSE: 340 MS
QT - MUSE: 364 MS
QT - MUSE: 368 MS
QT - MUSE: 382 MS
QT - MUSE: 400 MS
QT - MUSE: 416 MS
QT - MUSE: 440 MS
QT - MUSE: 442 MS
QT - MUSE: 452 MS
QT - MUSE: 526 MS
QTC - MUSE: 453 MS
QTC - MUSE: 463 MS
QTC - MUSE: 501 MS
QTC - MUSE: 502 MS
QTC - MUSE: 513 MS
QTC - MUSE: 526 MS
QTC - MUSE: 531 MS
QTC - MUSE: 531 MS
QTC - MUSE: 537 MS
QTC - MUSE: 544 MS
QTC - MUSE: 555 MS
R AXIS - MUSE: 106 DEGREES
R AXIS - MUSE: 107 DEGREES
R AXIS - MUSE: 107 DEGREES
R AXIS - MUSE: 115 DEGREES
R AXIS - MUSE: 121 DEGREES
R AXIS - MUSE: 124 DEGREES
R AXIS - MUSE: 88 DEGREES
R AXIS - MUSE: 90 DEGREES
R AXIS - MUSE: 95 DEGREES
R AXIS - MUSE: 97 DEGREES
R AXIS - MUSE: 99 DEGREES
RBC # BLD AUTO: 2.87 10E6/UL (ref 3.8–5.2)
RBC # BLD AUTO: 3.59 10E6/UL (ref 3.8–5.2)
RBC # BLD AUTO: 4.18 10E6/UL (ref 3.8–5.2)
RBC # BLD AUTO: 4.18 10E6/UL (ref 3.8–5.2)
RBC # BLD AUTO: 4.21 10E6/UL (ref 3.8–5.2)
RBC # BLD AUTO: 4.24 10E6/UL (ref 3.8–5.2)
RBC # BLD AUTO: 4.24 10E6/UL (ref 3.8–5.2)
RBC # BLD AUTO: 4.26 10E6/UL (ref 3.8–5.2)
RBC # BLD AUTO: 4.26 10E6/UL (ref 3.8–5.2)
RBC # BLD AUTO: 4.27 10E6/UL (ref 3.8–5.2)
RBC # BLD AUTO: 4.27 10E6/UL (ref 3.8–5.2)
RBC # BLD AUTO: 4.33 10E6/UL (ref 3.8–5.2)
RBC # BLD AUTO: 4.36 10E6/UL (ref 3.8–5.2)
RBC # BLD AUTO: 4.36 10E6/UL (ref 3.8–5.2)
RBC # BLD AUTO: 4.37 10E6/UL (ref 3.8–5.2)
RBC # BLD AUTO: 4.39 10E6/UL (ref 3.8–5.2)
RBC # BLD AUTO: 4.39 10E6/UL (ref 3.8–5.2)
RBC # BLD AUTO: 4.4 10E6/UL (ref 3.8–5.2)
RBC # BLD AUTO: 4.41 10E6/UL (ref 3.8–5.2)
RBC # BLD AUTO: 4.43 10E6/UL (ref 3.8–5.2)
RBC # BLD AUTO: 4.45 10E6/UL (ref 3.8–5.2)
RBC # BLD AUTO: 4.46 10E6/UL (ref 3.8–5.2)
RBC # BLD AUTO: 4.48 10E6/UL (ref 3.8–5.2)
RBC # BLD AUTO: 4.49 10E6/UL (ref 3.8–5.2)
RBC # BLD AUTO: 4.53 10E6/UL (ref 3.8–5.2)
RBC # BLD AUTO: 4.61 10E6/UL (ref 3.8–5.2)
RBC # BLD AUTO: 4.61 10E6/UL (ref 3.8–5.2)
RBC # BLD AUTO: 4.65 10E6/UL (ref 3.8–5.2)
RBC # BLD AUTO: 4.68 10E6/UL (ref 3.8–5.2)
RBC # BLD AUTO: 4.71 10E6/UL (ref 3.8–5.2)
RBC # BLD AUTO: 4.74 10E6/UL (ref 3.8–5.2)
RBC MORPH BLD: ABNORMAL
RBC URINE: 1 /HPF
RBC URINE: 161 /HPF
RBC URINE: 2 /HPF
RBC URINE: 2 /HPF
RBC URINE: 6 /HPF
RBC URINE: <1 /HPF
RSV RNA SPEC NAA+PROBE: NEGATIVE
SAO2 % BLDA: 96 % (ref 92–100)
SAO2 % BLDV: 10 % (ref 70–75)
SAO2 % BLDV: 10.8 % (ref 70–75)
SAO2 % BLDV: 32.9 % (ref 70–75)
SAO2 % BLDV: 42.8 % (ref 70–75)
SAO2 % BLDV: 45 % (ref 70–75)
SAO2 % BLDV: 77.6 % (ref 70–75)
SARS-COV-2 RNA RESP QL NAA+PROBE: NEGATIVE
SODIUM SERPL-SCNC: 124 MMOL/L (ref 135–145)
SODIUM SERPL-SCNC: 127 MMOL/L (ref 135–145)
SODIUM SERPL-SCNC: 128 MMOL/L (ref 135–145)
SODIUM SERPL-SCNC: 128 MMOL/L (ref 135–145)
SODIUM SERPL-SCNC: 129 MMOL/L (ref 135–145)
SODIUM SERPL-SCNC: 129 MMOL/L (ref 135–145)
SODIUM SERPL-SCNC: 130 MMOL/L (ref 135–145)
SODIUM SERPL-SCNC: 130 MMOL/L (ref 135–145)
SODIUM SERPL-SCNC: 131 MMOL/L (ref 135–145)
SODIUM SERPL-SCNC: 132 MMOL/L (ref 135–145)
SODIUM SERPL-SCNC: 133 MMOL/L (ref 135–145)
SODIUM SERPL-SCNC: 134 MMOL/L (ref 135–145)
SODIUM SERPL-SCNC: 135 MMOL/L (ref 135–145)
SODIUM SERPL-SCNC: 136 MMOL/L (ref 135–145)
SODIUM SERPL-SCNC: 137 MMOL/L (ref 135–145)
SODIUM SERPL-SCNC: 138 MMOL/L (ref 135–145)
SODIUM SERPL-SCNC: 139 MMOL/L (ref 135–145)
SODIUM SERPL-SCNC: 140 MMOL/L (ref 135–145)
SODIUM SERPL-SCNC: 141 MMOL/L (ref 135–145)
SODIUM UR-SCNC: 20 MMOL/L
SODIUM UR-SCNC: 53 MMOL/L
SODIUM UR-SCNC: <20 MMOL/L
SP GR UR STRIP: 1.01 (ref 1–1.03)
SP GR UR STRIP: 1.02 (ref 1–1.03)
SP GR UR STRIP: 1.02 (ref 1–1.03)
SPECIMEN EXPIRATION DATE: ABNORMAL
SPECIMEN EXPIRATION DATE: NORMAL
SPECIMEN EXPIRATION DATE: NORMAL
SQUAMOUS EPITHELIAL: 1 /HPF
SQUAMOUS EPITHELIAL: 11 /HPF
SQUAMOUS EPITHELIAL: 14 /HPF
SQUAMOUS EPITHELIAL: 2 /HPF
SQUAMOUS EPITHELIAL: 7 /HPF
SQUAMOUS EPITHELIAL: <1 /HPF
SQUAMOUS EPITHELIAL: <1 /HPF
SYSTOLIC BLOOD PRESSURE - MUSE: NORMAL MMHG
T AXIS - MUSE: -20 DEGREES
T AXIS - MUSE: -20 DEGREES
T AXIS - MUSE: -21 DEGREES
T AXIS - MUSE: -21 DEGREES
T AXIS - MUSE: -39 DEGREES
T AXIS - MUSE: -6 DEGREES
T AXIS - MUSE: -9 DEGREES
T AXIS - MUSE: 166 DEGREES
T AXIS - MUSE: 174 DEGREES
T AXIS - MUSE: 2 DEGREES
T AXIS - MUSE: 8 DEGREES
TROPONIN T SERPL HS-MCNC: 25 NG/L
TROPONIN T SERPL HS-MCNC: 27 NG/L
TROPONIN T SERPL HS-MCNC: 27 NG/L
TROPONIN T SERPL HS-MCNC: 28 NG/L
TROPONIN T SERPL HS-MCNC: 28 NG/L
TROPONIN T SERPL HS-MCNC: 30 NG/L
TROPONIN T SERPL HS-MCNC: 30 NG/L
TROPONIN T SERPL HS-MCNC: 31 NG/L
TROPONIN T SERPL HS-MCNC: 31 NG/L
TROPONIN T SERPL HS-MCNC: 35 NG/L
TROPONIN T SERPL HS-MCNC: 44 NG/L
TROPONIN T SERPL HS-MCNC: 45 NG/L
TROPONIN T SERPL HS-MCNC: 47 NG/L
TROPONIN T SERPL HS-MCNC: 50 NG/L
TROPONIN T SERPL HS-MCNC: 53 NG/L
TROPONIN T SERPL HS-MCNC: 63 NG/L
TSH SERPL DL<=0.005 MIU/L-ACNC: 0.83 UIU/ML (ref 0.3–4.2)
TSH SERPL DL<=0.005 MIU/L-ACNC: 2.11 UIU/ML (ref 0.3–4.2)
TSH SERPL DL<=0.005 MIU/L-ACNC: 3.24 UIU/ML (ref 0.3–4.2)
UNIT ABO/RH: NORMAL
UNIT ABO/RH: NORMAL
UNIT NUMBER: NORMAL
UNIT NUMBER: NORMAL
UNIT STATUS: NORMAL
UNIT STATUS: NORMAL
UNIT TYPE ISBT: 6200
UNIT TYPE ISBT: 6200
UROBILINOGEN UR STRIP-MCNC: 2 MG/DL
UROBILINOGEN UR STRIP-MCNC: 2 MG/DL
UROBILINOGEN UR STRIP-MCNC: 3 MG/DL
UROBILINOGEN UR STRIP-MCNC: 3 MG/DL
UROBILINOGEN UR STRIP-MCNC: 4 MG/DL
UROBILINOGEN UR STRIP-MCNC: 4 MG/DL
UROBILINOGEN UR STRIP-MCNC: NORMAL MG/DL
VENTRICULAR RATE- MUSE: 103 BPM
VENTRICULAR RATE- MUSE: 104 BPM
VENTRICULAR RATE- MUSE: 107 BPM
VENTRICULAR RATE- MUSE: 114 BPM
VENTRICULAR RATE- MUSE: 123 BPM
VENTRICULAR RATE- MUSE: 128 BPM
VENTRICULAR RATE- MUSE: 67 BPM
VENTRICULAR RATE- MUSE: 78 BPM
VENTRICULAR RATE- MUSE: 83 BPM
VENTRICULAR RATE- MUSE: 89 BPM
VENTRICULAR RATE- MUSE: 99 BPM
WBC # BLD AUTO: 10.5 10E3/UL (ref 4–11)
WBC # BLD AUTO: 10.5 10E3/UL (ref 4–11)
WBC # BLD AUTO: 10.6 10E3/UL (ref 4–11)
WBC # BLD AUTO: 11.5 10E3/UL (ref 4–11)
WBC # BLD AUTO: 11.8 10E3/UL (ref 4–11)
WBC # BLD AUTO: 12.3 10E3/UL (ref 4–11)
WBC # BLD AUTO: 13.5 10E3/UL (ref 4–11)
WBC # BLD AUTO: 14 10E3/UL (ref 4–11)
WBC # BLD AUTO: 15.7 10E3/UL (ref 4–11)
WBC # BLD AUTO: 17.1 10E3/UL (ref 4–11)
WBC # BLD AUTO: 17.2 10E3/UL (ref 4–11)
WBC # BLD AUTO: 7.6 10E3/UL (ref 4–11)
WBC # BLD AUTO: 7.6 10E3/UL (ref 4–11)
WBC # BLD AUTO: 7.7 10E3/UL (ref 4–11)
WBC # BLD AUTO: 8.2 10E3/UL (ref 4–11)
WBC # BLD AUTO: 8.3 10E3/UL (ref 4–11)
WBC # BLD AUTO: 8.4 10E3/UL (ref 4–11)
WBC # BLD AUTO: 8.7 10E3/UL (ref 4–11)
WBC # BLD AUTO: 8.8 10E3/UL (ref 4–11)
WBC # BLD AUTO: 8.8 10E3/UL (ref 4–11)
WBC # BLD AUTO: 8.9 10E3/UL (ref 4–11)
WBC # BLD AUTO: 8.9 10E3/UL (ref 4–11)
WBC # BLD AUTO: 9.1 10E3/UL (ref 4–11)
WBC # BLD AUTO: 9.1 10E3/UL (ref 4–11)
WBC # BLD AUTO: 9.3 10E3/UL (ref 4–11)
WBC # BLD AUTO: 9.4 10E3/UL (ref 4–11)
WBC # BLD AUTO: 9.5 10E3/UL (ref 4–11)
WBC # BLD AUTO: 9.8 10E3/UL (ref 4–11)
WBC CLUMPS #/AREA URNS HPF: PRESENT /HPF
WBC CLUMPS #/AREA URNS HPF: PRESENT /HPF
WBC URINE: 1 /HPF
WBC URINE: 11 /HPF
WBC URINE: 151 /HPF
WBC URINE: 17 /HPF
WBC URINE: 2 /HPF
WBC URINE: 3 /HPF
WBC URINE: 30 /HPF
WBC URINE: 8 /HPF
WBC URINE: <1 /HPF

## 2024-01-01 PROCEDURE — 36415 COLL VENOUS BLD VENIPUNCTURE: CPT | Performed by: STUDENT IN AN ORGANIZED HEALTH CARE EDUCATION/TRAINING PROGRAM

## 2024-01-01 PROCEDURE — 250N000013 HC RX MED GY IP 250 OP 250 PS 637: Performed by: FAMILY MEDICINE

## 2024-01-01 PROCEDURE — 83880 ASSAY OF NATRIURETIC PEPTIDE: CPT | Performed by: HOSPITALIST

## 2024-01-01 PROCEDURE — 36415 COLL VENOUS BLD VENIPUNCTURE: CPT | Performed by: PHYSICIAN ASSISTANT

## 2024-01-01 PROCEDURE — 250N000011 HC RX IP 250 OP 636: Performed by: PHYSICIAN ASSISTANT

## 2024-01-01 PROCEDURE — 93010 ELECTROCARDIOGRAM REPORT: CPT | Mod: 76 | Performed by: INTERNAL MEDICINE

## 2024-01-01 PROCEDURE — 250N000011 HC RX IP 250 OP 636: Mod: JZ | Performed by: FAMILY MEDICINE

## 2024-01-01 PROCEDURE — 85025 COMPLETE CBC W/AUTO DIFF WBC: CPT | Performed by: PHYSICIAN ASSISTANT

## 2024-01-01 PROCEDURE — 250N000013 HC RX MED GY IP 250 OP 250 PS 637: Performed by: NURSE PRACTITIONER

## 2024-01-01 PROCEDURE — 999N000157 HC STATISTIC RCP TIME EA 10 MIN

## 2024-01-01 PROCEDURE — 85610 PROTHROMBIN TIME: CPT | Performed by: NURSE PRACTITIONER

## 2024-01-01 PROCEDURE — 250N000013 HC RX MED GY IP 250 OP 250 PS 637: Performed by: INTERNAL MEDICINE

## 2024-01-01 PROCEDURE — 250N000009 HC RX 250: Performed by: STUDENT IN AN ORGANIZED HEALTH CARE EDUCATION/TRAINING PROGRAM

## 2024-01-01 PROCEDURE — 99232 SBSQ HOSP IP/OBS MODERATE 35: CPT | Performed by: INTERNAL MEDICINE

## 2024-01-01 PROCEDURE — 250N000012 HC RX MED GY IP 250 OP 636 PS 637: Performed by: STUDENT IN AN ORGANIZED HEALTH CARE EDUCATION/TRAINING PROGRAM

## 2024-01-01 PROCEDURE — 93010 ELECTROCARDIOGRAM REPORT: CPT | Performed by: INTERNAL MEDICINE

## 2024-01-01 PROCEDURE — 250N000011 HC RX IP 250 OP 636: Performed by: INTERNAL MEDICINE

## 2024-01-01 PROCEDURE — 250N000011 HC RX IP 250 OP 636: Mod: JZ | Performed by: INTERNAL MEDICINE

## 2024-01-01 PROCEDURE — 250N000011 HC RX IP 250 OP 636: Performed by: HOSPITALIST

## 2024-01-01 PROCEDURE — 99285 EMERGENCY DEPT VISIT HI MDM: CPT | Performed by: INTERNAL MEDICINE

## 2024-01-01 PROCEDURE — 255N000002 HC RX 255 OP 636: Performed by: INTERNAL MEDICINE

## 2024-01-01 PROCEDURE — 99239 HOSP IP/OBS DSCHRG MGMT >30: CPT | Performed by: NURSE PRACTITIONER

## 2024-01-01 PROCEDURE — 80053 COMPREHEN METABOLIC PANEL: CPT | Performed by: INTERNAL MEDICINE

## 2024-01-01 PROCEDURE — 87040 BLOOD CULTURE FOR BACTERIA: CPT | Performed by: PHYSICIAN ASSISTANT

## 2024-01-01 PROCEDURE — A9537 TC99M MEBROFENIN: HCPCS | Performed by: RADIOLOGY

## 2024-01-01 PROCEDURE — 85004 AUTOMATED DIFF WBC COUNT: CPT | Performed by: STUDENT IN AN ORGANIZED HEALTH CARE EDUCATION/TRAINING PROGRAM

## 2024-01-01 PROCEDURE — 80048 BASIC METABOLIC PNL TOTAL CA: CPT | Performed by: HOSPITALIST

## 2024-01-01 PROCEDURE — 99233 SBSQ HOSP IP/OBS HIGH 50: CPT | Performed by: HOSPITALIST

## 2024-01-01 PROCEDURE — 80076 HEPATIC FUNCTION PANEL: CPT | Performed by: PHYSICIAN ASSISTANT

## 2024-01-01 PROCEDURE — 36415 COLL VENOUS BLD VENIPUNCTURE: CPT | Performed by: INTERNAL MEDICINE

## 2024-01-01 PROCEDURE — 99222 1ST HOSP IP/OBS MODERATE 55: CPT | Mod: AI | Performed by: HOSPITALIST

## 2024-01-01 PROCEDURE — 85610 PROTHROMBIN TIME: CPT | Performed by: PHYSICIAN ASSISTANT

## 2024-01-01 PROCEDURE — 83735 ASSAY OF MAGNESIUM: CPT | Performed by: HOSPITALIST

## 2024-01-01 PROCEDURE — G0378 HOSPITAL OBSERVATION PER HR: HCPCS

## 2024-01-01 PROCEDURE — 36600 WITHDRAWAL OF ARTERIAL BLOOD: CPT | Performed by: FAMILY MEDICINE

## 2024-01-01 PROCEDURE — 96376 TX/PRO/DX INJ SAME DRUG ADON: CPT | Mod: XU | Performed by: PHYSICIAN ASSISTANT

## 2024-01-01 PROCEDURE — 86905 BLOOD TYPING RBC ANTIGENS: CPT | Performed by: INTERNAL MEDICINE

## 2024-01-01 PROCEDURE — 94640 AIRWAY INHALATION TREATMENT: CPT

## 2024-01-01 PROCEDURE — 250N000013 HC RX MED GY IP 250 OP 250 PS 637: Performed by: HOSPITALIST

## 2024-01-01 PROCEDURE — 84145 PROCALCITONIN (PCT): CPT | Performed by: INTERNAL MEDICINE

## 2024-01-01 PROCEDURE — 36415 COLL VENOUS BLD VENIPUNCTURE: CPT | Performed by: HOSPITALIST

## 2024-01-01 PROCEDURE — 94002 VENT MGMT INPAT INIT DAY: CPT

## 2024-01-01 PROCEDURE — 96374 THER/PROPH/DIAG INJ IV PUSH: CPT | Performed by: PHYSICIAN ASSISTANT

## 2024-01-01 PROCEDURE — 99285 EMERGENCY DEPT VISIT HI MDM: CPT | Mod: 25 | Performed by: PHYSICIAN ASSISTANT

## 2024-01-01 PROCEDURE — 85610 PROTHROMBIN TIME: CPT | Mod: ZL | Performed by: STUDENT IN AN ORGANIZED HEALTH CARE EDUCATION/TRAINING PROGRAM

## 2024-01-01 PROCEDURE — 80048 BASIC METABOLIC PNL TOTAL CA: CPT | Performed by: STUDENT IN AN ORGANIZED HEALTH CARE EDUCATION/TRAINING PROGRAM

## 2024-01-01 PROCEDURE — 83690 ASSAY OF LIPASE: CPT | Performed by: EMERGENCY MEDICINE

## 2024-01-01 PROCEDURE — 93970 EXTREMITY STUDY: CPT

## 2024-01-01 PROCEDURE — 87637 SARSCOV2&INF A&B&RSV AMP PRB: CPT | Performed by: INTERNAL MEDICINE

## 2024-01-01 PROCEDURE — 93005 ELECTROCARDIOGRAM TRACING: CPT | Performed by: PHYSICIAN ASSISTANT

## 2024-01-01 PROCEDURE — 87637 SARSCOV2&INF A&B&RSV AMP PRB: CPT | Performed by: EMERGENCY MEDICINE

## 2024-01-01 PROCEDURE — 82436 ASSAY OF URINE CHLORIDE: CPT | Performed by: HOSPITALIST

## 2024-01-01 PROCEDURE — 120N000001 HC R&B MED SURG/OB

## 2024-01-01 PROCEDURE — 82077 ASSAY SPEC XCP UR&BREATH IA: CPT | Performed by: FAMILY MEDICINE

## 2024-01-01 PROCEDURE — 99284 EMERGENCY DEPT VISIT MOD MDM: CPT

## 2024-01-01 PROCEDURE — 96376 TX/PRO/DX INJ SAME DRUG ADON: CPT

## 2024-01-01 PROCEDURE — 250N000011 HC RX IP 250 OP 636: Performed by: FAMILY MEDICINE

## 2024-01-01 PROCEDURE — 83735 ASSAY OF MAGNESIUM: CPT | Performed by: INTERNAL MEDICINE

## 2024-01-01 PROCEDURE — 96375 TX/PRO/DX INJ NEW DRUG ADDON: CPT

## 2024-01-01 PROCEDURE — 999N000208 ECHOCARDIOGRAM LIMITED

## 2024-01-01 PROCEDURE — 85610 PROTHROMBIN TIME: CPT | Performed by: HOSPITALIST

## 2024-01-01 PROCEDURE — 82248 BILIRUBIN DIRECT: CPT | Performed by: HOSPITALIST

## 2024-01-01 PROCEDURE — 83605 ASSAY OF LACTIC ACID: CPT | Performed by: FAMILY MEDICINE

## 2024-01-01 PROCEDURE — 250N000009 HC RX 250: Performed by: INTERNAL MEDICINE

## 2024-01-01 PROCEDURE — 93005 ELECTROCARDIOGRAM TRACING: CPT

## 2024-01-01 PROCEDURE — 82040 ASSAY OF SERUM ALBUMIN: CPT | Performed by: INTERNAL MEDICINE

## 2024-01-01 PROCEDURE — 86870 RBC ANTIBODY IDENTIFICATION: CPT | Performed by: INTERNAL MEDICINE

## 2024-01-01 PROCEDURE — 85027 COMPLETE CBC AUTOMATED: CPT | Performed by: INTERNAL MEDICINE

## 2024-01-01 PROCEDURE — 93308 TTE F-UP OR LMTD: CPT | Mod: 26 | Performed by: INTERNAL MEDICINE

## 2024-01-01 PROCEDURE — 250N000009 HC RX 250: Performed by: HOSPITALIST

## 2024-01-01 PROCEDURE — 82805 BLOOD GASES W/O2 SATURATION: CPT | Performed by: PHYSICIAN ASSISTANT

## 2024-01-01 PROCEDURE — 71046 X-RAY EXAM CHEST 2 VIEWS: CPT

## 2024-01-01 PROCEDURE — 87086 URINE CULTURE/COLONY COUNT: CPT | Performed by: HOSPITALIST

## 2024-01-01 PROCEDURE — G1010 CDSM STANSON: HCPCS

## 2024-01-01 PROCEDURE — 84443 ASSAY THYROID STIM HORMONE: CPT | Performed by: STUDENT IN AN ORGANIZED HEALTH CARE EDUCATION/TRAINING PROGRAM

## 2024-01-01 PROCEDURE — 250N000011 HC RX IP 250 OP 636: Performed by: NURSE PRACTITIONER

## 2024-01-01 PROCEDURE — 71045 X-RAY EXAM CHEST 1 VIEW: CPT

## 2024-01-01 PROCEDURE — 93321 DOPPLER ECHO F-UP/LMTD STD: CPT | Mod: 26 | Performed by: INTERNAL MEDICINE

## 2024-01-01 PROCEDURE — 96365 THER/PROPH/DIAG IV INF INIT: CPT

## 2024-01-01 PROCEDURE — 84295 ASSAY OF SERUM SODIUM: CPT | Performed by: HOSPITALIST

## 2024-01-01 PROCEDURE — 99232 SBSQ HOSP IP/OBS MODERATE 35: CPT | Performed by: NURSE PRACTITIONER

## 2024-01-01 PROCEDURE — 86803 HEPATITIS C AB TEST: CPT | Performed by: HOSPITALIST

## 2024-01-01 PROCEDURE — 82040 ASSAY OF SERUM ALBUMIN: CPT | Performed by: PHYSICIAN ASSISTANT

## 2024-01-01 PROCEDURE — 80048 BASIC METABOLIC PNL TOTAL CA: CPT | Performed by: INTERNAL MEDICINE

## 2024-01-01 PROCEDURE — 76705 ECHO EXAM OF ABDOMEN: CPT

## 2024-01-01 PROCEDURE — 84132 ASSAY OF SERUM POTASSIUM: CPT | Performed by: NURSE PRACTITIONER

## 2024-01-01 PROCEDURE — 05HM33Z INSERTION OF INFUSION DEVICE INTO RIGHT INTERNAL JUGULAR VEIN, PERCUTANEOUS APPROACH: ICD-10-PCS | Performed by: EMERGENCY MEDICINE

## 2024-01-01 PROCEDURE — 99285 EMERGENCY DEPT VISIT HI MDM: CPT | Mod: 25

## 2024-01-01 PROCEDURE — 99239 HOSP IP/OBS DSCHRG MGMT >30: CPT | Performed by: INTERNAL MEDICINE

## 2024-01-01 PROCEDURE — 81001 URINALYSIS AUTO W/SCOPE: CPT | Performed by: PHYSICIAN ASSISTANT

## 2024-01-01 PROCEDURE — 83605 ASSAY OF LACTIC ACID: CPT | Performed by: HOSPITALIST

## 2024-01-01 PROCEDURE — 99285 EMERGENCY DEPT VISIT HI MDM: CPT | Performed by: PHYSICIAN ASSISTANT

## 2024-01-01 PROCEDURE — 80053 COMPREHEN METABOLIC PANEL: CPT | Performed by: STUDENT IN AN ORGANIZED HEALTH CARE EDUCATION/TRAINING PROGRAM

## 2024-01-01 PROCEDURE — 250N000009 HC RX 250: Performed by: FAMILY MEDICINE

## 2024-01-01 PROCEDURE — 258N000003 HC RX IP 258 OP 636: Performed by: INTERNAL MEDICINE

## 2024-01-01 PROCEDURE — 81001 URINALYSIS AUTO W/SCOPE: CPT | Performed by: STUDENT IN AN ORGANIZED HEALTH CARE EDUCATION/TRAINING PROGRAM

## 2024-01-01 PROCEDURE — 85025 COMPLETE CBC W/AUTO DIFF WBC: CPT | Performed by: STUDENT IN AN ORGANIZED HEALTH CARE EDUCATION/TRAINING PROGRAM

## 2024-01-01 PROCEDURE — 84300 ASSAY OF URINE SODIUM: CPT | Performed by: INTERNAL MEDICINE

## 2024-01-01 PROCEDURE — 80048 BASIC METABOLIC PNL TOTAL CA: CPT | Mod: ZL

## 2024-01-01 PROCEDURE — 250N000011 HC RX IP 250 OP 636: Performed by: SURGERY

## 2024-01-01 PROCEDURE — 84132 ASSAY OF SERUM POTASSIUM: CPT | Performed by: HOSPITALIST

## 2024-01-01 PROCEDURE — 250N000011 HC RX IP 250 OP 636: Performed by: STUDENT IN AN ORGANIZED HEALTH CARE EDUCATION/TRAINING PROGRAM

## 2024-01-01 PROCEDURE — 83880 ASSAY OF NATRIURETIC PEPTIDE: CPT | Performed by: STUDENT IN AN ORGANIZED HEALTH CARE EDUCATION/TRAINING PROGRAM

## 2024-01-01 PROCEDURE — 36592 COLLECT BLOOD FROM PICC: CPT | Performed by: INTERNAL MEDICINE

## 2024-01-01 PROCEDURE — 97530 THERAPEUTIC ACTIVITIES: CPT | Mod: GO

## 2024-01-01 PROCEDURE — 97116 GAIT TRAINING THERAPY: CPT | Mod: GP

## 2024-01-01 PROCEDURE — 85014 HEMATOCRIT: CPT | Performed by: INTERNAL MEDICINE

## 2024-01-01 PROCEDURE — 80307 DRUG TEST PRSMV CHEM ANLYZR: CPT | Performed by: FAMILY MEDICINE

## 2024-01-01 PROCEDURE — 96374 THER/PROPH/DIAG INJ IV PUSH: CPT

## 2024-01-01 PROCEDURE — 0BH17EZ INSERTION OF ENDOTRACHEAL AIRWAY INTO TRACHEA, VIA NATURAL OR ARTIFICIAL OPENING: ICD-10-PCS | Performed by: EMERGENCY MEDICINE

## 2024-01-01 PROCEDURE — 85610 PROTHROMBIN TIME: CPT | Performed by: FAMILY MEDICINE

## 2024-01-01 PROCEDURE — 85027 COMPLETE CBC AUTOMATED: CPT | Performed by: NURSE PRACTITIONER

## 2024-01-01 PROCEDURE — 83735 ASSAY OF MAGNESIUM: CPT | Performed by: STUDENT IN AN ORGANIZED HEALTH CARE EDUCATION/TRAINING PROGRAM

## 2024-01-01 PROCEDURE — 36415 COLL VENOUS BLD VENIPUNCTURE: CPT | Performed by: NURSE PRACTITIONER

## 2024-01-01 PROCEDURE — 82805 BLOOD GASES W/O2 SATURATION: CPT | Performed by: SURGERY

## 2024-01-01 PROCEDURE — 36415 COLL VENOUS BLD VENIPUNCTURE: CPT | Performed by: FAMILY MEDICINE

## 2024-01-01 PROCEDURE — C9113 INJ PANTOPRAZOLE SODIUM, VIA: HCPCS | Mod: JZ | Performed by: INTERNAL MEDICINE

## 2024-01-01 PROCEDURE — 82805 BLOOD GASES W/O2 SATURATION: CPT | Performed by: FAMILY MEDICINE

## 2024-01-01 PROCEDURE — 85004 AUTOMATED DIFF WBC COUNT: CPT | Performed by: INTERNAL MEDICINE

## 2024-01-01 PROCEDURE — 250N000009 HC RX 250: Performed by: SURGERY

## 2024-01-01 PROCEDURE — 83935 ASSAY OF URINE OSMOLALITY: CPT | Performed by: HOSPITALIST

## 2024-01-01 PROCEDURE — 250N000011 HC RX IP 250 OP 636: Mod: JZ | Performed by: PHYSICIAN ASSISTANT

## 2024-01-01 PROCEDURE — 84484 ASSAY OF TROPONIN QUANT: CPT | Performed by: INTERNAL MEDICINE

## 2024-01-01 PROCEDURE — 250N000013 HC RX MED GY IP 250 OP 250 PS 637: Performed by: STUDENT IN AN ORGANIZED HEALTH CARE EDUCATION/TRAINING PROGRAM

## 2024-01-01 PROCEDURE — 83735 ASSAY OF MAGNESIUM: CPT | Performed by: FAMILY MEDICINE

## 2024-01-01 PROCEDURE — 83690 ASSAY OF LIPASE: CPT | Performed by: STUDENT IN AN ORGANIZED HEALTH CARE EDUCATION/TRAINING PROGRAM

## 2024-01-01 PROCEDURE — 99222 1ST HOSP IP/OBS MODERATE 55: CPT | Mod: AI | Performed by: INTERNAL MEDICINE

## 2024-01-01 PROCEDURE — 82570 ASSAY OF URINE CREATININE: CPT | Performed by: INTERNAL MEDICINE

## 2024-01-01 PROCEDURE — 84132 ASSAY OF SERUM POTASSIUM: CPT | Mod: XU | Performed by: HOSPITALIST

## 2024-01-01 PROCEDURE — 71275 CT ANGIOGRAPHY CHEST: CPT | Mod: MA

## 2024-01-01 PROCEDURE — 84132 ASSAY OF SERUM POTASSIUM: CPT | Performed by: INTERNAL MEDICINE

## 2024-01-01 PROCEDURE — 84484 ASSAY OF TROPONIN QUANT: CPT | Performed by: FAMILY MEDICINE

## 2024-01-01 PROCEDURE — 99223 1ST HOSP IP/OBS HIGH 75: CPT | Mod: AI | Performed by: HOSPITALIST

## 2024-01-01 PROCEDURE — 74177 CT ABD & PELVIS W/CONTRAST: CPT | Mod: MG

## 2024-01-01 PROCEDURE — 76770 US EXAM ABDO BACK WALL COMP: CPT

## 2024-01-01 PROCEDURE — 250N000011 HC RX IP 250 OP 636: Performed by: RADIOLOGY

## 2024-01-01 PROCEDURE — 81001 URINALYSIS AUTO W/SCOPE: CPT | Performed by: HOSPITALIST

## 2024-01-01 PROCEDURE — 84300 ASSAY OF URINE SODIUM: CPT | Performed by: HOSPITALIST

## 2024-01-01 PROCEDURE — 36415 COLL VENOUS BLD VENIPUNCTURE: CPT | Mod: ZL

## 2024-01-01 PROCEDURE — 80048 BASIC METABOLIC PNL TOTAL CA: CPT | Performed by: FAMILY MEDICINE

## 2024-01-01 PROCEDURE — 97165 OT EVAL LOW COMPLEX 30 MIN: CPT | Mod: GO

## 2024-01-01 PROCEDURE — 93325 DOPPLER ECHO COLOR FLOW MAPG: CPT

## 2024-01-01 PROCEDURE — 99207 PR NO CHARGE LOS: CPT | Performed by: INTERNAL MEDICINE

## 2024-01-01 PROCEDURE — 96360 HYDRATION IV INFUSION INIT: CPT

## 2024-01-01 PROCEDURE — 85610 PROTHROMBIN TIME: CPT | Mod: ZL

## 2024-01-01 PROCEDURE — 83880 ASSAY OF NATRIURETIC PEPTIDE: CPT | Performed by: FAMILY MEDICINE

## 2024-01-01 PROCEDURE — 97161 PT EVAL LOW COMPLEX 20 MIN: CPT | Mod: GP | Performed by: PHYSICAL THERAPIST

## 2024-01-01 PROCEDURE — 99284 EMERGENCY DEPT VISIT MOD MDM: CPT | Mod: 25

## 2024-01-01 PROCEDURE — 83880 ASSAY OF NATRIURETIC PEPTIDE: CPT | Performed by: INTERNAL MEDICINE

## 2024-01-01 PROCEDURE — 85610 PROTHROMBIN TIME: CPT | Performed by: INTERNAL MEDICINE

## 2024-01-01 PROCEDURE — 99284 EMERGENCY DEPT VISIT MOD MDM: CPT | Performed by: STUDENT IN AN ORGANIZED HEALTH CARE EDUCATION/TRAINING PROGRAM

## 2024-01-01 PROCEDURE — 71045 X-RAY EXAM CHEST 1 VIEW: CPT | Mod: TC

## 2024-01-01 PROCEDURE — 97530 THERAPEUTIC ACTIVITIES: CPT | Mod: GP

## 2024-01-01 PROCEDURE — 250N000011 HC RX IP 250 OP 636: Mod: JW | Performed by: INTERNAL MEDICINE

## 2024-01-01 PROCEDURE — 85027 COMPLETE CBC AUTOMATED: CPT | Performed by: HOSPITALIST

## 2024-01-01 PROCEDURE — 99223 1ST HOSP IP/OBS HIGH 75: CPT | Performed by: FAMILY MEDICINE

## 2024-01-01 PROCEDURE — 258N000003 HC RX IP 258 OP 636: Performed by: PHYSICIAN ASSISTANT

## 2024-01-01 PROCEDURE — P9047 ALBUMIN (HUMAN), 25%, 50ML: HCPCS | Performed by: SURGERY

## 2024-01-01 PROCEDURE — 258N000001 HC RX 258: Performed by: INTERNAL MEDICINE

## 2024-01-01 PROCEDURE — 80053 COMPREHEN METABOLIC PANEL: CPT | Performed by: EMERGENCY MEDICINE

## 2024-01-01 PROCEDURE — 83880 ASSAY OF NATRIURETIC PEPTIDE: CPT | Performed by: PHYSICIAN ASSISTANT

## 2024-01-01 PROCEDURE — 93325 DOPPLER ECHO COLOR FLOW MAPG: CPT | Mod: 26 | Performed by: INTERNAL MEDICINE

## 2024-01-01 PROCEDURE — 85004 AUTOMATED DIFF WBC COUNT: CPT | Performed by: HOSPITALIST

## 2024-01-01 PROCEDURE — 81003 URINALYSIS AUTO W/O SCOPE: CPT | Performed by: STUDENT IN AN ORGANIZED HEALTH CARE EDUCATION/TRAINING PROGRAM

## 2024-01-01 PROCEDURE — 82550 ASSAY OF CK (CPK): CPT | Performed by: HOSPITALIST

## 2024-01-01 PROCEDURE — 83735 ASSAY OF MAGNESIUM: CPT | Performed by: PHYSICIAN ASSISTANT

## 2024-01-01 PROCEDURE — 99285 EMERGENCY DEPT VISIT HI MDM: CPT | Performed by: STUDENT IN AN ORGANIZED HEALTH CARE EDUCATION/TRAINING PROGRAM

## 2024-01-01 PROCEDURE — 84520 ASSAY OF UREA NITROGEN: CPT | Performed by: STUDENT IN AN ORGANIZED HEALTH CARE EDUCATION/TRAINING PROGRAM

## 2024-01-01 PROCEDURE — 99239 HOSP IP/OBS DSCHRG MGMT >30: CPT | Performed by: HOSPITALIST

## 2024-01-01 PROCEDURE — 84484 ASSAY OF TROPONIN QUANT: CPT | Performed by: EMERGENCY MEDICINE

## 2024-01-01 PROCEDURE — 99285 EMERGENCY DEPT VISIT HI MDM: CPT

## 2024-01-01 PROCEDURE — 36556 INSERT NON-TUNNEL CV CATH: CPT | Mod: XU | Performed by: EMERGENCY MEDICINE

## 2024-01-01 PROCEDURE — 83605 ASSAY OF LACTIC ACID: CPT | Performed by: PHYSICIAN ASSISTANT

## 2024-01-01 PROCEDURE — 84155 ASSAY OF PROTEIN SERUM: CPT | Performed by: INTERNAL MEDICINE

## 2024-01-01 PROCEDURE — 97110 THERAPEUTIC EXERCISES: CPT | Mod: GP

## 2024-01-01 PROCEDURE — 258N000003 HC RX IP 258 OP 636: Performed by: FAMILY MEDICINE

## 2024-01-01 PROCEDURE — 85025 COMPLETE CBC W/AUTO DIFF WBC: CPT | Performed by: HOSPITALIST

## 2024-01-01 PROCEDURE — 96374 THER/PROPH/DIAG INJ IV PUSH: CPT | Mod: XU | Performed by: PHYSICIAN ASSISTANT

## 2024-01-01 PROCEDURE — 84484 ASSAY OF TROPONIN QUANT: CPT | Performed by: PHYSICIAN ASSISTANT

## 2024-01-01 PROCEDURE — 999N000065 XR CHEST PORT 1 VIEW

## 2024-01-01 PROCEDURE — 84295 ASSAY OF SERUM SODIUM: CPT | Mod: 91 | Performed by: HOSPITALIST

## 2024-01-01 PROCEDURE — 250N000011 HC RX IP 250 OP 636: Performed by: EMERGENCY MEDICINE

## 2024-01-01 PROCEDURE — 36415 COLL VENOUS BLD VENIPUNCTURE: CPT | Performed by: EMERGENCY MEDICINE

## 2024-01-01 PROCEDURE — P9059 PLASMA, FRZ BETWEEN 8-24HOUR: HCPCS | Performed by: INTERNAL MEDICINE

## 2024-01-01 PROCEDURE — 83605 ASSAY OF LACTIC ACID: CPT | Performed by: INTERNAL MEDICINE

## 2024-01-01 PROCEDURE — 36416 COLLJ CAPILLARY BLOOD SPEC: CPT | Mod: ZL | Performed by: STUDENT IN AN ORGANIZED HEALTH CARE EDUCATION/TRAINING PROGRAM

## 2024-01-01 PROCEDURE — 85025 COMPLETE CBC W/AUTO DIFF WBC: CPT | Performed by: INTERNAL MEDICINE

## 2024-01-01 PROCEDURE — 81001 URINALYSIS AUTO W/SCOPE: CPT | Performed by: FAMILY MEDICINE

## 2024-01-01 PROCEDURE — G0463 HOSPITAL OUTPT CLINIC VISIT: HCPCS

## 2024-01-01 PROCEDURE — 87186 SC STD MICRODIL/AGAR DIL: CPT | Performed by: EMERGENCY MEDICINE

## 2024-01-01 PROCEDURE — 250N000013 HC RX MED GY IP 250 OP 250 PS 637: Performed by: PHYSICIAN ASSISTANT

## 2024-01-01 PROCEDURE — 85004 AUTOMATED DIFF WBC COUNT: CPT | Performed by: FAMILY MEDICINE

## 2024-01-01 PROCEDURE — 87637 SARSCOV2&INF A&B&RSV AMP PRB: CPT | Performed by: PHYSICIAN ASSISTANT

## 2024-01-01 PROCEDURE — 99223 1ST HOSP IP/OBS HIGH 75: CPT | Mod: AI | Performed by: INTERNAL MEDICINE

## 2024-01-01 PROCEDURE — 31500 INSERT EMERGENCY AIRWAY: CPT | Mod: XU | Performed by: EMERGENCY MEDICINE

## 2024-01-01 PROCEDURE — 96375 TX/PRO/DX INJ NEW DRUG ADDON: CPT | Mod: XU | Performed by: PHYSICIAN ASSISTANT

## 2024-01-01 PROCEDURE — 83690 ASSAY OF LIPASE: CPT | Performed by: INTERNAL MEDICINE

## 2024-01-01 PROCEDURE — 200N000001 HC R&B ICU

## 2024-01-01 PROCEDURE — 258N000003 HC RX IP 258 OP 636: Performed by: SURGERY

## 2024-01-01 PROCEDURE — 84484 ASSAY OF TROPONIN QUANT: CPT | Performed by: NURSE PRACTITIONER

## 2024-01-01 PROCEDURE — 82247 BILIRUBIN TOTAL: CPT | Performed by: INTERNAL MEDICINE

## 2024-01-01 PROCEDURE — 85379 FIBRIN DEGRADATION QUANT: CPT | Performed by: PHYSICIAN ASSISTANT

## 2024-01-01 PROCEDURE — 99284 EMERGENCY DEPT VISIT MOD MDM: CPT | Performed by: FAMILY MEDICINE

## 2024-01-01 PROCEDURE — 85610 PROTHROMBIN TIME: CPT | Performed by: STUDENT IN AN ORGANIZED HEALTH CARE EDUCATION/TRAINING PROGRAM

## 2024-01-01 PROCEDURE — 83880 ASSAY OF NATRIURETIC PEPTIDE: CPT | Performed by: NURSE PRACTITIONER

## 2024-01-01 PROCEDURE — 84484 ASSAY OF TROPONIN QUANT: CPT | Performed by: STUDENT IN AN ORGANIZED HEALTH CARE EDUCATION/TRAINING PROGRAM

## 2024-01-01 PROCEDURE — 258N000003 HC RX IP 258 OP 636: Performed by: HOSPITALIST

## 2024-01-01 PROCEDURE — 81001 URINALYSIS AUTO W/SCOPE: CPT | Performed by: EMERGENCY MEDICINE

## 2024-01-01 PROCEDURE — 71250 CT THORAX DX C-: CPT | Mod: MG

## 2024-01-01 PROCEDURE — 85379 FIBRIN DEGRADATION QUANT: CPT | Performed by: HOSPITALIST

## 2024-01-01 PROCEDURE — 80048 BASIC METABOLIC PNL TOTAL CA: CPT | Performed by: NURSE PRACTITIONER

## 2024-01-01 PROCEDURE — 250N000009 HC RX 250: Performed by: NURSE PRACTITIONER

## 2024-01-01 PROCEDURE — 84443 ASSAY THYROID STIM HORMONE: CPT | Performed by: PHYSICIAN ASSISTANT

## 2024-01-01 PROCEDURE — 96361 HYDRATE IV INFUSION ADD-ON: CPT

## 2024-01-01 PROCEDURE — 96361 HYDRATE IV INFUSION ADD-ON: CPT | Performed by: PHYSICIAN ASSISTANT

## 2024-01-01 PROCEDURE — 85025 COMPLETE CBC W/AUTO DIFF WBC: CPT | Performed by: NURSE PRACTITIONER

## 2024-01-01 PROCEDURE — 99239 HOSP IP/OBS DSCHRG MGMT >30: CPT | Performed by: FAMILY MEDICINE

## 2024-01-01 PROCEDURE — 343N000001 HC RX 343: Performed by: RADIOLOGY

## 2024-01-01 PROCEDURE — 85027 COMPLETE CBC AUTOMATED: CPT | Performed by: FAMILY MEDICINE

## 2024-01-01 PROCEDURE — 999N000065 XR ABDOMEN 1 VIEW

## 2024-01-01 PROCEDURE — 87086 URINE CULTURE/COLONY COUNT: CPT | Performed by: INTERNAL MEDICINE

## 2024-01-01 PROCEDURE — 86900 BLOOD TYPING SEROLOGIC ABO: CPT | Performed by: INTERNAL MEDICINE

## 2024-01-01 PROCEDURE — 83690 ASSAY OF LIPASE: CPT | Performed by: PHYSICIAN ASSISTANT

## 2024-01-01 PROCEDURE — 82310 ASSAY OF CALCIUM: CPT | Performed by: INTERNAL MEDICINE

## 2024-01-01 PROCEDURE — 81001 URINALYSIS AUTO W/SCOPE: CPT | Performed by: NURSE PRACTITIONER

## 2024-01-01 PROCEDURE — 76705 ECHO EXAM OF ABDOMEN: CPT | Mod: 26 | Performed by: NURSE PRACTITIONER

## 2024-01-01 PROCEDURE — 83930 ASSAY OF BLOOD OSMOLALITY: CPT | Performed by: HOSPITALIST

## 2024-01-01 PROCEDURE — 83735 ASSAY OF MAGNESIUM: CPT | Performed by: NURSE PRACTITIONER

## 2024-01-01 PROCEDURE — 83880 ASSAY OF NATRIURETIC PEPTIDE: CPT | Performed by: EMERGENCY MEDICINE

## 2024-01-01 PROCEDURE — 97129 THER IVNTJ 1ST 15 MIN: CPT | Mod: GO

## 2024-01-01 PROCEDURE — 99285 EMERGENCY DEPT VISIT HI MDM: CPT | Mod: 25 | Performed by: NURSE PRACTITIONER

## 2024-01-01 PROCEDURE — 71260 CT THORAX DX C+: CPT | Mod: TC,MG

## 2024-01-01 PROCEDURE — 81001 URINALYSIS AUTO W/SCOPE: CPT | Performed by: INTERNAL MEDICINE

## 2024-01-01 PROCEDURE — 82962 GLUCOSE BLOOD TEST: CPT

## 2024-01-01 PROCEDURE — 82805 BLOOD GASES W/O2 SATURATION: CPT | Performed by: STUDENT IN AN ORGANIZED HEALTH CARE EDUCATION/TRAINING PROGRAM

## 2024-01-01 PROCEDURE — 94640 AIRWAY INHALATION TREATMENT: CPT | Mod: 76

## 2024-01-01 PROCEDURE — 84443 ASSAY THYROID STIM HORMONE: CPT | Performed by: HOSPITALIST

## 2024-01-01 PROCEDURE — 76705 ECHO EXAM OF ABDOMEN: CPT | Mod: TC

## 2024-01-01 PROCEDURE — 87637 SARSCOV2&INF A&B&RSV AMP PRB: CPT | Performed by: FAMILY MEDICINE

## 2024-01-01 PROCEDURE — 85007 BL SMEAR W/DIFF WBC COUNT: CPT | Performed by: INTERNAL MEDICINE

## 2024-01-01 PROCEDURE — 85025 COMPLETE CBC W/AUTO DIFF WBC: CPT | Performed by: EMERGENCY MEDICINE

## 2024-01-01 PROCEDURE — 82077 ASSAY SPEC XCP UR&BREATH IA: CPT | Performed by: EMERGENCY MEDICINE

## 2024-01-01 PROCEDURE — 258N000003 HC RX IP 258 OP 636: Performed by: EMERGENCY MEDICINE

## 2024-01-01 PROCEDURE — 86140 C-REACTIVE PROTEIN: CPT | Performed by: INTERNAL MEDICINE

## 2024-01-01 PROCEDURE — 83550 IRON BINDING TEST: CPT | Mod: ZL

## 2024-01-01 PROCEDURE — 82248 BILIRUBIN DIRECT: CPT | Performed by: FAMILY MEDICINE

## 2024-01-01 PROCEDURE — 99223 1ST HOSP IP/OBS HIGH 75: CPT | Performed by: HOSPITALIST

## 2024-01-01 PROCEDURE — 97161 PT EVAL LOW COMPLEX 20 MIN: CPT | Mod: GP

## 2024-01-01 PROCEDURE — 5A1935Z RESPIRATORY VENTILATION, LESS THAN 24 CONSECUTIVE HOURS: ICD-10-PCS | Performed by: EMERGENCY MEDICINE

## 2024-01-01 PROCEDURE — 99284 EMERGENCY DEPT VISIT MOD MDM: CPT | Performed by: EMERGENCY MEDICINE

## 2024-01-01 PROCEDURE — 258N000003 HC RX IP 258 OP 636: Performed by: STUDENT IN AN ORGANIZED HEALTH CARE EDUCATION/TRAINING PROGRAM

## 2024-01-01 PROCEDURE — 84484 ASSAY OF TROPONIN QUANT: CPT | Mod: 91 | Performed by: FAMILY MEDICINE

## 2024-01-01 PROCEDURE — 82805 BLOOD GASES W/O2 SATURATION: CPT | Performed by: INTERNAL MEDICINE

## 2024-01-01 PROCEDURE — 99222 1ST HOSP IP/OBS MODERATE 55: CPT | Performed by: INTERNAL MEDICINE

## 2024-01-01 PROCEDURE — 97530 THERAPEUTIC ACTIVITIES: CPT | Mod: GP | Performed by: PHYSICAL THERAPIST

## 2024-01-01 PROCEDURE — 84145 PROCALCITONIN (PCT): CPT | Performed by: EMERGENCY MEDICINE

## 2024-01-01 PROCEDURE — 99214 OFFICE O/P EST MOD 30 MIN: CPT | Performed by: STUDENT IN AN ORGANIZED HEALTH CARE EDUCATION/TRAINING PROGRAM

## 2024-01-01 PROCEDURE — 84145 PROCALCITONIN (PCT): CPT | Performed by: HOSPITALIST

## 2024-01-01 PROCEDURE — 83540 ASSAY OF IRON: CPT | Mod: ZL

## 2024-01-01 RX ORDER — METOPROLOL SUCCINATE 100 MG/1
100 TABLET, EXTENDED RELEASE ORAL DAILY
Status: DISCONTINUED | OUTPATIENT
Start: 2024-01-01 | End: 2024-01-01 | Stop reason: HOSPADM

## 2024-01-01 RX ORDER — BUSPIRONE HYDROCHLORIDE 10 MG/1
10 TABLET ORAL 2 TIMES DAILY
Status: DISCONTINUED | OUTPATIENT
Start: 2024-01-01 | End: 2024-01-01 | Stop reason: HOSPADM

## 2024-01-01 RX ORDER — CEFDINIR 300 MG/1
300 CAPSULE ORAL 2 TIMES DAILY
Status: DISCONTINUED | OUTPATIENT
Start: 2024-01-01 | End: 2024-01-01

## 2024-01-01 RX ORDER — METHYLPREDNISOLONE SODIUM SUCCINATE 125 MG/2ML
125 INJECTION, POWDER, LYOPHILIZED, FOR SOLUTION INTRAMUSCULAR; INTRAVENOUS ONCE
Status: COMPLETED | OUTPATIENT
Start: 2024-01-01 | End: 2024-01-01

## 2024-01-01 RX ORDER — ENOXAPARIN SODIUM 100 MG/ML
40 INJECTION SUBCUTANEOUS EVERY 24 HOURS
Status: DISCONTINUED | OUTPATIENT
Start: 2024-01-01 | End: 2024-01-01

## 2024-01-01 RX ORDER — POTASSIUM CHLORIDE 1500 MG/1
40 TABLET, EXTENDED RELEASE ORAL 2 TIMES DAILY
Status: DISCONTINUED | OUTPATIENT
Start: 2024-01-01 | End: 2024-01-01

## 2024-01-01 RX ORDER — POTASSIUM CHLORIDE 1500 MG/1
20 TABLET, EXTENDED RELEASE ORAL 2 TIMES DAILY
Qty: 60 TABLET | Refills: 0 | Status: ON HOLD | OUTPATIENT
Start: 2024-01-01 | End: 2024-01-01

## 2024-01-01 RX ORDER — ACETAMINOPHEN 650 MG/1
650 SUPPOSITORY RECTAL EVERY 4 HOURS PRN
Status: DISCONTINUED | OUTPATIENT
Start: 2024-01-01 | End: 2024-01-01 | Stop reason: HOSPADM

## 2024-01-01 RX ORDER — SODIUM CHLORIDE 9 MG/ML
INJECTION, SOLUTION INTRAVENOUS CONTINUOUS
Status: DISCONTINUED | OUTPATIENT
Start: 2024-01-01 | End: 2024-01-01

## 2024-01-01 RX ORDER — METHYLPREDNISOLONE SODIUM SUCCINATE 125 MG/2ML
60 INJECTION, POWDER, LYOPHILIZED, FOR SOLUTION INTRAMUSCULAR; INTRAVENOUS EVERY 24 HOURS
Status: DISCONTINUED | OUTPATIENT
Start: 2024-01-01 | End: 2024-01-01 | Stop reason: HOSPADM

## 2024-01-01 RX ORDER — METOPROLOL TARTRATE 25 MG/1
25 TABLET, FILM COATED ORAL 2 TIMES DAILY
Status: DISCONTINUED | OUTPATIENT
Start: 2024-01-01 | End: 2024-01-01 | Stop reason: HOSPADM

## 2024-01-01 RX ORDER — BISMUTH SUBSALICYLATE 262 MG/1
524 TABLET, CHEWABLE ORAL
Status: DISCONTINUED | OUTPATIENT
Start: 2024-01-01 | End: 2024-01-01 | Stop reason: HOSPADM

## 2024-01-01 RX ORDER — CALCIUM CARBONATE 500 MG/1
1000 TABLET, CHEWABLE ORAL 4 TIMES DAILY PRN
Status: DISCONTINUED | OUTPATIENT
Start: 2024-01-01 | End: 2024-01-01 | Stop reason: HOSPADM

## 2024-01-01 RX ORDER — TORSEMIDE 20 MG/1
60 TABLET ORAL DAILY
Qty: 90 TABLET | Refills: 0 | OUTPATIENT
Start: 2024-01-01

## 2024-01-01 RX ORDER — MAGNESIUM SULFATE HEPTAHYDRATE 40 MG/ML
2 INJECTION, SOLUTION INTRAVENOUS ONCE
Status: COMPLETED | OUTPATIENT
Start: 2024-01-01 | End: 2024-01-01

## 2024-01-01 RX ORDER — WARFARIN SODIUM 3 MG/1
3 TABLET ORAL
Status: COMPLETED | OUTPATIENT
Start: 2024-01-01 | End: 2024-01-01

## 2024-01-01 RX ORDER — WARFARIN SODIUM 3 MG/1
3 TABLET ORAL
Status: DISCONTINUED | OUTPATIENT
Start: 2024-01-01 | End: 2024-01-01

## 2024-01-01 RX ORDER — METOPROLOL SUCCINATE 100 MG/1
100 TABLET, EXTENDED RELEASE ORAL DAILY
Status: ON HOLD | COMMUNITY
End: 2024-01-01

## 2024-01-01 RX ORDER — IOPAMIDOL 755 MG/ML
107 INJECTION, SOLUTION INTRAVASCULAR ONCE
Status: COMPLETED | OUTPATIENT
Start: 2024-01-01 | End: 2024-01-01

## 2024-01-01 RX ORDER — FUROSEMIDE 10 MG/ML
40 INJECTION INTRAMUSCULAR; INTRAVENOUS DAILY
Status: DISCONTINUED | OUTPATIENT
Start: 2024-01-01 | End: 2024-01-01

## 2024-01-01 RX ORDER — TORSEMIDE 20 MG/1
60 TABLET ORAL DAILY
Status: DISCONTINUED | OUTPATIENT
Start: 2024-01-01 | End: 2024-01-01 | Stop reason: HOSPADM

## 2024-01-01 RX ORDER — AMOXICILLIN 250 MG
2 CAPSULE ORAL 2 TIMES DAILY PRN
Status: DISCONTINUED | OUTPATIENT
Start: 2024-01-01 | End: 2024-01-01 | Stop reason: HOSPADM

## 2024-01-01 RX ORDER — WARFARIN SODIUM 2 MG/1
2 TABLET ORAL DAILY
Status: ON HOLD | COMMUNITY
Start: 2024-01-01 | End: 2024-01-01

## 2024-01-01 RX ORDER — SODIUM CHLORIDE 9 MG/ML
INJECTION, SOLUTION INTRAVENOUS CONTINUOUS
Status: ACTIVE | OUTPATIENT
Start: 2024-01-01 | End: 2024-01-01

## 2024-01-01 RX ORDER — ROPINIROLE 0.25 MG/1
TABLET, FILM COATED ORAL EVERY EVENING
Qty: 30 TABLET | Refills: 11 | Status: SHIPPED | OUTPATIENT
Start: 2024-01-01

## 2024-01-01 RX ORDER — HYDROMORPHONE HYDROCHLORIDE 1 MG/ML
0.3 INJECTION, SOLUTION INTRAMUSCULAR; INTRAVENOUS; SUBCUTANEOUS ONCE
Status: COMPLETED | OUTPATIENT
Start: 2024-01-01 | End: 2024-01-01

## 2024-01-01 RX ORDER — ONDANSETRON 4 MG/1
4 TABLET, ORALLY DISINTEGRATING ORAL EVERY 6 HOURS PRN
Status: DISCONTINUED | OUTPATIENT
Start: 2024-01-01 | End: 2024-01-01 | Stop reason: HOSPADM

## 2024-01-01 RX ORDER — TORSEMIDE 20 MG/1
20 TABLET ORAL DAILY
Status: DISCONTINUED | OUTPATIENT
Start: 2024-01-01 | End: 2024-01-01

## 2024-01-01 RX ORDER — CEFDINIR 300 MG/1
300 CAPSULE ORAL 2 TIMES DAILY
Qty: 6 CAPSULE | Refills: 0 | Status: SHIPPED | OUTPATIENT
Start: 2024-01-01 | End: 2024-01-01

## 2024-01-01 RX ORDER — POTASSIUM CHLORIDE 1500 MG/1
40 TABLET, EXTENDED RELEASE ORAL 3 TIMES DAILY
Status: DISCONTINUED | OUTPATIENT
Start: 2024-01-01 | End: 2024-01-01 | Stop reason: HOSPADM

## 2024-01-01 RX ORDER — POTASSIUM CHLORIDE 1500 MG/1
40 TABLET, EXTENDED RELEASE ORAL ONCE
Status: COMPLETED | OUTPATIENT
Start: 2024-01-01 | End: 2024-01-01

## 2024-01-01 RX ORDER — POTASSIUM CHLORIDE 750 MG/1
20 CAPSULE, EXTENDED RELEASE ORAL DAILY
Qty: 30 CAPSULE | Refills: 0 | OUTPATIENT
Start: 2024-01-01

## 2024-01-01 RX ORDER — NALOXONE HYDROCHLORIDE 0.4 MG/ML
0.2 INJECTION, SOLUTION INTRAMUSCULAR; INTRAVENOUS; SUBCUTANEOUS
Status: DISCONTINUED | OUTPATIENT
Start: 2024-01-01 | End: 2024-01-01 | Stop reason: HOSPADM

## 2024-01-01 RX ORDER — FUROSEMIDE 10 MG/ML
40 INJECTION INTRAMUSCULAR; INTRAVENOUS ONCE
Status: COMPLETED | OUTPATIENT
Start: 2024-01-01 | End: 2024-01-01

## 2024-01-01 RX ORDER — BUSPIRONE HYDROCHLORIDE 10 MG/1
10 TABLET ORAL DAILY
Status: DISCONTINUED | OUTPATIENT
Start: 2024-01-01 | End: 2024-01-01 | Stop reason: HOSPADM

## 2024-01-01 RX ORDER — CEFTRIAXONE SODIUM 1 G/50ML
1 INJECTION, SOLUTION INTRAVENOUS EVERY 24 HOURS
Status: DISCONTINUED | OUTPATIENT
Start: 2024-01-01 | End: 2024-01-01 | Stop reason: HOSPADM

## 2024-01-01 RX ORDER — ATORVASTATIN CALCIUM 40 MG/1
40 TABLET, FILM COATED ORAL AT BEDTIME
Status: DISCONTINUED | OUTPATIENT
Start: 2024-01-01 | End: 2024-01-01 | Stop reason: HOSPADM

## 2024-01-01 RX ORDER — ONDANSETRON 2 MG/ML
4 INJECTION INTRAMUSCULAR; INTRAVENOUS EVERY 6 HOURS PRN
Status: DISCONTINUED | OUTPATIENT
Start: 2024-01-01 | End: 2024-01-01 | Stop reason: HOSPADM

## 2024-01-01 RX ORDER — POTASSIUM CHLORIDE 750 MG/1
20 TABLET, EXTENDED RELEASE ORAL 2 TIMES DAILY
Status: DISCONTINUED | OUTPATIENT
Start: 2024-01-01 | End: 2024-01-01 | Stop reason: DRUGHIGH

## 2024-01-01 RX ORDER — POTASSIUM CHLORIDE 1500 MG/1
20 TABLET, EXTENDED RELEASE ORAL ONCE
Status: DISCONTINUED | OUTPATIENT
Start: 2024-01-01 | End: 2024-01-01

## 2024-01-01 RX ORDER — TORSEMIDE 20 MG/1
40 TABLET ORAL DAILY
Status: DISCONTINUED | OUTPATIENT
Start: 2024-01-01 | End: 2024-01-01

## 2024-01-01 RX ORDER — METOPROLOL TARTRATE 50 MG
50 TABLET ORAL ONCE
Status: COMPLETED | OUTPATIENT
Start: 2024-01-01 | End: 2024-01-01

## 2024-01-01 RX ORDER — WARFARIN SODIUM 2 MG/1
4 TABLET ORAL
Status: COMPLETED | OUTPATIENT
Start: 2024-01-01 | End: 2024-01-01

## 2024-01-01 RX ORDER — AMOXICILLIN 250 MG
1 CAPSULE ORAL 2 TIMES DAILY PRN
Status: DISCONTINUED | OUTPATIENT
Start: 2024-01-01 | End: 2024-01-01 | Stop reason: HOSPADM

## 2024-01-01 RX ORDER — WARFARIN SODIUM 2 MG/1
TABLET ORAL
COMMUNITY
Start: 2024-01-01 | End: 2024-01-01

## 2024-01-01 RX ORDER — BENZOCAINE/MENTHOL 6 MG-10 MG
LOZENGE MUCOUS MEMBRANE 2 TIMES DAILY
Status: DISCONTINUED | OUTPATIENT
Start: 2024-01-01 | End: 2024-01-01 | Stop reason: HOSPADM

## 2024-01-01 RX ORDER — MAGNESIUM OXIDE 400 MG/1
400 TABLET ORAL EVERY 4 HOURS
Status: COMPLETED | OUTPATIENT
Start: 2024-01-01 | End: 2024-01-01

## 2024-01-01 RX ORDER — IPRATROPIUM BROMIDE AND ALBUTEROL SULFATE 2.5; .5 MG/3ML; MG/3ML
1 SOLUTION RESPIRATORY (INHALATION) EVERY 6 HOURS PRN
Status: DISCONTINUED | OUTPATIENT
Start: 2024-01-01 | End: 2024-01-01 | Stop reason: HOSPADM

## 2024-01-01 RX ORDER — CALCIUM GLUCONATE 20 MG/ML
1 INJECTION, SOLUTION INTRAVENOUS ONCE
Status: COMPLETED | OUTPATIENT
Start: 2024-01-01 | End: 2024-01-01

## 2024-01-01 RX ORDER — IPRATROPIUM BROMIDE AND ALBUTEROL SULFATE 2.5; .5 MG/3ML; MG/3ML
3 SOLUTION RESPIRATORY (INHALATION) ONCE
Status: COMPLETED | OUTPATIENT
Start: 2024-01-01 | End: 2024-01-01

## 2024-01-01 RX ORDER — PROCHLORPERAZINE MALEATE 5 MG
5 TABLET ORAL EVERY 6 HOURS PRN
Status: DISCONTINUED | OUTPATIENT
Start: 2024-01-01 | End: 2024-01-01 | Stop reason: HOSPADM

## 2024-01-01 RX ORDER — ENOXAPARIN SODIUM 100 MG/ML
40 INJECTION SUBCUTANEOUS EVERY 12 HOURS
Status: DISCONTINUED | OUTPATIENT
Start: 2024-01-01 | End: 2024-01-01 | Stop reason: HOSPADM

## 2024-01-01 RX ORDER — PANTOPRAZOLE SODIUM 40 MG/1
40 TABLET, DELAYED RELEASE ORAL
Status: DISCONTINUED | OUTPATIENT
Start: 2024-01-01 | End: 2024-01-01 | Stop reason: HOSPADM

## 2024-01-01 RX ORDER — PANTOPRAZOLE SODIUM 40 MG/1
40 TABLET, DELAYED RELEASE ORAL
Status: DISCONTINUED | OUTPATIENT
Start: 2024-01-01 | End: 2024-01-01

## 2024-01-01 RX ORDER — FUROSEMIDE 10 MG/ML
100 INJECTION INTRAMUSCULAR; INTRAVENOUS ONCE
Status: COMPLETED | OUTPATIENT
Start: 2024-01-01 | End: 2024-01-01

## 2024-01-01 RX ORDER — POTASSIUM CHLORIDE 1.5 G/1.58G
40 POWDER, FOR SOLUTION ORAL ONCE
Status: COMPLETED | OUTPATIENT
Start: 2024-01-01 | End: 2024-01-01

## 2024-01-01 RX ORDER — PIPERACILLIN SODIUM, TAZOBACTAM SODIUM 2; .25 G/10ML; G/10ML
2.25 INJECTION, POWDER, LYOPHILIZED, FOR SOLUTION INTRAVENOUS EVERY 6 HOURS
Status: DISCONTINUED | OUTPATIENT
Start: 2024-01-01 | End: 2024-01-01

## 2024-01-01 RX ORDER — LIDOCAINE 40 MG/G
CREAM TOPICAL
Status: DISCONTINUED | OUTPATIENT
Start: 2024-01-01 | End: 2024-01-01 | Stop reason: HOSPADM

## 2024-01-01 RX ORDER — MAGNESIUM SULFATE HEPTAHYDRATE 40 MG/ML
INJECTION, SOLUTION INTRAVENOUS
Status: COMPLETED
Start: 2024-01-01 | End: 2024-01-01

## 2024-01-01 RX ORDER — DILTIAZEM HYDROCHLORIDE 120 MG/1
120 CAPSULE, COATED, EXTENDED RELEASE ORAL DAILY
Status: ON HOLD | COMMUNITY
Start: 2023-09-22 | End: 2024-01-01

## 2024-01-01 RX ORDER — SENNOSIDES 8.6 MG
8.6 TABLET ORAL 2 TIMES DAILY PRN
Status: DISCONTINUED | OUTPATIENT
Start: 2024-01-01 | End: 2024-01-01

## 2024-01-01 RX ORDER — BUMETANIDE 0.25 MG/ML
2 INJECTION INTRAMUSCULAR; INTRAVENOUS
Status: DISCONTINUED | OUTPATIENT
Start: 2024-01-01 | End: 2024-01-01

## 2024-01-01 RX ORDER — EPINEPHRINE IN 0.9 % SOD CHLOR 5 MG/250ML
.01-.3 PLASTIC BAG, INJECTION (ML) INTRAVENOUS CONTINUOUS
Status: DISCONTINUED | OUTPATIENT
Start: 2024-01-01 | End: 2024-01-01 | Stop reason: HOSPADM

## 2024-01-01 RX ORDER — DILTIAZEM HYDROCHLORIDE 30 MG/1
30 TABLET, FILM COATED ORAL EVERY 6 HOURS SCHEDULED
Status: DISCONTINUED | OUTPATIENT
Start: 2024-01-01 | End: 2024-01-01

## 2024-01-01 RX ORDER — DOPAMINE HYDROCHLORIDE 160 MG/100ML
2-20 INJECTION, SOLUTION INTRAVENOUS CONTINUOUS
Status: DISCONTINUED | OUTPATIENT
Start: 2024-01-01 | End: 2024-01-01 | Stop reason: HOSPADM

## 2024-01-01 RX ORDER — FUROSEMIDE 10 MG/ML
20 INJECTION INTRAMUSCULAR; INTRAVENOUS ONCE
Status: COMPLETED | OUTPATIENT
Start: 2024-01-01 | End: 2024-01-01

## 2024-01-01 RX ORDER — HYDROCORTISONE SODIUM SUCCINATE 100 MG/2ML
100 INJECTION INTRAMUSCULAR; INTRAVENOUS ONCE
Status: COMPLETED | OUTPATIENT
Start: 2024-01-01 | End: 2024-01-01

## 2024-01-01 RX ORDER — METHYLPREDNISOLONE SODIUM SUCCINATE 125 MG/2ML
60 INJECTION, POWDER, LYOPHILIZED, FOR SOLUTION INTRAMUSCULAR; INTRAVENOUS EVERY 12 HOURS
Status: DISCONTINUED | OUTPATIENT
Start: 2024-01-01 | End: 2024-01-01

## 2024-01-01 RX ORDER — LISINOPRIL 10 MG/1
10 TABLET ORAL DAILY
Status: DISCONTINUED | OUTPATIENT
Start: 2024-01-01 | End: 2024-01-01 | Stop reason: HOSPADM

## 2024-01-01 RX ORDER — DILTIAZEM HYDROCHLORIDE 120 MG/1
120 CAPSULE, COATED, EXTENDED RELEASE ORAL DAILY
Status: DISCONTINUED | OUTPATIENT
Start: 2024-01-01 | End: 2024-01-01 | Stop reason: HOSPADM

## 2024-01-01 RX ORDER — FUROSEMIDE 10 MG/ML
60 INJECTION INTRAMUSCULAR; INTRAVENOUS EVERY 8 HOURS
Status: DISCONTINUED | OUTPATIENT
Start: 2024-01-01 | End: 2024-01-01

## 2024-01-01 RX ORDER — DILTIAZEM HYDROCHLORIDE 120 MG/1
120 CAPSULE, EXTENDED RELEASE ORAL DAILY
Status: ON HOLD | COMMUNITY
End: 2024-01-01

## 2024-01-01 RX ORDER — POTASSIUM CHLORIDE 1500 MG/1
40 TABLET, EXTENDED RELEASE ORAL 2 TIMES DAILY
Status: DISCONTINUED | OUTPATIENT
Start: 2024-01-01 | End: 2024-01-01 | Stop reason: HOSPADM

## 2024-01-01 RX ORDER — DILTIAZEM HCL/D5W 125 MG/125
5-15 PLASTIC BAG, INJECTION (ML) INTRAVENOUS CONTINUOUS
Status: DISCONTINUED | OUTPATIENT
Start: 2024-01-01 | End: 2024-01-01 | Stop reason: HOSPADM

## 2024-01-01 RX ORDER — POTASSIUM CHLORIDE 20MEQ/15ML
40 LIQUID (ML) ORAL ONCE
Status: COMPLETED | OUTPATIENT
Start: 2024-01-01 | End: 2024-01-01

## 2024-01-01 RX ORDER — LISINOPRIL 10 MG/1
10 TABLET ORAL DAILY
Qty: 30 TABLET | Refills: 0 | Status: SHIPPED | OUTPATIENT
Start: 2024-01-01 | End: 2024-01-01

## 2024-01-01 RX ORDER — PROCHLORPERAZINE 25 MG
12.5 SUPPOSITORY, RECTAL RECTAL EVERY 12 HOURS PRN
Status: DISCONTINUED | OUTPATIENT
Start: 2024-01-01 | End: 2024-01-01 | Stop reason: HOSPADM

## 2024-01-01 RX ORDER — DILTIAZEM HYDROCHLORIDE 120 MG/1
120 CAPSULE, EXTENDED RELEASE ORAL DAILY
Qty: 90 CAPSULE | Refills: 1 | Status: ON HOLD | OUTPATIENT
Start: 2024-01-01 | End: 2024-01-01

## 2024-01-01 RX ORDER — IPRATROPIUM BROMIDE AND ALBUTEROL SULFATE 2.5; .5 MG/3ML; MG/3ML
9 SOLUTION RESPIRATORY (INHALATION) ONCE
Status: COMPLETED | OUTPATIENT
Start: 2024-01-01 | End: 2024-01-01

## 2024-01-01 RX ORDER — ALBUTEROL SULFATE 90 UG/1
2 AEROSOL, METERED RESPIRATORY (INHALATION) EVERY 6 HOURS PRN
Status: DISCONTINUED | OUTPATIENT
Start: 2024-01-01 | End: 2024-01-01 | Stop reason: HOSPADM

## 2024-01-01 RX ORDER — POTASSIUM CHLORIDE 1500 MG/1
40 TABLET, EXTENDED RELEASE ORAL ONCE
Status: DISCONTINUED | OUTPATIENT
Start: 2024-01-01 | End: 2024-01-01

## 2024-01-01 RX ORDER — WARFARIN SODIUM 2 MG/1
TABLET ORAL
Qty: 60 TABLET | Refills: 1 | Status: ON HOLD | OUTPATIENT
Start: 2024-01-01 | End: 2024-01-01

## 2024-01-01 RX ORDER — POLYETHYLENE GLYCOL 3350 17 G/17G
17 POWDER, FOR SOLUTION ORAL DAILY
Status: DISCONTINUED | OUTPATIENT
Start: 2024-01-01 | End: 2024-01-01 | Stop reason: HOSPADM

## 2024-01-01 RX ORDER — NOREPINEPHRINE BITARTRATE 0.02 MG/ML
.01-.6 INJECTION, SOLUTION INTRAVENOUS CONTINUOUS
Status: DISCONTINUED | OUTPATIENT
Start: 2024-01-01 | End: 2024-01-01 | Stop reason: HOSPADM

## 2024-01-01 RX ORDER — POTASSIUM CHLORIDE 1500 MG/1
40 TABLET, EXTENDED RELEASE ORAL ONCE
Status: DISCONTINUED | OUTPATIENT
Start: 2024-01-01 | End: 2024-01-01 | Stop reason: DRUGHIGH

## 2024-01-01 RX ORDER — IOPAMIDOL 755 MG/ML
91 INJECTION, SOLUTION INTRAVASCULAR ONCE
Status: COMPLETED | OUTPATIENT
Start: 2024-01-01 | End: 2024-01-01

## 2024-01-01 RX ORDER — SODIUM CHLORIDE 9 MG/ML
INJECTION, SOLUTION INTRAVENOUS CONTINUOUS
Status: DISCONTINUED | OUTPATIENT
Start: 2024-01-01 | End: 2024-01-01 | Stop reason: HOSPADM

## 2024-01-01 RX ORDER — PREDNISONE 20 MG/1
40 TABLET ORAL ONCE
Status: COMPLETED | OUTPATIENT
Start: 2024-01-01 | End: 2024-01-01

## 2024-01-01 RX ORDER — ROPINIROLE 1 MG/1
2 TABLET, FILM COATED ORAL ONCE
Status: COMPLETED | OUTPATIENT
Start: 2024-01-01 | End: 2024-01-01

## 2024-01-01 RX ORDER — IPRATROPIUM BROMIDE AND ALBUTEROL SULFATE 2.5; .5 MG/3ML; MG/3ML
3 SOLUTION RESPIRATORY (INHALATION) EVERY 4 HOURS PRN
Status: DISCONTINUED | OUTPATIENT
Start: 2024-01-01 | End: 2024-01-01 | Stop reason: HOSPADM

## 2024-01-01 RX ORDER — LISINOPRIL 5 MG/1
10 TABLET ORAL DAILY
Status: DISCONTINUED | OUTPATIENT
Start: 2024-01-01 | End: 2024-01-01

## 2024-01-01 RX ORDER — IBUPROFEN 200 MG
1000 TABLET ORAL AT BEDTIME
Status: ON HOLD | COMMUNITY
End: 2024-01-01

## 2024-01-01 RX ORDER — POTASSIUM CHLORIDE 7.45 MG/ML
10 INJECTION INTRAVENOUS
Status: COMPLETED | OUTPATIENT
Start: 2024-01-01 | End: 2024-01-01

## 2024-01-01 RX ORDER — POTASSIUM CHLORIDE 1500 MG/1
60 TABLET, EXTENDED RELEASE ORAL ONCE
Status: COMPLETED | OUTPATIENT
Start: 2024-01-01 | End: 2024-01-01

## 2024-01-01 RX ORDER — BUSPIRONE HYDROCHLORIDE 10 MG/1
10 TABLET ORAL 2 TIMES DAILY
Qty: 60 TABLET | Refills: 11 | Status: SHIPPED | OUTPATIENT
Start: 2024-01-01

## 2024-01-01 RX ORDER — PREDNISONE 20 MG/1
60 TABLET ORAL ONCE
Status: COMPLETED | OUTPATIENT
Start: 2024-01-01 | End: 2024-01-01

## 2024-01-01 RX ORDER — MORPHINE SULFATE 2 MG/ML
1 INJECTION, SOLUTION INTRAMUSCULAR; INTRAVENOUS EVERY 4 HOURS PRN
Status: DISCONTINUED | OUTPATIENT
Start: 2024-01-01 | End: 2024-01-01 | Stop reason: HOSPADM

## 2024-01-01 RX ORDER — WARFARIN SODIUM 2 MG/1
2 TABLET ORAL
Status: DISCONTINUED | OUTPATIENT
Start: 2024-01-01 | End: 2024-01-01 | Stop reason: HOSPADM

## 2024-01-01 RX ORDER — POTASSIUM CHLORIDE 750 MG/1
40 CAPSULE, EXTENDED RELEASE ORAL 2 TIMES DAILY
Status: COMPLETED | OUTPATIENT
Start: 2024-01-01 | End: 2024-01-01

## 2024-01-01 RX ORDER — IBUPROFEN 200 MG
600-800 TABLET ORAL DAILY
Status: ON HOLD | COMMUNITY
End: 2024-01-01

## 2024-01-01 RX ORDER — CALCIUM CARBONATE 500 MG/1
500 TABLET, CHEWABLE ORAL DAILY PRN
Status: DISCONTINUED | OUTPATIENT
Start: 2024-01-01 | End: 2024-01-01

## 2024-01-01 RX ORDER — ACETAMINOPHEN 325 MG/1
650 TABLET ORAL EVERY 4 HOURS PRN
Status: DISCONTINUED | OUTPATIENT
Start: 2024-01-01 | End: 2024-01-01 | Stop reason: HOSPADM

## 2024-01-01 RX ORDER — IPRATROPIUM BROMIDE AND ALBUTEROL SULFATE 2.5; .5 MG/3ML; MG/3ML
6 SOLUTION RESPIRATORY (INHALATION) ONCE
Status: COMPLETED | OUTPATIENT
Start: 2024-01-01 | End: 2024-01-01

## 2024-01-01 RX ORDER — QUETIAPINE FUMARATE 25 MG/1
25 TABLET, FILM COATED ORAL 2 TIMES DAILY PRN
Status: DISCONTINUED | OUTPATIENT
Start: 2024-01-01 | End: 2024-01-01 | Stop reason: HOSPADM

## 2024-01-01 RX ORDER — DEXTROSE MONOHYDRATE 25 G/50ML
25-50 INJECTION, SOLUTION INTRAVENOUS
Status: DISCONTINUED | OUTPATIENT
Start: 2024-01-01 | End: 2024-01-01 | Stop reason: HOSPADM

## 2024-01-01 RX ORDER — PANTOPRAZOLE SODIUM 40 MG/1
TABLET, DELAYED RELEASE ORAL
Qty: 30 TABLET | Refills: 11 | Status: SHIPPED | OUTPATIENT
Start: 2024-01-01

## 2024-01-01 RX ORDER — DILTIAZEM HYDROCHLORIDE 120 MG/1
120 CAPSULE, COATED, EXTENDED RELEASE ORAL EVERY 24 HOURS
Status: DISCONTINUED | OUTPATIENT
Start: 2024-01-01 | End: 2024-01-01 | Stop reason: HOSPADM

## 2024-01-01 RX ORDER — ALBUTEROL SULFATE 90 UG/1
2 INHALANT RESPIRATORY (INHALATION) EVERY 6 HOURS PRN
Status: DISCONTINUED | OUTPATIENT
Start: 2024-01-01 | End: 2024-01-01 | Stop reason: HOSPADM

## 2024-01-01 RX ORDER — PROPOFOL 10 MG/ML
INJECTION, EMULSION INTRAVENOUS
Status: DISCONTINUED
Start: 2024-01-01 | End: 2024-01-01 | Stop reason: HOSPADM

## 2024-01-01 RX ORDER — ENOXAPARIN SODIUM 100 MG/ML
40 INJECTION SUBCUTANEOUS 2 TIMES DAILY
Status: DISCONTINUED | OUTPATIENT
Start: 2024-01-01 | End: 2024-01-01

## 2024-01-01 RX ORDER — IOPAMIDOL 755 MG/ML
66 INJECTION, SOLUTION INTRAVASCULAR ONCE
Status: COMPLETED | OUTPATIENT
Start: 2024-01-01 | End: 2024-01-01

## 2024-01-01 RX ORDER — ALBUTEROL SULFATE 90 UG/1
2 AEROSOL, METERED RESPIRATORY (INHALATION) EVERY 4 HOURS PRN
Status: DISCONTINUED | OUTPATIENT
Start: 2024-01-01 | End: 2024-01-01 | Stop reason: HOSPADM

## 2024-01-01 RX ORDER — WARFARIN SODIUM 2 MG/1
2 TABLET ORAL DAILY
Qty: 60 TABLET | Refills: 0 | Status: SHIPPED | OUTPATIENT
Start: 2024-01-01 | End: 2024-01-01

## 2024-01-01 RX ORDER — POTASSIUM CHLORIDE 1500 MG/1
40 TABLET, EXTENDED RELEASE ORAL DAILY
Status: DISCONTINUED | OUTPATIENT
Start: 2024-01-01 | End: 2024-01-01

## 2024-01-01 RX ORDER — CHLORHEXIDINE GLUCONATE ORAL RINSE 1.2 MG/ML
15 SOLUTION DENTAL EVERY 12 HOURS
Status: DISCONTINUED | OUTPATIENT
Start: 2024-01-01 | End: 2024-01-01 | Stop reason: HOSPADM

## 2024-01-01 RX ORDER — SENNOSIDES 8.6 MG
TABLET ORAL
Qty: 120 TABLET | Refills: 11 | Status: SHIPPED | OUTPATIENT
Start: 2024-01-01

## 2024-01-01 RX ORDER — IOPAMIDOL 755 MG/ML
112 INJECTION, SOLUTION INTRAVASCULAR ONCE
Status: COMPLETED | OUTPATIENT
Start: 2024-01-01 | End: 2024-01-01

## 2024-01-01 RX ORDER — POTASSIUM CHLORIDE 750 MG/1
10 CAPSULE, EXTENDED RELEASE ORAL DAILY
Status: DISCONTINUED | OUTPATIENT
Start: 2024-01-01 | End: 2024-01-01

## 2024-01-01 RX ORDER — BUSPIRONE HYDROCHLORIDE 10 MG/1
10 TABLET ORAL 2 TIMES DAILY
Qty: 60 TABLET | Refills: 0 | Status: SHIPPED | OUTPATIENT
Start: 2024-01-01 | End: 2024-01-01

## 2024-01-01 RX ORDER — AMIODARONE HYDROCHLORIDE 200 MG/1
200 TABLET ORAL DAILY
Status: DISCONTINUED | OUTPATIENT
Start: 2024-01-01 | End: 2024-01-01

## 2024-01-01 RX ORDER — ASPIRIN 81 MG/1
81 TABLET, CHEWABLE ORAL DAILY
Status: DISCONTINUED | OUTPATIENT
Start: 2024-01-01 | End: 2024-01-01 | Stop reason: HOSPADM

## 2024-01-01 RX ORDER — WARFARIN SODIUM 3 MG/1
3 TABLET ORAL ONCE
Status: COMPLETED | OUTPATIENT
Start: 2024-01-01 | End: 2024-01-01

## 2024-01-01 RX ORDER — WARFARIN SODIUM 2 MG/1
2 TABLET ORAL ONCE
Status: COMPLETED | OUTPATIENT
Start: 2024-01-01 | End: 2024-01-01

## 2024-01-01 RX ORDER — ATORVASTATIN CALCIUM 40 MG/1
40 TABLET, FILM COATED ORAL AT BEDTIME
Qty: 90 TABLET | Refills: 3 | Status: ON HOLD | OUTPATIENT
Start: 2024-01-01 | End: 2024-01-01

## 2024-01-01 RX ORDER — IPRATROPIUM BROMIDE AND ALBUTEROL SULFATE 2.5; .5 MG/3ML; MG/3ML
1 SOLUTION RESPIRATORY (INHALATION) EVERY 6 HOURS PRN
COMMUNITY

## 2024-01-01 RX ORDER — DILTIAZEM HYDROCHLORIDE 120 MG/1
120 CAPSULE, EXTENDED RELEASE ORAL DAILY
Qty: 30 CAPSULE | Refills: 0 | Status: SHIPPED | OUTPATIENT
Start: 2024-01-01 | End: 2024-01-01

## 2024-01-01 RX ORDER — DILTIAZEM HYDROCHLORIDE 30 MG/1
30 TABLET, FILM COATED ORAL ONCE
Status: COMPLETED | OUTPATIENT
Start: 2024-01-01 | End: 2024-01-01

## 2024-01-01 RX ORDER — HYDROCORTISONE 10 MG/G
CREAM TOPICAL 2 TIMES DAILY
Status: ON HOLD | COMMUNITY
End: 2024-01-01

## 2024-01-01 RX ORDER — DIPHENHYDRAMINE HCL 25 MG
25 CAPSULE ORAL
Status: DISCONTINUED | OUTPATIENT
Start: 2024-01-01 | End: 2024-01-01 | Stop reason: HOSPADM

## 2024-01-01 RX ORDER — POTASSIUM CHLORIDE 750 MG/1
20 CAPSULE, EXTENDED RELEASE ORAL 2 TIMES DAILY
Status: DISCONTINUED | OUTPATIENT
Start: 2024-01-01 | End: 2024-01-01 | Stop reason: HOSPADM

## 2024-01-01 RX ORDER — PANTOPRAZOLE SODIUM 40 MG/1
40 TABLET, DELAYED RELEASE ORAL
Qty: 90 TABLET | Refills: 1 | Status: ON HOLD | OUTPATIENT
Start: 2024-01-01 | End: 2024-01-01

## 2024-01-01 RX ORDER — DEXMEDETOMIDINE HYDROCHLORIDE 4 UG/ML
.1-1.2 INJECTION, SOLUTION INTRAVENOUS CONTINUOUS
Status: DISCONTINUED | OUTPATIENT
Start: 2024-01-01 | End: 2024-01-01 | Stop reason: HOSPADM

## 2024-01-01 RX ORDER — ATORVASTATIN CALCIUM 40 MG/1
40 TABLET, FILM COATED ORAL AT BEDTIME
Qty: 30 TABLET | Refills: 0 | Status: SHIPPED | OUTPATIENT
Start: 2024-01-01 | End: 2024-01-01

## 2024-01-01 RX ORDER — SODIUM CHLORIDE, SODIUM LACTATE, POTASSIUM CHLORIDE, CALCIUM CHLORIDE 600; 310; 30; 20 MG/100ML; MG/100ML; MG/100ML; MG/100ML
INJECTION, SOLUTION INTRAVENOUS CONTINUOUS
Status: DISCONTINUED | OUTPATIENT
Start: 2024-01-01 | End: 2024-01-01 | Stop reason: HOSPADM

## 2024-01-01 RX ORDER — BUSPIRONE HYDROCHLORIDE 10 MG/1
10 TABLET ORAL 2 TIMES DAILY
Qty: 180 TABLET | Refills: 0 | Status: ON HOLD | OUTPATIENT
Start: 2024-01-01 | End: 2024-01-01

## 2024-01-01 RX ORDER — KIT FOR THE PREPARATION OF TECHNETIUM TC 99M MEBROFENIN 45 MG/10ML
4-6 INJECTION, POWDER, LYOPHILIZED, FOR SOLUTION INTRAVENOUS ONCE
Status: COMPLETED | OUTPATIENT
Start: 2024-01-01 | End: 2024-01-01

## 2024-01-01 RX ORDER — POTASSIUM CHLORIDE 7.45 MG/ML
10 INJECTION INTRAVENOUS CONTINUOUS
Status: DISCONTINUED | OUTPATIENT
Start: 2024-01-01 | End: 2024-01-01 | Stop reason: HOSPADM

## 2024-01-01 RX ORDER — BUSPIRONE HYDROCHLORIDE 10 MG/1
10 TABLET ORAL 3 TIMES DAILY
Status: ON HOLD | COMMUNITY
End: 2024-01-01

## 2024-01-01 RX ORDER — NALOXONE HYDROCHLORIDE 0.4 MG/ML
0.4 INJECTION, SOLUTION INTRAMUSCULAR; INTRAVENOUS; SUBCUTANEOUS
Status: DISCONTINUED | OUTPATIENT
Start: 2024-01-01 | End: 2024-01-01 | Stop reason: HOSPADM

## 2024-01-01 RX ORDER — ROPIVACAINE IN 0.9% SOD CHL/PF 0.1 %
.01-.125 PLASTIC BAG, INJECTION (ML) EPIDURAL CONTINUOUS
Status: DISCONTINUED | OUTPATIENT
Start: 2024-01-01 | End: 2024-01-01

## 2024-01-01 RX ORDER — DOCUSATE SODIUM 100 MG/1
100 CAPSULE, LIQUID FILLED ORAL 2 TIMES DAILY
Status: DISCONTINUED | OUTPATIENT
Start: 2024-01-01 | End: 2024-01-01 | Stop reason: HOSPADM

## 2024-01-01 RX ORDER — PIPERACILLIN SODIUM, TAZOBACTAM SODIUM 3; .375 G/15ML; G/15ML
3.38 INJECTION, POWDER, LYOPHILIZED, FOR SOLUTION INTRAVENOUS EVERY 6 HOURS
Status: DISCONTINUED | OUTPATIENT
Start: 2024-01-01 | End: 2024-01-01 | Stop reason: HOSPADM

## 2024-01-01 RX ORDER — ASPIRIN 81 MG
1 TABLET, DELAYED RELEASE (ENTERIC COATED) ORAL DAILY
Qty: 30 TABLET | Refills: 11 | Status: SHIPPED | OUTPATIENT
Start: 2024-01-01

## 2024-01-01 RX ORDER — ATROPINE SULFATE 1 MG/ML
1 INJECTION, SOLUTION INTRAVENOUS ONCE
Status: COMPLETED | OUTPATIENT
Start: 2024-01-01 | End: 2024-01-01

## 2024-01-01 RX ORDER — WARFARIN SODIUM 3 MG/1
3 TABLET ORAL
Status: DISCONTINUED | OUTPATIENT
Start: 2024-01-01 | End: 2024-01-01 | Stop reason: HOSPADM

## 2024-01-01 RX ORDER — WARFARIN SODIUM 2.5 MG/1
5 TABLET ORAL
Status: COMPLETED | OUTPATIENT
Start: 2024-01-01 | End: 2024-01-01

## 2024-01-01 RX ORDER — ROPINIROLE 1 MG/1
1 TABLET, FILM COATED ORAL ONCE
Status: COMPLETED | OUTPATIENT
Start: 2024-01-01 | End: 2024-01-01

## 2024-01-01 RX ORDER — TORSEMIDE 20 MG/1
60 TABLET ORAL DAILY
Qty: 270 TABLET | Refills: 1 | Status: ON HOLD | OUTPATIENT
Start: 2024-01-01 | End: 2024-01-01

## 2024-01-01 RX ORDER — WARFARIN SODIUM 2 MG/1
TABLET ORAL
Qty: 30 TABLET | Refills: 0 | Status: ON HOLD | OUTPATIENT
Start: 2024-01-01 | End: 2024-01-01

## 2024-01-01 RX ORDER — HYDROCORTISONE BUTYRATE 0.1 %
CREAM (GRAM) TOPICAL 2 TIMES DAILY
Status: ON HOLD | COMMUNITY
End: 2024-01-01

## 2024-01-01 RX ORDER — POTASSIUM CHLORIDE 1500 MG/1
TABLET, EXTENDED RELEASE ORAL
Qty: 60 TABLET | Refills: 11 | Status: SHIPPED | OUTPATIENT
Start: 2024-01-01

## 2024-01-01 RX ORDER — TORSEMIDE 20 MG/1
40 TABLET ORAL DAILY
Status: DISCONTINUED | OUTPATIENT
Start: 2024-01-01 | End: 2024-01-01 | Stop reason: HOSPADM

## 2024-01-01 RX ORDER — DILTIAZEM HYDROCHLORIDE 5 MG/ML
25 INJECTION INTRAVENOUS ONCE
Status: COMPLETED | OUTPATIENT
Start: 2024-01-01 | End: 2024-01-01

## 2024-01-01 RX ORDER — ALBUMIN (HUMAN) 12.5 G/50ML
25 SOLUTION INTRAVENOUS ONCE
Status: COMPLETED | OUTPATIENT
Start: 2024-01-01 | End: 2024-01-01

## 2024-01-01 RX ORDER — DILTIAZEM HYDROCHLORIDE 5 MG/ML
15 INJECTION INTRAVENOUS ONCE
Status: COMPLETED | OUTPATIENT
Start: 2024-01-01 | End: 2024-01-01

## 2024-01-01 RX ORDER — TORSEMIDE 20 MG/1
60 TABLET ORAL DAILY
Qty: 90 TABLET | Refills: 0 | Status: SHIPPED | OUTPATIENT
Start: 2024-01-01 | End: 2024-01-01

## 2024-01-01 RX ORDER — FUROSEMIDE 10 MG/ML
40 INJECTION INTRAMUSCULAR; INTRAVENOUS EVERY 12 HOURS
Status: DISCONTINUED | OUTPATIENT
Start: 2024-01-01 | End: 2024-01-01

## 2024-01-01 RX ORDER — TORSEMIDE 20 MG/1
20 TABLET ORAL DAILY
Status: DISCONTINUED | OUTPATIENT
Start: 2024-01-01 | End: 2024-01-01 | Stop reason: HOSPADM

## 2024-01-01 RX ORDER — NICOTINE POLACRILEX 4 MG
15-30 LOZENGE BUCCAL
Status: DISCONTINUED | OUTPATIENT
Start: 2024-01-01 | End: 2024-01-01 | Stop reason: HOSPADM

## 2024-01-01 RX ORDER — ALBUTEROL SULFATE 90 UG/1
2 AEROSOL, METERED RESPIRATORY (INHALATION) EVERY 6 HOURS PRN
Status: DISCONTINUED | OUTPATIENT
Start: 2024-01-01 | End: 2024-01-01

## 2024-01-01 RX ORDER — ASPIRIN 81 MG/1
TABLET, CHEWABLE ORAL
Qty: 30 TABLET | Refills: 11 | Status: SHIPPED | OUTPATIENT
Start: 2024-01-01

## 2024-01-01 RX ORDER — CEFTRIAXONE 1 G/1
1 INJECTION, POWDER, FOR SOLUTION INTRAMUSCULAR; INTRAVENOUS EVERY 24 HOURS
Qty: 10 ML | Refills: 0 | Status: COMPLETED | OUTPATIENT
Start: 2024-01-01 | End: 2024-01-01

## 2024-01-01 RX ORDER — ENOXAPARIN SODIUM 100 MG/ML
40 INJECTION SUBCUTANEOUS EVERY 12 HOURS
Status: DISCONTINUED | OUTPATIENT
Start: 2024-01-01 | End: 2024-01-01

## 2024-01-01 RX ORDER — TORSEMIDE 20 MG/1
60 TABLET ORAL DAILY
Status: DISCONTINUED | OUTPATIENT
Start: 2024-01-01 | End: 2024-01-01

## 2024-01-01 RX ORDER — PREDNISONE 20 MG/1
40 TABLET ORAL DAILY
Status: DISCONTINUED | OUTPATIENT
Start: 2024-01-01 | End: 2024-01-01 | Stop reason: ALTCHOICE

## 2024-01-01 RX ORDER — METOPROLOL TARTRATE 1 MG/ML
5 INJECTION, SOLUTION INTRAVENOUS ONCE
Status: COMPLETED | OUTPATIENT
Start: 2024-01-01 | End: 2024-01-01

## 2024-01-01 RX ORDER — IPRATROPIUM BROMIDE AND ALBUTEROL SULFATE 2.5; .5 MG/3ML; MG/3ML
3 SOLUTION RESPIRATORY (INHALATION) EVERY 6 HOURS PRN
Status: DISCONTINUED | OUTPATIENT
Start: 2024-01-01 | End: 2024-01-01 | Stop reason: HOSPADM

## 2024-01-01 RX ORDER — TORSEMIDE 20 MG/1
20 TABLET ORAL DAILY
Qty: 30 TABLET | Refills: 0 | Status: ON HOLD | OUTPATIENT
Start: 2024-01-01 | End: 2024-01-01

## 2024-01-01 RX ORDER — BUMETANIDE 0.25 MG/ML
2 INJECTION INTRAMUSCULAR; INTRAVENOUS EVERY 8 HOURS
Status: DISCONTINUED | OUTPATIENT
Start: 2024-01-01 | End: 2024-01-01 | Stop reason: HOSPADM

## 2024-01-01 RX ORDER — PIPERACILLIN SODIUM, TAZOBACTAM SODIUM 3; .375 G/15ML; G/15ML
3.38 INJECTION, POWDER, LYOPHILIZED, FOR SOLUTION INTRAVENOUS EVERY 6 HOURS
Status: SHIPPED
Start: 2024-01-01

## 2024-01-01 RX ORDER — FUROSEMIDE 10 MG/ML
40 INJECTION INTRAMUSCULAR; INTRAVENOUS EVERY 6 HOURS
Status: DISCONTINUED | OUTPATIENT
Start: 2024-01-01 | End: 2024-01-01

## 2024-01-01 RX ORDER — LISINOPRIL 5 MG/1
10 TABLET ORAL DAILY
Status: DISCONTINUED | OUTPATIENT
Start: 2024-01-01 | End: 2024-01-01 | Stop reason: HOSPADM

## 2024-01-01 RX ORDER — TORSEMIDE 20 MG/1
60 TABLET ORAL ONCE
Status: COMPLETED | OUTPATIENT
Start: 2024-01-01 | End: 2024-01-01

## 2024-01-01 RX ORDER — MEROPENEM AND SODIUM CHLORIDE 500 MG/50ML
500 INJECTION, SOLUTION INTRAVENOUS EVERY 6 HOURS
Status: DISCONTINUED | OUTPATIENT
Start: 2024-01-01 | End: 2024-01-01 | Stop reason: HOSPADM

## 2024-01-01 RX ORDER — PANTOPRAZOLE SODIUM 40 MG/1
40 TABLET, DELAYED RELEASE ORAL
Qty: 30 TABLET | Refills: 0 | Status: SHIPPED | OUTPATIENT
Start: 2024-01-01 | End: 2024-01-01

## 2024-01-01 RX ORDER — MIDAZOLAM HCL IN 0.9 % NACL/PF 1 MG/ML
1-8 PLASTIC BAG, INJECTION (ML) INTRAVENOUS CONTINUOUS
Status: DISCONTINUED | OUTPATIENT
Start: 2024-01-01 | End: 2024-01-01 | Stop reason: HOSPADM

## 2024-01-01 RX ORDER — IBUPROFEN 200 MG
600-800 TABLET ORAL DAILY
Qty: 30 TABLET | Refills: 0 | Status: SHIPPED | OUTPATIENT
Start: 2024-01-01 | End: 2024-01-01

## 2024-01-01 RX ORDER — DEXTROSE MONOHYDRATE 25 G/50ML
INJECTION, SOLUTION INTRAVENOUS
Status: DISCONTINUED
Start: 2024-01-01 | End: 2024-01-01 | Stop reason: HOSPADM

## 2024-01-01 RX ORDER — IPRATROPIUM BROMIDE AND ALBUTEROL SULFATE 2.5; .5 MG/3ML; MG/3ML
1 SOLUTION RESPIRATORY (INHALATION) EVERY 6 HOURS PRN
Qty: 90 ML | Refills: 0 | Status: SHIPPED | OUTPATIENT
Start: 2024-01-01 | End: 2024-01-01

## 2024-01-01 RX ORDER — TORSEMIDE 20 MG/1
20 TABLET ORAL 2 TIMES DAILY
Qty: 60 TABLET | Refills: 0 | Status: ON HOLD | OUTPATIENT
Start: 2024-01-01 | End: 2024-01-01

## 2024-01-01 RX ORDER — LISINOPRIL 10 MG/1
10 TABLET ORAL DAILY
Qty: 30 TABLET | Refills: 0 | OUTPATIENT
Start: 2024-01-01

## 2024-01-01 RX ORDER — HYDROMORPHONE HCL IN WATER/PF 6 MG/30 ML
0.2 PATIENT CONTROLLED ANALGESIA SYRINGE INTRAVENOUS
Status: DISCONTINUED | OUTPATIENT
Start: 2024-01-01 | End: 2024-01-01 | Stop reason: HOSPADM

## 2024-01-01 RX ORDER — ATORVASTATIN CALCIUM 40 MG/1
40 TABLET, FILM COATED ORAL AT BEDTIME
Qty: 30 TABLET | Refills: 0 | OUTPATIENT
Start: 2024-01-01

## 2024-01-01 RX ORDER — ALBUTEROL SULFATE 90 UG/1
2 AEROSOL, METERED RESPIRATORY (INHALATION) EVERY 6 HOURS PRN
Qty: 18 G | Refills: 0 | Status: SHIPPED | OUTPATIENT
Start: 2024-01-01

## 2024-01-01 RX ORDER — HYDROMORPHONE HYDROCHLORIDE 1 MG/ML
0.5 INJECTION, SOLUTION INTRAMUSCULAR; INTRAVENOUS; SUBCUTANEOUS ONCE
Status: COMPLETED | OUTPATIENT
Start: 2024-01-01 | End: 2024-01-01

## 2024-01-01 RX ORDER — POTASSIUM CHLORIDE 1.5 G/1.58G
20 POWDER, FOR SOLUTION ORAL ONCE
Status: COMPLETED | OUTPATIENT
Start: 2024-01-01 | End: 2024-01-01

## 2024-01-01 RX ORDER — BUMETANIDE 0.25 MG/ML
2 INJECTION INTRAMUSCULAR; INTRAVENOUS EVERY 6 HOURS
Status: DISCONTINUED | OUTPATIENT
Start: 2024-01-01 | End: 2024-01-01

## 2024-01-01 RX ORDER — HYDROMORPHONE HCL IN WATER/PF 6 MG/30 ML
0.4 PATIENT CONTROLLED ANALGESIA SYRINGE INTRAVENOUS
Status: DISCONTINUED | OUTPATIENT
Start: 2024-01-01 | End: 2024-01-01 | Stop reason: HOSPADM

## 2024-01-01 RX ORDER — WARFARIN SODIUM 2 MG/1
TABLET ORAL
Qty: 60 TABLET | Refills: 0 | Status: ON HOLD | OUTPATIENT
Start: 2024-01-01 | End: 2024-01-01

## 2024-01-01 RX ORDER — WARFARIN SODIUM 2 MG/1
TABLET ORAL
Status: ON HOLD | COMMUNITY
Start: 2024-01-01 | End: 2024-01-01

## 2024-01-01 RX ORDER — FUROSEMIDE 10 MG/ML
40 INJECTION INTRAMUSCULAR; INTRAVENOUS EVERY 8 HOURS
Status: DISCONTINUED | OUTPATIENT
Start: 2024-01-01 | End: 2024-01-01 | Stop reason: HOSPADM

## 2024-01-01 RX ORDER — DILTIAZEM HCL/D5W 125 MG/125
5-15 PLASTIC BAG, INJECTION (ML) INTRAVENOUS CONTINUOUS
Status: DISCONTINUED | OUTPATIENT
Start: 2024-01-01 | End: 2024-01-01

## 2024-01-01 RX ORDER — METOPROLOL SUCCINATE 100 MG/1
100 TABLET, EXTENDED RELEASE ORAL DAILY
Qty: 30 TABLET | Refills: 0 | Status: SHIPPED | OUTPATIENT
Start: 2024-01-01 | End: 2024-01-01

## 2024-01-01 RX ORDER — MORPHINE SULFATE 2 MG/ML
2 INJECTION, SOLUTION INTRAMUSCULAR; INTRAVENOUS ONCE
Status: COMPLETED | OUTPATIENT
Start: 2024-01-01 | End: 2024-01-01

## 2024-01-01 RX ORDER — FUROSEMIDE 10 MG/ML
60 INJECTION INTRAMUSCULAR; INTRAVENOUS EVERY 12 HOURS
Status: DISCONTINUED | OUTPATIENT
Start: 2024-01-01 | End: 2024-01-01

## 2024-01-01 RX ORDER — ATORVASTATIN CALCIUM 20 MG/1
40 TABLET, FILM COATED ORAL AT BEDTIME
Status: DISCONTINUED | OUTPATIENT
Start: 2024-01-01 | End: 2024-01-01 | Stop reason: HOSPADM

## 2024-01-01 RX ORDER — LANOLIN ALCOHOL/MO/W.PET/CERES
3 CREAM (GRAM) TOPICAL
Status: DISCONTINUED | OUTPATIENT
Start: 2024-01-01 | End: 2024-01-01 | Stop reason: HOSPADM

## 2024-01-01 RX ORDER — ROPINIROLE 0.25 MG/1
0.5 TABLET, FILM COATED ORAL EVERY EVENING
Status: DISCONTINUED | OUTPATIENT
Start: 2024-01-01 | End: 2024-01-01 | Stop reason: HOSPADM

## 2024-01-01 RX ORDER — LISINOPRIL 10 MG/1
10 TABLET ORAL DAILY
Qty: 90 TABLET | Refills: 1 | Status: ON HOLD | OUTPATIENT
Start: 2024-01-01 | End: 2024-01-01

## 2024-01-01 RX ORDER — HEPARIN SODIUM 5000 [USP'U]/.5ML
5000 INJECTION, SOLUTION INTRAVENOUS; SUBCUTANEOUS EVERY 8 HOURS
Status: DISCONTINUED | OUTPATIENT
Start: 2024-01-01 | End: 2024-01-01 | Stop reason: HOSPADM

## 2024-01-01 RX ORDER — PANTOPRAZOLE SODIUM 40 MG/1
40 TABLET, DELAYED RELEASE ORAL
Qty: 30 TABLET | Refills: 0 | OUTPATIENT
Start: 2024-01-01

## 2024-01-01 RX ORDER — ALPRAZOLAM 0.5 MG
0.5 TABLET ORAL 3 TIMES DAILY PRN
Status: DISCONTINUED | OUTPATIENT
Start: 2024-01-01 | End: 2024-01-01

## 2024-01-01 RX ORDER — ONDANSETRON 4 MG/1
4 TABLET, ORALLY DISINTEGRATING ORAL ONCE
Status: COMPLETED | OUTPATIENT
Start: 2024-01-01 | End: 2024-01-01

## 2024-01-01 RX ORDER — POTASSIUM CHLORIDE 1500 MG/1
40 TABLET, EXTENDED RELEASE ORAL 3 TIMES DAILY
Status: DISCONTINUED | OUTPATIENT
Start: 2024-01-01 | End: 2024-01-01

## 2024-01-01 RX ORDER — METOPROLOL SUCCINATE 100 MG/1
100 TABLET, EXTENDED RELEASE ORAL DAILY
Qty: 90 TABLET | Refills: 1 | Status: ON HOLD | OUTPATIENT
Start: 2024-01-01 | End: 2024-01-01

## 2024-01-01 RX ORDER — PREDNISONE 20 MG/1
TABLET ORAL
Qty: 10 TABLET | Refills: 0 | Status: ON HOLD | OUTPATIENT
Start: 2024-01-01 | End: 2024-01-01

## 2024-01-01 RX ORDER — DILTIAZEM HYDROCHLORIDE 120 MG/1
120 CAPSULE, COATED, EXTENDED RELEASE ORAL DAILY
Qty: 30 CAPSULE | Refills: 0 | OUTPATIENT
Start: 2024-01-01

## 2024-01-01 RX ORDER — POTASSIUM CHLORIDE 1500 MG/1
40 TABLET, EXTENDED RELEASE ORAL 2 TIMES DAILY
Qty: 120 TABLET | Refills: 0 | Status: ON HOLD | OUTPATIENT
Start: 2024-01-01 | End: 2024-01-01

## 2024-01-01 RX ADMIN — SODIUM CHLORIDE: 9 INJECTION, SOLUTION INTRAVENOUS at 02:04

## 2024-01-01 RX ADMIN — DILTIAZEM HYDROCHLORIDE 120 MG: 120 CAPSULE, COATED, EXTENDED RELEASE ORAL at 21:05

## 2024-01-01 RX ADMIN — ENOXAPARIN SODIUM 40 MG: 40 INJECTION SUBCUTANEOUS at 22:30

## 2024-01-01 RX ADMIN — ENOXAPARIN SODIUM 40 MG: 40 INJECTION SUBCUTANEOUS at 20:06

## 2024-01-01 RX ADMIN — POTASSIUM CHLORIDE 10 MEQ: 10 CAPSULE, COATED, EXTENDED RELEASE ORAL at 08:19

## 2024-01-01 RX ADMIN — MICONAZOLE NITRATE: 2 POWDER TOPICAL at 08:29

## 2024-01-01 RX ADMIN — POTASSIUM CHLORIDE 20 MEQ: 750 TABLET, FILM COATED, EXTENDED RELEASE ORAL at 23:08

## 2024-01-01 RX ADMIN — MICONAZOLE NITRATE: 2 POWDER TOPICAL at 09:07

## 2024-01-01 RX ADMIN — POTASSIUM CHLORIDE 40 MEQ: 750 CAPSULE, EXTENDED RELEASE ORAL at 12:16

## 2024-01-01 RX ADMIN — METOPROLOL SUCCINATE 100 MG: 100 TABLET, EXTENDED RELEASE ORAL at 08:06

## 2024-01-01 RX ADMIN — POTASSIUM CHLORIDE 40 MEQ: 1500 TABLET, EXTENDED RELEASE ORAL at 09:01

## 2024-01-01 RX ADMIN — METOPROLOL SUCCINATE 100 MG: 100 TABLET, EXTENDED RELEASE ORAL at 08:39

## 2024-01-01 RX ADMIN — METOPROLOL SUCCINATE 100 MG: 100 TABLET, EXTENDED RELEASE ORAL at 08:02

## 2024-01-01 RX ADMIN — ALPRAZOLAM 0.5 MG: 0.5 TABLET ORAL at 22:06

## 2024-01-01 RX ADMIN — POTASSIUM CHLORIDE 40 MEQ: 1500 TABLET, EXTENDED RELEASE ORAL at 06:07

## 2024-01-01 RX ADMIN — POTASSIUM CHLORIDE 10 MEQ: 7.46 INJECTION, SOLUTION INTRAVENOUS at 07:28

## 2024-01-01 RX ADMIN — LISINOPRIL 10 MG: 5 TABLET ORAL at 08:02

## 2024-01-01 RX ADMIN — CEFTRIAXONE SODIUM 1 G: 1 INJECTION, POWDER, FOR SOLUTION INTRAMUSCULAR; INTRAVENOUS at 21:36

## 2024-01-01 RX ADMIN — MICONAZOLE NITRATE: 2 POWDER TOPICAL at 10:00

## 2024-01-01 RX ADMIN — IOPAMIDOL 112 ML: 755 INJECTION, SOLUTION INTRAVENOUS at 15:06

## 2024-01-01 RX ADMIN — BUSPIRONE HYDROCHLORIDE 10 MG: 10 TABLET ORAL at 08:58

## 2024-01-01 RX ADMIN — METHYLPREDNISOLONE SODIUM SUCCINATE 62.5 MG: 125 INJECTION, POWDER, FOR SOLUTION INTRAMUSCULAR; INTRAVENOUS at 20:25

## 2024-01-01 RX ADMIN — BUMETANIDE 2 MG: 0.25 INJECTION INTRAMUSCULAR; INTRAVENOUS at 05:00

## 2024-01-01 RX ADMIN — BUSPIRONE HYDROCHLORIDE 10 MG: 10 TABLET ORAL at 09:27

## 2024-01-01 RX ADMIN — BUMETANIDE 2 MG: 0.25 INJECTION INTRAMUSCULAR; INTRAVENOUS at 23:15

## 2024-01-01 RX ADMIN — WARFARIN SODIUM 3 MG: 3 TABLET ORAL at 18:04

## 2024-01-01 RX ADMIN — WARFARIN SODIUM 3 MG: 3 TABLET ORAL at 11:34

## 2024-01-01 RX ADMIN — POLYETHYLENE GLYCOL 3350 17 G: 17 POWDER, FOR SOLUTION ORAL at 08:03

## 2024-01-01 RX ADMIN — CEFTRIAXONE SODIUM 1 G: 1 INJECTION, SOLUTION INTRAVENOUS at 21:16

## 2024-01-01 RX ADMIN — METOPROLOL SUCCINATE 100 MG: 100 TABLET, EXTENDED RELEASE ORAL at 09:30

## 2024-01-01 RX ADMIN — PANTOPRAZOLE SODIUM 40 MG: 40 TABLET, DELAYED RELEASE ORAL at 08:02

## 2024-01-01 RX ADMIN — CALCIUM CARBONATE (ANTACID) CHEW TAB 500 MG 500 MG: 500 CHEW TAB at 01:13

## 2024-01-01 RX ADMIN — PANTOPRAZOLE SODIUM 40 MG: 40 TABLET, DELAYED RELEASE ORAL at 07:23

## 2024-01-01 RX ADMIN — DILTIAZEM HYDROCHLORIDE 120 MG: 120 CAPSULE, COATED, EXTENDED RELEASE ORAL at 08:02

## 2024-01-01 RX ADMIN — PIPERACILLIN AND TAZOBACTAM 3.38 G: 3; .375 INJECTION, POWDER, LYOPHILIZED, FOR SOLUTION INTRAVENOUS at 05:55

## 2024-01-01 RX ADMIN — IOPAMIDOL 107 ML: 755 INJECTION, SOLUTION INTRAVENOUS at 03:26

## 2024-01-01 RX ADMIN — IPRATROPIUM BROMIDE AND ALBUTEROL SULFATE 3 ML: .5; 3 SOLUTION RESPIRATORY (INHALATION) at 23:18

## 2024-01-01 RX ADMIN — WARFARIN SODIUM 4 MG: 2 TABLET ORAL at 17:41

## 2024-01-01 RX ADMIN — BUMETANIDE 2 MG: 0.25 INJECTION INTRAMUSCULAR; INTRAVENOUS at 05:14

## 2024-01-01 RX ADMIN — HYDROMORPHONE HYDROCHLORIDE 0.5 MG: 1 INJECTION, SOLUTION INTRAMUSCULAR; INTRAVENOUS; SUBCUTANEOUS at 12:21

## 2024-01-01 RX ADMIN — ATORVASTATIN CALCIUM 40 MG: 40 TABLET, FILM COATED ORAL at 22:12

## 2024-01-01 RX ADMIN — TORSEMIDE 60 MG: 20 TABLET ORAL at 09:30

## 2024-01-01 RX ADMIN — METOPROLOL SUCCINATE 100 MG: 100 TABLET, EXTENDED RELEASE ORAL at 08:09

## 2024-01-01 RX ADMIN — MIDAZOLAM HYDROCHLORIDE 1 MG/HR: 1 INJECTION, SOLUTION INTRAVENOUS at 01:45

## 2024-01-01 RX ADMIN — HEPARIN SODIUM 5000 UNITS: 5000 INJECTION, SOLUTION INTRAVENOUS; SUBCUTANEOUS at 15:59

## 2024-01-01 RX ADMIN — IPRATROPIUM BROMIDE AND ALBUTEROL SULFATE 3 ML: .5; 2.5 SOLUTION RESPIRATORY (INHALATION) at 14:58

## 2024-01-01 RX ADMIN — CEFTRIAXONE SODIUM 1 G: 1 INJECTION, SOLUTION INTRAVENOUS at 23:11

## 2024-01-01 RX ADMIN — Medication 5 MG/HR: at 05:40

## 2024-01-01 RX ADMIN — ALBUTEROL SULFATE 2 PUFF: 90 AEROSOL, METERED RESPIRATORY (INHALATION) at 20:49

## 2024-01-01 RX ADMIN — BUMETANIDE 2 MG: 0.25 INJECTION INTRAMUSCULAR; INTRAVENOUS at 05:07

## 2024-01-01 RX ADMIN — PANTOPRAZOLE SODIUM 40 MG: 40 TABLET, DELAYED RELEASE ORAL at 06:35

## 2024-01-01 RX ADMIN — DEXMEDETOMIDINE HYDROCHLORIDE 0.2 MCG/KG/HR: 4 INJECTION, SOLUTION INTRAVENOUS at 02:02

## 2024-01-01 RX ADMIN — MAGNESIUM OXIDE TAB 400 MG (241.3 MG ELEMENTAL MG) 400 MG: 400 (241.3 MG) TAB at 12:04

## 2024-01-01 RX ADMIN — BUSPIRONE HYDROCHLORIDE 10 MG: 10 TABLET ORAL at 09:33

## 2024-01-01 RX ADMIN — FUROSEMIDE 40 MG: 10 INJECTION, SOLUTION INTRAMUSCULAR; INTRAVENOUS at 23:07

## 2024-01-01 RX ADMIN — BUMETANIDE 2 MG: 0.25 INJECTION INTRAMUSCULAR; INTRAVENOUS at 15:26

## 2024-01-01 RX ADMIN — DILTIAZEM HYDROCHLORIDE 120 MG: 120 CAPSULE, COATED, EXTENDED RELEASE ORAL at 09:41

## 2024-01-01 RX ADMIN — FUROSEMIDE 60 MG: 10 INJECTION, SOLUTION INTRAVENOUS at 09:48

## 2024-01-01 RX ADMIN — BUSPIRONE HYDROCHLORIDE 10 MG: 10 TABLET ORAL at 21:41

## 2024-01-01 RX ADMIN — DILTIAZEM HYDROCHLORIDE 120 MG: 120 CAPSULE, COATED, EXTENDED RELEASE ORAL at 09:15

## 2024-01-01 RX ADMIN — POTASSIUM CHLORIDE 10 MEQ: 7.46 INJECTION, SOLUTION INTRAVENOUS at 04:32

## 2024-01-01 RX ADMIN — POTASSIUM CHLORIDE 40 MEQ: 10 CAPSULE, COATED, EXTENDED RELEASE ORAL at 12:31

## 2024-01-01 RX ADMIN — METOPROLOL SUCCINATE 100 MG: 100 TABLET, EXTENDED RELEASE ORAL at 08:01

## 2024-01-01 RX ADMIN — HYDROCORTISONE SODIUM SUCCINATE 100 MG: 100 INJECTION, POWDER, FOR SOLUTION INTRAMUSCULAR; INTRAVENOUS at 03:04

## 2024-01-01 RX ADMIN — ONDANSETRON 4 MG: 2 INJECTION INTRAMUSCULAR; INTRAVENOUS at 12:23

## 2024-01-01 RX ADMIN — POTASSIUM CHLORIDE 40 MEQ: 750 CAPSULE, EXTENDED RELEASE ORAL at 07:58

## 2024-01-01 RX ADMIN — PIPERACILLIN AND TAZOBACTAM 2.25 G: 2; .25 INJECTION, POWDER, FOR SOLUTION INTRAVENOUS at 01:00

## 2024-01-01 RX ADMIN — METOPROLOL SUCCINATE 100 MG: 100 TABLET, EXTENDED RELEASE ORAL at 09:15

## 2024-01-01 RX ADMIN — TORSEMIDE 20 MG: 20 TABLET ORAL at 08:39

## 2024-01-01 RX ADMIN — IPRATROPIUM BROMIDE AND ALBUTEROL SULFATE 9 ML: .5; 3 SOLUTION RESPIRATORY (INHALATION) at 07:45

## 2024-01-01 RX ADMIN — POLYETHYLENE GLYCOL 3350 17 G: 17 POWDER, FOR SOLUTION ORAL at 08:19

## 2024-01-01 RX ADMIN — WARFARIN SODIUM 3 MG: 3 TABLET ORAL at 11:23

## 2024-01-01 RX ADMIN — POTASSIUM CHLORIDE 40 MEQ: 10 CAPSULE, COATED, EXTENDED RELEASE ORAL at 09:29

## 2024-01-01 RX ADMIN — DILTIAZEM HYDROCHLORIDE 30 MG: 30 TABLET, FILM COATED ORAL at 17:34

## 2024-01-01 RX ADMIN — IPRATROPIUM BROMIDE AND ALBUTEROL SULFATE 6 ML: .5; 3 SOLUTION RESPIRATORY (INHALATION) at 07:34

## 2024-01-01 RX ADMIN — WARFARIN SODIUM 4 MG: 2 TABLET ORAL at 18:20

## 2024-01-01 RX ADMIN — METOPROLOL SUCCINATE 100 MG: 100 TABLET, EXTENDED RELEASE ORAL at 09:42

## 2024-01-01 RX ADMIN — METOPROLOL SUCCINATE 100 MG: 100 TABLET, EXTENDED RELEASE ORAL at 10:24

## 2024-01-01 RX ADMIN — POTASSIUM CHLORIDE 40 MEQ: 1500 TABLET, EXTENDED RELEASE ORAL at 09:56

## 2024-01-01 RX ADMIN — MAGNESIUM OXIDE TAB 400 MG (241.3 MG ELEMENTAL MG) 400 MG: 400 (241.3 MG) TAB at 12:10

## 2024-01-01 RX ADMIN — MICONAZOLE NITRATE: 2 POWDER TOPICAL at 14:36

## 2024-01-01 RX ADMIN — PANTOPRAZOLE SODIUM 40 MG: 40 TABLET, DELAYED RELEASE ORAL at 07:41

## 2024-01-01 RX ADMIN — PERFLUTREN 5 ML: 6.52 INJECTION, SUSPENSION INTRAVENOUS at 10:57

## 2024-01-01 RX ADMIN — BUMETANIDE 2 MG: 0.25 INJECTION INTRAMUSCULAR; INTRAVENOUS at 16:41

## 2024-01-01 RX ADMIN — POTASSIUM CHLORIDE 40 MEQ: 1500 TABLET, EXTENDED RELEASE ORAL at 14:05

## 2024-01-01 RX ADMIN — BUSPIRONE HYDROCHLORIDE 10 MG: 10 TABLET ORAL at 20:22

## 2024-01-01 RX ADMIN — ATORVASTATIN CALCIUM 40 MG: 40 TABLET, FILM COATED ORAL at 04:05

## 2024-01-01 RX ADMIN — DILTIAZEM HYDROCHLORIDE 120 MG: 120 CAPSULE, COATED, EXTENDED RELEASE ORAL at 21:06

## 2024-01-01 RX ADMIN — BUSPIRONE HYDROCHLORIDE 10 MG: 10 TABLET ORAL at 08:09

## 2024-01-01 RX ADMIN — POTASSIUM CHLORIDE 40 MEQ: 1500 TABLET, EXTENDED RELEASE ORAL at 20:23

## 2024-01-01 RX ADMIN — POTASSIUM CHLORIDE 40 MEQ: 1500 TABLET, EXTENDED RELEASE ORAL at 20:06

## 2024-01-01 RX ADMIN — CALCIUM GLUCONATE 1 G: 20 INJECTION, SOLUTION INTRAVENOUS at 02:18

## 2024-01-01 RX ADMIN — ATORVASTATIN CALCIUM 40 MG: 40 TABLET, FILM COATED ORAL at 21:06

## 2024-01-01 RX ADMIN — MICONAZOLE NITRATE: 2 POWDER TOPICAL at 09:33

## 2024-01-01 RX ADMIN — BUSPIRONE HYDROCHLORIDE 10 MG: 10 TABLET ORAL at 08:48

## 2024-01-01 RX ADMIN — DIPHENHYDRAMINE HYDROCHLORIDE 25 MG: 25 CAPSULE ORAL at 22:40

## 2024-01-01 RX ADMIN — BUSPIRONE HYDROCHLORIDE 10 MG: 10 TABLET ORAL at 14:35

## 2024-01-01 RX ADMIN — PANTOPRAZOLE SODIUM 40 MG: 40 TABLET, DELAYED RELEASE ORAL at 06:34

## 2024-01-01 RX ADMIN — TORSEMIDE 60 MG: 20 TABLET ORAL at 09:58

## 2024-01-01 RX ADMIN — FUROSEMIDE 40 MG: 10 INJECTION, SOLUTION INTRAVENOUS at 06:49

## 2024-01-01 RX ADMIN — IPRATROPIUM BROMIDE AND ALBUTEROL SULFATE 3 ML: .5; 3 SOLUTION RESPIRATORY (INHALATION) at 09:17

## 2024-01-01 RX ADMIN — TORSEMIDE 60 MG: 20 TABLET ORAL at 12:05

## 2024-01-01 RX ADMIN — BUSPIRONE HYDROCHLORIDE 10 MG: 10 TABLET ORAL at 09:30

## 2024-01-01 RX ADMIN — SODIUM BICARBONATE: 84 INJECTION, SOLUTION INTRAVENOUS at 03:24

## 2024-01-01 RX ADMIN — MICONAZOLE NITRATE: 2 POWDER TOPICAL at 20:23

## 2024-01-01 RX ADMIN — METOPROLOL SUCCINATE 100 MG: 100 TABLET, EXTENDED RELEASE ORAL at 18:20

## 2024-01-01 RX ADMIN — ATORVASTATIN CALCIUM 40 MG: 40 TABLET, FILM COATED ORAL at 20:56

## 2024-01-01 RX ADMIN — LISINOPRIL 10 MG: 5 TABLET ORAL at 08:57

## 2024-01-01 RX ADMIN — FUROSEMIDE 20 MG: 10 INJECTION, SOLUTION INTRAMUSCULAR; INTRAVENOUS at 06:25

## 2024-01-01 RX ADMIN — WARFARIN SODIUM 3 MG: 3 TABLET ORAL at 19:27

## 2024-01-01 RX ADMIN — MICONAZOLE NITRATE: 2 POWDER TOPICAL at 08:20

## 2024-01-01 RX ADMIN — WARFARIN SODIUM 3 MG: 3 TABLET ORAL at 18:53

## 2024-01-01 RX ADMIN — DEXTROSE MONOHYDRATE 25 ML: 25 INJECTION, SOLUTION INTRAVENOUS at 11:11

## 2024-01-01 RX ADMIN — POLYETHYLENE GLYCOL 3350 17 G: 17 POWDER, FOR SOLUTION ORAL at 08:08

## 2024-01-01 RX ADMIN — WARFARIN SODIUM 4 MG: 2 TABLET ORAL at 17:15

## 2024-01-01 RX ADMIN — METOPROLOL SUCCINATE 100 MG: 100 TABLET, EXTENDED RELEASE ORAL at 08:58

## 2024-01-01 RX ADMIN — BUMETANIDE 2 MG: 0.25 INJECTION INTRAMUSCULAR; INTRAVENOUS at 11:43

## 2024-01-01 RX ADMIN — FUROSEMIDE 40 MG: 10 INJECTION, SOLUTION INTRAMUSCULAR; INTRAVENOUS at 05:33

## 2024-01-01 RX ADMIN — BUSPIRONE HYDROCHLORIDE 10 MG: 10 TABLET ORAL at 08:56

## 2024-01-01 RX ADMIN — DIPHENHYDRAMINE HYDROCHLORIDE 25 MG: 25 CAPSULE ORAL at 01:58

## 2024-01-01 RX ADMIN — ENOXAPARIN SODIUM 40 MG: 40 INJECTION SUBCUTANEOUS at 09:33

## 2024-01-01 RX ADMIN — POTASSIUM CHLORIDE 40 MEQ: 750 CAPSULE, EXTENDED RELEASE ORAL at 13:43

## 2024-01-01 RX ADMIN — DILTIAZEM HYDROCHLORIDE 30 MG: 30 TABLET, FILM COATED ORAL at 03:54

## 2024-01-01 RX ADMIN — ALBUTEROL SULFATE 2 PUFF: 90 AEROSOL, METERED RESPIRATORY (INHALATION) at 04:32

## 2024-01-01 RX ADMIN — PANTOPRAZOLE SODIUM 40 MG: 40 TABLET, DELAYED RELEASE ORAL at 08:19

## 2024-01-01 RX ADMIN — NOREPINEPHRINE BITARTRATE 0.2 MCG/KG/MIN: 0.02 INJECTION, SOLUTION INTRAVENOUS at 03:37

## 2024-01-01 RX ADMIN — BUSPIRONE HYDROCHLORIDE 10 MG: 10 TABLET ORAL at 20:56

## 2024-01-01 RX ADMIN — ALBUTEROL SULFATE 2 PUFF: 90 AEROSOL, METERED RESPIRATORY (INHALATION) at 21:04

## 2024-01-01 RX ADMIN — POTASSIUM CHLORIDE 40 MEQ: 1500 TABLET, EXTENDED RELEASE ORAL at 11:29

## 2024-01-01 RX ADMIN — WARFARIN SODIUM 3 MG: 3 TABLET ORAL at 10:22

## 2024-01-01 RX ADMIN — ASPIRIN 81 MG CHEWABLE TABLET 81 MG: 81 TABLET CHEWABLE at 17:44

## 2024-01-01 RX ADMIN — DILTIAZEM HYDROCHLORIDE 120 MG: 120 CAPSULE, COATED, EXTENDED RELEASE ORAL at 08:05

## 2024-01-01 RX ADMIN — MICONAZOLE NITRATE: 2 POWDER TOPICAL at 23:22

## 2024-01-01 RX ADMIN — DILTIAZEM HYDROCHLORIDE 120 MG: 120 CAPSULE, COATED, EXTENDED RELEASE ORAL at 09:31

## 2024-01-01 RX ADMIN — IOPAMIDOL 91 ML: 755 INJECTION, SOLUTION INTRAVENOUS at 10:07

## 2024-01-01 RX ADMIN — BUMETANIDE 2 MG: 0.25 INJECTION INTRAMUSCULAR; INTRAVENOUS at 05:24

## 2024-01-01 RX ADMIN — POTASSIUM CHLORIDE 20 MEQ: 1.5 POWDER, FOR SOLUTION ORAL at 05:46

## 2024-01-01 RX ADMIN — DOCUSATE SODIUM 100 MG: 100 CAPSULE, LIQUID FILLED ORAL at 11:59

## 2024-01-01 RX ADMIN — FUROSEMIDE 100 MG: 10 INJECTION, SOLUTION INTRAMUSCULAR; INTRAVENOUS at 05:46

## 2024-01-01 RX ADMIN — BUMETANIDE 2 MG: 0.25 INJECTION INTRAMUSCULAR; INTRAVENOUS at 12:16

## 2024-01-01 RX ADMIN — BUSPIRONE HYDROCHLORIDE 10 MG: 10 TABLET ORAL at 08:57

## 2024-01-01 RX ADMIN — LISINOPRIL 10 MG: 10 TABLET ORAL at 09:30

## 2024-01-01 RX ADMIN — BUMETANIDE 2 MG: 0.25 INJECTION INTRAMUSCULAR; INTRAVENOUS at 08:58

## 2024-01-01 RX ADMIN — MAGNESIUM OXIDE TAB 400 MG (241.3 MG ELEMENTAL MG) 400 MG: 400 (241.3 MG) TAB at 10:56

## 2024-01-01 RX ADMIN — MAGNESIUM OXIDE TAB 400 MG (241.3 MG ELEMENTAL MG) 400 MG: 400 (241.3 MG) TAB at 16:48

## 2024-01-01 RX ADMIN — POLYETHYLENE GLYCOL 3350 17 G: 17 POWDER, FOR SOLUTION ORAL at 09:02

## 2024-01-01 RX ADMIN — MAGNESIUM SULFATE HEPTAHYDRATE 2 G: 40 INJECTION, SOLUTION INTRAVENOUS at 03:10

## 2024-01-01 RX ADMIN — METOPROLOL TARTRATE 50 MG: 50 TABLET, FILM COATED ORAL at 00:11

## 2024-01-01 RX ADMIN — METOPROLOL SUCCINATE 100 MG: 100 TABLET, EXTENDED RELEASE ORAL at 08:57

## 2024-01-01 RX ADMIN — FUROSEMIDE 40 MG: 10 INJECTION, SOLUTION INTRAVENOUS at 21:35

## 2024-01-01 RX ADMIN — POTASSIUM CHLORIDE 40 MEQ: 1500 TABLET, EXTENDED RELEASE ORAL at 10:24

## 2024-01-01 RX ADMIN — METOPROLOL TARTRATE 5 MG: 5 INJECTION INTRAVENOUS at 15:59

## 2024-01-01 RX ADMIN — PANTOPRAZOLE SODIUM 40 MG: 40 TABLET, DELAYED RELEASE ORAL at 08:57

## 2024-01-01 RX ADMIN — POTASSIUM CHLORIDE 40 MEQ: 1500 TABLET, EXTENDED RELEASE ORAL at 07:20

## 2024-01-01 RX ADMIN — BUMETANIDE 2 MG: 0.25 INJECTION INTRAMUSCULAR; INTRAVENOUS at 14:30

## 2024-01-01 RX ADMIN — DILTIAZEM HYDROCHLORIDE 120 MG: 120 CAPSULE, COATED, EXTENDED RELEASE ORAL at 09:02

## 2024-01-01 RX ADMIN — FUROSEMIDE 60 MG: 10 INJECTION, SOLUTION INTRAVENOUS at 17:29

## 2024-01-01 RX ADMIN — POTASSIUM CHLORIDE 10 MEQ: 7.46 INJECTION, SOLUTION INTRAVENOUS at 06:36

## 2024-01-01 RX ADMIN — IPRATROPIUM BROMIDE AND ALBUTEROL SULFATE 3 ML: .5; 3 SOLUTION RESPIRATORY (INHALATION) at 09:44

## 2024-01-01 RX ADMIN — POTASSIUM CHLORIDE 40 MEQ: 1500 TABLET, EXTENDED RELEASE ORAL at 20:55

## 2024-01-01 RX ADMIN — MORPHINE SULFATE 1 MG: 2 INJECTION, SOLUTION INTRAMUSCULAR; INTRAVENOUS at 20:18

## 2024-01-01 RX ADMIN — ENOXAPARIN SODIUM 40 MG: 40 INJECTION SUBCUTANEOUS at 20:22

## 2024-01-01 RX ADMIN — ALBUMIN HUMAN 25 G: 0.25 SOLUTION INTRAVENOUS at 02:45

## 2024-01-01 RX ADMIN — DILTIAZEM HYDROCHLORIDE 120 MG: 120 CAPSULE, COATED, EXTENDED RELEASE ORAL at 08:48

## 2024-01-01 RX ADMIN — METHYLPREDNISOLONE SODIUM SUCCINATE 62.5 MG: 125 INJECTION, POWDER, FOR SOLUTION INTRAMUSCULAR; INTRAVENOUS at 06:35

## 2024-01-01 RX ADMIN — TORSEMIDE 60 MG: 20 TABLET ORAL at 12:34

## 2024-01-01 RX ADMIN — FUROSEMIDE 40 MG: 10 INJECTION, SOLUTION INTRAMUSCULAR; INTRAVENOUS at 00:31

## 2024-01-01 RX ADMIN — POTASSIUM CHLORIDE 40 MEQ: 10 CAPSULE, COATED, EXTENDED RELEASE ORAL at 08:57

## 2024-01-01 RX ADMIN — POTASSIUM CHLORIDE 20 MEQ: 1500 TABLET, EXTENDED RELEASE ORAL at 08:18

## 2024-01-01 RX ADMIN — BUSPIRONE HYDROCHLORIDE 10 MG: 10 TABLET ORAL at 20:23

## 2024-01-01 RX ADMIN — FUROSEMIDE 40 MG: 10 INJECTION, SOLUTION INTRAMUSCULAR; INTRAVENOUS at 16:03

## 2024-01-01 RX ADMIN — WARFARIN SODIUM 4 MG: 2 TABLET ORAL at 17:31

## 2024-01-01 RX ADMIN — POLYETHYLENE GLYCOL 3350 17 G: 17 POWDER, FOR SOLUTION ORAL at 14:03

## 2024-01-01 RX ADMIN — MAGNESIUM SULFATE HEPTAHYDRATE 2 G: 40 INJECTION, SOLUTION INTRAVENOUS at 15:16

## 2024-01-01 RX ADMIN — PREDNISONE 60 MG: 20 TABLET ORAL at 08:19

## 2024-01-01 RX ADMIN — DILTIAZEM HYDROCHLORIDE 120 MG: 120 CAPSULE, COATED, EXTENDED RELEASE ORAL at 08:55

## 2024-01-01 RX ADMIN — SENNOSIDES AND DOCUSATE SODIUM 1 TABLET: 8.6; 5 TABLET ORAL at 09:11

## 2024-01-01 RX ADMIN — WARFARIN SODIUM 2 MG: 2 TABLET ORAL at 20:07

## 2024-01-01 RX ADMIN — SODIUM CHLORIDE 1000 ML: 9 INJECTION, SOLUTION INTRAVENOUS at 02:05

## 2024-01-01 RX ADMIN — POTASSIUM CHLORIDE 40 MEQ: 750 CAPSULE, EXTENDED RELEASE ORAL at 08:59

## 2024-01-01 RX ADMIN — ATORVASTATIN CALCIUM 40 MG: 40 TABLET, FILM COATED ORAL at 21:05

## 2024-01-01 RX ADMIN — TORSEMIDE 60 MG: 20 TABLET ORAL at 08:01

## 2024-01-01 RX ADMIN — FUROSEMIDE 40 MG: 10 INJECTION, SOLUTION INTRAMUSCULAR; INTRAVENOUS at 01:43

## 2024-01-01 RX ADMIN — MAGNESIUM OXIDE TAB 400 MG (241.3 MG ELEMENTAL MG) 400 MG: 400 (241.3 MG) TAB at 08:48

## 2024-01-01 RX ADMIN — POTASSIUM CHLORIDE 40 MEQ: 1.5 POWDER, FOR SOLUTION ORAL at 20:25

## 2024-01-01 RX ADMIN — ALBUTEROL SULFATE 2 PUFF: 90 INHALANT RESPIRATORY (INHALATION) at 02:00

## 2024-01-01 RX ADMIN — DOPAMINE HYDROCHLORIDE 2 MCG/KG/MIN: 160 INJECTION, SOLUTION INTRAVENOUS at 01:50

## 2024-01-01 RX ADMIN — MICONAZOLE NITRATE: 2 POWDER TOPICAL at 21:44

## 2024-01-01 RX ADMIN — BUSPIRONE HYDROCHLORIDE 10 MG: 10 TABLET ORAL at 23:08

## 2024-01-01 RX ADMIN — SODIUM CHLORIDE: 9 INJECTION, SOLUTION INTRAVENOUS at 09:58

## 2024-01-01 RX ADMIN — MICONAZOLE NITRATE: 2 POWDER TOPICAL at 08:04

## 2024-01-01 RX ADMIN — NOREPINEPHRINE BITARTRATE 0.03 MCG/KG/MIN: 0.02 INJECTION, SOLUTION INTRAVENOUS at 02:32

## 2024-01-01 RX ADMIN — ALBUTEROL SULFATE 2 PUFF: 90 AEROSOL, METERED RESPIRATORY (INHALATION) at 17:52

## 2024-01-01 RX ADMIN — FUROSEMIDE 40 MG: 10 INJECTION, SOLUTION INTRAVENOUS at 06:41

## 2024-01-01 RX ADMIN — FUROSEMIDE 40 MG: 10 INJECTION, SOLUTION INTRAMUSCULAR; INTRAVENOUS at 11:10

## 2024-01-01 RX ADMIN — POTASSIUM CHLORIDE 40 MEQ: 1500 TABLET, EXTENDED RELEASE ORAL at 13:49

## 2024-01-01 RX ADMIN — ATORVASTATIN CALCIUM 40 MG: 40 TABLET, FILM COATED ORAL at 20:06

## 2024-01-01 RX ADMIN — ALBUTEROL SULFATE 2 PUFF: 90 INHALANT RESPIRATORY (INHALATION) at 05:14

## 2024-01-01 RX ADMIN — SODIUM CHLORIDE 1000 ML: 9 INJECTION, SOLUTION INTRAVENOUS at 22:18

## 2024-01-01 RX ADMIN — TORSEMIDE 60 MG: 20 TABLET ORAL at 14:01

## 2024-01-01 RX ADMIN — BUMETANIDE 2 MG: 0.25 INJECTION INTRAMUSCULAR; INTRAVENOUS at 20:57

## 2024-01-01 RX ADMIN — FUROSEMIDE 60 MG: 10 INJECTION, SOLUTION INTRAVENOUS at 00:52

## 2024-01-01 RX ADMIN — LISINOPRIL 10 MG: 5 TABLET ORAL at 08:00

## 2024-01-01 RX ADMIN — WARFARIN SODIUM 4 MG: 2 TABLET ORAL at 18:42

## 2024-01-01 RX ADMIN — BUMETANIDE 2 MG: 0.25 INJECTION INTRAMUSCULAR; INTRAVENOUS at 21:26

## 2024-01-01 RX ADMIN — LISINOPRIL 10 MG: 5 TABLET ORAL at 12:05

## 2024-01-01 RX ADMIN — POTASSIUM CHLORIDE 10 MEQ: 7.46 INJECTION, SOLUTION INTRAVENOUS at 05:31

## 2024-01-01 RX ADMIN — ROPINIROLE HYDROCHLORIDE 0.5 MG: 0.25 TABLET, FILM COATED ORAL at 20:20

## 2024-01-01 RX ADMIN — POTASSIUM CHLORIDE 40 MEQ: 1.5 SOLUTION ORAL at 13:54

## 2024-01-01 RX ADMIN — BUSPIRONE HYDROCHLORIDE 10 MG: 10 TABLET ORAL at 20:49

## 2024-01-01 RX ADMIN — METOPROLOL SUCCINATE 100 MG: 100 TABLET, EXTENDED RELEASE ORAL at 08:47

## 2024-01-01 RX ADMIN — HYDROCORTISONE: 1 CREAM TOPICAL at 21:49

## 2024-01-01 RX ADMIN — BUMETANIDE 2 MG: 0.25 INJECTION INTRAMUSCULAR; INTRAVENOUS at 18:03

## 2024-01-01 RX ADMIN — ROPINIROLE HYDROCHLORIDE 1 MG: 1 TABLET, FILM COATED ORAL at 06:03

## 2024-01-01 RX ADMIN — TORSEMIDE 20 MG: 20 TABLET ORAL at 12:21

## 2024-01-01 RX ADMIN — ALBUTEROL SULFATE 2 PUFF: 90 INHALANT RESPIRATORY (INHALATION) at 20:25

## 2024-01-01 RX ADMIN — ALBUTEROL SULFATE 2 PUFF: 90 INHALANT RESPIRATORY (INHALATION) at 00:04

## 2024-01-01 RX ADMIN — PREDNISONE 40 MG: 20 TABLET ORAL at 12:34

## 2024-01-01 RX ADMIN — ALBUTEROL SULFATE 2 PUFF: 90 INHALANT RESPIRATORY (INHALATION) at 22:21

## 2024-01-01 RX ADMIN — MAGNESIUM SULFATE HEPTAHYDRATE 2 G: 40 INJECTION, SOLUTION INTRAVENOUS at 17:42

## 2024-01-01 RX ADMIN — MAGNESIUM SULFATE HEPTAHYDRATE 2 G: 40 INJECTION, SOLUTION INTRAVENOUS at 08:17

## 2024-01-01 RX ADMIN — BUSPIRONE HYDROCHLORIDE 10 MG: 10 TABLET ORAL at 20:35

## 2024-01-01 RX ADMIN — DILTIAZEM HYDROCHLORIDE 25 MG: 5 INJECTION INTRAVENOUS at 23:57

## 2024-01-01 RX ADMIN — WARFARIN SODIUM 3 MG: 3 TABLET ORAL at 17:03

## 2024-01-01 RX ADMIN — METHYLPREDNISOLONE SODIUM SUCCINATE 125 MG: 125 INJECTION, POWDER, FOR SOLUTION INTRAMUSCULAR; INTRAVENOUS at 05:52

## 2024-01-01 RX ADMIN — MICONAZOLE NITRATE: 2 POWDER TOPICAL at 13:01

## 2024-01-01 RX ADMIN — PANTOPRAZOLE SODIUM 40 MG: 40 TABLET, DELAYED RELEASE ORAL at 09:30

## 2024-01-01 RX ADMIN — LISINOPRIL 10 MG: 5 TABLET ORAL at 08:01

## 2024-01-01 RX ADMIN — SENNOSIDES AND DOCUSATE SODIUM 2 TABLET: 8.6; 5 TABLET ORAL at 19:27

## 2024-01-01 RX ADMIN — MICONAZOLE NITRATE: 2 POWDER TOPICAL at 20:22

## 2024-01-01 RX ADMIN — TORSEMIDE 60 MG: 20 TABLET ORAL at 08:55

## 2024-01-01 RX ADMIN — SODIUM CHLORIDE 1000 ML: 9 INJECTION, SOLUTION INTRAVENOUS at 03:33

## 2024-01-01 RX ADMIN — METHYLPREDNISOLONE SODIUM SUCCINATE 125 MG: 125 INJECTION, POWDER, FOR SOLUTION INTRAMUSCULAR; INTRAVENOUS at 01:02

## 2024-01-01 RX ADMIN — POTASSIUM CHLORIDE 10 MEQ: 10 CAPSULE, COATED, EXTENDED RELEASE ORAL at 08:02

## 2024-01-01 RX ADMIN — IPRATROPIUM BROMIDE AND ALBUTEROL SULFATE 3 ML: .5; 3 SOLUTION RESPIRATORY (INHALATION) at 05:55

## 2024-01-01 RX ADMIN — ATORVASTATIN CALCIUM 40 MG: 40 TABLET, FILM COATED ORAL at 22:21

## 2024-01-01 RX ADMIN — MICONAZOLE NITRATE: 2 POWDER TOPICAL at 23:15

## 2024-01-01 RX ADMIN — DOCUSATE SODIUM 100 MG: 100 CAPSULE, LIQUID FILLED ORAL at 20:20

## 2024-01-01 RX ADMIN — MAGNESIUM OXIDE TAB 400 MG (241.3 MG ELEMENTAL MG) 400 MG: 400 (241.3 MG) TAB at 04:05

## 2024-01-01 RX ADMIN — HYDROMORPHONE HYDROCHLORIDE 0.3 MG: 1 INJECTION, SOLUTION INTRAMUSCULAR; INTRAVENOUS; SUBCUTANEOUS at 23:18

## 2024-01-01 RX ADMIN — MICONAZOLE NITRATE: 2 POWDER TOPICAL at 09:41

## 2024-01-01 RX ADMIN — BUMETANIDE 2 MG: 0.25 INJECTION INTRAMUSCULAR; INTRAVENOUS at 20:58

## 2024-01-01 RX ADMIN — MEBROFENIN 5.4 MILLICURIE: 45 INJECTION, POWDER, LYOPHILIZED, FOR SOLUTION INTRAVENOUS at 11:59

## 2024-01-01 RX ADMIN — POTASSIUM CHLORIDE 40 MEQ: 1500 TABLET, EXTENDED RELEASE ORAL at 14:34

## 2024-01-01 RX ADMIN — PANTOPRAZOLE SODIUM 40 MG: 40 TABLET, DELAYED RELEASE ORAL at 06:04

## 2024-01-01 RX ADMIN — ACETAMINOPHEN 650 MG: 325 TABLET, FILM COATED ORAL at 19:00

## 2024-01-01 RX ADMIN — DIPHENHYDRAMINE HYDROCHLORIDE 25 MG: 25 CAPSULE ORAL at 20:06

## 2024-01-01 RX ADMIN — HYDROCORTISONE: 1 CREAM TOPICAL at 10:25

## 2024-01-01 RX ADMIN — DILTIAZEM HYDROCHLORIDE 120 MG: 120 CAPSULE, COATED, EXTENDED RELEASE ORAL at 08:28

## 2024-01-01 RX ADMIN — POTASSIUM CHLORIDE 10 MEQ: 7.46 INJECTION, SOLUTION INTRAVENOUS at 07:44

## 2024-01-01 RX ADMIN — ALBUTEROL SULFATE 2 PUFF: 90 AEROSOL, METERED RESPIRATORY (INHALATION) at 14:47

## 2024-01-01 RX ADMIN — SODIUM BICARBONATE 50 MEQ: 84 INJECTION INTRAVENOUS at 02:38

## 2024-01-01 RX ADMIN — ATROPINE SULFATE 1 MG: 1 INJECTION, SOLUTION INTRAVENOUS at 02:36

## 2024-01-01 RX ADMIN — ZOLPIDEM TARTRATE 2.5 MG: 5 TABLET ORAL at 21:03

## 2024-01-01 RX ADMIN — PIPERACILLIN AND TAZOBACTAM 3.38 G: 3; .375 INJECTION, POWDER, LYOPHILIZED, FOR SOLUTION INTRAVENOUS at 10:55

## 2024-01-01 RX ADMIN — WARFARIN SODIUM 3 MG: 3 TABLET ORAL at 18:37

## 2024-01-01 RX ADMIN — BISMUTH SUBSALICYLATE 262 MG: 262 TABLET, CHEWABLE ORAL at 08:34

## 2024-01-01 RX ADMIN — SODIUM CHLORIDE, POTASSIUM CHLORIDE, SODIUM LACTATE AND CALCIUM CHLORIDE: 600; 310; 30; 20 INJECTION, SOLUTION INTRAVENOUS at 06:38

## 2024-01-01 RX ADMIN — METHYLPREDNISOLONE SODIUM SUCCINATE 62.5 MG: 125 INJECTION, POWDER, FOR SOLUTION INTRAMUSCULAR; INTRAVENOUS at 09:49

## 2024-01-01 RX ADMIN — ENOXAPARIN SODIUM 40 MG: 40 INJECTION SUBCUTANEOUS at 17:28

## 2024-01-01 RX ADMIN — TORSEMIDE 60 MG: 20 TABLET ORAL at 08:48

## 2024-01-01 RX ADMIN — POTASSIUM CHLORIDE 40 MEQ: 1500 TABLET, EXTENDED RELEASE ORAL at 21:26

## 2024-01-01 RX ADMIN — POTASSIUM CHLORIDE 40 MEQ: 1500 TABLET, EXTENDED RELEASE ORAL at 15:15

## 2024-01-01 RX ADMIN — BUMETANIDE 2 MG: 0.25 INJECTION INTRAMUSCULAR; INTRAVENOUS at 23:41

## 2024-01-01 RX ADMIN — SODIUM CHLORIDE, PRESERVATIVE FREE: 5 INJECTION INTRAVENOUS at 08:20

## 2024-01-01 RX ADMIN — MICONAZOLE NITRATE: 2 POWDER TOPICAL at 21:04

## 2024-01-01 RX ADMIN — ONDANSETRON 4 MG: 4 TABLET, ORALLY DISINTEGRATING ORAL at 03:11

## 2024-01-01 RX ADMIN — BUMETANIDE 2 MG: 0.25 INJECTION INTRAMUSCULAR; INTRAVENOUS at 00:01

## 2024-01-01 RX ADMIN — METOPROLOL SUCCINATE 100 MG: 100 TABLET, EXTENDED RELEASE ORAL at 08:48

## 2024-01-01 RX ADMIN — ACETAMINOPHEN 650 MG: 325 TABLET, FILM COATED ORAL at 01:59

## 2024-01-01 RX ADMIN — ALBUTEROL SULFATE 2 PUFF: 90 INHALANT RESPIRATORY (INHALATION) at 05:50

## 2024-01-01 RX ADMIN — PANTOPRAZOLE SODIUM 40 MG: 40 TABLET, DELAYED RELEASE ORAL at 07:51

## 2024-01-01 RX ADMIN — FUROSEMIDE 20 MG: 10 INJECTION, SOLUTION INTRAVENOUS at 01:07

## 2024-01-01 RX ADMIN — WARFARIN SODIUM 5 MG: 2.5 TABLET ORAL at 18:27

## 2024-01-01 RX ADMIN — WARFARIN SODIUM 3 MG: 3 TABLET ORAL at 17:30

## 2024-01-01 RX ADMIN — METOPROLOL SUCCINATE 100 MG: 100 TABLET, EXTENDED RELEASE ORAL at 09:31

## 2024-01-01 RX ADMIN — FUROSEMIDE 40 MG: 10 INJECTION, SOLUTION INTRAMUSCULAR; INTRAVENOUS at 10:20

## 2024-01-01 RX ADMIN — HYDROMORPHONE HYDROCHLORIDE 0.3 MG: 0.5 INJECTION, SOLUTION INTRAMUSCULAR; INTRAVENOUS; SUBCUTANEOUS at 05:46

## 2024-01-01 RX ADMIN — METHYLPREDNISOLONE SODIUM SUCCINATE 62.5 MG: 125 INJECTION, POWDER, FOR SOLUTION INTRAMUSCULAR; INTRAVENOUS at 06:59

## 2024-01-01 RX ADMIN — POTASSIUM CHLORIDE 40 MEQ: 1.5 POWDER, FOR SOLUTION ORAL at 21:22

## 2024-01-01 RX ADMIN — METOPROLOL SUCCINATE 100 MG: 100 TABLET, EXTENDED RELEASE ORAL at 09:48

## 2024-01-01 RX ADMIN — DILTIAZEM HYDROCHLORIDE 120 MG: 120 CAPSULE, COATED, EXTENDED RELEASE ORAL at 08:09

## 2024-01-01 RX ADMIN — DILTIAZEM HYDROCHLORIDE 120 MG: 120 CAPSULE, COATED, EXTENDED RELEASE ORAL at 14:07

## 2024-01-01 RX ADMIN — SODIUM CHLORIDE, PRESERVATIVE FREE: 5 INJECTION INTRAVENOUS at 22:06

## 2024-01-01 RX ADMIN — SODIUM CHLORIDE: 9 INJECTION, SOLUTION INTRAVENOUS at 09:04

## 2024-01-01 RX ADMIN — POTASSIUM CHLORIDE 40 MEQ: 10 CAPSULE, COATED, EXTENDED RELEASE ORAL at 12:54

## 2024-01-01 RX ADMIN — ROPINIROLE HYDROCHLORIDE 2 MG: 1 TABLET, FILM COATED ORAL at 02:01

## 2024-01-01 RX ADMIN — CEFTRIAXONE SODIUM 1 G: 1 INJECTION, SOLUTION INTRAVENOUS at 21:15

## 2024-01-01 RX ADMIN — MICONAZOLE NITRATE: 2 POWDER TOPICAL at 08:07

## 2024-01-01 RX ADMIN — ATORVASTATIN CALCIUM 40 MG: 20 TABLET, FILM COATED ORAL at 20:20

## 2024-01-01 RX ADMIN — METOPROLOL SUCCINATE 100 MG: 100 TABLET, EXTENDED RELEASE ORAL at 08:19

## 2024-01-01 RX ADMIN — METOPROLOL SUCCINATE 100 MG: 100 TABLET, EXTENDED RELEASE ORAL at 08:28

## 2024-01-01 RX ADMIN — MICONAZOLE NITRATE: 2 POWDER TOPICAL at 14:16

## 2024-01-01 RX ADMIN — IOPAMIDOL 66 ML: 755 INJECTION, SOLUTION INTRAVENOUS at 01:30

## 2024-01-01 RX ADMIN — DILTIAZEM HYDROCHLORIDE 15 MG: 5 INJECTION, SOLUTION INTRAVENOUS at 09:15

## 2024-01-01 RX ADMIN — DILTIAZEM HYDROCHLORIDE 120 MG: 120 CAPSULE, COATED, EXTENDED RELEASE ORAL at 08:00

## 2024-01-01 RX ADMIN — SODIUM CHLORIDE: 9 INJECTION, SOLUTION INTRAVENOUS at 22:11

## 2024-01-01 RX ADMIN — MICONAZOLE NITRATE: 2 POWDER TOPICAL at 13:54

## 2024-01-01 RX ADMIN — TORSEMIDE 40 MG: 20 TABLET ORAL at 08:58

## 2024-01-01 RX ADMIN — BUMETANIDE 2 MG: 0.25 INJECTION INTRAMUSCULAR; INTRAVENOUS at 05:29

## 2024-01-01 RX ADMIN — CEFTRIAXONE SODIUM 1 G: 1 INJECTION, SOLUTION INTRAVENOUS at 00:18

## 2024-01-01 RX ADMIN — BUSPIRONE HYDROCHLORIDE 10 MG: 10 TABLET ORAL at 08:02

## 2024-01-01 RX ADMIN — METOPROLOL SUCCINATE 100 MG: 100 TABLET, EXTENDED RELEASE ORAL at 09:56

## 2024-01-01 RX ADMIN — BUMETANIDE 2 MG: 0.25 INJECTION INTRAMUSCULAR; INTRAVENOUS at 18:20

## 2024-01-01 RX ADMIN — ENOXAPARIN SODIUM 40 MG: 40 INJECTION SUBCUTANEOUS at 13:41

## 2024-01-01 RX ADMIN — FUROSEMIDE 40 MG: 10 INJECTION, SOLUTION INTRAMUSCULAR; INTRAVENOUS at 15:15

## 2024-01-01 RX ADMIN — PANTOPRAZOLE SODIUM 40 MG: 40 INJECTION, POWDER, FOR SOLUTION INTRAVENOUS at 08:30

## 2024-01-01 RX ADMIN — BUSPIRONE HYDROCHLORIDE 10 MG: 10 TABLET ORAL at 20:20

## 2024-01-01 RX ADMIN — SODIUM CHLORIDE 500 ML: 9 INJECTION, SOLUTION INTRAVENOUS at 08:07

## 2024-01-01 RX ADMIN — METOPROLOL SUCCINATE 100 MG: 100 TABLET, EXTENDED RELEASE ORAL at 09:02

## 2024-01-01 RX ADMIN — DILTIAZEM HYDROCHLORIDE 120 MG: 120 CAPSULE, COATED, EXTENDED RELEASE ORAL at 08:19

## 2024-01-01 RX ADMIN — MICONAZOLE NITRATE: 2 POWDER TOPICAL at 20:08

## 2024-01-01 RX ADMIN — IPRATROPIUM BROMIDE AND ALBUTEROL SULFATE 3 ML: .5; 3 SOLUTION RESPIRATORY (INHALATION) at 12:13

## 2024-01-01 RX ADMIN — BUSPIRONE HYDROCHLORIDE 10 MG: 10 TABLET ORAL at 21:05

## 2024-01-01 RX ADMIN — FUROSEMIDE 40 MG: 10 INJECTION, SOLUTION INTRAMUSCULAR; INTRAVENOUS at 07:20

## 2024-01-01 RX ADMIN — ENOXAPARIN SODIUM 40 MG: 40 INJECTION SUBCUTANEOUS at 12:04

## 2024-01-01 RX ADMIN — SODIUM CHLORIDE: 9 INJECTION, SOLUTION INTRAVENOUS at 13:52

## 2024-01-01 RX ADMIN — BUSPIRONE HYDROCHLORIDE 10 MG: 10 TABLET ORAL at 08:39

## 2024-01-01 RX ADMIN — ACETAMINOPHEN 650 MG: 325 TABLET, FILM COATED ORAL at 04:01

## 2024-01-01 RX ADMIN — POLYETHYLENE GLYCOL 3350 17 G: 17 POWDER, FOR SOLUTION ORAL at 09:41

## 2024-01-01 RX ADMIN — BUSPIRONE HYDROCHLORIDE 10 MG: 10 TABLET ORAL at 09:48

## 2024-01-01 RX ADMIN — POTASSIUM CHLORIDE 40 MEQ: 750 CAPSULE, EXTENDED RELEASE ORAL at 08:01

## 2024-01-01 RX ADMIN — PANTOPRAZOLE SODIUM 40 MG: 40 TABLET, DELAYED RELEASE ORAL at 06:37

## 2024-01-01 RX ADMIN — METOPROLOL TARTRATE 25 MG: 25 TABLET, FILM COATED ORAL at 20:20

## 2024-01-01 RX ADMIN — LISINOPRIL 10 MG: 10 TABLET ORAL at 08:57

## 2024-01-01 RX ADMIN — ATORVASTATIN CALCIUM 40 MG: 40 TABLET, FILM COATED ORAL at 20:21

## 2024-01-01 RX ADMIN — MICONAZOLE NITRATE: 2 POWDER TOPICAL at 21:03

## 2024-01-01 RX ADMIN — MICONAZOLE NITRATE: 2 POWDER TOPICAL at 14:20

## 2024-01-01 RX ADMIN — SODIUM BICARBONATE 50 MEQ: 84 INJECTION INTRAVENOUS at 02:40

## 2024-01-01 RX ADMIN — ENOXAPARIN SODIUM 40 MG: 40 INJECTION SUBCUTANEOUS at 12:11

## 2024-01-01 RX ADMIN — BUSPIRONE HYDROCHLORIDE 10 MG: 10 TABLET ORAL at 10:24

## 2024-01-01 RX ADMIN — SODIUM CHLORIDE 1000 ML: 9 INJECTION, SOLUTION INTRAVENOUS at 04:31

## 2024-01-01 RX ADMIN — FUROSEMIDE 40 MG: 10 INJECTION, SOLUTION INTRAMUSCULAR; INTRAVENOUS at 08:02

## 2024-01-01 RX ADMIN — FUROSEMIDE 60 MG: 10 INJECTION, SOLUTION INTRAMUSCULAR; INTRAVENOUS at 22:12

## 2024-01-01 RX ADMIN — POTASSIUM CHLORIDE 40 MEQ: 750 CAPSULE, EXTENDED RELEASE ORAL at 11:56

## 2024-01-01 RX ADMIN — LISINOPRIL 10 MG: 5 TABLET ORAL at 08:48

## 2024-01-01 RX ADMIN — HEPARIN SODIUM 5000 UNITS: 5000 INJECTION, SOLUTION INTRAVENOUS; SUBCUTANEOUS at 09:05

## 2024-01-01 RX ADMIN — ONDANSETRON 4 MG: 4 TABLET, ORALLY DISINTEGRATING ORAL at 10:28

## 2024-01-01 RX ADMIN — BUMETANIDE 2 MG: 0.25 INJECTION INTRAMUSCULAR; INTRAVENOUS at 14:07

## 2024-01-01 RX ADMIN — ONDANSETRON 4 MG: 4 TABLET, ORALLY DISINTEGRATING ORAL at 20:35

## 2024-01-01 RX ADMIN — BUSPIRONE HYDROCHLORIDE 10 MG: 10 TABLET ORAL at 23:06

## 2024-01-01 RX ADMIN — POTASSIUM CHLORIDE 40 MEQ: 1500 TABLET, EXTENDED RELEASE ORAL at 21:41

## 2024-01-01 RX ADMIN — BUSPIRONE HYDROCHLORIDE 10 MG: 10 TABLET ORAL at 08:00

## 2024-01-01 RX ADMIN — BUSPIRONE HYDROCHLORIDE 10 MG: 10 TABLET ORAL at 20:25

## 2024-01-01 RX ADMIN — POTASSIUM CHLORIDE 60 MEQ: 1500 TABLET, EXTENDED RELEASE ORAL at 09:15

## 2024-01-01 ASSESSMENT — ACTIVITIES OF DAILY LIVING (ADL)
ADLS_ACUITY_SCORE: 30
ADLS_ACUITY_SCORE: 35
ADLS_ACUITY_SCORE: 27
ADLS_ACUITY_SCORE: 29
ADLS_ACUITY_SCORE: 29
ADLS_ACUITY_SCORE: 30
ADLS_ACUITY_SCORE: 26
ADLS_ACUITY_SCORE: 38
ADLS_ACUITY_SCORE: 26
ADLS_ACUITY_SCORE: 24
ADLS_ACUITY_SCORE: 40
ADLS_ACUITY_SCORE: 31
ADLS_ACUITY_SCORE: 31
ADLS_ACUITY_SCORE: 30
ADLS_ACUITY_SCORE: 31
ADLS_ACUITY_SCORE: 29
ADLS_ACUITY_SCORE: 23
ADLS_ACUITY_SCORE: 30
ADLS_ACUITY_SCORE: 26
PREVIOUS_RESPONSIBILITIES: MEAL PREP;HOUSEKEEPING;LAUNDRY;SHOPPING;MEDICATION MANAGEMENT;FINANCES;DRIVING
ADLS_ACUITY_SCORE: 29
ADLS_ACUITY_SCORE: 40
ADLS_ACUITY_SCORE: 30
ADLS_ACUITY_SCORE: 30
ADLS_ACUITY_SCORE: 31
ADLS_ACUITY_SCORE: 32
ADLS_ACUITY_SCORE: 28
ADLS_ACUITY_SCORE: 28
ADLS_ACUITY_SCORE: 40
ADLS_ACUITY_SCORE: 27
ADLS_ACUITY_SCORE: 38
ADLS_ACUITY_SCORE: 30
ADLS_ACUITY_SCORE: 31
ADLS_ACUITY_SCORE: 26
ADLS_ACUITY_SCORE: 40
ADLS_ACUITY_SCORE: 30
ADLS_ACUITY_SCORE: 31
ADLS_ACUITY_SCORE: 38
ADLS_ACUITY_SCORE: 37
ADLS_ACUITY_SCORE: 27
ADLS_ACUITY_SCORE: 32
ADLS_ACUITY_SCORE: 24
ADLS_ACUITY_SCORE: 23
ADLS_ACUITY_SCORE: 32
ADLS_ACUITY_SCORE: 38
ADLS_ACUITY_SCORE: 23
ADLS_ACUITY_SCORE: 23
ADLS_ACUITY_SCORE: 30
ADLS_ACUITY_SCORE: 31
ADLS_ACUITY_SCORE: 32
ADLS_ACUITY_SCORE: 27
ADLS_ACUITY_SCORE: 28
ADLS_ACUITY_SCORE: 37
ADLS_ACUITY_SCORE: 25
ADLS_ACUITY_SCORE: 30
ADLS_ACUITY_SCORE: 30
ADLS_ACUITY_SCORE: 28
ADLS_ACUITY_SCORE: 31
ADLS_ACUITY_SCORE: 24
ADLS_ACUITY_SCORE: 30
ADLS_ACUITY_SCORE: 25
ADLS_ACUITY_SCORE: 28
ADLS_ACUITY_SCORE: 27
ADLS_ACUITY_SCORE: 25
ADLS_ACUITY_SCORE: 26
ADLS_ACUITY_SCORE: 21
ADLS_ACUITY_SCORE: 29
ADLS_ACUITY_SCORE: 27
ADLS_ACUITY_SCORE: 32
ADLS_ACUITY_SCORE: 38
ADLS_ACUITY_SCORE: 30
ADLS_ACUITY_SCORE: 27
ADLS_ACUITY_SCORE: 25
ADLS_ACUITY_SCORE: 31
ADLS_ACUITY_SCORE: 26
ADLS_ACUITY_SCORE: 40
ADLS_ACUITY_SCORE: 27
ADLS_ACUITY_SCORE: 33
ADLS_ACUITY_SCORE: 26
ADLS_ACUITY_SCORE: 37
ADLS_ACUITY_SCORE: 31
ADLS_ACUITY_SCORE: 29
ADLS_ACUITY_SCORE: 31
ADLS_ACUITY_SCORE: 35
ADLS_ACUITY_SCORE: 32
ADLS_ACUITY_SCORE: 38
ADLS_ACUITY_SCORE: 35
ADLS_ACUITY_SCORE: 30
ADLS_ACUITY_SCORE: 27
ADLS_ACUITY_SCORE: 30
ADLS_ACUITY_SCORE: 30
ADLS_ACUITY_SCORE: 37
ADLS_ACUITY_SCORE: 35
DRESSING/BATHING_DIFFICULTY: NO
ADLS_ACUITY_SCORE: 33
ADLS_ACUITY_SCORE: 29
ADLS_ACUITY_SCORE: 27
ADLS_ACUITY_SCORE: 37
ADLS_ACUITY_SCORE: 31
ADLS_ACUITY_SCORE: 33
ADLS_ACUITY_SCORE: 30
ADLS_ACUITY_SCORE: 40
ADLS_ACUITY_SCORE: 30
ADLS_ACUITY_SCORE: 30
ADLS_ACUITY_SCORE: 31
ADLS_ACUITY_SCORE: 30
ADLS_ACUITY_SCORE: 37
ADLS_ACUITY_SCORE: 38
ADLS_ACUITY_SCORE: 40
ADLS_ACUITY_SCORE: 33
TOILETING_ISSUES: NO
ADLS_ACUITY_SCORE: 29
ADLS_ACUITY_SCORE: 32
ADLS_ACUITY_SCORE: 35
ADLS_ACUITY_SCORE: 38
ADLS_ACUITY_SCORE: 38
ADLS_ACUITY_SCORE: 23
ADLS_ACUITY_SCORE: 22
ADLS_ACUITY_SCORE: 27
ADLS_ACUITY_SCORE: 25
ADLS_ACUITY_SCORE: 23
ADLS_ACUITY_SCORE: 31
ADLS_ACUITY_SCORE: 24
ADLS_ACUITY_SCORE: 25
ADLS_ACUITY_SCORE: 29
ADLS_ACUITY_SCORE: 29
ADLS_ACUITY_SCORE: 28
ADLS_ACUITY_SCORE: 23
ADLS_ACUITY_SCORE: 40
ADLS_ACUITY_SCORE: 31
ADLS_ACUITY_SCORE: 31
ADLS_ACUITY_SCORE: 23
ADLS_ACUITY_SCORE: 28
ADLS_ACUITY_SCORE: 29
ADLS_ACUITY_SCORE: 30
ADLS_ACUITY_SCORE: 30
ADLS_ACUITY_SCORE: 23
ADLS_ACUITY_SCORE: 29
ADLS_ACUITY_SCORE: 33
ADLS_ACUITY_SCORE: 21
ADLS_ACUITY_SCORE: 30
ADLS_ACUITY_SCORE: 22
ADLS_ACUITY_SCORE: 25
ADLS_ACUITY_SCORE: 26
ADLS_ACUITY_SCORE: 27
ADLS_ACUITY_SCORE: 30
ADLS_ACUITY_SCORE: 32
ADLS_ACUITY_SCORE: 32
ADLS_ACUITY_SCORE: 26
ADLS_ACUITY_SCORE: 35
ADLS_ACUITY_SCORE: 29
ADLS_ACUITY_SCORE: 26
ADLS_ACUITY_SCORE: 35
ADLS_ACUITY_SCORE: 26
ADLS_ACUITY_SCORE: 29
ADLS_ACUITY_SCORE: 35
ADLS_ACUITY_SCORE: 35
ADLS_ACUITY_SCORE: 23
ADLS_ACUITY_SCORE: 40
ADLS_ACUITY_SCORE: 30
ADLS_ACUITY_SCORE: 34
ADLS_ACUITY_SCORE: 27
ADLS_ACUITY_SCORE: 30
ADLS_ACUITY_SCORE: 32
ADLS_ACUITY_SCORE: 32
ADLS_ACUITY_SCORE: 31
ADLS_ACUITY_SCORE: 37
ADLS_ACUITY_SCORE: 32
ADLS_ACUITY_SCORE: 30
ADLS_ACUITY_SCORE: 26
ADLS_ACUITY_SCORE: 25
ADLS_ACUITY_SCORE: 26
ADLS_ACUITY_SCORE: 26
ADLS_ACUITY_SCORE: 37
ADLS_ACUITY_SCORE: 31
ADLS_ACUITY_SCORE: 38
ADLS_ACUITY_SCORE: 40
ADLS_ACUITY_SCORE: 30
ADLS_ACUITY_SCORE: 21
ADLS_ACUITY_SCORE: 31
ADLS_ACUITY_SCORE: 35
ADLS_ACUITY_SCORE: 28
ADLS_ACUITY_SCORE: 37
ADLS_ACUITY_SCORE: 23
ADLS_ACUITY_SCORE: 35
ADLS_ACUITY_SCORE: 26
ADLS_ACUITY_SCORE: 25
ADLS_ACUITY_SCORE: 31
ADLS_ACUITY_SCORE: 22
ADLS_ACUITY_SCORE: 31
ADLS_ACUITY_SCORE: 35
ADLS_ACUITY_SCORE: 33
ADLS_ACUITY_SCORE: 35
ADLS_ACUITY_SCORE: 29
ADLS_ACUITY_SCORE: 26
ADLS_ACUITY_SCORE: 32
ADLS_ACUITY_SCORE: 28
ADLS_ACUITY_SCORE: 29
ADLS_ACUITY_SCORE: 21
ADLS_ACUITY_SCORE: 30
ADLS_ACUITY_SCORE: 31
ADLS_ACUITY_SCORE: 31
ADLS_ACUITY_SCORE: 26
ADLS_ACUITY_SCORE: 38
ADLS_ACUITY_SCORE: 29
ADLS_ACUITY_SCORE: 26
ADLS_ACUITY_SCORE: 23
ADLS_ACUITY_SCORE: 31
ADLS_ACUITY_SCORE: 26
ADLS_ACUITY_SCORE: 31
ADLS_ACUITY_SCORE: 21
ADLS_ACUITY_SCORE: 29
ADLS_ACUITY_SCORE: 21
ADLS_ACUITY_SCORE: 24
ADLS_ACUITY_SCORE: 29
WEAR_GLASSES_OR_BLIND: NO
ADLS_ACUITY_SCORE: 31
ADLS_ACUITY_SCORE: 31
ADLS_ACUITY_SCORE: 35
ADLS_ACUITY_SCORE: 29
ADLS_ACUITY_SCORE: 30
ADLS_ACUITY_SCORE: 30
ADLS_ACUITY_SCORE: 38
ADLS_ACUITY_SCORE: 30
ADLS_ACUITY_SCORE: 30
ADLS_ACUITY_SCORE: 31
ADLS_ACUITY_SCORE: 35
ADLS_ACUITY_SCORE: 38
ADLS_ACUITY_SCORE: 31
ADLS_ACUITY_SCORE: 27
ADLS_ACUITY_SCORE: 30
ADLS_ACUITY_SCORE: 26
ADLS_ACUITY_SCORE: 32
ADLS_ACUITY_SCORE: 26
ADLS_ACUITY_SCORE: 26
ADLS_ACUITY_SCORE: 35
ADLS_ACUITY_SCORE: 30
ADLS_ACUITY_SCORE: 32
ADLS_ACUITY_SCORE: 30
ADLS_ACUITY_SCORE: 31
ADLS_ACUITY_SCORE: 26
ADLS_ACUITY_SCORE: 29
ADLS_ACUITY_SCORE: 32
ADLS_ACUITY_SCORE: 31
ADLS_ACUITY_SCORE: 28
ADLS_ACUITY_SCORE: 32
ADLS_ACUITY_SCORE: 32
ADLS_ACUITY_SCORE: 38
ADLS_ACUITY_SCORE: 40
FALL_HISTORY_WITHIN_LAST_SIX_MONTHS: NO
ADLS_ACUITY_SCORE: 29
ADLS_ACUITY_SCORE: 26
ADLS_ACUITY_SCORE: 23
ADLS_ACUITY_SCORE: 38
ADLS_ACUITY_SCORE: 28
ADLS_ACUITY_SCORE: 31
ADLS_ACUITY_SCORE: 30
ADLS_ACUITY_SCORE: 27
ADLS_ACUITY_SCORE: 25
ADLS_ACUITY_SCORE: 29
ADLS_ACUITY_SCORE: 33
ADLS_ACUITY_SCORE: 38
ADLS_ACUITY_SCORE: 38
ADLS_ACUITY_SCORE: 26
ADLS_ACUITY_SCORE: 31
ADLS_ACUITY_SCORE: 31
DIFFICULTY_EATING/SWALLOWING: NO
ADLS_ACUITY_SCORE: 32
ADLS_ACUITY_SCORE: 35
ADLS_ACUITY_SCORE: 38
ADLS_ACUITY_SCORE: 40
ADLS_ACUITY_SCORE: 29
ADLS_ACUITY_SCORE: 26
ADLS_ACUITY_SCORE: 31
ADLS_ACUITY_SCORE: 23
ADLS_ACUITY_SCORE: 30
ADLS_ACUITY_SCORE: 23
ADLS_ACUITY_SCORE: 31
ADLS_ACUITY_SCORE: 31
ADLS_ACUITY_SCORE: 30
ADLS_ACUITY_SCORE: 25
ADLS_ACUITY_SCORE: 30
ADLS_ACUITY_SCORE: 21
ADLS_ACUITY_SCORE: 29
ADLS_ACUITY_SCORE: 37
ADLS_ACUITY_SCORE: 21
DIFFICULTY_COMMUNICATING: NO
ADLS_ACUITY_SCORE: 38
ADLS_ACUITY_SCORE: 31
ADLS_ACUITY_SCORE: 26
ADLS_ACUITY_SCORE: 31
ADLS_ACUITY_SCORE: 28
ADLS_ACUITY_SCORE: 26
ADLS_ACUITY_SCORE: 24
ADLS_ACUITY_SCORE: 26
ADLS_ACUITY_SCORE: 29
ADLS_ACUITY_SCORE: 23
ADLS_ACUITY_SCORE: 29
ADLS_ACUITY_SCORE: 23
ADLS_ACUITY_SCORE: 27
ADLS_ACUITY_SCORE: 32
ADLS_ACUITY_SCORE: 23
ADLS_ACUITY_SCORE: 31
ADLS_ACUITY_SCORE: 27
ADLS_ACUITY_SCORE: 35
ADLS_ACUITY_SCORE: 29
ADLS_ACUITY_SCORE: 30
ADLS_ACUITY_SCORE: 34
ADLS_ACUITY_SCORE: 27
HEARING_DIFFICULTY_OR_DEAF: NO
ADLS_ACUITY_SCORE: 26
ADLS_ACUITY_SCORE: 31
ADLS_ACUITY_SCORE: 35
ADLS_ACUITY_SCORE: 32
ADLS_ACUITY_SCORE: 30
ADLS_ACUITY_SCORE: 35
ADLS_ACUITY_SCORE: 38
ADLS_ACUITY_SCORE: 28
ADLS_ACUITY_SCORE: 30
ADLS_ACUITY_SCORE: 31
ADLS_ACUITY_SCORE: 32
ADLS_ACUITY_SCORE: 28
ADLS_ACUITY_SCORE: 31
ADLS_ACUITY_SCORE: 29
ADLS_ACUITY_SCORE: 26
ADLS_ACUITY_SCORE: 30
ADLS_ACUITY_SCORE: 31
ADLS_ACUITY_SCORE: 30
ADLS_ACUITY_SCORE: 25
ADLS_ACUITY_SCORE: 23
ADLS_ACUITY_SCORE: 31
ADLS_ACUITY_SCORE: 35
ADLS_ACUITY_SCORE: 26
ADLS_ACUITY_SCORE: 30
ADLS_ACUITY_SCORE: 38
ADLS_ACUITY_SCORE: 26
ADLS_ACUITY_SCORE: 29
ADLS_ACUITY_SCORE: 34
ADLS_ACUITY_SCORE: 31
ADLS_ACUITY_SCORE: 32
ADLS_ACUITY_SCORE: 31
ADLS_ACUITY_SCORE: 29
ADLS_ACUITY_SCORE: 32
ADLS_ACUITY_SCORE: 27
ADLS_ACUITY_SCORE: 38
ADLS_ACUITY_SCORE: 31
ADLS_ACUITY_SCORE: 31
ADLS_ACUITY_SCORE: 23
ADLS_ACUITY_SCORE: 31
ADLS_ACUITY_SCORE: 26
ADLS_ACUITY_SCORE: 23
ADLS_ACUITY_SCORE: 29
ADLS_ACUITY_SCORE: 30
ADLS_ACUITY_SCORE: 25
ADLS_ACUITY_SCORE: 26
ADLS_ACUITY_SCORE: 29
ADLS_ACUITY_SCORE: 22
ADLS_ACUITY_SCORE: 38
ADLS_ACUITY_SCORE: 31
ADLS_ACUITY_SCORE: 31
ADLS_ACUITY_SCORE: 26
ADLS_ACUITY_SCORE: 28
ADLS_ACUITY_SCORE: 29
ADLS_ACUITY_SCORE: 26
ADLS_ACUITY_SCORE: 30
ADLS_ACUITY_SCORE: 32
ADLS_ACUITY_SCORE: 25
ADLS_ACUITY_SCORE: 30
ADLS_ACUITY_SCORE: 31
ADLS_ACUITY_SCORE: 30
ADLS_ACUITY_SCORE: 38
ADLS_ACUITY_SCORE: 29
ADLS_ACUITY_SCORE: 40
ADLS_ACUITY_SCORE: 31
ADLS_ACUITY_SCORE: 31
ADLS_ACUITY_SCORE: 27
ADLS_ACUITY_SCORE: 34
ADLS_ACUITY_SCORE: 38
ADLS_ACUITY_SCORE: 25
ADLS_ACUITY_SCORE: 28
ADLS_ACUITY_SCORE: 38
ADLS_ACUITY_SCORE: 30
ADLS_ACUITY_SCORE: 27
ADLS_ACUITY_SCORE: 22
ADLS_ACUITY_SCORE: 21
ADLS_ACUITY_SCORE: 32
ADLS_ACUITY_SCORE: 38
ADLS_ACUITY_SCORE: 28
ADLS_ACUITY_SCORE: 29
ADLS_ACUITY_SCORE: 35
ADLS_ACUITY_SCORE: 31
ADLS_ACUITY_SCORE: 31
ADLS_ACUITY_SCORE: 26
ADLS_ACUITY_SCORE: 26
ADLS_ACUITY_SCORE: 32
ADLS_ACUITY_SCORE: 30
ADLS_ACUITY_SCORE: 35
ADLS_ACUITY_SCORE: 32
ADLS_ACUITY_SCORE: 26
ADLS_ACUITY_SCORE: 35
ADLS_ACUITY_SCORE: 37
ADLS_ACUITY_SCORE: 38
ADLS_ACUITY_SCORE: 30
CHANGE_IN_FUNCTIONAL_STATUS_SINCE_ONSET_OF_CURRENT_ILLNESS/INJURY: NO
ADLS_ACUITY_SCORE: 29
ADLS_ACUITY_SCORE: 35
ADLS_ACUITY_SCORE: 38
ADLS_ACUITY_SCORE: 31
ADLS_ACUITY_SCORE: 23
ADLS_ACUITY_SCORE: 26
ADLS_ACUITY_SCORE: 32
ADLS_ACUITY_SCORE: 31
ADLS_ACUITY_SCORE: 31
ADLS_ACUITY_SCORE: 40
ADLS_ACUITY_SCORE: 25
ADLS_ACUITY_SCORE: 28
ADLS_ACUITY_SCORE: 30
DOING_ERRANDS_INDEPENDENTLY_DIFFICULTY: NO
ADLS_ACUITY_SCORE: 26
ADLS_ACUITY_SCORE: 23
ADLS_ACUITY_SCORE: 30
ADLS_ACUITY_SCORE: 26
ADLS_ACUITY_SCORE: 27
ADLS_ACUITY_SCORE: 23
ADLS_ACUITY_SCORE: 33
ADLS_ACUITY_SCORE: 31
ADLS_ACUITY_SCORE: 25
ADLS_ACUITY_SCORE: 26
ADLS_ACUITY_SCORE: 38
ADLS_ACUITY_SCORE: 21
ADLS_ACUITY_SCORE: 33
ADLS_ACUITY_SCORE: 28
ADLS_ACUITY_SCORE: 33
ADLS_ACUITY_SCORE: 32
ADLS_ACUITY_SCORE: 30
ADLS_ACUITY_SCORE: 38
ADLS_ACUITY_SCORE: 27
ADLS_ACUITY_SCORE: 30
ADLS_ACUITY_SCORE: 22
ADLS_ACUITY_SCORE: 25
ADLS_ACUITY_SCORE: 29
ADLS_ACUITY_SCORE: 40
ADLS_ACUITY_SCORE: 38
ADLS_ACUITY_SCORE: 28
ADLS_ACUITY_SCORE: 25
ADLS_ACUITY_SCORE: 28
ADLS_ACUITY_SCORE: 27
ADLS_ACUITY_SCORE: 32
ADLS_ACUITY_SCORE: 26
ADLS_ACUITY_SCORE: 26
ADLS_ACUITY_SCORE: 21
ADLS_ACUITY_SCORE: 28
ADLS_ACUITY_SCORE: 25
ADLS_ACUITY_SCORE: 34
ADLS_ACUITY_SCORE: 23
ADLS_ACUITY_SCORE: 28
ADLS_ACUITY_SCORE: 23
ADLS_ACUITY_SCORE: 31
ADLS_ACUITY_SCORE: 30
ADLS_ACUITY_SCORE: 22
ADLS_ACUITY_SCORE: 29
WALKING_OR_CLIMBING_STAIRS_DIFFICULTY: NO
ADLS_ACUITY_SCORE: 30
ADLS_ACUITY_SCORE: 31
ADLS_ACUITY_SCORE: 27
CONCENTRATING,_REMEMBERING_OR_MAKING_DECISIONS_DIFFICULTY: NO
ADLS_ACUITY_SCORE: 29
ADLS_ACUITY_SCORE: 29
ADLS_ACUITY_SCORE: 21
ADLS_ACUITY_SCORE: 26
ADLS_ACUITY_SCORE: 23
ADLS_ACUITY_SCORE: 26
ADLS_ACUITY_SCORE: 26
ADLS_ACUITY_SCORE: 22
ADLS_ACUITY_SCORE: 31
ADLS_ACUITY_SCORE: 30
ADLS_ACUITY_SCORE: 23
ADLS_ACUITY_SCORE: 38
ADLS_ACUITY_SCORE: 29
ADLS_ACUITY_SCORE: 35
ADLS_ACUITY_SCORE: 30
ADLS_ACUITY_SCORE: 21
ADLS_ACUITY_SCORE: 31
ADLS_ACUITY_SCORE: 35
ADLS_ACUITY_SCORE: 30
ADLS_ACUITY_SCORE: 25
ADLS_ACUITY_SCORE: 31
ADLS_ACUITY_SCORE: 23
ADLS_ACUITY_SCORE: 32
ADLS_ACUITY_SCORE: 31
ADLS_ACUITY_SCORE: 26
ADLS_ACUITY_SCORE: 35

## 2024-01-01 ASSESSMENT — COLUMBIA-SUICIDE SEVERITY RATING SCALE - C-SSRS
1. IN THE PAST MONTH, HAVE YOU WISHED YOU WERE DEAD OR WISHED YOU COULD GO TO SLEEP AND NOT WAKE UP?: NO
2. HAVE YOU ACTUALLY HAD ANY THOUGHTS OF KILLING YOURSELF IN THE PAST MONTH?: NO
6. HAVE YOU EVER DONE ANYTHING, STARTED TO DO ANYTHING, OR PREPARED TO DO ANYTHING TO END YOUR LIFE?: NO
6. HAVE YOU EVER DONE ANYTHING, STARTED TO DO ANYTHING, OR PREPARED TO DO ANYTHING TO END YOUR LIFE?: NO
1. IN THE PAST MONTH, HAVE YOU WISHED YOU WERE DEAD OR WISHED YOU COULD GO TO SLEEP AND NOT WAKE UP?: NO
1. IN THE PAST MONTH, HAVE YOU WISHED YOU WERE DEAD OR WISHED YOU COULD GO TO SLEEP AND NOT WAKE UP?: NO
2. HAVE YOU ACTUALLY HAD ANY THOUGHTS OF KILLING YOURSELF IN THE PAST MONTH?: NO
6. HAVE YOU EVER DONE ANYTHING, STARTED TO DO ANYTHING, OR PREPARED TO DO ANYTHING TO END YOUR LIFE?: NO
6. HAVE YOU EVER DONE ANYTHING, STARTED TO DO ANYTHING, OR PREPARED TO DO ANYTHING TO END YOUR LIFE?: NO
1. IN THE PAST MONTH, HAVE YOU WISHED YOU WERE DEAD OR WISHED YOU COULD GO TO SLEEP AND NOT WAKE UP?: NO
6. HAVE YOU EVER DONE ANYTHING, STARTED TO DO ANYTHING, OR PREPARED TO DO ANYTHING TO END YOUR LIFE?: NO
1. IN THE PAST MONTH, HAVE YOU WISHED YOU WERE DEAD OR WISHED YOU COULD GO TO SLEEP AND NOT WAKE UP?: NO
2. HAVE YOU ACTUALLY HAD ANY THOUGHTS OF KILLING YOURSELF IN THE PAST MONTH?: NO
6. HAVE YOU EVER DONE ANYTHING, STARTED TO DO ANYTHING, OR PREPARED TO DO ANYTHING TO END YOUR LIFE?: NO
1. IN THE PAST MONTH, HAVE YOU WISHED YOU WERE DEAD OR WISHED YOU COULD GO TO SLEEP AND NOT WAKE UP?: NO
6. HAVE YOU EVER DONE ANYTHING, STARTED TO DO ANYTHING, OR PREPARED TO DO ANYTHING TO END YOUR LIFE?: NO
2. HAVE YOU ACTUALLY HAD ANY THOUGHTS OF KILLING YOURSELF IN THE PAST MONTH?: NO
1. IN THE PAST MONTH, HAVE YOU WISHED YOU WERE DEAD OR WISHED YOU COULD GO TO SLEEP AND NOT WAKE UP?: NO
2. HAVE YOU ACTUALLY HAD ANY THOUGHTS OF KILLING YOURSELF IN THE PAST MONTH?: NO
2. HAVE YOU ACTUALLY HAD ANY THOUGHTS OF KILLING YOURSELF IN THE PAST MONTH?: NO
6. HAVE YOU EVER DONE ANYTHING, STARTED TO DO ANYTHING, OR PREPARED TO DO ANYTHING TO END YOUR LIFE?: NO
1. IN THE PAST MONTH, HAVE YOU WISHED YOU WERE DEAD OR WISHED YOU COULD GO TO SLEEP AND NOT WAKE UP?: NO
2. HAVE YOU ACTUALLY HAD ANY THOUGHTS OF KILLING YOURSELF IN THE PAST MONTH?: NO
1. IN THE PAST MONTH, HAVE YOU WISHED YOU WERE DEAD OR WISHED YOU COULD GO TO SLEEP AND NOT WAKE UP?: NO
6. HAVE YOU EVER DONE ANYTHING, STARTED TO DO ANYTHING, OR PREPARED TO DO ANYTHING TO END YOUR LIFE?: NO

## 2024-01-01 ASSESSMENT — ENCOUNTER SYMPTOMS
HEADACHES: 0
CHILLS: 0
DIARRHEA: 0
NECK STIFFNESS: 0
COUGH: 1
FATIGUE: 0
NECK STIFFNESS: 0
COUGH: 0
NAUSEA: 0
ABDOMINAL PAIN: 0
CONSTITUTIONAL NEGATIVE: 1
DYSURIA: 0
ABDOMINAL PAIN: 0
COUGH: 0
CONSTIPATION: 0
CHILLS: 0
CHILLS: 0
PALPITATIONS: 0
FLANK PAIN: 0
LIGHT-HEADEDNESS: 0
DIAPHORESIS: 0
NUMBNESS: 0
PSYCHIATRIC NEGATIVE: 1
NAUSEA: 1
ANAL BLEEDING: 0
WHEEZING: 0
BACK PAIN: 0
VOICE CHANGE: 0
ABDOMINAL DISTENTION: 1
VOMITING: 0
DYSURIA: 0
HEMATURIA: 0
HEMATURIA: 0
SHORTNESS OF BREATH: 1
ALLERGIC/IMMUNOLOGIC NEGATIVE: 1
VOMITING: 0
SHORTNESS OF BREATH: 1
ARTHRALGIAS: 0
COLOR CHANGE: 0
MYALGIAS: 0
MUSCULOSKELETAL NEGATIVE: 1
ABDOMINAL PAIN: 0
BLOOD IN STOOL: 0
COUGH: 0
WHEEZING: 1
CONSTIPATION: 0
VOMITING: 0
FLANK PAIN: 0
FEVER: 0
BLOOD IN STOOL: 0
NECK PAIN: 0
COUGH: 1
LIGHT-HEADEDNESS: 0
NAUSEA: 0
SHORTNESS OF BREATH: 1
HEMATOLOGIC/LYMPHATIC NEGATIVE: 1
CHILLS: 0
FEVER: 0
ANAL BLEEDING: 0
FEVER: 0
EYES NEGATIVE: 1
DIARRHEA: 0
SHORTNESS OF BREATH: 1
COLOR CHANGE: 0
HEADACHES: 0
DIFFICULTY URINATING: 1
NUMBNESS: 0
NECK PAIN: 0
FEVER: 0
BLOOD IN STOOL: 0
ARTHRALGIAS: 0
WOUND: 0
SHORTNESS OF BREATH: 1
VOMITING: 0
ENDOCRINE NEGATIVE: 1
BACK PAIN: 0
ABDOMINAL DISTENTION: 0
DIZZINESS: 0
WOUND: 0
DIZZINESS: 0
NEUROLOGICAL NEGATIVE: 1
DIAPHORESIS: 0
ABDOMINAL PAIN: 0
FEVER: 0
COUGH: 0
FLANK PAIN: 0
CHEST TIGHTNESS: 0
ABDOMINAL DISTENTION: 0
CONFUSION: 0
MYALGIAS: 0
VOICE CHANGE: 0
PALPITATIONS: 0
CHEST TIGHTNESS: 0
HEMATURIA: 0
CONFUSION: 0
NAUSEA: 0

## 2024-01-01 ASSESSMENT — ANXIETY QUESTIONNAIRES
7. FEELING AFRAID AS IF SOMETHING AWFUL MIGHT HAPPEN: NOT AT ALL
4. TROUBLE RELAXING: NOT AT ALL
1. FEELING NERVOUS, ANXIOUS, OR ON EDGE: NOT AT ALL
IF YOU CHECKED OFF ANY PROBLEMS ON THIS QUESTIONNAIRE, HOW DIFFICULT HAVE THESE PROBLEMS MADE IT FOR YOU TO DO YOUR WORK, TAKE CARE OF THINGS AT HOME, OR GET ALONG WITH OTHER PEOPLE: NOT DIFFICULT AT ALL
GAD7 TOTAL SCORE: 0
7. FEELING AFRAID AS IF SOMETHING AWFUL MIGHT HAPPEN: NOT AT ALL
5. BEING SO RESTLESS THAT IT IS HARD TO SIT STILL: NOT AT ALL
GAD7 TOTAL SCORE: 0
8. IF YOU CHECKED OFF ANY PROBLEMS, HOW DIFFICULT HAVE THESE MADE IT FOR YOU TO DO YOUR WORK, TAKE CARE OF THINGS AT HOME, OR GET ALONG WITH OTHER PEOPLE?: NOT DIFFICULT AT ALL
6. BECOMING EASILY ANNOYED OR IRRITABLE: NOT AT ALL
2. NOT BEING ABLE TO STOP OR CONTROL WORRYING: NOT AT ALL
3. WORRYING TOO MUCH ABOUT DIFFERENT THINGS: NOT AT ALL
GAD7 TOTAL SCORE: 0

## 2024-01-01 ASSESSMENT — PATIENT HEALTH QUESTIONNAIRE - PHQ9
10. IF YOU CHECKED OFF ANY PROBLEMS, HOW DIFFICULT HAVE THESE PROBLEMS MADE IT FOR YOU TO DO YOUR WORK, TAKE CARE OF THINGS AT HOME, OR GET ALONG WITH OTHER PEOPLE: NOT DIFFICULT AT ALL
SUM OF ALL RESPONSES TO PHQ QUESTIONS 1-9: 1
SUM OF ALL RESPONSES TO PHQ QUESTIONS 1-9: 1

## 2024-01-01 ASSESSMENT — ASTHMA QUESTIONNAIRES
ACT_TOTALSCORE: 24
QUESTION_3 LAST FOUR WEEKS HOW OFTEN DID YOUR ASTHMA SYMPTOMS (WHEEZING, COUGHING, SHORTNESS OF BREATH, CHEST TIGHTNESS OR PAIN) WAKE YOU UP AT NIGHT OR EARLIER THAN USUAL IN THE MORNING: NOT AT ALL
QUESTION_5 LAST FOUR WEEKS HOW WOULD YOU RATE YOUR ASTHMA CONTROL: COMPLETELY CONTROLLED
QUESTION_1 LAST FOUR WEEKS HOW MUCH OF THE TIME DID YOUR ASTHMA KEEP YOU FROM GETTING AS MUCH DONE AT WORK, SCHOOL OR AT HOME: NONE OF THE TIME
QUESTION_4 LAST FOUR WEEKS HOW OFTEN HAVE YOU USED YOUR RESCUE INHALER OR NEBULIZER MEDICATION (SUCH AS ALBUTEROL): NOT AT ALL
HOSPITALIZATION_OVERNIGHT_LAST_YEAR_TOTAL: ONE
EMERGENCY_ROOM_LAST_YEAR_TOTAL: TWO
QUESTION_2 LAST FOUR WEEKS HOW OFTEN HAVE YOU HAD SHORTNESS OF BREATH: ONCE OR TWICE A WEEK
ACT_TOTALSCORE: 24

## 2024-01-01 ASSESSMENT — PAIN SCALES - GENERAL: PAINLEVEL: NO PAIN (0)

## 2024-01-22 NOTE — TELEPHONE ENCOUNTER
Anticoagulation Management    Heather MATTHEW Rosas is being followed by the anticoagulation clinic while in noncompliant status. Heather Rosas is receiving every 6 week reminder calls.     Last INR checked on 8/7/23    Called and Left message for patient to call and schedule lab appointment as soon as possible. If returning call, please schedule.

## 2024-02-05 PROBLEM — I48.91 ATRIAL FIBRILLATION (H): Chronic | Status: ACTIVE | Noted: 2018-11-26

## 2024-02-05 PROBLEM — R19.7 DIARRHEA: Status: ACTIVE | Noted: 2023-07-07

## 2024-02-05 PROBLEM — I25.10 CORONARY ARTERY DISEASE INVOLVING NATIVE CORONARY ARTERY: Chronic | Status: ACTIVE | Noted: 2021-10-12

## 2024-02-05 PROBLEM — R60.1 GENERALIZED EDEMA DUE TO FLUID OVERLOAD: Status: ACTIVE | Noted: 2024-01-01

## 2024-02-05 PROBLEM — E87.70 GENERALIZED EDEMA DUE TO FLUID OVERLOAD: Status: ACTIVE | Noted: 2024-01-01

## 2024-02-05 NOTE — ED PROVIDER NOTES
[facility]  ED Provider Note    Chief Complaint   Patient presents with    Diarrhea     History:  Heather Rosas is a 73 year old female with history of hypertension anxiety GERD schizophrenia adenocarcinoma of the uterus, atrial fibrillation supposed to be on Coumadin but has not been taking it for a year because she got annoyed with having lab tests, mitral regurgitation, CABG x 4, coronary artery disease, aortic valve sclerosis, fibromyalgia presents to the emergency department today with diarrhea feeling dizzy and denies abdominal pain.  She states that she did not take any of her medications this morning.  No active chest pain.  No other complaints at this time.    Review of Systems   Performed; see HPI for pertinent positives and negatives.     Medical history, surgical history, and social history was reviewed.  Nursing documentation, triage note, and vitals were reviewed.    Vitals:  BP: (!) 142/108  Pulse: 115  Temp: 97.8  F (36.6  C)  Resp: 20  Weight: 86.2 kg (190 lb)  SpO2: 93 %    Physical Exam:  Constitutional: Alert and conversant. NAD   HENT: NCAT   Eyes: Normal pupils   Neck: supple   CV: No pallor  Pulmonary/Chest: Non-labored respirations  Abdominal: non-distended, diffusely tender, no rebound no guarding no specific exquisite pain in any 1 particular spot  MSK: KERR.  Bilateral lower extremity edema  Neuro: Alert and appropriate   Skin: Warm and dry. No diaphoresis. No rashes on exposed skin    Psych: Appropriate mood and affect       MDM:      ED Course as of 02/05/24 1307   Mon Feb 05, 2024   1304 73-year-old female here with diarrhea dizziness weakness medication noncompliance.  EKG demonstrates A-fib with RVR.  Diltiazem given 50 mg IV followed by her home dose of diltiazem and metoprolol with improvement in her rate into the 70s and 80s.  Blood pressure normalized.  Oxygen is going up and down, as low as 84%.  Placed on 2 L nasal cannula.  BNP elevated, physical exam with  excess fluid.  Chest x-ray with pulmonary edema.  CT scan shows diffuse anasarca including gallbladder wall thickening likely secondary to the anasarca   1306 US Abdomen Limited (RUQ)  No gallstones noted in the ultrasound.  No leukocytosis to suggest infectious etiology.  I did consult surgery and Dr. Villatoro did evaluate the patient.  He does not think it is acute cholecystitis, notes her diffuse abdominal tenderness is inconsistent with gallbladder disease, lack of leukocytosis.  States he would be available for consult if needed if the patient were admitted   1307 Given the patient's oxygenation, Lasix was given, she is continues to have significant episodes and frequent of hypoxia.  Stabilized on nasal cannula.  No significant improvement with DuoNebs.  Case discussed with hospital medicine who accepts the patient for admission       Impression:  Final diagnoses:   Generalized edema due to fluid overload   Acute pulmonary edema (H)   Non compliance w medication regimen          Ilir Araiza MD  02/05/24 130

## 2024-02-05 NOTE — H&P
[facility]    History and Physical - Hospitalist Service       Date of Admission:  2/5/2024    Assessment & Plan      Heather Rosas is a 73 year old female admitted on 2/5/2024. She send for evaluation of increasing shortness of breath and abdominal bloating and edema.    1.  Acute on chronic congestive heart failure with preserved ejection fraction: Patient came in with what appears to be definitely volume overload does have lower extremity edema some trace also going up to her presacral area.  Unclear what her dry weight is.  She states she is weighing herself daily but cannot remember what it was.  Has not been taking diuretics really has not been taking her medications for her A-fib flutter either.  So she was little tacky when she came in.  This is likely just due to noncompliance with a lot of her medications.  Perhaps slight tachycardia from uncontrolled A-fib.  At this point we will start her on scheduled IV diuresis trying to take some fluid off of her.  She is hemodynamically stable appears that she likely will tolerate it her renal function is also normal.  Her last echo was performed in July of this last year EF of 55 to 60%.  It has been 6 months will repeat 1 tomorrow just to assess her LV function.  Will hold her ACE inhibitor while diuresing.    2.  Chronic atrial fibrillation/flutter with acute rapid ventricular response: We placed her back on her usual medications the metoprolol and oral Cardizem.  Rate is better controlled currently in the 80 range.  We discussed anticoagulation once again.  Previously a DOAC had been suggested however it is cost is prohibitive and she cannot afford it.  Therefore was put on warfarin however was noncompliant in follow-up for INRs.  She has basically stopped it.  I discussed with her restarting it however she declines that option at this point after I I described or informed her regarding the increased risk of stroke with A-fib.  Will keep her on  telemetry to monitor heart rate.    3.  Abdominal pain: Some discomfort.  A lot of this is probably just due to her underlying volume overload.  Her gallbladder was somewhat thickened wall however surgery looked at her and did not feel it was an acute cholecystitis.  If we had concerns at some point we could do a HIDA scan.  Does have some diarrhea unclear etiology no fevers elevated white count will follow this.      4.  Question sigmoid colon Lesion on CT: There is a questionable mass 2 cm noted on the CT scan in her sigmoid colon.  This should be worked up as an outpatient with a flex sig/colonoscopy.    5.  Hypokalemia: replaced orally we will continue to follow this will give her daily dose of potassium.    6.  Coronary artery disease: She was previously followed by cardiology did have a coronary bypass grafting performed.  There is been no obvious evidence of ischemia myocardial infarction at this point at least.    7.  Disposition.  The patient is from home living independently.  Will work on things anticipate she will be able to return home.  She does need to get a primary care provider 1 was set up last hospital stay however she failed that follow-up.  Will reiterate this once again to get this set up for her.        Diet:  2 g sodium diet  DVT Prophylaxis: Enoxaparin (Lovenox) SQ  Martin Catheter: Not present  Lines: None     Cardiac Monitoring: None  Code Status:  Full code         Disposition Plan      Expected Discharge Date: 02/07/2024                  Anastacio López MD  Hospitalist Service  [facility]  Securely message with Pen[employer] (more info)  Text page via [facility] Paging/Directory     ______________________________________________________________________    Chief Complaint   Increasing shortness of breath and weight gain with abdominal distention.    History is obtained from the patient, electronic health record, and emergency department physician    History of Present Illness   Heather CASTRO  Ralph is a 73 year old female who presented to emergency room with complaints of increasing shortness of breath fluid retention with stomach bloating and some diarrhea.  She does have a significant history of chronic atrial flutter/fibrillation.  Has not been very compliant with her medications including anticoagulation.  She had been on Coumadin however did not follow-up with the Coumadin clinic just he is coming in to have to get her blood checked.  Also has a history of heart failure with reduced ejection fraction once again has not been very compliant with her diuretics at all.  She does not have a primary care provider at all.  1 was set up for after her last hospital stay which was back in September 2023.  However she failed that appointment.  She is not the greatest historian about things.  Says she weighs herself daily but really has not gained any weight.  Denies any chest pain she has been short of breath no fevers shaking chills or sweats that she is aware of no bleeding troubles at all.  Has had some loose stools over the last couple of days.  No palpitations or syncope.  Really cannot tell if her heart rate is going fast or not.  Has noted more abdominal bloating and lower extremity edema.  She does live by herself.  She is  does have a son who lives up on [facility].    Upon arrival to the ER her initial blood pressure 142/108 pulse was 117 sats are 93% on room air she was afebrile 97.8.  She was noted to be in a fib flutter.  She was given 1 dose of IV diltiazem then put on oral diltiazem along with oral metoprolol.  Rate did come down better.  She was also given 40 mg of IV Lasix in the ER.  Her labs are significant for potassium 2.9 she had normal renal function creatinine 0.8 LFTs were normal glucose is 104 BNP was 3088 troponin was 31 TSH 3.24.  White count 7600 hemoglobin was 12.6 platelet count 195,000.  1.  She did complain of some abdominal discomfort.  CT scan was performed of her  abdomen and pelvis and ultrasound there was some gallbladder wall thickening and felt maybe this is secondary to some increased volume started anasarca which are CT scan did suggest.  The CT did show some diffuse wall edema consistent with some anasarca fluid overload did have a enhancing mass in the sigmoid colon about 2.2 cm.  Also was a polypoid filling defect in left posterior bladder measuring up to 7 mm recommended urology consultation at some point.  Surgery did see the patient did not feel there was any evidence of acute cholecystitis.  A lot of the edema is felt to be secondary to overall volume overload.  Her last echocardiogram was back in June 2023 she had an EF from 55 to 60% some moderate LVH moderate left atrial enlargement, she was in A-fib at that time.    Past Medical History    Past Medical History:   Diagnosis Date    Acute diastolic congestive heart failure (H) 10/12/2021    Acute exacerbation of CHF (congestive heart failure) (H) 10/11/2021    Acute on chronic congestive heart failure, unspecified heart failure type (H) 09/21/2023    Allergic rhinitis 01/01/2011    Anxiety 01/01/2011    Anxiety state, unspecified     Anxiety state    Atrial fibrillation with RVR (H) 07/07/2023    Atrial flutter with rapid ventricular response (H) 10/12/2021    CVA (cerebral vascular accident) (H) 05/14/2018    Dyslipidemia 11/26/2003    fibromyalgia 06/14/2004    GERD, Esophageal reflux 01/01/2011    History of non-ST elevation myocardial infarction (NSTEMI) 09/06/2018    HTN (hypertension)     Essential hypertension    Major depression, recurrent (H24) 01/01/2011    Major depressive disorder, recurrent episode, moderate (H) 01/01/2011    Metrorrhagia 02/18/2018    Non compliance with medical treatment 07/07/2023    Nonorganic sleep disorder, unspecified     Non-org. 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,  ARDS, renal failure       Past Surgical History   Past Surgical History:   Procedure Laterality Date    ANGIOGRAM  02/2005    Normal     BIOPSY BREAST  1984    LT, normal    BYPASS GRAFT ARTERY CORONARY  09/10/2018    CARPAL TUNNEL RELEASE RT/LT  2005    RT, carpal tunnel    COLONOSCOPY  2011    Repeat in ten years     COLONOSCOPY  1996    COLONOSCOPY  2004    DILATION AND CURETTAGE, HYSTEROSCOPY, ABLATE ENDOMETRIUM, COMBINED N/A 2/19/2018    Procedure: COMBINED DILATION AND CURETTAGE, HYSTEROSCOPY, ABLATE ENDOMETRIUM;  HYSTEROSCOPY DILATION AND CURETTAGE, ENDOMETRIAL ABLATION;  Surgeon: Jose Nettles MD;  Location: [facility] OR    HYSTERECTOMY TOTAL ABD, NICK SALPINGO-OOPHORECTOMY, NODE DISSECTION, TUMOR DEBULKING, COMBINED  10/30/2018    INSERT PORT VASCULAR ACCESS N/A 12/11/2018    Procedure: PORT-A-CATH PLACEMENT;  Surgeon: Rafi Trinidad MD;  Location: [facility] OR    placement of central line  2005    REMOVE PORT VASCULAR ACCESS N/A 10/6/2020    Procedure: port a cath removal;  Surgeon: Rafi Trinidad MD;  Location: [facility] OR       Prior to Admission Medications   Prior to Admission Medications   Prescriptions Last Dose Informant Patient Reported? Taking?   albuterol (PROAIR HFA/PROVENTIL HFA/VENTOLIN HFA) 108 (90 Base) MCG/ACT inhaler More than a month  No Yes   Sig: Inhale 2 puffs into the lungs every 6 hours as needed for shortness of breath, wheezing or cough   atorvastatin (LIPITOR) 40 MG tablet 2/4/2024  No Yes   Sig: Take 1 tablet (40 mg) by mouth At Bedtime   diltiazem ER (DILT-XR) 120 MG 24 hr capsule   No No   Sig: Take 1 capsule (120 mg) by mouth daily for 30 days   diltiazem ER COATED BEADS (CARDIZEM CD/CARTIA XT) 120 MG 24 hr capsule 2/4/2024  Yes Yes   Sig: Take 120 mg by mouth daily   lisinopril (ZESTRIL) 10 MG tablet 2/4/2024  No Yes   Sig: Take 1 tablet (10 mg) by mouth daily   metoprolol succinate ER (TOPROL XL) 100 MG 24 hr tablet   No No   Sig: Take 1 tablet (100 mg) by mouth daily for 30 days   nicotine  "(NICODERM CQ) 14 MG/24HR 24 hr patch   Yes No   Sig: Place 1 patch onto the skin every 24 hours   Patient not taking: Reported on 9/21/2023   nystatin (MYCOSTATIN) 976905 UNIT/GM external powder   Yes No   Sig: Apply 1 g topically 3 times daily Apply to affected area: groin Indications: Infection   pantoprazole (PROTONIX) 40 MG EC tablet 2/4/2024  No Yes   Sig: Take 1 tablet (40 mg) by mouth daily before breakfast   torsemide (DEMADEX) 20 MG tablet 2/4/2024  No Yes   Sig: Take 3 tablets (60 mg) by mouth daily   warfarin ANTICOAGULANT (COUMADIN) 5 MG tablet More than a month  Yes Yes   Sig: Take warfarin 5 mg by mouth on 9/22/23, 5 mg on 9/23/23, and 5 mg on 9/24/23. Follow up with anticoagulation clinic on 9/25/23 for INR and further dosing instructions.      Facility-Administered Medications: None        Review of Systems    The 10 point Review of Systems is negative other than noted in the HPI or here.      Social History   I have reviewed this patient's social history and updated it with pertinent information if needed.  Social History     Tobacco Use    Smoking status: Every Day     Packs/day: 1.00     Years: 30.00     Additional pack years: 0.00     Total pack years: 30.00     Types: Cigarettes    Smokeless tobacco: Never    Tobacco comments:     pt declined, stated she would use, \"the patch\". Patient has not had a cigarette in 1 week because she was in the hospital   Vaping Use    Vaping Use: Never used   Substance Use Topics    Alcohol use: No     Comment: rare    Drug use: No         Family History   I have reviewed this patient's family history and updated it with pertinent information if needed.  Family History   Problem Relation Age of Onset    C.A.D. Father 45        (cause of death)     Other - See Comments Father         rheumatic fever     Allergies Father     Cancer Sister 56        Esophageal to bone cancer    Gastrointestinal Disease Mother         GERD    Cancer Mother         pancreatic ca (cause " of death) /liver ca    Breast Cancer Maternal Aunt     Breast Cancer Maternal Aunt     Colon Cancer Paternal Aunt         (cause of death)     Crohn's Disease Other     Depression Maternal Uncle     Depression Maternal Aunt     Diabetes Paternal Grandmother         type 2    Neurologic Disorder Brother         neuropathy    Cancer Brother 58        Tonsilular cancer/ lymph node in neck    Allergies Brother     Breast Cancer Cousin     Breast Cancer Cousin     Breast Cancer Cousin          Allergies   Allergies   Allergen Reactions    Fexofenadine Hcl Nausea and Vomiting     Allegra     Rosuvastatin Other (See Comments)     Dizziness - Crestor     Cats Other (See Comments)     Sneezing, runny nose    Codeine Sulfate Nausea and Vomiting and GI Disturbance        Physical Exam   Vital Signs: Temp: 97.8  F (36.6  C) Temp src: Tympanic BP: 115/96 Pulse: 87   Resp: 18 SpO2: 93 % O2 Device: None (Room air) Oxygen Delivery: 2 LPM  Weight: 190 lbs 0 oz    Constitutional: awake, alert, cooperative, no apparent distress, and appears stated age  Eyes: Lids and lashes normal, pupils equal, round and reactive to light, extra ocular muscles intact, sclera clear, conjunctiva normal  ENT: Normocephalic, without obvious abnormality, atraumatic, sinuses nontender on palpation, external ears without lesions, oral pharynx with moist mucous membranes, tonsils without erythema or exudates, gums normal and good dentition.  Respiratory: No obvious increased work of breathing.  She does have some scattered crackles bilaterally.  No wheezing or consolidative areas.  Cardiovascular: She has a regular rhythm currently on auscultation.  No audible murmurs.  Neck veins appear to be right on 6 cm.  Pulses are 2+ and equal.  GI: Abdomen is somewhat distended.  Mildly tender bowel sounds are positive.  No obvious masses.  Skin: no bruising or bleeding and no rashes  Musculoskeletal: Both of her lower extremities she does have probably 2-3+ edema  below the knees.  Does have some palpable edema up onto her hips and abdominal wall.    Neurologic: Awake, alert, oriented to name, place and time.  Cranial nerves II-XII are grossly intact.  Motor is 5 out of 5 bilaterally.  Cerebellar finger to nose, heel to shin intact.  Sensory is intact.  Babinski down going, Romberg negative, and gait is normal.         90 MINUTES SPENT BY ME on the date of service doing chart review, history, exam, documentation & further activities per the note.      Data     I have personally reviewed the following data over the past 24 hrs:    7.6  \   12.6   / 195     136 96 (L) 14.8 /  104 (H)   2.9 (L) 30 (H) 0.80 \     ALT: 16 AST: 27 AP: 174 (H) TBILI: 1.6 (H)   ALB: 3.8 TOT PROTEIN: 6.9 LIPASE: 23     Trop: 30 (H) BNP: 3,088 (H)     TSH: 3.24 T4: N/A A1C: N/A     INR:  1.14 PTT:  N/A   D-dimer:  N/A Fibrinogen:  N/A       Imaging results reviewed over the past 24 hrs:   Recent Results (from the past 24 hour(s))   XR Chest Port 1 View    Narrative    Procedure:XR CHEST PORT 1 VIEW    Clinical history:Female, 73 years, chest pressure    Technique: Single view was obtained.    Comparison: 9/21/2023    Findings: The cardiac silhouette is enlarged and unchanged, heart  borders are somewhat indistinct. The pulmonary vasculature appears  mildly distended and indistinct.    The lungs demonstrate interstitial thickening and subtle scattered  opacities. There is blunting of the left costo phrenic angle. Bony  structures are unremarkable.      Impression    Impression:   Findings suggesting CHF with developing perihilar edema and small  left-sided pleural effusion.    STONE COOK MD         SYSTEM ID:  [id number]   CT Abdomen Pelvis w Contrast    Narrative    Exam: CT ABDOMEN PELVIS W CONTRAST    Exam reason: diarrhea, LLQ pain and RUQ paipn    Technique: Using helical CT technique, axial images of the abdomen and  pelvis were obtained with IV contrast.  Coronal and sagittal  reconstructions  also performed. This CT was performed using one or  more of the following dose reduction techniques: automated exposure  control, adjustment of the mA and/or kV according to patient size,  and/or use of iterative reconstruction technique.    Meds/Contrast: ISOVUE 370  91mL    Comparison: 5/9/2023, 10/19/2018     FINDINGS:    ABDOMEN:    Liver: No mass or any significant abnormality.  Gallbladder: No calcified gallstones. There is mild diffuse  gallbladder wall thickening.  Bile Ducts: No biliary ductal dilation.   Spleen:  No splenomegaly or focal lesion.  Pancreas: No mass, ductal dilatation, or inflammatory changes.  Kidneys: No solid mass, hydronephrosis, or any definite calculi. There  is focal cortical scarring in the lower pole of the right kidney,  possibly due to prior infection.  Adrenals:  No nodules.   Lymph Nodes: No adenopathy.   Vascular: No aortic aneurysm.   Abdominal Wall: There is diffuse body wall edema. Small fat-containing  umbilical hernia.     PELVIS:   There is a subtle 7 mm polypoid filling defect in the left posterior  bladder (series 3 image 169). There is a nonspecific low-density  density structure measuring up to 18 mm adjacent to the right common  femoral vein, not significantly changed since 5/9/2023 but new since  2018.     Bowel/Mesentery/Peritoneum:   -No bowel obstruction.   -Normal appendix.  -There is an apparent enhancing mass in the sigmoid colon measuring up  to 2.2 cm (series 3 image 153 and series 4 image 72). There is also a  more subtle focal thickening of the wall of the sigmoid colon (series  4 image 83).  -Trace free fluid in the abdomen, particularly adjacent to the liver.    Visualized portions of the Chest: There is a mildly prominent  retroaortic lymph node measuring 12 mm short axis, increased in size  since 5/9/2023. There is mosaic attenuation in the lower lungs and  mild intralobular septal thickening.  Musculoskeletal: No acute osseous abnormalities.        Impression    IMPRESSION:  Trace free fluid in the abdomen, diffuse wall edema, and intralobular  septal thickening in the lung bases, compatible with anasarca or fluid  overload.    There is mild diffuse gallbladder wall thickening which is likely due  to the trace free fluid.    There is an apparent enhancing mass in the sigmoid colon measuring up  to 2.2 cm as well as more subtle focal thickening of the wall of the  sigmoid colon. Surgery consultation for possible colonoscopy is  recommended.    There is a polypoid filling defect in the left posterior bladder  measuring up to 7 mm. Urology consultation for possible cystoscopy is  recommended.    Nonspecific retroaortic lymph node in the lower chest measuring 12 mm  short axis. A CT of the chest could be considered for further  evaluation.    MORENA GARDNER MD         SYSTEM ID:  [id number]   US Abdomen Limited (RUQ)    Narrative    Exam: US ABDOMEN LIMITED    Exam reason: RUQ pain    Technique: Grayscale ultrasound images of the abdomen were obtained.    Comparison: 2/5/2024    FINDINGS:    Pancreas: Visualized portions appear normal.    Liver: Normal size and echogenicity. No focal solid masses.    Gallbladder:  No gallstones. There is diffuse gallbladder wall  thickening.  There is diffuse tenderness in the right upper quadrant  during the exam.    Biliary Ducts: Common bile duct (CBD) is 4 mm.  No intrahepatic or  extrahepatic ductal dilatation.    Right kidney: 10.8 cm long. The right kidney is normal in size and  echogenicity. No solid masses, calculi or hydronephrosis.                 Impression    IMPRESSION:     Diffuse gallbladder wall thickening. No gallstones. No significant  bile duct dilation.    MORENA GARDNER MD         SYSTEM ID:  [id number]

## 2024-02-05 NOTE — ED NOTES
"Patient mentioned that she has been having chest pressure over the last week with difficulty breathing.   Denies chest pain, \"it doesn't hurt, its just a pressure or heaviness\".  MD notified, see new orders.  "

## 2024-02-05 NOTE — ED NOTES
"Patient presents to the ED via [facility] w/ c/o diarrhea x2 days.   A&Ox4. No respiratory distress.   Skin is warm, pink, dry.    \"My stomach feels bloated\".  RUQ and LLQ tenderness with palpation.   Diarrhea x5/day, x2 @NOC.    Last oral intake was around an hour ago.  Last BM was about 2 hours ago.  "

## 2024-02-05 NOTE — LETTER
February 16, 2024      Heather Mcelroygarfield  [address] [facility] [address]        Dear ,    We are writing to inform you of your test results.    Per PCP. Normal HIDA scan or normal working gall bladder. Please call [phone] if you have any further questions.     Resulted Orders   NM HepatOBiliary Scan    Narrative    Examination: NM HEPATOBILIARY SCAN      Indication: rule out cholecystitis, gallbladder wall thickening     Technique:    The patient received 5.4 mCi of Tc-99m mebrofenin intravenously.  Images were obtained out through 75 minutes.     Findings:    There is prompt clearance of the radionuclide from the blood pool into  the liver. By 30 minutes there is clear visualization of the  intrahepatic ducts as well as the upper common bile duct. By 45  minutes there is visualization of the gallbladder. Given the slightly  atypical configuration of the gallbladder, it is somewhat superimposed  with the common bile duct on the AP views but is visualized anteriorly  on the lateral view. At 60 minutes there is emptying from the common  bile duct into the small bowel.      Impression    Impression:    Patent cystic duct.    MORENA GARDNER MD         SYSTEM ID:  [id number]       If you have any questions or concerns, please call the clinic at the number listed above.       Sincerely,      Octavio Phillips,

## 2024-02-05 NOTE — ED NOTES
"Medications reviewed with patient.   She reports she has not taken her Coumadin or potassium in over a year.   She stated, \"I just eat more potatoes to get my potassium and they wanted me to come in all the time for something after taking Coumadin\".    She took all other medications yesterday, but reports she needs refills on most of them.  "

## 2024-02-05 NOTE — PLAN OF CARE
[facility] Inpatient Admission Note:    Patient admitted to 3232/3232-1 at approximately 1415 via bed accompanied by transport tech from emergency room . Report received from Baljinder CARRILLO in SBAR format at 1400 via telephone. Patient ambulated to bed via self.. Patient is alert and oriented X 3, denies pain; rates at 0 on 0-10 scale.  Patient oriented to room, unit, hourly rounding, and plan of care. Explained admission packet and patient handbook with patient bill of rights brochure. Will continue to monitor and document as needed.     Inpatient Nursing criteria listed below was met:    Health care directives status obtained and documented: Yes    Patient identifies a surrogate decision maker: No     Clergy visit ordered if patient requests: N/A    Skin issues/needs documented: N/A    Fall Prevention Yes: Care plan updated, education given and documented, sticker and magnet in place: Yes    Care Plan initiated: Yes    Education Documented (including assessment): Yes    Patient has discharge needs : No

## 2024-02-06 NOTE — PLAN OF CARE
Reason for admission: Fluid overload  Pt is Aox4 .  Ax1 with gait belt. Makes needs known, Call light within reach, wheels locked, ID band on. Reports 3/10 for pain this shift , but denied wanting medical intervention. IV SL   Inhaler given this shift for SOB, effective per pt  Currently on Telemetry   Free from falls and injury this shift   Reminder to use call light when needing to go to the bathroom   Alarms active and audible  Pt called Son today to notify of her visit here  Pt has a personal bag as her belongings in the room    /77 (BP Location: Left arm, Patient Position: Semi-Gaspar's, Cuff Size: Adult Regular)   Pulse 88   Temp 97.6  F (36.4  C) (Tympanic)   Resp 20   Wt 86.2 kg (190 lb)   SpO2 97%   BMI 38.38 kg/m    Face to face report given with opportunity to observe patient.    Report given to Dennis Murphy RN on 2/5/2024 at 6:54 PM

## 2024-02-06 NOTE — UTILIZATION REVIEW
"  Admission Status; Secondary Review Determination         Under the authority of the Utilization Management Committee, the utilization review process indicated a secondary review on the above patient.  The review outcome is based on review of the medical records, discussions with staff, and applying clinical experience noted on the date of the review.        (xxx)      Inpatient Status Appropriate - This patient's medical care is consistent with medical management for inpatient care and reasonable inpatient medical practice.      () Observation Status Appropriate - This patient does not meet hospital inpatient criteria and is placed in observation status. If this patient's primary payer is Medicare and was admitted as an inpatient, Condition Code 44 should be used and patient status changed to \"observation\".   () Admission Status NOT Appropriate - This patient's medical care is not consistent with medical management for Inpatient or Observation Status.          RATIONALE FOR DETERMINATION     Heather Rosas is a 73 year old female with a history of a fib/flutter, CAD (s/p CABG x4), HTN, GERD, schizophrenia, adenocarcinoma of the uterus, and anxiety who was admitted on 2/5/2024 with increased SOB, abdominal bloating, diarrhea, and edema.  BNP is elevated at 3088.  CXR showed perihilar edema and small left sided pleural effusion.  CT abd/pelvis show incidental 2cm mass in sigmoid and filling defect in the bladder.  She is admitted for close clinical monitoring, IV diuretics, and further evaluation.  Blood pressure values have been borderline low requiring adjustment in diuretics.  It is reasonable to anticipate a hospital stay of at least 2 midnights.  IP status is appropriate.      The severity of illness, intensity of service provided, expected LOS and risk for adverse outcome make the care complex, high risk and appropriate for hospital admission.        The information on this document is developed by the " utilization review team in order for the business office to ensure compliance.  This only denotes the appropriateness of proper admission status and does not reflect the quality of care rendered.         The definitions of Inpatient Status and Observation Status used in making the determination above are those provided in the CMS Coverage Manual, Chapter 1 and Chapter 6, section 70.4.      Sincerely,     Lia Anaya MD  Physician Advisor   Utilization Review/ Case Management  [facility].

## 2024-02-06 NOTE — PROVIDER NOTIFICATION
MD notified of pt BP being 106/88 and second check right before I gave medication 103/66. MD face to face stated to hold oral diltiazem 120 mg capsule until noon and recheck BP. BP recheck was 133/95> provider notified and in secure chat stated to give medication.        Cindy Murphy RN on 2/6/2024 at 2:15 PM

## 2024-02-06 NOTE — PLAN OF CARE
Reason for hospital stay:  Gen Edema and Fluid Overload  Living situation PTA: Home Alone  Most recent vitals: /83 (BP Location: Right arm, Patient Position: Sitting, Cuff Size: Adult Regular)   Pulse 74   Temp 98.3  F (36.8  C) (Tympanic)   Resp 20   Wt 95.8 kg (211 lb 3.2 oz)   SpO2 94%   BMI 42.66 kg/m        Pain Management:  Denies pain this shift  LOC:  A&O x 4  Cardiac:  HRR irregular. Pt has Hx of Afib/Aflutter.   Respiratory:  Lungs have Expiratory wheezes in upper lobes bilaterally. Diminished in Lower lobes equal bilat. 99% Dyspneic w/ exertion. 90-99% on Room air. Albuterol inhaler PRN q 4 hrs.   GI:  Normoactive BS x 4, Pt stated she has has diarrhea for several days.  :  Voids spontaneously w/o difficulty-pt on lasix, experiences frequency after administration.  Skin Issues:  Scattered bruising and scabbing no interventions needed.     IVF:  Sl.  ABX:  N/A    Nutrition: 2 Gram Na   Activity: Assist x 1 w/ gait belt  Safety:  Bed in lowest position, wheels locked and alarms in place. Call light and personal items within reach. Room near nurses station w/ door open and frequent rounding.  Face to face report given with opportunity to observe patient.    Report given to LORENA Stern RN   2/6/2024  7:24 AM

## 2024-02-06 NOTE — PROGRESS NOTES
[facility]    Medicine Progress Note - Hospitalist Service    Date of Admission:  2/5/2024    Assessment & Plan   Patient is a Heather Rosas is a 73 year old female admitted on 2/5/2024. She send for evaluation of increasing shortness of breath and abdominal bloating and edema. She was given IV lasix for suspected CHF and exacerbation. She  has been making urine. She did have imaging in ER showing colon mass likely and need colonoscopy/. She also had finding of olypoid filling defect in the left posterior bladder  measuring up to 7 mm. Needs urology out patent for cystoscopy as malignancy not ruled out., she has been non compliant with meds and has not taken coumadin for Afib., Cannot afford DOACs, and refused coumadin. She is on diuretics but we are monitoring closely as she is softer with BP;s.      1.  Acute on chronic congestive heart failure with preserved ejection fraction:  Volume overload  Anasarca  Echo  Tele  Daily weights  I's and o's  On lasix     2.  Chronic atrial fibrillation/flutter with acute rapid ventricular response:   On Cardizem and metoprolol  Has declined anticoagulation- aware for stroke risk     3.  Abdominal pain:   Distention  Had CT on admission showing  Trace free fluid in the abdomen, diffuse wall edema, and intralobular  septal thickening in the lung bases, compatible with anasarca or fluid  overload.There is mild diffuse gallbladder wall thickening which is likely due  to the trace free fluid.There is an apparent enhancing mass in the sigmoid colon measuring up  to 2.2 cm as well as more subtle focal thickening of the wall of the  sigmoid colon. Surgery consultation for possible colonoscopy is  recommended. There is a polypoid filling defect in the left posterior bladder  measuring up to 7 mm. Urology consultation for possible cystoscopy is  recommended.Nonspecific retroaortic lymph node in the lower chest measuring 12 mm  short axis. A CT of the chest could be  considered for further  evaluation.    US was done of RUQ showing  Diffuse gallbladder wall thickening. No gallstones. No significant  bile duct dilation.    Diarrhea  Reported on admission  Monitor none in hospital     4.    sigmoid colon Lesion on CT:   Needs colonoscopy out patient  Malignancy not ruled out      5.  Hypokalemia:   Replace per protocol      6.  Coronary artery disease:   S/P ABG X 4    -echo    Dispo pending clinical improvement           Diet: Combination Diet Regular Diet Adult; 2 gm NA Diet    DVT Prophylaxis: Enoxaparin (Lovenox) SQ  Martin Catheter: Not present  Lines: None     Cardiac Monitoring: ACTIVE order. Indication: Acute decompensated heart failure (48 hours)  Code Status: Full Code      Clinically Significant Risk Factors Present on Admission        # Hypokalemia: Lowest K = 2.9 mmol/L in last 2 days, will replace as needed        # Drug Induced Coagulation Defect: home medication list includes an anticoagulant medication    # Hypertension: Noted on problem list  # Acute heart failure with preserved ejection fraction: heart failure noted on problem list, last echo with EF >50%, and receiving IV diuretics  # Acute Respiratory Failure: Documented O2 saturation < 91%.  Continue supplemental oxygen as needed          # History of CABG: noted on surgical history       Disposition Plan    pending clinical improvement          Octavio Phillips,   Hospitalist Service  [facility]  Securely message with [employer] (more info)  Text page via [facility] Paging/Directory   ______________________________________________________________________    Interval History   Patient was seen this morning for medical rounds. No chest pain , some shortness of breath. She was feeling little better. Mentioned she had diarrhea prior to coming to hospital. ECHO is pending     Physical Exam   Vital Signs: Temp: 98.3  F (36.8  C) Temp src: Tympanic BP: 103/66 Pulse: 68   Resp: 20 SpO2: 94 % O2 Device: None  (Room air) Oxygen Delivery: 1 LPM  Weight: 211 lbs 3.21 oz    Physical Exam  Constitutional:       Appearance: She is obese.      Comments: Frail appearing stated age female    HENT:      Right Ear: External ear normal.      Left Ear: External ear normal.      Nose: Nose normal.      Mouth/Throat:      Pharynx: Oropharynx is clear.   Cardiovascular:      Rate and Rhythm: Normal rate.   Pulmonary:      Effort: Pulmonary effort is normal.   Abdominal:      General: Abdomen is flat.   Musculoskeletal:         General: Swelling present.   Skin:     Coloration: Skin is not jaundiced.   Neurological:      Mental Status: She is alert. Mental status is at baseline.         Medical Decision Making       50 MINUTES SPENT BY ME on the date of service doing chart review, history, exam, documentation & further activities per the note.      Data     I have personally reviewed the following data over the past 24 hrs:    8.8  \   13.0   / 195     134 (L) 96 (L) 17.1 /  87   4.3 24 0.88 \     ALT: 18 AST: 43 AP: 181 (H) TBILI: 2.1 (H)   ALB: 3.5 TOT PROTEIN: 6.5 LIPASE: N/A     Trop: 30 (H) BNP: N/A       Imaging results reviewed over the past 24 hrs:   Recent Results (from the past 24 hour(s))   US Abdomen Limited (RUQ)    Narrative    Exam: US ABDOMEN LIMITED    Exam reason: RUQ pain    Technique: Grayscale ultrasound images of the abdomen were obtained.    Comparison: 2/5/2024    FINDINGS:    Pancreas: Visualized portions appear normal.    Liver: Normal size and echogenicity. No focal solid masses.    Gallbladder:  No gallstones. There is diffuse gallbladder wall  thickening.  There is diffuse tenderness in the right upper quadrant  during the exam.    Biliary Ducts: Common bile duct (CBD) is 4 mm.  No intrahepatic or  extrahepatic ductal dilatation.    Right kidney: 10.8 cm long. The right kidney is normal in size and  echogenicity. No solid masses, calculi or hydronephrosis.                 Impression    IMPRESSION:     Diffuse  gallbladder wall thickening. No gallstones. No significant  bile duct dilation.    MORENA GARDNER MD         SYSTEM ID:  [id number]

## 2024-02-06 NOTE — PLAN OF CARE
Pt. Is alert and oriented x 4. Ax1 with gait belt. Makes needs known, call light within reach, wheels locked, ID band on. Denies pain this shift. IV SL. Free from falls and injury this shift. Reminded to use call light when needing to go to bathroom. Alarms active and audible. Vital signs are as charted. Held Cardizem due to blood pressure per provider this AM. Blood pressure check at noon, vitals are as charted.   Face to face report given with opportunity to observe patient.    Report given to LORENA Vega, Nursing student on 2/6/2024 at 1:15 PM

## 2024-02-06 NOTE — MEDICATION SCRIBE - ADMISSION MEDICATION HISTORY
Medication Scribe Admission Medication History    Admission medication history is complete. The information provided in this note is only as accurate as the sources available at the time of the update.    Information Source(s): Patient, Patient's pharmacy, and [facility] in-person    Pertinent Information:   Patient manages er own medications and is a good historian. Fill dates do not reflect compliancy although patient reports compliancy. Last fill dates all listed in September 2023, pt should be out of all medications.   Torsemide- pt reports she only takes one tablet daily.     Changes made to PTA medication list:  Added: None  Deleted: coumadin- pt stopped taking over 6 months ago- reports she does not want to be on because she does not want to come in for INR checks.   Changed: None    Allergies reviewed with patient and updates made in EHR: no    Medication History Completed By: Beth Nails 2/6/2024 1:43 PM    Prior to Admission medications    Medication Sig Last Dose Taking? Auth Provider Long Term End Date   albuterol (PROAIR HFA/PROVENTIL HFA/VENTOLIN HFA) 108 (90 Base) MCG/ACT inhaler Inhale 2 puffs into the lungs every 6 hours as needed for shortness of breath, wheezing or cough More than a month Yes Nicolette Huffman MD Yes    atorvastatin (LIPITOR) 40 MG tablet Take 1 tablet (40 mg) by mouth At Bedtime Past Week Yes Claudia Chino, NP Yes    diltiazem ER (DILT-XR) 120 MG 24 hr capsule Take 1 capsule (120 mg) by mouth daily for 30 days Past Week Yes Claudia Chino NP Yes 2/6/24   lisinopril (ZESTRIL) 10 MG tablet Take 1 tablet (10 mg) by mouth daily Past Week Yes Claudia Chino NP Yes    metoprolol succinate ER (TOPROL XL) 100 MG 24 hr tablet Take 1 tablet (100 mg) by mouth daily for 30 days Past Week Yes Claudia Chino NP Yes 2/6/24   pantoprazole (PROTONIX) 40 MG EC tablet Take 1 tablet (40 mg) by mouth daily before breakfast Past Week Yes Claudia Chino  R, NP     torsemide (DEMADEX) 20 MG tablet Take 3 tablets (60 mg) by mouth daily  Patient taking differently: Take 20 mg by mouth daily Past Week Yes Claudia Chino R, NP Yes    nystatin (MYCOSTATIN) 357340 UNIT/GM external powder Apply 1 g topically 3 times daily as needed (groin)   Reported, Patient       d

## 2024-02-06 NOTE — PROGRESS NOTES
Assessment completed by visit with Heather.    LOC: alert, oriented, pleasant     Dx: edema, CHF exacerbation  Chronic Disease Management: CHF, HTN, AFib, CVA history     Lives with: alone  Living at:  home  Transportation: YES     Primary PCP: Nicolette Huffman  Insurance:  Medicare      Support System:  family   Homecare/PCA: not connected  /County Services:   not connected   : NO      How was the [facility] notification completed: n/a    Health Care Directive: no  Guardian: no  POA: no    Pharmacy: [facility]   Meds management: independently manages      Adequate Resources for needs (housing, utilities, food/med): YES  Household chores: independently manages  Work/community/social activity: YES as desired     ADLs: independently manages  Ambulation:independent   Falls: denies   Nutrition: no concerns   Sleep: no concerns     Equipment used: none      Oxygen supplier: n/a       Does the supplier have valid oxygen orders: n/a    Mental health: denies   Substance abuse: denies   Exposure to violence/abuse: no concerns voiced/identified  Stressors: no concerns voiced/identified      Able to Return to Prior Living Arrangements: YES    Choice of Vendor: n/a    Barriers: no barriers identified      TOBI: medium    Plan: return home via family

## 2024-02-07 NOTE — PROGRESS NOTES
[facility]    Medicine Progress Note - Hospitalist Service    Date of Admission:  2/5/2024    Assessment & Plan   Patient is a Heather Rosas is a 73 year old female admitted on 2/5/2024. She send for evaluation of increasing shortness of breath and abdominal bloating and edema. She was given IV lasix for suspected CHF and exacerbation. She  has been making urine. She did have imaging in ER showing colon mass likely and need colonoscopy/. She also had finding of olypoid filling defect in the left posterior bladder  measuring up to 7 mm. Needs urology out patent for cystoscopy as malignancy not ruled out., she has been non compliant with meds and has not taken coumadin for Afib., Cannot afford DOACs, and refused coumadin. She was  on diuretics but we are monitoring closely as she is softer with BP;s. ON 2/7 her creatine worsened,d she had hyperkalemia and urine was dark ez. I did order 250 ml of NS to give over 10 hours to re-eval her creatine in morning. She also did have ECHO done showing increased  Severe left atrial enlargement is present. Moderate right atrial enlargement is present.Severe tricuspid insufficiency is present.  The ejection fraction is 50-55%  Moderate concentric wall thickening consistent with left ventricular hypertrophy. Lasix has been held, her home potassium is being held. She also has some hepatic congestion with mild elevation of  LFT's. Previously in 7/31/2023 ECHO she has mild tricuspid insufficiency,      1.  Acute on chronic congestive heart failure with preserved ejection fraction:  Volume overload  Anasarca  Cardio renal syndrome?  Hepatic congestion likely   Echo done- worsening status  Tele  Daily weights  I's and o's  Was On lasix being held as MILDRED     MILDRED  Status post diureses  Will give back 250cc of NS as urine is dark ez and has hyperkalemia    Hyperkalemia  NS  Recheck potassium   -hold home potassium       Chronic atrial fibrillation/flutter with acute  rapid ventricular response:   On Cardizem and metoprolol  Has declined anticoagulation prior- aware for stroke risk   I discuss with her again regarding care and plan. ON 2/7 she did agree to start coumadin.   Pharmacy to manage coumadin dosing.    3.  Abdominal pain:  resolved   Distention  Had CT on admission showing  Trace free fluid in the abdomen, diffuse wall edema, and intralobular  septal thickening in the lung bases, compatible with anasarca or fluid  overload.There is mild diffuse gallbladder wall thickening which is likely due  to the trace free fluid.There is an apparent enhancing mass in the sigmoid colon measuring up  to 2.2 cm as well as more subtle focal thickening of the wall of the  sigmoid colon. Surgery consultation for possible colonoscopy is  recommended. There is a polypoid filling defect in the left posterior bladder  measuring up to 7 mm. Urology consultation for possible cystoscopy is  recommended.Nonspecific retroaortic lymph node in the lower chest measuring 12 mm  short axis. A CT of the chest could be considered for further  evaluation.    US was done of RUQ showing  Diffuse gallbladder wall thickening. No gallstones. No significant  bile duct dilation.    Diarrhea resolved   Reported on admission  Monitor none in hospital      sigmoid colon Lesion on CT:   Needs colonoscopy out patient  Malignancy not ruled out        Hypokalemia:   Replace per protocol        Coronary artery disease:   S/P ABG X 4    -echo done showing severe tricuspid insufficiency.,     Full Code    Dispo pending clinical improvement           Diet: Combination Diet Regular Diet Adult; 2 gm NA Diet    DVT Prophylaxis: Enoxaparin (Lovenox) SQ  Martin Catheter: Not present  Lines: None     Cardiac Monitoring: ACTIVE order. Indication: Acute decompensated heart failure (48 hours)  Code Status: Full Code      Clinically Significant Risk Factors        # Hypokalemia: Lowest K = 3.3 mmol/L in last 2 days, will replace as  needed  # Hyperkalemia: Highest K = 5.5 mmol/L in last 2 days, will monitor as appropriate        # Coagulation Defect: INR = 1.46 (Ref range: 0.85 - 1.15) and/or PTT = N/A, will monitor for bleeding   # Acute Kidney Injury, unspecified: based on a >150% or 0.3 mg/dL increase in last creatinine compared to past 90 day average, will monitor renal function  # Hypertension: Noted on problem list  # Acute heart failure with preserved ejection fraction: heart failure noted on problem list, last echo with EF >50%, and receiving IV diuretics            # History of CABG: noted on surgical history       Disposition Plan    pending clinical improvement          Octavio Phillips,   Hospitalist Service  [facility]  Securely message with [employer] (more info)  Text page via [facility] Paging/Directory   ______________________________________________________________________    Interval History   Patient was seen this morning for medical rounds.  Patient denies any chest pain or shortness of breath but she did state she is feeling a little better, she does have lower extremity edema.  She did have a UA done today as her creatinine worsened and was very concentrated.  She was taken off her Lasix and IV fluids ordered just slow hydration.  Her potassium also was being held due to having hyperkalemia.  Patient did have an echo done yesterday showing severe tricuspid insufficiency, her LFTs are mildly elevated also.  Patient did agree to start Coumadin that she previously was on but stopped taking the medication as she did not want to drive to [place] for INR checks, she did agree now she will get them when required.  She did not mention any diarrhea.    Physical Exam   Vital Signs: Temp: 97  F (36.1  C) Temp src: Tympanic BP: 131/80 Pulse: 65   Resp: 18 SpO2: 97 % O2 Device: None (Room air)    Weight: 202 lbs 9.64 oz    Physical Exam  Constitutional:       Appearance: She is obese.      Comments: Frail appearing stated age  female    HENT:      Right Ear: External ear normal.      Left Ear: External ear normal.      Nose: Nose normal.      Mouth/Throat:      Pharynx: Oropharynx is clear.   Cardiovascular:      Rate and Rhythm: Normal rate.   Pulmonary:      Effort: Pulmonary effort is normal.   Abdominal:      General: Abdomen is flat.   Musculoskeletal:         General: Swelling present.   Skin:     General: Skin is warm.   Neurological:      Mental Status: She is alert. Mental status is at baseline.         Medical Decision Making       50 MINUTES SPENT BY ME on the date of service doing chart review, history, exam, documentation & further activities per the note.      Data     I have personally reviewed the following data over the past 24 hrs:    9.1  \   12.7   / 257     133 (L) 96 (L) 27.8 (H) /  87   5.5 (H) 24 1.42 (H) \     ALT: 68 (H) AST: 117 (H) AP: 194 (H) TBILI: 1.9 (H)   ALB: 3.7 TOT PROTEIN: 6.4 LIPASE: N/A     INR:  1.46 (H) PTT:  N/A   D-dimer:  N/A Fibrinogen:  N/A       Imaging results reviewed over the past 24 hrs:   No results found for this or any previous visit (from the past 24 hour(s)).

## 2024-02-07 NOTE — PROGRESS NOTES
CLINICAL NUTRITION SERVICES  -  ASSESSMENT NOTE    REASON FOR ASSESSMENT:  Admission Nutrition Risk Screen - unsure of weight loss      NUTRITION HISTORY  Heather Rosas is a 73 year old female admitted for generalized edema, CHF. Medical hx includes CHF, HTN, dyslipidemia, GERD, afib, CVA hx, depression, schizophrenia. Weight gain in the last few months. Fluid retention likely contributing to weight gain/variable weight hx. Weight trending down this admission with lasix. Noted diarrhea for a week prior to admission, appears to have resolved. Fair appetite.    CURRENT NUTRITION ORDERS  Diet Order:   Orders Placed This Encounter      Combination Diet Regular Diet Adult; 2 gm NA Diet  Current Intake/Tolerance: 75%, 50%    ANTHROPOMETRICS  Height: Data Unavailable  Weight: 202 lbs 9.6 oz  Body mass index is 42.66 kg/m .  Weight Status:  Obesity Grade III BMI >40  Weight History:   Wt Readings from Last 10 Encounters:   02/06/24 95.8 kg (211 lb 3.2 oz)- admit   09/22/23 84.7 kg (186 lb 11.7 oz)   08/03/23 90.4 kg (199 lb 6.4 oz)   07/29/23 102.9 kg (226 lb 13.7 oz)   07/24/23 98.1 kg (216 lb 6.1 oz)   07/10/23 93.6 kg (206 lb 5.6 oz)   05/09/23 90 kg (198 lb 6.4 oz)   10/27/22 87.5 kg (193 lb)   05/23/22 100 kg (220 lb 7.4 oz)   04/18/22 92.6 kg (204 lb 2.3 oz)        Malnutrition Diagnosis: Patient does not meet two of the criteria necessary for diagnosing malnutrition    NUTRITION INTERVENTIONS  Recommendations / Nutrition Prescription  Encourage intake during mealtimes    MONITORING AND EVALUATION:  Intake, weight, labs

## 2024-02-07 NOTE — PLAN OF CARE
Goal Outcome Evaluation:    Pt alert and oriented x4. Up in chair. Alarm on. Ambulated 100 feet. Standby assist w/ gait belt. NS 0.9% running 25 ml/hr. Left AC fossa. No redness/pain noted. Appetite fair. Continent urine/bowel. Small BM. Up to bathroom with stand by assist. Able to make needs known. Has been using call light appropriately through out shift.       Face to face report given with opportunity to observe patient.    Report given to Gely Simms   2/7/2024  2:05 PM

## 2024-02-07 NOTE — PLAN OF CARE
Pt A&O, VS as charted. HR irregular. Continues w/ telemetry, per ICU Atrial flutter. Patient calling appropriately this shift to express needs. Chair/bed alarms active. PO potassium held as order parameters not met. Up w/ Ax1 w/ GB.     Face to face report given with opportunity to observe patient.    Report given to Janeen Aquino RN   2/7/2024  3:12 PM

## 2024-02-07 NOTE — PLAN OF CARE
Reason for admission: Fluid overload  Pt is Aox4.  Ax1 with gait belt. Multiple reminders to use call light when needing to go to Bathroom, frequent checks, Call light within reach, wheels locked, ID band on. Denies pain throughout shift.   IV SL   Free from falls and injury this shift   Alarms active and audible  Per IC nurse on tele Aflutter 70-80's  Pt room was changed from 3232 to 3110 across from the nurses station for for pt safety today.   /80 (BP Location: Right arm, Patient Position: Sitting, Cuff Size: Adult Regular)   Pulse 73   Temp 97.9  F (36.6  C) (Tympanic)   Resp 20   Wt 95.8 kg (211 lb 3.2 oz)   SpO2 96%   BMI 42.66 kg/m    Face to face report given with opportunity to observe patient.    Report given to Dennis Murphy, RN on 2/6/2024 at 7:44 PM

## 2024-02-07 NOTE — PHARMACY-ANTICOAGULATION SERVICE
Pharmacy Consult-Warfarin Assessment Day #1    Heather Rosas is a 73 year old female admitted on 2/5/2024 with Generalized edema due to fluid overload    Primary Indication(s) for Anticoagulation: A-fib    Goal INR: 2-3    FYI, patient is followed by the [facility] at: [facility]; patient would like [facility] upon discharge    Patient previously anticoagulated on Coumadin in 2023 on a dose of 2.5 mg every Mon, Wed, Fri; 5 mg all other days     Home tablet strength(s): [facility]    Factors that may increase patient's bleeding risk and/or sensitivity to warfarin (Coumadin) include: Advanced Age, elevated LFTs, Miconazole, Elevated baseline INR, Lovenox, Diarrhea    Factors that may decrease patient's bleeding risk and/or sensitivity to warfarin (Coumadin) include: -    Anticoagulation Dose History  More data exists               2/5/2024 2/7/2024      warfarin ANTICOAGULANT (COUMADIN) 2.5 MG tablet       - -   INR       1.14  1.46              CBC RESULTS:   Recent Labs   Lab Test 02/07/24  0510   HGB 12.7          Assessment/Plan: Per Inpatient Warfarin Dosing Procedure, patient is considered High Sensitivity. Will give warfarin 3 mg today. Check INR with AM labs. Stop Lovenox when INR >2      Thank You for the Consult. Will continue to follow.    Dale Lovell [facility] ....................  2/7/2024   11:38 AM

## 2024-02-07 NOTE — PLAN OF CARE
Took over care of this pt at 1500. Assess as charted. Up in chair for dinner. Tele and IV intact. Did report some abd discomfort. Was able to belch and pass some gas, and then discomfort was better.     Face to face report given with opportunity to observe patient.    Report given to Simona Khan RN   2/7/2024  7:18 PM

## 2024-02-07 NOTE — PLAN OF CARE
Reason for hospital stay:  Gen Edema and Fluid Overload  Living situation PTA: Home Alone  Most recent vitals: /81 (BP Location: Left arm, Patient Position: Sitting, Cuff Size: Adult Regular)   Pulse 67   Temp 97.4  F (36.3  C) (Tympanic)   Resp 18   Wt 95.8 kg (211 lb 3.2 oz)   SpO2 95%   BMI 42.66 kg/m        Pain Management:  Denies pain this shift  LOC:  A&O x 4  Cardiac:  HRR irregular. Pt has Hx of Afib/Aflutter.   Respiratory:  Lungs have Expiratory wheezes in upper lobes bilaterally. Diminished in Lower lobes equal bilat. 99% Dyspneic w/ exertion. 90-95% on Room air. Albuterol inhaler PRN q 4 hrs.   GI:  Normoactive BS x 4  :  Voids spontaneously w/o difficulty  Skin Issues:  Scattered bruising and scabbing no interventions needed.     IVF:  Sl.  ABX:  N/A    Nutrition: 2 Gram Na   Activity: Assist x 1 w/ gait belt  Safety:  Bed in lowest position, wheels locked and alarms in place. Call light and personal items within reach. Room near nurses station w/ door open and frequent rounding. Patient has tried to get out of bed and chair on her own and has taken her chair alarm off several times. She was educated on this several times and currently has 2 chair alarms on to deter her from trying this again. She is unable to remain in the bed due to discomfort so was placed in the chair for this reason. Multiple attempts have been made to keep pt safe and free from falls.   Face to face report given with opportunity to observe patient.    Report given to LORENA Mcgovern RN   2/7/2024  7:13 AM

## 2024-02-08 NOTE — PLAN OF CARE
Pt alert to self, and time. Pt denied pain this shift. Pt afebrile, and remains on room air this shift.   Pt has minimal output this shift, output is dark ez.   Peripheral IV pulled out this shift, and new peripheral IV inserted in L arm.   Pt stand by assist with gait belt this shift. Alarms activated and audible. Pt needs reminders to call before getting up on own.     Face to face report given with opportunity to observe patient.    Report given to LORENA Triana.

## 2024-02-08 NOTE — PROGRESS NOTES
[facility]    Medicine Progress Note - Hospitalist Service    Date of Admission:  2/5/2024    Assessment & Plan   Patient is a Heather Rosas is a 73 year old female admitted on 2/5/2024. She send for evaluation of increasing shortness of breath and abdominal bloating and edema. She was given IV lasix for suspected CHF and exacerbation. She  has been making urine. She did have imaging in ER showing colon mass likely and need colonoscopy/. She also had finding of olypoid filling defect in the left posterior bladder  measuring up to 7 mm. Needs urology out patent for cystoscopy as malignancy not ruled out., she has been non compliant with meds and has not taken coumadin for Afib., Cannot afford DOACs, and refused coumadin. She was  on diuretics but we are monitoring closely as she is softer with BP;s. ON 2/7 her creatine worsened,d she had hyperkalemia and urine was dark ez. I did order 250 ml of NS to give over 10 hours to re-eval her creatine in morning. She also did have ECHO done showing increased  Severe left atrial enlargement is present. Moderate right atrial enlargement is present.Severe tricuspid insufficiency is present.  The ejection fraction is 50-55%  Moderate concentric wall thickening consistent with left ventricular hypertrophy. Lasix has been held, her home potassium is being held. She also has some hepatic congestion with mild elevation of  LFT's. Previously in 7/31/2023 ECHO she has mild tricuspid insufficiency, On 2/8 LFT's worsened, Direct bili elevated, discussed with Dr Trinidad he will see patient and recs for repeat RUQ US and HIDA. Also patient has worsening creatinine suspect 2nd from over diureses but cardiorenal syndrome on differential. I did start low dose IV hydration and monitoring urine out put closely.      Acute on chronic congestive heart failure with preserved ejection fraction:  Volume overload  Anasarca  Cardio renal syndrome?  Hepatic congestion likely   Flat  troponin 30 and 31  Echo done- worsening status, right ventricle moderately dilated and has severe tricuspid insufficiency   Tele  Daily weights  I's and o's  Was On lasix being held as MILDRED    MILDRED- Status post diureses  She was hydrated with low dose 50 ml per hour over 2/7 night and increased to 50cc per hour on 2/8  Monitor her creatine  UA done  NO UTI  ON NS at 50 ml per hour   Avoid all nephrotoxic meds  Place [place] to monitor strict I's and o's as she did lose some urine and missed the hat    Hyperkalemia 5.5  NS   Monitor potassium  Did nor reverse as she just received replacement and it is not working.  Recheck BMP at 6 pm  -hold home potassium    Chronic atrial fibrillation/flutter with acute rapid ventricular response:   On Cardizem and metoprolol  Has declined anticoagulation prior- aware for stroke risk  I discuss with her again regarding care and plan. ON 2/7 she did agree to start coumadin.   Pharmacy to manage coumadin dosing.    Abdominal pain:  resolved   Distention  Had CT on admission showing  Trace free fluid in the abdomen, diffuse wall edema, and intralobular  septal thickening in the lung bases, compatible with anasarca or fluid  overload.There is mild diffuse gallbladder wall thickening which is likely due  to the trace free fluid.There is an apparent enhancing mass in the sigmoid colon measuring up  to 2.2 cm as well as more subtle focal thickening of the wall of the  sigmoid colon. Surgery consultation for possible colonoscopy is  recommended. There is a polypoid filling defect in the left posterior bladder  measuring up to 7 mm. Urology consultation for possible cystoscopy is  recommended.Nonspecific retroaortic lymph node in the lower chest measuring 12 mm  short axis. A CT of the chest could be considered for further  evaluation.    US was done of RUQ showing  Diffuse gallbladder wall thickening. No gallstones. No significant  bile duct dilation.    ON 2/8 discussed with Dr Trinidad as  patient has worsening transaminitis and has elevated direct bili. Recs to repeat RUQ US which was done showing GB wall thickened and no stone. I did order HIDA scan as per further recommendation to rule out cholecystitis  -monitor labs    Elevated LFT's  Hold statin  Avoid tylenol  Daily labs  Hepatitis c   HIDA in am    Diarrhea resolved   Reported on admission  Monitor   NO diarrhea reported in hospital     Sigmoid colon Lesion on CT:   Needs colonoscopy out patient  Malignancy not ruled out , discussed with patient she will need out patient evaluation when stable.      Hypokalemia:   Replace per protocol      Coronary artery disease:   S/P ABG X 4    -echo done showing severe tricuspid insufficiency.,     Constipation  Prn senna  Scheduled miralax    Full Code    Dispo pending clinical improvement           Diet: Combination Diet Regular Diet Adult; 2 gm NA Diet    DVT Prophylaxis: Warfarin  Martin Catheter: Not present  Lines: None     Cardiac Monitoring: ACTIVE order. Indication: Acute decompensated heart failure (48 hours)  Code Status: Full Code      Clinically Significant Risk Factors        # Hyperkalemia: Highest K = 5.5 mmol/L in last 2 days, will monitor as appropriate          # Acute Kidney Injury, unspecified: based on a >150% or 0.3 mg/dL increase in last creatinine compared to past 90 day average, will monitor renal function  # Hypertension: Noted on problem list  # Acute heart failure with preserved ejection fraction: heart failure noted on problem list, last echo with EF >50%, and receiving IV diuretics            # History of CABG: noted on surgical history       Disposition Plan    pending clinical improvement          Octavio Phillips,   Hospitalist Service  [facility]  Securely message with [employer] (more info)  Text page via [employer] Paging/Directory   ______________________________________________________________________    Interval History   Patient was seen this morning for  medical rounds. No chest pain. She did not complain of any shortness of breath.  I did get labs on her today. She did have worsening LFT's and creatine, her urine out put was decreased. I did discuss her care with general surgery recs for RUQ US repeat and  HIDA scan. Discuss with elana regarding plan and care.     Physical Exam   Vital Signs: Temp: (!) 96.7  F (35.9  C) Temp src: Tympanic BP: 145/88 (pt up ambulating at this time) Pulse: 62   Resp: 20 SpO2: 95 % O2 Device: None (Room air)    Weight: 217 lbs 2.45 oz    Physical Exam  Constitutional:       Appearance: She is obese.   HENT:      Right Ear: External ear normal.      Left Ear: External ear normal.      Mouth/Throat:      Pharynx: Oropharynx is clear.   Eyes:      Conjunctiva/sclera: Conjunctivae normal.   Cardiovascular:      Rate and Rhythm: Normal rate.   Pulmonary:      Effort: Pulmonary effort is normal.   Abdominal:      General: There is distension.   Musculoskeletal:         General: Swelling present.   Skin:     General: Skin is warm.   Neurological:      Mental Status: She is alert. Mental status is at baseline.         Medical Decision Making       52 MINUTES SPENT BY ME on the date of service doing chart review, history, exam, documentation & further activities per the note.      Data     I have personally reviewed the following data over the past 24 hrs:    10.5  \   12.8   / 272     133 (L) 94 (L) 40.5 (H) /  107 (H)   5.5 (H) 22 1.95 (H) \     ALT: 208 (H) AST: 347 (H) AP: 199 (H) TBILI: 2.5 (H); 2.5 (H)   ALB: 3.7 TOT PROTEIN: 6.5 LIPASE: N/A     Trop: N/A BNP: 3,858 (H)     INR:  1.67 (H) PTT:  N/A   D-dimer:  1.92 (H) Fibrinogen:  N/A       Imaging results reviewed over the past 24 hrs:   Recent Results (from the past 24 hour(s))   US Lower Extremity Venous Duplex Bilateral    Narrative    Exam:US LOWER EXTREMITY VENOUS DUPLEX BILATERAL    History: Leg swelling    Comparisons:none    Technique: Venous duplex ultrasonography of the  bilateral lower  extremities was performed.     Findings: The common femoral veins, superficial femoral veins and  popliteal veins are fully compressible with spontaneous and  augmentable venous flow. Right-sided Baker's cyst is seen.           Impression    Impression: No evidence of deep venous thrombosis within the lower  extremities.    JEFERSON NEGRO MD         SYSTEM ID:  [id number]   US Abdomen Limited    Narrative    PROCEDURES: US ABDOMEN LIMITED    HISTORY: Abdominal pain    TECHNIQUE: Grayscale ultrasound of the upper abdomen was performed.    COMPARISON: 2/5/2024    FINDINGS:    MEASUREMENTS:   Liver length:  23 cm.     LIVER: The liver is enlarged. No liver masses or biliary ductal  enlargement is noted.    GALLBLADDER: The gallbladder wall is abnormally thickened. No  gallstones are seen. Gallbladder wall thickening is stable from  previous examination of 2/5/2040    ABDOMINAL AORTA AND IVC: The abdominal aorta is largely obscured by  bowel gas. The inferior vena cava is patent.    PANCREAS: Pancreas was obscured by bowel bowel gas.    Right KIDNEY: Right kidney is free of masses or hydronephrosis.        Impression    IMPRESSION:     Gall bladder wall thickening without change    JEFERSON NEGRO MD         SYSTEM ID:  [id number]

## 2024-02-08 NOTE — PLAN OF CARE
/71 (BP Location: Right arm, Cuff Size: Adult Large)   Pulse 63   Temp 97  F (36.1  C) (Tympanic)   Resp 18   Wt 98.5 kg (217 lb 2.5 oz)   SpO2 93%   BMI 43.86 kg/m          A&O x4. Reports abd discomfort & belching- was relieved by small BM. HR irregular, Aflutter 60s per ICU. Dyspneic upon exertion, exp wheezes bilat upper lobes- PRN inhaler given w improvement. 2+ BLE edema present. 18 g L AC SL. Ax1 w gait belt, forgetful to use call light & unclips chair alarms prior to self transferring- pt educated on fall risk, alarms activated. Room across from unit, wheels locked and bed in lowest position      Face to face report given with opportunity to observe patient.    Report given to LORENA Andrade RN   2/8/2024  7:00 AM

## 2024-02-09 NOTE — PROGRESS NOTES
[facility]    Hospitalist Progress Note    History of Present Illness  Heather Rosas is a 73 year old female who presented to emergency room with complaints of increasing shortness of breath fluid retention with stomach bloating and some diarrhea.  She does have a significant history of chronic atrial flutter/fibrillation.  Has not been very compliant with her medications including anticoagulation.  She had been on Coumadin however did not follow-up with the Coumadin clinic just he is coming in to have to get her blood checked.  Also has a history of heart failure with reduced ejection fraction once again has not been very compliant with her diuretics at all.  She does not have a primary care provider at all.  1 was set up for after her last hospital stay which was back in September 2023.  However she failed that appointment.  She is not the greatest historian about things.  Says she weighs herself daily but really has not gained any weight.  Denies any chest pain she has been short of breath no fevers shaking chills or sweats that she is aware of no bleeding troubles at all.  Has had some loose stools over the last couple of days.  No palpitations or syncope.  Really cannot tell if her heart rate is going fast or not.  Has noted more abdominal bloating and lower extremity edema.  She does live by herself.  She is  does have a son who lives up on [address] [facility].     Upon arrival to the ER her initial blood pressure 142/108 pulse was 117 sats are 93% on room air she was afebrile 97.8.  She was noted to be in a fib flutter.  She was given 1 dose of IV diltiazem then put on oral diltiazem along with oral metoprolol.  Rate did come down better.  She was also given 40 mg of IV Lasix in the ER.  Her labs are significant for potassium 2.9 she had normal renal function creatinine 0.8 LFTs were normal glucose is 104 BNP was 3088 troponin was 31 TSH 3.24.  White count 7600 hemoglobin was 12.6 platelet count  195,000.  1.  She did complain of some abdominal discomfort.  CT scan with IV contrast was performed of her abdomen and pelvis and ultrasound there was some gallbladder wall thickening and felt maybe this is secondary to some increased volume started anasarca which are CT scan did suggest.  The CT did show some diffuse wall edema consistent with some anasarca fluid overload did have a enhancing mass in the sigmoid colon about 2.2 cm.  Also was a polypoid filling defect in left posterior bladder measuring up to 7 mm recommended urology consultation at some point.  Surgery did see the patient did not feel there was any evidence of acute cholecystitis.  A lot of the edema is felt to be secondary to overall volume overload.  Her last echocardiogram was back in June 2023 she had an EF from 55 to 60% some moderate LVH moderate left atrial enlargement, she was in A-fib at that time.         Assessment & Plan  Patient is sierra Rosas is a 73 year old female admitted on 2/5/2024. She send for evaluation of increasing shortness of breath and abdominal bloating and edema. She was given IV lasix for suspected CHF and exacerbation. She  has been making urine. She did have imaging in ER showing colon mass likely and need colonoscopy/. She also had finding of olypoid filling defect in the left posterior bladder  measuring up to 7 mm. Needs urology out patent for cystoscopy as malignancy not ruled out., she has been non compliant with meds and has not taken coumadin for Afib., Cannot afford DOACs, and refused coumadin. She was  on diuretics but we are monitoring closely as she is softer with BP;s. ON 2/7 her creatine worsened,d she had hyperkalemia and urine was dark ez. I did order 250 ml of NS to give over 10 hours to re-eval her creatine in morning. She also did have ECHO done showing increased  Severe left atrial enlargement is present. Moderate right atrial enlargement is present.Severe tricuspid insufficiency is  present.  The ejection fraction is 50-55%  Moderate concentric wall thickening consistent with left ventricular hypertrophy. Lasix has been held, her home potassium is being held. She also has some hepatic congestion with mild elevation of  LFT's. Previously in 7/31/2023 ECHO she has mild tricuspid insufficiency, On 2/8 LFT's worsened, Direct bili elevated, discussed with Dr Trinidad he will see patient and recs for repeat RUQ US and HIDA. Also patient has worsening creatinine suspect 2nd from over diureses but cardiorenal syndrome on differential. I did start low dose IV hydration and monitoring urine out put closely.      Acute on chronic congestive heart failure with preserved ejection fraction:  Echo done-  patient did have EF 50 to 55% with some concentric LVH.  Right ventricle moderately dilated and has severe tricuspid insufficiency   She came in with diffuse, fluid however sats are fine was initiated on diuretic therapy with this she has had general decline in her renal function.  Her pressures have remained stable as has her oxygenation.  Her ACE inhibitor has been on hold.  Continued on her rate controlling drugs Metroprolol and diltiazem.  Unfortunate her weights are unreliable every day it is up-and-down with what appears to be inaccurate readings.  Going to repeat her echocardiogram today for a limited study just to assess her LV function but also RV function with PA pressure evaluation.  Based on all of her current findings it appears the patient needs some IV fluid.  Repeat echocardiogram showed LV function remains basically normal 55%.  Right ventricle is definitely enlarged and is moderate reduced systolic function.  There is perhaps some hint of flattening of the interventricular septum.  RV systolic pressure was probably 34+ right atrium which is estimated at about 8.     MILDRED- Status post diureses  She was hydrated with low dose 50 ml per hour over 2/7 night and increased to 50cc per hour on  2/8  Monitor her creatine  UA done  NO UTI  ON NS at 50 ml per hour   Avoid all nephrotoxic meds  Place [place] to monitor strict I's and o's as she did lose some urine and missed the hat  -2/9: Patient presented with normal renal function creatinine 0.88 with a BUN of 17.  Daily it has worsened to today it is now 48.3/2.44.  Her urine output has decreased she has become more oliguric despite IV Lasix.  Potassium is 5.4.  Her FENA was calculated it is 0.49% with a urine sodium of less than 20 all this pointing more towards a prerenal state.  Hemodynamically she is very stable blood pressures in the 130 range.  Has never had hypotension.  Repeat urinalysis is pending.  Her CT on admission did not show any hydronephrosis.  Ultrasound done yesterday did look at her right kidney and she had no hydronephrosis at that point.  Her oxygenation is normal on room air.  Afebrile.  Echo done on admission showed basically normal systolic function with evidence of probable right-sided failure.  D-dimer was unremarkable.  At this point unable to perform a CT angiogram due to her renal insufficiency.  She is on warfarin therapy.  [place] catheter was placed last night.  She was actually given 100 mg of Lasix this morning in addition to 40 mg that she got at 12:30 AM last evening.  She is extremely oliguric has only put 185 cc of urine.  She did note that she did get the CT with IV contrast renal function did start to worsen roughly 36 hours after this.  She really did not receive a significant amount of diuretic she got to 60 mg doses that first 24 hours and then nothing after that up until this morning when she got a total of 140 mg of IV Lasix.  Some questions does she have some component of contrast-induced renal failure and or secondary to his fairly severe right-sided heart failure.  She does have significant appears to be sleep apnea.  So wondering at this point if her worsening renal function is worsening her right heart failure  picture.     Hyperkalemia 5.5  Continue to monitor closely above with her acute kidney injury.     Chronic atrial fibrillation/flutter with acute rapid ventricular response:   On Cardizem and metoprolol  Has declined anticoagulation prior- aware for stroke risk  I discuss with her again regarding care and plan. ON 2/7 she did agree to start coumadin.   Pharmacy to manage coumadin dosing.  -2/9: Pulses been in the 60s.  She lillian on the Cardizem and metoprolol.     Abdominal pain:  resolved   Distention  Had CT on admission showing  Trace free fluid in the abdomen, diffuse wall edema, and intralobular  septal thickening in the lung bases, compatible with anasarca or fluid  overload.There is mild diffuse gallbladder wall thickening which is likely due  to the trace free fluid.There is an apparent enhancing mass in the sigmoid colon measuring up  to 2.2 cm as well as more subtle focal thickening of the wall of the  sigmoid colon. Surgery consultation for possible colonoscopy is  recommended. There is a polypoid filling defect in the left posterior bladder  measuring up to 7 mm. Urology consultation for possible cystoscopy is  recommended.Nonspecific retroaortic lymph node in the lower chest measuring 12 mm  short axis. A CT of the chest could be considered for further  evaluation.   US was done of RUQ showing  Diffuse gallbladder wall thickening. No gallstones. No significant  bile duct dilation.   ON 2/8 discussed with Dr Trinidad as patient has worsening transaminitis and has elevated direct bili. Recs to repeat RUQ US which was done showing GB wall thickened and no stone. I did order HIDA scan as per further recommendation to rule out cholecystitis  -monitor labs  -2/9: Transaminases liver function test continue to rise slowly.  Alk phos is 208   total bilirubin is 2 with a direct of 1.4.  Plan is for a HIDA scan today to assess her gallbladder.  Ultrasound CT scans have shown thickened gallbladder wall  with no obvious signs of stone or obstruction.  Thought was this was secondary to volume/right-sided failure.     Elevated LFT's  Hold statin  Avoid tylenol  Daily labs  Hepatitis c   HIDA in am     Diarrhea resolved   Reported on admission  Monitor   NO diarrhea reported in hospital      Sigmoid colon Lesion on CT:   Needs colonoscopy out patient  Malignancy not ruled out , discussed with patient she will need out patient evaluation when stable.         Coronary artery disease:   S/P ABG X 4    -echo done showing severe tricuspid insufficiency.,      Constipation  Prn senna  Scheduled miralax         Diet: 2 Gram Sodium Diet  Fluids: Saline 50 cc an hour    DVT Prophylaxis: Warfarin  Code Status: Full Code    Disposition: Expected discharge in 3-5 days once renal function stabilizes.    Anastacio López MD    Interval History   Patient's morning was sleeping extremely deep with significant snoring took me a good minute have to wake her up.  But then she was alert and oriented says she feels really good and is wondering why she still in the hospital here.  She remains completely hemodynamically stable pressures 130 systolic afebrile heart rate in the 60s and A-fib sats are 97% on room air.  Renal function continues to decline somewhat with her creatinine now at 2.44 with a BUN of 48.  Also transaminases bilirubin continues to increase also.  Repeat her echo and she has normal systolic function does have fairly significantly enlarged right ventricle right atrium with decreased function her IVC was dilated 2.6 but does have respiratory variation.  Given the fact she had IV contrast prior to worsening of her renal function perhaps this is a reaction to this.  Also worsening what appears to be right-sided heart failure.  May have to just write out this renal picture she did have some response to some high-dose Lasix.  If does not improve will need to contact renal and or cardiology for further recommendations.  Right a  scan was negative.  I feel this points towards just venous congestion of her liver gallbladder etc.    -Data reviewed today: I reviewed all new labs and imaging results over the last 24 hours. I personally reviewed no images or EKG's today.    Physical Exam   Temp: (!) 96  F (35.6  C) Temp src: Tympanic BP: 120/74 Pulse: 63   Resp: 20 SpO2: 94 % O2 Device: None (Room air)    Vitals:    02/07/24 0547 02/08/24 0619 02/09/24 0438   Weight: 91.9 kg (202 lb 9.6 oz) 98.5 kg (217 lb 2.5 oz) 100.7 kg (222 lb 0.1 oz)     Vital Signs with Ranges  Temp:  [96  F (35.6  C)-98.1  F (36.7  C)] 96  F (35.6  C)  Pulse:  [62-71] 63  Resp:  [18-22] 20  BP: (120-143)/(74-98) 120/74  SpO2:  [94 %-97 %] 94 %  I/O last 3 completed shifts:  In: 1425 [P.O.:360; I.V.:1065]  Out: 710 [Urine:710]    Peripheral IV 02/08/24 Anterior;Right Lower forearm (Active)   Site Assessment WDL 02/09/24 0800   Line Status Infusing 02/09/24 0800   Dressing Transparent 02/09/24 0800   Dressing Status clean;dry;intact 02/09/24 0800   Line Intervention Flushed 02/08/24 1300   Phlebitis Scale 0-->no symptoms 02/09/24 0800   Infiltration? no 02/09/24 0800   Number of days: 1       Urethral Catheter 02/08/24 Double-lumen 16 fr (Active)   Tube Description Positional 02/09/24 0800   Catheter Care Soap and water/perineal cleanser 02/09/24 0800   Collection Container Standard 02/09/24 0800   Securement Method Securing device (Describe) 02/09/24 0800   Rationale for Continued Use Strict 1-2 Hour I&O 02/09/24 0800   Urine Output 250 mL 02/09/24 1424   Number of days: 1       Wound 07/11/20 Left Foot Other (comment) small hardened area on left heel (Active)   Number of days: 1308       Incision/Surgical Site 12/11/18 Right Chest (Active)   Number of days: 1886       Incision/Surgical Site 10/06/20 Right Chest (Active)   Number of days: 1221     No line/device    Constitutional: Alert and oriented x3. No distress    HEENT: Normocephalic/atraumatic, PERRL, EOMI, mouth  moist, neck supple, no LN.     Cardiovascular: Irregular RRR.  No murmur, no  rubs, or gallops.  JVD extremely difficult to assess.  Bruits no.  Pulses 2    Respiratory: Generally decreased occasional crackle no wheezes or consolidation otherwise clear to auscultation bilaterally    Abdomen: Soft, nontender, nondistended, no organomegaly. Bowel sounds present    Extremities: Warm/dry.  4+ out of melon like edema edema up to her sacrum pitting.    Neuro:   Non focal, cranial nerves intact, Moves all extremities.  Medications        [Held by provider] atorvastatin  40 mg Oral At Bedtime    bumetanide  2 mg Intravenous Q6H    diltiazem ER COATED BEADS  120 mg Oral Daily    [Held by provider] furosemide  40 mg Intravenous Daily    [Held by provider] lisinopril  10 mg Oral Daily    metoprolol succinate ER  100 mg Oral Daily    miconazole   Topical TID    pantoprazole  40 mg Oral QAM AC    polyethylene glycol  17 g Oral Daily    [Held by provider] potassium chloride ER  40 mEq Oral Daily    sodium chloride (PF)  3 mL Intracatheter Q8H    [Held by provider] warfarin ANTICOAGULANT  3 mg Oral ONCE at 18:00    Warfarin Therapy Reminder  1 each Oral See Admin Instructions       Data   Recent Labs   Lab 02/09/24  0506 02/08/24  1604 02/08/24  0538 02/07/24  1000 02/07/24  0510 02/05/24  1943 02/05/24  0825   WBC 11.8*  --  10.5  --  9.1   < > 7.6   HGB 13.2  --  12.8  --  12.7   < > 12.6   MCV 92  --  93  --  94   < > 92     --  272  --  257   < > 195   INR 1.82*  --  1.67* 1.46*  --   --  1.14   *  --  133*  --  133*   < > 136   POTASSIUM 5.4* 5.1 5.5*  --  5.5*   < > 2.9*   CHLORIDE 93*  --  94*  --  96*   < > 96*   CO2 19*  --  22  --  24   < > 30*   BUN 48.3*  --  40.5*  --  27.8*   < > 14.8   CR 2.44*  --  1.95*  --  1.42*   < > 0.80   ANIONGAP 16*  --  17*  --  13   < > 10   CARINA 9.0  --  9.5  --  9.4   < > 9.0   GLC 85  --  107*  --  87   < > 104*   ALBUMIN 3.6  --  3.7  --  3.7   < > 3.8   PROTTOTAL 6.4  --   6.5  --  6.4   < > 6.9   BILITOTAL 2.0*  --  2.5*  2.5*  --  1.9*   < > 1.6*   ALKPHOS 208*  --  199*  --  194*   < > 174*   *  --  208*  --  68*   < > 16   *  --  347*  --  117*   < > 27   LIPASE  --   --   --   --   --   --  23    < > = values in this interval not displayed.       Recent Results (from the past 24 hour(s))   Echocardiogram Limited   Result Value    LVEF  60-65%    [facility]    [id number]  [id number]  [id number]  [id number]^JESÚS     [facility]  Echocardiography Laboratory  [address] [place] [id number]     Name: RACQUEL PINZON  MRN: [id number]  : 1950  Study Date: 2024 10:14 AM  Age: 73 yrs  Gender: Female  Patient Location: [facility]  Reason For Study: Heart Failure, Unspecified  History: Heart failure  Ordering Physician: JESÚS HODGSON  Performed By: Anisa Cardona RDCS     BSA: 1.9 m2  Height: 59 in  Weight: 222 lb  ______________________________________________________________________________  Procedure  Limited Portable Echo Adult. Contrast Definity. Patient was given 5ml mixture  of 1.5ml Definity and 8.5ml saline. 5 ml wasted. The patient's rhythm is  atrial fibrillation.  ______________________________________________________________________________  Interpretation Summary  Global and regional left ventricular function is normal with an EF of 60-65%.  Mild concentric wall thickening consistent with left ventricular hypertrophy  is present.  Moderate right ventricular dilation is present.  Global right ventricular function is moderately to severely reduced.  Mild to moderate right atrial enlargement is present.  Moderate tricuspid insufficiency is present.  The right ventricular systolic pressure is 34mmHg above the right atrial  pressure.  Dilation of the inferior vena cava is present with normal respiratory  variation in diameter.  IVC diameter and respiratory changes fall into an intermediate range  suggesting an RA  pressure of 8 mmHg.  This study was compared with the study from 7/10/2023 .  The RV siz ehas enlarged and function decreased since previous study  ______________________________________________________________________________  Left Ventricle  Global and regional left ventricular function is normal with an EF of 60-65%.  Mild concentric wall thickening consistent with left ventricular hypertrophy  is present.     Right Ventricle  Moderate right ventricular dilation is present. Global right ventricular  function is moderately to severely reduced.     Atria  Mild to moderate right atrial enlargement is present.     Mitral Valve  Mild mitral insufficiency is present.     Tricuspid Valve  Moderate tricuspid insufficiency is present. The right ventricular systolic  pressure is 34mmHg above the right atrial pressure.     Pulmonic Valve  Moderate pulmonic insufficiency is present.     Vessels  Dilation of the inferior vena cava is present with normal respiratory  variation in diameter. IVC diameter and respiratory changes fall into an  intermediate range suggesting an RA pressure of 8 mmHg. Borderlne flattening  of the IVS in diastole.     Pericardium  No pericardial effusion is present.     Compared to Previous Study  This study was compared with the study from 7/10/2023 . The RV siz ehas  enlarged and function decreased since previous study.     ______________________________________________________________________________  MMode/2D Measurements & Calculations  IVSd: 1.3 cm  LVIDd: 4.4 cm  LVIDs: 3.0 cm  LVPWd: 1.3 cm  FS: 32.0 %  LV mass(C)d: 220.2 grams  LV mass(C)dI: 114.2 grams/m2  RWT: 0.61     TAPSE: 1.0 cm     Doppler Measurements & Calculations  TR max haresh: 293.0 cm/sec  TR max P.4 mmHg  RVSP(TR): 42.4 mmHg     ______________________________________________________________________________  Report approved by: Mirian Dockery 2024 12:13 PM         NM HepatOBiliary Scan    Narrative    Examination:  NM HEPATOBILIARY SCAN      Indication: rule out cholecystitis, gallbladder wall thickening     Technique:    The patient received 5.4 mCi of Tc-99m mebrofenin intravenously.  Images were obtained out through 75 minutes.     Findings:    There is prompt clearance of the radionuclide from the blood pool into  the liver. By 30 minutes there is clear visualization of the  intrahepatic ducts as well as the upper common bile duct. By 45  minutes there is visualization of the gallbladder. Given the slightly  atypical configuration of the gallbladder, it is somewhat superimposed  with the common bile duct on the AP views but is visualized anteriorly  on the lateral view. At 60 minutes there is emptying from the common  bile duct into the small bowel.      Impression    Impression:    Patent cystic duct.    MORENA GARDNER MD         SYSTEM ID:  [id number]

## 2024-02-09 NOTE — PLAN OF CARE
Goal Outcome Evaluation:Face to face report given with opportunity to observe patient.    Report given to Vickie Cagle, RN   2/9/2024  4:05 PM

## 2024-02-09 NOTE — PHARMACY-ANTICOAGULATION SERVICE
Pharmacy Consult-Warfarin Assessment Day #3    Heather Rosas is a 73 year old female admitted on 2/5/2024 with Generalized edema due to fluid overload     Primary Indication(s) for Anticoagulation: A-fib     Goal INR: 2-3     FYI, patient is followed by the Anticoagulation[facility]time Clinic at: New start; patient would like [facility]) upon discharge     Patient previously anticoagulated on Coumadin in 2023 on a dose of 2.5 mg every Mon, Wed, Fri; 5 mg all other days      Home tablet strength(s): New start     Factors that may increase patient's bleeding risk and/or sensitivity to warfarin (Coumadin) include: Advanced Age, elevated LFTs, Miconazole, Elevated baseline INR, Recent diarrhea (now resolved per notes)     Factors that may decrease patient's bleeding risk and/or sensitivity to warfarin (Coumadin) include: -          2/5/2024 2/7/2024 2/8/2024 2/9/2024   Recent Dosing and Labs   warfarin ANTICOAGULANT (COUMADIN) 2.5 MG tablet - - - -   warfarin ANTICOAGULANT (COUMADIN) 3 MG tablet - 3 mg, $Given 3 mg, $Given -   INR 1.14  1.46  1.67  1.82      CBC RESULTS:   Recent Labs   Lab Test 02/09/24  0506   HGB 13.2          Assessment/Plan: Renal failure and heart failure worsening and provider wants us to hold dose until told otherwise. May restart Lovenox if HIDA is okay.       Thank You for the Consult. Will continue to follow.    Oralia Suarez [facility] ....................  2/9/2024   11:15 AM

## 2024-02-09 NOTE — PLAN OF CARE
Reason for hospital stay:  Generalized edema due to fluid overload  Most recent vitals: /81 (BP Location: Right arm, Patient Position: Chair, Cuff Size: Adult Regular)   Pulse 71   Temp 96.9  F (36.1  C) (Tympanic)   Resp 18   Wt 98.5 kg (217 lb 2.5 oz)   SpO2 96%   BMI 43.86 kg/m      Pain Management:  Patient denied pain between [id number]  LOC:  A&O x4 with intermittent confusion  Cardiac:  Apical pulse irregular. Telemetry in place - Atrial flutter 60s per ICU nurse. 2+ edema noted to BLE.  Respiratory:  Maintaining sats on room air. Lung sounds clear but diminished throughout. Infrequent nonproductive cough reported  GI:  Bowel sounds audible and normoactive, did have a small bowel movement this shift. Constipation reported.   :  Martin catheter placed this shift for strict I&O monitoring   Skin Issues:  Scattered bruising and scabs    IVF:  NS 50 mL/hr  ABX:  None    Nutrition: 2 g NA but now is NPO   ADL's:  Assist of 1  Ambulation: Stand by assist with gait belt  Safety:  Alarms active and audible, call light within reach, room near nurses station, door open, lighting adjusted, nonskid footwear in use, bed in lowest position, wheels locked.    Face to face report given with opportunity to observe patient.    Report given to LORENA Jarvis RN   2/8/2024  7:26 PM

## 2024-02-09 NOTE — PLAN OF CARE
/83 (BP Location: Left arm, Cuff Size: Adult Regular)   Pulse 62   Temp 98.1  F (36.7  C) (Tympanic)   Resp 22   Wt 100.7 kg (222 lb 0.1 oz)   SpO2 95%   BMI 44.84 kg/m          A&O x4 w occasional confusion. HR irregular, Aflutter 60s per ICU. LS diminished crackles on RA. 2+ BLE edema present- encouraged elevation as pt tolerates. Strict I&Os- total urine output of 205 ml as of yesterday as charted- provider notified & one time dose IV 40 mg Lasix ordered. Pt appeared more dyspneic & urine output not increasing- MD ordered another one time dose 100 mg IV lasix. 20 g R forearm w NS @ 50 ml/hr. NPO @ 0700 for procedure at 12 per order. SBA w gait belt, forgetful to use call light, attempting to get up on own. Pt repeatedly educated on fall risk, alarms activated. Room across from unit, wheels locked and bed in lowest position.       Face to face report given with opportunity to observe patient.    Report given to LORENA Cochran RN   2/9/2024  7:07 AM

## 2024-02-09 NOTE — PROCEDURES
Heather Rosas 73 year old    Returned from Nuclear Medicine  Exam Performed : HIDA scan  Tolerated Procedure : with mild difficult, patient stated she had a sore back and [place] was bothering her  May resume previous diet : Yes  Time Returned to Unit : 1340  Report Given to : Nurse Cabrera      Talked to Nurse Johnston on 2/8/2024 at 1345 regarding NM HIDA scan order that had been placed. Talked to her about the scan being scheduled on 2/9/2024 about 1200, scan was not able to be performed on 2/8/2024 due to isotope not being available. Dr. Cote was in formed of the plan and was in agreement.     During the discussion with Vickie prep for the exam was discussed. Patient needs to be NPO at least 4 hours before the exam, (7 AM) and no narcotic pain medications given within 4 hours of the exam also. Vickie said she would notify the night nurse of this prep.     On 2/9/2024 we talked to Nurse Cabrera at 1030 to make sure that the patient was ready for the exam. Heather was prepped properly.     At 1130 Wendie Barreto went to the floor and with Deonte assistance Heather was transferred to a wheelchair and Wendie Barreto brought the patient down to Nuclear Medicine were the test was performed . Patient did well for the imaging but her back became sore and her [place] was bothering her.     At 1340 Heather was returned to her room . Radha and Wendie Barreto  assisted Heather to her chair.  A sign was posted on the bathroom door to alert people that Heather had  a nuclear medicine procedure and that standard precautions should be followed for 48 hours.     A hand off was performed with Deborah prior to leaving the floor.    2/9/2024 1:46 PM   Wendie Montanez

## 2024-02-09 NOTE — PLAN OF CARE
Goal Outcome Evaluation:patient is back from her HIDA scan, she is placed on continuous pulse oximetry, patient is constantly falling asleep, extremities are dusky, sats are good at this time on room air, 93%, patient sitting upright in the chair, snoring, will wake briefly, but immediately falls asleep, chair alarm is on vitals as charted.

## 2024-02-09 NOTE — PLAN OF CARE
Goal Outcome Evaluation:  Upon general assessment, patient very drowsy, pleasant and adequately makes her needs known, patient is drowsy, we did boost patient in the chair with assist of patient, and she tolerated well, patient has generalized edema, the skin tight, the abdomen is reddened, yeasty and tight, there are scattered small scabs noted throughout, patient did get up to the shower and was washed up per staff, patient has been NPO, an ECHO was done, and at present time patient is down for her HIDA scan, there is a [place] catheter in place, this writer did drain about 250 ml of urine from the [place], the heart rate is distant, patient adequately makes her needs known, vitals as charted.

## 2024-02-10 PROBLEM — N17.9 ACUTE KIDNEY FAILURE, UNSPECIFIED (H): Status: ACTIVE | Noted: 2024-01-01

## 2024-02-10 NOTE — PROGRESS NOTES
[facility]    Hospitalist Progress Note     History of Present Illness  Heather Rosas is a 73 year old female who presented to emergency room with complaints of increasing shortness of breath fluid retention with stomach bloating and some diarrhea.  She does have a significant history of chronic atrial flutter/fibrillation.  Has not been very compliant with her medications including anticoagulation.  She had been on Coumadin however did not follow-up with the Coumadin clinic just he is coming in to have to get her blood checked.  Also has a history of heart failure with reduced ejection fraction once again has not been very compliant with her diuretics at all.  She does not have a primary care provider at all.  1 was set up for after her last hospital stay which was back in September 2023.  However she failed that appointment.  She is not the greatest historian about things.  Says she weighs herself daily but really has not gained any weight.  Denies any chest pain she has been short of breath no fevers shaking chills or sweats that she is aware of no bleeding troubles at all.  Has had some loose stools over the last couple of days.  No palpitations or syncope.  Really cannot tell if her heart rate is going fast or not.  Has noted more abdominal bloating and lower extremity edema.  She does live by herself.  She is  does have a son who lives up on [facility].     Upon arrival to the ER her initial blood pressure 142/108 pulse was 117 sats are 93% on room air she was afebrile 97.8.  She was noted to be in a fib flutter.  She was given 1 dose of IV diltiazem then put on oral diltiazem along with oral metoprolol.  Rate did come down better.  She was also given 40 mg of IV Lasix in the ER.  Her labs are significant for potassium 2.9 she had normal renal function creatinine 0.8 LFTs were normal glucose is 104 BNP was 3088 troponin was 31 TSH 3.24.  White count 7600 hemoglobin was 12.6 platelet  count 195,000.  1.  She did complain of some abdominal discomfort.  CT scan with IV contrast was performed of her abdomen and pelvis and ultrasound there was some gallbladder wall thickening and felt maybe this is secondary to some increased volume started anasarca which are CT scan did suggest.  The CT did show some diffuse wall edema consistent with some anasarca fluid overload did have a enhancing mass in the sigmoid colon about 2.2 cm.  Also was a polypoid filling defect in left posterior bladder measuring up to 7 mm recommended urology consultation at some point.  Surgery did see the patient did not feel there was any evidence of acute cholecystitis.  A lot of the edema is felt to be secondary to overall volume overload.  Her last echocardiogram was back in June 2023 she had an EF from 55 to 60% some moderate LVH moderate left atrial enlargement, she was in A-fib at that time.           Assessment & Plan  Patient is sierra Rosas is a 73 year old female admitted on 2/5/2024. She send for evaluation of increasing shortness of breath and abdominal bloating and edema. She was given IV lasix for suspected CHF and exacerbation. She  has been making urine. She did have imaging in ER showing colon mass likely and need colonoscopy/. She also had finding of olypoid filling defect in the left posterior bladder  measuring up to 7 mm. Needs urology out patent for cystoscopy as malignancy not ruled out., she has been non compliant with meds and has not taken coumadin for Afib., Cannot afford DOACs, and refused coumadin. She was  on diuretics but we are monitoring closely as she is softer with BP;s. ON 2/7 her creatine worsened,d she had hyperkalemia and urine was dark ez. I did order 250 ml of NS to give over 10 hours to re-eval her creatine in morning. She also did have ECHO done showing increased  Severe left atrial enlargement is present. Moderate right atrial enlargement is present.Severe tricuspid insufficiency  is present.  The ejection fraction is 50-55%  Moderate concentric wall thickening consistent with left ventricular hypertrophy. Lasix has been held, her home potassium is being held. She also has some hepatic congestion with mild elevation of  LFT's. Previously in 7/31/2023 ECHO she has mild tricuspid insufficiency, On 2/8 LFT's worsened, Direct bili elevated, discussed with Dr Trinidad he will see patient and recs for repeat RUQ US and HIDA. Also patient has worsening creatinine suspect 2nd from over diureses but cardiorenal syndrome on differential. I did start low dose IV hydration and monitoring urine out put closely.      Acute on chronic congestive heart failure with preserved ejection fraction:  Echo done-  patient did have EF 50 to 55% with some concentric LVH.  Right ventricle moderately dilated and has severe tricuspid insufficiency   She came in with diffuse, fluid however sats are fine was initiated on diuretic therapy with this she has had general decline in her renal function.  Her pressures have remained stable as has her oxygenation.  Her ACE inhibitor has been on hold.  Continued on her rate controlling drugs Metroprolol and diltiazem.  Unfortunate her weights are unreliable every day it is up-and-down with what appears to be inaccurate readings.  Going to repeat her echocardiogram today for a limited study just to assess her LV function but also RV function with PA pressure evaluation.  Based on all of her current findings it appears the patient needs some IV fluid.  Repeat echocardiogram showed LV function remains basically normal 55%.  Right ventricle is definitely enlarged and is moderate reduced systolic function.  There is perhaps some hint of flattening of the interventricular septum.  RV systolic pressure was probably 34+ right atrium which is estimated at about 8.  -2/10: So based upon her echo results she has normal function but evidence of right-sided heart failure.  Things are complicated  by her renal insufficiency post IV diuresis with IV Bumex and she is now put out a significant amount of urine overnight.  Potassium did drop down slightly but overall she is doing markedly better I think we will continue to push the diuresis now at this time.  Blood pressure is tolerating it fine.  Renal functions improving.     MILDRED- Status post diureses  She was hydrated with low dose 50 ml per hour over 2/7 night and increased to 50cc per hour on 2/8  Monitor her creatine  UA done  NO UTI  ON NS at 50 ml per hour   Avoid all nephrotoxic meds  Place canada to monitor strict I's and o's as she did lose some urine and missed the hat  -2/9: Patient presented with normal renal function creatinine 0.88 with a BUN of 17.  Daily it has worsened to today it is now 48.3/2.44.  Her urine output has decreased she has become more oliguric despite IV Lasix.  Potassium is 5.4.  Her FENA was calculated it is 0.49% with a urine sodium of less than 20 all this pointing more towards a prerenal state.  Hemodynamically she is very stable blood pressures in the 130 range.  Has never had hypotension.  Repeat urinalysis is pending.  Her CT on admission did not show any hydronephrosis.  Ultrasound done yesterday did look at her right kidney and she had no hydronephrosis at that point.  Her oxygenation is normal on room air.  Afebrile.  Echo done on admission showed basically normal systolic function with evidence of probable right-sided failure.  D-dimer was unremarkable.  At this point unable to perform a CT angiogram due to her renal insufficiency.  She is on warfarin therapy.  [place] catheter was placed last night.  She was actually given 100 mg of Lasix this morning in addition to 40 mg that she got at 12:30 AM last evening.  She is extremely oliguric has only put 185 cc of urine.  She did note that she did get the CT with IV contrast renal function did start to worsen roughly 36 hours after this.  She really did not receive a  significant amount of diuretic she got to 60 mg doses that first 24 hours and then nothing after that up until this morning when she got a total of 140 mg of IV Lasix.  Some questions does she have some component of contrast-induced renal failure and or secondary to his fairly severe right-sided heart failure.  She does have significant appears to be sleep apnea.  So wondering at this point if her worsening renal function is worsening her right heart failure picture.  -2/10: Martin catheter in place she put out 2375 cc of urine yesterday with a couple doses of the IV Bumex.  Creatinine is declining down to 2.28 today from 2.45 yesterday.  Potassium down from 5.6-3.8.  I think her renal insufficiency may have been   contrast-induced.  Will continue to push the diuresis watching her potassium and renal function blood pressure.  Patient still has significant amount of edema.     Hyperkalemia 5.5  Continue to monitor closely above with her acute kidney injury.  -2/10: Down to 3.8.  Will likely need some replacement.     Chronic atrial fibrillation/flutter with acute rapid ventricular response:   On Cardizem and metoprolol  Has declined anticoagulation prior- aware for stroke risk  I discuss with her again regarding care and plan. ON 2/7 she did agree to start coumadin.   Pharmacy to manage coumadin dosing.  -2/9: Pulses been in the 60s.  She remains on the Cardizem and metoprolol.  -2/10: Rate continues to be stable blood pressure stable continue with the Cardizem metoprolol.     Abdominal pain:  resolved   Distention  Had CT on admission showing  Trace free fluid in the abdomen, diffuse wall edema, and intralobular  septal thickening in the lung bases, compatible with anasarca or fluid  overload.There is mild diffuse gallbladder wall thickening which is likely due  to the trace free fluid.There is an apparent enhancing mass in the sigmoid colon measuring up  to 2.2 cm as well as more subtle focal thickening of the wall  of the  sigmoid colon. Surgery consultation for possible colonoscopy is  recommended. There is a polypoid filling defect in the left posterior bladder  measuring up to 7 mm. Urology consultation for possible cystoscopy is  recommended.Nonspecific retroaortic lymph node in the lower chest measuring 12 mm  short axis. A CT of the chest could be considered for further  evaluation.   US was done of RUQ showing  Diffuse gallbladder wall thickening. No gallstones. No significant  bile duct dilation.   ON 2/8 discussed with Dr Trinidad as patient has worsening transaminitis and has elevated direct bili. Recs to repeat RUQ US which was done showing GB wall thickened and no stone. I did order HIDA scan as per further recommendation to rule out cholecystitis  -monitor labs  -2/9: Transaminases liver function test continue to rise slowly.  Alk phos is 208   total bilirubin is 2 with a direct of 1.4.  Plan is for a HIDA scan today to assess her gallbladder.  Ultrasound CT scans have shown thickened gallbladder wall with no obvious signs of stone or obstruction.  Thought was this was secondary to volume/right-sided failure.  -2/10: Her HIDA scan was negative.  Feel elevated LFTs etc. all secondary to venous congestion.      Diarrhea resolved   Reported on admission  Monitor   NO diarrhea reported in hospital      Sigmoid colon Lesion on CT:   Needs colonoscopy out patient  Malignancy not ruled out , discussed with patient she will need out patient evaluation when stable.         Coronary artery disease:   S/P ABG X 4    -echo done showing severe tricuspid insufficiency.,      Constipation  Prn senna  Scheduled miralax      Diet: 2 Gram Sodium Diet  Fluids: None    DVT Prophylaxis: Enoxaparin (Lovenox) SQ  Code Status: Full Code    Disposition: Expected discharge in 3-4 days once adequately diuresed.    Anastacio López MD    Interval History   Patient alert pleasant feels good no dyspnea no new aches or pains  shortness of breath.  She is hemodynamically stable.  Sats good on room air 93%.  Has started to diurese now.  Improving creatinine will continue to push the IV diuresis she does have significant mount of fluid on board.  Renal function is improved.    -Data reviewed today: I reviewed all new labs and imaging results over the last 24 hours. I personally reviewed no images or EKG's today.    Physical Exam   Temp: 97.7  F (36.5  C) Temp src: Tympanic BP: 114/76 Pulse: 70   Resp: 22 SpO2: (!) 91 % O2 Device: None (Room air)    Vitals:    02/08/24 0619 02/09/24 0438 02/10/24 0316   Weight: 98.5 kg (217 lb 2.5 oz) 100.7 kg (222 lb 0.1 oz) 99.8 kg (220 lb 0.3 oz)     Vital Signs with Ranges  Temp:  [96  F (35.6  C)-97.8  F (36.6  C)] 97.7  F (36.5  C)  Pulse:  [62-70] 70  Resp:  [18-24] 22  BP: (114-133)/(68-88) 114/76  SpO2:  [91 %-97 %] 91 %  I/O last 3 completed shifts:  In: 630 [P.O.:630]  Out: 2375 [Urine:2375]    Peripheral IV 02/08/24 Anterior;Right Lower forearm (Active)   Site Assessment WDL 02/10/24 0500   Line Status Saline locked 02/10/24 0500   Dressing Transparent 02/10/24 0500   Dressing Status clean;dry;intact 02/10/24 0500   Line Intervention Flushed 02/08/24 1300   Phlebitis Scale 0-->no symptoms 02/10/24 0500   Infiltration? no 02/10/24 0500   Number of days: 2       Urethral Catheter 02/08/24 Double-lumen 16 fr (Active)   Tube Description Positional 02/10/24 0547   Catheter Care Catheter wipes;Done 02/10/24 0400   Collection Container Standard 02/10/24 0547   Securement Method Securing device (Describe) 02/10/24 0547   Rationale for Continued Use Strict 1-2 Hour I&O 02/10/24 0547   Urine Output 400 mL 02/10/24 0547   Number of days: 2       Wound 07/11/20 Left Foot Other (comment) small hardened area on left heel (Active)   Number of days: 1309       Incision/Surgical Site 12/11/18 Right Chest (Active)   Number of days: 1887       Incision/Surgical Site 10/06/20 Right Chest (Active)   Number of days:  1222     No line/device    Constitutional: Alert and oriented x3. No distress    HEENT: Normocephalic/atraumatic, PERRL, EOMI, mouth moist, neck supple, no LN.     Cardiovascular: RRR.  No murmur, no  rubs, or gallops.  JVD unable.  Bruits no.  Pulses 2    Respiratory: Bibasilar crackles otherwise clear to auscultation bilaterally    Abdomen: Obese soft, nontender, nondistended, no organomegaly. Bowel sounds present    Extremities: Lower extremities with 3-4+ edema she does have pitting edema up to her sacrum.  Warm/dry.     Neuro:   Non focal, cranial nerves intact, Moves all extremities.  Medications      [Held by provider] atorvastatin  40 mg Oral At Bedtime    bumetanide  2 mg Intravenous Q6H    diltiazem ER COATED BEADS  120 mg Oral Daily    [Held by provider] furosemide  40 mg Intravenous Daily    [Held by provider] lisinopril  10 mg Oral Daily    metoprolol succinate ER  100 mg Oral Daily    miconazole   Topical TID    pantoprazole  40 mg Oral QAM AC    polyethylene glycol  17 g Oral Daily    [Held by provider] potassium chloride ER  40 mEq Oral Daily    sodium chloride (PF)  3 mL Intracatheter Q8H    [Held by provider] warfarin ANTICOAGULANT  3 mg Oral ONCE at 18:00    Warfarin Therapy Reminder  1 each Oral See Admin Instructions       Data   Recent Labs   Lab 02/10/24  0609 02/09/24  1512 02/09/24  0506 02/08/24  1604 02/08/24  0538 02/05/24  1943 02/05/24  0825   WBC 9.3  --  11.8*  --  10.5   < > 7.6   HGB 12.3  --  13.2  --  12.8   < > 12.6   MCV 90  --  92  --  93   < > 92     --  274  --  272   < > 195   INR 2.16*  --  1.82*  --  1.67*   < > 1.14    131* 128*  --  133*   < > 136   POTASSIUM 3.8 5.6* 5.4*   < > 5.5*   < > 2.9*   CHLORIDE 96* 95* 93*  --  94*   < > 96*   CO2 23 23 19*  --  22   < > 30*   BUN 56.8* 52.7* 48.3*  --  40.5*   < > 14.8   CR 2.28* 2.45* 2.44*  --  1.95*   < > 0.80   ANIONGAP 16* 13 16*  --  17*   < > 10   CARINA 8.9 9.1 9.0  --  9.5   < > 9.0   * 112* 85  --   107*   < > 104*   ALBUMIN 3.6  --  3.6  --  3.7   < > 3.8   PROTTOTAL 6.4  --  6.4  --  6.5   < > 6.9   BILITOTAL 1.5*  --  2.0*  --  2.5*  2.5*   < > 1.6*   ALKPHOS 222*  --  208*  --  199*   < > 174*   *  --  306*  --  208*   < > 16   *  --  452*  --  347*   < > 27   LIPASE  --   --   --   --   --   --  23    < > = values in this interval not displayed.       Recent Results (from the past 24 hour(s))   Echocardiogram Limited   Result Value    LVEF  60-65%    [facility]    [id number]  [id number]  [id number]  [id number]^REMA^JESÚS     [facility]  Echocardiography Laboratory  [address] [place] [id number]     Name: RACQUEL PINZON  MRN: [id number]  : 1950  Study Date: 2024 10:14 AM  Age: 73 yrs  Gender: Female  Patient Location: [facility]  Reason For Study: Heart Failure, Unspecified  History: Heart failure  Ordering Physician: JESÚS HODGSON  Performed By: Anisa Cardona RDCS     BSA: 1.9 m2  Height: 59 in  Weight: 222 lb  ______________________________________________________________________________  Procedure  Limited Portable Echo Adult. Contrast Definity. Patient was given 5ml mixture  of 1.5ml Definity and 8.5ml saline. 5 ml wasted. The patient's rhythm is  atrial fibrillation.  ______________________________________________________________________________  Interpretation Summary  Global and regional left ventricular function is normal with an EF of 60-65%.  Mild concentric wall thickening consistent with left ventricular hypertrophy  is present.  Moderate right ventricular dilation is present.  Global right ventricular function is moderately to severely reduced.  Mild to moderate right atrial enlargement is present.  Moderate tricuspid insufficiency is present.  The right ventricular systolic pressure is 34mmHg above the right atrial  pressure.  Dilation of the inferior vena cava is present with normal respiratory  variation in diameter.  IVC  diameter and respiratory changes fall into an intermediate range  suggesting an RA pressure of 8 mmHg.  This study was compared with the study from 7/10/2023 .  The RV siz ehas enlarged and function decreased since previous study  ______________________________________________________________________________  Left Ventricle  Global and regional left ventricular function is normal with an EF of 60-65%.  Mild concentric wall thickening consistent with left ventricular hypertrophy  is present.     Right Ventricle  Moderate right ventricular dilation is present. Global right ventricular  function is moderately to severely reduced.     Atria  Mild to moderate right atrial enlargement is present.     Mitral Valve  Mild mitral insufficiency is present.     Tricuspid Valve  Moderate tricuspid insufficiency is present. The right ventricular systolic  pressure is 34mmHg above the right atrial pressure.     Pulmonic Valve  Moderate pulmonic insufficiency is present.     Vessels  Dilation of the inferior vena cava is present with normal respiratory  variation in diameter. IVC diameter and respiratory changes fall into an  intermediate range suggesting an RA pressure of 8 mmHg. Borderlne flattening  of the IVS in diastole.     Pericardium  No pericardial effusion is present.     Compared to Previous Study  This study was compared with the study from 7/10/2023 . The RV siz ehas  enlarged and function decreased since previous study.     ______________________________________________________________________________  MMode/2D Measurements & Calculations  IVSd: 1.3 cm  LVIDd: 4.4 cm  LVIDs: 3.0 cm  LVPWd: 1.3 cm  FS: 32.0 %  LV mass(C)d: 220.2 grams  LV mass(C)dI: 114.2 grams/m2  RWT: 0.61     TAPSE: 1.0 cm     Doppler Measurements & Calculations  TR max haresh: 293.0 cm/sec  TR max P.4 mmHg  RVSP(TR): 42.4 mmHg     ______________________________________________________________________________  Report approved by: Anastacio López,  Mirian 02/09/2024 12:13 PM         NM HepatOBiliary Scan    Narrative    Examination: NM HEPATOBILIARY SCAN      Indication: rule out cholecystitis, gallbladder wall thickening     Technique:    The patient received 5.4 mCi of Tc-99m mebrofenin intravenously.  Images were obtained out through 75 minutes.     Findings:    There is prompt clearance of the radionuclide from the blood pool into  the liver. By 30 minutes there is clear visualization of the  intrahepatic ducts as well as the upper common bile duct. By 45  minutes there is visualization of the gallbladder. Given the slightly  atypical configuration of the gallbladder, it is somewhat superimposed  with the common bile duct on the AP views but is visualized anteriorly  on the lateral view. At 60 minutes there is emptying from the common  bile duct into the small bowel.      Impression    Impression:    Patent cystic duct.    MORENA GARDNER MD         SYSTEM ID:  [id number]

## 2024-02-10 NOTE — PLAN OF CARE
Pt alert to self, and time. Pt lethargic this shift but arouses to voice. Pt denied pain this shift. Pt afebrile, and remains on room air this shift. Pt remains on cardiac monitoring this shift, Per ICU tele report pt in afib 60's.  Pt has canada catheter in place for strict I &O. 450 ml straw yellow output this shift.   Pt stand by assist with gait belt this shift. Alarms activated and audible. Pt needs reminders to call before getting up on own.     Face to face report given with opportunity to observe patient.    Report given to Keke Echavarria RN.     Vickie Gonzalez RN   2/9/2024  7:29 PM

## 2024-02-10 NOTE — PHARMACY-ANTICOAGULATION SERVICE
Pharmacy Consult-Warfarin Assessment Day #4    Heather Rossa is a 73 year old female admitted on 2/5/2024 with Generalized edema due to fluid overload    Primary Indication(s) for Anticoagulation: A-fib    Goal INR: 2-3    FYI, patient is followed by the Anticoagulation[facility] at: New start; patient would like GI ([place]) upon discharge     Patient previously anticoagulated on Coumadin in 2023 on a dose of 2.5 mg every Mon, Wed, Fri; 5 mg all other days     Home tablet strength(s): new start    Factors that may increase patient's bleeding risk and/or sensitivity to warfarin (Coumadin) include: Advanced Age, elevated LFTs, Miconazole, Elevated baseline INR, Recent diarrhea (now resolved per notes)     Factors that may decrease patient's bleeding risk and/or sensitivity to warfarin (Coumadin) include: -      Anticoagulation Dose History  More data exists         Latest Ref Rng & Units 9/21/2023 9/22/2023 2/5/2024 2/7/2024 2/8/2024 2/9/2024 2/10/2024   Recent Dosing and Labs   warfarin ANTICOAGULANT (COUMADIN) 2.5 MG tablet - 5 mg, $Given - - - - - -   warfarin ANTICOAGULANT (COUMADIN) 3 MG tablet - - - - 3 mg, $Given 3 mg, $Given - -   INR 0.85 - 1.15 1.09  1.22  1.16  1.14  1.46  1.67  1.82  2.16      CBC RESULTS:   Recent Labs   Lab Test 02/10/24  0609   HGB 12.3          Assessment/Plan: Renal failure and heart failure worsening. Per provider hold warfarin today (2/10) and tomorrow (2/11). Patient will be re-evaluated on Monday (2/12) for potentially re-starting warfarin then.     Thank You for the Consult. Will continue to follow.    Amy Ling [facility] ....................  2/10/2024   11:59 AM

## 2024-02-10 NOTE — PLAN OF CARE
Reason for hospital stay:  Generalized Edema  Living situation PTA: home  Most recent vitals: /75 (BP Location: Left arm, Patient Position: Chair, Cuff Size: Adult Regular)   Pulse 78   Temp 97.5  F (36.4  C) (Tympanic)   Resp 20   Wt 99.8 kg (220 lb 0.3 oz)   SpO2 93%   BMI 44.44 kg/m      Pain Management:  denies pain  LOC:  alert and oriented x3  Cardiac:  Tele  Respiratory:  Distant breath sounds, SOB  GI:  Martin, strict I+O, Bumex Q 6  :  No concerns  Skin Issues:  BLLE +4 Edema    IVF:  SL  ABX:  none    Nutrition: 2 gram low sodium    Ambulation:stand by assist, gait belt  Safety:  bed/chair alarm, bed in low position, call light within reach.    Face to face report given with opportunity to observe patient.  Report given to Dennis DILLARD RN.    Arti Tim RN  2/10/2024, 7:05 AM

## 2024-02-11 NOTE — PLAN OF CARE
Patient alert and oriented. VS and assess as charted. Denied pain this shift. Tele in place. Patient's urinary output this shift was 3,300 ml. IV SL. SBA. Patient up in chair. Call light and personal items within reach. Wheels locked. Alarm on. Makes needs known. Free from falls and injuries this shift.       Face to face report given with opportunity to observe patient.    Report given to Arti MAX RN & LORENA Echavarria.    Dennis Haas RN   2/10/2024  7:21 PM

## 2024-02-11 NOTE — PHARMACY-ANTICOAGULATION SERVICE
Pharmacy Consult-Warfarin Assessment Day #5    Heather Rosas is a 73 year old female admitted on 2/5/2024 with Generalized edema due to fluid overload    Primary Indication(s) for Anticoagulation: A-fib     Goal INR: 2-3     FYI, patient is followed by the Antico[facility] at: [facility]; patient would like GI ([place]) upon discharge      Patient previously anticoagulated on Coumadin in 2023 on a dose of 2.5 mg every Mon, Wed, Fri; 5 mg all other days      Home tablet strength(s): new start     Factors that may increase patient's bleeding risk and/or sensitivity to warfarin (Coumadin) include: Advanced Age, elevated LFTs, Miconazole, Elevated baseline INR, Recent diarrhea (now resolved per notes)      Factors that may decrease patient's bleeding risk and/or sensitivity to warfarin (Coumadin) include: -    Anticoagulation Dose History  More data exists         Latest Ref Rng & Units 9/22/2023 2/5/2024 2/7/2024 2/8/2024 2/9/2024 2/10/2024 2/11/2024   Recent Dosing and Labs   warfarin ANTICOAGULANT (COUMADIN) 3 MG tablet - - - 3 mg, $Given 3 mg, $Given - - -   INR 0.85 - 1.15 1.16  1.14  1.46  1.67  1.82  2.16  1.60      CBC RESULTS:   Recent Labs   Lab Test 02/11/24  0608   HGB 12.6          Assessment/Plan: Warfarin going to be held today per provider due to the significant right-sided failure and diuresis. Provider might restart warfarin tomorrow upon re-evaluation.    Thank You for the Consult. Will continue to follow.    Amy Ling [facility] ....................  2/11/2024   10:12 AM

## 2024-02-11 NOTE — PROGRESS NOTES
[facility]    Hospitalist Progress Note    History of Present Illness  Heather Rosas is a 73 year old female who presented to emergency room with complaints of increasing shortness of breath fluid retention with stomach bloating and some diarrhea.  She does have a significant history of chronic atrial flutter/fibrillation.  Has not been very compliant with her medications including anticoagulation.  She had been on Coumadin however did not follow-up with the Coumadin clinic just he is coming in to have to get her blood checked.  Also has a history of heart failure with reduced ejection fraction once again has not been very compliant with her diuretics at all.  She does not have a primary care provider at all.  1 was set up for after her last hospital stay which was back in September 2023.  However she failed that appointment.  She is not the greatest historian about things.  Says she weighs herself daily but really has not gained any weight.  Denies any chest pain she has been short of breath no fevers shaking chills or sweats that she is aware of no bleeding troubles at all.  Has had some loose stools over the last couple of days.  No palpitations or syncope.  Really cannot tell if her heart rate is going fast or not.  Has noted more abdominal bloating and lower extremity edema.  She does live by herself.  She is  does have a son who lives up on [address] [facility].     Upon arrival to the ER her initial blood pressure 142/108 pulse was 117 sats are 93% on room air she was afebrile 97.8.  She was noted to be in a fib flutter.  She was given 1 dose of IV diltiazem then put on oral diltiazem along with oral metoprolol.  Rate did come down better.  She was also given 40 mg of IV Lasix in the ER.  Her labs are significant for potassium 2.9 she had normal renal function creatinine 0.8 LFTs were normal glucose is 104 BNP was 3088 troponin was 31 TSH 3.24.  White count 7600 hemoglobin was 12.6 platelet count  195,000.  1.  She did complain of some abdominal discomfort.  CT scan with IV contrast was performed of her abdomen and pelvis and ultrasound there was some gallbladder wall thickening and felt maybe this is secondary to some increased volume started anasarca which are CT scan did suggest.  The CT did show some diffuse wall edema consistent with some anasarca fluid overload did have a enhancing mass in the sigmoid colon about 2.2 cm.  Also was a polypoid filling defect in left posterior bladder measuring up to 7 mm recommended urology consultation at some point.  Surgery did see the patient did not feel there was any evidence of acute cholecystitis.  A lot of the edema is felt to be secondary to overall volume overload.  Her last echocardiogram was back in June 2023 she had an EF from 55 to 60% some moderate LVH moderate left atrial enlargement, she was in A-fib at that time.           Assessment & Plan  Patient is sierra Rosas is a 73 year old female admitted on 2/5/2024. She send for evaluation of increasing shortness of breath and abdominal bloating and edema. She was given IV lasix for suspected CHF and exacerbation. She  has been making urine. She did have imaging in ER showing colon mass likely and need colonoscopy/. She also had finding of olypoid filling defect in the left posterior bladder  measuring up to 7 mm. Needs urology out patent for cystoscopy as malignancy not ruled out., she has been non compliant with meds and has not taken coumadin for Afib., Cannot afford DOACs, and refused coumadin. She was  on diuretics but we are monitoring closely as she is softer with BP;s. ON 2/7 her creatine worsened,d she had hyperkalemia and urine was dark ez. I did order 250 ml of NS to give over 10 hours to re-eval her creatine in morning. She also did have ECHO done showing increased  Severe left atrial enlargement is present. Moderate right atrial enlargement is present.Severe tricuspid insufficiency is  present.  The ejection fraction is 50-55%  Moderate concentric wall thickening consistent with left ventricular hypertrophy. Lasix has been held, her home potassium is being held. She also has some hepatic congestion with mild elevation of  LFT's. Previously in 7/31/2023 ECHO she has mild tricuspid insufficiency, On 2/8 LFT's worsened, Direct bili elevated, discussed with Dr Trinidad he will see patient and recs for repeat RUQ US and HIDA. Also patient has worsening creatinine suspect 2nd from over diureses but cardiorenal syndrome on differential. I did start low dose IV hydration and monitoring urine out put closely.      Acute on chronic congestive heart failure with preserved ejection fraction:  Echo done-  patient did have EF 50 to 55% with some concentric LVH.  Right ventricle moderately dilated and has severe tricuspid insufficiency   She came in with diffuse, fluid however sats are fine was initiated on diuretic therapy with this she has had general decline in her renal function.  Her pressures have remained stable as has her oxygenation.  Her ACE inhibitor has been on hold.  Continued on her rate controlling drugs Metroprolol and diltiazem.  Unfortunate her weights are unreliable every day it is up-and-down with what appears to be inaccurate readings.  Going to repeat her echocardiogram today for a limited study just to assess her LV function but also RV function with PA pressure evaluation.  Based on all of her current findings it appears the patient needs some IV fluid.  Repeat echocardiogram showed LV function remains basically normal 55%.  Right ventricle is definitely enlarged and is moderate reduced systolic function.  There is perhaps some hint of flattening of the interventricular septum.  RV systolic pressure was probably 34+ right atrium which is estimated at about 8.  -2/10: So based upon her echo results she has normal function but evidence of right-sided heart failure.  Things are complicated by  her renal insufficiency post IV diuresis with IV Bumex and she is now put out a significant amount of urine overnight.  Potassium did drop down slightly but overall she is doing markedly better I think we will continue to push the diuresis now at this time.  Blood pressure is tolerating it fine.  Renal functions improving.-  2/11: Patient continues to diurese extremely well over 6 L yesterday.  She is down 2.6 kg.  Renal function continues to improve also.  However potassium is on the low side we will need to replace this.  Since she is tolerating it well we will continue with the aggressive IV diuresis.  She still has significant amount of edema on examination still for 3-4+ edema lower extremities with presacral edema noted also.  Oxygenation is good 93% blood pressure is 120/70.     MILDRED- Status post diureses  She was hydrated with low dose 50 ml per hour over 2/7 night and increased to 50cc per hour on 2/8  Monitor her creatine  UA done  NO UTI  ON NS at 50 ml per hour   Avoid all nephrotoxic meds  Place [place] to monitor strict I's and o's as she did lose some urine and missed the hat  -2/9: Patient presented with normal renal function creatinine 0.88 with a BUN of 17.  Daily it has worsened to today it is now 48.3/2.44.  Her urine output has decreased she has become more oliguric despite IV Lasix.  Potassium is 5.4.  Her FENA was calculated it is 0.49% with a urine sodium of less than 20 all this pointing more towards a prerenal state.  Hemodynamically she is very stable blood pressures in the 130 range.  Has never had hypotension.  Repeat urinalysis is pending.  Her CT on admission did not show any hydronephrosis.  Ultrasound done yesterday did look at her right kidney and she had no hydronephrosis at that point.  Her oxygenation is normal on room air.  Afebrile.  Echo done on admission showed basically normal systolic function with evidence of probable right-sided failure.  D-dimer was unremarkable.  At this  point unable to perform a CT angiogram due to her renal insufficiency.  She is on warfarin therapy.  Martin catheter was placed last night.  She was actually given 100 mg of Lasix this morning in addition to 40 mg that she got at 12:30 AM last evening.  She is extremely oliguric has only put 185 cc of urine.  She did note that she did get the CT with IV contrast renal function did start to worsen roughly 36 hours after this.  She really did not receive a significant amount of diuretic she got to 60 mg doses that first 24 hours and then nothing after that up until this morning when she got a total of 140 mg of IV Lasix.  Some questions does she have some component of contrast-induced renal failure and or secondary to his fairly severe right-sided heart failure.  She does have significant appears to be sleep apnea.  So wondering at this point if her worsening renal function is worsening her right heart failure picture.  -2/10: Martin catheter in place she put out 2375 cc of urine yesterday with a couple doses of the IV Bumex.  Creatinine is declining down to 2.28 today from 2.45 yesterday.  Potassium down from 5.6-3.8.  I think her renal insufficiency may have been   contrast-induced.  Will continue to push the diuresis watching her potassium and renal function blood pressure.  Patient still has significant amount of edema.  -2/11: Continues to diurese nicely 6 L yesterday she is on the IV Bumex.  Potassium is down will replace.  Renal function continues to improve her creatinine is now down to 1.45 BUN is 44.  Requires Martin catheter still.     Hyperkalemia 5.5  Continue to monitor closely above with her acute kidney injury.  -2/10: Down to 3.8.  Will likely need some replacement.  -2/11: With diuresis down to 2.7 will start oral placement.     Chronic atrial fibrillation/flutter with acute rapid ventricular response:   On Cardizem and metoprolol  Has declined anticoagulation prior- aware for stroke risk  I discuss  with her again regarding care and plan. ON 2/7 she did agree to start coumadin.   Pharmacy to manage coumadin dosing.  -2/9: Pulses been in the 60s.  She remains on the Cardizem and metoprolol.  -2/10: Rate continues to be stable blood pressure stable continue with the Cardizem metoprolol.  -2/11: Continues with adequate rate control blood pressure.  I have held her warfarin just because of the significant right-sided failure and diuresis likely will restart this perhaps tomorrow.  Will reassess.      Abdominal pain:  resolved   Distention  Had CT on admission showing  Trace free fluid in the abdomen, diffuse wall edema, and intralobular  septal thickening in the lung bases, compatible with anasarca or fluid  overload.There is mild diffuse gallbladder wall thickening which is likely due  to the trace free fluid.There is an apparent enhancing mass in the sigmoid colon measuring up  to 2.2 cm as well as more subtle focal thickening of the wall of the  sigmoid colon. Surgery consultation for possible colonoscopy is  recommended. There is a polypoid filling defect in the left posterior bladder  measuring up to 7 mm. Urology consultation for possible cystoscopy is  recommended.Nonspecific retroaortic lymph node in the lower chest measuring 12 mm  short axis. A CT of the chest could be considered for further  evaluation.   US was done of RUQ showing  Diffuse gallbladder wall thickening. No gallstones. No significant  bile duct dilation.   ON 2/8 discussed with Dr Trinidad as patient has worsening transaminitis and has elevated direct bili. Recs to repeat RUQ US which was done showing GB wall thickened and no stone. I did order HIDA scan as per further recommendation to rule out cholecystitis  -monitor labs  -2/9: Transaminases liver function test continue to rise slowly.  Alk phos is 208   total bilirubin is 2 with a direct of 1.4.  Plan is for a HIDA scan today to assess her gallbladder.  Ultrasound CT scans  have shown thickened gallbladder wall with no obvious signs of stone or obstruction.  Thought was this was secondary to volume/right-sided failure.  -2/10: Her HIDA scan was negative.  Feel elevated LFTs etc. all secondary to venous congestion.  -2/11: Liver function tests are slowly improving.        Diarrhea resolved   Reported on admission  Monitor   NO diarrhea reported in hospital      Sigmoid colon Lesion on CT:   Needs colonoscopy out patient  Malignancy not ruled out , discussed with patient she will need out patient evaluation when stable.       Coronary artery disease:   S/P ABG X 4    -echo done showing severe tricuspid insufficiency.,      Constipation  Prn senna  Scheduled miralax       Diet: 2 Gram Sodium Diet  Fluids: None    DVT Prophylaxis: Will likely restart warfarin tomorrow  Code Status: Full Code    Disposition: Expected discharge in 3-4 days once adequately diuresed.    Anastacio López MD    Interval History   Patient alert pleasant no distress denies any real discomfort no shortness of breath chest pain nausea vomiting.  Martin catheter is in place she put out 6 L of urine yesterday weight is down over 4-1/2 kg.  Renal function stable has improved down to 1.45 her creatinine.  Still has a significant amount of fluid on board however so we will continue with aggressive IV diuresis as long as her body allows us to do so.    -Data reviewed today: I reviewed all new labs and imaging results over the last 24 hours. I personally reviewed no images or EKG's today.    Physical Exam   Temp: (!) 96.5  F (35.8  C) Temp src: Tympanic BP: 117/74 Pulse: 69   Resp: 20 SpO2: 93 % O2 Device: None (Room air)    Vitals:    02/09/24 0438 02/10/24 0316 02/11/24 0353   Weight: 100.7 kg (222 lb 0.1 oz) 99.8 kg (220 lb 0.3 oz) 95.2 kg (209 lb 14.1 oz)     Vital Signs with Ranges  Temp:  [96.5  F (35.8  C)-98.2  F (36.8  C)] 96.5  F (35.8  C)  Pulse:  [61-69] 69  Resp:  [20-22] 20  BP: (103-130)/(65-74) 117/74  SpO2:   [90 %-95 %] 93 %  I/O last 3 completed shifts:  In: 540 [P.O.:540]  Out: 6900 [Urine:6900]    Peripheral IV 02/08/24 Anterior;Right Lower forearm (Active)   Site Assessment WDL 02/11/24 0500   Line Status Saline locked 02/11/24 0500   Dressing Transparent 02/11/24 0500   Dressing Status clean;dry;intact 02/11/24 0500   Line Intervention Flushed 02/11/24 0500   Phlebitis Scale 0-->no symptoms 02/11/24 0500   Infiltration? no 02/11/24 0500   Number of days: 3       Urethral Catheter 02/08/24 Double-lumen 16 fr (Active)   Tube Description Positional 02/11/24 0129   Catheter Care Catheter wipes 02/11/24 0129   Collection Container Standard 02/11/24 0129   Securement Method Securing device (Describe) 02/11/24 0129   Rationale for Continued Use Strict 1-2 Hour I&O 02/11/24 0129   Urine Output 500 mL 02/11/24 0510   Number of days: 3       Wound 07/11/20 Left Foot Other (comment) small hardened area on left heel (Active)   Number of days: 1310       Incision/Surgical Site 12/11/18 Right Chest (Active)   Number of days: 1888       Incision/Surgical Site 10/06/20 Right Chest (Active)   Number of days: 1223     No line/device    Constitutional: Alert and oriented x3. No distress    HEENT: Normocephalic/atraumatic, PERRL, EOMI, mouth moist, neck supple, no LN.     Cardiovascular: RRR.  No murmur, no  rubs, or gallops.  JVD unable.  Bruits no.  Pulses 2    Respiratory: Decreased at the bases a few crackles otherwise clear to auscultation bilaterally    Abdomen: Soft, nontender, nondistended, no organomegaly. Bowel sounds present    Extremities:   Warm/dry.  Still 4+ edema with presacral edema also edema    Neuro:   Non focal, cranial nerves intact, Moves all extremities.  Medications      [Held by provider] atorvastatin  40 mg Oral At Bedtime    bumetanide  2 mg Intravenous Q6H    diltiazem ER COATED BEADS  120 mg Oral Daily    [Held by provider] lisinopril  10 mg Oral Daily    metoprolol succinate ER  100 mg Oral Daily     miconazole   Topical TID    pantoprazole  40 mg Oral QAM AC    polyethylene glycol  17 g Oral Daily    [Held by provider] potassium chloride ER  40 mEq Oral TID    potassium chloride ER  10 mEq Oral Daily    sodium chloride (PF)  3 mL Intracatheter Q8H    [Held by provider] warfarin ANTICOAGULANT  3 mg Oral ONCE at 18:00    Warfarin Therapy Reminder  1 each Oral See Admin Instructions       Data   Recent Labs   Lab 02/11/24  0608 02/10/24  0609 02/09/24  1512 02/09/24  0506 02/05/24  1943 02/05/24  0825   WBC 9.4 9.3  --  11.8*   < > 7.6   HGB 12.6 12.3  --  13.2   < > 12.6   MCV 90 90  --  92   < > 92    245  --  274   < > 195   INR 1.60* 2.16*  --  1.82*   < > 1.14    135 131* 128*   < > 136   POTASSIUM 2.7* 3.8 5.6* 5.4*   < > 2.9*   CHLORIDE 96* 96* 95* 93*   < > 96*   CO2 30* 23 23 19*   < > 30*   BUN 44.3* 56.8* 52.7* 48.3*   < > 14.8   CR 1.45* 2.28* 2.45* 2.44*   < > 0.80   ANIONGAP 13 16* 13 16*   < > 10   CARINA 8.8 8.9 9.1 9.0   < > 9.0   * 120* 112* 85   < > 104*   ALBUMIN 3.5 3.6  --  3.6   < > 3.8   PROTTOTAL 6.5 6.4  --  6.4   < > 6.9   BILITOTAL 1.5* 1.5*  --  2.0*   < > 1.6*   ALKPHOS 229* 222*  --  208*   < > 174*   * 341*  --  306*   < > 16   * 437*  --  452*   < > 27   LIPASE  --   --   --   --   --  23    < > = values in this interval not displayed.       No results found for this or any previous visit (from the past 24 hour(s)).

## 2024-02-11 NOTE — PLAN OF CARE
Reason for hospital stay:  Generalized edema  Living situation PTA: home  Most recent vitals: /74 (BP Location: Right arm, Patient Position: Supine, Cuff Size: Adult Regular)   Pulse 69   Temp (!) 96.5  F (35.8  C) (Tympanic)   Resp 20   Wt 95.2 kg (209 lb 14.1 oz)   SpO2 93%   BMI 42.39 kg/m      Pain Management:  denies pain  LOC:  alert and oriented  Cardiac:  WDL  Respiratory:  Crackles   GI:  WNL  :  Martin, strict I + O , Bumex Q 6hr  Skin Issues:  Edema BLLE +3    IVF:  SL R  ABX:  none    Nutrition: 2 gram Na+    Safety: Assist x1, gait belt, bed/chair alarm, bed in low position, call light within reach    Face to face report given with opportunity to observe patient.    Report given to Dennis Tim RN   2/11/2024  6:02 AM

## 2024-02-12 NOTE — PLAN OF CARE
Patient alert and oriented. VS and assess as charted. Denied pain this shift. Tele taken off this shift. Patient's urinary output this shift was 3600 ml. New IV placed in left arm this shift. Patient had 2 BMs this shift. SBA. Patient up in chair. Call light and personal items within reach. Wheels locked. Alarm on. Makes needs known. Free from falls and injuries this shift.     Face to face report given with opportunity to observe patient.    Report given to Arti MAX RN & LORENA Echavarria.     Dennis Haas RN   2/11/2024  8:05 PM

## 2024-02-12 NOTE — PHARMACY-ANTICOAGULATION SERVICE
Pharmacy Consult-Warfarin Assessment Day #5    Heather Rsoas is a 73 year old female admitted on 2/5/2024 with Generalized edema due to fluid overload    Primary Indication(s) for Anticoagulation: A.fib    Goal INR: 2-3    FYI, patient is followed by the Anticoagulation[facility] at:  [facility]; patient would like [facility] ([place]) upon discharge     Patient previously anticoagulated on Coumadin in 2023 on a dose of 2.5 mg every Mon, Wed, Fri; 5 mg all other days      Home tablet strength(s): none (new start)    Factors that may increase patient's bleeding risk and/or sensitivity to warfarin (Coumadin) include: Advanced Age, elevated LFTs, Miconazole, Elevated baseline INR     Factors that may decrease patient's bleeding risk and/or sensitivity to warfarin (Coumadin) include:      Dietary Considerations:      Anticoagulation Dose History  More data exists         INR Goal 2/5/2024 2/7/2024 2/8/2024 2/9/2024 2/10/2024 2/11/2024 2/12/2024   Recent Dosing and Labs   warfarin ANTICOAGULANT (COUMADIN) 3 MG tablet - - 3 mg, $Given 3 mg, $Given - - - -   INR 2-3 1.14  1.46  1.67  1.82  2.16  1.60  1.38      CBC RESULTS:   Recent Labs   Lab Test 02/12/24  0508   HGB 12.8          Assessment/Plan: Give warfarin 3 mg today, starting in the morning, in order to get INR to goal range faster.    Thank You for the Consult. Will continue to follow.    Amanda Zelaya [facility] ....................  2/12/2024   8:51 AM

## 2024-02-12 NOTE — PROGRESS NOTES
Checked in with Heather and son, Valentin.  Confirmed her dog, Baljeet is being cared for.  Valentin and daughter in law, Gely inquired about additional assistance for Heather at home.  Plan to continue to discuss during hospital stay.

## 2024-02-12 NOTE — PLAN OF CARE
Reason for hospital stay:  generalized edema due to fluid overload  Living situation PTA: home  Most recent vitals: /68 (BP Location: Right arm, Patient Position: Supine, Cuff Size: Adult Regular)   Pulse 68   Temp 97.6  F (36.4  C) (Tympanic)   Resp 24   Wt 95.2 kg (209 lb 14.1 oz)   SpO2 94%   BMI 42.39 kg/m      Pain Management:  denies pain  LOC:  alert and oriented  Cardiac:  Irregular rhythm  Respiratory:  LS- diminished with fine crackles  :  Martin cath/ strict I and O  GI:  WNL  Skin Issues:  scattered bruising, edema BLLE +3/ general edema +2, rash under Panus and groin areas    IVF:  SL     Bumex Q 6hr    Nutrition:     Ambulation:up with gait belt+ standby assist  Safety:  call light within reach, bed in low position, bed/chair alarm on.      Face to face report given with opportunity to observe patient.    Report given to Vickie Tim RN   2/12/2024  7:34 AM

## 2024-02-12 NOTE — PLAN OF CARE
Pt A&O x 4 this shift. Pt denies pain this shift. Pt afebrile, and remains on room air. Lung sounds are diminished.   Martin catheter remains in place for I&O's this shift. Output is straw yellow. 3,100 mL output this shift.   Pt standby assist with gait belt this shift for transfers.     Face to face report given with opportunity to observe patient.    Report given to LORENA Triana.     Vickie Gonzalez RN   2/12/2024  7:13 PM

## 2024-02-12 NOTE — PROGRESS NOTES
[facility]    Hospitalist Progress Note       History of Present Illness  Heather Rosas is a 73 year old female who presented to emergency room with complaints of increasing shortness of breath fluid retention with stomach bloating and some diarrhea.  She does have a significant history of chronic atrial flutter/fibrillation.  Has not been very compliant with her medications including anticoagulation.  She had been on Coumadin however did not follow-up with the Coumadin clinic just he is coming in to have to get her blood checked.  Also has a history of heart failure with reduced ejection fraction once again has not been very compliant with her diuretics at all.  She does not have a primary care provider at all.  1 was set up for after her last hospital stay which was back in September 2023.  However she failed that appointment.  She is not the greatest historian about things.  Says she weighs herself daily but really has not gained any weight.  Denies any chest pain she has been short of breath no fevers shaking chills or sweats that she is aware of no bleeding troubles at all.  Has had some loose stools over the last couple of days.  No palpitations or syncope.  Really cannot tell if her heart rate is going fast or not.  Has noted more abdominal bloating and lower extremity edema.  She does live by herself.  She is  does have a son who lives up on [address].     Upon arrival to the ER her initial blood pressure 142/108 pulse was 117 sats are 93% on room air she was afebrile 97.8.  She was noted to be in a fib flutter.  She was given 1 dose of IV diltiazem then put on oral diltiazem along with oral metoprolol.  Rate did come down better.  She was also given 40 mg of IV Lasix in the ER.  Her labs are significant for potassium 2.9 she had normal renal function creatinine 0.8 LFTs were normal glucose is 104 BNP was 3088 troponin was 31 TSH 3.24.  White count 7600 hemoglobin was 12.6 platelet  count 195,000.  1.  She did complain of some abdominal discomfort.  CT scan with IV contrast was performed of her abdomen and pelvis and ultrasound there was some gallbladder wall thickening and felt maybe this is secondary to some increased volume started anasarca which are CT scan did suggest.  The CT did show some diffuse wall edema consistent with some anasarca fluid overload did have a enhancing mass in the sigmoid colon about 2.2 cm.  Also was a polypoid filling defect in left posterior bladder measuring up to 7 mm recommended urology consultation at some point.  Surgery did see the patient did not feel there was any evidence of acute cholecystitis.  A lot of the edema is felt to be secondary to overall volume overload.  Her last echocardiogram was back in June 2023 she had an EF from 55 to 60% some moderate LVH moderate left atrial enlargement, she was in A-fib at that time.           Assessment & Plan  Patient is sierra Rosas is a 73 year old female admitted on 2/5/2024. She send for evaluation of increasing shortness of breath and abdominal bloating and edema. She was given IV lasix for suspected CHF and exacerbation. She  has been making urine. She did have imaging in ER showing colon mass likely and need colonoscopy/. She also had finding of olypoid filling defect in the left posterior bladder  measuring up to 7 mm. Needs urology out patent for cystoscopy as malignancy not ruled out., she has been non compliant with meds and has not taken coumadin for Afib., Cannot afford DOACs, and refused coumadin. She was  on diuretics but we are monitoring closely as she is softer with BP;s. ON 2/7 her creatine worsened,d she had hyperkalemia and urine was dark ez. I did order 250 ml of NS to give over 10 hours to re-eval her creatine in morning. She also did have ECHO done showing increased  Severe left atrial enlargement is present. Moderate right atrial enlargement is present.Severe tricuspid insufficiency  is present.  The ejection fraction is 50-55%  Moderate concentric wall thickening consistent with left ventricular hypertrophy. Lasix has been held, her home potassium is being held. She also has some hepatic congestion with mild elevation of  LFT's. Previously in 7/31/2023 ECHO she has mild tricuspid insufficiency, On 2/8 LFT's worsened, Direct bili elevated, discussed with Dr Trinidad he will see patient and recs for repeat RUQ US and HIDA. Also patient has worsening creatinine suspect 2nd from over diureses but cardiorenal syndrome on differential. I did start low dose IV hydration and monitoring urine out put closely.      Acute on chronic congestive heart failure with preserved ejection fraction:  Echo done-  patient did have EF 50 to 55% with some concentric LVH.  Right ventricle moderately dilated and has severe tricuspid insufficiency   She came in with diffuse, fluid however sats are fine was initiated on diuretic therapy with this she has had general decline in her renal function.  Her pressures have remained stable as has her oxygenation.  Her ACE inhibitor has been on hold.  Continued on her rate controlling drugs Metroprolol and diltiazem.  Unfortunate her weights are unreliable every day it is up-and-down with what appears to be inaccurate readings.  Going to repeat her echocardiogram today for a limited study just to assess her LV function but also RV function with PA pressure evaluation.  Based on all of her current findings it appears the patient needs some IV fluid.  Repeat echocardiogram showed LV function remains basically normal 55%.  Right ventricle is definitely enlarged and is moderate reduced systolic function.  There is perhaps some hint of flattening of the interventricular septum.  RV systolic pressure was probably 34+ right atrium which is estimated at about 8.  -2/10: So based upon her echo results she has normal function but evidence of right-sided heart failure.  Things are complicated  by her renal insufficiency post IV diuresis with IV Bumex and she is now put out a significant amount of urine overnight.  Potassium did drop down slightly but overall she is doing markedly better I think we will continue to push the diuresis now at this time.  Blood pressure is tolerating it fine.  Renal functions improving.-  2/11: Patient continues to diurese extremely well over 6 L yesterday.  She is down 2.6 kg.  Renal function continues to improve also.  However potassium is on the low side we will need to replace this.  Since she is tolerating it well we will continue with the aggressive IV diuresis.  She still has significant amount of edema on examination still for 3-4+ edema lower extremities with presacral edema noted also.  Oxygenation is good 93% blood pressure is 120/70.  -2/12: Continue to push diuresis.  Patient was 7 L out.  Blood pressure staying 105-120 systolic range.  Her weight is 90.3 kg today.  Down 5 kg from yesterday.  And down 23 pounds since her admission.  She still has significant amount of edema.  Markedly less now presacral.  Lower extremity still have 4+ edema.  Awaiting the lab results.  Her potassium was low yesterday have been replacing.  Also want to see what her renal function is doing it had been coming down nicely but there will be a point we will have to start to back off.  Finally got her labs back her potassium is 2.5.  Somehow her oral replacement got put on hold but restarted that here today creatinine has continued to come down quite nicely.  On bicarb is going up is getting some contraction alkalosis.  Will cut back on the amount of Bumex were giving her currently 2 every 8 hours and continue to monitor closely she still has significant amount of edema present.     MILDRED- Status post diureses  She was hydrated with low dose 50 ml per hour over 2/7 night and increased to 50cc per hour on 2/8  Monitor her creatine  UA done  NO UTI  ON NS at 50 ml per hour   Avoid all  nephrotoxic meds  Place [place] to monitor strict I's and o's as she did lose some urine and missed the hat  -2/9: Patient presented with normal renal function creatinine 0.88 with a BUN of 17.  Daily it has worsened to today it is now 48.3/2.44.  Her urine output has decreased she has become more oliguric despite IV Lasix.  Potassium is 5.4.  Her FENA was calculated it is 0.49% with a urine sodium of less than 20 all this pointing more towards a prerenal state.  Hemodynamically she is very stable blood pressures in the 130 range.  Has never had hypotension.  Repeat urinalysis is pending.  Her CT on admission did not show any hydronephrosis.  Ultrasound done yesterday did look at her right kidney and she had no hydronephrosis at that point.  Her oxygenation is normal on room air.  Afebrile.  Echo done on admission showed basically normal systolic function with evidence of probable right-sided failure.  D-dimer was unremarkable.  At this point unable to perform a CT angiogram due to her renal insufficiency.  She is on warfarin therapy.  [place] catheter was placed last night.  She was actually given 100 mg of Lasix this morning in addition to 40 mg that she got at 12:30 AM last evening.  She is extremely oliguric has only put 185 cc of urine.  She did note that she did get the CT with IV contrast renal function did start to worsen roughly 36 hours after this.  She really did not receive a significant amount of diuretic she got to 60 mg doses that first 24 hours and then nothing after that up until this morning when she got a total of 140 mg of IV Lasix.  Some questions does she have some component of contrast-induced renal failure and or secondary to his fairly severe right-sided heart failure.  She does have significant appears to be sleep apnea.  So wondering at this point if her worsening renal function is worsening her right heart failure picture.  -2/10: [place] catheter in place she put out 2375 cc of urine yesterday  with a couple doses of the IV Bumex.  Creatinine is declining down to 2.28 today from 2.45 yesterday.  Potassium down from 5.6-3.8.  I think her renal insufficiency may have been   contrast-induced.  Will continue to push the diuresis watching her potassium and renal function blood pressure.  Patient still has significant amount of edema.  -2/11: Continues to diurese nicely 6 L yesterday she is on the IV Bumex.  Potassium is down will replace.  Renal function continues to improve her creatinine is now down to 1.45 BUN is 44.  Requires Martin catheter still.  -2/12: Creatinine is down today to 1.03.  Potassium was 2.4 so are replacing this.  Will cut back on the Bumex to every 8 hours continue to monitor closely.     Hyperkalemia 5.5  Continue to monitor closely above with her acute kidney injury.  -2/10: Down to 3.8.  Will likely need some replacement.  -2/11: With diuresis down to 2.7 will start oral placement.     Chronic atrial fibrillation/flutter with acute rapid ventricular response:   On Cardizem and metoprolol  Has declined anticoagulation prior- aware for stroke risk  I discuss with her again regarding care and plan. ON 2/7 she did agree to start coumadin.   Pharmacy to manage coumadin dosing.  -2/9: Pulses been in the 60s.  She remains on the Cardizem and metoprolol.  -2/10: Rate continues to be stable blood pressure stable continue with the Cardizem metoprolol.  -2/11: Continues with adequate rate control blood pressure.  I have held her warfarin just because of the significant right-sided failure and diuresis likely will restart this perhaps tomorrow.  Will reassess.  -2/12: Will restart her warfarin today.        Abdominal pain:  resolved   Distention  Had CT on admission showing  Trace free fluid in the abdomen, diffuse wall edema, and intralobular  septal thickening in the lung bases, compatible with anasarca or fluid  overload.There is mild diffuse gallbladder wall thickening which is likely due  to  the trace free fluid.There is an apparent enhancing mass in the sigmoid colon measuring up  to 2.2 cm as well as more subtle focal thickening of the wall of the  sigmoid colon. Surgery consultation for possible colonoscopy is  recommended. There is a polypoid filling defect in the left posterior bladder  measuring up to 7 mm. Urology consultation for possible cystoscopy is  recommended.Nonspecific retroaortic lymph node in the lower chest measuring 12 mm  short axis. A CT of the chest could be considered for further  evaluation.   US was done of RUQ showing  Diffuse gallbladder wall thickening. No gallstones. No significant  bile duct dilation.   ON 2/8 discussed with Dr Trinidad as patient has worsening transaminitis and has elevated direct bili. Recs to repeat RUQ US which was done showing GB wall thickened and no stone. I did order HIDA scan as per further recommendation to rule out cholecystitis  -monitor labs  -2/9: Transaminases liver function test continue to rise slowly.  Alk phos is 208   total bilirubin is 2 with a direct of 1.4.  Plan is for a HIDA scan today to assess her gallbladder.  Ultrasound CT scans have shown thickened gallbladder wall with no obvious signs of stone or obstruction.  Thought was this was secondary to volume/right-sided failure.  -2/10: Her HIDA scan was negative.  Feel elevated LFTs etc. all secondary to venous congestion.  -2/11: Liver function tests are slowly improving.  -2/12: Abdomen is benign still obese still has fluid tolerating diet stooling.  Feel there is no evidence of any intra-abdominal infection.     Diarrhea resolved   Reported on admission  Monitor   NO diarrhea reported in hospital      Sigmoid colon Lesion on CT:   Needs colonoscopy out patient  Malignancy not ruled out , discussed with patient she will need out patient evaluation when stable.       Coronary artery disease:   S/P ABG X 4    -echo done showing severe tricuspid insufficiency.,       Constipation  Prn senna  Scheduled miralax    Diet: 2 Gram Sodium Diet  Fluids: None    DVT Prophylaxis: Warfarin  Code Status: Full Code    Disposition: Expected discharge in 2-4 days once adequately diuresed.    Anastacio López MD    Interval History   Alert pleasant denies anything really.  No shortness of breath chest pain nausea vomiting abdominal pain.  Cannula tired of being in the hospital but once again explained the significant diuresis that we are achieving.  Will continue to push this until we cannot any longer.  She still with a lot of excess fluid on board.    -Data reviewed today: I reviewed all new labs and imaging results over the last 24 hours. I personally reviewed no images or EKG's today.    Physical Exam   Temp: 97  F (36.1  C) Temp src: Tympanic BP: 105/62 Pulse: 68   Resp: 20 SpO2: 92 % O2 Device: None (Room air)    Vitals:    02/09/24 0438 02/10/24 0316 02/11/24 0353   Weight: 100.7 kg (222 lb 0.1 oz) 99.8 kg (220 lb 0.3 oz) 95.2 kg (209 lb 14.1 oz)     Vital Signs with Ranges  Temp:  [96.4  F (35.8  C)-98  F (36.7  C)] 97  F (36.1  C)  Pulse:  [63-72] 68  Resp:  [20-24] 20  BP: (105-123)/(62-84) 105/62  SpO2:  [92 %-97 %] 92 %  I/O last 3 completed shifts:  In: 540 [P.O.:540]  Out: 6875 [Urine:6875]    Peripheral IV 02/11/24 Distal;Left;Posterior Lower forearm (Active)   Site Assessment WDL 02/12/24 0500   Line Status Saline locked 02/12/24 0500   Dressing Transparent 02/12/24 0500   Dressing Status clean;dry;intact 02/12/24 0500   Line Intervention Flushed 02/12/24 0500   Phlebitis Scale 0-->no symptoms 02/12/24 0500   Infiltration? no 02/12/24 0500   Number of days: 1       Urethral Catheter 02/08/24 Double-lumen 16 fr (Active)   Tube Description Positional 02/12/24 0500   Catheter Care Catheter wipes 02/12/24 0500   Collection Container Standard 02/12/24 0500   Securement Method Securing device (Describe) 02/12/24 0500   Rationale for Continued Use Strict 1-2 Hour I&O 02/12/24 0500    Urine Output 1200 mL 02/12/24 0500   Number of days: 4       Wound 07/11/20 Left Foot Other (comment) small hardened area on left heel (Active)   Number of days: 1311       Incision/Surgical Site 12/11/18 Right Chest (Active)   Number of days: 1889       Incision/Surgical Site 10/06/20 Right Chest (Active)   Number of days: 1224     No line/device    Constitutional: Alert and oriented x3. No distress    HEENT: Normocephalic/atraumatic, PERRL, EOMI, mouth moist, neck supple, no LN.     Cardiovascular: Occasionally irregular RRR.  No murmur, no  rubs, or gallops.  JVD unable.  Bruits no.  Pulses 2    Respiratory: Clear to auscultation bilaterally    Abdomen: Soft, nontender, nondistended, no organomegaly. Bowel sounds present.  The presacral edema has decreased as has some of her abdominal wall edema.    Extremities: Warm/dry.  Still 4+ edema    Neuro:   Non focal, cranial nerves intact, Moves all extremities.  Medications      [Held by provider] atorvastatin  40 mg Oral At Bedtime    bumetanide  2 mg Intravenous Q6H    diltiazem ER COATED BEADS  120 mg Oral Daily    [Held by provider] lisinopril  10 mg Oral Daily    metoprolol succinate ER  100 mg Oral Daily    miconazole   Topical TID    pantoprazole  40 mg Oral QAM AC    polyethylene glycol  17 g Oral Daily    [Held by provider] potassium chloride ER  40 mEq Oral TID    potassium chloride ER  10 mEq Oral Daily    sodium chloride (PF)  3 mL Intracatheter Q8H    Warfarin Therapy Reminder  1 each Oral See Admin Instructions       Data   Recent Labs   Lab 02/12/24  0508 02/11/24  0608 02/10/24  0609 02/09/24  1512 02/05/24  1943 02/05/24  0825   WBC 9.1 9.4 9.3  --    < > 7.6   HGB 12.8 12.6 12.3  --    < > 12.6   MCV 89 90 90  --    < > 92    236 245  --    < > 195   INR 1.38* 1.60* 2.16*  --    < > 1.14   NA  --  139 135 131*   < > 136   POTASSIUM  --  2.7* 3.8 5.6*   < > 2.9*   CHLORIDE  --  96* 96* 95*   < > 96*   CO2  --  30* 23 23   < > 30*   BUN  --   44.3* 56.8* 52.7*   < > 14.8   CR  --  1.45* 2.28* 2.45*   < > 0.80   ANIONGAP  --  13 16* 13   < > 10   CARINA  --  8.8 8.9 9.1   < > 9.0   GLC  --  107* 120* 112*   < > 104*   ALBUMIN  --  3.5 3.6  --    < > 3.8   PROTTOTAL  --  6.5 6.4  --    < > 6.9   BILITOTAL  --  1.5* 1.5*  --    < > 1.6*   ALKPHOS  --  229* 222*  --    < > 174*   ALT  --  328* 341*  --    < > 16   AST  --  344* 437*  --    < > 27   LIPASE  --   --   --   --   --  23    < > = values in this interval not displayed.       No results found for this or any previous visit (from the past 24 hour(s)).

## 2024-02-13 NOTE — PLAN OF CARE
Reason for hospital stay:  Generalized edema due to fluid overload   Living situation PTA: Home  Most recent vitals: /62 (BP Location: Right arm, Patient Position: Sitting, Cuff Size: Adult Regular)   Pulse 81   Temp 98.2  F (36.8  C) (Temporal)   Resp 20   Wt 88.8 kg (195 lb 12.3 oz)   SpO2 93%   BMI 39.54 kg/m      Pain Management:  Patient denied pain overnight  LOC:  A&O x4  Cardiac:  Apical pulse irregular and distant, hx of afib - PO warfarin. 3+ edema to BLE  Respiratory:  Maintaining sats on room air. Lung sounds clear and equal bilaterally but diminished in bilateral lower lobes.   GI:  Bowel sounds audible and normoactive  :  Martin catheter in place for strict I&O monitoring. Bumex given x2 overnight, adequate output noted.  Skin Issues:  Scattered bruising and scabs     IVF:  SL  ABX:  None    Nutrition: 2 g NA diet  ADL's:  Independent with minimal assist  Ambulation: Stand by assist with gait belt  Safety:  Alarms active and audible, call light within reach, lighting adjusted, nonskid footwear in use, bed in lowest position, wheels locked, room near unit station, door open.    Face to face report given with opportunity to observe patient.    Report given to LORENA Johnston RN   2/13/2024  7:10 AM

## 2024-02-13 NOTE — PROGRESS NOTES
[facility]    Hospitalist Progress Note    History of Present Illness  Heather Rosas is a 73 year old female who presented to emergency room with complaints of increasing shortness of breath fluid retention with stomach bloating and some diarrhea.  She does have a significant history of chronic atrial flutter/fibrillation.  Has not been very compliant with her medications including anticoagulation.  She had been on Coumadin however did not follow-up with the Coumadin clinic just he is coming in to have to get her blood checked.  Also has a history of heart failure with reduced ejection fraction once again has not been very compliant with her diuretics at all.  She does not have a primary care provider at all.  1 was set up for after her last hospital stay which was back in September 2023.  However she failed that appointment.  She is not the greatest historian about things.  Says she weighs herself daily but really has not gained any weight.  Denies any chest pain she has been short of breath no fevers shaking chills or sweats that she is aware of no bleeding troubles at all.  Has had some loose stools over the last couple of days.  No palpitations or syncope.  Really cannot tell if her heart rate is going fast or not.  Has noted more abdominal bloating and lower extremity edema.  She does live by herself.  She is  does have a son who lives up on [address] [facility] [address] [facility].     Upon arrival to the ER her initial blood pressure 142/108 pulse was 117 sats are 93% on room air she was afebrile 97.8.  She was noted to be in a fib flutter.  She was given 1 dose of IV diltiazem then put on oral diltiazem along with oral metoprolol.  Rate did come down better.  She was also given 40 mg of IV Lasix in the ER.  Her labs are significant for potassium 2.9 she had normal renal function creatinine 0.8 LFTs were normal glucose is 104 BNP was 3088 troponin was 31 TSH 3.24.  White count 7600 hemoglobin was 12.6 platelet count  195,000.    She did complain of some abdominal discomfort.  CT scan with IV contrast was performed of her abdomen and pelvis and ultrasound there was some gallbladder wall thickening and felt maybe this is secondary to some increased volume started anasarca which are CT scan did suggest.  The CT did show some diffuse wall edema consistent with some anasarca fluid overload did have a enhancing mass in the sigmoid colon about 2.2 cm.  Also was a polypoid filling defect in left posterior bladder measuring up to 7 mm recommended urology consultation at some point.  Surgery did see the patient did not feel there was any evidence of acute cholecystitis.  A lot of the edema is felt to be secondary to overall volume overload.  Her last echocardiogram was back in June 2023 she had an EF from 55 to 60% some moderate LVH moderate left atrial enlargement, she was in A-fib at that time.           Assessment & Plan  Patient is sierra Rosas is a 73 year old female admitted on 2/5/2024. She send for evaluation of increasing shortness of breath and abdominal bloating and edema. She was given IV lasix for suspected CHF and exacerbation. She  has been making urine. She did have imaging in ER showing colon mass likely and need colonoscopy/. She also had finding of olypoid filling defect in the left posterior bladder  measuring up to 7 mm. Needs urology out patent for cystoscopy as malignancy not ruled out., she has been non compliant with meds and has not taken coumadin for Afib., Cannot afford DOACs, and refused coumadin. She was  on diuretics but we are monitoring closely as she is softer with BP;s. ON 2/7 her creatine worsened,d she had hyperkalemia and urine was dark ez. I did order 250 ml of NS to give over 10 hours to re-eval her creatine in morning. She also did have ECHO done showing increased  Severe left atrial enlargement is present. Moderate right atrial enlargement is present.Severe tricuspid insufficiency is  present.  The ejection fraction is 50-55%  Moderate concentric wall thickening consistent with left ventricular hypertrophy. Lasix has been held, her home potassium is being held. She also has some hepatic congestion with mild elevation of  LFT's. Previously in 7/31/2023 ECHO she has mild tricuspid insufficiency, On 2/8 LFT's worsened, Direct bili elevated, discussed with Dr Trinidad he will see patient and recs for repeat RUQ US and HIDA. Also patient has worsening creatinine suspect 2nd from over diureses but cardiorenal syndrome on differential. I did start low dose IV hydration and monitoring urine out put closely.      Acute on chronic congestive heart failure with preserved ejection fraction:  Echo done-  patient did have EF 50 to 55% with some concentric LVH.  Right ventricle moderately dilated and has severe tricuspid insufficiency   She came in with diffuse, fluid however sats are fine was initiated on diuretic therapy with this she has had general decline in her renal function.  Her pressures have remained stable as has her oxygenation.  Her ACE inhibitor has been on hold.  Continued on her rate controlling drugs Metroprolol and diltiazem.  Unfortunate her weights are unreliable every day it is up-and-down with what appears to be inaccurate readings.  Going to repeat her echocardiogram today for a limited study just to assess her LV function but also RV function with PA pressure evaluation.  Based on all of her current findings it appears the patient needs some IV fluid.  Repeat echocardiogram showed LV function remains basically normal 55%.  Right ventricle is definitely enlarged and is moderate reduced systolic function.  There is perhaps some hint of flattening of the interventricular septum.  RV systolic pressure was probably 34+ right atrium which is estimated at about 8.  -2/10: So based upon her echo results she has normal function but evidence of right-sided heart failure.  Things are complicated by  her renal insufficiency post IV diuresis with IV Bumex and she is now put out a significant amount of urine overnight.  Potassium did drop down slightly but overall she is doing markedly better I think we will continue to push the diuresis now at this time.  Blood pressure is tolerating it fine.  Renal functions improving.-  2/11: Patient continues to diurese extremely well over 6 L yesterday.  She is down 2.6 kg.  Renal function continues to improve also.  However potassium is on the low side we will need to replace this.  Since she is tolerating it well we will continue with the aggressive IV diuresis.  She still has significant amount of edema on examination still for 3-4+ edema lower extremities with presacral edema noted also.  Oxygenation is good 93% blood pressure is 120/70.  -2/12: Continue to push diuresis.  Patient was 7 L out.  Blood pressure staying 105-120 systolic range.  Her weight is 90.3 kg today.  Down 5 kg from yesterday.  And down 23 pounds since her admission.  She still has significant amount of edema.  Markedly less now presacral.  Lower extremity still have 4+ edema.  Awaiting the lab results.  Her potassium was low yesterday have been replacing.  Also want to see what her renal function is doing it had been coming down nicely but there will be a point we will have to start to back off.  Finally got her labs back her potassium is 2.5.  Somehow her oral replacement got put on hold but restarted that here today creatinine has continued to come down quite nicely.  On bicarb is going up is getting some contraction alkalosis.  Will cut back on the amount of Bumex were giving her currently 2 every 8 hours and continue to monitor closely she still has significant amount of edema present.    9-2/13: Continues to respond nicely to the IV Bumex.  5 and half liters out yesterday.  Weight is down to 88.8 kg.  Down another 1.5.  Her renal functions holding its back down to her baseline with a creatinine  of 0.89 BUN is 24 she is getting little contraction alkalosis bicarb is up to 38.  I cut back on the diuretics to every 8 hours 2 mg of Bumex.  Will give her another 24 hours of this likely try and switch over to orals that she is getting a little and see about going home.  Her lower extremities are now softer I can actually grasp and without seeming like a melon.  Unfortunate potassium has been a problem.  Is up to 2.4 over today.  She will need to be switched over to oral diuretics.  I consider this possibly could be attempted tomorrow.  However she continues to diurese nicely may be contacted and is staying for another day or 2.  This is all more right-sided heart failure than anything else.     MILDRED- Status post diureses  She was hydrated with low dose 50 ml per hour over 2/7 night and increased to 50cc per hour on 2/8  Monitor her creatine  UA done  NO UTI  ON NS at 50 ml per hour   Avoid all nephrotoxic meds  Place [place] to monitor strict I's and o's as she did lose some urine and missed the hat  -2/9: Patient presented with normal renal function creatinine 0.88 with a BUN of 17.  Daily it has worsened to today it is now 48.3/2.44.  Her urine output has decreased she has become more oliguric despite IV Lasix.  Potassium is 5.4.  Her FENA was calculated it is 0.49% with a urine sodium of less than 20 all this pointing more towards a prerenal state.  Hemodynamically she is very stable blood pressures in the 130 range.  Has never had hypotension.  Repeat urinalysis is pending.  Her CT on admission did not show any hydronephrosis.  Ultrasound done yesterday did look at her right kidney and she had no hydronephrosis at that point.  Her oxygenation is normal on room air.  Afebrile.  Echo done on admission showed basically normal systolic function with evidence of probable right-sided failure.  D-dimer was unremarkable.  At this point unable to perform a CT angiogram due to her renal insufficiency.  She is on warfarin  therapy.  Martin catheter was placed last night.  She was actually given 100 mg of Lasix this morning in addition to 40 mg that she got at 12:30 AM last evening.  She is extremely oliguric has only put 185 cc of urine.  She did note that she did get the CT with IV contrast renal function did start to worsen roughly 36 hours after this.  She really did not receive a significant amount of diuretic she got to 60 mg doses that first 24 hours and then nothing after that up until this morning when she got a total of 140 mg of IV Lasix.  Some questions does she have some component of contrast-induced renal failure and or secondary to his fairly severe right-sided heart failure.  She does have significant appears to be sleep apnea.  So wondering at this point if her worsening renal function is worsening her right heart failure picture.  -2/10: Martin catheter in place she put out 2375 cc of urine yesterday with a couple doses of the IV Bumex.  Creatinine is declining down to 2.28 today from 2.45 yesterday.  Potassium down from 5.6-3.8.  I think her renal insufficiency may have been   contrast-induced.  Will continue to push the diuresis watching her potassium and renal function blood pressure.  Patient still has significant amount of edema.  -2/11: Continues to diurese nicely 6 L yesterday she is on the IV Bumex.  Potassium is down will replace.  Renal function continues to improve her creatinine is now down to 1.45 BUN is 44.  Requires Martin catheter still.  -2/12: Creatinine is down today to 1.03.  Potassium was 2.4 so are replacing this.  Will cut back on the Bumex to every 8 hours continue to monitor closely.  -2/13: BUN/creatinine 24/0.89 potassium 3.4.  Continue with the IV Bumex and potassium replacement.  Martin catheter is in keep this in until were done.     Hyperkalemia 5.5  Continue to monitor closely above with her acute kidney injury.  -2/10: Down to 3.8.  Will likely need some replacement.  -2/11: With diuresis  down to 2.7 will start oral placement.  -2/13: As above replacing 3.1 today.     Chronic atrial fibrillation/flutter with acute rapid ventricular response:   On Cardizem and metoprolol  Has declined anticoagulation prior- aware for stroke risk  I discuss with her again regarding care and plan. ON 2/7 she did agree to start coumadin.   Pharmacy to manage coumadin dosing.  -2/9: Pulses been in the 60s.  She remains on the Cardizem and metoprolol.  -2/10: Rate continues to be stable blood pressure stable continue with the Cardizem metoprolol.  -2/11: Continues with adequate rate control blood pressure.  I have held her warfarin just because of the significant right-sided failure and diuresis likely will restart this perhaps tomorrow.  Will reassess.  -2/12: Will restart her warfarin today.  -2/13: Restarted warfarin.  INR 1.35 today.        Abdominal pain:  resolved   Distention  Had CT on admission showing  Trace free fluid in the abdomen, diffuse wall edema, and intralobular  septal thickening in the lung bases, compatible with anasarca or fluid  overload.There is mild diffuse gallbladder wall thickening which is likely due  to the trace free fluid.There is an apparent enhancing mass in the sigmoid colon measuring up  to 2.2 cm as well as more subtle focal thickening of the wall of the  sigmoid colon. Surgery consultation for possible colonoscopy is  recommended. There is a polypoid filling defect in the left posterior bladder  measuring up to 7 mm. Urology consultation for possible cystoscopy is  recommended.Nonspecific retroaortic lymph node in the lower chest measuring 12 mm  short axis. A CT of the chest could be considered for further  evaluation.   US was done of RUQ showing  Diffuse gallbladder wall thickening. No gallstones. No significant  bile duct dilation.   ON 2/8 discussed with Dr Trinidad as patient has worsening transaminitis and has elevated direct bili. Recs to repeat RUQ US which was done showing GB  wall thickened and no stone. I did order HIDA scan as per further recommendation to rule out cholecystitis  -monitor labs  -2/9: Transaminases liver function test continue to rise slowly.  Alk phos is 208   total bilirubin is 2 with a direct of 1.4.  Plan is for a HIDA scan today to assess her gallbladder.  Ultrasound CT scans have shown thickened gallbladder wall with no obvious signs of stone or obstruction.  Thought was this was secondary to volume/right-sided failure.  -2/10: Her HIDA scan was negative.  Feel elevated LFTs etc. all secondary to venous congestion.  -2/11: Liver function tests are slowly improving.  -2/12: Abdomen is benign still obese still has fluid tolerating diet stooling.  Feel there is no evidence of any intra-abdominal infection.  -2/13: Abdomen is soft.  Her liver function test continue to decline.  Feel this is all secondary to right heart failure.     Diarrhea resolved   Reported on admission  Monitor   NO diarrhea reported in hospital      Sigmoid colon Lesion on CT:   Needs colonoscopy out patient  Malignancy not ruled out , discussed with patient she will need out patient evaluation when stable.       Coronary artery disease:   S/P ABG X 4    -echo done showing severe tricuspid insufficiency.,             Diet: 2 Gram Sodium Diet  Fluids: None    DVT Prophylaxis: Warfarin  Code Status: Full Code    Disposition: Expected discharge in 2-3 days once adequately diuresed and on oral diuretics.    Anastacio López MD    Interval History   Patient is a little bit more crabby today.  Wants to go home.  Does not like taking the potassium replacement.  Did put in with applesauce which is more tolerable for her.  Still continues to diurese have cut back on the amount of Bumex.  Still put on over 5 L yesterday.  Will give another day consideration of changing to orals tomorrow.  Issues that still need to be addressed are she likely needs colonoscopy due to potential mass in the  sigmoid colon on CT scan also a polypoid lesion on her bladder on the CT scan does need to be addressed as an outpatient.    -Data reviewed today: I reviewed all new labs and imaging results over the last 24 hours. I personally reviewed no images or EKG's today.    Physical Exam   Temp: 98.2  F (36.8  C) Temp src: Temporal BP: 116/62 Pulse: 81   Resp: 20 SpO2: 93 % O2 Device: None (Room air)    Vitals:    02/11/24 0353 02/12/24 0802 02/13/24 0501   Weight: 95.2 kg (209 lb 14.1 oz) 90.3 kg (199 lb 1.2 oz) 88.8 kg (195 lb 12.3 oz)     Vital Signs with Ranges  Temp:  [97.5  F (36.4  C)-98.7  F (37.1  C)] 98.2  F (36.8  C)  Pulse:  [65-81] 81  Resp:  [18-20] 20  BP: (113-129)/(62-73) 116/62  SpO2:  [93 %-95 %] 93 %  I/O last 3 completed shifts:  In: 1620 [P.O.:1620]  Out: 6350 [Urine:6350]    Peripheral IV 02/11/24 Distal;Left;Posterior Lower forearm (Active)   Site Assessment WDL 02/13/24 0506   Line Status Saline locked 02/13/24 0506   Dressing Transparent 02/13/24 0506   Dressing Status clean;dry;intact 02/13/24 0506   Line Intervention Flushed 02/13/24 0506   Phlebitis Scale 0-->no symptoms 02/13/24 0506   Infiltration? no 02/13/24 0506   Number of days: 2       Urethral Catheter 02/08/24 Double-lumen 16 fr (Active)   Tube Description Positional 02/13/24 0501   Catheter Care Catheter wipes 02/13/24 0501   Collection Container Standard 02/13/24 0501   Securement Method Securing device (Describe) 02/13/24 0501   Rationale for Continued Use Non-ICU: q2 hour intake/output with documentation 02/13/24 0501   Urine Output 150 mL 02/13/24 0500   Number of days: 5       Wound 07/11/20 Left Foot Other (comment) small hardened area on left heel (Active)   Number of days: 1312       Incision/Surgical Site 12/11/18 Right Chest (Active)   Number of days: 1890       Incision/Surgical Site 10/06/20 Right Chest (Active)   Number of days: 1225     Martin catheter needs to be until 3 diuresing    Constitutional: Alert and oriented x3.  No distress    HEENT: Normocephalic/atraumatic, PERRL, EOMI, mouth moist, neck supple, no LN.     Cardiovascular: Irregular RRR.  No murmur, no  rubs, or gallops.  JVD difficult.  Bruits no.  Pulses 2    Respiratory: Decreased at the bases few scattered crackles otherwise clear to auscultation bilaterally    Abdomen: Seems less distended.  Bowel sounds are positive.  The presacral edema and abdominal wall edema is markedly improved.      Extremities: Warm/dry.  Lower extremities are actually now soft and more pitting than tight as before      Neuro:   Non focal, cranial nerves intact, Moves all extremities.  Medications      [Held by provider] atorvastatin  40 mg Oral At Bedtime    bumetanide  2 mg Intravenous Q8H    diltiazem ER COATED BEADS  120 mg Oral Daily    [Held by provider] lisinopril  10 mg Oral Daily    metoprolol succinate ER  100 mg Oral Daily    miconazole   Topical TID    pantoprazole  40 mg Oral QAM AC    polyethylene glycol  17 g Oral Daily    potassium chloride ER  40 mEq Oral TID    sodium chloride (PF)  3 mL Intracatheter Q8H    Warfarin Therapy Reminder  1 each Oral See Admin Instructions       Data   Recent Labs   Lab 02/13/24  0455 02/12/24  0508 02/11/24  0608   WBC 9.4 9.1 9.4   HGB 12.4 12.8 12.6   MCV 89 89 90    243 236   INR 1.35* 1.38* 1.60*    137 139   POTASSIUM 3.4 2.4* 2.7*   CHLORIDE 94* 92* 96*   CO2 38* 35* 30*   BUN 24.2* 33.8* 44.3*   CR 0.89 1.03* 1.45*   ANIONGAP 8 10 13   CARINA 8.3* 8.7* 8.8   GLC 93 114* 107*   ALBUMIN 3.2* 3.5 3.5   PROTTOTAL 5.9* 6.6 6.5   BILITOTAL 1.3* 1.4* 1.5*   ALKPHOS 184* 217* 229*   * 263* 328*   * 211* 344*       No results found for this or any previous visit (from the past 24 hour(s)).

## 2024-02-13 NOTE — PHARMACY-ANTICOAGULATION SERVICE
Pharmacy Consult-Warfarin Assessment Day #6    Heather Rosas is a 73 year old female admitted on 2/5/2024 with Generalized edema due to fluid overload    Primary Indication(s) for Anticoagulation: Afib    Goal INR: 2-3    FYI, patient is followed by the Anticoagulation[facility] at: New start; patient would like [facility] [place] upon discharge      Patient previously anticoagulated on Coumadin in 2023 on a dose of 2.5 mg every Mon, Wed, Fri; 5 mg all other days    Home tablet strength(s): none (new start)    Factors that may increase patient's bleeding risk and/or sensitivity to warfarin (Coumadin) include: Advanced age, elevated LFTs, miconazole, elevated baseline INR    Factors that may decrease patient's bleeding risk and/or sensitivity to warfarin (Coumadin) include: none    Dietary Considerations: none    Anticoagulation Dose History  More data exists         2/7/2024 2/8/2024 2/9/2024 2/10/2024 2/11/2024 2/12/2024 2/13/2024   Recent Dosing and Labs   warfarin ANTICOAGULANT (COUMADIN) 3 MG tablet 3 mg, $Given 3 mg, $Given HELD HELD HELD 3 mg, $Given -   INR 1.46  1.67  1.82  2.16  1.60  1.38  1.35      CBC RESULTS:   Recent Labs   Lab Test 02/13/24  0455   HGB 12.4        Assessment/Plan: INR decreased a bit since yesterday. According to the Inpatient Warfarin Dosing guide, recommend giving 3 mg again toda (this AM to help get to goal faster). Will re-assess tomorrow.       Thank You for the Consult. Will continue to follow.    Sarah Hassan [facility] ....................  2/13/2024   8:06 AM

## 2024-02-14 NOTE — PROGRESS NOTES
ANTICOAGULATION  MANAGEMENT: Discharge Review    Heather Rosas chart reviewed for anticoagulation continuity of care    Hospital Admission on 2/5/24 for Generalized edema.    Discharge disposition: Home    Results:    Recent labs: (last 7 days)     02/08/24  0538 02/09/24  0506 02/10/24  0609 02/11/24  0608 02/12/24  0508 02/13/24  0455 02/14/24  0517   INR 1.67* 1.82* 2.16* 1.60* 1.38* 1.35* 1.55*     Anticoagulation inpatient management:     less warfarin administered than maintenance regimen and Patient will be considered a new start as it looks like she was not taking her warfarin prior to admission and per the discharge paperwork we have received.     Anticoagulation discharge instructions:     Warfarin dosing:  New start dosing: Will be switching to 2 mg tablets    Bridging: No   INR goal change: No: Still 2.0-3.0     Medication changes affecting anticoagulation: No    Additional factors affecting anticoagulation: Yes: advanced age, elevated LFT's, miconazole, elevated baseline INR-per discharge paperwork fax    Hospital note:  2. Chronic atrial fibrillation/flutter with acute rapid ventricular response: We placed her back on her usual medications the metoprolol and oral Cardizem. Rate is better controlled currently in the 80 range. We discussed anticoagulation once again. Previously a DOAC had been suggested however it is cost is prohibitive and she cannot afford it. Therefore was put on warfarin however was noncompliant in follow-up for INRs. She has basically stopped it. I discussed with her restarting it however she declines that option at this point after I I described or informed her regarding the increased risk of stroke with A-fib. Will keep her on telemetry to monitor heart rate.      PLAN     Agree with dosing adjustment on discharge  Recommend to check INR on 2/15/24  Will see if patient calls back to follow up    Left a detailed message for Heather    Anticoagulation Calendar updated    Berkley SUTTON  LORENA Davis

## 2024-02-14 NOTE — PROGRESS NOTES
Name: Heather Rosas    MRN#: [id number]    Reason for Hospitalization: Acute pulmonary edema (H) [J81.0];Non compliance w medication regimen [Z91.148];Generalized edema due to fluid overload [E87.70, R60.1]    TOBI: medium    Discharge Date: 2/14/2024    Patient / Family response to discharge plan: agreeable     Follow-Up Appt:   Future Appointments   Date Time Provider Department Center   2/19/2024 10:30 AM Cleopatra Mariscal MD [facility] [place]       Other Providers (Care Coordinator, County Services, PCA services etc): No; declined concerns about managing at home     Discharge Disposition: home via taxi.  Heather to call/pay when ready to discharge.      APRIL Murrell

## 2024-02-14 NOTE — PLAN OF CARE
/59 (BP Location: Left arm, Patient Position: Semi-Gaspar's, Cuff Size: Adult Regular)   Pulse 86   Temp 98.1  F (36.7  C) (Tympanic)   Resp 20   Wt 87.9 kg (193 lb 12.6 oz)   SpO2 95%   BMI 39.14 kg/m           Pt alert and oriented. Martin pulled today, voided 50ml spontaneously no issues. No SOB, lungs clear. Edema +2 BLE. Given education on CHF. Instructed to take 4mg Coumadin tonight, clinic will call for further instructions. Follow up appt made for 2/19 . Instructed [facility] will call her as well, and given their number if they do not call by Friday. Pt states understanding. Discharged home in taxi.

## 2024-02-14 NOTE — DISCHARGE SUMMARY
[facility]    Discharge Summary  Hospitalist    Date of Admission:  2/5/2024  Date of Discharge:  2/14/2024  Discharging Provider: Claudia Chino NP  Date of Service (when I saw the patient): 02/14/24    Discharge Diagnoses     Principal Problem:    Generalized edema due to fluid overload  Active Problems:    Atrial fibrillation (H)    Coronary artery disease involving native coronary artery    Diarrhea    Acute kidney failure, unspecified (H)      History of Present Illness   From admission: Heather Rosas is a 73 year old female who presented to emergency room with complaints of increasing shortness of breath fluid retention with stomach bloating and some diarrhea.  She does have a significant history of chronic atrial flutter/fibrillation.  Has not been very compliant with her medications including anticoagulation.  She had been on Coumadin however did not follow-up with the Coumadin clinic just he is coming in to have to get her blood checked.  Also has a history of heart failure with reduced ejection fraction once again has not been very compliant with her diuretics at all.  She does not have a primary care provider at all.  1 was set up for after her last hospital stay which was back in September 2023.  However she failed that appointment.  She is not the greatest historian about things.  Says she weighs herself daily but really has not gained any weight.  Denies any chest pain she has been short of breath no fevers shaking chills or sweats that she is aware of no bleeding troubles at all.  Has had some loose stools over the last couple of days.  No palpitations or syncope.  Really cannot tell if her heart rate is going fast or not.  Has noted more abdominal bloating and lower extremity edema.  She does live by herself.  She is  does have a son who lives up on [address].     Upon arrival to the ER her initial blood pressure 142/108 pulse was 117 sats are 93% on room air she was  afebrile 97.8.  She was noted to be in a fib flutter.  She was given 1 dose of IV diltiazem then put on oral diltiazem along with oral metoprolol.  Rate did come down better.  She was also given 40 mg of IV Lasix in the ER.  Her labs are significant for potassium 2.9 she had normal renal function creatinine 0.8 LFTs were normal glucose is 104 BNP was 3088 troponin was 31 TSH 3.24.  White count 7600 hemoglobin was 12.6 platelet count 195,000.  1.  She did complain of some abdominal discomfort.  CT scan was performed of her abdomen and pelvis and ultrasound there was some gallbladder wall thickening and felt maybe this is secondary to some increased volume started anasarca which are CT scan did suggest.  The CT did show some diffuse wall edema consistent with some anasarca fluid overload did have a enhancing mass in the sigmoid colon about 2.2 cm.  Also was a polypoid filling defect in left posterior bladder measuring up to 7 mm recommended urology consultation at some point.  Surgery did see the patient did not feel there was any evidence of acute cholecystitis.  A lot of the edema is felt to be secondary to overall volume overload.  Her last echocardiogram was back in June 2023 she had an EF from 55 to 60% some moderate LVH moderate left atrial enlargement, she was in A-fib at that time.    Hospital Course     Acute on chronic congestive heart failure with preserved ejection fraction:  Echo done-  patient did have EF 50 to 55% with some concentric LVH.  Right ventricle moderately dilated and has severe tricuspid insufficiency   She came in with diffuse, fluid however sats are fine was initiated on diuretic therapy with this she has had general decline in her renal function.  Her pressures have remained stable as has her oxygenation.  Her ACE inhibitor has been on hold.  Continued on her rate controlling drugs Metroprolol and diltiazem.  Unfortunate her weights are unreliable every day it is up-and-down with what  appears to be inaccurate readings.  Going to repeat her echocardiogram today for a limited study just to assess her LV function but also RV function with PA pressure evaluation.  Based on all of her current findings it appears the patient needs some IV fluid.  Repeat echocardiogram showed LV function remains basically normal 55%.  Right ventricle is definitely enlarged and is moderate reduced systolic function.  There is perhaps some hint of flattening of the interventricular septum.  RV systolic pressure was probably 34+ right atrium which is estimated at about 8.  -2/10: So based upon her echo results she has normal function but evidence of right-sided heart failure.  Things are complicated by her renal insufficiency post IV diuresis with IV Bumex and she is now put out a significant amount of urine overnight.  Potassium did drop down slightly but overall she is doing markedly better I think we will continue to push the diuresis now at this time.  Blood pressure is tolerating it fine.  Renal functions improving.-  2/11: Patient continues to diurese extremely well over 6 L yesterday.  She is down 2.6 kg.  Renal function continues to improve also.  However potassium is on the low side we will need to replace this.  Since she is tolerating it well we will continue with the aggressive IV diuresis.  She still has significant amount of edema on examination still for 3-4+ edema lower extremities with presacral edema noted also.  Oxygenation is good 93% blood pressure is 120/70.  -2/12: Continue to push diuresis.  Patient was 7 L out.  Blood pressure staying 105-120 systolic range.  Her weight is 90.3 kg today.  Down 5 kg from yesterday.  And down 23 pounds since her admission.  She still has significant amount of edema.  Markedly less now presacral.  Lower extremity still have 4+ edema.  Awaiting the lab results.  Her potassium was low yesterday have been replacing.  Also want to see what her renal function is doing it  had been coming down nicely but there will be a point we will have to start to back off.  Finally got her labs back her potassium is 2.5.  Somehow her oral replacement got put on hold but restarted that here today creatinine has continued to come down quite nicely.  On bicarb is going up is getting some contraction alkalosis.  Will cut back on the amount of Bumex were giving her currently 2 every 8 hours and continue to monitor closely she still has significant amount of edema present.    9-2/13: Continues to respond nicely to the IV Bumex.  5 and half liters out yesterday.  Weight is down to 88.8 kg.  Down another 1.5.  Her renal functions holding its back down to her baseline with a creatinine of 0.89 BUN is 24 she is getting little contraction alkalosis bicarb is up to 38.  I cut back on the diuretics to every 8 hours 2 mg of Bumex.  Will give her another 24 hours of this likely try and switch over to orals that she is getting a little and see about going home.  Her lower extremities are now softer I can actually grasp and without seeming like a melon.  Unfortunate potassium has been a problem.  Is up to 2.4 over today.  She will need to be switched over to oral diuretics.  I consider this possibly could be attempted tomorrow.  However she continues to diurese nicely may be contacted and is staying for another day or 2.  This is all more right-sided heart failure than anything else.     MILDRED- Status post diureses  She was hydrated with low dose 50 ml per hour over 2/7 night and increased to 50cc per hour on 2/8  Monitor her creatine  UA done  NO UTI  ON NS at 50 ml per hour   Avoid all nephrotoxic meds  Place [place] to monitor strict I's and o's as she did lose some urine and missed the hat  -2/9: Patient presented with normal renal function creatinine 0.88 with a BUN of 17.  Daily it has worsened to today it is now 48.3/2.44.  Her urine output has decreased she has become more oliguric despite IV Lasix.  Potassium  is 5.4.  Her FENA was calculated it is 0.49% with a urine sodium of less than 20 all this pointing more towards a prerenal state.  Hemodynamically she is very stable blood pressures in the 130 range.  Has never had hypotension.  Repeat urinalysis is pending.  Her CT on admission did not show any hydronephrosis.  Ultrasound done yesterday did look at her right kidney and she had no hydronephrosis at that point.  Her oxygenation is normal on room air.  Afebrile.  Echo done on admission showed basically normal systolic function with evidence of probable right-sided failure.  D-dimer was unremarkable.  At this point unable to perform a CT angiogram due to her renal insufficiency.  She is on warfarin therapy.  Martin catheter was placed last night.  She was actually given 100 mg of Lasix this morning in addition to 40 mg that she got at 12:30 AM last evening.  She is extremely oliguric has only put 185 cc of urine.  She did note that she did get the CT with IV contrast renal function did start to worsen roughly 36 hours after this.  She really did not receive a significant amount of diuretic she got to 60 mg doses that first 24 hours and then nothing after that up until this morning when she got a total of 140 mg of IV Lasix.  Some questions does she have some component of contrast-induced renal failure and or secondary to his fairly severe right-sided heart failure.  She does have significant appears to be sleep apnea.  So wondering at this point if her worsening renal function is worsening her right heart failure picture.  -2/10: Martin catheter in place she put out 2375 cc of urine yesterday with a couple doses of the IV Bumex.  Creatinine is declining down to 2.28 today from 2.45 yesterday.  Potassium down from 5.6-3.8.  I think her renal insufficiency may have been   contrast-induced.  Will continue to push the diuresis watching her potassium and renal function blood pressure.  Patient still has significant amount of  edema.  -2/11: Continues to diurese nicely 6 L yesterday she is on the IV Bumex.  Potassium is down will replace.  Renal function continues to improve her creatinine is now down to 1.45 BUN is 44.  Requires [place] catheter still.  -2/12: Creatinine is down today to 1.03.  Potassium was 2.4 so are replacing this.  Will cut back on the Bumex to every 8 hours continue to monitor closely.  -2/13: BUN/creatinine 24/0.89 potassium 3.4.  Continue with the IV Bumex and potassium replacement.  [place] catheter is in keep this in until were done.  -2/14: Labs stable. Down to dry weight. Discontinued [place]-voiding on her own. Continue previous dose of lasix as she has been noncompliant and only taking 20mg at home. Discharge home.      Hyperkalemia 5.5  Continue to monitor closely above with her acute kidney injury.  -2/10: Down to 3.8.  Will likely need some replacement.  -2/11: With diuresis down to 2.7 will start oral placement.  -2/13: As above replacing 3.1 today.  -2/14: resolved     Chronic atrial fibrillation/flutter with acute rapid ventricular response:   On Cardizem and metoprolol  Has declined anticoagulation prior- aware for stroke risk  I discuss with her again regarding care and plan. ON 2/7 she did agree to start coumadin.   Pharmacy to manage coumadin dosing.  -2/9: Pulses been in the 60s.  She remains on the Cardizem and metoprolol.  -2/10: Rate continues to be stable blood pressure stable continue with the Cardizem metoprolol.  -2/11: Continues with adequate rate control blood pressure.  I have held her warfarin just because of the significant right-sided failure and diuresis likely will restart this perhaps tomorrow.  Will reassess.  -2/12: Will restart her warfarin today.  -2/13: Restarted warfarin.  INR 1.35 today.  -2/14: Continue titration at clinic.         Abdominal pain:  resolved   Distention  Had CT on admission showing  Trace free fluid in the abdomen, diffuse wall edema, and intralobular  septal  thickening in the lung bases, compatible with anasarca or fluid  overload.There is mild diffuse gallbladder wall thickening which is likely due  to the trace free fluid.There is an apparent enhancing mass in the sigmoid colon measuring up  to 2.2 cm as well as more subtle focal thickening of the wall of the  sigmoid colon. Surgery consultation for possible colonoscopy is  recommended. There is a polypoid filling defect in the left posterior bladder  measuring up to 7 mm. Urology consultation for possible cystoscopy is  recommended.Nonspecific retroaortic lymph node in the lower chest measuring 12 mm  short axis. A CT of the chest could be considered for further  evaluation.   US was done of RUQ showing  Diffuse gallbladder wall thickening. No gallstones. No significant  bile duct dilation.   ON 2/8 discussed with Dr Trinidad as patient has worsening transaminitis and has elevated direct bili. Recs to repeat RUQ US which was done showing GB wall thickened and no stone. I did order HIDA scan as per further recommendation to rule out cholecystitis  -monitor labs  -2/9: Transaminases liver function test continue to rise slowly.  Alk phos is 208   total bilirubin is 2 with a direct of 1.4.  Plan is for a HIDA scan today to assess her gallbladder.  Ultrasound CT scans have shown thickened gallbladder wall with no obvious signs of stone or obstruction.  Thought was this was secondary to volume/right-sided failure.  -2/10: Her HIDA scan was negative.  Feel elevated LFTs etc. all secondary to venous congestion.  -2/11: Liver function tests are slowly improving.  -2/12: Abdomen is benign still obese still has fluid tolerating diet stooling.  Feel there is no evidence of any intra-abdominal infection.  -2/13: Abdomen is soft.  Her liver function test continue to decline.  Feel this is all secondary to right heart failure.       Diarrhea resolved   Reported on admission  Monitor   NO diarrhea reported in hospital       Sigmoid colon Lesion on CT:   Needs colonoscopy out patient  Malignancy not ruled out , discussed with patient she will need out patient evaluation when stable.       Coronary artery disease:   S/P ABG X 4    -echo done showing severe tricuspid insufficiency.,     Claudia Chino CNP      Pending Results     Unresulted Labs Ordered in the Past 30 Days of this Admission       No orders found from 1/6/2024 to 2/6/2024.            Code Status   Full Code       Primary Care Physician   Nicolette Huffman           Discharge Disposition   Discharged to home  Condition at discharge: Stable    Consultations This Hospital Stay   PHARMACY TO DOSE WARFARIN  SURGERY GENERAL IP CONSULT  PHARMACY TO DOSE WARFARIN    Time Spent on this Encounter   I, Claudia Chino NP, personally saw the patient today and spent greater than 30 minutes discharging this patient.    Discharge Orders      Follow-Up with Cardiology [facility] Heart Failure Discharge      Reason for your hospital stay    CHF exacerbation     Follow-up and recommended labs and tests     Follow up with primary care provider, Nicolette Huffman, within 7 days for hospital follow- up.  No follow up labs or test are needed.     Activity    Your activity upon discharge: activity as tolerated     Diet    Follow this diet upon discharge: Orders Placed This Encounter      2 Gram Sodium Diet     Discharge Medications   Current Discharge Medication List        CONTINUE these medications which have NOT CHANGED    Details   albuterol (PROAIR HFA/PROVENTIL HFA/VENTOLIN HFA) 108 (90 Base) MCG/ACT inhaler Inhale 2 puffs into the lungs every 6 hours as needed for shortness of breath, wheezing or cough  Qty: 18 g, Refills: 1    Comments: Pharmacy may dispense brand covered by insurance (Proair, or proventil or ventolin or generic albuterol inhaler)  Associated Diagnoses: Wheezing      atorvastatin (LIPITOR) 40 MG tablet Take 1 tablet (40 mg) by mouth At Bedtime  Qty: 30 tablet, Refills: 0     Associated Diagnoses: Acute on chronic diastolic congestive heart failure (H)      diltiazem ER (DILT-XR) 120 MG 24 hr capsule Take 1 capsule (120 mg) by mouth daily for 30 days  Qty: 30 capsule, Refills: 0    Associated Diagnoses: Atrial fibrillation with RVR (H)      lisinopril (ZESTRIL) 10 MG tablet Take 1 tablet (10 mg) by mouth daily  Qty: 30 tablet, Refills: 0    Associated Diagnoses: Acute on chronic diastolic congestive heart failure (H)      metoprolol succinate ER (TOPROL XL) 100 MG 24 hr tablet Take 1 tablet (100 mg) by mouth daily for 30 days  Qty: 30 tablet, Refills: 0    Associated Diagnoses: Atrial fibrillation with RVR (H)      pantoprazole (PROTONIX) 40 MG EC tablet Take 1 tablet (40 mg) by mouth daily before breakfast  Qty: 30 tablet, Refills: 0    Associated Diagnoses: Gastroesophageal reflux disease without esophagitis      torsemide (DEMADEX) 20 MG tablet Take 3 tablets (60 mg) by mouth daily  Qty: 90 tablet, Refills: 0    Associated Diagnoses: Acute on chronic diastolic congestive heart failure (H)      nystatin (MYCOSTATIN) 119723 UNIT/GM external powder Apply 1 g topically 3 times daily as needed (groin)           Allergies   Allergies   Allergen Reactions    Fexofenadine Hcl Nausea and Vomiting     Allegra     Rosuvastatin Other (See Comments)     Dizziness - Crestor     Cats Other (See Comments)     Sneezing, runny nose    Codeine Sulfate Nausea and Vomiting and GI Disturbance     Data   Most Recent 3 CBC's:  Recent Labs   Lab Test 02/14/24  0517 02/13/24  0455 02/12/24  0508   WBC 8.4 9.4 9.1   HGB 12.5 12.4 12.8   MCV 91 89 89    236 243      Most Recent 3 BMP's:  Recent Labs   Lab Test 02/14/24  0517 02/13/24  0455 02/12/24  0508    140 137   POTASSIUM 3.4 3.4 2.4*   CHLORIDE 94* 94* 92*   CO2 36* 38* 35*   BUN 20.9 24.2* 33.8*   CR 0.93 0.89 1.03*   ANIONGAP 9 8 10   CARINA 8.6* 8.3* 8.7*   * 93 114*     Most Recent 2 LFT's:  Recent Labs   Lab Test 02/14/24  0517  02/13/24  0455   AST 81* 114*   * 176*   ALKPHOS 187* 184*   BILITOTAL 1.4* 1.3*     Most Recent INR's and Anticoagulation Dosing History:  Anticoagulation Dose History  More data exists         Latest Ref Rng & Units 2/8/2024 2/9/2024 2/10/2024 2/11/2024 2/12/2024 2/13/2024 2/14/2024   Recent Dosing and Labs   warfarin ANTICOAGULANT (COUMADIN) 3 MG tablet - 3 mg, $Given - - - 3 mg, $Given 3 mg, $Given -   INR 0.85 - 1.15 1.67  1.82  2.16  1.60  1.38  1.35  1.55      Most Recent 3 Troponin's:  Recent Labs   Lab Test 10/11/21  2012 09/01/18  0950 05/14/18  1814 02/20/18  0449   TROPI  --  5.776* <0.015 1.994*   TROPONIN <0.015  --   --   --      Most Recent Cholesterol Panel:  Recent Labs   Lab Test 07/09/23  0437   CHOL 130   LDL 74   HDL 34*   TRIG 110     Most Recent 6 Bacteria Isolates From Any Culture (See EPIC Reports for Culture Details):  Recent Labs   Lab Test 05/15/18  0045 07/23/17  0032 07/23/17  0026   CULT No MRSA isolated No growth after 6 days No growth after 6 days     Most Recent TSH, T4 and A1c Labs:  Recent Labs   Lab Test 02/05/24  0825 07/09/23  0437   TSH 3.24  --    A1C  --  5.3     Results for orders placed or performed during the hospital encounter of 02/05/24   CT Abdomen Pelvis w Contrast    Narrative    Exam: CT ABDOMEN PELVIS W CONTRAST    Exam reason: diarrhea, LLQ pain and RUQ paipn    Technique: Using helical CT technique, axial images of the abdomen and  pelvis were obtained with IV contrast.  Coronal and sagittal  reconstructions also performed. This CT was performed using one or  more of the following dose reduction techniques: automated exposure  control, adjustment of the mA and/or kV according to patient size,  and/or use of iterative reconstruction technique.    Meds/Contrast: ISOVUE 370  91mL    Comparison: 5/9/2023, 10/19/2018     FINDINGS:    ABDOMEN:    Liver: No mass or any significant abnormality.  Gallbladder: No calcified gallstones. There is mild  diffuse  gallbladder wall thickening.  Bile Ducts: No biliary ductal dilation.   Spleen:  No splenomegaly or focal lesion.  Pancreas: No mass, ductal dilatation, or inflammatory changes.  Kidneys: No solid mass, hydronephrosis, or any definite calculi. There  is focal cortical scarring in the lower pole of the right kidney,  possibly due to prior infection.  Adrenals:  No nodules.   Lymph Nodes: No adenopathy.   Vascular: No aortic aneurysm.   Abdominal Wall: There is diffuse body wall edema. Small fat-containing  umbilical hernia.     PELVIS:   There is a subtle 7 mm polypoid filling defect in the left posterior  bladder (series 3 image 169). There is a nonspecific low-density  density structure measuring up to 18 mm adjacent to the right common  femoral vein, not significantly changed since 5/9/2023 but new since  2018.     Bowel/Mesentery/Peritoneum:   -No bowel obstruction.   -Normal appendix.  -There is an apparent enhancing mass in the sigmoid colon measuring up  to 2.2 cm (series 3 image 153 and series 4 image 72). There is also a  more subtle focal thickening of the wall of the sigmoid colon (series  4 image 83).  -Trace free fluid in the abdomen, particularly adjacent to the liver.    Visualized portions of the Chest: There is a mildly prominent  retroaortic lymph node measuring 12 mm short axis, increased in size  since 5/9/2023. There is mosaic attenuation in the lower lungs and  mild intralobular septal thickening.  Musculoskeletal: No acute osseous abnormalities.       Impression    IMPRESSION:  Trace free fluid in the abdomen, diffuse wall edema, and intralobular  septal thickening in the lung bases, compatible with anasarca or fluid  overload.    There is mild diffuse gallbladder wall thickening which is likely due  to the trace free fluid.    There is an apparent enhancing mass in the sigmoid colon measuring up  to 2.2 cm as well as more subtle focal thickening of the wall of the  sigmoid colon.  Surgery consultation for possible colonoscopy is  recommended.    There is a polypoid filling defect in the left posterior bladder  measuring up to 7 mm. Urology consultation for possible cystoscopy is  recommended.    Nonspecific retroaortic lymph node in the lower chest measuring 12 mm  short axis. A CT of the chest could be considered for further  evaluation.    MORENA GARDNER MD         SYSTEM ID:  [id number]   XR Chest Port 1 View    Narrative    Procedure:XR CHEST PORT 1 VIEW    Clinical history:Female, 73 years, chest pressure    Technique: Single view was obtained.    Comparison: 9/21/2023    Findings: The cardiac silhouette is enlarged and unchanged, heart  borders are somewhat indistinct. The pulmonary vasculature appears  mildly distended and indistinct.    The lungs demonstrate interstitial thickening and subtle scattered  opacities. There is blunting of the left costo phrenic angle. Bony  structures are unremarkable.      Impression    Impression:   Findings suggesting CHF with developing perihilar edema and small  left-sided pleural effusion.    STONE COOK MD         SYSTEM ID:  [id number]   US Abdomen Limited (RUQ)    Narrative    Exam: US ABDOMEN LIMITED    Exam reason: RUQ pain    Technique: Grayscale ultrasound images of the abdomen were obtained.    Comparison: 2/5/2024    FINDINGS:    Pancreas: Visualized portions appear normal.    Liver: Normal size and echogenicity. No focal solid masses.    Gallbladder:  No gallstones. There is diffuse gallbladder wall  thickening.  There is diffuse tenderness in the right upper quadrant  during the exam.    Biliary Ducts: Common bile duct (CBD) is 4 mm.  No intrahepatic or  extrahepatic ductal dilatation.    Right kidney: 10.8 cm long. The right kidney is normal in size and  echogenicity. No solid masses, calculi or hydronephrosis.                 Impression    IMPRESSION:     Diffuse gallbladder wall thickening. No gallstones. No significant  bile duct  dilation.    MORENA GARDNER MD         SYSTEM ID:  [id number]   US Lower Extremity Venous Duplex Bilateral    Narrative    Exam:US LOWER EXTREMITY VENOUS DUPLEX BILATERAL    History: Leg swelling    Comparisons:none    Technique: Venous duplex ultrasonography of the bilateral lower  extremities was performed.     Findings: The common femoral veins, superficial femoral veins and  popliteal veins are fully compressible with spontaneous and  augmentable venous flow. Right-sided Baker's cyst is seen.           Impression    Impression: No evidence of deep venous thrombosis within the lower  extremities.    JEFERSON NEGRO MD         SYSTEM ID:  [id number]   US Abdomen Limited    Narrative    PROCEDURES: US ABDOMEN LIMITED    HISTORY: Abdominal pain    TECHNIQUE: Grayscale ultrasound of the upper abdomen was performed.    COMPARISON: 2/5/2024    FINDINGS:    MEASUREMENTS:   Liver length:  23 cm.     LIVER: The liver is enlarged. No liver masses or biliary ductal  enlargement is noted.    GALLBLADDER: The gallbladder wall is abnormally thickened. No  gallstones are seen. Gallbladder wall thickening is stable from  previous examination of 2/5/2040    ABDOMINAL AORTA AND IVC: The abdominal aorta is largely obscured by  bowel gas. The inferior vena cava is patent.    PANCREAS: Pancreas was obscured by bowel bowel gas.    Right KIDNEY: Right kidney is free of masses or hydronephrosis.        Impression    IMPRESSION:     Gall bladder wall thickening without change    JEFERSON NEGRO MD         SYSTEM ID:  [id number]   NM HepatOBiliary Scan    Narrative    Examination: NM HEPATOBILIARY SCAN      Indication: rule out cholecystitis, gallbladder wall thickening     Technique:    The patient received 5.4 mCi of Tc-99m mebrofenin intravenously.  Images were obtained out through 75 minutes.     Findings:    There is prompt clearance of the radionuclide from the blood pool into  the liver. By 30 minutes there is clear visualization of  the  intrahepatic ducts as well as the upper common bile duct. By 45  minutes there is visualization of the gallbladder. Given the slightly  atypical configuration of the gallbladder, it is somewhat superimposed  with the common bile duct on the AP views but is visualized anteriorly  on the lateral view. At 60 minutes there is emptying from the common  bile duct into the small bowel.      Impression    Impression:    Patent cystic duct.    MORENA GARDNER MD         SYSTEM ID:  [id number]   Echocardiogram Limited     Value    LVEF  50-55% (borderline)    [facility]    [id number]  [id number]  [id number]  [id number]^[email or web address]     [facility]  Echocardiography Laboratory  [address] [place] [id number]     Name: RACQUEL PINZON  MRN: [id number]  : 1950  Study Date: 2024 10:40 AM  Age: 73 yrs  Gender: Female  Patient Location: [facility]  Reason For Study: CHF  History: CHF  Ordering Physician: JESÚS HODGSON  Performed By: Anisa Cardona RDCS     BSA: 1.9 m2  Height: 59 in  Weight: 211 lb  ______________________________________________________________________________  Procedure  Limited Portable Echo Adult.  ______________________________________________________________________________  Interpretation Summary  Moderate concentric wall thickening consistent with left ventricular  hypertrophy is present.  Left ventricular function is decreased. The ejection fraction is 50-55%  (borderline).  Moderate right ventricular dilation is present.  Global right ventricular function is mildly to moderately reduced.  Severe left atrial enlargement is present.  Moderate right atrial enlargement is present.  Severe tricuspid insufficiency is present.  ______________________________________________________________________________  Left Ventricle  Moderate concentric wall thickening consistent with left ventricular  hypertrophy is present. Left ventricular function is decreased. The  ejection  fraction is 50-55% (borderline).     Right Ventricle  Moderate right ventricular dilation is present. Global right ventricular  function is mildly to moderately reduced.     Atria  Severe left atrial enlargement is present. Moderate right atrial enlargement  is present.     Tricuspid Valve  Severe tricuspid insufficiency is present. The peak velocity of the tricuspid  regurgitant jet is not obtainable.     Vessels  IVC diameter >2.1 cm collapsing <50% with sniff suggests a high RA pressure  estimated at 15 mmHg or greater.     Pericardium  No pericardial effusion is present.  ______________________________________________________________________________  MMode/2D Measurements & Calculations  IVSd: 1.6 cm  LVIDd: 3.9 cm  LVIDs: 2.7 cm  LVPWd: 1.5 cm  FS: 32.0 %  LV mass(C)d: 240.0 grams  LV mass(C)dI: 127.2 grams/m2  LA Volume Indexed (AL/bp): 63.8 ml/m2  RWT: 0.77  TAPSE: 1.3 cm     ______________________________________________________________________________  Report approved by: Mirian Dockery 2024 06:50 AM         Echocardiogram Limited     Value    LVEF  60-65%    Narrative    [id number]  [id number]  [id number]  [id number]^REMA^JESÚS     [facility]  Echocardiography Laboratory  [address] [place] [id number]     Name: RACQUEL PINZON  MRN: [id number]  : 1950  Study Date: 2024 10:14 AM  Age: 73 yrs  Gender: Female  Patient Location: [facility]  Reason For Study: Heart Failure, Unspecified  History: Heart failure  Ordering Physician: JESÚS HODGSON  Performed By: Anisa Cardona RDCS     BSA: 1.9 m2  Height: 59 in  Weight: 222 lb  ______________________________________________________________________________  Procedure  Limited Portable Echo Adult. Contrast Definity. Patient was given 5ml mixture  of 1.5ml Definity and 8.5ml saline. 5 ml wasted. The patient's rhythm is  atrial  fibrillation.  ______________________________________________________________________________  Interpretation Summary  Global and regional left ventricular function is normal with an EF of 60-65%.  Mild concentric wall thickening consistent with left ventricular hypertrophy  is present.  Moderate right ventricular dilation is present.  Global right ventricular function is moderately to severely reduced.  Mild to moderate right atrial enlargement is present.  Moderate tricuspid insufficiency is present.  The right ventricular systolic pressure is 34mmHg above the right atrial  pressure.  Dilation of the inferior vena cava is present with normal respiratory  variation in diameter.  IVC diameter and respiratory changes fall into an intermediate range  suggesting an RA pressure of 8 mmHg.  This study was compared with the study from 7/10/2023 .  The RV siz ehas enlarged and function decreased since previous study  ______________________________________________________________________________  Left Ventricle  Global and regional left ventricular function is normal with an EF of 60-65%.  Mild concentric wall thickening consistent with left ventricular hypertrophy  is present.     Right Ventricle  Moderate right ventricular dilation is present. Global right ventricular  function is moderately to severely reduced.     Atria  Mild to moderate right atrial enlargement is present.     Mitral Valve  Mild mitral insufficiency is present.     Tricuspid Valve  Moderate tricuspid insufficiency is present. The right ventricular systolic  pressure is 34mmHg above the right atrial pressure.     Pulmonic Valve  Moderate pulmonic insufficiency is present.     Vessels  Dilation of the inferior vena cava is present with normal respiratory  variation in diameter. IVC diameter and respiratory changes fall into an  intermediate range suggesting an RA pressure of 8 mmHg. Borderlne flattening  of the IVS in diastole.     Pericardium  No  pericardial effusion is present.     Compared to Previous Study  This study was compared with the study from 7/10/2023 . The RV siz ehas  enlarged and function decreased since previous study.     ______________________________________________________________________________  MMode/2D Measurements & Calculations  IVSd: 1.3 cm  LVIDd: 4.4 cm  LVIDs: 3.0 cm  LVPWd: 1.3 cm  FS: 32.0 %  LV mass(C)d: 220.2 grams  LV mass(C)dI: 114.2 grams/m2  RWT: 0.61     TAPSE: 1.0 cm     Doppler Measurements & Calculations  TR max haresh: 293.0 cm/sec  TR max P.4 mmHg  RVSP(TR): 42.4 mmHg     ______________________________________________________________________________  Report approved by: Mirian Dockery 2024 12:13 PM

## 2024-02-14 NOTE — PLAN OF CARE
Reason for hospital stay:  Generalized edema due to fluid overload   Living situation PTA: Home   Most recent vitals: /65 (BP Location: Left arm, Patient Position: Semi-Gaspar's, Cuff Size: Adult Regular)   Pulse 87   Temp 97  F (36.1  C) (Tympanic)   Resp 16   Wt 87.9 kg (193 lb 12.6 oz)   SpO2 94%   BMI 39.14 kg/m      Pain Management:  Patient denied pain overnight  LOC:  A&O x4  Cardiac:  Apical pulse irregular and distant, hx of afib - PO warfarin. 3+ edema to BLE  Respiratory:  Maintaining sats on room air. Lung sounds clear and equal bilaterally   GI:  Bowel sounds audible and normoactive  :  Martin catheter in place for strict I&O monitoring. Bumex given x2 overnight, adequate output noted.  Skin Issues:  Scattered bruising and scabs      IVF:  SL  ABX:  None     Nutrition: 2 g NA diet  ADL's:  Independent with minimal assist  Ambulation: Stand by assist with gait belt  Safety:  Alarms active and audible, call light within reach, lighting adjusted, nonskid footwear in use, bed in lowest position, wheels locked, room near unit station, door open.    Comments:   Around 5:30 am patient had a 5+ minute long conversation with self.     Face to face report given with opportunity to observe patient.    Report given to LORENA Salazar RN   2/14/2024  7:17 AM

## 2024-02-14 NOTE — DISCHARGE INSTRUCTIONS
[facility] in [place] will be contacting you to schedule a follow up with cardiology. If you do not hear from them by Friday, please call [phone]

## 2024-02-14 NOTE — PHARMACY-ANTICOAGULATION SERVICE
Pharmacy Consult-Warfarin Assessment Day #7    Heather Rosas is a 73 year old female admitted on 2/5/2024 with Generalized edema due to fluid overload    Primary Indication(s) for Anticoagulation: Afib    Goal INR: 2-3    FYI, patient is followed by the [facility] at: New start; patient would like [facility] ([place] upon discharge       Patient previously anticoagulated on Coumadin in 2023 on a dose of 2.5 mg every Mon, Wed, Fri; 5 mg all other days     Home tablet strength(s): none (new start)     Factors that may increase patient's bleeding risk and/or sensitivity to warfarin (Coumadin) include: Advanced age, elevated LFTs, miconazole, elevated baseline INR     Factors that may decrease patient's bleeding risk and/or sensitivity to warfarin (Coumadin) include: none    Dietary Considerations: none    Anticoagulation Dose History  More data exists         2/8/2024 2/9/2024 2/10/2024 2/11/2024 2/12/2024 2/13/2024 2/14/2024   Recent Dosing and Labs   warfarin ANTICOAGULANT (COUMADIN) 3 MG tablet 3 mg, $Given HELD HELD HELD 3 mg, $Given 3 mg, $Given -   INR 1.67  1.82  2.16  1.60  1.38  1.35  1.55      CBC RESULTS:   Recent Labs   Lab Test 02/14/24  0517   HGB 12.5        Assessment/Plan: INR still subtherapeutic, increased by <0.3 in the last 48 hours. Patient discharging today. Recommend taking 4 mg today, then follow-up with [facility] Cou[facility] clinic tomorrow for INR check and further dosing instructions. New start, needs close follow-up. Not discharging on any antibiotics or major drug interactions with warfarin.     Thank You for the Consult. Will continue to follow.    Sarah Hassan [facility] ....................  2/14/2024   8:56 AM

## 2024-02-14 NOTE — PLAN OF CARE
Pt A&O x 4 this shift. Pt denies pain this shift. Pt afebrile, and remains on room air. Lung sounds are diminished.   Martin catheter remains in place for I&O's this shift. Output is straw yellow.   Pt standby assist with gait belt this shift for transfers.   Alarms activated and audible, as pt needs reminders use call light and not self transfer.     Face to face report given with opportunity to observe patient.    Report given to LORENA Triana.     Vickie Gonzalez RN   2/13/2024  7:12 PM

## 2024-02-19 NOTE — TELEPHONE ENCOUNTER
Attempt # 1  Outcome: Left Message   Comment: LVM to reschedule missed hospital follow up appt on 2/19/24.

## 2024-02-20 NOTE — TELEPHONE ENCOUNTER
Disp Refills Start End SUZY   atorvastatin (LIPITOR) 40 MG tablet 30 tablet 0 9/22/2023 -- No      Disp Refills Start End SUZY   diltiazem ER (DILT-XR) 120 MG 24 hr capsule 30 capsule 0 9/22/2023 2/6/2024 No      Disp Refills Start End SUZY   lisinopril (ZESTRIL) 10 MG tablet 30 tablet 0 9/22/2023 -- No      Disp Refills Start End SUZY   pantoprazole (PROTONIX) 40 MG EC tablet 30 tablet 0 9/22/2023 -- No      Disp Refills Start End SUZY   torsemide (DEMADEX) 20 MG tablet 90 tablet 0 9/22/2023 -- No     Not on med list     Potassium chloride ER 10 meq      Last Written Prescription Date:  unknown  Last Fill Quantity: unknown,   # refills: unknown     Last Office Visit: 08/03/2023  Future Office visit:    Next 5 appointments (look out 90 days)      Feb 26, 2024  4:00 PM  (Arrive by 3:45 PM)  SHORT with Cleopatra Mariscal MD  [facility] ([facility] ) [address]  [place] [address]  [phone]             Routing refill request to provider for review/approval because:     no

## 2024-02-24 PROBLEM — E87.70 FLUID OVERLOAD: Status: ACTIVE | Noted: 2024-01-01

## 2024-02-24 PROBLEM — I48.91 RAPID ATRIAL FIBRILLATION (H): Status: ACTIVE | Noted: 2024-01-01

## 2024-02-24 PROBLEM — I50.32 CHRONIC DIASTOLIC CONGESTIVE HEART FAILURE (H): Chronic | Status: ACTIVE | Noted: 2018-09-20

## 2024-02-24 PROBLEM — I50.9 CHF (CONGESTIVE HEART FAILURE) (H): Status: ACTIVE | Noted: 2024-01-01

## 2024-02-24 NOTE — ED PROVIDER NOTES
History     Chief Complaint   Patient presents with    unable to void     Notes unable to void for the past week. Notes not taking her meds x 1 week due to ran out     HPI  Heather Rosas is a 73 year old individual with history of hypertension, generalized anxiety disorder, GERD, schizophrenia, atrial fibrillation on anticoagulation with warfarin, chronic diastolic congestive heart failure, aortic valve sclerosis with moderate mitral regurg, CABG x 4, tobacco dependence, fibromyalgia, comes in for inability to urinate for a week.  Patient states that she ran out of her home medications about a week ago.  Now has not been able to urinate for a week.  For this reason patient comes in.  Denies any shortness of breath out of the ordinary.  No chest pain.  Does have some swelling of the lower extremities.  No fever or chills reported.  No cough out of the ordinary as patient is a smoker.    Allergies:  Allergies   Allergen Reactions    Fexofenadine Hcl Nausea and Vomiting     Allegra     Rosuvastatin Other (See Comments)     Dizziness - Crestor     Cats Other (See Comments)     Sneezing, runny nose    Codeine Sulfate Nausea and Vomiting and GI Disturbance       Problem List:    Patient Active Problem List    Diagnosis Date Noted    Acute kidney failure, unspecified (H) 02/10/2024     Priority: Medium    Generalized edema due to fluid overload 02/05/2024     Priority: Medium    Pulmonary nodules 08/03/2023     Priority: Medium     5/2023 - 8mm      Thyroid nodule 08/03/2023     Priority: Medium     1cm, noted in 2019      Tobacco dependence 08/03/2023     Priority: Medium    Aortic valve sclerosis (7/2023) 07/24/2023     Priority: Medium    Diarrhea 07/07/2023     Priority: Medium    Coronary artery disease involving native coronary artery 10/12/2021     Priority: Medium     CAD S/P NSTEMI with CABG x 4 vessel 9/10/18 Dr. Oconnell.       Status post chemotherapy 09/25/2020     Priority: Medium     Added automatically  from request for surgery [id number]      Atrial fibrillation (H) 11/26/2018     Priority: Medium    Hyponatremia 10/30/2018     Priority: Medium    Moderate mitral regurgitation 10/30/2018     Priority: Medium    Adenocarcinoma of the endometrium/uterus (H) 10/09/2018     Priority: Medium     Discovered on 02/18 biopsy. S/p resection and chemo. Pt lost to follow up.   High-grade endometrial adenocarcinoma of the uterus with staging consistent with pathologic T1b N0 I plus or stage 1B with isolated tumor cells involving one of the periaortic lymph nodes.       Chronic diastolic congestive heart failure (H) 09/20/2018     Priority: Medium    S/P CABG x 4 09/10/2018     Priority: Medium    HTN (hypertension)      Priority: Medium    ANNA (generalized anxiety disorder) 01/01/2011     Priority: Medium    GERD (Gastroesophageal reflux disease) 01/01/2011     Priority: Medium    Schizophrenia (H) 01/01/2011     Priority: Medium    Seasonal allergic rhinitis 01/01/2011     Priority: Medium    Fibromyalgia 06/14/2004     Priority: Medium    Dyslipidemia 11/26/2003     Priority: Medium        Past Medical History:    Past Medical History:   Diagnosis Date    Acute diastolic congestive heart failure (H) 10/12/2021    Acute exacerbation of CHF (congestive heart failure) (H) 10/11/2021    Acute on chronic congestive heart failure, unspecified heart failure type (H) 09/21/2023    Allergic rhinitis 01/01/2011    Anxiety 01/01/2011    Anxiety state, unspecified     Atrial fibrillation with RVR (H) 07/07/2023    Atrial flutter with rapid ventricular response (H) 10/12/2021    CVA (cerebral vascular accident) (H) 05/14/2018    Dyslipidemia 11/26/2003    fibromyalgia 06/14/2004    GERD, Esophageal reflux 01/01/2011    History of non-ST elevation myocardial infarction (NSTEMI) 09/06/2018    HTN (hypertension)     Major depression, recurrent (H24) 01/01/2011    Major depressive disorder, recurrent episode, moderate (H) 01/01/2011     Metrorrhagia 02/18/2018    Non compliance with medical treatment 07/07/2023    Nonorganic sleep disorder, unspecified     Obesity 01/01/2011    Obesity (H) 01/01/2011    Schizophrenia (H) 01/01/2011    Toxic shock syndrome (H) 2006       Past Surgical History:    Past Surgical History:   Procedure Laterality Date    ANGIOGRAM  02/2005    Normal     BIOPSY BREAST  1984    LT, normal    BYPASS GRAFT ARTERY CORONARY  09/10/2018    CARPAL TUNNEL RELEASE RT/LT  2005    RT, carpal tunnel    COLONOSCOPY  2011    Repeat in ten years     COLONOSCOPY  1996    COLONOSCOPY  2004    DILATION AND CURETTAGE, HYSTEROSCOPY, ABLATE ENDOMETRIUM, COMBINED N/A 2/19/2018    Procedure: COMBINED DILATION AND CURETTAGE, HYSTEROSCOPY, ABLATE ENDOMETRIUM;  HYSTEROSCOPY DILATION AND CURETTAGE, ENDOMETRIAL ABLATION;  Surgeon: Jose Nettles MD;  Location: [facility] OR    HYSTERECTOMY TOTAL ABD, NICK SALPINGO-OOPHORECTOMY, NODE DISSECTION, TUMOR DEBULKING, COMBINED  10/30/2018    INSERT PORT VASCULAR ACCESS N/A 12/11/2018    Procedure: PORT-A-CATH PLACEMENT;  Surgeon: Rafi Trinidad MD;  Location: [facility] OR    placement of central line  2005    REMOVE PORT VASCULAR ACCESS N/A 10/6/2020    Procedure: port a cath removal;  Surgeon: Rafi rTinidad MD;  Location: [facility] OR       Family History:    Family History   Problem Relation Age of Onset    C.A.D. Father 45        (cause of death)     Other - See Comments Father         rheumatic fever     Allergies Father     Cancer Sister 56        Esophageal to bone cancer    Gastrointestinal Disease Mother         GERD    Cancer Mother         pancreatic ca (cause of death) /liver ca    Breast Cancer Maternal Aunt     Breast Cancer Maternal Aunt     Colon Cancer Paternal Aunt         (cause of death)     Crohn's Disease Other     Depression Maternal Uncle     Depression Maternal Aunt     Diabetes Paternal Grandmother         type 2    Neurologic Disorder Brother         neuropathy    Cancer Brother 58         "Tonsilular cancer/ lymph node in neck    Allergies Brother     Breast Cancer Cousin     Breast Cancer Cousin     Breast Cancer Cousin        Social History:  Marital Status:   [4]  Social History     Tobacco Use    Smoking status: Every Day     Packs/day: 1.00     Years: 30.00     Additional pack years: 0.00     Total pack years: 30.00     Types: Cigarettes    Smokeless tobacco: Never    Tobacco comments:     pt declined, stated she would use, \"the patch\". Patient has not had a cigarette in 1 week because she was in the hospital   Vaping Use    Vaping Use: Never used   Substance Use Topics    Alcohol use: No     Comment: rare    Drug use: No        Medications:    albuterol (PROAIR HFA/PROVENTIL HFA/VENTOLIN HFA) 108 (90 Base) MCG/ACT inhaler  atorvastatin (LIPITOR) 40 MG tablet  lisinopril (ZESTRIL) 10 MG tablet  nystatin (MYCOSTATIN) 629017 UNIT/GM external powder  pantoprazole (PROTONIX) 40 MG EC tablet  torsemide (DEMADEX) 20 MG tablet  warfarin ANTICOAGULANT (COUMADIN) 2 MG tablet  diltiazem ER (DILT-XR) 120 MG 24 hr capsule  metoprolol succinate ER (TOPROL XL) 100 MG 24 hr tablet          Review of Systems   Constitutional: Negative.    HENT: Negative.     Eyes: Negative.    Respiratory:  Positive for cough.    Cardiovascular:  Positive for leg swelling.   Gastrointestinal:  Positive for abdominal distention. Negative for blood in stool, constipation, diarrhea, nausea and vomiting.   Endocrine: Negative.    Genitourinary:  Positive for difficulty urinating and pelvic pain. Negative for flank pain, vaginal bleeding and vaginal discharge.   Musculoskeletal: Negative.    Skin: Negative.    Allergic/Immunologic: Negative.    Neurological: Negative.    Hematological: Negative.    Psychiatric/Behavioral: Negative.         Physical Exam   BP: (!) 128/117  Pulse: (!) 126  Temp: 97.5  F (36.4  C)  Resp: 24  SpO2: 93 %      GENERAL APPEARANCE:  The patient is a 73 year old well-developed, well-nourished " individual in no acute distress that appears as stated age.  NECK:  Supple.  Trachea is midline.   LUNGS:  Breathing is easy.  Breath sounds are equal and clear bilaterally.  No wheezes, rhonchi, or rales.  HEART: Irregular rhythm with tachycardic rate.  No murmurs, gallops, or rubs.  ABDOMEN: Distended with anterior pelvic tenderness.  No mass, guarding, or rebound.  No organomegaly or hernia.  Bowel sounds are present.  No CVA tenderness or flank mass.  No abdominal bruits or thrills present upon auscultation/palpation.  GENITOURINARY: Mild anterior pelvic tenderness to palpation.  No obvious distention.  EXTREMITIES: 1+ edema to the lower extremities bilaterally.    NEUROLOGIC:  No focal sensory or motor deficits are noted.   PSYCHIATRIC:  The patient is awake, alert, and oriented x4.  Recent and remote memory is intact.  Appropriate mood and affect.  Calm and cooperative with history and physical exam.  SKIN:  Warm, dry, and well perfused.  Good turgor.  No lesions, nodules, or rashes are noted.  No bruising noted.      Comment: Discrepancies between my note and notes on behalf of the nursing team or other care providers are secondary to my findings reflecting my physical examination and questioning of the patient.  Any conflicting information provided is not in line with my examination of the patient.       ED Course              ED Course as of 02/24/24 1737   Sat Feb 24, 2024   1340 In to see patient and history/physical completed.    1347 Labs, ECG ordered.   1413 EKG 12-lead, tracing only  No STEMI noted.   1441 POCUS abdominal ultrasound of the bladder shows no bladder distention.  175 mL noted.  No hydronephrosis or perinephric fluid noted.   1459 N-Terminal Pro BNP Inpatient(!): 5,926  Furosemide 40 mg IV ordered.   1512 Metoprolol 5 mg IV ordered for rate control.   1645 Discussed case with Hospitalist Dr. Denson who accepted patient for [facility] admission for continued diuresing and heart rate control.    1737 Diltiazem 30 mg p.o. ordered.     POC US ABDOMEN LIMITED    Date/Time: 2/24/2024 1:55 PM    Performed by: Pardeep Huston APRN CNP  Authorized by: Pardeep Huston APRN CNP    Procedure details:     Left renal:  Visualized    Right renal:  Visualized    Bladder:  Visualized  Left renal findings:     Intra-abdominal fluid: not identified      Perinephric fluid: not identified      Hydronephrosis: none    Right renal findings:     Intra-abdominal fluid: not identified      Perinephric fluid: not identified      Hydronephrosis: none    Bladder findings:     Free pelvic fluid: not identified      Volume:  175    ECG:    ECG competed at 1411 and personally reviewed at 1413 showing atrial fibrillation with rapid ventricular response.  Ventricular rate 123 with a QTc of 526.  Right bundle branch block is present.  Anteroseptal ST/T wave abnormalities present.  Abnormal ECG.  When compared to ECG from 2/5/2024, no significant changes noted.         Results for orders placed or performed during the hospital encounter of 02/24/24 (from the past 24 hour(s))   UA with Microscopic reflex to Culture    Specimen: Urine, Midstream   Result Value Ref Range    Color Urine Yellow Colorless, Straw, Light Yellow, Yellow    Appearance Urine Slightly Cloudy (A) Clear    Glucose Urine Negative Negative mg/dL    Bilirubin Urine Small (A) Negative    Ketones Urine Negative Negative mg/dL    Specific Gravity Urine 1.024 1.003 - 1.035    Blood Urine Trace (A) Negative    pH Urine 5.5 4.7 - 8.0    Protein Albumin Urine 70 (A) Negative mg/dL    Urobilinogen Urine 4.0 (A) Normal, 2.0 mg/dL    Nitrite Urine Negative Negative    Leukocyte Esterase Urine Negative Negative    Bacteria Urine Many (A) None Seen /HPF    WBC Clumps Urine Present (A) None Seen /HPF    RBC Urine 2 <=2 /HPF    WBC Urine 11 (H) <=5 /HPF    Squamous Epithelials Urine 11 (H) <=1 /HPF    Hyaline Casts Urine 29 (H) <=2 /LPF    Cellular Casts Urine 13 (H) None Seen /LPF    POC US ABDOMEN LIMITED    Narrative    Pardeep Huston APRN CNP     2/24/2024  4:48 PM  POC US ABDOMEN LIMITED    Date/Time: 2/24/2024 1:55 PM    Performed by: Pardeep Huston APRN CNP  Authorized by: Pardeep Huston APRN CNP    Procedure details:     Left renal:  Visualized    Right renal:  Visualized    Bladder:  Visualized  Left renal findings:     Intra-abdominal fluid: not identified      Perinephric fluid: not identified      Hydronephrosis: none    Right renal findings:     Intra-abdominal fluid: not identified      Perinephric fluid: not identified      Hydronephrosis: none    Bladder findings:     Free pelvic fluid: not identified      Volume:  175   EKG 12-lead, tracing only   Result Value Ref Range    Systolic Blood Pressure  mmHg    Diastolic Blood Pressure  mmHg    Ventricular Rate 123 BPM    Atrial Rate 277 BPM    ID Interval  ms    QRS Duration 142 ms     ms    QTc 526 ms    P Axis  degrees    R AXIS 99 degrees    T Axis -6 degrees    Interpretation ECG       Atrial flutter with variable A-V block  Right bundle branch block  Abnormal ECG  When compared with ECG of 05-FEB-2024 08:25,  No significant change was found     CBC with platelets differential    Narrative    The following orders were created for panel order CBC with platelets differential.  Procedure                               Abnormality         Status                     ---------                               -----------         ------                     CBC with platelets and d...[[id number]]  Abnormal            Final result                 Please view results for these tests on the individual orders.   INR   Result Value Ref Range    INR 1.70 (H) 0.85 - 1.15   Basic metabolic panel   Result Value Ref Range    Sodium 135 135 - 145 mmol/L    Potassium 3.4 3.4 - 5.3 mmol/L    Chloride 98 98 - 107 mmol/L    Carbon Dioxide (CO2) 24 22 - 29 mmol/L    Anion Gap 13 7 - 15 mmol/L    Urea Nitrogen 20.4 8.0 - 23.0 mg/dL    Creatinine  0.96 (H) 0.51 - 0.95 mg/dL    GFR Estimate 63 >60 mL/min/1.73m2    Calcium 9.2 8.8 - 10.2 mg/dL    Glucose 104 (H) 70 - 99 mg/dL   Nt probnp inpatient (BNP)   Result Value Ref Range    N terminal Pro BNP Inpatient 5,926 (H) 0 - 900 pg/mL   CBC with platelets and differential   Result Value Ref Range    WBC Count 8.3 4.0 - 11.0 10e3/uL    RBC Count 4.24 3.80 - 5.20 10e6/uL    Hemoglobin 12.1 11.7 - 15.7 g/dL    Hematocrit 38.0 35.0 - 47.0 %    MCV 90 78 - 100 fL    MCH 28.5 26.5 - 33.0 pg    MCHC 31.8 31.5 - 36.5 g/dL    RDW 18.7 (H) 10.0 - 15.0 %    Platelet Count 194 150 - 450 10e3/uL    % Neutrophils 75 %    % Lymphocytes 12 %    % Monocytes 9 %    % Eosinophils 2 %    % Basophils 1 %    % Immature Granulocytes 1 %    NRBCs per 100 WBC 0 <1 /100    Absolute Neutrophils 6.3 1.6 - 8.3 10e3/uL    Absolute Lymphocytes 1.0 0.8 - 5.3 10e3/uL    Absolute Monocytes 0.8 0.0 - 1.3 10e3/uL    Absolute Eosinophils 0.1 0.0 - 0.7 10e3/uL    Absolute Basophils 0.1 0.0 - 0.2 10e3/uL    Absolute Immature Granulocytes 0.0 <=0.4 10e3/uL    Absolute NRBCs 0.0 10e3/uL   Extra Tube    Narrative    The following orders were created for panel order Extra Tube.  Procedure                               Abnormality         Status                     ---------                               -----------         ------                     Extra Red Top Tube[[id number]]                               Final result               Extra Heparinized Syringe[[id number]]                        Final result                 Please view results for these tests on the individual orders.   Extra Red Top Tube   Result Value Ref Range    Hold Specimen JIC    Extra Heparinized Syringe   Result Value Ref Range    Hold Specimen JIC    Troponin T, High Sensitivity   Result Value Ref Range    Troponin T, High Sensitivity 30 (H) <=14 ng/L   Magnesium   Result Value Ref Range    Magnesium 1.9 1.7 - 2.3 mg/dL   XR Chest 2 Views    Narrative    Exam:  XR CHEST 2  VIEWS    HISTORY: Shortness of breath.    COMPARISON:  2/5/2024, 9/21/2023    FINDINGS:     The cardiomediastinal contours are unchanged.      There are diffusely prominent interstitial markings with streaky  opacities in the mid to lower lungs bilaterally. Possible trace right  pleural effusion. No pneumothorax.      No acute osseous abnormality.       Impression    IMPRESSION:      Findings compatible with CHF/fluid overload.    Streaky opacities in the mid to lower lungs bilaterally may be due to  to edema, atelectasis, or possibly superimposed infection.      MORENA GARDNER MD         SYSTEM ID:  [id number]       Medications   furosemide (LASIX) injection 40 mg (40 mg Intravenous $Given 2/24/24 1609)   metoprolol (LOPRESSOR) injection 5 mg (5 mg Intravenous $Given 2/24/24 0427)   diltiazem (CARDIZEM) tablet 30 mg (30 mg Oral $Given 2/24/24 0643)       Assessments & Plan (with Medical Decision Making)     I have reviewed the nursing notes.    I have reviewed the findings, diagnosis, plan and need for follow up with the patient.      Summary:  Patient presents to the ER today for voiding difficulties.  Potential diagnosis which have been considered and evaluated include UTI, pyelonephritis, urinary retention, CHF, med noncompliance, as well as others. Many of these have been excluded using the various modalities and assessment as noted on the chart. At the present time, the diagnosis given seems to be the most likely fluid overload/CHF with rapid atrial fibrillation.  Upon arrival, vitals signs show blood pressure 128/117 with a pulse of 126.  Temperature 36.4  C.  Respirations 24 with oxygenation 93% on room air.  The patient is alert and oriented but does have labored breathing noted.  Lung sounds clear throughout.  Does have irregular rhythm with tachycardic rate noted.  1+ lower extremity edema noted.  Does have abdominal distention with anterior pelvic tenderness to palpation.  POCUS ultrasound of bladder  showed no bladder distention.  No hydronephrosis obvious on POCUS ultrasound.  Due to tachycardia did do ECG which showed atrial fibrillation with RVR.  Patient has not been taking her medications for about a week.  Lab work was obtained showing WBC of 8.3 with hemoglobin 12.1.  Urine negative for nitrates or leukocyte Estrace but does have many bacteria and clumps present but 11 WBC's and squamous epithelial cells present.  INR subtherapeutic at 1.70.  High-sensitivity troponin 30.  BNP is elevated at 5926 which is higher than previous admission.  Furosemide 40 mg IV ordered for this reason.  Metoprolol 5 mg IV ordered for heart rate.  Electrolytes and renal functions benign.  Chest x-ray personally reviewed showing fluid overload.  Patient having fatigue and dyspnea on exertion.  Patient is in rapid atrial fibrillation.  Not managing medication well at home.  Discussed case with Hospitalist Dr. Denson who accepted patient for admission to [facility] for further rate control and diuresing.      Critical Care Time: None    Impression and plan discussed with patient. Questions answered, concerns addressed, indications for urgent re-evaluation reviewed, and  given. Patient/Parent/Caregiver agree with treatment plan and have no further questions at this time.      This note was created by the Dragon Voice Dictation System. Inadvertent typographical errors, due to software recognition problems, may still exist.             New Prescriptions    No medications on file       Final diagnoses:   CHF (congestive heart failure) (H)   Fluid overload   Rapid atrial fibrillation (H)       2/24/2024   [facility]       Pardeep Huston APRN CNP  02/24/24 0818       Pardeep Huston APRN CNP  02/24/24 [id number]

## 2024-02-24 NOTE — H&P
[facility]    History and Physical - Hospitalist Service       Date of Admission:  2/24/2024    Assessment & Plan      Heather Rosas is a 73 year old female admitted on 2/24/2024 for acute on chronic right-sided heart failure    # Acute on chronic right-sided heart failure  # Moderate tricuspid insufficiency  - 73 year old female who with established diagnosis of CHF, hypertension,  presented to the hospital complaints of weakness, shortness of breath, abdominal distention   -  Patient also stated she ran out of her medication for few days.  She is known to be noncompliant to medication  - Of note patient was recently discharged from the hospital on 2/14/2024 when she was being treated for CHF exacerbation.  -On admission patient tachycardic with heart rate of 126, blood pressure 128/117,   - N-terminal proBNP elevated at 5926,   -chest x-ray shows signs of pulmonary edema, bladder scan done in the ER showed 175 mL of urine.  -Echocardiogram from 2/9 reported ejection fraction of 60 to 65% with severely reduced right ventricular function  -CHF exacerbation most likely due to noncompliance to medication  - started on IV Bumex 2 mg twice a day  -Resume home metoprolol  -Patient has a history of hyperkalemia so is not on ACE inhibitor  -Daily weight, strict I's and O's  -daily weight  -Patient had recent echo so we will hold off on repeating echo       # Chronic atrial fibrillation with acute rapid ventricular response  -On admission heart rate in the 120s  -Due to medication noncompliance  -In the ER patient received IV metoprolol 5 mg  -Resume home Cardizem CD1 20 mg daily and metoprolol 100 mg twice a day.  -Resume home warfarin, pharmacy to dose.  INR today is 1.7      # Hypertension  # Coronary artery disease  # CABG in the past  -Stable.  Denies chest pain  -High-sensitivity troponin is 30 which is most likely from CHF  -Resume home metoprolol, statins, warfarin    #mild uti  -rocephin          Diet:  Cardiac diet  DVT Prophylaxis: Warfarin  Martin Catheter: Not present  Lines: None     Cardiac Monitoring: None  Code Status:  Full code    Clinically Significant Risk Factors Present on Admission               # Drug Induced Coagulation Defect: home medication list includes an anticoagulant medication    # Hypertension: Noted on problem list  # Acute heart failure with preserved ejection fraction: heart failure noted on problem list, last echo with EF >50%, and receiving IV diuretics          # History of CABG: noted on surgical history       Disposition Plan      Expected Discharge Date: 02/28/2024                  Rishabh Garcia MD  Hospitalist Service  [facility]  Securely message with [employer] (more info)  Text page via [facility] Paging/Directory     ______________________________________________________________________    Chief Complaint   Weakness, shortness of breath    History is obtained from the patient    History of Present Illness   Heather Rosas is a 73 year old female who with established diagnosis of CHF, hypertension, atrial fibrillation on warfarin, schizophrenia, generalized anxiety disorder, coronary disease who presented to the hospital complaints of weakness, shortness of breath.  She also stated she has not been able to pee for some time.  Patient also stated she ran out of her medication for few days.  She is known to be noncompliant to medication.  She denies fever, chills, headache.  Of note patient was recently discharged from the hospital on 2/14/2024 when she was being treated for CHF exacerbation.    On admission patient tachycardic with heart rate of 126, blood pressure 128/117, INR 1.7, creatinine is normal at 0.9, N-terminal proBNP elevated at 5926, high-sensitivity troponin 30, UA negative for UTI, chest x-ray shows signs of pulmonary edema, bladder scan done in the ER showed 175 mL of urine.    Patient assessed as having acute on chronic CHF, atrial fibrillation  with rapid ventricular response.  All this due to medication noncompliance.    For CHF patient started on IV Bumex 2 mg twice a day    For atrial fibrillation patient initially received 5 mg IV metoprolol in the ER and then restarted on home Cardizem CD1 20 mg daily and metoprolol 100 mg twice a day.          Past Medical History    Past Medical History:   Diagnosis Date    Acute diastolic congestive heart failure (H) 10/12/2021    Acute exacerbation of CHF (congestive heart failure) (H) 10/11/2021    Acute on chronic congestive heart failure, unspecified heart failure type (H) 09/21/2023    Allergic rhinitis 01/01/2011    Anxiety 01/01/2011    Anxiety state, unspecified     Anxiety state    Atrial fibrillation with RVR (H) 07/07/2023    Atrial flutter with rapid ventricular response (H) 10/12/2021    CVA (cerebral vascular accident) (H) 05/14/2018    Dyslipidemia 11/26/2003    fibromyalgia 06/14/2004    GERD, Esophageal reflux 01/01/2011    History of non-ST elevation myocardial infarction (NSTEMI) 09/06/2018    HTN (hypertension)     Essential hypertension    Major depression, recurrent (H24) 01/01/2011    Major depressive disorder, recurrent episode, moderate (H) 01/01/2011    Metrorrhagia 02/18/2018    Non compliance with medical treatment 07/07/2023    Nonorganic sleep disorder, unspecified     Non-org. 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, ARDS, renal failure       Past Surgical History   Past Surgical History:   Procedure Laterality Date    ANGIOGRAM  02/2005    Normal     BIOPSY BREAST  1984    LT, normal    BYPASS GRAFT ARTERY CORONARY  09/10/2018    CARPAL TUNNEL RELEASE RT/LT  2005    RT, carpal tunnel    COLONOSCOPY  2011    Repeat in ten years     COLONOSCOPY  1996    COLONOSCOPY  2004    DILATION AND CURETTAGE, HYSTEROSCOPY, ABLATE ENDOMETRIUM, COMBINED N/A 2/19/2018    Procedure: COMBINED DILATION  "AND CURETTAGE, HYSTEROSCOPY, ABLATE ENDOMETRIUM;  HYSTEROSCOPY DILATION AND CURETTAGE, ENDOMETRIAL ABLATION;  Surgeon: Jose Nettles MD;  Location: [facility] OR    HYSTERECTOMY TOTAL ABD, NICK SALPINGO-OOPHORECTOMY, NODE DISSECTION, TUMOR DEBULKING, COMBINED  10/30/2018    INSERT PORT VASCULAR ACCESS N/A 12/11/2018    Procedure: PORT-A-CATH PLACEMENT;  Surgeon: Rafi Trinidad MD;  Location: [facility] OR    placement of central line  2005    REMOVE PORT VASCULAR ACCESS N/A 10/6/2020    Procedure: port a cath removal;  Surgeon: Rafi Trinidad MD;  Location: [facility] OR     Family History:    Family History[]Expand by Default         Family History   Problem Relation Age of Onset    C.A.D. Father 45         (cause of death)     Other - See Comments Father           rheumatic fever     Allergies Father      Cancer Sister 56         Esophageal to bone cancer    Gastrointestinal Disease Mother           GERD    Cancer Mother           pancreatic ca (cause of death) /liver ca    Breast Cancer Maternal Aunt      Breast Cancer Maternal Aunt      Colon Cancer Paternal Aunt           (cause of death)     Crohn's Disease Other      Depression Maternal Uncle      Depression Maternal Aunt      Diabetes Paternal Grandmother           type 2    Neurologic Disorder Brother           neuropathy    Cancer Brother 58         Tonsilular cancer/ lymph node in neck    Allergies Brother      Breast Cancer Cousin      Breast Cancer Cousin      Breast Cancer Cousin              Social History:  Marital Status:   [4]  Social History            Tobacco Use    Smoking status: Every Day       Packs/day: 1.00       Years: 30.00       Additional pack years: 0.00       Total pack years: 30.00       Types: Cigarettes    Smokeless tobacco: Never    Tobacco comments:       pt declined, stated she would use, \"the patch\". Patient has not had a cigarette in 1 week because she was in the hospital   Vaping Use    Vaping Use: Never used   Substance Use Topics    " Alcohol use: No       Comment: rare    Drug use: No        Allergies:        Allergies   Allergen Reactions    Fexofenadine Hcl Nausea and Vomiting       Allegra     Rosuvastatin Other (See Comments)       Dizziness - Crestor     Cats Other (See Comments)       Sneezing, runny nose    Codeine Sulfate Nausea and Vomiting and GI Disturbance         Prior to Admission Medications   Prior to Admission Medications   Prescriptions Last Dose Informant Patient Reported? Taking?   albuterol (PROAIR HFA/PROVENTIL HFA/VENTOLIN HFA) 108 (90 Base) MCG/ACT inhaler Unknown  No Yes   Sig: Inhale 2 puffs into the lungs every 6 hours as needed for shortness of breath, wheezing or cough   atorvastatin (LIPITOR) 40 MG tablet Past Week  No Yes   Sig: Take 1 tablet (40 mg) by mouth At Bedtime   diltiazem ER (DILT-XR) 120 MG 24 hr capsule   No No   Sig: Take 1 capsule (120 mg) by mouth daily for 30 days   lisinopril (ZESTRIL) 10 MG tablet Past Week  No Yes   Sig: Take 1 tablet (10 mg) by mouth daily   metoprolol succinate ER (TOPROL XL) 100 MG 24 hr tablet   No No   Sig: Take 1 tablet (100 mg) by mouth daily for 30 days   nystatin (MYCOSTATIN) 397271 UNIT/GM external powder Unknown  Yes Yes   Sig: Apply 1 g topically 3 times daily as needed (groin)   pantoprazole (PROTONIX) 40 MG EC tablet Past Week  No Yes   Sig: Take 1 tablet (40 mg) by mouth daily before breakfast   torsemide (DEMADEX) 20 MG tablet Past Week  No Yes   Sig: Take 3 tablets (60 mg) by mouth daily   warfarin ANTICOAGULANT (COUMADIN) 2 MG tablet 2/23/2024  No Yes   Sig: Take 2 tablets (4 mg) by mouth on 2/14, then follow-up with [facility] on 2/15 for INR check and further dosing instructions.      Facility-Administered Medications: None        Review of Systems    The 10 point Review of Systems is negative other than noted in the HPI     Physical Exam   Vital Signs: Temp: 97.5  F (36.4  C) Temp src: Tympanic BP: (!) 128/117 Pulse: (!) 126   Resp: 24 SpO2: 93 % O2  Device: None (Room air)    Weight: 0 lbs 0 oz    GENERAL APPEARANCE:  The patient is a 73 year old well-developed, well-nourished individual in no acute distress that appears as stated age.  NECK:  Supple.  Trachea is midline.   LUNGS:  Breathing is easy.  Breath sounds are equal and clear bilaterally.  No wheezes, rhonchi, or rales.  HEART: Irregular rhythm with tachycardic rate.  No murmurs, gallops, or rubs.  ABDOMEN: Distended with anterior pelvic tenderness.  No mass, guarding, or rebound.  No organomegaly or hernia.  Bowel sounds are present.  No CVA tenderness or flank mass.  No abdominal bruits or thrills present upon auscultation/palpation.  GENITOURINARY: Mild anterior pelvic tenderness to palpation.  No obvious distention.  EXTREMITIES: 1+ edema to the lower extremities bilaterally.    NEUROLOGIC:  No focal sensory or motor deficits are noted.   PSYCHIATRIC:  The patient is awake, alert, and oriented x4.  Recent and remote memory is intact.  Appropriate mood and affect.  Calm and cooperative with history and physical exam.  SKIN:  Warm, dry, and well perfused.  Good turgor.  No lesions, nodules, or rashes are noted.  No bruising noted.          Medical Decision Making       79 MINUTES SPENT BY ME on the date of service doing chart review, history, exam, documentation & further activities per the note.      Data

## 2024-02-25 PROBLEM — I50.33 ACUTE ON CHRONIC DIASTOLIC CONGESTIVE HEART FAILURE (H): Status: ACTIVE | Noted: 2024-01-01

## 2024-02-25 PROBLEM — Z91.148 NONCOMPLIANCE WITH MEDICATION REGIMEN: Status: ACTIVE | Noted: 2023-07-07

## 2024-02-25 NOTE — CARE PLAN
[facility] Inpatient Admission Note:    Patient admitted to 3210/3210-1 at approximately 1755 via bed accompanied by transport tech from emergency room . Report received from Karuna CARRILLO in SBAR format at 1745 via telephone. Patient transferred to bed via self.. Patient is alert and oriented X 3, denies pain; rates at 0 on 0-10 scale.  Patient oriented to room, unit, hourly rounding, and plan of care. Explained admission packet and patient handbook with patient bill of rights brochure. Will continue to monitor and document as needed.     Inpatient Nursing criteria listed below was met:    Health care directives status obtained and documented: Yes    Patient identifies a surrogate decision maker: Yes If yes, who:Valentin (Son).  Contact Information:See flowsheet.     If initial lactic acid greater than 2.0, repeat lactic acid drawn within one hour of arrival to unit: NA. If no, state reason: No lactic drawn.    Clergy visit ordered if patient requests: N/A patient denied.    Skin issues/needs documented: No    Isolation Patient: no Education given, correct sign in place and documentation row added to PCS:  No    Fall Prevention No: Care plan updated, education given and documented, sticker and magnet in place: No    Care Plan initiated: Yes    Education Documented (including assessment): Yes    Patient has discharge needs : Yes If yes, please explain:TBD      Vitals as charted and medication given per MAR.    Face to face report given with opportunity to observe patient.    Report given to Ricky Hernandez RN   2/24/2024  7:07 PM

## 2024-02-25 NOTE — PROVIDER NOTIFICATION
1940 - Clarified if provider would like to start any abx for pt's UTI.     2045 - provider ordered rocephin.     2150 - pt requesting something for anxiety.    2155 - provider ordered xanax.

## 2024-02-25 NOTE — PHARMACY-ANTICOAGULATION SERVICE
Clinical Pharmacy - Warfarin Dosing Consult     Pharmacy has been consulted to manage this patient s warfarin therapy.  Indication: Atrial Fibrillation  Therapy Goal: INR 2-3  OP Anticoag Clinic: [facility]  Warfarin Prior to Admission: Yes  Warfarin PTA Regimen: Pt re-started. Was to take 4 mg once, then follow up with clinic.  Recent documented change in oral intake/nutrition: No    INR   Date Value Ref Range Status   02/24/2024 1.70 (H) 0.85 - 1.15 Final   02/14/2024 1.55 (H) 0.85 - 1.15 Final       Recommend warfarin 4 mg today.  Pharmacy will monitor Heather Rosas daily and order warfarin doses to achieve specified goal.      Please contact pharmacy as soon as possible if the warfarin needs to be held for a procedure or if the warfarin goals change.

## 2024-02-25 NOTE — PHARMACY-ANTICOAGULATION SERVICE
Pharmacy Consult-Warfarin Assessment Day #2    Heather Rosas is a 73 year old female admitted on 2/24/2024 with Acute on chronic diastolic congestive heart failure (H)    Primary Indication(s) for Anticoagulation: Afib    Goal INR: 2-3    FYI, patient is followed by the Anticoagulation[facility] at: [facility]    Patient previously anticoagulated on Coumadin Re-started warfarin    Home tablet strength(s): 2 mg    Factors that may increase patient's bleeding risk and/or sensitivity to warfarin (Coumadin) include:     Factors that may decrease patient's bleeding risk and/or sensitivity to warfarin (Coumadin) include:         Anticoagulation Dose History  More data exists         Latest Ref Rng & Units 2/10/2024 2/11/2024 2/12/2024 2/13/2024 2/14/2024 2/24/2024 2/25/2024   Recent Dosing and Labs   warfarin ANTICOAGULANT (COUMADIN) 2 MG tablet - - - - - - 4 mg, $Given -   warfarin ANTICOAGULANT (COUMADIN) 3 MG tablet - - - 3 mg, $Given 3 mg, $Given - - -   INR 0.85 - 1.15 2.16  1.60  1.38  1.35  1.55  1.70  1.84      CBC RESULTS:   Recent Labs   Lab Test 02/25/24  0557   HGB 11.8          Assessment/Plan: INR subtherapeutic. Will give 3 mg warfarin today.    Thank You for the Consult. Will continue to follow.    Melo Stein [facility] ....................  2/25/2024   8:32 AM

## 2024-02-25 NOTE — PLAN OF CARE
"Blood pressure 107/74, pulse 67, temperature 97.2  F (36.2  C), temperature source Tympanic, resp. rate 22, height 1.499 m (4' 11\"), weight 89.3 kg (196 lb 13.9 oz), SpO2 94%.    PIV x1. Abx - IV Rocephin. Tele reading aflutter in the 60s. Vitals and assessments as charted. 2 gram Na+ diet. Diuresing with total output overnight of 1600 mLs. Remain on 1800 mL fluid restriction. Pt making frequent requests from staff to have something to drink, but agreeable to following fluid restriction. Had episode of anxiety at approximately 2200. Provider ordered prn xanax. Pt reported improvement and stated she used to take klonopin many years ago. Significant scabbing to back and arms. When asked pt stated she didn't know where it came from, but attested to picking at her arms all the time because she gets pimples there. BLEs jim which appears more positional when her legs aren't elevated. Mild edema throughout, but did show improvement this AM. Bed in lowest position. Call light in reach. ID band in place. Makes needs known and calls appropriately.     Face to face report given with opportunity to observe patient.    Report given to LORENA Banuelos RN   2/25/2024  7:00 AM    "

## 2024-02-25 NOTE — PHARMACY
[facility]    Pharmacy      Antimicrobial Stewardship Note     Current antimicrobial therapy:  Anti-infectives (From now, onward)      Start     Dose/Rate Route Frequency Ordered Stop    02/24/24 2100  cefTRIAXone in d5w (ROCEPHIN) intermittent infusion 1 g         1 g  over 30 Minutes Intravenous EVERY 24 HOURS 02/24/24 2046              Indication: UTI    Days of Therapy: 2     Pertinent labs:  Creatinine   Creatinine   Date Value Ref Range Status   02/25/2024 0.97 (H) 0.51 - 0.95 mg/dL Final   02/24/2024 0.96 (H) 0.51 - 0.95 mg/dL Final   02/14/2024 0.93 0.51 - 0.95 mg/dL Final   08/13/2019 0.87 0.52 - 1.04 mg/dL Final   04/12/2019 0.84 0.52 - 1.04 mg/dL Final   03/21/2019 0.76 0.52 - 1.04 mg/dL Final     WBC   WBC   Date Value Ref Range Status   08/13/2019 9.2 4.0 - 11.0 10e9/L Final   05/29/2019 6.4 4.0 - 11.0 10e9/L Final   04/12/2019 10.2 4.0 - 11.0 10e9/L Final     WBC Count   Date Value Ref Range Status   02/25/2024 7.6 4.0 - 11.0 10e3/uL Final   02/24/2024 8.3 4.0 - 11.0 10e3/uL Final   02/14/2024 8.4 4.0 - 11.0 10e3/uL Final     Procalcitonin   Procalcitonin   Date Value Ref Range Status   09/21/2023 0.06 (H) <0.05 ng/mL Final     Comment:     Interpretation and Recommendations   <0.05 ng/mL Normal   Very low risk of bacterial infection.   Strongly discourage antibiotics.     0.05-0.24 ng/mL Low risk of systemic infection. Local bacterial infection possible.   Assess other clinical features of infection.   Discourage antibiotics.     0.25-0.49 ng/mL Possible early systemic infection or localized infection   Encourage antibiotics only in correct clinical context.   Consider obtaining blood cultures or other relevant cultures.   Recheck PCT in 6-12 hours to ensure baseline low level.   If repeat PCT is rising, consider early systemic infection and consider starting antibiotics.     0.50-1.99 ng/mL Moderate risk of systemic infection.   Recommend antibiotics.   Evaluate culture results and  clinical features to target antibacterial therapy.   Obtain blood cultures and other relevant cultures if not done.     If empiric antibiotics were started, recheck PCT in:        2 days to guide antibiotic de-escalation.        Discontinue or de-escalate antibiotics when PCT concentration is <80% of peak or abs PCT <0.5.     If empiric antibiotics were NOT started, recheck PCT in:        6-24 hours to re-evaluate need for antibiotics.       2.00-9.99 ng/mL High risk for progression to severe sepsis.   Strongly recommend initiating or continuing antibiotics.   Evaluate culture results and clinical features to target antibacterial therapy.   Obtain blood cultures and other relevant cultures if not done.   Repeat PCT in 2 days to guide antibiotic de-escalation.   Consider de-escalating antibiotics when PCT concentration is <80% of peak or abs PCT < 0.5.     Greater than or equal to 10 ng/mL Very high likelihood of severe sepsis or septic shock.   Strongly recommend initiating or continuing antibiotics.   Evaluate culture results and clinical features to target antibacterial therapy.   Obtain blood cultures and other relevant cultures if not done.   Repeat PCT in 2 days to guide antibiotic de-escalation.   Consider de-escalating antibiotics when PCT concentration is <80% of peak or abs PCT < 1.0.     04/18/2022 <0.05 <0.05 ng/mL Final     Comment:     Interpretation and Recommendations  <0.05 ng/mL Normal  Very low risk of bacterial infection.  Strongly discourage antibiotics.    0.05-0.24 ng/mL Low risk of systemic infection. Local bacterial infection possible.  Assess other clinical features of infection.  Discourage antibiotics.    0.25-0.49 ng/mL Possible early systemic infection or localized infection  Encourage antibiotics only in correct clinical context.  Consider obtaining blood cultures or other relevant cultures.  Recheck PCT in 6-12 hours to ensure baseline low level.  If repeat PCT is rising, consider  early systemic infection and consider starting antibiotics.    0.50-1.99 ng/mL Moderate risk of systemic infection.  Recommend antibiotics.  Evaluate culture results and clinical features to target antibacterial therapy.  Obtain blood cultures and other relevant cultures if not done.    If empiric antibiotics were started, recheck PCT in:       2 days to guide antibiotic de-escalation.       Discontinue or de-escalate antibiotics when PCT concentration is <80% of      peak or abs PCT <0.5.    If empiric antibiotics were NOT started, recheck PCT in:       6-24 hours to re-evaluate need for antibiotics.     2.00-9.99 g/mL High risk for progression to severe sepsis.  Strongly recommend initiating or continuing antibiotics.  Evaluate culture results and clinical features to target antibacterial therapy.  Obtain blood cultures and other relevant cultures if not done.  Repeat PCT in 2 days to guide antibiotic de-escalation.  Consider de-escalating antibiotics when PCT concentration is <80% or peak or abs PCT < 0.05.    Greater than or equal to 10 ng/mL Very high likelihood of severe sepsis or septic shock.  Strongly recommend initiating or continuing antibiotics.  Evaluate culture results and clinical features to target antibacterial therapy.  Obtain blood cultures and other relevant cultures if not done.  Repeat PCT in 2 days to guide antibiotic de-escalation.  Consider de-escalating antibiotics when PCT concentration is <80% of peak or abs PCT < 1.     04/17/2022 <0.05 <0.05 ng/mL Final     Comment:     Interpretation and Recommendations  <0.05 ng/mL Normal  Very low risk of bacterial infection.  Strongly discourage antibiotics.    0.05-0.24 ng/mL Low risk of systemic infection. Local bacterial infection possible.  Assess other clinical features of infection.  Discourage antibiotics.    0.25-0.49 ng/mL Possible early systemic infection or localized infection  Encourage antibiotics only in correct clinical  "context.  Consider obtaining blood cultures or other relevant cultures.  Recheck PCT in 6-12 hours to ensure baseline low level.  If repeat PCT is rising, consider early systemic infection and consider starting antibiotics.    0.50-1.99 ng/mL Moderate risk of systemic infection.  Recommend antibiotics.  Evaluate culture results and clinical features to target antibacterial therapy.  Obtain blood cultures and other relevant cultures if not done.    If empiric antibiotics were started, recheck PCT in:       2 days to guide antibiotic de-escalation.       Discontinue or de-escalate antibiotics when PCT concentration is <80% of      peak or abs PCT <0.5.    If empiric antibiotics were NOT started, recheck PCT in:       6-24 hours to re-evaluate need for antibiotics.     2.00-9.99 g/mL High risk for progression to severe sepsis.  Strongly recommend initiating or continuing antibiotics.  Evaluate culture results and clinical features to target antibacterial therapy.  Obtain blood cultures and other relevant cultures if not done.  Repeat PCT in 2 days to guide antibiotic de-escalation.  Consider de-escalating antibiotics when PCT concentration is <80% or peak or abs PCT < 0.05.    Greater than or equal to 10 ng/mL Very high likelihood of severe sepsis or septic shock.  Strongly recommend initiating or continuing antibiotics.  Evaluate culture results and clinical features to target antibacterial therapy.  Obtain blood cultures and other relevant cultures if not done.  Repeat PCT in 2 days to guide antibiotic de-escalation.  Consider de-escalating antibiotics when PCT concentration is <80% of peak or abs PCT < 1.       CRP No results found for: \"CRP\"    Culture Results:    None  Recommendations/Interventions:  1. None    Melo Stein RPH  February 25, 2024        "

## 2024-02-26 NOTE — PROGRESS NOTES
Name: Heather Rosas    MRN#: [id number]    Reason for Hospitalization: CHF (congestive heart failure) (H) [I50.9]  Rapid atrial fibrillation (H) [I48.91]  Fluid overload [E87.70]    Discharge Date: 2/26/2024    Patient / Family response to discharge plan: Patient agreeable to discharge home.     Follow-Up Appt:   Future Appointments   Date Time Provider Department Center   2/27/2024  5:30 AM [facility]   3/1/2024 11:30 AM [facility]       Other Providers (Care Coordinator, County Services, PCA services etc): Yes: [employer] to pick patient up at 1:45.    Discharge Disposition: home

## 2024-02-26 NOTE — PLAN OF CARE
"/72 (BP Location: Left arm)   Pulse 70   Temp 97.2  F (36.2  C) (Tympanic)   Resp 19   Ht 1.499 m (4' 11\")   Wt 88.8 kg (195 lb 12.3 oz)   SpO2 94%   BMI 39.54 kg/m      A&), VSS, afebrile, denies pain, IV out for discharge, remains on RA lungs dim/clear, moves independently in room, good urine output moving independently in room, 2 incontinent Bms as charted, good appetite fluid restriction remains, free from falls, makes needs known.    Patient discharged at 1:45 PM via wheel chair accompanied by  staff. Prescriptions sent to patients preferred pharmacy. All belongings sent with patient.     Discharge instructions reviewed with patient. Listed belongings gathered and returned to patient. yes    Patient discharged to home.   Report called to N/A    Surgical Patient   Surgical Procedures during stay: N/A  Did patient receive discharge instruction on wound care and recognition of infection symptoms? N/A    MISC  Follow up appointment made:  Yes  Home medications returned to patient: N/A  Patient reports pain was well managed at discharge: Yes    Vickie Zelaya RN on 2/26/2024 at 2:26 PM      "

## 2024-02-26 NOTE — PROGRESS NOTES
Assessment completed by visit with Heather.     LOC: alert, oriented, pleasant      Dx: edema, CHF exacerbation  Chronic Disease Management: CHF, HTN, AFib, CVA history      Lives with: alone  Living at:  home  Transportation: YES      Primary PCP: Nicolette Huffman  Insurance:  Medicare     Support System:  family   Homecare/PCA: not connected  /County Services:   not connected   : NO                                                               How was the [facility] notification completed: n/a     Health Care Directive: no  Guardian: no  POA: no     Pharmacy: [facility]   Meds management: independently manages     Adequate Resources for needs (housing, utilities, food/med): YES  Household chores: independently manages  Work/community/social activity: YES as desired      ADLs: independently manages  Ambulation:independent   Falls: denies   Nutrition: no concerns reported at this time  Sleep: no concerns reported at this time     Equipment used: none                                      Mental health: denies   Substance abuse: denies   Exposure to violence/abuse: no concerns voiced/identified  Stressors: no concerns voiced/identified     Able to Return to Prior Living Arrangements: YES     Choice of Vendor: n/a     Barriers: no barriers identified     TOBI: medium     Plan: return home via taxi

## 2024-02-26 NOTE — MEDICATION SCRIBE - ADMISSION MEDICATION HISTORY
Medication Scribe Admission Medication History    Admission medication history is complete. The information provided in this note is only as accurate as the sources available at the time of the update.    Information Source(s): Patient and CareEverywhere/SureScripts via in-person    Pertinent Information:   Patient reports that she ran out of all of her medications aside from coumadin. Last admission she reported she still had medications at home although her fill history reflected non-compliancy. Per her pharmacy she will need refills on all medications- all medications have been closed out. Last fills- 9/22/23.    Coumadin information:  INR date: 2/14/24  INR result: 1.55  Next INR date: 2/15/24 (patient no showed)  Range: 2.0-3.0  Indication: chronic a-fib    Coumadin strength/instructions: 4 mg on 2/14 then follow up with coumadin clinic on 2/15 for INR check for further dosing (pt taking different than prescribed- taking 1 tablet daily at 1 PM)  Tablet strength: 2 mg    Time of day patient takes coumadin at home: 1 PM  Exact last dose/time patient took PTA: 1300    Patient's coumadin clinic facility and contact:  Fax number for discharge instructions (if applicable): NA      Changes made to PTA medication list:  Added: ibu-pt taking once daily  Deleted: None  Changed: None    Allergies reviewed with patient and updates made in EHR: yes    Medication History Completed By: Beth Nails 2/26/2024 9:27 AM    PTA Med List   Medication Sig Note Last Dose    albuterol (PROAIR HFA/PROVENTIL HFA/VENTOLIN HFA) 108 (90 Base) MCG/ACT inhaler Inhale 2 puffs into the lungs every 6 hours as needed for shortness of breath, wheezing or cough  2/23/2024 at 1500    atorvastatin (LIPITOR) 40 MG tablet Take 1 tablet (40 mg) by mouth At Bedtime 2/26/2024: Needs refills.      diltiazem ER (DILT-XR) 120 MG 24 hr capsule Take 120 mg by mouth daily 2/26/2024: Needs refills.      lisinopril (ZESTRIL) 10 MG tablet Take 1 tablet (10 mg)  by mouth daily 2/26/2024: Needs a refill.      metoprolol succinate ER (TOPROL XL) 100 MG 24 hr tablet Take 100 mg by mouth daily 2/26/2024: Needs refills.      nystatin (MYCOSTATIN) 939276 UNIT/GM external powder Apply 1 g topically 3 times daily as needed (groin)      pantoprazole (PROTONIX) 40 MG EC tablet Take 1 tablet (40 mg) by mouth daily before breakfast 2/26/2024: Needs refills.      torsemide (DEMADEX) 20 MG tablet Take 3 tablets (60 mg) by mouth daily 2/26/2024: Needs refills.      warfarin ANTICOAGULANT (COUMADIN) 2 MG tablet Take 2 tablets (4 mg) by mouth on 2/14, then follow-up with Coumadin Clinic on 2/15 for INR check and further dosing instructions. (Patient taking differently: Take 2 mg by mouth daily at 1 pm.)  2/23/2024 at 1000

## 2024-02-26 NOTE — PLAN OF CARE
"Blood pressure 114/70, pulse 102, temperature 97.6  F (36.4  C), temperature source Tympanic, resp. rate 18, height 1.499 m (4' 11\"), weight 88.8 kg (195 lb 12.3 oz), SpO2 92%.    PIV x1. Abx - IV Rocephin. Vitals and assessments as charted. 2 gram Na+ diet. Diuresing with total output 800 mLs - pt did miss the hat a couple times. Remain on 2000 mL fluid restriction. Pt cnts making frequent requests from staff to have something to drink, but agreeable to following fluid restriction with encouragement. Increased stool overnight that eventually became loose. Had one episode of incontinence. BLEs jim which appears more positional when her legs aren't elevated. Mild edema throughout. Bed in lowest position. Call light in reach. ID band in place. Makes needs known and calls appropriately.     Face to face report given with opportunity to observe patient.    Report given to LORENA Sanchez RN   2/26/2024  7:00 AM    "

## 2024-02-26 NOTE — DISCHARGE SUMMARY
[facility]    Discharge Summary  Hospitalist    Date of Admission:  2/24/2024  Date of Discharge:  2/26/2024  1:45 PM  Discharging Provider: Claudia Chino NP  Date of Service (when I saw the patient): 2/26/24    Discharge Diagnoses     Principal Problem:    Acute on chronic diastolic congestive heart failure (H)  Active Problems:    HTN (hypertension)    Schizophrenia (H)    Noncompliance with medication regimen    Rapid atrial fibrillation (H)      History of Present Illness   From admission: Heather Rosas is a 73 year old female who with established diagnosis of CHF, hypertension, atrial fibrillation on warfarin, schizophrenia, generalized anxiety disorder, coronary disease who presented to the hospital complaints of weakness, shortness of breath.  She also stated she has not been able to pee for some time.  Patient also stated she ran out of her medication for few days.  She is known to be noncompliant to medication.  She denies fever, chills, headache.  Of note patient was recently discharged from the hospital on 2/14/2024 when she was being treated for CHF exacerbation.     On admission patient tachycardic with heart rate of 126, blood pressure 128/117, INR 1.7, creatinine is normal at 0.9, N-terminal proBNP elevated at 5926, high-sensitivity troponin 30, UA negative for UTI, chest x-ray shows signs of pulmonary edema, bladder scan done in the ER showed 175 mL of urine.     Patient assessed as having acute on chronic CHF, atrial fibrillation with rapid ventricular response.  All this due to medication noncompliance.     For CHF patient started on IV Bumex 2 mg twice a day     For atrial fibrillation patient initially received 5 mg IV metoprolol in the ER and then restarted on home Cardizem CD1 20 mg daily and metoprolol 100 mg twice a day.    Hospital Course     Acute on chronic diastolic congestive heart failure (H): Net loss 2 liters and 3 pounds. Weaning down oxygen. Continue with IV  "bumex.   -2/26: Down to 195 pounds, no dyspnea or hypoxia. Reorderd 30 days of all medications and had them delivered to her here before discharge.        HTN (hypertension): Pressures so far stable       Schizophrenia (H)    Noncompliance with medication regimen: Frequent hospitalizations due to not taking her medications.       Rapid atrial fibrillation (H): Due to underlying CHF exacerbation and not taking her medications-resolved with diuresis and starting back her BB.        # Hypokalemia: Lowest K = 2.9 mmol/L in last 2 days, will replace as needed        # Drug Induced Coagulation Defect: home medication list includes an anticoagulant medication    # Hypertension: Noted on problem list  # Acute heart failure with preserved ejection fraction: heart failure noted on problem list, last echo with EF >50%, and receiving IV diuretics     # Obesity: Estimated body mass index is 39.76 kg/m  as calculated from the following:    Height as of this encounter: 1.499 m (4' 11\").    Weight as of this encounter: 89.3 kg (196 lb 13.9 oz).        # History of CABG: noted on surgical history       Claudia Chino CNP      Pending Results     Unresulted Labs Ordered in the Past 30 Days of this Admission       No orders found from 1/25/2024 to 2/25/2024.            Code Status   Full Code       Primary Care Physician   Nicolette Huffman           Discharge Disposition   Discharged to home  Condition at discharge: Stable    Consultations This Hospital Stay   PHARMACY TO DOSE WARFARIN  OCCUPATIONAL THERAPY ADULT IP CONSULT  NUTRITION SERVICES ADULT IP CONSULT  CARE MANAGEMENT / SOCIAL WORK IP CONSULT  PHYSICAL THERAPY ADULT IP CONSULT  OCCUPATIONAL THERAPY ADULT IP CONSULT    Time Spent on this Encounter   IClaudia NP, personally saw the patient today and spent greater than 30 minutes discharging this patient.    Discharge Orders      Reason for your hospital stay    CHF exacerbation     Follow-up and recommended " labs and tests     Follow up with primary care provider, Nicolette Huffman, within 7 days for hospital follow- up.  No follow up labs or test are needed.     Activity    Your activity upon discharge: activity as tolerated     Diet    Follow this diet upon discharge: Orders Placed This Encounter      Fluid restriction 2000 ML FLUID      Combination Diet 2 gm NA Diet     Discharge Medications   Current Discharge Medication List        START taking these medications    Details   busPIRone (BUSPAR) 10 MG tablet Take 1 tablet (10 mg) by mouth 2 times daily  Qty: 60 tablet, Refills: 0    Associated Diagnoses: Acute on chronic diastolic congestive heart failure (H)           CONTINUE these medications which have CHANGED    Details   albuterol (PROAIR HFA/PROVENTIL HFA/VENTOLIN HFA) 108 (90 Base) MCG/ACT inhaler Inhale 2 puffs into the lungs every 6 hours as needed for shortness of breath, wheezing or cough  Qty: 18 g, Refills: 0    Comments: Pharmacy may dispense brand covered by insurance (Proair, or proventil or ventolin or generic albuterol inhaler)  Associated Diagnoses: Wheezing      atorvastatin (LIPITOR) 40 MG tablet Take 1 tablet (40 mg) by mouth at bedtime  Qty: 30 tablet, Refills: 0    Associated Diagnoses: Acute on chronic diastolic congestive heart failure (H)      diltiazem ER (DILT-XR) 120 MG 24 hr capsule Take 1 capsule (120 mg) by mouth daily  Qty: 30 capsule, Refills: 0    Associated Diagnoses: Acute on chronic diastolic congestive heart failure (H)      lisinopril (ZESTRIL) 10 MG tablet Take 1 tablet (10 mg) by mouth daily  Qty: 30 tablet, Refills: 0    Associated Diagnoses: Acute on chronic diastolic congestive heart failure (H)      metoprolol succinate ER (TOPROL XL) 100 MG 24 hr tablet Take 1 tablet (100 mg) by mouth daily  Qty: 30 tablet, Refills: 0    Associated Diagnoses: Acute on chronic diastolic congestive heart failure (H)      pantoprazole (PROTONIX) 40 MG EC tablet Take 1 tablet (40 mg) by  mouth daily before breakfast  Qty: 30 tablet, Refills: 0    Associated Diagnoses: Gastroesophageal reflux disease without esophagitis      torsemide (DEMADEX) 20 MG tablet Take 3 tablets (60 mg) by mouth daily  Qty: 90 tablet, Refills: 0    Associated Diagnoses: Acute on chronic diastolic congestive heart failure (H)      warfarin ANTICOAGULANT (COUMADIN) 2 MG tablet Follow-up with [facility] on 2/27 for INR check and further dosing instructions.  Qty: 60 tablet, Refills: 0    Associated Diagnoses: Chronic atrial fibrillation (H)           CONTINUE these medications which have NOT CHANGED    Details   nystatin (MYCOSTATIN) 274693 UNIT/GM external powder Apply 1 g topically 3 times daily as needed (groin)           STOP taking these medications       ibuprofen (ADVIL/MOTRIN) 200 MG tablet Comments:   Reason for Stopping:             Allergies   Allergies   Allergen Reactions    Fexofenadine Hcl Nausea and Vomiting     Allegra     Rosuvastatin Other (See Comments)     Dizziness - Crestor     Cats Other (See Comments)     Sneezing, runny nose    Codeine Sulfate Nausea and Vomiting and GI Disturbance     Data   Most Recent 3 CBC's:  Recent Labs   Lab Test 02/26/24  0520 02/25/24  0557 02/24/24  1422   WBC 8.2 7.6 8.3   HGB 11.9 11.8 12.1   MCV 94 90 90    205 194      Most Recent 3 BMP's:  Recent Labs   Lab Test 02/26/24  0520 02/25/24  1424 02/25/24  0557 02/24/24  1422     --  136 135   POTASSIUM 3.1* 3.4 2.9* 3.4   CHLORIDE 102  --  98 98   CO2 28  --  27 24   BUN 24.2*  --  19.7 20.4   CR 1.02*  --  0.97* 0.96*   ANIONGAP 10  --  11 13   CARINA 8.4*  --  9.0 9.2   *  --  102* 104*     Most Recent 2 LFT's:  Recent Labs   Lab Test 02/14/24  0517 02/13/24  0455   AST 81* 114*   * 176*   ALKPHOS 187* 184*   BILITOTAL 1.4* 1.3*     Most Recent INR's and Anticoagulation Dosing History:  Anticoagulation Dose History  More data exists         Latest Ref Rng & Units 2/11/2024 2/12/2024 2/13/2024  2/14/2024 2/24/2024 2/25/2024 2/26/2024   Recent Dosing and Labs   warfarin ANTICOAGULANT (COUMADIN) 2 MG tablet - - - - - 4 mg, $Given - -   warfarin ANTICOAGULANT (COUMADIN) 3 MG tablet - - 3 mg, $Given 3 mg, $Given - - 3 mg, $Given 3 mg, $Given   INR 0.85 - 1.15 1.60  1.38  1.35  1.55  1.70  1.84  2.02      Most Recent 3 Troponin's:  Recent Labs   Lab Test 10/11/21  2012 09/01/18  0950 05/14/18  1814 02/20/18  0449   TROPI  --  5.776* <0.015 1.994*   TROPONIN <0.015  --   --   --      Most Recent Cholesterol Panel:  Recent Labs   Lab Test 07/09/23  0437   CHOL 130   LDL 74   HDL 34*   TRIG 110     Most Recent 6 Bacteria Isolates From Any Culture (See EPIC Reports for Culture Details):  Recent Labs   Lab Test 05/15/18  0045 07/23/17  0032 07/23/17  0026   CULT No MRSA isolated No growth after 6 days No growth after 6 days     Most Recent TSH, T4 and A1c Labs:  Recent Labs   Lab Test 02/05/24  0825 07/09/23  0437   TSH 3.24  --    A1C  --  5.3     Results for orders placed or performed during the hospital encounter of 02/24/24   POC US ABDOMEN LIMITED    Narrative    Pardeep Huston APRN CNP     2/24/2024  4:48 PM  POC US ABDOMEN LIMITED    Date/Time: 2/24/2024 1:55 PM    Performed by: Pardeep Huston APRN CNP  Authorized by: Pardeep Huston APRN CNP    Procedure details:     Left renal:  Visualized    Right renal:  Visualized    Bladder:  Visualized  Left renal findings:     Intra-abdominal fluid: not identified      Perinephric fluid: not identified      Hydronephrosis: none    Right renal findings:     Intra-abdominal fluid: not identified      Perinephric fluid: not identified      Hydronephrosis: none    Bladder findings:     Free pelvic fluid: not identified      Volume:  175   XR Chest 2 Views    Narrative    Exam:  XR CHEST 2 VIEWS    HISTORY: Shortness of breath.    COMPARISON:  2/5/2024, 9/21/2023    FINDINGS:     The cardiomediastinal contours are unchanged.      There are diffusely prominent  interstitial markings with streaky  opacities in the mid to lower lungs bilaterally. Possible trace right  pleural effusion. No pneumothorax.      No acute osseous abnormality.       Impression    IMPRESSION:      Findings compatible with CHF/fluid overload.    Streaky opacities in the mid to lower lungs bilaterally may be due to  to edema, atelectasis, or possibly superimposed infection.      MORENA GARDNER MD         SYSTEM ID:  [id number]

## 2024-02-26 NOTE — PHARMACY-ANTICOAGULATION SERVICE
Pharmacy Consult-Warfarin Assessment Day #3    Heather Rosas is a 73 year old female admitted on 2/24/2024 with Acute on chronic diastolic congestive heart failure (H)    Primary Indication(s) for Anticoagulation: Afib    Goal INR: 2-3    FYI, patient is followed by the Anticoagulation[facility] Clinic at: [facility]    Patient previously anticoagulated on Coumadin Re-started warfarin     Home tablet strength(s): 2 mg    Factors that may increase patient's bleeding risk and/or sensitivity to warfarin (Coumadin) include: Abx    Factors that may decrease patient's bleeding risk and/or sensitivity to warfarin (Coumadin) include:       Anticoagulation Dose History  More data exists         Latest Ref Rng & Units 2/11/2024 2/12/2024 2/13/2024 2/14/2024 2/24/2024 2/25/2024 2/26/2024   Recent Dosing and Labs   warfarin ANTICOAGULANT (COUMADIN) 2 MG tablet - - - - - 4 mg, $Given - -   warfarin ANTICOAGULANT (COUMADIN) 3 MG tablet - - 3 mg, $Given 3 mg, $Given - - 3 mg, $Given -   INR 0.85 - 1.15 1.60  1.38  1.35  1.55  1.70  1.84  2.02      CBC RESULTS:   Recent Labs   Lab Test 02/26/24  0520   HGB 11.9          Assessment/Plan: INR is therapeutic. Will give 3 mg warfarin today.    Thank You for the Consult. Will continue to follow.    Melo Stein [facility] ....................  2/26/2024   7:19 AM

## 2024-02-26 NOTE — PHARMACY
[facility]    Pharmacy      Antimicrobial Stewardship Note     Current antimicrobial therapy:  Anti-infectives (From now, onward)      Start     Dose/Rate Route Frequency Ordered Stop    02/24/24 2100  cefTRIAXone in d5w (ROCEPHIN) intermittent infusion 1 g         1 g  over 30 Minutes Intravenous EVERY 24 HOURS 02/24/24 2046              Indication: UTI    Days of Therapy: 3     Pertinent labs:  Creatinine   Creatinine   Date Value Ref Range Status   02/26/2024 1.02 (H) 0.51 - 0.95 mg/dL Final   02/25/2024 0.97 (H) 0.51 - 0.95 mg/dL Final   02/24/2024 0.96 (H) 0.51 - 0.95 mg/dL Final   08/13/2019 0.87 0.52 - 1.04 mg/dL Final   04/12/2019 0.84 0.52 - 1.04 mg/dL Final   03/21/2019 0.76 0.52 - 1.04 mg/dL Final     WBC   WBC   Date Value Ref Range Status   08/13/2019 9.2 4.0 - 11.0 10e9/L Final   05/29/2019 6.4 4.0 - 11.0 10e9/L Final   04/12/2019 10.2 4.0 - 11.0 10e9/L Final     WBC Count   Date Value Ref Range Status   02/26/2024 8.2 4.0 - 11.0 10e3/uL Final   02/25/2024 7.6 4.0 - 11.0 10e3/uL Final   02/24/2024 8.3 4.0 - 11.0 10e3/uL Final     Procalcitonin   Procalcitonin   Date Value Ref Range Status   09/21/2023 0.06 (H) <0.05 ng/mL Final     Comment:     Interpretation and Recommendations   <0.05 ng/mL Normal   Very low risk of bacterial infection.   Strongly discourage antibiotics.     0.05-0.24 ng/mL Low risk of systemic infection. Local bacterial infection possible.   Assess other clinical features of infection.   Discourage antibiotics.     0.25-0.49 ng/mL Possible early systemic infection or localized infection   Encourage antibiotics only in correct clinical context.   Consider obtaining blood cultures or other relevant cultures.   Recheck PCT in 6-12 hours to ensure baseline low level.   If repeat PCT is rising, consider early systemic infection and consider starting antibiotics.     0.50-1.99 ng/mL Moderate risk of systemic infection.   Recommend antibiotics.   Evaluate culture results and  clinical features to target antibacterial therapy.   Obtain blood cultures and other relevant cultures if not done.     If empiric antibiotics were started, recheck PCT in:        2 days to guide antibiotic de-escalation.        Discontinue or de-escalate antibiotics when PCT concentration is <80% of peak or abs PCT <0.5.     If empiric antibiotics were NOT started, recheck PCT in:        6-24 hours to re-evaluate need for antibiotics.       2.00-9.99 ng/mL High risk for progression to severe sepsis.   Strongly recommend initiating or continuing antibiotics.   Evaluate culture results and clinical features to target antibacterial therapy.   Obtain blood cultures and other relevant cultures if not done.   Repeat PCT in 2 days to guide antibiotic de-escalation.   Consider de-escalating antibiotics when PCT concentration is <80% of peak or abs PCT < 0.5.     Greater than or equal to 10 ng/mL Very high likelihood of severe sepsis or septic shock.   Strongly recommend initiating or continuing antibiotics.   Evaluate culture results and clinical features to target antibacterial therapy.   Obtain blood cultures and other relevant cultures if not done.   Repeat PCT in 2 days to guide antibiotic de-escalation.   Consider de-escalating antibiotics when PCT concentration is <80% of peak or abs PCT < 1.0.     04/18/2022 <0.05 <0.05 ng/mL Final     Comment:     Interpretation and Recommendations  <0.05 ng/mL Normal  Very low risk of bacterial infection.  Strongly discourage antibiotics.    0.05-0.24 ng/mL Low risk of systemic infection. Local bacterial infection possible.  Assess other clinical features of infection.  Discourage antibiotics.    0.25-0.49 ng/mL Possible early systemic infection or localized infection  Encourage antibiotics only in correct clinical context.  Consider obtaining blood cultures or other relevant cultures.  Recheck PCT in 6-12 hours to ensure baseline low level.  If repeat PCT is rising, consider  early systemic infection and consider starting antibiotics.    0.50-1.99 ng/mL Moderate risk of systemic infection.  Recommend antibiotics.  Evaluate culture results and clinical features to target antibacterial therapy.  Obtain blood cultures and other relevant cultures if not done.    If empiric antibiotics were started, recheck PCT in:       2 days to guide antibiotic de-escalation.       Discontinue or de-escalate antibiotics when PCT concentration is <80% of      peak or abs PCT <0.5.    If empiric antibiotics were NOT started, recheck PCT in:       6-24 hours to re-evaluate need for antibiotics.     2.00-9.99 g/mL High risk for progression to severe sepsis.  Strongly recommend initiating or continuing antibiotics.  Evaluate culture results and clinical features to target antibacterial therapy.  Obtain blood cultures and other relevant cultures if not done.  Repeat PCT in 2 days to guide antibiotic de-escalation.  Consider de-escalating antibiotics when PCT concentration is <80% or peak or abs PCT < 0.05.    Greater than or equal to 10 ng/mL Very high likelihood of severe sepsis or septic shock.  Strongly recommend initiating or continuing antibiotics.  Evaluate culture results and clinical features to target antibacterial therapy.  Obtain blood cultures and other relevant cultures if not done.  Repeat PCT in 2 days to guide antibiotic de-escalation.  Consider de-escalating antibiotics when PCT concentration is <80% of peak or abs PCT < 1.     04/17/2022 <0.05 <0.05 ng/mL Final     Comment:     Interpretation and Recommendations  <0.05 ng/mL Normal  Very low risk of bacterial infection.  Strongly discourage antibiotics.    0.05-0.24 ng/mL Low risk of systemic infection. Local bacterial infection possible.  Assess other clinical features of infection.  Discourage antibiotics.    0.25-0.49 ng/mL Possible early systemic infection or localized infection  Encourage antibiotics only in correct clinical  "context.  Consider obtaining blood cultures or other relevant cultures.  Recheck PCT in 6-12 hours to ensure baseline low level.  If repeat PCT is rising, consider early systemic infection and consider starting antibiotics.    0.50-1.99 ng/mL Moderate risk of systemic infection.  Recommend antibiotics.  Evaluate culture results and clinical features to target antibacterial therapy.  Obtain blood cultures and other relevant cultures if not done.    If empiric antibiotics were started, recheck PCT in:       2 days to guide antibiotic de-escalation.       Discontinue or de-escalate antibiotics when PCT concentration is <80% of      peak or abs PCT <0.5.    If empiric antibiotics were NOT started, recheck PCT in:       6-24 hours to re-evaluate need for antibiotics.     2.00-9.99 g/mL High risk for progression to severe sepsis.  Strongly recommend initiating or continuing antibiotics.  Evaluate culture results and clinical features to target antibacterial therapy.  Obtain blood cultures and other relevant cultures if not done.  Repeat PCT in 2 days to guide antibiotic de-escalation.  Consider de-escalating antibiotics when PCT concentration is <80% or peak or abs PCT < 0.05.    Greater than or equal to 10 ng/mL Very high likelihood of severe sepsis or septic shock.  Strongly recommend initiating or continuing antibiotics.  Evaluate culture results and clinical features to target antibacterial therapy.  Obtain blood cultures and other relevant cultures if not done.  Repeat PCT in 2 days to guide antibiotic de-escalation.  Consider de-escalating antibiotics when PCT concentration is <80% of peak or abs PCT < 1.       CRP No results found for: \"CRP\"    Culture Results:    None in last 168 hours  Recommendations/Interventions:  1. None    Melo Stein RPH  February 26, 2024        "

## 2024-02-26 NOTE — CONSULTS
Reason for consult- Heart Failure - Dietitian to instruct patient on 2 gram sodium diet     Pt has been educated on a low sodium diet several times in the past. Pt's only question was how much sodium per day- current order for 2gm sodium diet. Reviewed reading the food label with pt to help make informed choices. Pt cooks her own food- she does add salt while cooking but much less than she use to. Suggested pt to avoid adding salt while cooking, utilizing salt free seasonings and only adding a small amount if needed at the table after tasting. Provided pt with handouts on heart healthy eating and provided pt with RD's number to call if she has questions.

## 2024-02-26 NOTE — CARE PLAN
Patient alert and orientated, following commands, afebrile. HR 60's to 70's, Blood pressures stable. RA lung sounds diminished. Up to the bathroom to void independently, 1 BM this shift. Did complain of nausea this AM, PRN zofran given x1 and patient stated it relieved nausea. Patient remains on fluid restriction, intake/output charted in flowsheet. Mild edema to lower extremities. Rest of assessments as charted.    Face to face report given with opportunity to observe patient.    Report given to Ricky Hernandez RN   2/25/2024  7:15 PM

## 2024-02-26 NOTE — PROGRESS NOTES
02/26/24 0900   Appointment Info   Signing Clinician's Name / Credentials (PT) Darek Jones DPT   Rehab Comments (PT) Pt was up in recliner having just finished breakfast upon approach and she was agreeable to PT eval.   Living Environment   People in Home alone   Current Living Arrangements house   Home Accessibility stairs to enter home;stairs within home   Number of Stairs, Main Entrance 4   Stair Railings, Main Entrance railings on both sides of stairs   Number of Stairs, Within Home, Primary greater than 10 stairs   Stair Railings, Within Home, Primary railing on left side (ascending)   Transportation Anticipated car, drives self   Living Environment Comments Pt lives alone in a 2 story home + basement.  All needs met on main level except laundry is in the basement which she completes 2x/wk.  Bathroom has a walk-in shower with grab bars and shower chair.  Son lives nearby.   Self-Care   Usual Activity Tolerance moderate   Current Activity Tolerance fair   Equipment Currently Used at Home none   Fall history within last six months no   Activity/Exercise/Self-Care Comment Pt states she is independent without device for household and community mobility at baseline.  Independent for ADLs/IADLs at baseline including driving and she also states for mowing lawn and snow shoveling.  Pt states her current activity tolerance is impaired compared to baseline.   General Information   Onset of Illness/Injury or Date of Surgery 02/24/24   Referring Physician Dr. Garcia   Patient/Family Therapy Goals Statement (PT) Return home.   Pertinent History of Current Problem (include personal factors and/or comorbidities that impact the POC) Pt was admitted for Acute on chronic diastolic congestive heart failure; HTN; Schizophrenia (H)    Noncompliance with medication regimen: Frequent hospitalizations due to not taking her medications;   Rapid atrial fibrillation (H): Due to underlying CHF exacerbation and not taking her  medications-resolved with diuresis and starting back her BB.   Cognition   Affect/Mental Status (Cognition) WFL   Orientation Status (Cognition) oriented x 4   Follows Commands (Cognition) WFL   Pain Assessment   Patient Currently in Pain No   Integumentary/Edema   Integumentary/Edema Comments trace to 1+ pitting edema bilat ankles and feet   Posture    Posture Forward head position;Protracted shoulders;Kyphosis   Range of Motion (ROM)   Range of Motion ROM is WFL   Strength (Manual Muscle Testing)   Strength (Manual Muscle Testing) Deficits observed during functional mobility   Strength Comments Minimal for LEs   Bed Mobility   Bed Mobility rolling left;supine-sit;sit-supine   Rolling Left [place] (Bed Mobility) modified independence   Supine-Sit [place] (Bed Mobility) modified independence   Sit-Supine [place] (Bed Mobility) modified independence   Assistive Device (Bed Mobility) bed rails  (Slight use of bed rails)   Transfers   Transfers sit-stand transfer;bed-chair transfer   Transfer Safety Concerns Noted decreased step length   Impairments Contributing to Impaired Transfers decreased strength   Bed-Chair Transfer   Assistive Device (Bed-Chair Transfers)   (None)   Bed-Chair [place] (Transfers) supervision   Comment, (Bed-Chair Transfer) Bed->chair = SPV w/o device   Sit-Stand Transfer   Sit-Stand [place] (Transfers) supervision   Assistive Device (Sit-Stand Transfers)   (None)   Comment, (Sit-Stand Transfer) Pt moved sit->stand from recliner first time reaching out to PT hand for slight HHA, but when reminded PT needs to see how pt can complete tasks on her own as able she was able to stand from w/c and bed each SPV.   Gait/Stairs (Locomotion)   [place] Level (Gait) supervision   Assistive Device (Gait)   (None)   Distance in Feet (Gait) 60   Pattern (Gait) step-to   Negotiation (Stairs) stairs independence;handrail location;number of steps;ascending technique;descending  technique   [place] Level (Stairs) contact guard;supervision  (Pt CGA for ascending steps d/t mod challenge, but SBA for descending steps as less taxing for her.)   Handrail Location (Stairs) both sides   Number of Steps (Stairs) 4   Ascending Technique (Stairs) step-to-step   Descending Technique (Stairs) step-to-step   Comment, (Gait/Stairs) Pt amb approx 60 ft SPV w/o AD exhibiting mild decreased step length bilat and mild slow pace, but no instability.  Pt requested to sit at 60 ft d/t fatigue.  SpO2 = 95%, HR = 74.   Balance   Balance Comments Sitting balance = Good w/o support.  Standing balance = Good- w/o support.   Sensory Examination   Sensory Perception patient reports no sensory changes   Coordination   Coordination no deficits were identified   Muscle Tone   Muscle Tone no deficits were identified   Clinical Impression   Criteria for Skilled Therapeutic Intervention Yes, treatment indicated   PT Diagnosis (PT) Gait difficulty   Influenced by the following impairments Activity intolerance; mild decreased in functional LE strength; mild impairment of standing balance.   Functional limitations due to impairments Gait; stairs   Clinical Presentation (PT Evaluation Complexity) stable   Clinical Presentation Rationale Clinical judgment   Clinical Decision Making (Complexity) low complexity   Planned Therapy Interventions (PT) gait training;neuromuscular re-education;stair training;strengthening;stretching;progressive activity/exercise;transfer training   Risk & Benefits of therapy have been explained evaluation/treatment results reviewed;care plan/treatment goals reviewed;risks/benefits reviewed;current/potential barriers reviewed;participants voiced agreement with care plan;participants included;patient   Clinical Impression Comments PT eval completed and pt was steady with transfers and gait; but activity tolerance/gait endurance decreased compared to baseline and pt had min difficulty with ascendng  "stairs.  Pt would benefit from skilled PT services to address these impairments during hospitalization to help maximize functional restoration.   PT Total Evaluation Time   PT Eval, Low Complexity Minutes (29859) 15   Physical Therapy Goals   PT Frequency 6x/week   PT Predicted Duration/Target Date for Goal Attainment 03/02/24   PT Goals Gait;Stairs   PT: Gait 150 feet;Independent   PT: Stairs Modified independent;10 stairs;Rail on both sides   Interventions   Interventions Quick Adds Therapeutic Activity   Therapeutic Activity   Therapeutic Activities: dynamic activities to improve functional performance Minutes (07178) 8   Symptoms Noted During/After Treatment Fatigue;Shortness of breath   Treatment Detail/Skilled Intervention Pt amb the 60 ft back to her room also SPV w/o device exhibiting similar gait pattern and after completing this and her bed mobility she was mildly SOB.  SpO2 = 88% and recovered to 90% in 15\" and HR = 75.  Ended visit with pt up on recliner and call button in easy reach.   PT Discharge Planning   PT Plan Progress activity tolerance with gait and progress stair negotiation as able.   PT Discharge Recommendation (DC Rec) home   PT Rationale for DC Rec Pt appears safe to D/C home as son is nearby to provide some assist if needed.  Her activity tolerance she continue to return to baseline as her medical issues are resolved and she resumes her usual activities at home.   PT Brief overview of current status Bed mob = Quynh; transfers = SPV; gait x 60 ft = SPV w/o device; stairs x4 = CGA to SBA w/ bilat rails.   PT Equipment Needed at Discharge   (None)   Total Session Time   Timed Code Treatment Minutes 8   Total Session Time (sum of timed and untimed services) 23   Psychosocial Support   Trust Relationship/Rapport care explained;choices provided;questions answered;questions encouraged;reassurance provided;thoughts/feelings acknowledged     "

## 2024-02-27 NOTE — PROGRESS NOTES
F/up appt with PCP 3/1/24  *Needs repeat INR for dosing**  [facility] states med are not yet picked up    Spoke with patient  Updated that meds are ready for pick-up  Suggested to purchase a pill counter also  Offered if patient brings med to appt, writer will show her how to fill pill counter  Confirmed f/up appt with PCP Friday 3/1/24  No concerns from patient  Call ended pleasantly                        Hospital Problem List  HTN (hypertension)  Schizophrenia (H)  Noncompliance with medication regimen  Rapid atrial fibrillation (H)  Acute on chronic diastolic congestive heart failure (H)

## 2024-02-29 NOTE — PROGRESS NOTES
LVM for patient  Left [facility] reminder date & time  Asked for call-back to confirm if meds were picked-up & if she has had any issue taking them as ordered    Also, would like to revisit suggestion for a pill counter  Will ask patient to bring meds & counter to appt  Writer will offer to assist with counter fill & address questions if patient has needs.

## 2024-02-29 NOTE — TELEPHONE ENCOUNTER
LVM for patient  Left [facility] reminder date & time  Asked for call-back to confirm if meds were picked-up & if she has had any issue taking them as ordered    Also, would like to revisit suggestion for a pill counter  Will ask patient to bring meds & counter to appt  Writer will offer to assist with counter fill & address questions if patient has needs.     Writer verified with Trevor via phone:  Meds picked up except diltiazem & Torsemide  [facility] states these had to be ordered...    [facility] issued Warfarin with dosing instruction:  Take one 2 mg tablet, one time daily    Pt will have [facility] done at [facility] tomorrow    Pended to PCP to review  Please advise if writer can assist further in encouraging med compliance

## 2024-02-29 NOTE — TELEPHONE ENCOUNTER
Patient was in hospital and appt with Dr. Mariscal on 2/26 was cancelled.    Nicolette Huffman MD  P  Family Care Team 2  Caller: Unspecified (1 week ago)  Can we call patient and see if she is out of medications or if these can be filled when she sees Dr Mariscal 2/26? I haven't seen her since August and am not clear what she is/isn't taking. I did review her recent hospitalizations but it looks like refills were sent in then

## 2024-03-01 PROBLEM — I50.812 CHRONIC RIGHT-SIDED CONGESTIVE HEART FAILURE (H): Status: ACTIVE | Noted: 2018-09-20

## 2024-03-01 PROBLEM — I50.812 CHRONIC RIGHT-SIDED CONGESTIVE HEART FAILURE (H): Chronic | Status: ACTIVE | Noted: 2018-09-20

## 2024-03-01 PROBLEM — I50.33 ACUTE ON CHRONIC DIASTOLIC CONGESTIVE HEART FAILURE (H): Status: RESOLVED | Noted: 2024-01-01 | Resolved: 2024-01-01

## 2024-03-01 PROBLEM — N17.9 ACUTE KIDNEY FAILURE, UNSPECIFIED (H): Status: RESOLVED | Noted: 2024-01-01 | Resolved: 2024-01-01

## 2024-03-01 PROBLEM — I48.91 RAPID ATRIAL FIBRILLATION (H): Status: RESOLVED | Noted: 2024-01-01 | Resolved: 2024-01-01

## 2024-03-01 NOTE — TELEPHONE ENCOUNTER
"Called patient at 11:20 am  Patient at home \"forgot\" appointment  Writer stressed the importance of being seen & getting f/up INR  Offered to leave lab [facility] today but patient declined  Again writer relayed importance of INR lab & f/up  Patient relayed understanding but writer does not feel that patient has good comprehension of the importance  Rescheduled labs & OV for Monday 3/5    "

## 2024-03-04 PROBLEM — I50.32 CHRONIC DIASTOLIC CONGESTIVE HEART FAILURE (H): Status: ACTIVE | Noted: 2024-01-01

## 2024-03-04 PROBLEM — E87.6 HYPOKALEMIA: Status: ACTIVE | Noted: 2024-01-01

## 2024-03-04 PROBLEM — K63.9 LESION OF COLON: Status: ACTIVE | Noted: 2024-01-01

## 2024-03-04 PROBLEM — N32.9 LESION OF BLADDER: Status: ACTIVE | Noted: 2024-01-01

## 2024-03-04 PROBLEM — Z91.148 NONCOMPLIANCE WITH MEDICATION REGIMEN: Chronic | Status: ACTIVE | Noted: 2023-07-07

## 2024-03-04 PROBLEM — I07.1 SEVERE TRICUSPID REGURGITATION: Status: ACTIVE | Noted: 2024-01-01

## 2024-03-04 PROBLEM — I07.1 SEVERE TRICUSPID REGURGITATION: Chronic | Status: ACTIVE | Noted: 2024-01-01

## 2024-03-04 PROBLEM — I50.32 CHRONIC DIASTOLIC CONGESTIVE HEART FAILURE (H): Chronic | Status: ACTIVE | Noted: 2024-01-01

## 2024-03-04 NOTE — TELEPHONE ENCOUNTER
2nd attempt made to call patient and update on PCP recommendation.   No answer.   LM for patient to return call to nurse.

## 2024-03-04 NOTE — PROGRESS NOTES
Assessment & Plan     Hospital discharge follow-up  [facility] discharge reviewed - x2 hospitalizations in last month  Reviewed importance of medication compliance - she states understanding     Noncompliance with medication regimen  History of this leading to recurrent hospitalizations in the last month  Will also frequently miss follow up appts     Chronic diastolic congestive heart failure (H)  Chronic right-sided congestive heart failure (H)  Severe tricuspid regurgitation   2/5/24 echo with EF 60-65% but severely reduced RVF and diastolic dysfunction  May be secondary to lung disease, her right sided valve disease (moderate tricuspid insufficiency)  Appears slightly fluid up today - weight stable from dry weight on discharge but 2+ bilat edema  Will continue current torsemide dose   Needs cardiology appointment - scheduled today - wants cardiology at [facility] rather than   Discussed the importance of follow up with her today   - Adult Cardiology Eval [facility] Referral  - torsemide (DEMADEX) 20 MG tablet; Take 3 tablets (60 mg) by mouth daily  - lisinopril (ZESTRIL) 10 MG tablet; Take 1 tablet (10 mg) by mouth daily    Elevated creatinine  Creatinine increased 0.5 over one week (since hospital discharge)  BUN doesn't seem to indicate dehydration/pre-renal nor does sodium of 135  Fluid status seemed mildly up vs euvolemic   Suspect ACE + torsemide may have caused interstitial reaction vs reduced perfusion to kidneys   Will hold ACE - recheck BMP in three days; no NSAIDs   Opted to not reduce torsemide for now, as again, appears more volume up than down but may need to reduce torsemide if MILDRED worsening at follow up     Longstanding persistent atrial fibrillation (H)  In afib - rates low 90s here but reports home is usually 70  May need more strict rate control but BP soft today so no adjustment  Is taking coumadin - INR therapeutic   Per coumadin team note in chart, planning to continue 2mg dose until next  check in two weeks  - INR; Future  - Adult Cardiology Eval  Referral  - metoprolol succinate ER (TOPROL XL) 100 MG 24 hr tablet; Take 1 tablet (100 mg) by mouth daily  - diltiazem ER (DILT-XR) 120 MG 24 hr capsule; Take 1 capsule (120 mg) by mouth daily  - INR point of care ( [facility] clinic ONLY)    Dyslipidemia  Will need to update lipid profile in 3 months - LDL goal <70   - Adult Cardiology Eval [facility] Referral  - atorvastatin (LIPITOR) 40 MG tablet; Take 1 tablet (40 mg) by mouth at bedtime    Coronary artery disease involving native coronary artery of native heart without angina pectoris  No anginal symptoms but history of 4 vessel CABG  Cards referral   Continue lipitor and metoprolol   - [facility]  - atorvastatin (LIPITOR) 40 MG tablet; Take 1 tablet (40 mg) by mouth at bedtime    HTN (hypertension)  On low end today - suspect may be over treated now with medication compliance (has been subpar in the past)    GERD (Gastroesophageal reflux disease)  Refilled   - pantoprazole (PROTONIX) 40 MG EC tablet; Take 1 tablet (40 mg) by mouth daily before breakfast    ANNA (generalized anxiety disorder)  Refilled   - busPIRone (BUSPAR) 10 MG tablet; Take 1 tablet (10 mg) by mouth 2 times daily    Dry eyes  No signs of conjunctivitis or eye emergency   Vision fair on testing - recommend eye exam formally   Should try dry eyes eye drops to see if dryness/mild irritation improves   Follow up if symptoms worsen/new symptoms arise    Hypokalemia  Persistent today - will start 20mEq K twice daily   Recheck at follow up in two weeks  - potassium chloride ER (K-TAB) 20 MEQ CR tablet; Take 1 tablet (20 mEq) by mouth 2 times daily      MED REC REQUIRED  Post Medication Reconciliation Status:  Discharge medications reconciled, continue medications without change      Will need to discuss follow up with general surgery for colonoscopy for sigmoid lesion and urology for cystoscopy for bladder  lesion.     Ideally also will have chest CT to evaluate retroaortic lymph node noted on abdominal CT 2/5/2024.       Mukesh Romero is a 73 year old, presenting for the following health issues:  Follow Up        3/4/2024    11:44 AM   Additional Questions   Roomed by CHELO Ortiz   Accompanied by self`     HPI         2/28/2024     8:56 AM   Post Discharge Outreach   Admission Date 2/24/2024   Reason for Admission Acute on chronic diastolic congestive heart failure (H)   Discharge Date 2/26/2024   Discharge Diagnosis Acute on chronic diastolic congestive heart failure (H)   How are you doing now that you are home? feeling good   How are your symptoms? (Red Flag symptoms escalate to triage hotline per guidelines) Improved   Do you feel your condition is stable enough to be safe at home until your provider visit? Yes   Does the patient have their discharge instructions?  Yes   Does the patient have questions regarding their discharge instructions?  No   Were you started on any new medications or were there changes to any of your previous medications?  No   Does the patient have all of their medications? No (see comment)   Do you have questions regarding any of your medications?  No   Do you have all of your needed medical supplies or equipment (DME)?  (i.e. oxygen tank, CPAP, cane, etc.) Yes   Discharge follow-up appointment scheduled within 14 calendar days?  Yes   Discharge Follow Up Appointment Date 3/1/2024   Discharge Follow Up Appointment Scheduled with? Primary Care Provider     Hospital Follow-up Visit:    Hospital/Nursing Home/IP Rehab Facility: [facility]  Date of Admission: 02/24/24  Date of Discharge: 02/26/24  Reason(s) for Admission: Acute on chronic diastolic congestive heart failure     Was your hospitalization related to COVID-19? No   Problems taking medications regularly:  None  Medication changes since discharge: None  Problems adhering to non-medication therapy:  None    Summary  "of hospitalization:  [facility] discharge summary reviewed  Diagnostic Tests/Treatments reviewed.  Follow up needed: none  Other Healthcare Providers Involved in Patient s Care:         None  Update since discharge: improved.         Plan of care communicated with patient           Pulse 70 at home  Blood pressure \"good\" at home  Weight 195lbs  Taking home medications  Reports no issues with compliance now - unclear why she stopped taking in the past  Leg swelling remains improved   Shortness of breath at baseline but much better than prior to hospitalization     Eyes have been dry   Vision 20/60 today   Started in the hospital. No eye pain. No light sensitivity. No facial weakness or difficulty with speech.         Objective    /64 (BP Location: Left arm, Patient Position: Sitting, Cuff Size: Adult Large)   Pulse 93   Temp 97  F (36.1  C) (Tympanic)   Resp 19   Ht 1.499 m (4' 11\")   Wt 88.5 kg (195 lb)   SpO2 96%   BMI 39.39 kg/m    Body mass index is 39.39 kg/m .    Physical Exam  Constitutional:       General: She is not in acute distress.     Appearance: Normal appearance. She is not ill-appearing, toxic-appearing or diaphoretic.   HENT:      Head: Normocephalic and atraumatic.      Nose: Nose normal. No congestion or rhinorrhea.      Mouth/Throat:      Mouth: Mucous membranes are moist.   Eyes:      General: Lids are normal. Lids are everted, no foreign bodies appreciated. Gaze aligned appropriately. No visual field deficit.        Right eye: No foreign body or discharge.         Left eye: No discharge.      Extraocular Movements: Extraocular movements intact.      Pupils: Pupils are equal, round, and reactive to light.      Comments: Slight injection/erythema around eyes bilaterally  Vision testing: Right 20/60; Left 20/60  No afferent pupillary defect  No pain with testing eye movements.   No anisocoria.    Cardiovascular:      Rate and Rhythm: Normal rate. Rhythm irregular. "   Pulmonary:      Effort: Pulmonary effort is normal.      Breath sounds: Wheezing (slight, bilateral at end expiration) present. No rhonchi or rales.   Abdominal:      Palpations: There is no mass.      Tenderness: There is no abdominal tenderness. There is no guarding.   Musculoskeletal:      Right lower leg: Edema present.      Left lower leg: Edema present.      Comments: 1-2+ bilateral to knees   Skin:     General: Skin is warm and dry.      Capillary Refill: Capillary refill takes less than 2 seconds.   Neurological:      General: No focal deficit present.      Mental Status: She is alert and oriented to person, place, and time.   Psychiatric:         Mood and Affect: Mood normal.         Behavior: Behavior normal.        Results for orders placed or performed in visit on 03/04/24   INR point of care (  clinic ONLY)     Status: Abnormal   Result Value Ref Range    INR 2.7 (H) 0.9 - 1.1    Narrative    This test is intended for monitoring Coumadin therapy. Results are not accurate in patients with prolonged INR due to factor deficiency.   Results for orders placed or performed in visit on 03/04/24   Iron and iron binding capacity     Status: Normal   Result Value Ref Range    Iron 69 37 - 145 ug/dL    Iron Binding Capacity 390 240 - 430 ug/dL    Iron Sat Index 18 15 - 46 %   Basic metabolic panel     Status: Abnormal   Result Value Ref Range    Sodium 135 135 - 145 mmol/L    Potassium 3.0 (L) 3.4 - 5.3 mmol/L    Chloride 90 (L) 98 - 107 mmol/L    Carbon Dioxide (CO2) 30 (H) 22 - 29 mmol/L    Anion Gap 15 7 - 15 mmol/L    Urea Nitrogen 19.2 8.0 - 23.0 mg/dL    Creatinine 1.53 (H) 0.51 - 0.95 mg/dL    GFR Estimate 36 (L) >60 mL/min/1.73m2    Calcium 8.1 (L) 8.8 - 10.2 mg/dL    Glucose 104 (H) 70 - 99 mg/dL   Extra Tube     Status: None    Narrative    The following orders were created for panel order Extra Tube.  Procedure                               Abnormality         Status                     ---------                                -----------         ------                     Extra Blue Top Tube[[id number]]                              Final result               Extra Purple Top Tube[[id number]]                            Final result                 Please view results for these tests on the individual orders.   Extra Blue Top Tube     Status: None   Result Value Ref Range    Hold Specimen JIC    Extra Purple Top Tube     Status: None   Result Value Ref Range    Hold Specimen JIC    INR     Status: Abnormal   Result Value Ref Range    INR 2.37 (H) 0.85 - 1.15                 Signed Electronically by: Nicolette Huffman MD

## 2024-03-04 NOTE — PATIENT INSTRUCTIONS
Make sure taking medications as instructed  Refills sent to [facility]  Monitor blood pressure and pulse at home daily - write down  Check daily weights  Get dry eye drops for eyes  Consider formal eye exam  Follow up if worsening

## 2024-03-04 NOTE — LETTER
My Heart Failure Action Plan  Name: Heather Rosas   YOB: 1950  Date: 3/4/2024   [facility] doctor:   [facility] [place]   [address]  [place] [address]  [phone] My Diagnosis: HF-pEF (EF > 40%)  My Ejection Fraction:   Lab Results   Component Value Date    LVEF 60-65% 02/09/2024     Over 50%  My Exercise Goal: Start exercise slowly - to begin, do a few minutes of exercise, several times a day. Increase your time and speed [id number] to build tolerance, with a goal of 30 minutes of exercise daily. Steady, slow, and consistent exercise is both safe and healthy. Stop and rest when you feel tired or become short of breath. Do not push yourself on days when you don t feel well.       My Weight Plan:   Wt Readings from Last 2 Encounters:   03/04/24 88.5 kg (195 lb)   02/26/24 88.8 kg (195 lb 12.3 oz)     Weigh yourself daily using the same scale. If you gain more than 2 pounds in 24 hours or 5 pounds in 7 days. call the clinic    My Diet Goal: No added salt    Emergency Room Visits:    Our goal is to improve your quality of life and help you avoid a visit to the emergency room or hospital.  If we work together, we can achieve this goal. But, if you feel you need to call 911 or go to the emergency room, please do so.  If you go to the emergency room, please bring your list of medicines and your daily weight chart with you.    Each day ask yourself:  Is my weight up?  Do I have swelling?  Do I have trouble breathing?  How did I sleep?  Other problems?       GREEN ZONE     Weight gained is no more than 2 pounds a day or 5 pounds a in 7 days.  No swelling in feet, ankles, legs or stomach.  No more swelling than usual.  No more trouble breathing than usual.  No change in my sleep.  No other problems. What should I do?  I am doing fine. I will take my medicine, follow my diet, see my doctor, exercise, and watch for symptoms.           YELLOW ZONE         Weight gain of  more than 2 pounds in one day or 5 pounds in 7 days.  New swelling in ankle, leg, knee or thigh.  Bloating in belly, pants feel tighter.  Swelling in hands or face.  Coughing or trouble breathing while walking or talking.  Harder to breathe last night.  Have trouble sleeping, wake up short of breath.  Unusually tired.  Not eating.  Nausea, vomiting, or diarrhea  Pain in my chest or bad  leg cramps.  Feel weak or dizzy. What should I do?  I need to take action and call my doctor or nurse today.                 RED ZONE         Weight gain of 5 pounds overnight.  Chest pain or pressure that does not go away.  Feel less alert.  Wheezing or have trouble breathing when at rest.  Cannot sleep lying down.  Cannot take my medicines.  Pass out or faint. What should I do?  I need to call my doctor or nurse now!  Call 911 if I have chest pain or cannot breathe.

## 2024-03-04 NOTE — TELEPHONE ENCOUNTER
Writer attempted to call patient and update on provider recommendation.   Line was busy at time of call.  Writer will attempt to call patient at a later time.    Per PCP  Patient to hold lisinopril and to not take NSAIDs.   Patient to schedule appt this Thursday, 3/7, ok to double book at 1 PM.  Patient to have lab on 3/7.  Start potassium Chloride 20 MEQ BID - Rx sent to pharmacy.     Per [facility], patient to take 2 mg coumadin everyday.

## 2024-03-04 NOTE — PROGRESS NOTES
ANTICOAGULATION MANAGEMENT     Heather Rosas 73 year old female is on warfarin with therapeutic INR result. (Goal INR 2.0-3.0)    Recent labs: (last 7 days)     03/04/24  1253   INR 2.7*       ASSESSMENT     Source(s): Chart review     Warfarin doses taken: Warfarin taken as instructed  Diet: No new diet changes identified  New illness, injury, or hospitalization: Yes: Hospitalized 02/24/24 - 02/26/24 for acute on chronic diastolic heart failure.   Medication/supplement changes: None noted  Signs or symptoms of bleeding or clotting: No  Previous INR: Therapeutic last visit; previously outside of goal range  Additional findings: None     PLAN     Recommended plan for no diet, medication or health factor changes affecting INR     Dosing Instructions: Continue your current warfarin dose with next INR in 2 weeks       Summary  As of 3/4/2024      Full warfarin instructions:  2 mg every day   Next INR check:  3/18/2024               Detailed voice message left for Heather with dosing instructions and follow up date.     Patient offered & declined to schedule next visit    Education provided: Please call back if any changes to your diet, medications or how you've been taking warfarin    Plan made per [facility] anticoagulation protocol    Yesi Brooks RN  Anticoagulation Clinic  3/4/2024    _______________________________________________________________________     Anticoagulation Episode Summary       Current INR goal:  2.0-3.0   TTR:  48.4% (1.3 mo)   Target end date:  Indefinite   Send INR reminders to:  JAZLYN SAGASTUME       Comments:               Anticoagulation Care Providers       Provider Role Specialty Phone number    Nicolette Huffman MD Referring Family Medicine [phone]

## 2024-03-05 NOTE — TELEPHONE ENCOUNTER
Patient returned call to nurse.   Patient updated on provider recommendation and verbalized understanding.   Patient is scheduled for an appt on 3/7 and advised to go to lab first.     Next 5 appointments (look out 90 days)      Mar 07, 2024  1:00 PM  (Arrive by 12:45 PM)  SHORT with Nicolette Huffman MD  [facility] [place] ([facility] [place] ) [address]  [place] [address]  [phone]     Apr 11, 2024  1:30 PM  (Arrive by 1:15 PM)  PHYSICAL with Nicolette Huffman MD  [facility] [place] ([facility] [place] ) [address]  [place] [address]  [phone]

## 2024-03-07 NOTE — TELEPHONE ENCOUNTER
Left message for patient to return call to clinic to reschedule today's missed appointment with Dr. Huffman.  Dr. Huffman would like to see this patient tomorrow arrival time 2:30 for a follow up with lab first.

## 2024-03-08 NOTE — TELEPHONE ENCOUNTER
LM for patient to return call to clinic to reschedule missed appointment from 3/7/2024, can schedule patient with Dr. Huffman today 2:30 arrival time lab first.

## 2024-04-16 PROBLEM — C54.1 ADENOCARCINOMA OF THE ENDOMETRIUM/UTERUS (H): Chronic | Status: ACTIVE | Noted: 2018-10-09

## 2024-04-16 PROBLEM — I34.0 MODERATE MITRAL REGURGITATION: Chronic | Status: ACTIVE | Noted: 2018-10-30

## 2024-04-16 PROBLEM — F17.200 TOBACCO DEPENDENCE: Chronic | Status: ACTIVE | Noted: 2023-08-03

## 2024-04-16 PROBLEM — J96.01 ACUTE HYPOXIC RESPIRATORY FAILURE (H): Status: ACTIVE | Noted: 2024-01-01

## 2024-04-16 PROBLEM — I50.9 ACUTE ON CHRONIC CONGESTIVE HEART FAILURE, UNSPECIFIED HEART FAILURE TYPE (H): Status: ACTIVE | Noted: 2024-01-01

## 2024-04-16 PROBLEM — Z95.1 S/P CABG X 4: Chronic | Status: ACTIVE | Noted: 2018-09-10

## 2024-04-16 PROBLEM — J44.1 COPD EXACERBATION (H): Status: ACTIVE | Noted: 2024-01-01

## 2024-04-16 NOTE — DISCHARGE INSTRUCTIONS
[facility] will contact you for a hospital follow up appointment with Dr. Nicolette Huffman.  If you do not receive a call by Monday, April 22nd, please call [phone] to schedule.     Establish care appointment scheduled for Wednesday, May 8th at 2:30 pm, with Dr. Aponte at [facility].  Please call [phone] if you need to reschedule.       What to expect when you have contrast    During your exam, we will inject  contrast  into your vein or artery. (Contrast is a clear liquid with iodine in it. It shows up on X-rays.)    You may feel warm or hot. You may have a metal taste in your mouth and a slight upset stomach. You may also feel pressure near the kidneys and bladder. These effects will last about 1 to 3 minutes.    Please tell us if you have:   Sneezing    Itching   Hives    Swelling in the face   A hoarse voice   Breathing problems   Other new symptoms    Serious problems are rare.  They may include:   Irregular heartbeat    Seizures   Kidney failure             Tissue damage   Shock     Death    If you have any problems during the exam, we  will treat them right away.    When you get home    Call your hospital if you have any new symptoms in the next 2 days, like hives or swelling. (Phone numbers are at the bottom of this page.) Or call your family doctor.     If you have wheezing or trouble breathing, call [phone].    Self-care  -Drink at least 4 extra glasses of water today.   This reduces the stress on your kidneys.  -Keep taking your regular medicines.    The contrast will pass out of your body in your  Urine(pee). This will happen in the next 24 hours. You  will not feel this. Your urine will not  change color.    If you have kidney problems or take metformin    Drink 4 to 8 large glasses of water for the next  2 days, if you are not on a fluid restriction.    ?If you take metformin (Glucophage or Glucovance) for diabetes, keep taking it.      ?Your kidney function  tests are abnormal.  If you take Metformin, do not take it for 48 hours. Please go to your clinic for a blood test within 3 days after your exam before the restarting this medicine.     (Note to provider:please give patient prescription for lab tests.)    ?Special instructions: ***    I have read and understand the above information.    Patient Sign Here:______________________________________Date:________Time:______    Staff Sign Here:________________________________________Date:_______Time:______      Radiology Departments:     ?[facility] [phone] ?[place]: [phone]     ?[place]: [phone] ?[facility]:[phone]      ?[place]: [phone]  ?[facility]: [phone]  ?[place]:[phone]    ?[facility] [place]:[phone]  ?[facility] [place]:[phone]

## 2024-04-16 NOTE — PLAN OF CARE
Goal Outcome Evaluation:         Pt discharging to [facility] bed.  AM meds given, VSS.  Makes needs known,  Call light in reach.

## 2024-04-16 NOTE — PROGRESS NOTES
04/16/24 0900   Appointment Info   Signing Clinician's Name / Credentials (OT) Jacqueline Juárez, OTR/L   Living Environment   People in Home alone   Current Living Arrangements house   Home Accessibility stairs to enter home;stairs within home   Number of Stairs, Main Entrance 5   Stair Railings, Main Entrance railings safe and in good condition   Number of Stairs, Within Home, Primary greater than 10 stairs   Stair Railings, Within Home, Primary railing on left side (ascending)   Transportation Anticipated family or friend will provide;car, drives self   Living Environment Comments Patient reported that she lives in a multilevel home with 5 CANELO and a railing. Patient reported that there are 2 full flights of stairs within the home and that her bedroom is in the basement. Bathroom has a walk-in shower with grab bars.   Self-Care   Usual Activity Tolerance moderate   Current Activity Tolerance fair   Equipment Currently Used at Home grab bar, tub/shower   Fall history within last six months no   Activity/Exercise/Self-Care Comment Patient reported that she was independent with all ADLs and was walking independently without an assistive device.   Instrumental Activities of Daily Living (IADL)   Previous Responsibilities meal prep;housekeeping;laundry;shopping;medication management;finances;driving   IADL Comments Patient reported that she was independent with all IADLs at baseline. She still drives.   General Information   Onset of Illness/Injury or Date of Surgery 04/15/24   Referring Physician Octavio Phillips MD   Patient/Family Therapy Goal Statement (OT) Patient would like to return home. She was agreeable to home therapy   Additional Occupational Profile Info/Pertinent History of Current Problem Patient presented to the hospital with hyponatremia.   Cognitive Status Examination   Orientation Status orientation to person, place and time   Visual Perception   Visual Impairment/Limitations WNL   Pain Assessment    Patient Currently in Pain No   Posture   Posture forward head position   Range of Motion Comprehensive   General Range of Motion no range of motion deficits identified   Strength Comprehensive (MMT)   General Manual Muscle Testing (MMT) Assessment upper extremity strength deficits identified   Upper Extremity (Manual Muscle Testing)   Comment, MMT: Upper Extremity RUE shoulder flexion: 4-/5; LUE shoulder flexion: 4/5   Bed Mobility   Bed Mobility supine-sit   Supine-Sit [place] (Bed Mobility) modified independence   Assistive Device (Bed Mobility) bed rails   Comment (Bed Mobility) Patient was able to transfer supine-sitting EOB mod I with bed rail.   Transfers   Transfers sit-stand transfer;bed-chair transfer   Transfer Skill: Bed to Chair/Chair to Bed   Bed-Chair [place] (Transfers) contact guard   Transfer Comments Patient ambulated from bed to sink and sink to chair with CGA. Some LOB noted.   Sit-Stand Transfer   Sit-Stand [place] (Transfers) supervision   Sit/Stand Transfer Comments Patient was able to stand from bed with SBA without an assistive device.   Balance   Balance Comments When walked to the chair, patient did have one instance of loss of balance but was able to correct with CGA and use of recliner.   Activities of Daily Living   BADL Assessment/Intervention lower body dressing;grooming   Lower Body Dressing Assessment/Training   Position (Lower Body Dressing) edge of bed sitting   Comment, (Lower Body Dressing) Patient was able to don/doff socks mod I while seated at edge of bed.   [place] Level (Lower Body Dressing) doff;don;socks;modified independence   Grooming Assessment/Training   Position (Grooming) sink side;supported standing   [place] Level (Grooming) oral care regimen;grooming skills;wash face, hands;supervision   Comment, (Grooming) Patient was able to wash face and hands and brush teeth while standing at the sink with close SBA.   Clinical Impression   Criteria  for Skilled Therapeutic Interventions Met (OT) Yes, treatment indicated   OT Diagnosis Impaired ADLs and mobility   Influenced by the following impairments Hyponatremia   OT Problem List-Impairments impacting ADL problems related to;activity tolerance impaired;pain;strength;mobility   Assessment of Occupational Performance 1-3 Performance Deficits   Identified Performance Deficits Impaired ADLs and mobility   Planned Therapy Interventions (OT) ADL retraining;strengthening;home program guidelines;progressive activity/exercise;stretching   Clinical Decision Making Complexity (OT) problem focused assessment/low complexity   Risk & Benefits of therapy have been explained evaluation/treatment results reviewed;care plan/treatment goals reviewed;risks/benefits reviewed;current/potential barriers reviewed;participants voiced agreement with care plan;participants included;patient   Clinical Impression Comments Patient was previously living alone in a multi level home with 5 CANLEO and greater than 10 stairs within the home. She was independent with ADLs, mobility, and IADLs at baseline. During evaluation, patient was able to transfer with SBA and ambulate with CGA. She was able to wash face and brush teeth while standing at sink with SBA.   OT Total Evaluation Time   OT Eval, Low Complexity Minutes (34444) 10   Interventions   Interventions Quick Adds Therapeutic Activity   Therapeutic Activities   Therapeutic Activity Minutes (69577) 8   Treatment Detail/Skilled Intervention Patient was able to transfer from edge of bed with SBA. She ambulated 8 feet to sink with CGA. While standing at the sink, patient brushed teeth and washed her face and hands with SBA. She was able to stand for ~5 minutes during grooming tasks. Patient ambulated 6 feet to recliner with CGA. When turnning to position herself in front of recliner, she had mild loss of balance and used the recliner to steady herself. She was seated in recliner with call light  within reach and chair alarm clip on. Patient was educated on use of call light.   OT Discharge Planning   OT Plan Progress ADLs, strength, and mobility   OT Discharge Recommendation (DC Rec) home with home care occupational therapy   OT Rationale for DC Rec Patient was previously living alone in a multi level home with 5 CANELO and greater than 10 stairs within the home. She was independent with ADLs, mobility, and IADLs at baseline. During evaluation, patient was able to transfer with SBA and ambulate with CGA. She was able to wash face and brush teeth while standing at sink with SBA. Pt left resting in recliner with call light within reach and chair alarm on. Patient was able to complete most ADL tasks with SBA today. Recommend home OT at discharge.   OT Brief overview of current status SBA for transfers; CGA for mobility; SBA for grooming tasks while standing; mod I for bed mobility; mod I for lower body dressing   Total Session Time   Timed Code Treatment Minutes 8   Total Session Time (sum of timed and untimed services) 18

## 2024-04-16 NOTE — PLAN OF CARE
Goal Outcome Evaluation: Pt A&O, little confused after waking up.  Received iv Lasix with adequate output as charted.    BP soft this afternoon, 101/58, all other VSS.  Tele monitored- Afib 60-80's.  Pt refusing Coumadin, started on Lovenox and placed SCD's as ordered.  Continues with iv Solumedrol.  Lungs dim with ex whzs.  K+ recheck after receiving 4 riders was 3.3.  Gave 40 meq oral with re-check at 1800.  Fair appetite. Fluid restrict of 1800 mls.  Alarms in use and call light in reach.     Re-check BP was 120/63.  K+ re-check was 3.4    Face to face report given with opportunity to observe patient.    Report given to Kamilah Dsouza RN   4/16/2024  8:10 PM

## 2024-04-16 NOTE — ED NOTES
"Up to restroom with assist of one. Pt has to hold writer's arm/hand. Unsteady and feels dizzy. Pt states she's \"been holding onto things at home to get around\". Dr Burnham informed.   "

## 2024-04-16 NOTE — MEDICATION SCRIBE - ADMISSION MEDICATION HISTORY
Medication Scribe Admission Medication History    Admission medication history is complete. The information provided in this note is only as accurate as the sources available at the time of the update.    Information Source(s): Patient and CareEverywhere/SureScripts via in-person    Pertinent Information:   Patient manages her own medications and is a fair historian. Per chart review, pt has been no-show's at her last 3 appointments and her medications should be out at this point (aside from diltiazem filled 90 caps 3/4/24).     Patient reports compliancy with metoprolol, buspar and lisinopril- all filled 2/27/24 30 days. Pt should be out of medications. Uncertain if pt actually takes as prescribed. She reports she is not taking her lipitor, protonix or potassium. She did not remember torsemide-which is one she is usually very familiar with in past reviews- and with further questioning thinks she may only be taking one tablet per day.  Warfarin- pt does not want to take. Reports she takes 1 tablet every 4 days. Does not show up to anticoagulation visits.       Changes made to PTA medication list:  Added: ibu- pt taking 1000 mg each night at HS  Deleted: None  Changed: see notes above on medications pt is taking differently than prescribed.     Allergies reviewed with patient and updates made in EHR: yes    Medication History Completed By: Beth Nails 4/16/2024 8:23 AM    PTA Med List   Medication Sig Note Last Dose    albuterol (PROAIR HFA/PROVENTIL HFA/VENTOLIN HFA) 108 (90 Base) MCG/ACT inhaler Inhale 2 puffs into the lungs every 6 hours as needed for shortness of breath, wheezing or cough  Past Week    busPIRone (BUSPAR) 10 MG tablet Take 1 tablet (10 mg) by mouth 2 times daily  Past Week    diltiazem ER (DILT-XR) 120 MG 24 hr capsule Take 1 capsule (120 mg) by mouth daily  4/15/2024    ibuprofen (ADVIL/MOTRIN) 200 MG tablet Take 1,000 mg by mouth at bedtime  4/14/2024 at HS    lisinopril (ZESTRIL) 10 MG tablet  Take 1 tablet (10 mg) by mouth daily  4/15/2024    metoprolol succinate ER (TOPROL XL) 100 MG 24 hr tablet Take 1 tablet (100 mg) by mouth daily  4/15/2024    nystatin (MYCOSTATIN) 747706 UNIT/GM external powder Apply 1 g topically 3 times daily as needed (groin)  Past Month    torsemide (DEMADEX) 20 MG tablet Take 3 tablets (60 mg) by mouth daily (Patient taking differently: Take 20 mg by mouth daily)  Past Week    warfarin ANTICOAGULANT (COUMADIN) 2 MG tablet Follow-up with Coumadin Clinic on 2/27 for INR check and further dosing instructions. (Patient taking differently: Take 2 mg by mouth Every 4 days.) 4/16/2024: Doesn't want to take.  4/14/2024

## 2024-04-16 NOTE — ED PROVIDER NOTES
History     Chief Complaint   Patient presents with    Hypertension    Cough    Shortness of Breath     The history is provided by the patient.   Shortness of Breath  Severity:  Moderate  Onset quality:  Gradual  Duration:  3 weeks  Timing:  Constant  Progression:  Worsening  Chronicity:  Recurrent  Associated symptoms: cough and wheezing    Associated symptoms: no abdominal pain, no chest pain, no diaphoresis, no fever, no headaches, no neck pain, no rash and no vomiting        Allergies:  Allergies   Allergen Reactions    Fexofenadine Hcl Nausea and Vomiting     Allegra     Rosuvastatin Other (See Comments)     Dizziness - Crestor     Cats Other (See Comments)     Sneezing, runny nose    Codeine Sulfate Nausea and Vomiting and GI Disturbance       Problem List:    Patient Active Problem List    Diagnosis Date Noted    COPD exacerbation (H) 04/16/2024     Priority: Medium    Acute on chronic congestive heart failure, unspecified heart failure type (H) 04/16/2024     Priority: Medium    Acute hypoxic respiratory failure (H) 04/16/2024     Priority: Medium    Severe tricuspid regurgitation 03/04/2024     Priority: Medium    Hypokalemia 03/04/2024     Priority: Medium    Chronic diastolic congestive heart failure (H) 03/04/2024     Priority: Medium    Lesion of colon 03/04/2024     Priority: Medium    Lesion of bladder 03/04/2024     Priority: Medium    Generalized edema due to fluid overload 02/05/2024     Priority: Medium    Pulmonary nodules 08/03/2023     Priority: Medium     5/2023 - 8mm      Thyroid nodule 08/03/2023     Priority: Medium     1cm, noted in 2019      Tobacco dependence 08/03/2023     Priority: Medium    Aortic valve sclerosis (7/2023) 07/24/2023     Priority: Medium    Diarrhea 07/07/2023     Priority: Medium    Noncompliance with medication regimen 07/07/2023     Priority: Medium    Coronary artery disease involving native coronary artery 10/12/2021     Priority: Medium     CAD S/P NSTEMI with  CABG x 4 vessel 9/10/18 Dr. Oconnell.       Status post chemotherapy 09/25/2020     Priority: Medium     Added automatically from request for surgery [id number]      Atrial fibrillation (H) 11/26/2018     Priority: Medium    Hyponatremia 10/30/2018     Priority: Medium    Moderate mitral regurgitation 10/30/2018     Priority: Medium    Adenocarcinoma of the endometrium/uterus (H) 10/09/2018     Priority: Medium     Discovered on 02/18 biopsy. S/p resection and chemo. Pt lost to follow up.   High-grade endometrial adenocarcinoma of the uterus with staging consistent with pathologic T1b N0 I plus or stage 1B with isolated tumor cells involving one of the periaortic lymph nodes.       Chronic right-sided congestive heart failure (H) 09/20/2018     Priority: Medium    S/P CABG x 4 09/10/2018     Priority: Medium    HTN (hypertension)      Priority: Medium    ANNA (generalized anxiety disorder) 01/01/2011     Priority: Medium    GERD (Gastroesophageal reflux disease) 01/01/2011     Priority: Medium    Schizophrenia (H) 01/01/2011     Priority: Medium    Seasonal allergic rhinitis 01/01/2011     Priority: Medium    Fibromyalgia 06/14/2004     Priority: Medium    Dyslipidemia 11/26/2003     Priority: Medium        Past Medical History:    Past Medical History:   Diagnosis Date    Acute diastolic congestive heart failure (H) 10/12/2021    Acute exacerbation of CHF (congestive heart failure) (H) 10/11/2021    Acute kidney failure, unspecified (H)     Acute on chronic congestive heart failure, unspecified heart failure type (H) 09/21/2023    Acute on chronic diastolic congestive heart failure (H)     Allergic rhinitis 01/01/2011    Anxiety 01/01/2011    Anxiety state, unspecified     Atrial fibrillation with RVR (H) 07/07/2023    Atrial flutter with rapid ventricular response (H) 10/12/2021    CVA (cerebral vascular accident) (H) 05/14/2018    Dyslipidemia 11/26/2003    fibromyalgia 06/14/2004    GERD, Esophageal reflux 01/01/2011     History of non-ST elevation myocardial infarction (NSTEMI) 09/06/2018    HTN (hypertension)     Major depression, recurrent (H24) 01/01/2011    Major depressive disorder, recurrent episode, moderate (H) 01/01/2011    Metrorrhagia 02/18/2018    Non compliance with medical treatment 07/07/2023    Nonorganic sleep disorder, unspecified     Obesity 01/01/2011    Obesity (H) 01/01/2011    Rapid atrial fibrillation (H)     Schizophrenia (H) 01/01/2011    Toxic shock syndrome (H) 2006       Past Surgical History:    Past Surgical History:   Procedure Laterality Date    ANGIOGRAM  02/2005    Normal     BIOPSY BREAST  1984    LT, normal    BYPASS GRAFT ARTERY CORONARY  09/10/2018    CARPAL TUNNEL RELEASE RT/LT  2005    RT, carpal tunnel    COLONOSCOPY  2011    Repeat in ten years     COLONOSCOPY  1996    COLONOSCOPY  2004    DILATION AND CURETTAGE, HYSTEROSCOPY, ABLATE ENDOMETRIUM, COMBINED N/A 2/19/2018    Procedure: COMBINED DILATION AND CURETTAGE, HYSTEROSCOPY, ABLATE ENDOMETRIUM;  HYSTEROSCOPY DILATION AND CURETTAGE, ENDOMETRIAL ABLATION;  Surgeon: Jose Nettles MD;  Location: [facility]    HYSTERECTOMY TOTAL ABD, NICK SALPINGO-OOPHORECTOMY, NODE DISSECTION, TUMOR DEBULKING, COMBINED  10/30/2018    INSERT PORT VASCULAR ACCESS N/A 12/11/2018    Procedure: PORT-A-CATH PLACEMENT;  Surgeon: Rafi Trinidad MD;  Location: [facility]    placement of central line  2005    REMOVE PORT VASCULAR ACCESS N/A 10/6/2020    Procedure: port a cath removal;  Surgeon: Rafi Trinidad MD;  Location: [facility]       Family History:    Family History   Problem Relation Age of Onset    C.A.D. Father 45        (cause of death)     Other - See Comments Father         rheumatic fever     Allergies Father     Cancer Sister 56        Esophageal to bone cancer    Gastrointestinal Disease Mother         GERD    Cancer Mother         pancreatic ca (cause of death) /liver ca    Breast Cancer Maternal Aunt     Breast Cancer Maternal Aunt     Colon Cancer  "Paternal Aunt         (cause of death)     Crohn's Disease Other     Depression Maternal Uncle     Depression Maternal Aunt     Diabetes Paternal Grandmother         type 2    Neurologic Disorder Brother         neuropathy    Cancer Brother 58        Tonsilular cancer/ lymph node in neck    Allergies Brother     Breast Cancer Cousin     Breast Cancer Cousin     Breast Cancer Cousin        Social History:  Marital Status:   [4]  Social History     Tobacco Use    Smoking status: Every Day     Current packs/day: 1.00     Average packs/day: 1 pack/day for 30.0 years (30.0 ttl pk-yrs)     Types: Cigarettes     Passive exposure: Never    Smokeless tobacco: Never    Tobacco comments:     pt declined, stated she would use, \"the patch\". Patient has not had a cigarette in 1 week because she was in the hospital   Vaping Use    Vaping status: Never Used   Substance Use Topics    Alcohol use: No     Comment: rare    Drug use: No        Medications:    No current outpatient medications on file.        Review of Systems   Constitutional:  Negative for chills, diaphoresis and fever.   HENT:  Negative for voice change.    Eyes:  Negative for visual disturbance.   Respiratory:  Positive for cough, shortness of breath and wheezing. Negative for chest tightness.    Cardiovascular:  Negative for chest pain, palpitations and leg swelling.   Gastrointestinal:  Negative for abdominal distention, abdominal pain, anal bleeding, blood in stool, nausea and vomiting.   Genitourinary:  Negative for decreased urine volume, dysuria, flank pain and hematuria.   Musculoskeletal:  Negative for arthralgias, back pain, gait problem, myalgias, neck pain and neck stiffness.   Skin:  Negative for color change, pallor, rash and wound.   Neurological:  Negative for dizziness, syncope, light-headedness, numbness and headaches.   Psychiatric/Behavioral:  Negative for confusion and suicidal ideas.        Physical Exam   BP: 106/95  Pulse: 89  Temp: " "97.8  F (36.6  C)  Resp: 24  Height: 149.9 cm (4' 11\")  Weight: 93.5 kg (206 lb 2.1 oz)  SpO2: 94 %      Physical Exam  Vitals and nursing note reviewed.   Constitutional:       Appearance: She is well-developed.   HENT:      Head: Normocephalic and atraumatic.      Mouth/Throat:      Pharynx: No oropharyngeal exudate.   Eyes:      Conjunctiva/sclera: Conjunctivae normal.      Pupils: Pupils are equal, round, and reactive to light.   Neck:      Thyroid: No thyromegaly.      Vascular: No JVD.      Trachea: No tracheal deviation.   Cardiovascular:      Rate and Rhythm: Normal rate and regular rhythm.      Heart sounds: Normal heart sounds. No murmur heard.     No friction rub. No gallop.   Pulmonary:      Effort: Pulmonary effort is normal. No respiratory distress.      Breath sounds: No stridor. Examination of the right-upper field reveals wheezing. Examination of the left-upper field reveals wheezing. Examination of the right-middle field reveals wheezing. Examination of the left-middle field reveals wheezing. Examination of the right-lower field reveals wheezing. Examination of the left-lower field reveals wheezing. Wheezing present. No rales.   Chest:      Chest wall: No tenderness.   Abdominal:      General: Bowel sounds are normal. There is no distension.      Palpations: Abdomen is soft. There is no mass.      Tenderness: There is no abdominal tenderness. There is no guarding or rebound.   Musculoskeletal:         General: No tenderness. Normal range of motion.      Cervical back: Normal range of motion and neck supple.   Lymphadenopathy:      Cervical: No cervical adenopathy.   Skin:     General: Skin is warm and dry.      Coloration: Skin is not pale.      Findings: No erythema or rash.   Neurological:      Mental Status: She is alert and oriented to person, place, and time.   Psychiatric:         Behavior: Behavior normal.         ED Course        Procedures                  Results for orders placed or " performed during the hospital encounter of 04/15/24 (from the past 24 hour(s))   Symptomatic Influenza A/B, RSV, & SARS-CoV2 PCR (COVID-19) Nasopharyngeal    Specimen: Nasopharyngeal; Swab   Result Value Ref Range    Influenza A PCR Negative Negative    Influenza B PCR Negative Negative    RSV PCR Negative Negative    SARS CoV2 PCR Negative Negative    Narrative    Testing was performed using the Xpert Xpress CoV2/Flu/RSV Assay on the [employer] GeneXpert Instrument. This test should be ordered for the detection of SARS-CoV-2, influenza, and RSV viruses in individuals who meet clinical and/or epidemiological criteria. Test performance is unknown in asymptomatic patients. This test is for in vitro diagnostic use under the FDA EUA for laboratories certified under CLIA to perform high or moderate complexity testing. This test has not been FDA cleared or approved. A negative result does not rule out the presence of PCR inhibitors in the specimen or target RNA in concentration below the limit of detection for the assay. If only one viral target is positive but coinfection with multiple targets is suspected, the sample should be re-tested with another FDA cleared, approved, or authorized test, if coinfection would change clinical management. This test was validated by the [facility] [employer]. These laboratories are certified under the Clinical Laboratory Improvement Amendments of 1988 (CLIA-88) as qualified to perform high complexity laboratory testing.   Comprehensive metabolic panel   Result Value Ref Range    Sodium 124 (L) 135 - 145 mmol/L    Potassium 3.2 (L) 3.4 - 5.3 mmol/L    Carbon Dioxide (CO2) 23 22 - 29 mmol/L    Anion Gap 13 7 - 15 mmol/L    Urea Nitrogen 22.7 8.0 - 23.0 mg/dL    Creatinine 1.24 (H) 0.51 - 0.95 mg/dL    GFR Estimate 46 (L) >60 mL/min/1.73m2    Calcium 9.1 8.8 - 10.2 mg/dL    Chloride 88 (L) 98 - 107 mmol/L    Glucose 101 (H) 70 - 99 mg/dL    Alkaline Phosphatase 177 (H) 40 - 150 U/L     AST 19 0 - 45 U/L    ALT 11 0 - 50 U/L    Protein Total 7.0 6.4 - 8.3 g/dL    Albumin 3.9 3.5 - 5.2 g/dL    Bilirubin Total 1.7 (H) <=1.2 mg/dL   CBC with Platelets & Differential    Narrative    The following orders were created for panel order CBC with Platelets & Differential.  Procedure                               Abnormality         Status                     ---------                               -----------         ------                     CBC with platelets and d...[[id number]]  Abnormal            Final result                 Please view results for these tests on the individual orders.   CRP inflammation   Result Value Ref Range    CRP Inflammation 12.56 (H) <5.00 mg/L   Lactic acid whole blood   Result Value Ref Range    Lactic Acid 1.5 0.7 - 2.0 mmol/L   Troponin T, High Sensitivity   Result Value Ref Range    Troponin T, High Sensitivity 35 (H) <=14 ng/L   Nt probnp inpatient   Result Value Ref Range    N terminal Pro BNP Inpatient 4,186 (H) 0 - 900 pg/mL   INR   Result Value Ref Range    INR 1.27 (H) 0.85 - 1.15   CBC with platelets and differential   Result Value Ref Range    WBC Count 9.5 4.0 - 11.0 10e3/uL    RBC Count 4.27 3.80 - 5.20 10e6/uL    Hemoglobin 12.1 11.7 - 15.7 g/dL    Hematocrit 37.3 35.0 - 47.0 %    MCV 87 78 - 100 fL    MCH 28.3 26.5 - 33.0 pg    MCHC 32.4 31.5 - 36.5 g/dL    RDW 19.3 (H) 10.0 - 15.0 %    Platelet Count 238 150 - 450 10e3/uL    % Neutrophils 73 %    % Lymphocytes 11 %    % Monocytes 12 %    % Eosinophils 2 %    % Basophils 1 %    % Immature Granulocytes 1 %    NRBCs per 100 WBC 0 <1 /100    Absolute Neutrophils 7.0 1.6 - 8.3 10e3/uL    Absolute Lymphocytes 1.1 0.8 - 5.3 10e3/uL    Absolute Monocytes 1.1 0.0 - 1.3 10e3/uL    Absolute Eosinophils 0.2 0.0 - 0.7 10e3/uL    Absolute Basophils 0.1 0.0 - 0.2 10e3/uL    Absolute Immature Granulocytes 0.1 <=0.4 10e3/uL    Absolute NRBCs 0.0 10e3/uL   Magnesium   Result Value Ref Range    Magnesium 1.7 1.7 - 2.3 mg/dL    EKG 12 lead   Result Value Ref Range    Systolic Blood Pressure  mmHg    Diastolic Blood Pressure  mmHg    Ventricular Rate 89 BPM    Atrial Rate 267 BPM    NC Interval  ms    QRS Duration 146 ms     ms    QTc 537 ms    P Axis -73 degrees    R AXIS 107 degrees    T Axis -21 degrees    Interpretation ECG       Atrial flutter with 3:1 A-V conduction  Right bundle branch block  Possible Inferior infarct , age undetermined  Abnormal ECG  When compared with ECG of 24-FEB-2024 14:11,  No significant change was found     CBC with platelets differential    Narrative    The following orders were created for panel order CBC with platelets differential.  Procedure                               Abnormality         Status                     ---------                               -----------         ------                     CBC with platelets and d...[[id number]]                                                   Please view results for these tests on the individual orders.   Sodium random urine   Result Value Ref Range    Sodium Urine mmol/L 53 mmol/L   Osmolality urine   Result Value Ref Range    Osmolality Urine 210 100 - 1,200 mmol/kg    Narrative    Reference Ranges depend on patient's hydration status and renal function.   Neonates:  mmol/kg   2 years and older, random specimens: 100-1200 mmol/kg; Greater than 850 mmol/kg after 12 hour fluid restriction  Urine/serum osmolality ratio: 2 years and older: 1.0-3.0; 3.0-4.7 after 12 hour fluid restriction       Medications   lidocaine 1 % 0.1-1 mL (has no administration in time range)   lidocaine (LMX4) cream (has no administration in time range)   sodium chloride (PF) 0.9% PF flush 3 mL (3 mLs Intracatheter $Given 4/16/24 2131)   sodium chloride (PF) 0.9% PF flush 3 mL (has no administration in time range)   senna-docusate (SENOKOT-S/PERICOLACE) 8.6-50 MG per tablet 1 tablet (has no administration in time range)     Or   senna-docusate (SENOKOT-S/PERICOLACE)  8.6-50 MG per tablet 2 tablet (has no administration in time range)   calcium carbonate (TUMS) chewable tablet 1,000 mg (has no administration in time range)   Warfarin Dose Required Daily - Pharmacist Managed ( Oral Held by provider 4/16/24 0433)   furosemide (LASIX) injection 60 mg (has no administration in time range)   albuterol (PROVENTIL HFA/VENTOLIN HFA) inhaler (has no administration in time range)   atorvastatin (LIPITOR) tablet 40 mg (40 mg Oral $Given 4/16/24 0405)   diltiazem ER (DILT-XR) 24 hr capsule 120 mg (has no administration in time range)   busPIRone (BUSPAR) tablet 10 mg (has no administration in time range)   metoprolol succinate ER (TOPROL XL) 24 hr tablet 100 mg (has no administration in time range)   potassium chloride ER (K-TAB) TBCR 20 mEq (has no administration in time range)   pantoprazole (PROTONIX) EC tablet 40 mg (has no administration in time range)   magnesium oxide (MAG-OX) tablet 400 mg (400 mg Oral $Given 4/16/24 0405)   potassium chloride 10 mEq in 100 mL sterile water infusion (10 mEq Intravenous $New Bag 4/16/24 0531)   ipratropium - albuterol 0.5 mg/2.5 mg/3 mL (DUONEB) neb solution 3 mL (3 mLs Nebulization $Given 4/15/24 2318)   furosemide (LASIX) injection 40 mg (40 mg Intravenous $Given 4/16/24 0143)   methylPREDNISolone sodium succinate (solu-MEDROL) injection 125 mg (125 mg Intravenous $Given 4/16/24 0102)   iopamidol (ISOVUE-370) solution 66 mL (66 mLs Intravenous $Given 4/16/24 0130)   sodium chloride (PF) 0.9% PF flush 100 mL (100 mLs Intravenous $Given 4/16/24 0130)   rOPINIRole (REQUIP) tablet 2 mg (2 mg Oral $Given 4/16/24 0201)   warfarin ANTICOAGULANT (COUMADIN) tablet 2 mg (2 mg Oral Not Given 4/16/24 0424)   potassium chloride jayashree ER (KLOR-CON M20) CR tablet 40 mEq (40 mEq Oral Not Given 4/16/24 0405)       Assessments & Plan (with Medical Decision Making)   Progressive SOB for 3 wk, hard to walk due to SOB  Hx of CHF, also she is long time smoker      Labs  reviewed  CXR: bilateral congestion  CTA chest: no PE, bilateral edema and bronchtiis as per vrad  IV steroid, IV lasix given + neb treatment  I spoke to Dr Ford, accepted for admission.   I have reviewed the nursing notes.    I have reviewed the findings, diagnosis, plan and need for follow up with the patient.          Current Discharge Medication List          Final diagnoses:   Acute on chronic congestive heart failure, unspecified heart failure type (H)   COPD exacerbation (H)   Hyponatremia       4/15/2024   [facility]       Adriel uBrnham MD  04/16/24 2838

## 2024-04-16 NOTE — ED TRIAGE NOTES
Lives home alone; brought in by [facility] for c/o high blood pressure.  BP WDL via EMS; 106/95 on arrival.  Stated feeling dizzy or unsteady while walking for the past two weeks. Denies any falls. EMS put her on 2L oxygen for comfort; 94% on room air here.  Wet cough present.

## 2024-04-16 NOTE — PLAN OF CARE
[facility] Inpatient Admission Note:    Patient admitted to 3228/3228-1 at approximately 0245 via cart accompanied by transport tech from emergency room . Report received from Paige in SBAR format at 0222 via telephone. Patient ambulated to bed via self.. Patient is alert and oriented X 3, denies pain; rates at 0 on 0-10 scale.  Patient oriented to room, unit, hourly rounding, and plan of care. Explained admission packet and patient handbook with patient bill of rights brochure. Will continue to monitor and document as needed.     Inpatient Nursing criteria listed below was met:    Health care directives status obtained and documented: No    Patient identifies a surrogate decision maker: No If yes, who:N/A Contact Information:N/A     If initial lactic acid greater than 2.0, repeat lactic acid drawn within one hour of arrival to unit: NA. If no, state reason: N/A    Clergy visit ordered if patient requests: No    Skin issues/needs documented: Yes    Isolation Patient: no Education given, correct sign in place and documentation row added to PCS:   N/A    Fall Prevention Yes: Care plan updated, education given and documented, sticker and magnet in place: Yes    Care Plan initiated: Yes    Education Documented (including assessment): Yes    Patient has discharge needs : No If yes, please explain:N/A

## 2024-04-16 NOTE — H&P
See H&P from this morning for further detail  Patient was seen this morning for medical rounds.  Patient denying chest pain she does have some shortness of breath and wheezing but she is feeling a lot better.  We did discuss with her medications that she takes at home.  She states she does not want to be on Coumadin which we will discontinue.  We did discuss with her regarding risk for stroke with A-fib and not being on anticoagulation.  Patient also did state that she is on Cardizem and metoprolol.  We are giving her diuretics.  Anticipate she will be in the hospital for couple more nights.  Limited echo was ordered this morning.

## 2024-04-16 NOTE — PLAN OF CARE
VS as charted. Denies pain.   A&O to self.   RA. Lungs: Diminished. Infrequent, harsh, non-productive cough.   Scabs on back.   1800 fluid restriction.   IV infusing K+ @ 100 mL/hr.   Continent of urine.   Call light within reach, WILL NOT use the call light, & makes her needs known.   Face to face report given with opportunity to observe patient.    Report given to Reece FLANAGAN RN.     Melina Jordan RN   4/16/2024  7:15 AM

## 2024-04-16 NOTE — H&P
[facility]    History and Physical - Hospitalist Service       Date of Admission:  4/15/2024    Assessment & Plan      Heather Rosas is a 73 year old female admitted on 4/15/2024. She   established diagnosis of chronic diastolic CHF, chronic right-sided heart failure, chronic atrial fibrillation flutter, anasarca, sigmoid lesion on CT, coronary artery disease, status post CABG x 4.  Patient did emergency room with complaint of having cough shortness of breath and wheezing, she has had hard time walking due to shortness of breath for about 3 weeks now.  She also has been smoking previously. Patient admitted with acute on chronic diasostic CHF and acute hypoxic respiratory failure, copd exacerbation.  She did have negative  PE study in ER but  had some ? Pulm edema. She was given IV lasix steroids and  nebs. She is full code. INR was sub therapeutic and she wanted to decide if wants to continue  coumadin.   Active Problems:       Severe tricuspid regurgitation   Acute on chronic congestive heart failure, unspecified heart failure type (H)  On iv lasix  Hold lisinopril but will restart as BP allows   Limited echo  I's and o's  tele      COPD exacerbation (H)    Acute hypoxic respiratory failure (H)  CTA chest done showing no PE pulm edema? Trace pleura effusion  Wean off oxygen  Duonebs  Solumedrol     Hypomagnesemia  Replace per protocol      Hypokalemia  Replace per protocol  On home potassium       Hyponatremia  New  for her  Suspect chf related  Urine studies    Dyslipidemia  Not on statin      HTN (hypertension)  On home torsemide and lisinopril      ANNA (generalized anxiety disorder)  PRN atarax      GERD (Gastroesophageal reflux disease)  Not on ppi      Schizophrenia (H)  Not on any meds      Adenocarcinoma of the endometrium/uterus (H)  Out patient follow up      Atrial fibrillation (H)  Rate controled  On Cardizem and metoprolol  On coumadin but sub therapeutic INR  Pharmacy to dose  coumadin      S/P CABG x 4    Coronary artery disease involving native coronary artery      Tobacco dependence  Tobacco cessation recommended               Diet:  cardiac   DVT Prophylaxis: Warfarin  Martin Catheter: Not present  Lines: None     Cardiac Monitoring: None  Code Status:  Full Code     Clinically Significant Risk Factors Present on Admission        # Hypokalemia: Lowest K = 3.2 mmol/L in last 2 days, will replace as needed  # Hyponatremia: Lowest Na = 124 mmol/L in last 2 days, will monitor as appropriate       # Drug Induced Coagulation Defect: home medication list includes an anticoagulant medication    # Hypertension: Noted on problem list  # Acute heart failure with preserved ejection fraction: heart failure noted on problem list, last echo with EF >50%, and receiving IV diuretics          # History of CABG: noted on surgical history       Disposition Plan     Medically Ready for Discharge: Anticipated in 2-4 Days           Octavio Phillips DO  Hospitalist Service  [facility]  Securely message with [employer] (more info)  Text page via [facility] Paging/Directory     ______________________________________________________________________    Chief Complaint   Shortness of breath     History is obtained from the patient, electronic health record, and emergency department physician    History of Present Illness   Heather Rosas is a 73 year old female who has established diagnosis of chronic diastolic CHF, chronic right-sided heart failure, chronic atrial fibrillation flutter, anasarca, sigmoid lesion on CT, coronary artery disease, status post CABG x 4.  Patient did emergency room with complaint of having cough shortness of breath and wheezing, she has had hard time walking due to shortness of breath for about 3 weeks now.  She also has been smoking previously.  Patient did have extensive workup in the emergenc potassium was 3.2, creatinine 1.24, she was found to have acute hyponatremia sodium  124, alk phos 177, white blood cell count of 9.5, CRP 12.56, lactic acid 1.5, troponin was elevated at 35 but baseline of 30 her proBNP was elevated 4100 previously was 5900, INR was 1.27.  Her magnesium was 1.7.  She did undergo CTA of her chest in the emergency room showing pulmonary embolism, she was given IV Lasix in the emergency room and DuoNeb in the emergency room along with Solu-Medrol.  Patient denied any chest pain but she has been having wheezing when I saw her she felt little better.  She did states she is taking her diuretics.  We did switch her to be Lasix 60 mg 3 times daily.  Patient is a full code.      Past Medical History    Past Medical History:   Diagnosis Date    Acute diastolic congestive heart failure (H) 10/12/2021    Acute exacerbation of CHF (congestive heart failure) (H) 10/11/2021    Acute kidney failure, unspecified (H)     Acute on chronic congestive heart failure, unspecified heart failure type (H) 09/21/2023    Acute on chronic diastolic congestive heart failure (H)     Allergic rhinitis 01/01/2011    Anxiety 01/01/2011    Anxiety state, unspecified     Anxiety state    Atrial fibrillation with RVR (H) 07/07/2023    Atrial flutter with rapid ventricular response (H) 10/12/2021    CVA (cerebral vascular accident) (H) 05/14/2018    Dyslipidemia 11/26/2003    fibromyalgia 06/14/2004    GERD, Esophageal reflux 01/01/2011    History of non-ST elevation myocardial infarction (NSTEMI) 09/06/2018    HTN (hypertension)     Essential hypertension    Major depression, recurrent (H24) 01/01/2011    Major depressive disorder, recurrent episode, moderate (H) 01/01/2011    Metrorrhagia 02/18/2018    Non compliance with medical treatment 07/07/2023    Nonorganic sleep disorder, unspecified     Non-org. 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, ARDS, renal failure        Past Surgical History   Past Surgical History:   Procedure Laterality Date    ANGIOGRAM  02/2005    Normal     BIOPSY BREAST  1984    LT, normal    BYPASS GRAFT ARTERY CORONARY  09/10/2018    CARPAL TUNNEL RELEASE RT/LT  2005    RT, carpal tunnel    COLONOSCOPY  2011    Repeat in ten years     COLONOSCOPY  1996    COLONOSCOPY  2004    DILATION AND CURETTAGE, HYSTEROSCOPY, ABLATE ENDOMETRIUM, COMBINED N/A 2/19/2018    Procedure: COMBINED DILATION AND CURETTAGE, HYSTEROSCOPY, ABLATE ENDOMETRIUM;  HYSTEROSCOPY DILATION AND CURETTAGE, ENDOMETRIAL ABLATION;  Surgeon: Jose Nettles MD;  Location: [facility] OR    HYSTERECTOMY TOTAL ABD, NICK SALPINGO-OOPHORECTOMY, NODE DISSECTION, TUMOR DEBULKING, COMBINED  10/30/2018    INSERT PORT VASCULAR ACCESS N/A 12/11/2018    Procedure: PORT-A-CATH PLACEMENT;  Surgeon: Rafi Trinidad MD;  Location: [facility] OR    placement of central line  2005    REMOVE PORT VASCULAR ACCESS N/A 10/6/2020    Procedure: port a cath removal;  Surgeon: Rafi Trinidad MD;  Location: [facility] OR       Prior to Admission Medications   Prior to Admission Medications   Prescriptions Last Dose Informant Patient Reported? Taking?   albuterol (PROAIR HFA/PROVENTIL HFA/VENTOLIN HFA) 108 (90 Base) MCG/ACT inhaler   No No   Sig: Inhale 2 puffs into the lungs every 6 hours as needed for shortness of breath, wheezing or cough   atorvastatin (LIPITOR) 40 MG tablet   No No   Sig: Take 1 tablet (40 mg) by mouth at bedtime   busPIRone (BUSPAR) 10 MG tablet   No No   Sig: Take 1 tablet (10 mg) by mouth 2 times daily   diltiazem ER (DILT-XR) 120 MG 24 hr capsule   No No   Sig: Take 1 capsule (120 mg) by mouth daily   lisinopril (ZESTRIL) 10 MG tablet   No No   Sig: Take 1 tablet (10 mg) by mouth daily   metoprolol succinate ER (TOPROL XL) 100 MG 24 hr tablet   No No   Sig: Take 1 tablet (100 mg) by mouth daily   nystatin (MYCOSTATIN) 222675 UNIT/GM external powder   Yes No   Sig: Apply 1 g topically 3 times daily as needed  "(groin)   pantoprazole (PROTONIX) 40 MG EC tablet   No No   Sig: Take 1 tablet (40 mg) by mouth daily before breakfast   potassium chloride ER (K-TAB) 20 MEQ CR tablet   No No   Sig: Take 1 tablet (20 mEq) by mouth 2 times daily   torsemide (DEMADEX) 20 MG tablet   No No   Sig: Take 3 tablets (60 mg) by mouth daily   warfarin ANTICOAGULANT (COUMADIN) 2 MG tablet   No No   Sig: Follow-up with [facility] on 2/27 for INR check and further dosing instructions.      Facility-Administered Medications: None        Review of Systems    The 10 point Review of Systems is negative other than noted in the HPI or here.      Social History   I have reviewed this patient's social history and updated it with pertinent information if needed.  Social History     Tobacco Use    Smoking status: Every Day     Current packs/day: 1.00     Average packs/day: 1 pack/day for 30.0 years (30.0 ttl pk-yrs)     Types: Cigarettes     Passive exposure: Never    Smokeless tobacco: Never    Tobacco comments:     pt declined, stated she would use, \"the patch\". Patient has not had a cigarette in 1 week because she was in the hospital   Vaping Use    Vaping status: Never Used   Substance Use Topics    Alcohol use: No     Comment: rare    Drug use: No         Family History   I have reviewed this patient's family history and updated it with pertinent information if needed.  Family History   Problem Relation Age of Onset    C.A.D. Father 45        (cause of death)     Other - See Comments Father         rheumatic fever     Allergies Father     Cancer Sister 56        Esophageal to bone cancer    Gastrointestinal Disease Mother         GERD    Cancer Mother         pancreatic ca (cause of death) /liver ca    Breast Cancer Maternal Aunt     Breast Cancer Maternal Aunt     Colon Cancer Paternal Aunt         (cause of death)     Crohn's Disease Other     Depression Maternal Uncle     Depression Maternal Aunt     Diabetes Paternal Grandmother         " type 2    Neurologic Disorder Brother         neuropathy    Cancer Brother 58        Tonsilular cancer/ lymph node in neck    Allergies Brother     Breast Cancer Cousin     Breast Cancer Cousin     Breast Cancer Cousin          Allergies   Allergies   Allergen Reactions    Fexofenadine Hcl Nausea and Vomiting     Allegra     Rosuvastatin Other (See Comments)     Dizziness - Crestor     Cats Other (See Comments)     Sneezing, runny nose    Codeine Sulfate Nausea and Vomiting and GI Disturbance        Physical Exam   Vital Signs: Temp: 97.8  F (36.6  C) Temp src: Oral BP: 112/87 Pulse: 86   Resp: 24 SpO2: 95 % O2 Device: None (Room air) (after pt walked to restroom)    Weight: 0 lbs 0 oz    Physical Exam  Constitutional:       Appearance: She is obese.   HENT:      Right Ear: External ear normal.      Left Ear: External ear normal.      Mouth/Throat:      Pharynx: Oropharynx is clear.   Eyes:      Conjunctiva/sclera: Conjunctivae normal.   Cardiovascular:      Rate and Rhythm: Normal rate.   Pulmonary:      Effort: Pulmonary effort is normal.      Breath sounds: Wheezing present.   Abdominal:      General: Abdomen is flat.   Musculoskeletal:         General: Swelling present. Normal range of motion.   Skin:     General: Skin is warm.   Neurological:      Mental Status: She is alert. Mental status is at baseline.         Medical Decision Making       55 MINUTES SPENT BY ME on the date of service doing chart review, history, exam, documentation & further activities per the note.      Data     I have personally reviewed the following data over the past 24 hrs:    9.5  \   12.1   / 238     124 (L) 88 (L) 22.7 /  101 (H)   3.2 (L) 23 1.24 (H) \     ALT: 11 AST: 19 AP: 177 (H) TBILI: 1.7 (H)   ALB: 3.9 TOT PROTEIN: 7.0 LIPASE: N/A     Trop: 35 (H) BNP: 4,186 (H)     Procal: N/A CRP: 12.56 (H) Lactic Acid: 1.5       INR:  1.27 (H) PTT:  N/A   D-dimer:  N/A Fibrinogen:  N/A       Imaging results reviewed over the past 24 hrs:    No results found for this or any previous visit (from the past 24 hour(s)).

## 2024-04-16 NOTE — PHARMACY-ANTICOAGULATION SERVICE
Clinical Pharmacy - Warfarin Dosing Consult     Pharmacy has been consulted to manage this patient s warfarin therapy.  Indication: Atrial Fibrillation  Therapy Goal: INR 2-3  OP Anticoag Clinic: [place]  Warfarin PTA Regimen: Most recent regimen of 2 mg daily.  Dose Comments: Current INR subtherapeutic at 1.27.    INR   Date Value Ref Range Status   04/15/2024 1.27 (H) 0.85 - 1.15 Final   03/04/2024 2.7 (H) 0.9 - 1.1 Final       Recommend warfarin 2 mg today.  Pharmacy will monitor Heather Rosas daily and order warfarin doses to achieve specified goal.      Please contact pharmacy as soon as possible if the warfarin needs to be held for a procedure or if the warfarin goals change.

## 2024-04-16 NOTE — PROVIDER NOTIFICATION
MD notified about both RN managed and scheduled K+ and Magnesium, asking for clarification.  Also requested HOLD order for K+ and Diltiazem as verbally discussed.

## 2024-04-16 NOTE — PROGRESS NOTES
Assessment completed by visit with Heather.    LOC: alert, oriented, drowsy     Dx: hyponatremia, CHF   Chronic Disease Management: COPD, CHF, GERD, afib     Lives with: alone with dogRogeriody   Living at:  home   Transportation: YES Self/family     Primary PCP: Nicolette Huffman  Insurance:  Medicare       Support System:  family  Homecare/PCA: not connected   /County Services:   not connected   [place]: NO      How was the [facility] notification completed: n/a    Health Care Directive: no  Guardian: no  POA: no    Pharmacy: [facility]   Meds management: independently manages      Adequate Resources for needs (housing, utilities, food/med): YES  Household chores: independently manages  Work/community/social activity: YES as desired     ADLs: independently manages  Ambulation:independent   Falls: denies   Nutrition: no concerns   Sleep: no concerns     Equipment used: none       Oxygen supplier: n/a      Does the supplier have valid oxygen orders: n/a    Mental health: denies   Substance abuse: denies   Exposure to violence/abuse: no concerns voiced/identified  Stressors: no concerns voiced/identified      Able to Return to Prior Living Arrangements: YES    Choice of Vendor: n/a    Barriers: no barriers identified      TOBI: medium     Plan: anticipate return home via family or taxi

## 2024-04-17 NOTE — PROGRESS NOTES
[facility]    Medicine Progress Note - Hospitalist Service    Date of Admission:  4/15/2024    Assessment & Plan      Heather Rosas is a 73 year old female admitted on 4/15/2024. She   established diagnosis of chronic diastolic CHF, chronic right-sided heart failure, chronic atrial fibrillation flutter, anasarca, sigmoid lesion on CT, coronary artery disease, status post CABG x 4.  Patient did emergency room with complaint of having cough shortness of breath and wheezing, she has had hard time walking due to shortness of breath for about 3 weeks now.  She also has been smoking previously. Patient admitted with acute on chronic diasostic CHF and acute hypoxic respiratory failure, copd exacerbation.  She did have negative  PE study in ER but  had some ? Pulm edema. She was given IV lasix steroids and  nebs. She is full code. INR was sub therapeutic and she wanted to decide if wants to continue  coumadin.   Active Problems:       Severe tricuspid regurgitation   Acute on chronic diastolic congestive heart failure,    Was On iv lasix switched to  home oral torsemide 60mg, she reporte she is only taking 20mg likely  the cause of her CHF exacerbation     restart home lisinopril   Limited echo done showing lvef 60-65% Severe left atrial enlargement is present. Moderate right atrial enlargement is present. Moderate mitral insufficiency is present. Moderate tricuspid insufficiency is present. RA pressures elevated   I's and o's  tele      COPD exacerbation (H)    Acute hypoxic respiratory failure (H)  CTA chest done showing no PE pulm edema? Trace pleura effusion  Wean off oxygen  Duonebs  Solumedrol     Hypomagnesemia  Replace per protocol      Hypokalemia  Replace per protocol  On home potassium       Hyponatremia  New  for her  Suspect chf related  Urine studies    Dyslipidemia  Not on statin      HTN (hypertension)  On home torsemide and lisinopril      ANNA (generalized anxiety disorder)  PRN atarax       "GERD (Gastroesophageal reflux disease)  Not on ppi      Schizophrenia (H)  Not on any meds      Adenocarcinoma of the endometrium/uterus (H)  Out patient follow up      Atrial fibrillation (H)  Rate controled  On Cardizem and metoprolol  On coumadin but sub therapeutic INR  Pharmacy to dose coumadin      S/P CABG x 4    Coronary artery disease involving native coronary artery      Tobacco dependence  Tobacco cessation recommended               Diet: Combination Diet 2 gm NA Diet; No Caffeine Diet (and additional linked orders)  Fluid restriction 1800 ML FLUID (and additional linked orders)    DVT Prophylaxis: Enoxaparin (Lovenox) SQ  Martin Catheter: Not present  Lines: None     Cardiac Monitoring: ACTIVE order. Indication: Acute decompensated heart failure (48 hours)  Code Status: Full Code      Clinically Significant Risk Factors        # Hypokalemia: Lowest K = 3.2 mmol/L in last 2 days, will replace as needed  # Hyponatremia: Lowest Na = 124 mmol/L in last 2 days, will monitor as appropriate       # Coagulation Defect: INR = 1.33 (Ref range: 0.85 - 1.15) and/or PTT = N/A, will monitor for bleeding    # Hypertension: Noted on problem list  # Acute heart failure with preserved ejection fraction: heart failure noted on problem list, last echo with EF >50%, and receiving IV diuretics       # Obesity: Estimated body mass index is 38.96 kg/m  as calculated from the following:    Height as of this encounter: 1.499 m (4' 11\").    Weight as of this encounter: 87.5 kg (192 lb 14.4 oz)., PRESENT ON ADMISSION      # History of CABG: noted on surgical history       Disposition Plan     Medically Ready for Discharge: Anticipated Tomorrow             Octavio Phillips DO  Hospitalist Service  [facility]  Securely message with [employer] (more info)  Text page via [facility] Paging/Directory   ______________________________________________________________________    Interval History   Patient was  seen this morning for " medical rounds.  Patient did states she still has some shortness of breath wheezing and somewhat improved.  We discussed regarding her medications and continue taking diuretics.  Also discussed with patient regarding anticoagulation she continues to refuse to take Coumadin and will not take it outpatient.  We are working on transitioning to oral diuretics with her home regimen and likely discharge home tomorrow.    Physical Exam   Vital Signs: Temp: 97  F (36.1  C) Temp src: Tympanic BP: 131/82 Pulse: 85   Resp: 20 SpO2: 96 % O2 Device: None (Room air)    Weight: 192 lbs 14.4 oz    Physical Exam  Constitutional:       Appearance: She is obese.   HENT:      Right Ear: External ear normal.      Left Ear: External ear normal.      Nose: Nose normal.      Mouth/Throat:      Pharynx: Oropharynx is clear.   Cardiovascular:      Rate and Rhythm: Normal rate.   Pulmonary:      Effort: Pulmonary effort is normal.      Breath sounds: Wheezing present.   Abdominal:      General: Abdomen is flat.   Musculoskeletal:         General: Swelling present.   Neurological:      Mental Status: She is alert. Mental status is at baseline.           Medical Decision Making       54 MINUTES SPENT BY ME on the date of service doing chart review, history, exam, documentation & further activities per the note.      Data     I have personally reviewed the following data over the past 24 hrs:    13.5 (H)  \   12.1   / 217     131 (L) 94 (L) 34.2 (H) /  193 (H)   4.0 25 1.19 (H) \     INR:  1.33 (H) PTT:  N/A   D-dimer:  N/A Fibrinogen:  N/A       Imaging results reviewed over the past 24 hrs:   No results found for this or any previous visit (from the past 24 hour(s)).

## 2024-04-17 NOTE — PLAN OF CARE
"Reason for hospital stay:  Hyponatremia, Electrolyte Imbalance    Most recent vitals: /70 (BP Location: Left arm, Patient Position: Chair, Cuff Size: Adult Regular)   Pulse 69   Temp 98.6  F (37  C) (Temporal)   Resp 20   Ht 1.499 m (4' 11\")   Wt 87.5 kg (192 lb 14.4 oz)   SpO2 95%   BMI 38.96 kg/m      Patient A+O but forgetful at times. Talks to self and also difficult to concentrate on conversation and tasks at times. Denied any pain. Sodium up to 131 this morning and continues on 1800 mL fluid restriction. Magnesium 1.7 and patient did refuse magnesium replacement this morning but later during shift did agree to take magnesium tablets. Will be rechecked in the morning. Potassium 4 and K+ replacement was discontinued. Lungs with scattered expiratory wheezes - refused offers of inhaler. AP irregular - telemetry in place - Afib, A-flutter 60s per ICU nurse.      Face to face report given with opportunity to observe patient.    Report given to Vivien Baker RN   4/17/2024  7:28 PM    "

## 2024-04-17 NOTE — CONSULTS
Reason for admission: Heart Failure - Dietitian to instruct patient on 2 gram sodium diet     Offered low sodium diet education. Pt has been educated on a low sodium diet multiple times in the last 9 months. Pt reports following a low fat diet at home. She doesn't think she eats high salt foods. Breakfast is corn flakes, no lunch just tea/coffee, and dinner is asian or mediterranean or Mongolian. Wasn't able to provide many examples of dinner besides spaghetti. Discussed salt content of pre-made sauces and some alternatives. Pt reports she likes to cook and doesn't eat frozen meals or need home delivered meals.    Pt provided with low sodium diet education handout and RD's contact info. Encouraged pt to call with questions.

## 2024-04-17 NOTE — PROGRESS NOTES
04/17/24 9388   Appointment Info   Signing Clinician's Name / Credentials (PT) Karyn Costa, DPALEC   Living Environment   People in Home alone   Current Living Arrangements house   Home Accessibility stairs to enter home;stairs within home   Number of Stairs, Main Entrance 5   Stair Railings, Main Entrance railings safe and in good condition;railing on right side (ascending)   Number of Stairs, Within Home, Primary greater than 10 stairs   Stair Railings, Within Home, Primary railing on left side (ascending);railings safe and in good condition   Transportation Anticipated car, drives self   Living Environment Comments Pt reports her bedroom and shower are located in the basement but she spends most of her time on the mainfloor of the home   Self-Care   Usual Activity Tolerance moderate   Current Activity Tolerance fair   Equipment Currently Used at Home grab bar, tub/shower   Fall history within last six months no   Activity/Exercise/Self-Care Comment Pt states she is independent with ADLs and ambulates without AD however does state that she holds onto furniture in her home and if she has to go to the store she grabs a shopping cart to walk across parking lot due to feelings of instability   General Information   Onset of Illness/Injury or Date of Surgery 04/15/24   Referring Physician Dr. Phillips   Patient/Family Therapy Goals Statement (PT) to go home   Pertinent History of Current Problem (include personal factors and/or comorbidities that impact the POC) Pt admitted with acute on chronic CHF   Existing Precautions/Restrictions fall   General Observations Pt up in chair, agreeable to PT   Cognition   Affect/Mental Status (Cognition) WFL   Orientation Status (Cognition) oriented x 3   Follows Commands (Cognition) WFL   Cognitive Status Comments Pt does repeat questions at times and demonstrates questionable retention of information   Pain Assessment   Patient Currently in Pain No   Integumentary/Edema    Integumentary/Edema Comments LE edema   Posture    Posture Forward head position   Range of Motion (ROM)   Range of Motion ROM is WFL   Strength (Manual Muscle Testing)   Strength (Manual Muscle Testing) Deficits observed during functional mobility   Bed Mobility   Comment, (Bed Mobility) NT, up in chair upon PT arrival   Transfers   Transfers sit-stand transfer   Sit-Stand Transfer   Sit-Stand [place] (Transfers) supervision   Assistive Device (Sit-Stand Transfers) walker, front-wheeled   Gait/Stairs (Locomotion)   [place] Level (Gait) supervision;contact guard   Distance in Feet (Gait) 5'   Pattern (Gait) step-to   Comment, (Gait/Stairs) slow, guarded gait, unstead on feet, wants to reach towards furniture   Balance   Balance Comments poor without AD   Clinical Impression   Criteria for Skilled Therapeutic Intervention Yes, treatment indicated   PT Diagnosis (PT) gait disturbance   Influenced by the following impairments weakness, impaired balance, decreased activity tolerance   Functional limitations due to impairments decreased safety with functional mobility without AD   Clinical Presentation (PT Evaluation Complexity) stable   Clinical Presentation Rationale clinical judgement   Clinical Decision Making (Complexity) low complexity   Planned Therapy Interventions (PT) balance training;bed mobility training;gait training;neuromuscular re-education;patient/family education;stair training;strengthening;transfer training;progressive activity/exercise;risk factor education   Risk & Benefits of therapy have been explained evaluation/treatment results reviewed;care plan/treatment goals reviewed;risks/benefits reviewed;participants included;patient   Clinical Impression Comments PT evaluation completed.  Pt lives at home alone in a 2 story home, bedroom located in basement.  Pt reports she is independent with ADLs at baseline and ambulates without AD however does state she holds onto furniture all the time  "when navigating around her home.  Pt would benefit from a 4WW upon discharge and home with home PT services.  Will see during acute care stay to progress activity and strength.   PT Total Evaluation Time   PT Eval, Low Complexity Minutes (77459) 10   Physical Therapy Goals   PT Frequency 6x/week   PT Predicted Duration/Target Date for Goal Attainment 05/08/24   PT Goals Bed Mobility;Transfers;Gait;Stairs   PT: Bed Mobility Modified independent;Supine to/from sit   PT: Transfers Modified independent;Sit to/from stand;Bed to/from chair;Assistive device   PT: Gait Modified independent;Rolling walker;150 feet   PT: Stairs Modified independent;Greater than 10 stairs;Rail on left   Interventions   Interventions Quick Adds Therapeutic Activity   Therapeutic Activity   Therapeutic Activities: dynamic activities to improve functional performance Minutes (29733) 16   Symptoms Noted During/After Treatment Fatigue   Treatment Detail/Skilled Intervention Pt provided with walker and ambulated 110' with FWW SBA, improved palma and step-through gait noted.  Pt then transported via w/c to PT gym.  Pt states \"I can't do those stairs\".  Asked pt how she was managing stairs at home and she said fine.  Then educated pt that in order to be able to return home, she would need to demonstrate abiltiy to do stairs.  Pt transferred sit>stand SBA from w/c.  Pt managed 4 steps with bilteral rails SBA with step over step pattern.  Pt ambulated additional 110' with FWW SBA and transferred into chair without difficulty.  Discussed benefit of FWW or 4WW at discharge to improve overall stability and activity level.  Pt verbalized agreement when this plan.  Pt left sitting in chair with clip alarm on and call light in reach.   PT Discharge Planning   PT Plan Progress mobility and strength   PT Discharge Recommendation (DC Rec) home with home care physical therapy   PT Rationale for DC Rec PT evaluation completed. Pt lives at home alone in a 2 story " home, bedroom located in basement. Pt reports she is independent with ADLs at baseline and ambulates without AD however does state she holds onto furniture all the time when navigating around her home. Pt would benefit from a 4WW upon discharge and home with home PT services. Will see during acute care stay to progress activity and strength.   PT Brief overview of current status sit<>stand SBA, ambulated 5' without AD CGA with instability and pt reaching for furniture, provided FWW and ambulated 110'x2 with FWW SBA with improved palma.  Pt managed 4 steps with bilateral rails SBA   Total Session Time   Timed Code Treatment Minutes 16   Total Session Time (sum of timed and untimed services) 26

## 2024-04-17 NOTE — PROGRESS NOTES
04/17/24 1000   Appointment Info   Signing Clinician's Name / Credentials (OT) Jacqueline Juárez, OTR/L   Interventions   Interventions Quick Adds Therapeutic Activity   Therapeutic Activities   Therapeutic Activity Minutes (74991) 10   Symptoms noted during/after treatment fatigue   Treatment Detail/Skilled Intervention Patient was in the bathroom with nursing staff upon OT arrival. Patient was able to stand from toilet and complete toiletin hygiene with SBA. She ambulated to the sink with SBA. Patient was able to brush teeth while standing at the sink with SBA. Patient reached for doors and countertops when walking and reported that she often uses furniture when walking at home. Once sitting in her recliner, patient engaged in UE AROM including shoulder flexion, shoulder horizontal adduction/ abduction, chest fly, and elbow flexion/ extension x 10 to improve strength and ROM for self cares and transfers. Patient required several breaks during activity due to fatigue. Patient does continue to have some confusion. Reminded patient to use call light when transferring. Patient was left seated in recliner with call light within reach and chair alarm on.   OT Discharge Planning   OT Plan Progress ADLs, strength, and mobility   OT Discharge Recommendation (DC Rec) home with home care occupational therapy   OT Rationale for DC Rec Patient was previously living alone and was independent with ADLs and IADLs. Patient was able to complete toileting tasks and grooming tasks with SBA today. She does continue to demonstrate decreased activity tolerance. Recommend home OT.   OT Brief overview of current status SBA for transfers/ mobility; SBA for toileting; SBA for grooming tasks while standing; decreased activity tolerance   Total Session Time   Timed Code Treatment Minutes 10   Total Session Time (sum of timed and untimed services) 10

## 2024-04-17 NOTE — PLAN OF CARE
"Reason for hospital stay:  Hyponatremia  Living situation PTA: Home alone  Most recent vitals: /78 (BP Location: Left arm, Patient Position: Chair, Cuff Size: Adult Regular)   Pulse 88   Temp 97.5  F (36.4  C) (Tympanic)   Resp 18   Ht 1.499 m (4' 11\")   Wt 87.5 kg (192 lb 14.4 oz)   SpO2 97%   BMI 38.96 kg/m      Pain Management:  Patient denied any pain overnight  LOC:  A&O x4  Cardiac:  Apical pulse irregular, hx of Afib - Noncompliant with coumadin. Telemetry in place - Atrial flutter 80-90's per ICU nurse  Respiratory:  Maintaining sats on room air. Lung sounds clear but diminished throughout. Patient reports GONZALEZ.  GI:  Bowel sounds audible and normoactive   :  Voiding spontaneously without difficulty.   Skin Issues:   Scattered bruising and scabs    IVF:  SL   ABX:  None ordered    Nutrition: 2 g NA, no caffeine, and 1,800 mL fluid restriction.  ADL's:  Independent with minimal assist  Ambulation: Stand by assist with gait belt  Safety:  Alarms active and audible, call light within reach, calls appropriately at times, lighting adjusted, nonskid footwear in use, bed in lowest position, wheels locked, room near nurses station, door open.     Comments:  Replaced K+ overnight. Redraw scheduled for tomorrow morning.     Face to face report given with opportunity to observe patient.    Report given to LORENA Suh RN   4/17/2024  7:25 AM    "

## 2024-04-18 NOTE — TELEPHONE ENCOUNTER
11:24 AM    Reason for Call: OVERBOOK    Patient is having the following symptoms: Patient needs to be seen  for hospital follow up within 7-10 days. days.    The patient is requesting an appointment for Overbook with .    Was an appointment offered for this call? No  If yes : Appointment type              Date    Preferred method for responding to this message: Telephone Call  What is your phone number ?  [phone]    If we cannot reach you directly, may we leave a detailed response at the number you provided? Yes    Can this message wait until your PCP/provider returns, if unavailable today? Provider is in    Deborah Arceo

## 2024-04-18 NOTE — PROGRESS NOTES
04/18/24 1000   Appointment Info   Signing Clinician's Name / Credentials (OT) Jacqueline Juárez, OTR/L   Interventions   Interventions Quick Adds Therapeutic Activity   Therapeutic Activities   Therapeutic Activity Minutes (31449) 15   Symptoms noted during/after treatment none   Treatment Detail/Skilled Intervention Patient was seated in recliner at time of OT arrival and was agreeable to therapy. Patient was able to stand from recliner with SBA and cues for hand placement on fww. Patient ambulated 100 feet with SBA and fww. Patient benefited from use of fww and demonstrated increased steadiness. Following a seated break, patient participated grooming tasks while standing at this sink. She was able to brush teeth and wash face with SBA while standing. She also was able to complete toileting tasks and transfer mod I with fww. Patient ambulated an additional 10 feet back to recliner. She was left resting in recliner with call light within reach and chair alarm on.   OT Discharge Planning   OT Plan Progress ADLs, strength, and mobility   OT Discharge Recommendation (DC Rec) home with home care occupational therapy   OT Rationale for DC Rec Patient was able to complete grooming tasks and mobility with fww and SBA. Recommend home with home OT to continue to progress safety, independence, and activity tolerance.   OT Brief overview of current status SBA for transfers; Pt ambulated 100 feet with fww and SBA; grooming tasks while standing at the sink with SBA; Mod I for toileting   Total Session Time   Timed Code Treatment Minutes 15   Total Session Time (sum of timed and untimed services) 15   Psychosocial Support   Trust Relationship/Rapport care explained;choices provided;emotional support provided;empathic listening provided;questions answered;questions encouraged;reassurance provided;thoughts/feelings acknowledged

## 2024-04-18 NOTE — PROGRESS NOTES
Occupational Therapy Discharge Summary    Reason for therapy discharge:    Discharged to home with home therapy.    Progress towards therapy goal(s). See goals on Care Plan in [facility] electronic health record for goal details.  Goals partially met.  Barriers to achieving goals:   discharge from facility.    Therapy recommendation(s):    Continued therapy is recommended.  Rationale/Recommendations:  continue to progress safety with ADLs and mobility.

## 2024-04-18 NOTE — PROGRESS NOTES
Checked in with Heather.  Discussed recommendations for home care services.  Heather agreeable.      Pt/family was provided with the Medicare Compare list for Home Health Care.  Discussed associated Medicare star ratings to assist with choice for referrals/discharge planning.    Education was given to pt/family that star ratings are updated/maintained by Medicare and can be reviewed by visiting www.medicare.gov.    Patient/family choices for vendor in priority are:   First Choice : [facility] ; referral sent     Second Choice: [facility]

## 2024-04-18 NOTE — TELEPHONE ENCOUNTER
Attempt # 1  Outcome: Left Message   Comment: LVM for pt to call to schedule follow-up for 4/23 at 3 pm per PCP.

## 2024-04-18 NOTE — PLAN OF CARE
Assumed care of pt at approximately 1230 pm.  VSS. Administered magnesium et Lovenox. Pt A & O, anticipates going home. Pt needs transportation, Case management will assist with this. Pt ate lunch. No other concerns noted.      Nikia Garcia, RN   4/18/2024  1530 PM

## 2024-04-18 NOTE — PROGRESS NOTES
04/18/24 1100   Appointment Info   Signing Clinician's Name / Credentials (PT) Jennifer Jones DPT   Appointment Canceled Reason (PT) Patient declined   Appointment Cancel Comments (PT) Asked us to come back this afternoon. Plan to attempt again later

## 2024-04-18 NOTE — PLAN OF CARE
Patient discharged at 1530 PM via wheel chair accompanied by staff. Prescriptions sent to patients preferred pharmacy. All belongings sent with patient.     Discharge instructions reviewed with patient. Listed belongings gathered and returned to patient. Yes    Patient discharged to Home.     Surgical Patient :No  Surgical Procedures during stay: N/A  Did patient receive discharge instruction on wound care and recognition of infection symptoms? N/A    MISC  Follow up appointment made:  Yes  Home medications returned to patient: N/A  Patient reports pain was well managed at discharge: Yes

## 2024-04-18 NOTE — PROGRESS NOTES
04/18/24 1400   Appointment Info   Signing Clinician's Name / Credentials (PT) Jennifer Jones DPT   Interventions   Interventions Quick Adds Therapeutic Activity   Therapeutic Activity   Therapeutic Activities: dynamic activities to improve functional performance Minutes (66582) 25   Symptoms Noted During/After Treatment None   Treatment Detail/Skilled Intervention Pt seated in recliner at start of session and agreeable to PT. Pt hoping to d/c home today.  Completed sit to stand transfer c SBA.  Pt ambulated about 250' c FWW and SBA. Pt steady and safe with FWW. No safety concerns. Pt completes transfers safely c FWW. Discussed d/c recommendation and provided pt with walker for home. Fit walker for patient. Pt resting in recliner at end of session   PT Discharge Planning   PT Plan Progress mobility and strength   PT Discharge Recommendation (DC Rec) home with home care physical therapy   PT Rationale for DC Rec Pt provided c FWW and plans to d/c home today   PT Brief overview of current status Ambulated 250' c FWW and SBA   Total Session Time   Timed Code Treatment Minutes 25   Total Session Time (sum of timed and untimed services) 25

## 2024-04-18 NOTE — PLAN OF CARE
Pt up in chair this morning for breakfast. Pt given 400mg of the 800mg magnesium replacement. Next dose due around 1230. Pt back on home med of demadex. Continues on IV solu-medrol. Pt doesn't call approp so alarms are activated.     Face to face report given with opportunity to observe patient.    Report given to Shane Crenshaw RN   4/18/2024  10:05 AM

## 2024-04-18 NOTE — PLAN OF CARE
Goal Outcome Evaluation:      Plan of Care Reviewed With: patient        Pt has been afebrile through the night, VS are as charted.  Her O2 sats are in the upper 90's on RA, lung sounds are clear/diminished at the start of the shift - expiratory wheezing upon recheck - albuterol inhaler given..  She does get GONZALEZ/SOB. She is saline locked, good urine output - she is on 1800 mL fluid restriction - well within limits through the day.  She does continue to have 2+ dependent edema.  She has remained free of falls, call light within reach - encouraged to use (has set off her bed alarm a couple times through the night).  She does ambulate to and from the bathroom with standby assist - frequent rounding done to assess needs.        Face to face report given with opportunity to observe patient.    Report given to Nicolette Calixto RN   4/18/2024  7:29 AM

## 2024-04-19 NOTE — DISCHARGE SUMMARY
"[facility]  Hospitalist Discharge Summary      Date of Admission:  4/15/2024  Date of Discharge:  4/18/2024  3:30 PM  Discharging Provider: Anastacio López MD  Discharge Service: Hospitalist Service    Discharge Diagnoses     Acute on chronic diastolic heart failure  Acute respiratory failure with hypoxia  COPD  Hypomagnesemia  Hypokalemia  Hyponatremia  Coronary artery disease  Chronic atrial fibrillation    Clinically Significant Risk Factors     # Severe Obesity: Estimated body mass index is 40.88 kg/m  as calculated from the following:    Height as of this encounter: 1.499 m (4' 11\").    Weight as of this encounter: 91.8 kg (202 lb 6.1 oz).       Follow-ups Needed After Discharge   Follow-up Appointments     Follow-up and recommended labs and tests       Follow up with primary care provider, Nicolette Huffman, within 7 days for   hospital follow- up.  The following labs/tests are recommended: BMP.        Patient has been very noncompliant with her medications.  We are setting up home care with hopeful medication management.    Unresulted Labs Ordered in the Past 30 Days of this Admission       No orders found from 3/16/2024 to 4/16/2024.        These results will be followed up by none    Discharge Disposition   Discharged to home  Condition at discharge: Stable    Hospital Course      Heather Roass is a 73 year old female who has established diagnosis of chronic diastolic CHF, chronic right-sided heart failure, chronic atrial fibrillation flutter, anasarca, sigmoid lesion on CT, coronary artery disease, status post CABG x 4.  Patient did emergency room with complaint of having cough shortness of breath and wheezing, she has had hard time walking due to shortness of breath for about 3 weeks now.  She also has been smoking previously.  Patient did have extensive workup in the emergenc potassium was 3.2, creatinine 1.24, she was found to have acute hyponatremia sodium 124, alk phos 177, white blood " cell count of 9.5, CRP 12.56, lactic acid 1.5, troponin was elevated at 35 but baseline of 30 her proBNP was elevated 4100 previously was 5900, INR was 1.27.  Her magnesium was 1.7.  She did undergo CTA of her chest in the emergency room showing pulmonary embolism, she was given IV Lasix in the emergency room and DuoNeb in the emergency room along with Solu-Medrol.  Patient denied any chest pain but she has been having wheezing when I saw her she felt little better.  She did states she is taking her diuretics.  We did switch her to be IV Lasix 60 mg 3 times daily.  Patient is a full code    Assessment & Plan  Heather Rosas is a 73 year old female admitted on 4/15/2024. She   established diagnosis of chronic diastolic CHF, chronic right-sided heart failure, chronic atrial fibrillation flutter, anasarca, sigmoid lesion on CT, coronary artery disease, status post CABG x 4.  Patient did emergency room with complaint of having cough shortness of breath and wheezing, she has had hard time walking due to shortness of breath for about 3 weeks now.  She also has been smoking previously. Patient admitted with acute on chronic diasostic CHF and acute hypoxic respiratory failure, copd exacerbation.  She did have negative  PE study in ER but  had some ? Pulm edema. She was given IV lasix steroids and  nebs. She is full code. INR was sub therapeutic and she wanted to decide if wants to continue  coumadin.   Active Problems:       Severe tricuspid regurgitation   Acute on chronic diastolic congestive heart failure,    Was On iv lasix switched to  home oral torsemide 60mg, she reporte she is only taking 20mg likely  the cause of her CHF exacerbation     restart home lisinopril   Limited echo done showing lvef 60-65% Severe left atrial enlargement is present. Moderate right atrial enlargement is present. Moderate mitral insufficiency is present. Moderate tricuspid insufficiency is present. RA pressures elevated   I's and  o's  Tele  -4/18: So with the IV Lasix she did diurese relatively well.  Still has a lot of peripheral edema still present.  The patient was not taking the appropriate amount of diuretic that she supposed to.  So we have now reinforced with her we want her to take 60 mg of the oral torsemide.  Want her to weigh herself every day and keep track of this and then have follow-up within the next week hopefully with primary care.  We are going to have home care, and she has agreed to this have nursing come and assess her medications perhaps set up her medications for her and to assess how she is doing in terms of her diuresis.       COPD exacerbation (H)    Acute hypoxic respiratory failure (H)  CTA chest done showing no PE pulm edema? Trace pleura effusion  Wean off oxygen  Duonebs  Solumedrol  -4/18: Really no obvious signs of any further wheezing and such the steroids were discontinued.  Feel this was more probably related to volume than anything else.  She is on room air 94%.     Hypomagnesemia  Replace per protocol  -4/18: Magnesium is 1.8 upon discharge.       Hypokalemia  Replace per protocol  On home potassium  -4/18: Potassium is 4.2 at discharge.       Hyponatremia  New  for her  Suspect chf related  Urine studies  -4/18: Her sodium was 135 on discharge.    Dyslipidemia  Not on statin       HTN (hypertension)  On home torsemide and lisinopril  -4/18: Her blood pressures were very stable 117/83.          Atrial fibrillation (H)  Rate controled  On Cardizem and metoprolol  On coumadin but sub therapeutic INR  Pharmacy to dose coumadin  -4/18: Her heart rate is nicely controlled 74.  We did go over the warfarin with her once again.  We will have nursing come in and check her INR for her.  Initially she had declined it and she still might decline its use but at least with visiting nurses we can get her started on this once again.             Tobacco dependence  Tobacco cessation recommended     Consultations This  Hospital Stay   PHARMACY TO DOSE WARFARIN  OCCUPATIONAL THERAPY ADULT IP CONSULT  NUTRITION SERVICES ADULT IP CONSULT  CARE MANAGEMENT / SOCIAL WORK IP CONSULT  PHARMACY TO DOSE WARFARIN  PHYSICAL THERAPY ADULT IP CONSULT    Code Status   Full Code    Time Spent on this Encounter   I, Anastacio López MD, personally saw the patient today and spent greater than 30 minutes discharging this patient.       Anastacio López MD  [facility]  [address] [place] [address]  [place] [address]  Phone: [phone]  Fax: [phone]  ______________________________________________________________________    Physical Exam   Vital Signs:                    Weight: 202 lbs 6.12 oz  Constitutional: awake, alert, cooperative, no apparent distress, and appears stated age  Respiratory: No increased work of breathing, good air exchange, clear to auscultation bilaterally, no crackles or wheezing  Cardiovascular: Irregular irregular neck veins due to appear to be slightly distended.  Pulses are 2+ and equal.  GI: No scars, normal bowel sounds, soft, non-distended, non-tender, no masses palpated, no hepatosplenomegally  Musculoskeletal: Both lower extremities have edema kind of melon like below the knees which is chronic for her.          Primary Care Physician   Nicolette Huffman    Discharge Orders      Home Care Referral      Reason for your hospital stay    Patient was admitted with increasing shortness of breath and lower extremity edema and cough.  Was found to have increasing fluid once again.  She was empirically treated with bronchodilators IV Lasix and some steroids.  She has been diuresed down she is breathing better she is on room air.  Tried to impress upon her the need for her to continue with her diuretics as ordered.  She will be on 60 mg daily of torsemide.  Also will restart her warfarin.  She has agreed to home health care coming in to set up her medications and assess her response.     Follow-up and recommended labs  and tests     Follow up with primary care provider, Nicolette Huffman, within 7 days for hospital follow- up.  The following labs/tests are recommended: BMP.     Activity    Your activity upon discharge: activity as tolerated     Monitor and record    weight every day and keep a record of this a written record     Brief Discharge Instructions    Regarding warfarin: Follow-up with Coumadin Clinic on 4/19/24 for INR check and further dosing instructions.     Full Code     Diet    Follow this diet upon discharge: Orders Placed This Encounter      Fluid restriction 1800 ML FLUID      Combination Diet 2 gm NA Diet; No Caffeine Diet       Significant Results and Procedures   Most Recent 3 CBC's:  Recent Labs   Lab Test 04/18/24  0541 04/17/24  0711 04/16/24  0543   WBC 17.1* 13.5* 7.7   HGB 12.4 12.1 12.4   MCV 88 87 85    217 201     Most Recent 3 BMP's:  Recent Labs   Lab Test 04/18/24  0541 04/17/24  0711 04/17/24  0044 04/16/24  1129 04/16/24  0543    131*  --   --  128*   POTASSIUM 4.2 4.0 3.8   < > 3.3*  3.3*   CHLORIDE 96* 94*  --   --  91*   CO2 27 25  --   --  21*   BUN 42.2* 34.2*  --   --  22.3   CR 1.12* 1.19*  --   --  1.12*   ANIONGAP 12 12  --   --  16*   CARINA 9.4 9.1  --   --  9.1   * 193*  --   --  121*    < > = values in this interval not displayed.     Most Recent 2 LFT's:  Recent Labs   Lab Test 04/15/24  2321 02/14/24  0517   AST 19 81*   ALT 11 146*   ALKPHOS 177* 187*   BILITOTAL 1.7* 1.4*     Most Recent 3 INR's:  Recent Labs   Lab Test 04/18/24  0541 04/17/24  0532 04/15/24  2321   INR 1.21* 1.33* 1.27*     Most Recent 3 Troponin's:  Recent Labs   Lab Test 10/11/21  2012 09/01/18  0950 05/14/18  1814 02/20/18  0449   TROPI  --  5.776* <0.015 1.994*   TROPONIN <0.015  --   --   --      Most Recent 3 BNP's:  Recent Labs   Lab Test 04/15/24  2321 02/24/24  1422 02/08/24  0538   NTBNPI 4,186* 5,926* 3,858*     7-Day Micro Results       Collected Updated Procedure Result Status       04/15/2024 2240 04/15/2024 2323 Symptomatic Influenza A/B, RSV, & SARS-CoV2 PCR (COVID-19) Nasopharyngeal [[id number]]    Swab from Nasopharyngeal    Final result Component Value   Influenza A PCR Negative   Influenza B PCR Negative   RSV PCR Negative   SARS CoV2 PCR Negative   NEGATIVE: SARS-CoV-2 (COVID-19) RNA not detected, presumed negative.                  Most Recent Urinalysis:  Recent Labs   Lab Test 02/24/24  1347   COLOR Yellow   APPEARANCE Slightly Cloudy*   URINEGLC Negative   URINEBILI Small*   URINEKETONE Negative   SG 1.024   UBLD Trace*   URINEPH 5.5   PROTEIN 70*   NITRITE Negative   LEUKEST Negative   RBCU 2   WBCU 11*   ,   Results for orders placed or performed during the hospital encounter of 04/15/24   XR Chest Port 1 View    Narrative    Exam:  XR CHEST PORT 1 VIEW    HISTORY: sob.    COMPARISON:  2/24/2024, 2/5/2024    FINDINGS:     The cardiomediastinal contours are enlarged, unchanged.      Mildly prominent interstitial markings.  No focal consolidation. No  pneumothorax. No pleural effusion.    No acute osseous abnormality.       Impression    IMPRESSION:      Findings compatible with mild CHF/fluid overload.      MORENA GARDNER MD         SYSTEM ID:  [id number]   CTA Chest with Contrast    Narrative    Exam:  CTA CHEST WITH CONTRAST    Exam reason:  sob, r/o pe    Technique:  Contrast enhanced helical CT pulmonary angiography was  performed. Sagittal and coronal reformats as well as coronal MIP  reformats were obtained. This CT was performed using one or more of  the following dose reduction techniques: automated exposure control,  adjustment of the mA and/or kV according to patient size, and/or use  of iterative reconstruction technique.    Meds/Contrast: Isovue 370 66ml    Comparison:  4/15/2024, 5/23/2020    FINDINGS:    Technical quality: Diagnostic.    Cardiovascular:   -There are no filling defects in the pulmonary arteries that would  indicate pulmonary embolism.  -No aortic  aneurysm.  -The main pulmonary artery is normal caliber.  Lungs/Airways: There is interlobular septal thickening in the lung  apices and the lung bases. There is patchy mosaic attenuation, likely  due to air-trapping or the degree of inspiration. There is a 3 mm  nodule in the right lower lobe. There is dependent atelectasis.  Valerie/Mediastinum: There are prominent upper mediastinal lymph nodes,  for example a right pretracheal node measuring 2.1 cm short axis  (series 4 image 44).  Pleura: Trace right pleural effusion. No pneumothorax.  Chest Wall/Axilla: There is diffuse body wall edema.    Visualized Upper Abdomen: Small volume ascites in the upper abdomen.  There is reflux of contrast into the hepatic veins, likely due to  decreased cardiac output. Small hiatal hernia.  Musculoskeletal: No acute osseous abnormalities.      Impression    IMPRESSION:    No pulmonary embolism.    Mild-to-moderate CHF/fluid overload. Trace right pleural effusion.    There are prominent upper mediastinal lymph nodes, for which  differential considerations are broad.    Diffuse body wall edema compatible with anasarca. Small volume ascites  in the upper abdomen.        MORENA GARDNER MD         SYSTEM ID:  [id number]   Echocardiogram Limited     Value    LVEF  60-65%    Narrative    [id number]  Formerly [facility]  [id number]  [id number]^GIANLUCA^HOSSEIN^ARI     [facility]  Echocardiography Laboratory  [address] [place] [address]     Name: RACQUEL PINZON  MRN: [id number]  : 1950  Study Date: 2024 08:25 AM  Age: 73 yrs  Gender: Female  Patient Location: [facility]  Reason For Study: CHF  History: CHF  Ordering Physician: HOSSEIN HOUGH  Performed By: Anisa Cardona RDCS     BSA: 1.9 m2  Height: 59 in  Weight: 206 lb  ______________________________________________________________________________  Procedure  Limited Portable Echo Adult. The patient's rhythm is atrial  fibrillation.  ______________________________________________________________________________  Interpretation Summary  Global and regional left ventricular function is normal with an EF of 60-65%.  Mild concentric wall thickening consistent with left ventricular hypertrophy  is present.  Global right ventricular function is moderately reduced.  Severe left atrial enlargement is present.  Moderate right atrial enlargement is present.  Moderate mitral insufficiency is present.  Moderate tricuspid insufficiency is present.  The right ventricular systolic pressure is 44mmHg above the right atrial  pressure.  IVC diameter >2.1 cm collapsing <50% with sniff suggests a high RA pressure  estimated at 15 mmHg or greater.  This study was compared with the study from 2/9/24 .  No significant changes noted.  ______________________________________________________________________________  Left Ventricle  Global and regional left ventricular function is normal with an EF of 60-65%.  Mild concentric wall thickening consistent with left ventricular hypertrophy  is present. Diastolic function not assessed due to atrial fibrillation.     Right Ventricle  Global right ventricular function is moderately reduced.     Atria  Severe left atrial enlargement is present. Moderate right atrial enlargement  is present.     Mitral Valve  Moderate mitral insufficiency is present.     Tricuspid Valve  Moderate tricuspid insufficiency is present. The right ventricular systolic  pressure is 44mmHg above the right atrial pressure.     Vessels  IVC diameter >2.1 cm collapsing <50% with sniff suggests a high RA pressure  estimated at 15 mmHg or greater.     Pericardium  No pericardial effusion is present.     Compared to Previous Study  This study was compared with the study from 2/9/24 . No significant changes  noted.     ______________________________________________________________________________  MMode/2D Measurements & Calculations  IVSd: 1.4  cm  LVIDd: 4.8 cm  LVIDs: 3.8 cm  LVPWd: 1.3 cm  FS: 19.7 %  LV mass(C)d: 253.8 grams  LV mass(C)dI: 135.9 grams/m2  LA dimension: 4.9 cm  LVOT diam: 1.9 cm  LVOT area: 2.9 cm2  EF Biplane: 65.2 %     LA Volume Indexed (AL/bp): 62.9 ml/m2  RWT: 0.55  TAPSE: 0.89 cm     Doppler Measurements & Calculations  LV V1 max P.3 mmHg  LV V1 max: 75.0 cm/sec  LV V1 VTI: 15.5 cm  SV(LVOT): 45.4 ml  SI(LVOT): 24.3 ml/m2  TR max haresh: 333.0 cm/sec  TR max P.3 mmHg     ______________________________________________________________________________  Report approved by: Mirian Dockery 2024 10:52 AM             Discharge Medications   Discharge Medication List as of 2024  2:28 PM        CONTINUE these medications which have CHANGED    Details   warfarin ANTICOAGULANT (COUMADIN) 2 MG tablet Take 1 tablet (2 mg) by mouth daily Follow-up with Coumadin Clinic on  for INR check and further dosing instructions., Disp-60 tablet, R-0, E-Prescribe           CONTINUE these medications which have NOT CHANGED    Details   albuterol (PROAIR HFA/PROVENTIL HFA/VENTOLIN HFA) 108 (90 Base) MCG/ACT inhaler Inhale 2 puffs into the lungs every 6 hours as needed for shortness of breath, wheezing or cough, Disp-18 g, R-0, E-PrescribePharmacy may dispense brand covered by insurance (Proair, or proventil or ventolin or generic albuterol inhaler)      busPIRone (BUSPAR) 10 MG tablet Take 1 tablet (10 mg) by mouth 2 times daily, Disp-180 tablet, R-0, E-Prescribe      diltiazem ER (DILT-XR) 120 MG 24 hr capsule Take 1 capsule (120 mg) by mouth daily, Disp-90 capsule, R-1, E-Prescribe      lisinopril (ZESTRIL) 10 MG tablet Take 1 tablet (10 mg) by mouth daily, Disp-90 tablet, R-1, E-Prescribe      metoprolol succinate ER (TOPROL XL) 100 MG 24 hr tablet Take 1 tablet (100 mg) by mouth daily, Disp-90 tablet, R-1, E-Prescribe      nystatin (MYCOSTATIN) 403644 UNIT/GM external powder Apply 1 g topically 3 times daily as needed  (groin)Historical      potassium chloride ER (K-TAB) 20 MEQ CR tablet Take 1 tablet (20 mEq) by mouth 2 times daily, Disp-60 tablet, R-0, E-Prescribe      torsemide (DEMADEX) 20 MG tablet Take 3 tablets (60 mg) by mouth daily, Disp-270 tablet, R-1, E-Prescribe           STOP taking these medications       atorvastatin (LIPITOR) 40 MG tablet Comments:   Reason for Stopping:         ibuprofen (ADVIL/MOTRIN) 200 MG tablet Comments:   Reason for Stopping:         pantoprazole (PROTONIX) 40 MG EC tablet Comments:   Reason for Stopping:             Allergies   Allergies   Allergen Reactions    Allegra [Fexofenadine] Nausea and Vomiting    Crestor [Rosuvastatin] Dizziness    Cats Other (See Comments)     Sneezing, runny nose    Codeine Sulfate Nausea and Vomiting and GI Disturbance

## 2024-04-22 NOTE — PROGRESS NOTES
F/up on 5/8/24 with covering Provider  No further concerns at calls end.  Phone call ended pleasantly.

## 2024-04-25 NOTE — TELEPHONE ENCOUNTER
"Writer attempting to contact Patient this past week  Will offer f/up ivet't sooner than 5/8 if Pt is agreeable  Further working with [facility] CORONEL, Pop, RN, Manager  States they are doing a \"stop in\" at Pt's home today d/t being unable to make phone contact also  Writer GERARDO also with sons, alternate contact  Nurse Pop with update me after they \"stop-in\" today   "

## 2024-04-26 NOTE — TELEPHONE ENCOUNTER
"Writer spoke with  that confirmed that Police did make contact with the patient & she seems \"fine\"  Writer did LVM again trying to touch base on condition & offer appt sooner    Updated LORENA Lord @ [facility] as well      "

## 2024-04-26 NOTE — TELEPHONE ENCOUNTER
LVM again today for condition update & to offer an appt sooner than 5/8  Last contact with Pt was one week ago on 4/19, late charting on 4/22  Also LVM for both sons listed  Son, Montrell called me back - has not talked to her recently - tried her phone before calling me & got her VM    Spoke with Pop, RN @ [facility]   T: [phone]  Agency has also been reaching out to the patient this past week  States Nurse went to Pt's house yesterday afternoon to attempt to make contact  Nurse relayed to Nurse Lord that the house was dark, mail is piled up, & a dog was barking.    Writer did contact 911 Non Emergent to request a welfare check  Waiting for update from Police at the time of this note

## 2024-05-02 NOTE — PHARMACY-ANTICOAGULATION SERVICE
Pharmacy Consult-Warfarin Assessment Day #2    Heather Rosas is a 73 year old female admitted on 2/5/2024 with Generalized edema due to fluid overload    Primary Indication(s) for Anticoagulation: A-fib     Goal INR: 2-3     FYI, patient is followed by the [facility]tico[facility] at: [facility]; patient would like GI ([facility]) upon discharge     Patient previously anticoagulated on Coumadin in 2023 on a dose of 2.5 mg every Mon, Wed, Fri; 5 mg all other days      Home tablet strength(s): [facility]     Factors that may increase patient's bleeding risk and/or sensitivity to warfarin (Coumadin) include: Advanced Age, elevated LFTs, Miconazole, Elevated baseline INR, Lovenox (now held), Recent diarrhea (now resolved per notes)     Factors that may decrease patient's bleeding risk and/or sensitivity to warfarin (Coumadin) include: -    Anticoagulation Dose History  More data exists              2/5/2024 2/7/2024 2/8/2024      warfarin ANTICOAGULANT (COUMADIN) 2.5 MG tablet      - - -   warfarin ANTICOAGULANT (COUMADIN) 3 MG tablet      - 3 mg, $Given -   INR      1.14  1.46  1.67      CBC RESULTS:   Recent Labs   Lab Test 02/08/24  0538   HGB 12.8            Assessment/Plan: Per Inpatient Warfarin Dosing Procedure, will give warfarin 3 mg today. Check INR with AM labs      Thank You for the Consult. Will continue to follow.    Dale Lovell [facility] ....................  2/8/2024   11:23 AM     No indicators present No

## 2024-05-16 NOTE — DISCHARGE INSTRUCTIONS
Currently you take torsemide 60 mg (3 tablets) on a daily basis.  I want you to take 60 mg twice a day for the next 4 days.  In addition I want you to follow with primary care provider for recheck of your labs after this change in your dose.  Call to schedule an appointment. Continue taking your potassium at home.  Use the steroids and the nebulizers as prescribed

## 2024-05-16 NOTE — ED NOTES
Pt back from x-ray. Helped to restroom to void with assist of one. Pt holds onto writer securely. Urine sent to lab for UA. Pt helped back to bed per writer. Pt with increased SOB with exertion and O2 off. Replaced O2 at 1L/NC when back to bed.

## 2024-05-16 NOTE — ED NOTES
"Patient was able to ambulate about 3/4 around the department before feeling tired and wanting to sit down and take a break. She ambulated independently without the use of a cane or a walker, and denied any lightheadedness, dizziness, or balance issues. The patient did feel short of breath towards the end of the walk. She notes wanting to go camping in June 2024 and asked for ways she could \"beef [herself] up\" in order for that to happen. RN and MD aware.   "

## 2024-05-16 NOTE — ED TRIAGE NOTES
Pt arrives per EMS from home with C/O SOB and weakness to legs X3 days. Breathing worsened tonight. Arrives on O2 2L/NC by EMS for sats in low 90's. O2 removed. O2 sat 95 RA. Pt able to speak in full sentences. Coughing frequently (congested). Patient's face turns purple when coughing or trying to catch her breath. Pt smokes quarter pack cigarettes per day.     Triage Assessment (Adult)       Row Name 05/16/24 0518          Triage Assessment    Airway WDL WDL        Respiratory WDL    Respiratory WDL X;rhythm/pattern;expansion/retractions;cough     Rhythm/Pattern, Respiratory shortness of breath;tachypneic;pattern regular;other (see comments)  slightly labord     Cough Frequency frequent     Cough Type congested;nonproductive        Skin Circulation/Temperature WDL    Skin Circulation/Temperature WDL X;circulation  with coughing, face turns purple     Skin Circulation cyanosis  to face with coughing        Cardiac WDL    Cardiac WDL X;rhythm     Pulse Rate & Regularity tachycardic        Peripheral/Neurovascular WDL    Peripheral Neurovascular WDL WDL        Cognitive/Neuro/Behavioral WDL    Cognitive/Neuro/Behavioral WDL WDL

## 2024-05-16 NOTE — ED NOTES
AVS reviewed, no questions at this time.   Rx sent to outside pharmacy.  Wheeled out to [facility].

## 2024-05-16 NOTE — ED NOTES
Patient changed into a gown and placed on continuous cardiac monitor.   Repositioned, resting in a position of comfort.   RT @ bedside.

## 2024-05-16 NOTE — ED PROVIDER NOTES
History     Chief Complaint   Patient presents with    Shortness of Breath    Extremity Weakness     HPI  Heather Rosas is a 73 year old female who brought in by ambulance with a chief complaint of productive cough with clear sputum, shortness of breath, wheezing has been going on for 3 weeks, progressively getting worse.  She continues to smoke.  Denies any fever.  Denies any sick contacts.  Denies any chest pain.  Denies any abdominal pain diarrhea or constipation.  Denies any urinary symptoms.  Also feeling weak all over.  Otherwise denies any focal weakness or numbness anywhere.    Allergies:  Allergies   Allergen Reactions    Allegra [Fexofenadine] Nausea and Vomiting    Crestor [Rosuvastatin] Dizziness    Cats Other (See Comments)     Sneezing, runny nose    Codeine Sulfate Nausea and Vomiting and GI Disturbance       Problem List:    Patient Active Problem List    Diagnosis Date Noted    COPD exacerbation (H) 04/16/2024     Priority: Medium    Acute on chronic congestive heart failure, unspecified heart failure type (H) 04/16/2024     Priority: Medium    Acute hypoxic respiratory failure (H) 04/16/2024     Priority: Medium    Severe tricuspid regurgitation 03/04/2024     Priority: Medium    Hypokalemia 03/04/2024     Priority: Medium    Chronic diastolic congestive heart failure (H) 03/04/2024     Priority: Medium    Lesion of colon 03/04/2024     Priority: Medium    Lesion of bladder 03/04/2024     Priority: Medium    Generalized edema due to fluid overload 02/05/2024     Priority: Medium    Pulmonary nodules 08/03/2023     Priority: Medium     5/2023 - 8mm      Thyroid nodule 08/03/2023     Priority: Medium     1cm, noted in 2019      Tobacco dependence 08/03/2023     Priority: Medium    Aortic valve sclerosis (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 Oconnell.       Status post chemotherapy 09/25/2020     Priority: Medium     Added automatically from request for surgery [id number]      Atrial fibrillation (H) 11/26/2018     Priority: Medium    Hyponatremia 10/30/2018     Priority: Medium    Moderate mitral regurgitation 10/30/2018     Priority: Medium    Adenocarcinoma of the endometrium/uterus (H) 10/09/2018     Priority: Medium     Discovered on 02/18 biopsy. S/p resection and chemo. Pt lost to follow up.   High-grade endometrial adenocarcinoma of the uterus with staging consistent with pathologic T1b N0 I plus or stage 1B with isolated tumor cells involving one of the periaortic lymph nodes.       Chronic right-sided congestive heart failure (H) 09/20/2018     Priority: Medium    S/P CABG x 4 09/10/2018     Priority: Medium    HTN (hypertension)      Priority: Medium    ANNA (generalized anxiety disorder) 01/01/2011     Priority: Medium    GERD (Gastroesophageal reflux disease) 01/01/2011     Priority: Medium    Schizophrenia (H) 01/01/2011     Priority: Medium    Seasonal allergic rhinitis 01/01/2011     Priority: Medium    Fibromyalgia 06/14/2004     Priority: Medium    Dyslipidemia 11/26/2003     Priority: Medium        Past Medical History:    Past Medical History:   Diagnosis Date    Acute diastolic congestive heart failure (H) 10/12/2021    Acute exacerbation of CHF (congestive heart failure) (H) 10/11/2021    Acute kidney failure, unspecified (H)     Acute on chronic congestive heart failure, unspecified heart failure type (H) 09/21/2023    Acute on chronic diastolic congestive heart failure (H)     Allergic rhinitis 01/01/2011    Anxiety 01/01/2011    Anxiety state, unspecified     Atrial fibrillation with RVR (H) 07/07/2023    Atrial flutter with rapid ventricular response (H) 10/12/2021    CVA (cerebral vascular accident) (H) 05/14/2018     Dyslipidemia 11/26/2003    fibromyalgia 06/14/2004    GERD, Esophageal reflux 01/01/2011    History of non-ST elevation myocardial infarction (NSTEMI) 09/06/2018    HTN (hypertension)     Major depression, recurrent (H24) 01/01/2011    Major depressive disorder, recurrent episode, moderate (H) 01/01/2011    Metrorrhagia 02/18/2018    Non compliance with medical treatment 07/07/2023    Nonorganic sleep disorder, unspecified     Obesity 01/01/2011    Obesity (H) 01/01/2011    Rapid atrial fibrillation (H)     Schizophrenia (H) 01/01/2011    Toxic shock syndrome (H) 2006       Past Surgical History:    Past Surgical History:   Procedure Laterality Date    ANGIOGRAM  02/2005    Normal     BIOPSY BREAST  1984    LT, normal    BYPASS GRAFT ARTERY CORONARY  09/10/2018    CARPAL TUNNEL RELEASE RT/LT  2005    RT, carpal tunnel    COLONOSCOPY  2011    Repeat in ten years     COLONOSCOPY  1996    COLONOSCOPY  2004    DILATION AND CURETTAGE, HYSTEROSCOPY, ABLATE ENDOMETRIUM, COMBINED N/A 2/19/2018    Procedure: COMBINED DILATION AND CURETTAGE, HYSTEROSCOPY, ABLATE ENDOMETRIUM;  HYSTEROSCOPY DILATION AND CURETTAGE, ENDOMETRIAL ABLATION;  Surgeon: Jose Nettles MD;  Location: [facility]    HYSTERECTOMY TOTAL ABD, NICK SALPINGO-OOPHORECTOMY, NODE DISSECTION, TUMOR DEBULKING, COMBINED  10/30/2018    INSERT PORT VASCULAR ACCESS N/A 12/11/2018    Procedure: PORT-A-CATH PLACEMENT;  Surgeon: Rafi Trinidad MD;  Location: [facility] OR    placement of central line  2005    REMOVE PORT VASCULAR ACCESS N/A 10/6/2020    Procedure: port a cath removal;  Surgeon: Rafi Trinidad MD;  Location: [facility]       Family History:    Family History   Problem Relation Age of Onset    C.A.D. Father 45        (cause of death)     Other - See Comments Father         rheumatic fever     Allergies Father     Cancer Sister 56        Esophageal to bone cancer    Gastrointestinal Disease Mother         GERD    Cancer Mother         pancreatic ca (cause of death) /liver  "ca    Breast Cancer Maternal Aunt     Breast Cancer Maternal Aunt     Colon Cancer Paternal Aunt         (cause of death)     Crohn's Disease Other     Depression Maternal Uncle     Depression Maternal Aunt     Diabetes Paternal Grandmother         type 2    Neurologic Disorder Brother         neuropathy    Cancer Brother 58        Tonsilular cancer/ lymph node in neck    Allergies Brother     Breast Cancer Cousin     Breast Cancer Cousin     Breast Cancer Cousin        Social History:  Marital Status:   [4]  Social History     Tobacco Use    Smoking status: Every Day     Current packs/day: 1.00     Average packs/day: 1 pack/day for 30.0 years (30.0 ttl pk-yrs)     Types: Cigarettes     Passive exposure: Never    Smokeless tobacco: Never    Tobacco comments:     pt declined, stated she would use, \"the patch\". Patient has not had a cigarette in 1 week because she was in the hospital   Vaping Use    Vaping status: Never Used   Substance Use Topics    Alcohol use: No     Comment: rare    Drug use: No        Medications:    ipratropium - albuterol 0.5 mg/2.5 mg/3 mL (DUONEB) 0.5-2.5 (3) MG/3ML neb solution  predniSONE (DELTASONE) 20 MG tablet  albuterol (PROAIR HFA/PROVENTIL HFA/VENTOLIN HFA) 108 (90 Base) MCG/ACT inhaler  busPIRone (BUSPAR) 10 MG tablet  diltiazem ER (DILT-XR) 120 MG 24 hr capsule  lisinopril (ZESTRIL) 10 MG tablet  metoprolol succinate ER (TOPROL XL) 100 MG 24 hr tablet  nystatin (MYCOSTATIN) 769953 UNIT/GM external powder  potassium chloride ER (K-TAB) 20 MEQ CR tablet  torsemide (DEMADEX) 20 MG tablet  warfarin ANTICOAGULANT (COUMADIN) 2 MG tablet          Review of Systems   All other systems reviewed and are negative.      Physical Exam   BP: 107/78  Pulse: 89  Temp: 97.5  F (36.4  C)  Resp: 20  SpO2: 95 %      Physical Exam  Constitutional:       General: She is not in acute distress.     Appearance: Normal appearance. She is well-developed. She is not ill-appearing, toxic-appearing or " diaphoretic.   HENT:      Head: Normocephalic and atraumatic.      Nose: Nose normal. No congestion or rhinorrhea.      Mouth/Throat:      Pharynx: No oropharyngeal exudate or posterior oropharyngeal erythema.   Eyes:      General: No scleral icterus.        Right eye: No discharge.         Left eye: No discharge.      Extraocular Movements: Extraocular movements intact.      Conjunctiva/sclera: Conjunctivae normal.      Pupils: Pupils are equal, round, and reactive to light.   Neck:      Vascular: No carotid bruit.   Cardiovascular:      Rate and Rhythm: Normal rate and regular rhythm.      Heart sounds: Normal heart sounds.   Pulmonary:      Effort: No respiratory distress.      Breath sounds: No stridor. Wheezing present. No rhonchi or rales.   Chest:      Chest wall: No tenderness.   Abdominal:      General: Bowel sounds are normal. There is no distension.      Palpations: Abdomen is soft. There is no mass.      Tenderness: There is no abdominal tenderness. There is no right CVA tenderness, left CVA tenderness, guarding or rebound.      Hernia: No hernia is present.   Musculoskeletal:         General: No swelling, tenderness, deformity or signs of injury.      Cervical back: Normal range of motion and neck supple. No rigidity or tenderness.      Right lower leg: No edema.      Left lower leg: No edema.   Lymphadenopathy:      Cervical: No cervical adenopathy.   Skin:     General: Skin is warm and dry.      Coloration: Skin is not jaundiced or pale.      Findings: No bruising, erythema, lesion or rash.   Neurological:      General: No focal deficit present.      Mental Status: She is alert and oriented to person, place, and time. Mental status is at baseline.      Cranial Nerves: No cranial nerve deficit.      Sensory: No sensory deficit.      Motor: No weakness.      Coordination: Coordination normal.      Gait: Gait normal.      Deep Tendon Reflexes: Reflexes normal.         ED Course     ED Course as of  05/16/24 1049   Thu May 16, 2024   0705 SOR hx of COPD CHF here with SOB. Cough and worsening for a week, and feeling weak. Satting fine on room air. K repleted. EKG fine. Treated for COPD. Pending repeat trop and likely discharge.   0836 Troponin T, High Sensitivity(!): 27  Troponins stable   1040 Patient oxygenating 92% or above, passed a road test.  Has signs of both CHF and COPD.  Will do a short doubling of her diuretic recommend close primary care follow-up as well as treatment for COPD exacerbation.  Patient discharged in stable condition all question answered and return precautions given   Patient was seen and examined shortly after arrival.  Stable.  On cardiac monitor.  Given 125 mg IV Solu-Medrol and DuoNeb.  EKG, lab and imaging reviewed.  Given 10 mill equivalent potassium chloride.  Second troponin is pending.  Patient care transferred to Dr. Montes De Oca at time shift exchange in stable condition for further management and disposition.a   Procedures                Results for orders placed or performed during the hospital encounter of 05/16/24 (from the past 24 hour(s))   [place] Draw    Narrative    The following orders were created for panel order [place] Draw.  Procedure                               Abnormality         Status                     ---------                               -----------         ------                     Extra Blue Top Tube[[id number]]                              Final result               Extra Red Top Tube[[id number]]                               Final result               Extra Green Top (Lithium...[[id number]]                      Final result               Extra Purple Top Tube[[id number]]                            Final result               Extra Heparinized Syringe[[id number]]                        Final result                 Please view results for these tests on the individual orders.   Extra Blue Top Tube   Result Value Ref Range    Hold Specimen JIC    Extra Red Top  Tube   Result Value Ref Range    Hold Specimen JIC    Extra Green Top (Lithium Heparin) Tube   Result Value Ref Range    Hold Specimen JIC    Extra Purple Top Tube   Result Value Ref Range    Hold Specimen JIC    Extra Heparinized Syringe   Result Value Ref Range    Hold Specimen JIC    CBC with platelets differential    Narrative    The following orders were created for panel order CBC with platelets differential.  Procedure                               Abnormality         Status                     ---------                               -----------         ------                     CBC with platelets and d...[[id number]]  Abnormal            Final result                 Please view results for these tests on the individual orders.   INR   Result Value Ref Range    INR 1.33 (H) 0.85 - 1.15   Basic metabolic panel   Result Value Ref Range    Sodium 135 135 - 145 mmol/L    Potassium 2.9 (L) 3.4 - 5.3 mmol/L    Chloride 95 (L) 98 - 107 mmol/L    Carbon Dioxide (CO2) 27 22 - 29 mmol/L    Anion Gap 13 7 - 15 mmol/L    Urea Nitrogen 15.2 8.0 - 23.0 mg/dL    Creatinine 0.99 (H) 0.51 - 0.95 mg/dL    GFR Estimate 61 >60 mL/min/1.73m2    Calcium 9.3 8.8 - 10.2 mg/dL    Glucose 85 70 - 99 mg/dL   Troponin T, High Sensitivity   Result Value Ref Range    Troponin T, High Sensitivity 31 (H) <=14 ng/L   Nt probnp inpatient (BNP)   Result Value Ref Range    N terminal Pro BNP Inpatient 4,233 (H) 0 - 900 pg/mL   CBC with platelets and differential   Result Value Ref Range    WBC Count 8.8 4.0 - 11.0 10e3/uL    RBC Count 4.48 3.80 - 5.20 10e6/uL    Hemoglobin 12.5 11.7 - 15.7 g/dL    Hematocrit 40.3 35.0 - 47.0 %    MCV 90 78 - 100 fL    MCH 27.9 26.5 - 33.0 pg    MCHC 31.0 (L) 31.5 - 36.5 g/dL    RDW 19.5 (H) 10.0 - 15.0 %    Platelet Count 294 150 - 450 10e3/uL    % Neutrophils 65 %    % Lymphocytes 16 %    % Monocytes 15 %    % Eosinophils 3 %    % Basophils 1 %    % Immature Granulocytes 1 %    NRBCs per 100 WBC 0 <1 /100     Absolute Neutrophils 5.7 1.6 - 8.3 10e3/uL    Absolute Lymphocytes 1.4 0.8 - 5.3 10e3/uL    Absolute Monocytes 1.4 (H) 0.0 - 1.3 10e3/uL    Absolute Eosinophils 0.3 0.0 - 0.7 10e3/uL    Absolute Basophils 0.1 0.0 - 0.2 10e3/uL    Absolute Immature Granulocytes 0.1 <=0.4 10e3/uL    Absolute NRBCs 0.0 10e3/uL   Symptomatic Influenza A/B, RSV, & SARS-CoV2 PCR (COVID-19) Nose    Specimen: Nose; Swab   Result Value Ref Range    Influenza A PCR Negative Negative    Influenza B PCR Negative Negative    RSV PCR Negative Negative    SARS CoV2 PCR Negative Negative    Narrative    Testing was performed using the Xpert Xpress CoV2/Flu/RSV Assay on the [employer] Instrument. This test should be ordered for the detection of SARS-CoV-2, influenza, and RSV viruses in individuals who meet clinical and/or epidemiological criteria. Test performance is unknown in asymptomatic patients. This test is for in vitro diagnostic use under the FDA EUA for laboratories certified under CLIA to perform high or moderate complexity testing. This test has not been FDA cleared or approved. A negative result does not rule out the presence of PCR inhibitors in the specimen or target RNA in concentration below the limit of detection for the assay. If only one viral target is positive but coinfection with multiple targets is suspected, the sample should be re-tested with another FDA cleared, approved, or authorized test, if coinfection would change clinical management. This test was validated by the [facility]. These laboratories are certified under the Clinical Laboratory Improvement Amendments of 1988 (CLIA-88) as qualified to perform high complexity laboratory testing.   Blood gas venous   Result Value Ref Range    pH Venous 7.37 7.32 - 7.43    pCO2 Venous 48 40 - 50 mm Hg    pO2 Venous 29 25 - 47 mm Hg    Bicarbonate Venous 28 21 - 28 mmol/L    Base Excess/Deficit Venous 2.0 -3.0 - 3.0 mmol/L    FIO2 24     Oxyhemoglobin  Venous 44 (L) 70 - 75 %    O2 Sat, Venous 45.0 (L) 70.0 - 75.0 %    Narrative    In healthy individuals, oxyhemoglobin (O2Hb) and oxygen saturation (SO2) are approximately equal. In the presence of dyshemoglobins, oxyhemoglobin can be considerably lower than oxygen saturation.   EKG 12-lead, tracing only   Result Value Ref Range    Systolic Blood Pressure  mmHg    Diastolic Blood Pressure  mmHg    Ventricular Rate 83 BPM    Atrial Rate  BPM    NC Interval  ms    QRS Duration 150 ms     ms    QTc 531 ms    P Axis  degrees    R AXIS 97 degrees    T Axis 2 degrees    Interpretation ECG       Atrial fibrillation  Right bundle branch block  Septal infarct , age undetermined  Possible Inferior infarct (cited on or before 15-APR-2024)  Abnormal ECG  When compared with ECG of 15-APR-2024 23:39,  Atrial fibrillation has replaced Atrial flutter  Septal infarct is now Present  Nonspecific T wave abnormality has replaced inverted T waves in Inferior leads     XR Chest 2 Views    Narrative    Procedure:XR CHEST 2 VIEWS    Clinical history:Female, 73 years, sob    Technique: Single view was obtained.    Comparison: 4/15/2024    Findings: The cardiac silhouette is enlarged. The pulmonary  vasculature is distended, indistinct in certain areas.    The lungs demonstrate diffuse interstitial thickening. There is slight  blunting of the right costophrenic angle. Bony structures are  unremarkable.      Impression    Impression:   CHF with early/mild and suggestion of a very small right-sided  pleural effusion.    STONE COOK MD         SYSTEM ID:  [id number]   UA with Microscopic reflex to Culture    Specimen: Urine, Midstream   Result Value Ref Range    Color Urine Light Yellow Colorless, Straw, Light Yellow, Yellow    Appearance Urine Clear Clear    Glucose Urine Negative Negative mg/dL    Bilirubin Urine Negative Negative    Ketones Urine Negative Negative mg/dL    Specific Gravity Urine 1.006 1.003 - 1.035    Blood Urine  Negative Negative    pH Urine 6.5 4.7 - 8.0    Protein Albumin Urine 20 (A) Negative mg/dL    Urobilinogen Urine 2.0 Normal, 2.0 mg/dL    Nitrite Urine Negative Negative    Leukocyte Esterase Urine Negative Negative    Bacteria Urine Few (A) None Seen /HPF    RBC Urine <1 <=2 /HPF    WBC Urine 8 (H) <=5 /HPF    Squamous Epithelials Urine <1 <=1 /HPF    Narrative    Urine Culture not indicated   Troponin T, High Sensitivity   Result Value Ref Range    Troponin T, High Sensitivity 27 (H) <=14 ng/L       Medications   potassium chloride 10 mEq in 100 mL sterile water infusion (0 mEq Intravenous Stopped 5/16/24 0830)   ipratropium - albuterol 0.5 mg/2.5 mg/3 mL (DUONEB) neb solution 3 mL (3 mLs Nebulization $Given 5/16/24 0555)   methylPREDNISolone sodium succinate (solu-MEDROL) injection 125 mg (125 mg Intravenous $Given 5/16/24 0552)   furosemide (LASIX) injection 40 mg (40 mg Intravenous $Given 5/16/24 0720)   ipratropium - albuterol 0.5 mg/2.5 mg/3 mL (DUONEB) neb solution 6 mL (6 mLs Nebulization $Given 5/16/24 0734)   potassium chloride jayashree ER (KLOR-CON M20) CR tablet 40 mEq (40 mEq Oral $Given 5/16/24 0720)       Assessments & Plan (with Medical Decision Making)     I have reviewed the nursing notes.    I have reviewed the findings, diagnosis, plan and need for follow up with the patient.          New Prescriptions    IPRATROPIUM - ALBUTEROL 0.5 MG/2.5 MG/3 ML (DUONEB) 0.5-2.5 (3) MG/3ML NEB SOLUTION    Take 1 vial (3 mLs) by nebulization every 6 hours as needed for shortness of breath, wheezing or cough    PREDNISONE (DELTASONE) 20 MG TABLET    Take two tablets (= 40mg) each day for 5 (five) days       Final diagnoses:   Hypokalemia   Elevated troponin   COPD with acute exacerbation (H)   Acute on chronic congestive heart failure, unspecified heart failure type (H)       5/16/2024   [facility]       Johnny Mullen MD  05/16/24 8314       Ilir Araiza MD  05/16/24 [id number]

## 2024-05-17 NOTE — ED NOTES
Patient given written and verbal discharge instructions, verbalized understanding. All questions answered, no concerns. Patient left to home via [facility].

## 2024-05-17 NOTE — TELEPHONE ENCOUNTER
Received PA request from [facility] for ipratropium - albuterol 0.5 mg/2.5 mg/3 mL (DUONEB) neb solution 6 mL. Submitted on CMM, waiting for response.

## 2024-05-17 NOTE — ED NOTES
"Attempted to give patient potassium orally. Pills cut into half then quartered as pt refused the pills when in half. Patient took approx 1 whole pill then refused the rest. States \"I am too full. I had about 9 pills already and I am full\". Encouraged patient to continue taking medications and reminded her that most of pills she took were cut up, pt stated \"I guess they were.\" Continued to refuse, \"give me a break and I can take them later today. Put them in a bag and send them home with me\". Educated patient that medications were ordered for now, and that she was prescribed the whole dose. Continues to report she is so full she is \"bloated from all the pills\", began crying. With talking with patient, pt states she is refusing pills. Able to calm patient down with discussion, pt verbalized understanding in purpose of medication. Declines further needs.   "

## 2024-05-17 NOTE — ED NOTES
Care Transitions focused note:      Chart reviewed, met with patient to discuss assistance at home or assisted living.  While ED provider in the room she stated she was interested in assisted living however when he left she said her son and daughter in law who is a nurse practitioner will help her.    She gave permission for me to call son however she stated they were on vacation.  I did leave him a message to call me.    Discussed [place] choices referral which I will make.  She stated she does not think she will qualify for any services.    Pt would be best served in a facility where her medications are dispensed and her well being could be checked on.  I did suggest this to her and provided her with senior guide and my contact information.  She stated she has a boyfriend and isn't really interested in going to assisted living or a different living situation.    Pt is being discharged at this time.      APRIL [facility]

## 2024-05-17 NOTE — ED TRIAGE NOTES
Presents via [facility] with c/o SOB. Was just released from this ED yesterday for same sx. States right after she was discharged, she walked up the stairs at her house and SOB started again. States slightly worsened overnight. Also reports sx of RLQ pain overnight but denies now.

## 2024-05-17 NOTE — ED PROVIDER NOTES
[facility]  ED Provider Note    Chief Complaint   Patient presents with    Shortness of Breath     History:  Heather Rosas is a 73 year old female with history of hypertension atrial fibrillation CHF previous CABG, COPD who presents to the emergency department today complaining of shortness of breath.  She was seen yesterday for the same.  She was given instructions on discharge to increase her diuretic and to use nebulizers and steroids for treatment of a COPD exacerbation.  She did not of these things because she says she forgot.  The last time she took anything for her symptoms was over 12 hours ago.  She denies any other complaints.    Review of Systems   Performed; see HPI for pertinent positives and negatives.     Medical history, surgical history, and social history was reviewed.  Nursing documentation, triage note, and vitals reviewed.    Vitals:  BP: 96/66  Pulse: 85  Temp: 96.8  F (36  C)  Resp: 18  SpO2: 95 %    Physical Exam:  Constitutional: Alert and conversant. NAD   HENT: NCAT   Eyes: Normal pupils   Neck: supple   CV: No pallor   Pulmonary/Chest: Non-labored respirations, bilateral expiratory wheezing but I hear good air movement  Abdominal: non-distended   MSK: KERR.   Neuro: Alert and appropriate   Skin: Warm and dry. No diaphoresis. No rashes on exposed skin    Psych: Appropriate mood and affect       MDM:      ED Course as of 05/17/24 1614   Fri May 17, 2024   0947 Differential includes but is not limited to acute coronary syndrome, CHF, cardiac tamponade/pericardial effusion, arrhythmia, PNA, Covid-19, sepsis, COPD, reactive airway disease/asthma, pulmonary embolism, pneumothorax, pleural effusion, pulmonary edema, anemia, foreign body, metabolic abnormality, acidosis.   Careful evaluation of the patient's chart reveals recurrent medication noncompliance underlying a cycle of repeated emergency department visits requiring admissions including multiple ICU admissions for  heart failure during which the hospitalists optimize her treatment discharge her and she restarts the cycle of medication noncompliance and failure.  She was seen yesterday and discharged with a medical plan and she return here having done essentially none of it.  At this time she does not meet obvious inpatient admission criteria but I do note that her creatinine has shifted and her B MP is shifted.  I will replete her magnesium.  I will replete her potassium.  Her white blood cell count elevated likely because of the steroids.  I discussed her case in depth with her primary care provider who also recognizes the difficulty.  My  has been in to see the patient to discuss with her higher level of home care.  The patient is refused.  She states she will just move in with her son and daughter-in-law.  We will set up redraw of labs for 48 hours for now and follow-up in 72 hours with primary for reevaluation the patient's laboratory markers.  Patient is appropriate for further outpatient management at this time, discharged in stable additional patient answered and return precautions given        Procedures:  Procedures    Impression:  Final diagnoses:   Non compliance w medication regimen            Ilir Araiza MD  05/17/24 0907

## 2024-05-17 NOTE — DISCHARGE INSTRUCTIONS
Take your medications as directed.  After speaking with your primary care provider we have decided to continue you on 60 mg of torsemide daily.  Use the prednisone as directed in yesterday's prescription.  Continue using the DuoNebs as I have prescribed yesterday.  On Sunday you will have a redraw of your labs and you will see your primary care provider on Monday

## 2024-05-17 NOTE — ED NOTES
Patient able to obtain UA independently. Needed help with getting into bed due to bed height. Patient upset that beds do not lower all the way to ground, and that she had difficulty getting into stretcher. Patient able to position self into bed independently after second attempt with SBA from this RN, although continued to be upset. With speaking with patient, patient became much calmer but states she will not return to this ED again.     Continues to rest in bed, reports slight SOB. No obvious resp distress after ambulating to bathroom and back or when repositioning into bed.

## 2024-05-18 PROBLEM — R53.1 WEAKNESS: Status: ACTIVE | Noted: 2024-01-01

## 2024-05-18 NOTE — PROGRESS NOTES
[facility] Inpatient Admission Note:    Patient admitted to 3110/3110-2 at approximately 1845 via wheel chair accompanied by other:RN from emergency room . Report received from ER nurse in SBAR format at 1840 via telephone. Patient transferred to bed via self.. Patient is alert and oriented X 3, denies pain; rates at 0 on 0-10 scale.  Patient oriented to room, unit, hourly rounding, and plan of care. Explained admission packet and patient handbook with patient bill of rights brochure. Will continue to monitor and document as needed.     Inpatient Nursing criteria listed below was met:    Health care directives status obtained and documented: No    Patient identifies a surrogate decision maker: No If yes, who:n/a Contact Information:n/a     If initial lactic acid greater than 2.0, repeat lactic acid drawn within one hour of arrival to unit: No. If no, state reason: n/a    Clergy visit ordered if patient requests: No    Skin issues/needs documented: No    Isolation Patient: no Education given, correct sign in place and documentation row added to PCS:  No    Fall Prevention Yes: Care plan updated, education given and documented, sticker and magnet in place: Yes    Care Plan initiated: Yes    Education Documented (including assessment): No    Patient has discharge needs : Yes If yes, please explain:no longer able to stay in her home due to conditions and her own mental/physical health.

## 2024-05-18 NOTE — ED NOTES
Patient ambulated with walker down hallway with SBA from staff. Patient had strong, slow gait, sats 94% on RA upon rerooming. Provider aware.

## 2024-05-18 NOTE — ED TRIAGE NOTES
Pt presents via [facility]. Patient was just discharged from this ED, too weak to get from wheelchair into house and was brought back here. Patient states she does not feel safe by herself at home and is unable to care for self.

## 2024-05-18 NOTE — H&P
[facility]    History and Physical - Hospitalist Service       Date of Admission:  5/18/2024    Assessment & Plan      Heather Rosas is a 73 year old female admitted on 5/18/2024 with generalized weakness    # Generalized weakness  #failure to   -73 year old female with established diagnosis of CHF, COPD, hypertension, GERD, atrial fibrillation on Coumadin, coronary artery disease, CABG who presented to the hospital brought by EMS complaining of generalized weakness.   -  Patient was discharged from the ER this morning and sent home and felt so weak that EMS brought her back.   - According to EMS patient has buckets of stool and urine in her house.   -On admission WBC elevated at 14 from steroids she took recently for copd, - -UA negative for UTI,  -TSH pending to rule out hypothyroidism  -Possibly failure to thrive.   - Patient will most likely need placement  -PT, OT Ordered    # Acute kidney injury  -On admission  slight increase of creatinine 1.28, baseline 1.19,   -hold home torosemide and lisinopril  for today    # Chronic CHF  -torsemide AND LISInopril  on hold given MILDRED    # COPD  -resume home inhalers    # Atrial fibrillation on Coumadin  -resume home cardizem  -coumadin,pharmacy to dose        Diet:  cardiac  DVT Prophylaxis: Warfarin  Martin Catheter: Not present  Lines: None     Cardiac Monitoring: None  Code Status:  full    Clinically Significant Risk Factors Present on Admission        # Hypokalemia: Lowest K = 3.3 mmol/L in last 2 days, will replace as needed     # Hypomagnesemia: Lowest Mg = 1.5 mg/dL in last 2 days, will replace as needed    # Drug Induced Coagulation Defect: home medication list includes an anticoagulant medication    # Hypertension: Noted on problem list  # Chronic heart failure with preserved ejection fraction: heart failure noted on problem list and last echo with EF >50%          # History of CABG: noted on surgical history       Disposition Plan     Medically  Ready for Discharge:            Rishabh Garcia MD  Hospitalist Service  [facility]  Securely message with [employer] (more info)  Text page via [facility] Paging/Directory     ______________________________________________________________________    Chief Complaint   Generalized weakness    History is obtained from the patient    History of Present Illness   Heather Rosas is a 73 year old female with established diagnosis of CHF, COPD, hypertension, GERD, atrial fibrillation on Coumadin, coronary artery disease, CABG who presented to the hospital brought by EMS complaining of generalized weakness.  This has been the for presentation in the ER for the last few days.  Patient is known to be noncompliant to her medication.  Patient was discharged from the ER this morning and sent home and felt so weak that EMS brought her back.  According to EMS patient has buckets of stool and urine in her house.  Her living condition unlivable.    On admission WBC elevated at 14, potassium low at 3.3, slight increase of creatinine 1.28, baseline 1.19, UA negative for UTI,    Patient leukocytosis most likely from steroids she recently took for COPD.    For hypokalemia potassium replacement ordered.    Unclear etiology of generalized weakness. Possibly failure to thrive.  Tsh pending to rule out hypothyroidism.  Patient will most likely need placement          Past Medical History    Past Medical History:   Diagnosis Date    Acute diastolic congestive heart failure (H) 10/12/2021    Acute exacerbation of CHF (congestive heart failure) (H) 10/11/2021    Acute kidney failure, unspecified (H)     Acute on chronic congestive heart failure, unspecified heart failure type (H) 09/21/2023    Acute on chronic diastolic congestive heart failure (H)     Allergic rhinitis 01/01/2011    Anxiety 01/01/2011    Anxiety state, unspecified     Anxiety state    Atrial fibrillation with RVR (H) 07/07/2023    Atrial flutter with rapid ventricular  response (H) 10/12/2021    CVA (cerebral vascular accident) (H) 05/14/2018    Dyslipidemia 11/26/2003    fibromyalgia 06/14/2004    GERD, Esophageal reflux 01/01/2011    History of non-ST elevation myocardial infarction (NSTEMI) 09/06/2018    HTN (hypertension)     Essential hypertension    Major depression, recurrent (H24) 01/01/2011    Major depressive disorder, recurrent episode, moderate (H) 01/01/2011    Metrorrhagia 02/18/2018    Non compliance with medical treatment 07/07/2023    Nonorganic sleep disorder, unspecified     Non-org. 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, ARDS, renal failure       Past Surgical History   Past Surgical History:   Procedure Laterality Date    ANGIOGRAM  02/2005    Normal     BIOPSY BREAST  1984    LT, normal    BYPASS GRAFT ARTERY CORONARY  09/10/2018    CARPAL TUNNEL RELEASE RT/LT  2005    RT, carpal tunnel    COLONOSCOPY  2011    Repeat in ten years     COLONOSCOPY  1996    COLONOSCOPY  2004    DILATION AND CURETTAGE, HYSTEROSCOPY, ABLATE ENDOMETRIUM, COMBINED N/A 2/19/2018    Procedure: COMBINED DILATION AND CURETTAGE, HYSTEROSCOPY, ABLATE ENDOMETRIUM;  HYSTEROSCOPY DILATION AND CURETTAGE, ENDOMETRIAL ABLATION;  Surgeon: Jose Nettles MD;  Location: [facility] OR    HYSTERECTOMY TOTAL ABD, NICK SALPINGO-OOPHORECTOMY, NODE DISSECTION, TUMOR DEBULKING, COMBINED  10/30/2018    INSERT PORT VASCULAR ACCESS N/A 12/11/2018    Procedure: PORT-A-CATH PLACEMENT;  Surgeon: Rafi Trinidad MD;  Location: [facility] OR    placement of central line  2005    REMOVE PORT VASCULAR ACCESS N/A 10/6/2020    Procedure: port a cath removal;  Surgeon: Rafi Trinidad MD;  Location: [facility] OR     Family History:    Family History         Family History   Problem Relation Age of Onset    C.A.D. Father 45         (cause of death)     Other - See Comments Father           rheumatic  "fever     Allergies Father      Cancer Sister 56         Esophageal to bone cancer    Gastrointestinal Disease Mother           GERD    Cancer Mother           pancreatic ca (cause of death) /liver ca    Breast Cancer Maternal Aunt      Breast Cancer Maternal Aunt      Colon Cancer Paternal Aunt           (cause of death)     Crohn's Disease Other      Depression Maternal Uncle      Depression Maternal Aunt      Diabetes Paternal Grandmother           type 2    Neurologic Disorder Brother           neuropathy    Cancer Brother 58         Tonsilular cancer/ lymph node in neck    Allergies Brother      Breast Cancer Cousin      Breast Cancer Cousin      Breast Cancer Cousin              Social History:  Marital Status:   [4]  Social History   []Expand by Default            Tobacco Use    Smoking status: Every Day       Current packs/day: 1.00       Average packs/day: 1 pack/day for 30.0 years (30.0 ttl pk-yrs)       Types: Cigarettes       Passive exposure: Never    Smokeless tobacco: Never    Tobacco comments:       pt declined, stated she would use, \"the patch\". Patient has not had a cigarette in 1 week because she was in the hospital   Vaping Use    Vaping status: Never Used   Substance Use Topics    Alcohol use: No       Comment: rare    Drug use: No           Allergies:  Allergies[]Expand by Default         Allergies   Allergen Reactions    Fexofenadine Hcl Nausea and Vomiting       Allegra     Rosuvastatin Other (See Comments)       Dizziness - Crestor     Cats Other (See Comments)       Sneezing, runny nose    Codeine Sulfate Nausea and Vomiting and GI Disturbance         Prior to Admission Medications   Prior to Admission Medications   Prescriptions Last Dose Informant Patient Reported? Taking?   albuterol (PROAIR HFA/PROVENTIL HFA/VENTOLIN HFA) 108 (90 Base) MCG/ACT inhaler   No No   Sig: Inhale 2 puffs into the lungs every 6 hours as needed for shortness of breath, wheezing or cough   busPIRone " (BUSPAR) 10 MG tablet   No No   Sig: Take 1 tablet (10 mg) by mouth 2 times daily   diltiazem ER (DILT-XR) 120 MG 24 hr capsule   No No   Sig: Take 1 capsule (120 mg) by mouth daily   ipratropium - albuterol 0.5 mg/2.5 mg/3 mL (DUONEB) 0.5-2.5 (3) MG/3ML neb solution   No No   Sig: Take 1 vial (3 mLs) by nebulization every 6 hours as needed for shortness of breath, wheezing or cough   lisinopril (ZESTRIL) 10 MG tablet   No No   Sig: Take 1 tablet (10 mg) by mouth daily   metoprolol succinate ER (TOPROL XL) 100 MG 24 hr tablet   No No   Sig: Take 1 tablet (100 mg) by mouth daily   nystatin (MYCOSTATIN) 493609 UNIT/GM external powder   Yes No   Sig: Apply 1 g topically 3 times daily as needed (groin)   potassium chloride ER (K-TAB) 20 MEQ CR tablet   No No   Sig: Take 1 tablet (20 mEq) by mouth 2 times daily   predniSONE (DELTASONE) 20 MG tablet   No No   Sig: Take two tablets (= 40mg) each day for 5 (five) days   torsemide (DEMADEX) 20 MG tablet   No No   Sig: Take 3 tablets (60 mg) by mouth daily   warfarin ANTICOAGULANT (COUMADIN) 2 MG tablet   Yes No   Sig: Follow-up with [facility] on 4/19/24 for INR check and further dosing instructions.      Facility-Administered Medications: None        Review of Systems    The 10 point Review of Systems is negative other than noted in the HPI      Physical Exam   Vital Signs: Temp: 98.1  F (36.7  C) Temp src: Oral BP: 103/77 Pulse: 89   Resp: 18 SpO2: 96 % O2 Device: None (Room air)    Weight: 0 lbs 0 oz    Constitutional: Alert and conversant. NAD   HENT: NCAT   Eyes: Normal pupils   Neck: supple   CV: No pallor  Pulmonary/Chest: Non-labored respirations  Abdominal: non-distended   MSK: KERR.   Neuro: Alert and appropriate   Skin: Warm and dry. No diaphoresis. No rashes on exposed skin    Psych: Appropriate mood and affect         Medical Decision Making       79 MINUTES SPENT BY ME on the date of service doing chart review, history, exam, documentation & further  activities per the note.      Data

## 2024-05-18 NOTE — ED NOTES
Patient given written and verbal discharge instructions, verbalized understanding. All questions answered, no concerns. Patient awaiting healthline for ride home.

## 2024-05-18 NOTE — ED PROVIDER NOTES
[facility]  ED Provider Note    Chief Complaint   Patient presents with    Generalized Weakness     History:  Heather Rosas is a 73 year old female with Heather return home after her visit here.  EMS brought her to her house and they noted that she had been moving and buckets in a house and the house was full of flies and not livable.  They brought her back.  Nothing else is changed    Review of Systems   Performed; see HPI for pertinent positives and negatives.     Medical history, surgical history, and social history was reviewed.  Nursing documentation, triage note, and vitals were reviewed.    Vitals:  BP: 103/77  Pulse: 89  Temp: 98.1  F (36.7  C)  Resp: 18  SpO2: 96 %    Physical Exam:  Constitutional: Alert and conversant. NAD   HENT: NCAT   Eyes: Normal pupils   Neck: supple   CV: No pallor  Pulmonary/Chest: Non-labored respirations  Abdominal: non-distended   MSK: KERR.   Neuro: Alert and appropriate   Skin: Warm and dry. No diaphoresis. No rashes on exposed skin    Psych: Appropriate mood and affect       MDM:      ED Course as of 05/18/24 1839   Sat May 18, 2024   1839 Patient returns unable to care for herself.  Requires admission.  Admitted to medicine appreciate gracious accepting of this admission       Procedures:  Procedures    Impression:  Final diagnoses:   Weakness            Ilir Araiza MD  05/18/24 1839

## 2024-05-18 NOTE — ED NOTES
Report given to 3C RN. Patient aware of transport, family unable to be contacted. All questions answered, no concerns. Patient OK to go up to 3C after obs video

## 2024-05-18 NOTE — ED TRIAGE NOTES
Patient presents via [facility] after fall. Patient  has been in this ED yesterday and day before related to SOB.     Patient c/o generalized weakness. States she slid down in the door way because her knees became weak. Denies any pain or injuries, slid onto butt. States she was unable to stand up so yelled for help. EMS reports concerns as patient is much weaker than normal. Patient denies any SOB today.

## 2024-05-18 NOTE — ED NOTES
EMS reports that patient's home was filled with buckets of BM and urine. No food at the house. House is also very messy. EMS had high concerns for safety. Provider aware.

## 2024-05-18 NOTE — ED PROVIDER NOTES
[facility]  ED Provider Note    Chief Complaint   Patient presents with    Extremity Weakness     History:  Heather Rosas is a 73 year old female very well-known in our emergency department with CHF and COPD, chronically medication noncompliant presenting to the emergency department today saying that she felt weak in the knees and had to sit down.  She is not taking any of her medications today she has no other complaints and denies headaches fevers cough runny nose.  Shortness of breath chest pain abdominal pain nausea vomiting diarrhea urinary symptoms muscle pain.    Review of Systems   Performed; see HPI for pertinent positives and negatives.     Medical history, surgical history, and social history was reviewed.  Nursing documentation, triage note, and vitals were reviewed.    Vitals:  BP: 120/84  Pulse: 103  Temp: 97.2  F (36.2  C)  Resp: 18  SpO2: 94 %    Physical Exam:  Constitutional: Alert and conversant. NAD   HENT: NCAT   Eyes: Normal pupils   Neck: supple   CV: No pallor  Pulmonary/Chest: Non-labored respirations  Abdominal: non-distended   MSK: KERR.   Neuro: Alert and appropriate   Skin: Warm and dry. No diaphoresis. No rashes on exposed skin    Psych: Appropriate mood and affect       MDM:      ED Course as of 05/18/24 1439   Sat May 18, 2024   1437 Patient complaining of weakness.  Does not demonstrate any obvious weakness while here.  Behavior essentially identical to yesterday.  No evidence of UTI.  No concerning electrolyte dyscrasias.  BUN/creatinine slightly worse from previous but not so much worse that it would require admission.  BNP also worsening but more likely related to the change in creatinine.  Clinically does not appear to be in overt heart failure.  Oxygenating well on room air with no difficulties.  From a respiratory standpoint she is dramatically improved.  White count remains elevated at 14 consistent with steroids.  Vitals within normal limits.  Potassium  improving from previous, nearly normal.  Will give more potassium will give her her appropriate home doses of medications including torsemide prednisone.  Potassium repleted.  Patient ambulating using walker without assistance.  Appropriate for further outpatient management discharged in stable condition all question answered return precautions given.  Recommending she continue her follow-up plan with primary care provider on Monday       Procedures:  Procedures    Impression:  Final diagnoses:   Weakness of both lower extremities           Ilir Araiza MD  05/18/24 3568

## 2024-05-18 NOTE — DISCHARGE INSTRUCTIONS
Return the emergency department for worsening symptoms or new concerning symptoms.  Follow-up with your primary care provider on Monday

## 2024-05-18 NOTE — ED NOTES
"Patient states she has not taken her medications today. \"I started them this morning, but then I came here.\"  "

## 2024-05-19 PROBLEM — I50.813 ACUTE ON CHRONIC RIGHT-SIDED CONGESTIVE HEART FAILURE (H): Status: ACTIVE | Noted: 2024-01-01

## 2024-05-19 NOTE — PROGRESS NOTES
"[facility]    Hospitalist Progress Note    Date of Service (when I saw the patient): 05/19/2024    Assessment & Plan       Acute on chronic right-sided congestive heart failure (H): Weight up significantly-previous dry weight has been 195. She does have some firm edema to LE but nonpitting. When she is in failure her creatinine typically bumps up a bit and it has. BNP more elevated than normal. He reports weakness-which is typical for her CHF exacerbations. She has chronic incontinence. Will place a [place] and give IV lasix every 8 hrs. She typically diureses relatively easy.       Atrial fibrillation (H): Rates a bit high on arrival-110's. Now 80-90's. Continue coumadin, metoprolol, diltiazem      HTN (hypertension): stable on arrival, continue as above.      Schizophrenia (H): EMT report her house was uninhabitable-buckets of urine and feces, flies everywhere etc. Sliding scale consult.  consult as well.       Noncompliance with medication regimen    Weakness      # Hypokalemia: Lowest K = 3.3 mmol/L in last 2 days, will replace as needed        # Drug Induced Coagulation Defect: home medication list includes an anticoagulant medication    # Hypertension: Noted on problem list  # Acute heart failure with preserved ejection fraction: heart failure noted on problem list, last echo with EF >50%, and receiving IV diuretics     # Severe Obesity: Estimated body mass index is 41.66 kg/m  as calculated from the following:    Height as of this encounter: 1.499 m (4' 11.02\").    Weight as of this encounter: 93.6 kg (206 lb 5.6 oz).        # History of CABG: noted on surgical history       DVT Prophylaxis: Warfarin  Code Status: Full Code    Disposition: Expected discharge in 2-3 days once diuresed.    Claudia Chino, CNP    Interval History   Denies chest pain,abdominal pain, nausea, dyspnea.     -Data reviewed today: I reviewed all new labs and imaging results over the last 24 hours.     Physical Exam "   Temp: (!) 96.2  F (35.7  C) Temp src: Tympanic BP: 122/81 Pulse: 87   Resp: 18 SpO2: 95 % O2 Device: None (Room air)    Vitals:    05/18/24 2201 05/19/24 0444   Weight: 94.5 kg (208 lb 5.4 oz) 93.6 kg (206 lb 5.6 oz)     Vital Signs with Ranges  Temp:  [96.2  F (35.7  C)-98.1  F (36.7  C)] 96.2  F (35.7  C)  Pulse:  [] 87  Resp:  [16-18] 18  BP: (103-131)/(62-91) 122/81  SpO2:  [93 %-98 %] 95 %  I/O last 3 completed shifts:  In: 240 [P.O.:240]  Out: 550 [Urine:550]    Peripheral IV 05/18/24 Anterior;Distal;Left Lower forearm (Active)   Site Assessment WDL 05/19/24 0448   Line Status Saline locked 05/19/24 0448   Dressing Transparent 05/19/24 0448   Dressing Status clean;dry;intact 05/19/24 0448   Line Intervention Flushed 05/19/24 0448   Phlebitis Scale 0-->no symptoms 05/19/24 0448   Infiltration? no 05/19/24 0448   Number of days: 1       Wound 07/11/20 Left Foot Other (comment) small hardened area on left heel (Active)   Number of days: 1408       Incision/Surgical Site 12/11/18 Right Chest (Active)   Number of days: 1986       Incision/Surgical Site 10/06/20 Right Chest (Active)   Number of days: 1321     Line/device assessment(s) completed for medical necessity    Constitutional: Awake,alert, n oacute distress  Respiratory: Scattered wheezes upper airways, scattered rhonchi lower luncs with fine crackles.   Cardiovascular: HHR irregular, no murmurs, rubs, thrills. She does have pretty firm edema bilateral legs but it is nonpitting  GI: Soft, nontender, bowel sounds positive  Skin/Integumen: NO unusual rashes,open areas or bruising  Other:      Medications   Current Facility-Administered Medications   Medication Dose Route Frequency Provider Last Rate Last Admin    Patient is already receiving anticoagulation with heparin, enoxaparin (LOVENOX), warfarin (COUMADIN)  or other anticoagulant medication   Does not apply Continuous PRN Rishabh Garcia MD         Current Facility-Administered Medications    Medication Dose Route Frequency Provider Last Rate Last Admin    busPIRone (BUSPAR) tablet 10 mg  10 mg Oral BID Rishabh Garcia MD   10 mg at 05/19/24 0802    diltiazem ER COATED BEADS (CARDIZEM CD/CARTIA XT) 24 hr capsule 120 mg  120 mg Oral Daily Rishabh Garcia MD   120 mg at 05/19/24 0802    lisinopril (ZESTRIL) tablet 10 mg  10 mg Oral Daily Claudia Chino NP   10 mg at 05/19/24 0802    metoprolol succinate ER (TOPROL XL) 24 hr tablet 100 mg  100 mg Oral Daily Rishabh Garcia MD   100 mg at 05/19/24 0801    potassium chloride ER (MICRO-K) CR capsule 40 mEq  40 mEq Oral BID Claudia Chino NP   40 mEq at 05/19/24 0801    [Held by provider] torsemide (DEMADEX) tablet 60 mg  60 mg Oral Daily Claudia Chino NP        Warfarin Dose Required Daily - Pharmacist Managed  1 each Oral See Admin Instructions Rishabh Garcia MD           Data   Recent Labs   Lab 05/19/24  0308 05/18/24  1918 05/18/24  1244 05/18/24  1241 05/17/24  0743 05/16/24  0809 05/16/24  0527   WBC  --   --   --  14.0* 12.3*  --  8.8   HGB  --   --   --  12.3 11.8  --  12.5   MCV  --   --   --  89 90  --  90   PLT  --   --   --  275 278  --  294   INR 1.38* 1.25*  --   --  1.34*  --  1.33*   *  --   --  133* 133*   < > 135   POTASSIUM 3.9  3.9  --   --  3.3* 3.3*   < > 2.9*   CHLORIDE 97*  --   --  96* 97*   < > 95*   CO2 23  --   --  25 23   < > 27   BUN 49.6*  --   --  43.5* 29.3*   < > 15.2   CR 1.30*  --   --  1.28* 1.19*   < > 0.99*   ANIONGAP 14  --   --  12 13   < > 13   CARINA 9.3  --   --  9.4 9.1   < > 9.3   *  --  109* 99 142*   < > 85    < > = values in this interval not displayed.       No results found for this or any previous visit (from the past 24 hour(s)).

## 2024-05-19 NOTE — PHARMACY-ANTICOAGULATION SERVICE
Clinical Pharmacy - Warfarin Dosing Consult     Pharmacy has been consulted to manage this patient s warfarin therapy.  Indication: Atrial Fibrillation  Therapy Goal: INR 2-3  OP Anticoag Clinic: [place]  Warfarin PTA Regimen: 2 mg daily  Dose Comments: Last dispense of #60 tablets of warfarin 2 mg on 2/27 for a 60 day supply. Subtherapeutic INR on admission with INR of 1.25.    INR   Date Value Ref Range Status   05/18/2024 1.25 (H) 0.85 - 1.15 Final   05/17/2024 1.34 (H) 0.85 - 1.15 Final     INR trending down since yesterday. Unsure how compliant pt has been able to be with warfarin regimen.       Recommend warfarin 2 mg today, continue prescribed warfarin dose from INR clinic and trend INR while here.  Pharmacy will monitor Heather MATTHEW Rosas daily and order warfarin doses to achieve specified goal.      Please contact pharmacy as soon as possible if the warfarin needs to be held for a procedure or if the warfarin goals change.

## 2024-05-19 NOTE — PLAN OF CARE
Pt is A & O. VS et assessments as charted. Pt is assist of one w/gaitbelt et walker in room if needed. Pt verbalizes that she would like a walker for home. Pt Denies pain this shift but has episodes of SOB upon exertion. Pt's O2 sats range between 95-97% on RA. Pt is tachycardic beginning of shift, provider aware et ordered one time dose of Metoprolol. Pt stable throughout shift, no more c/o SOB rest of shift. Alarms active et audible.     Face to face report given with opportunity to observe patient.    Report given to LORENA Rhoades RN   5/19/2024  7:00 AM

## 2024-05-19 NOTE — PHARMACY-ANTICOAGULATION SERVICE
Pharmacy Consult-Warfarin Assessment Day #2    Heather Rosas is a 73 year old female admitted on 5/18/2024 with Acute on chronic right-sided congestive heart failure (H)    Primary Indication(s) for Anticoagulation: A-fib    Goal INR: 2-3    FYI, patient is followed by the Anticoagulation[facility] Clinic at: [place]    Patient previously anticoagulated on Coumadin at a dose of 2 mg daily    Factors that may increase patient's bleeding risk and/or sensitivity to warfarin (Coumadin) include: advanced age    Factors that may decrease patient's bleeding risk and/or sensitivity to warfarin (Coumadin) include: unsure of last dose/how compliant patient has been with warfarin    Anticoagulation Dose History  More data exists         Latest Ref Rng & Units 4/15/2024 4/17/2024 4/18/2024 5/16/2024 5/17/2024 5/18/2024 5/19/2024   Recent Dosing and Labs   warfarin ANTICOAGULANT (COUMADIN) 2 MG tablet - - - - - - 2 mg, $Given -   INR 0.85 - 1.15 1.27  1.33  1.21  1.33  1.34  1.25  1.38      CBC RESULTS:   Recent Labs   Lab Test 05/18/24  1241   HGB 12.3          Assessment/Plan: INR subtherapeutic. Give warfarin 3 mg today per warfarin dosing policy. Recheck INR tomorrow with am labs.        Thank You for the Consult. Will continue to follow.    Amy Ling [facility] ....................  5/19/2024   8:57 AM

## 2024-05-19 NOTE — PLAN OF CARE
"Goal Outcome Evaluation:      Plan of Care Reviewed With: patient    Overall Patient Progress: no changeOverall Patient Progress: no change    Outcome Evaluation: Pt maintains on RA. PIV saline locked. Alarms on for safety. Tolerating diet well. Pt confused at times in the evening and stating she \"is afraid\". Writer let pt sit in hallway with staff present in her recliner for a while as she stated she did not feel safe in her room. Relaxation techniques given to pt and reviewed. Pt also had SOB while laying in bed x1 on shift. Education given in regards to breathing and exercises to help. Also PRN inhaler and PRN nebulizer given for SOB. Pt was able to relax after and eat dinner. Now resting in her room quietly. Call light in with in reach.    Face to face report given with opportunity to observe patient.    Report given to Noc shift LORENA Tellez RN   5/19/2024  7:00 PM    "

## 2024-05-20 NOTE — TELEPHONE ENCOUNTER
Received DENIAL PA from [facility] for  ipratropium - albuterol 0.5 mg/2.5 mg/3 mL (DUONEB) neb solution 6 mL. Scanned into EPIC, routed to provider.    Reasoning: Not covered by Medicare Pt D

## 2024-05-20 NOTE — PROGRESS NOTES
05/20/24 0900   Appointment Info   Signing Clinician's Name / Credentials (OT) Jacqueline Marquita, OTR/L   Living Environment   People in Home alone   Current Living Arrangements house   Home Accessibility stairs to enter home;stairs within home   Number of Stairs, Main Entrance 5   Stair Railings, Main Entrance railings safe and in good condition;railing on right side (ascending)   Number of Stairs, Within Home, Primary greater than 10 stairs   Transportation Anticipated car, drives self;family or friend will provide   Living Environment Comments Patient reported that she lives alone in a single level home with 4-5 CANELO and a basement. Patient reported that she does not go in her basement often. Bathroom has a walk-in shower with grab bars.   Self-Care   Usual Activity Tolerance moderate   Current Activity Tolerance fair   Equipment Currently Used at Home grab bar, tub/shower   Fall history within last six months no   Activity/Exercise/Self-Care Comment Patient reported that she was independent with all ADLs at baseline. She reported that she was independent with mobility without an assistive device.   Instrumental Activities of Daily Living (IADL)   Previous Responsibilities meal prep;housekeeping;medication management;driving;laundry;shopping   IADL Comments Patient reported that she was independent with all IADLs at baseline. She reported that she still drives.   General Information   Onset of Illness/Injury or Date of Surgery 05/18/24   Referring Physician Rishabh Garcia   Patient/Family Therapy Goal Statement (OT) Patient would like to return home. She reported that her son and daughter-in-law will be coming home and staying with her.   Additional Occupational Profile Info/Pertinent History of Current Problem Patient presented to hospital with acute on chronic right-sided congestive heart failure.   General Observations and Info Patient currently using indwelling catheter. When asked, patient reported that she uses  this at [facility]. However, nursing staff reported that they placed this catheter when she arrived at the hospital.   Cognitive Status Examination   Orientation Status person;place   Cognitive Status Comments Patient demonstrated confusion during evaluation. When asked what she had for breakfast, patient began naming medications. She reported that she uses a catheter at home but per chart review, this was placed when she arrived at the hospital. When asked the month, patient began counting up from 1 and reported 1953 as the current date.   Visual Perception   Visual Impairment/Limitations WNL   Pain Assessment   Patient Currently in Pain No   Posture   Posture forward head position   Range of Motion Comprehensive   General Range of Motion no range of motion deficits identified   Strength Comprehensive (MMT)   General Manual Muscle Testing (MMT) Assessment upper extremity strength deficits identified   Upper Extremity (Manual Muscle Testing)   Comment, MMT: Upper Extremity L shoulder flexion: 4-/5; R shoulder flexion: 4-/5   Transfers   Transfers sit-stand transfer;toilet transfer   Sit-Stand Transfer   Sit-Stand [place] (Transfers) supervision   Sit/Stand Transfer Comments Patient was able to stand from recliner with SBA and fww. She required CGA and fww to ambulate ~20ft to/from bathroom.   Toilet Transfer   Type (Toilet Transfer) sit-stand   [place] Level (Toilet Transfer) supervision   Assistive Device (Toilet Transfer) walker, front-wheeled;grab bars/safety frame   Toilet Transfer Comments Patient was able to transfer on/off toilet with SBA, fww, and grab bars.   Activities of Daily Living   BADL Assessment/Intervention lower body dressing;toileting;grooming   Lower Body Dressing Assessment/Training   [place] Level (Lower Body Dressing) doff;don;socks;modified independence   Position (Lower Body Dressing) supported sitting   Comment, (Lower Body Dressing) Patient was able to adjust socks mod I while  seated in recliner.   Grooming Assessment/Training   [place] Level (Grooming) oral care regimen   Position (Grooming) sink side;supported standing   Comment, (Grooming) Patient was able to brush teeth while standing at sink with SBA.   Toileting   Comment, (Toileting) Patient was able to transfer on/off toilet with SBA and use of grab bars. Several times during session, patient reported urge to void and requested to use the toilet. She required several reminders that she has a catheter.   Clinical Impression   Criteria for Skilled Therapeutic Interventions Met (OT) Yes, treatment indicated   OT Diagnosis Impaired ADLs, mobility, and confusion   Influenced by the following impairments Acute on chronic right-sided congestive heart failure   OT Problem List-Impairments impacting ADL problems related to;activity tolerance impaired;cognition;strength;inability to direct their own care;mobility   Assessment of Occupational Performance 1-3 Performance Deficits   Identified Performance Deficits Impaired ADLs, mobility, and cognition   Planned Therapy Interventions (OT) ADL retraining;strengthening;home program guidelines;progressive activity/exercise;stretching   Clinical Decision Making Complexity (OT) problem focused assessment/low complexity   Risk & Benefits of therapy have been explained evaluation/treatment results reviewed;care plan/treatment goals reviewed;risks/benefits reviewed;current/potential barriers reviewed;participants voiced agreement with care plan;participants included;patient   Clinical Impression Comments Patient was previously living alone and was independent with ADLs and mobility. Patient was also hospitalized recently and per chart review, she has been non-compliant with medications and other IADLs. During evaluation, patient was able to stand from recliner with SBA and ambulate within her room with CGA and fww. She was able to complete lower body dressing, grooming, and toileting with SBA.  She demonstrated frequent confusion and required cues for safety and initiation. She is oriented to place but not time.   OT Total Evaluation Time   OT Eval, Low Complexity Minutes (42129) 9   OT Goals   Therapy Frequency (OT) 5 times/week   OT Predicted Duration/Target Date for Goal Attainment 05/27/24   OT Goals Lower Body Dressing;Cognition;Toilet Transfer/Toileting   OT: Lower Body Dressing Modified independent   OT: Toilet Transfer/Toileting Modified independent   OT: Cognitive Patient/caregiver will verbalize understanding of cognitive assessment results/recommendations as needed for safe discharge planning   Interventions   Interventions Quick Adds Therapeutic Activity   Therapeutic Activities   Therapeutic Activity Minutes (85220) 8   Symptoms noted during/after treatment fatigue   Treatment Detail/Skilled Intervention Patient was seated in recliner at time of OT arrival. Patient was able to stand from recliner with SBA and fww. She requested to use the toilet and was reminded that she has currently has a catheter. Patient ambulated ~10 feet to bathroom with CGA and fww. While standing at sink, she was able to brush teeth with SBA. She was able to stand for ~5 minutes during activity. She requested a sitting break and transferred onto the toilet. She was able to transfer on/off toilet with SBA and grab bars. Patient ambulated an additional 10 feet back to recliner with CGA and fww. Patient was left resting in recliner with call light within reach and chair alarm on.   OT Discharge Planning   OT Plan Progress ADLs, strength, and mobility   OT Discharge Recommendation (DC Rec) Transitional Care Facility;home with assist   OT Rationale for DC Rec Patient was previously living alone and was independent with ADLs and mobility. Patient was also hospitalized recently and per chart review, she has been non-compliant with medications and other IADLs. During evaluation, patient was able to stand from recliner with SBA  and ambulate within her room with CGA and fww. She was able to complete lower body dressing, grooming, and toileting with SBA. She demonstrated frequent confusion and required cues for safety and initiation. She is oriented to place but not time. Patient not safe to return home alone at this time and would require 24/7 supervision. Recommend STR.   OT Brief overview of current status SBA for transfers; CGA and fww for mobility; SBA for grooming and toileting; confusion; required cues for safety and initiation   Total Session Time   Timed Code Treatment Minutes 8   Total Session Time (sum of timed and untimed services) 17   Psychosocial Support   Trust Relationship/Rapport care explained;choices provided;emotional support provided;questions answered;empathic listening provided;questions encouraged;reassurance provided;thoughts/feelings acknowledged

## 2024-05-20 NOTE — MEDICATION SCRIBE - ADMISSION MEDICATION HISTORY
Medication Scribe Admission Medication History    Admission medication history is complete. The information provided in this note is only as accurate as the sources available at the time of the update.    Information Source(s): Patient and CareEverywhere/SureScripts via in-person    Pertinent Information:   Patient manages her own medications and is a fair historian. According to fill hx patient should be out of her buspar, lisinopril, torsemide, pantoprazole and metoprolol (all last filled 2/27/24 for 30 day). Patient reports she takes all of her medications once per day and does not miss any doses. Per usual she has not been taking her coumadin as she does not like to take it. She did get a 90 day supply of her diltiazem on 3/4/24. She did not  the prednisone prescribed on 5/16/24 form the ER although she told me she had completed 3 doses PTA. Hx of non-compliancy.     Changes made to PTA medication list:  Added: None  Deleted: None  Changed: buspar- reports she takes once daily    Allergies reviewed with patient and updates made in EHR: no    Medication History Completed By: Beth Nails 5/20/2024 11:02 AM    PTA Med List   Medication Sig Note Last Dose    albuterol (PROAIR HFA/PROVENTIL HFA/VENTOLIN HFA) 108 (90 Base) MCG/ACT inhaler Inhale 2 puffs into the lungs every 6 hours as needed for shortness of breath, wheezing or cough  5/18/2024 at 1000    busPIRone (BUSPAR) 10 MG tablet Take 1 tablet (10 mg) by mouth 2 times daily (Patient taking differently: Take 10 mg by mouth daily)  5/18/2024 at 1000    diltiazem ER (DILT-XR) 120 MG 24 hr capsule Take 1 capsule (120 mg) by mouth daily  5/18/2024 at 1000    ipratropium - albuterol 0.5 mg/2.5 mg/3 mL (DUONEB) 0.5-2.5 (3) MG/3ML neb solution Take 1 vial (3 mLs) by nebulization every 6 hours as needed for shortness of breath, wheezing or cough  PRn at PRN    lisinopril (ZESTRIL) 10 MG tablet Take 1 tablet (10 mg) by mouth daily  5/18/2024 at 1000    metoprolol  succinate ER (TOPROL XL) 100 MG 24 hr tablet Take 1 tablet (100 mg) by mouth daily  5/18/2024 at 1000    nystatin (MYCOSTATIN) 000083 UNIT/GM external powder Apply 1 g topically 3 times daily as needed (groin)  More than a month    predniSONE (DELTASONE) 20 MG tablet Take two tablets (= 40mg) each day for 5 (five) days 5/20/2024: Reports taking 3 doses   Past Week    torsemide (DEMADEX) 20 MG tablet Take 3 tablets (60 mg) by mouth daily  5/18/2024 at 1000

## 2024-05-20 NOTE — PROGRESS NOTES
[facility]    Hospitalist Progress Note     Heather Rosas is a 73 year old female with established diagnosis of CHF, COPD, hypertension, GERD, atrial fibrillation on Coumadin, coronary artery disease, CABG who presented to the hospital brought by EMS complaining of generalized weakness.  This has been the for presentation in the ER for the last few days.  Patient is known to be noncompliant to her medication.  Patient was discharged from the ER this morning and sent home and felt so weak that EMS brought her back.  According to EMS patient has buckets of stool and urine in her house.  Her living condition unlivable.     On admission WBC elevated at 14, potassium low at 3.3, slight increase of creatinine 1.28, baseline 1.19, UA negative for UTI,     Patient leukocytosis most likely from steroids she recently took for COPD.     For hypokalemia potassium replacement ordered.     Unclear etiology of generalized weakness. Possibly failure to thrive.  Tsh pending to rule out hypothyroidism.  Patient will most likely need placement     Assessment & Plan  Acute on chronic right-sided congestive heart failure (H): Weight up significantly-previous dry weight has been 195. She does have some firm edema to LE but nonpitting. When she is in failure her creatinine typically bumps up a bit and it has. BNP more elevated than normal. He reports weakness-which is typical for her CHF exacerbations. She has chronic incontinence. Will place a [place] and give IV lasix every 8 hrs. She typically diureses relatively easy.   -5/20: Patient put out 2500 cc of urine in last 24 hours weight down 1.2 kg from yesterday.  Hemodynamically stable pressures 110 and 130 systolic.  Heart rates in the 80s..  BUN/creatinine however are up somewhat BUNs in the 50 range normally she is down in the 20 range at most.  Overall her edema is actually relatively good.  This is about as good as she probably will get.  Is not pitting at all.  I  think I will stop her IV furosemide today and started on her oral dose this afternoon.  Recheck her electrolytes renal function tomorrow.      Pulmonary: Denies any real shortness of breath sats 95% on room air.  Lungs few scattered crackles occasional rhonchi but nothing of significance.  She is in no real distress.    Atrial fibrillation (H): Rates a bit high on arrival-110's. Now 80-90's. Continue coumadin, metoprolol, diltiazem  -5/20: Rates adequately controlled on current regimen blood pressure stable.  In terms of the Coumadin she obviously has not been taking it she has not been getting her INR checked the only way I think it is reasonable to restart this is if she is in a structured nursing facility or has ongoing 24-hour care.       HTN (hypertension): stable on arrival, continue as above.  -5/20: Hemodynamically stable.       Schizophrenia (H)/dementia: EMT report her house was uninhabitable-buckets of urine and feces, flies everywhere etc. social service consult.  consult as well.  -5/20: This has been a recurrent concern issue that she is not taking medications appropriately.  Been in and out of the hospital multiple times.  Will need to get family to come in and discuss care options for patient will likely require at minimum assisted living situation versus nursing home.  Patient was discharged back on April 18.  The tentative plan was to have home care, and both nursing PT and OT as the patient has been noncompliant in all of her medications and follow-up.  Looking through the notes in the chart the clinic and home care services trying to contact the patient.  She would not answer the phone or the door.  911 was called please did a welfare check and apparently she seemed fine no other interventions were taken.  She was post of a follow-up appointment on the eighth and the clinic was a no-show and then presented to the ER on 5/16.  Again on 5/17.  And then again on 5/18 they attempted to bring her  back home however her home was unlivable per the EMS staff and was brought back to the ER and then admitted here.  Based on her history of noncompliance not allowing follow-up do not feel that the patient is safe to return home to that current situation unless things are markedly changed.  Will need to have case management get involved today along with family decide on an appropriate disposition plan.          Diet: Combination Diet 2 gm NA Diet  Fluids: None    DVT Prophylaxis: Warfarin  Code Status: Full Code    Disposition: Expected discharge in 2-3 days once appropriate facility is found and stable on oral diuretic.    Anastacio López MD    Interval History   Patient is awake alert pleasant does have some underlying confusion about issues.  Denies any shortness of breath or chest pain no nausea vomiting currently at all.    -Data reviewed today: I reviewed all new labs and imaging results over the last 24 hours. I personally reviewed no images or EKG's today.    Physical Exam   Temp: 97  F (36.1  C) Temp src: Tympanic BP: 124/78 Pulse: 85   Resp: 20 SpO2: 95 % O2 Device: None (Room air)    Vitals:    05/18/24 2201 05/19/24 0444 05/20/24 0537   Weight: 94.5 kg (208 lb 5.4 oz) 93.6 kg (206 lb 5.6 oz) 92.4 kg (203 lb 11.2 oz)     Vital Signs with Ranges  Temp:  [97  F (36.1  C)-98.1  F (36.7  C)] 97  F (36.1  C)  Pulse:  [67-88] 85  Resp:  [18-20] 20  BP: ()/(61-80) 124/78  SpO2:  [94 %-98 %] 95 %  I/O last 3 completed shifts:  In: 240 [P.O.:240]  Out: 2500 [Urine:2500]    Peripheral IV 05/18/24 Anterior;Distal;Left Lower forearm (Active)   Site Assessment WDL 05/20/24 0734   Line Status Saline locked 05/20/24 0734   Dressing Transparent 05/20/24 0734   Dressing Status clean;dry;intact 05/20/24 0734   Line Intervention Flushed 05/20/24 0540   Phlebitis Scale 0-->no symptoms 05/20/24 0734   Infiltration? no 05/20/24 0734   Number of days: 2       Urethral Catheter 05/20/24 16 fr (Active)   Tube Description  Positional 05/20/24 0732   Catheter Care Done 05/20/24 0200   Collection Container Standard 05/20/24 1000   Securement Method Securing device (Describe) 05/20/24 0732   Rationale for Continued Use Non-ICU: q2 hour intake/output with documentation 05/20/24 1000   Urine Output 550 mL 05/20/24 1000   Number of days: 0       Wound 07/11/20 Left Foot Other (comment) small hardened area on left heel (Active)   Number of days: 1409       Incision/Surgical Site 12/11/18 Right Chest (Active)   Number of days: 1987       Incision/Surgical Site 10/06/20 Right Chest (Active)   Number of days: 1322     No line/device    Constitutional: Alert and oriented x3. No distress    HEENT: Normocephalic/atraumatic, PERRL, EOMI, mouth moist, neck supple, no LN.     Cardiovascular: Irregular RRR.  No murmur, no  rubs, or gallops.  JVD unable.  Bruits no.  Pulses 2    Respiratory: Occasional scattered rhonchi and crackle for the most part clear to auscultation bilaterally    Abdomen: Soft, nontender, nondistended, no organomegaly. Bowel sounds present    Extremities: Warm/dry.  2+ edema    Neuro:   Non focal, cranial nerves intact, Moves all extremities.  Medications   Current Facility-Administered Medications   Medication Dose Route Frequency Provider Last Rate Last Admin    Patient is already receiving anticoagulation with heparin, enoxaparin (LOVENOX), warfarin (COUMADIN)  or other anticoagulant medication   Does not apply Continuous PRN Rishabh Garcia MD         Current Facility-Administered Medications   Medication Dose Route Frequency Provider Last Rate Last Admin    busPIRone (BUSPAR) tablet 10 mg  10 mg Oral BID Rishabh Garcia MD   10 mg at 05/20/24 0800    diltiazem ER COATED BEADS (CARDIZEM CD/CARTIA XT) 24 hr capsule 120 mg  120 mg Oral Daily Rishabh Garcia MD   120 mg at 05/20/24 0800    lisinopril (ZESTRIL) tablet 10 mg  10 mg Oral Daily Claudia Chino NP   10 mg at 05/20/24 0800    metoprolol succinate ER (TOPROL XL)  24 hr tablet 100 mg  100 mg Oral Daily Rishabh Garcia MD   100 mg at 05/20/24 0802    potassium chloride ER (MICRO-K) CR capsule 40 mEq  40 mEq Oral BID Claudia Chino NP   40 mEq at 05/20/24 1216    torsemide (DEMADEX) tablet 60 mg  60 mg Oral Daily Anastacio López MD        warfarin ANTICOAGULANT (COUMADIN) tablet 4 mg  4 mg Oral ONCE at 18:00 Anastacio López MD        Warfarin Dose Required Daily - Pharmacist Managed  1 each Oral See Admin Instructions Rishabh Garcia MD           Data   Recent Labs   Lab 05/20/24  0529 05/19/24  0308 05/18/24  1918 05/18/24  1244 05/18/24  1241 05/17/24  0743   WBC 9.4  --   --   --  14.0* 12.3*   HGB 13.3  --   --   --  12.3 11.8   MCV 89  --   --   --  89 90     --   --   --  275 278   INR 1.29* 1.38* 1.25*  --   --  1.34*    134*  --   --  133* 133*   POTASSIUM 4.2 3.9  3.9  --   --  3.3* 3.3*   CHLORIDE 100 97*  --   --  96* 97*   CO2 25 23  --   --  25 23   BUN 52.4* 49.6*  --   --  43.5* 29.3*   CR 1.31* 1.30*  --   --  1.28* 1.19*   ANIONGAP 12 14  --   --  12 13   CARINA 9.4 9.3  --   --  9.4 9.1   * 139*  --  109* 99 142*       No results found for this or any previous visit (from the past 24 hour(s)).

## 2024-05-20 NOTE — UTILIZATION REVIEW
Admission Status; Secondary Review Determination         Under the authority of the Utilization Management Committee, the utilization review process indicated a secondary review on the above patient.  The review outcome is based on review of the medical records, discussions with staff, and applying clinical experience noted on the date of the review.        (x)      Inpatient Status Appropriate - This patient's medical care is consistent with medical management for inpatient care and reasonable inpatient medical practice.          RATIONALE FOR DETERMINATION   The patient is a 73-year-old female admitted on 5/18/2024.  Patient has a history of CHF and COPD along with atrial fibs on Coumadin.  She is status post CABG and presented to the ED via EMS for generalized weakness.  Patient had been discharged from the ER previously in the morning and felt so weak that she came back to the ED.  Patient had been placed on steroids for COPD exacerbation.  Creatinine continues to rise and currently is 1.31 with a baseline of 1.1 to last month.  She had been on torsemide and lisinopril at home which are both currently on hold.  proBNP is quite elevated at 6706.  Chest x-ray was not repeated from prior admission however it did show atelectasis but no pulmonary edema according to the report.  She has had weight increase.  Echocardiogram also was not repeated but had normal ejection fraction in April.  Because of atrial fibs she is on warfarin and INR is 1.29 which would be considered subtherapeutic and likely will be managed to therapeutic level.  Based on 2 midnight stay and increasing creatinine in the light of CHF diagnosis with underlying COPD also, recommend switching to inpatient status.  Dr. Anastacio López will be notified via phone number provided with this recommendation.      The severity of illness, intensity of service provided, expected LOS and risk for adverse outcome make the care complex, high risk and  appropriate for hospital admission.        The information on this document is developed by the utilization review team in order for the business office to ensure compliance.  This only denotes the appropriateness of proper admission status and does not reflect the quality of care rendered.         The definitions of Inpatient Status and Observation Status used in making the determination above are those provided in the CMS Coverage Manual, Chapter 1 and Chapter 6, section 70.4.      Sincerely,     Dennis Salgado MD  Physician Advisor  Utilization Review/ Case Management  [facility].

## 2024-05-20 NOTE — PROGRESS NOTES
05/20/24 1300   Appointment Info   Signing Clinician's Name / Credentials (PT) Darek Jones DPT   Rehab Comments (PT) Pt was up in recliner upon approach and was very agreeable to PT david.   Living Environment   People in Home alone   Current Living Arrangements house   Home Accessibility stairs to enter home;stairs within home   Number of Stairs, Main Entrance 4   Stair Railings, Main Entrance railing on left side (ascending)   Number of Stairs, Within Home, Primary six   Stair Railings, Within Home, Primary railing on right side (ascending)   Transportation Anticipated car, drives self;family or friend will provide   Living Environment Comments Patient reported that she lives alone in a single level home with 4-5 CANELO and a basement. Patient reported that her bedroom and primary bathroom are in the basement, but she does spend most of her time on the main floor as there is a half bath and she often sleeps on the couch.  Basement bathroom has a walk-in shower with grab bars and she may have a shower chair.   Self-Care   Usual Activity Tolerance moderate   Current Activity Tolerance fair   Equipment Currently Used at Home grab bar, tub/shower   Fall history within last six months no   Activity/Exercise/Self-Care Comment Patient reported that she was independent with all ADLs at baseline. She reported that she was independent with mobility without an assistive device, but did often touch walls/furniture for support in home.  Although pt seems to report managing well overall with ADLs and IADLs, it must be noted that when EMS brought pt home from ED on 5/18/24 they noted  the home was not liveable with buckets full of urinte and feces and home full of flies so they had to bring her right back to ED.   General Information   Onset of Illness/Injury or Date of Surgery 05/18/24   Referring Physician Dr. Garcia   Patient/Family Therapy Goals Statement (PT) Pt wants to return home   Pertinent History of Current Problem  (include personal factors and/or comorbidities that impact the POC) Acute on chronic R-sided CHF; HTN; schizophrenia; A-fib; non-compliance with meds; weakness/failure to thrive   Existing Precautions/Restrictions fall   General Observations Pt was pleasant and cooperative   Cognition   Affect/Mental Status (Cognition) WFL   Orientation Status (Cognition) oriented to;person;place;time   Follows Commands (Cognition) WFL   Safety Deficit (Cognition) awareness of need for assistance;judgment  (Pt seems to report managing well at home, but report of condition of home from EMS strongly indicates to the contrary so must question pt's judgment/insight to some degree.)   Pain Assessment   Patient Currently in Pain No   Integumentary/Edema   Integumentary/Edema Comments LE edema   Posture    Posture Forward head position;Protracted shoulders   Range of Motion (ROM)   ROM Comment LE AROM WFL throughout bilat   Strength (Manual Muscle Testing)   Strength (Manual Muscle Testing) Deficits observed during functional mobility   Bed Mobility   Bed Mobility rolling left;rolling right;scooting/bridging;supine-sit;sit-supine   Rolling Left [place] (Bed Mobility) modified independence   Rolling Right [place] (Bed Mobility) modified independence   Scooting/Bridging [place] (Bed Mobility) modified independence   Supine-Sit [place] (Bed Mobility) modified independence   Sit-Supine [place] (Bed Mobility) modified independence   Assistive Device (Bed Mobility) bed rails   Comment, (Bed Mobility) Pt completed bed mobility Quynh and with mild challenge.   Transfers   Transfers bed-chair transfer;sit-stand transfer   Transfer Safety Concerns Noted decreased weight-shifting ability;decreased step length   Impairments Contributing to Impaired Transfers decreased strength;impaired balance   Bed-Chair Transfer   Assistive Device (Bed-Chair Transfers)   (None)   Bed-Chair [place] (Transfers) contact guard;minimum assist  (75% patient effort)   Comment, (Bed-Chair Transfer) Pt transferred about 5 ft from recliner over to bed CGA/Baljeet w/o device as she stated she typically uses no device at home.  Pt demonstrated mod Trendelenburg pattern and min to mildly unsteady at times - touching bedside table.   Sit-Stand Transfer   Sit-Stand [place] (Transfers) supervision;verbal cues  (SBA)   Comment, (Sit-Stand Transfer) Pt moves sit<->stand from recliner and bed up to no device and later from w/c up to FWW x 2 each SBA with cues for proper hand placement.   Gait/Stairs (Locomotion)   [place] Level (Gait) contact guard;minimum assist (75% patient effort)   Assistive Device (Gait)   (None)   Distance in Feet (Gait) 15 ft   Negotiation (Stairs) stairs independence;handrail location;number of steps;ascending technique;descending technique   [place] Level (Stairs) contact guard  (Mod challenge ascending using clear UE assistance d/t some functional LE weakness)   Handrail Location (Stairs) both sides   Number of Steps (Stairs) 4   Ascending Technique (Stairs) step-to-step   Descending Technique (Stairs) step-to-step   Comment, (Gait/Stairs) Pt wanted to try amb w/o device at first and she amb 15 ft from bed to entry to her room CGA/Baljeet w/o device demonstrating Trendelenburg gait bilat with min to mild instability and reaching for at least light support to wall, etc.   Balance   Balance Comments Sitting balance = Good- w/o support.  Standing balance = Fair w/o support; Good- w/ FWW support.   Sensory Examination   Sensory Perception patient reports no sensory changes   Coordination   Coordination no deficits were identified   Muscle Tone   Muscle Tone no deficits were identified   Clinical Impression   Criteria for Skilled Therapeutic Intervention Yes, treatment indicated   PT Diagnosis (PT) Gait difficulty   Influenced by the following impairments Functional LE weakness; decreased standing balance, decreased activity tolerance.    Functional limitations due to impairments Transfers, Gait, Stairs   Clinical Presentation (PT Evaluation Complexity) stable   Clinical Presentation Rationale Clinical judgment   Clinical Decision Making (Complexity) low complexity   Planned Therapy Interventions (PT) balance training;gait training;home exercise program;manual therapy techniques;neuromuscular re-education;patient/family education;stair training;strengthening;stretching;transfer training;progressive activity/exercise   Risk & Benefits of therapy have been explained evaluation/treatment results reviewed;care plan/treatment goals reviewed;risks/benefits reviewed;current/potential barriers reviewed;participants voiced agreement with care plan;participants included;patient   Clinical Impression Comments PT eval completed and pt Quynh for bed mobility, but CGA/Baljeet when attempting transfers and gait w/o device - certainly would benefit from use of device and appears it was recommended by PT during her last hospitalization here in April 2024.  She would benefit from skilled PT services to address her impairments in order to help improve safety and maximize functional independence.   PT Total Evaluation Time   PT Eval, Low Complexity Minutes (17788) 20   Interventions   Interventions Quick Adds Gait Training   Gait Training   Gait Training Minutes (64302) 10   Symptoms Noted During/After Treatment (Gait Training) none   Treatment Detail/Skilled Intervention Upon having somem unsteadiness when walking from bed to entry of her room, pt was agreeable to amb with FWW.  Pt then amb approx 40 ft with FWW = CGA/SBA with improved speed and stability.  Upon returning from gym where stair negotiation was assessed, pt transfer w/c->bed CGA/SBA with FWW.  Ended visit with pt in relciner, call button in easy reach and clip alert on.   PT Discharge Planning   PT Plan Progress mobility and strength   PT Discharge Recommendation (DC Rec) Transitional Care Facility   PT  Rationale for DC Rec Pt seems unable to properly care for self at home and would benefit from strengthening to improve her functional mobility.   PT Brief overview of current status Sit<->stand = SBA; amb x 40 ft CGA/SBA with FWW; stairs x 4 = CGA with bilat rails.   Total Session Time   Timed Code Treatment Minutes 10   Total Session Time (sum of timed and untimed services) 30   Psychosocial Support   Trust Relationship/Rapport care explained;choices provided;questions answered;questions encouraged;reassurance provided;thoughts/feelings acknowledged

## 2024-05-20 NOTE — PLAN OF CARE
Pt is A & O w/intermittent confusion. Needs reminders et emotional support at times. Pt cooperative w/cares. Up in chair most of noc. Pt appears to have episodes of SOB w/ anxiousness. O2 sats remain between 95%-97% on RA. Pt does have a frequent dry cough.  when in bed. Pt is assist of one to stand-by assist w/gait belt et walker. Pt denies any pain throughout shift. Martin cathter in place. VSS. Assessments as charted. Alarms active et audible.     Face to face report given with opportunity to observe patient.    Report given to LORENA Galicia RN   5/20/2024  7:20 AM

## 2024-05-20 NOTE — PROGRESS NOTES
Assessment completed with Heather.    LOC: alert     Dx: HF  Chronic Disease Management: HTN, Schizophrenia, A-Fib, GERD    Lives with: her dog  Living at:  home in [place]  Transportation: YES, drives self    Primary PCP: Nicolette Huffman  Insurance:  Medicare      Support System:  family  Homecare/PCA: no  /County Services:   no  : NO      How was the [facility] notification completed: n/a    Health Care Directive: no and does not want information  Guardian: no  POA: no    Pharmacy: [facility]  Meds management: manages own    Adequate Resources for needs (housing, utilities, food/med): YES  Household chores: does own  Work/community/social activity: YES, gets out as desired    ADLs: dressing is difficult  Ambulation:furniture walks  Falls: denies  Nutrition: no concern  Sleep: sleeps okay    Equipment used: commode, shower chair      Oxygen supplier: no      Does the supplier have valid oxygen orders: n/a    Mental health: denies  Substance abuse: denies  Exposure to violence/abuse: denies  Stressors: denies    Able to Return to Prior Living Arrangements: NO    Choice of Vendor: n/a yet    Barriers: According to EMS patient has buckets of stool and urine in her house. Her living condition is unlivable.     TOBI: Medium    Plan: patient has been advised that she should not go home. She wants to talk to her son Valentin, friend Sho and ex- to see if she would be able to live with them. If she is not able to live with them, this writer will help patient with suitable discharge plan.    Anabel Garcia CM

## 2024-05-20 NOTE — PHARMACY-ANTICOAGULATION SERVICE
Pharmacy Consult-Warfarin Assessment Day #3    Heather Rosas is a 73 year old female admitted on 5/18/2024 with Acute on chronic right-sided congestive heart failure (H)    Primary Indication(s) for Anticoagulation: A-fib     Goal INR: 2-3     FYI, patient is followed by the Anticoagulation[facility] at: [facility]     Patient previously anticoagulated on Coumadin at a dose of 2 mg daily     Factors that may increase patient's bleeding risk and/or sensitivity to warfarin (Coumadin) include: advanced age     Factors that may decrease patient's bleeding risk and/or sensitivity to warfarin (Coumadin) include: unsure of last dose/how compliant patient has been with warfarin       Anticoagulation Dose History           5/18/2024 5/19/2024 5/20/2024   Recent Dosing and Labs   warfarin ANTICOAGULANT (COUMADIN) 2 MG tablet 2 mg, $Given - -   warfarin ANTICOAGULANT (COUMADIN) 3 MG tablet - 3 mg, $Given -   INR 1.25  1.38  1.29      CBC RESULTS:   Recent Labs   Lab Test 05/20/24  0529   HGB 13.3          Assessment/Plan: INR still subtherapeutic. Will give 4 mg warfarin today and recheck INR tomorrow with AM labs.     Thank You for the Consult. Will continue to follow.    Oralia Suarez RPH ....................  5/20/2024   9:45 AM

## 2024-05-21 NOTE — PLAN OF CARE
"Reason for hospital stay:  Acute on chronic right-sided CHF  Living situation PTA: Home alone  Most recent vitals: /77 (BP Location: Left arm, Patient Position: Chair, Cuff Size: Adult Regular)   Pulse 82   Temp 97.3  F (36.3  C) (Tympanic)   Resp 18   Ht 1.499 m (4' 11.02\")   Wt 88.6 kg (195 lb 5.2 oz)   SpO2 95%   BMI 39.43 kg/m      Pain Management:  Patient denied any pain overnight  LOC:  A&O x 4  Cardiac:  Apical pulse irregular, hx of Afib - Warfarin   Respiratory:  Maintaining sats on room air. Lung sounds clear and equal bilaterally but diminished in bilateral lower lobes. Infrequent dry cough noted.   GI:  Bowel sounds audible and normoactive throughout  :  Urethral canada in place for strict I&O measurement - Adequate output noted  Skin Issues:  Scattered bruising    IVF:  SL   ABX:  None ordered at this time    Nutrition: 2 g NA diet  ADL's:  Independent with minimal assist  Ambulation: Assist of 1 with gait belt and walker  Safety:  Alarms active and audible, call light within reach, room near unit station, door open, bed in lowest position, wheels locked, lighting adjusted, nonskid footwear in use.     Comments:  RN Managed Potassium replacement.     Face to face report given with opportunity to observe patient.    Report given to LORENA Andrade RN   5/21/2024  7:16 AM    "

## 2024-05-21 NOTE — PLAN OF CARE
"Reason for hospital stay:  Right sided CHF   Living situation PTA: Home alone   Most recent vitals: /72 (BP Location: Left arm, Patient Position: Chair, Cuff Size: Adult Regular)   Pulse 80   Temp 98.9  F (37.2  C) (Tympanic)   Resp 18   Ht 1.499 m (4' 11.02\")   Wt 92.4 kg (203 lb 11.2 oz)   SpO2 96%   BMI 41.12 kg/m      Pain Management:  Denies pain.   LOC:  A&O x 4.   Cardiac:  HRI. Afib   Respiratory:  Lungs: Uppers: Clear. Lowers: Diminished. Infrequent, dry, cough.   GI:  All audible & normoactive   :  Martin. Urine output adequate.   Skin Issues:  Scattered bruises.     IVF:  Left lower forearm. Flushes well & SL.   ABX:  None at this time     Nutrition: 2 gm sodium diet.   ADL's: Up in the chair all day.   Ambulation:Assist x 1 w/ GB & walker.   Safety:  Call light within reach, use of call light appropriately, & makes needs known.     Face to face report given with opportunity to observe patient.    Report given to Kamilah SOTO RN.     Melina Jordan RN   5/20/2024  8:32 PM                           "

## 2024-05-21 NOTE — PROGRESS NOTES
Care Transitions focused note:      Met with Heather as followup from yesterday. She states she spoke with Kenroy her ex-brother in-law, who wished her well.    Heather did not call son Valentin, friend Sho or ex- Kenyon.     Went over all options from [facility] to [facility].    Pt/family was provided with the Medicare Compare list for SNF.  Discussed associated Medicare star ratings to assist with choice for referrals/discharge planning Yes    Education was given to pt/family that star ratings are updated/maintained by Medicare and can be reviewed by visiting www.medicare.gov Yes    Patient/family choices for facility in priority are:   First Choice : Skilled Nursing Facility [place]: [facility]    [phone]    Second Choice: Skilled Nursing Facility [place]: [facility]     [phone]    Third Choice: Skilled Nursing Facility [place]: [facility]         [phone]      LVM for saman Hanson to update and ask him to collect needed items for the SNF.    Referrals sent.    Anabel Garcia CM

## 2024-05-21 NOTE — PLAN OF CARE
Pt A&O x 4 this shift. Pt afebrile, and remains on room air.Lung sounds are clear, diminished. Pt denies pain this shift.   Martin catheter removed this shift at 16:30. Pt voiding spontaneously after, output is straw yellow. Pt has small formed stool this shift.   Mild edema remains present to bilateral lower extremities.     Pt assist of 1 with walker, and gait belt.   Pt needs reminders to use call light this shift. Alarms on and audible this shift. Pt free from falls and/or injuries this shift.     Face to face report given with opportunity to observe patient.    Report given to LORENA Vega.     Vickie Gonzalez RN   5/21/2024  7:06 PM

## 2024-05-21 NOTE — PROGRESS NOTES
05/21/24 1300   Appointment Info   Signing Clinician's Name / Credentials (OT) Jacqueline Juárez, OTR/L   Interventions   Interventions Quick Adds Therapeutic Activity   Therapeutic Activities   Therapeutic Activity Minutes (71475) 10   Symptoms noted during/after treatment fatigue   Treatment Detail/Skilled Intervention Patient was seated in recliner at time of OT arrival. She reported that she had already participated in ADLs but was agreeable to functional mobility. She was able to stand from recliner with SBA and cues for safe hand positioning. Patient was able to ambulate 100 feet with SBA and fww. Patient reported mild fatigue following ambulation. While seated in recliner, patient engaged in shoulder flexion, chest press, shoulder horizontal adduction, shoulder circles, and elbow flexion x 10 to improve UE strength for ADLs and transfers. Patient was oriented to month, year, and place today but continues to demonstrate confusion. Patient was left resting in recliner with call light within reach and chair alarm on.   OT Discharge Planning   OT Plan Progress ADLs, strength, and mobility   OT Discharge Recommendation (DC Rec) Transitional Care Facility   OT Rationale for DC Rec Patient continues to benefit from SBA-CGA for mobility and ADLs and continues to demonstrate confusion. She is not safe to return home at this time. Recommend STR.   OT Brief overview of current status Patient was able to ambulate 100 feet with SBA and fww. She was oriented to month, year, and place but continues to demonstrate confusion and benefits from cues for safety.   Total Session Time   Timed Code Treatment Minutes 10   Total Session Time (sum of timed and untimed services) 10   Psychosocial Support   Trust Relationship/Rapport care explained;choices provided;questions answered;questions encouraged;thoughts/feelings acknowledged

## 2024-05-21 NOTE — PROGRESS NOTES
[facility]    Hospitalist Progress Note     Heather Rosas is a 73 year old female with established diagnosis of CHF, COPD, hypertension, GERD, atrial fibrillation on Coumadin, coronary artery disease, CABG who presented to the hospital brought by EMS complaining of generalized weakness.  This has been the for presentation in the ER for the last few days.  Patient is known to be noncompliant to her medication.  Patient was discharged from the ER this morning and sent home and felt so weak that EMS brought her back.  According to EMS patient has buckets of stool and urine in her house.  Her living condition unlivable.     On admission WBC elevated at 14, potassium low at 3.3, slight increase of creatinine 1.28, baseline 1.19, UA negative for UTI,     Patient leukocytosis most likely from steroids she recently took for COPD.     For hypokalemia potassium replacement ordered.     Unclear etiology of generalized weakness. Possibly failure to thrive.  TSH pending to rule out hypothyroidism.  Patient will most likely need placement     Assessment & Plan    Acute on chronic right-sided congestive heart failure (H): Weight up significantly-previous dry weight has been 195. She does have some firm edema to LE but nonpitting. When she is in failure her creatinine typically bumps up a bit and it has. BNP more elevated than normal. He reports weakness-which is typical for her CHF exacerbations. She has chronic incontinence. Will place a canada and give IV lasix every 8 hrs. She typically diureses relatively easy.   -5/20: Patient put out 2500 cc of urine in last 24 hours weight down 1.2 kg from yesterday.  Hemodynamically stable pressures 110 and 130 systolic.  Heart rates in the 80s..  BUN/creatinine however are up somewhat BUNs in the 50 range normally she is down in the 20 range at most.  Overall her edema is actually relatively good.  This is about as good as she probably will get.  Is not pitting at all.  I  think I will stop her IV furosemide today and started on her oral dose this afternoon.  Recheck her electrolytes renal function tomorrow.  -5/21: Patient put out 8775 cc of urine yesterday.  Stopped the IV Lasix placed her on oral torsemide.  She remains hemodynamically stable pressures 115-127.  Pulse has been in the 70-80 range.  Room air sats greater than 95% on room air.  Weight went down 8 pounds from this yesterday.  Continue with the oral torsemide and monitor closely.  BUN/creatinine stable 41/1.04.  Potassium is 3.6.  Keep Martin catheter in for 1 more day     Pulmonary: Denies any real shortness of breath sats 95% on room air.  Lungs few scattered crackles occasional rhonchi but nothing of significance.  She is in no real distress.  -5/21: Pulmonary wise still continues to do well 95% sat on room air no dyspnea.     Atrial fibrillation (H): Rates a bit high on arrival-110's. Now 80-90's. Continue coumadin, metoprolol, diltiazem  -5/20: Rates adequately controlled on current regimen blood pressure stable.  In terms of the Coumadin she obviously has not been taking it she has not been getting her INR checked the only way I think it is reasonable to restart this is if she is in a structured nursing facility or has ongoing 24-hour care.  -5/21: Rate rate controlled fine.  INR is 1.50 today.      HTN (hypertension): stable on arrival, continue as above.  -5/20: Hemodynamically stable.  -5/21: Stable hemodynamically       Schizophrenia (H)/dementia: EMT report her house was uninhabitable-buckets of urine and feces, flies everywhere etc. social service consult.  consult as well.  -5/20: This has been a recurrent concern issue that she is not taking medications appropriately.  Been in and out of the hospital multiple times.  Will need to get family to come in and discuss care options for patient will likely require at minimum assisted living situation versus nursing home.  Patient was discharged back on April 18.   The tentative plan was to have home care, and both nursing PT and OT as the patient has been noncompliant in all of her medications and follow-up.  Looking through the notes in the chart the clinic and home care services trying to contact the patient.  She would not answer the phone or the door.  911 was called please did a welfare check and apparently she seemed fine no other interventions were taken.  She was post of a follow-up appointment on the eighth and the clinic was a no-show and then presented to the ER on 5/16.  Again on 5/17.  And then again on 5/18 they attempted to bring her back home however her home was unlivable per the EMS staff and was brought back to the ER and then admitted here.  Based on her history of noncompliance not allowing follow-up do not feel that the patient is safe to return home to that current situation unless things are markedly changed.  Will need to have case management get involved today along with family decide on an appropriate disposition plan.          Diet: Combination Diet 2 gm NA Diet  Fluids: None    DVT Prophylaxis: Warfarin  Code Status: Full Code    Disposition: Expected discharge in 1-2 days once appropriate disposition site is found.    Anastacio López MD    Interval History   Patient's alert pleasant mild confusion denies any shortness of breath chest pain nausea vomiting appetites good.  She put out a significant amount of urine yesterday.  Martin catheter still in place hemodynamically fine renal functions better.  Will see how she is by tomorrow make sure everything stable she does need to be placed case management work on this.    -Data reviewed today: I reviewed all new labs and imaging results over the last 24 hours. I personally reviewed no images or EKG's today.    Physical Exam   Temp: 97.3  F (36.3  C) Temp src: Tympanic BP: 126/77 Pulse: 82   Resp: 18 SpO2: 95 % O2 Device: None (Room air)    Vitals:    05/19/24 0444 05/20/24 0537 05/21/24 0513    Weight: 93.6 kg (206 lb 5.6 oz) 92.4 kg (203 lb 11.2 oz) 88.6 kg (195 lb 5.2 oz)     Vital Signs with Ranges  Temp:  [97  F (36.1  C)-98.9  F (37.2  C)] 97.3  F (36.3  C)  Pulse:  [70-85] 82  Resp:  [18-20] 18  BP: (116-127)/(71-78) 126/77  SpO2:  [95 %-98 %] 95 %  I/O last 3 completed shifts:  In: 780 [P.O.:780]  Out: 8775 [Urine:8775]    Peripheral IV 05/18/24 Anterior;Distal;Left Lower forearm (Active)   Site Assessment WDL 05/21/24 0500   Line Status Saline locked 05/21/24 0500   Dressing Transparent 05/21/24 0500   Dressing Status clean;dry;intact 05/21/24 0500   Line Intervention Flushed 05/21/24 0500   Phlebitis Scale 0-->no symptoms 05/21/24 0500   Infiltration? no 05/21/24 0500   Number of days: 3       Urethral Catheter 05/20/24 16 fr (Active)   Tube Description Positional 05/21/24 0500   Catheter Care Done;Catheter wipes 05/21/24 0500   Collection Container Standard 05/21/24 0500   Securement Method Securing device (Describe) 05/21/24 0500   Rationale for Continued Use Non-ICU: q2 hour intake/output with documentation 05/21/24 0500   Urine Output 400 mL 05/21/24 0628   Number of days: 1       Wound 07/11/20 Left Foot Other (comment) small hardened area on left heel (Active)   Number of days: 1410       Incision/Surgical Site 12/11/18 Right Chest (Active)   Number of days: 1988       Incision/Surgical Site 10/06/20 Right Chest (Active)   Number of days: 1323     No line/device    Constitutional: Alert and oriented x3. No distress    HEENT: Normocephalic/atraumatic, PERRL, EOMI, mouth moist, neck supple, no LN.     Cardiovascular: RRR.  No no murmur, no  rubs, or gallops.  JVD no.  Bruits no.  Pulses 2 still significant mount of lower extremity edema.  Legs have been down most of the morning.  Probably about 10 surgery 4+ edema    Respiratory: Clear to auscultation bilaterally    Abdomen: Soft, nontender, nondistended, no organomegaly. Bowel sounds present    Extremities: Warm/dry.  still significant  mount of lower extremity edema.  Legs have been down most of the morning.  4+ edema below knees    Neuro:   Non focal, cranial nerves intact, Moves all extremities.  Medications   Current Facility-Administered Medications   Medication Dose Route Frequency Provider Last Rate Last Admin    Patient is already receiving anticoagulation with heparin, enoxaparin (LOVENOX), warfarin (COUMADIN)  or other anticoagulant medication   Does not apply Continuous PRN Rishabh Garcia MD         Current Facility-Administered Medications   Medication Dose Route Frequency Provider Last Rate Last Admin    busPIRone (BUSPAR) tablet 10 mg  10 mg Oral BID Rishabh Garcia MD   10 mg at 05/20/24 2022    diltiazem ER COATED BEADS (CARDIZEM CD/CARTIA XT) 24 hr capsule 120 mg  120 mg Oral Daily Rishabh Garcia MD   120 mg at 05/20/24 0800    lisinopril (ZESTRIL) tablet 10 mg  10 mg Oral Daily Claudia Chino NP   10 mg at 05/20/24 0800    metoprolol succinate ER (TOPROL XL) 24 hr tablet 100 mg  100 mg Oral Daily Rishabh Garcia MD   100 mg at 05/20/24 0802    potassium chloride ER (MICRO-K) CR capsule 40 mEq  40 mEq Oral BID Claudia Chino NP   40 mEq at 05/20/24 1216    torsemide (DEMADEX) tablet 60 mg  60 mg Oral Daily Anastacio López MD   60 mg at 05/20/24 1401    Warfarin Dose Required Daily - Pharmacist Managed  1 each Oral See Admin Instructions Rishabh Garcia MD           Data   Recent Labs   Lab 05/21/24  0533 05/20/24  0529 05/19/24  0308 05/18/24  1244 05/18/24  1241   WBC 9.8 9.4  --   --  14.0*   HGB 13.1 13.3  --   --  12.3   MCV 89 89  --   --  89    247  --   --  275   INR 1.50* 1.29* 1.38*   < >  --     137 134*  --  133*   POTASSIUM 3.6 4.2 3.9  3.9  --  3.3*   CHLORIDE 97* 100 97*  --  96*   CO2 29 25 23  --  25   BUN 41.4* 52.4* 49.6*  --  43.5*   CR 1.04* 1.31* 1.30*  --  1.28*   ANIONGAP 12 12 14  --  12   CARINA 9.5 9.4 9.3  --  9.4   * 105* 139*   < > 99   ALBUMIN 3.7  --   --   --   --     PROTTOTAL 6.6  --   --   --   --    BILITOTAL 1.8*  --   --   --   --    ALKPHOS 160*  --   --   --   --    ALT 18  --   --   --   --    AST 25  --   --   --   --     < > = values in this interval not displayed.       No results found for this or any previous visit (from the past 24 hour(s)).

## 2024-05-21 NOTE — PHARMACY-MEDICATION REGIMEN REVIEW
Pharmacy Consult-Warfarin Assessment Day #4    Heather Rosas is a 73 year old female admitted on 5/18/2024 with Acute on chronic right-sided congestive heart failure (H)    Primary Indication(s) for Anticoagulation: A-fib     Goal INR: 2-3     FYI, patient is followed by the Anticoagulation/[facility]time Clinic at: [facility]     Patient previously anticoagulated on Coumadin at a dose of 2 mg daily     Factors that may increase patient's bleeding risk and/or sensitivity to warfarin (Coumadin) include: advanced age     Factors that may decrease patient's bleeding risk and/or sensitivity to warfarin (Coumadin) include: unsure of last dose/how compliant patient has been with warfarin    Anticoagulation Dose History  More data exists         5/18/2024 5/19/2024 5/20/2024 5/21/2024   Recent Dosing and Labs   warfarin ANTICOAGULANT (COUMADIN) 2 MG tablet 2 mg, $Given - 4 mg, $Given -   warfarin ANTICOAGULANT (COUMADIN) 3 MG tablet - 3 mg, $Given - -   INR 1.25  1.38  1.29  1.50      CBC RESULTS:   Recent Labs   Lab Test 05/21/24  0533   HGB 13.1          Assessment/Plan: Will give 4 mg warfarin again today and recheck INR tomorrow with AM labs.     Thank You for the Consult. Will continue to follow.    Oralia Suarez RPH ....................  5/21/2024   8:53 AM

## 2024-05-21 NOTE — PROGRESS NOTES
05/21/24 1400   Appointment Info   Signing Clinician's Name / Credentials (PT) Jennifer Jones DPT   Interventions   Interventions Quick Adds Therapeutic Procedure   Therapeutic Procedure/Exercise   Ther. Procedure: strength, endurance, ROM, flexibillity Minutes (33006) 10   Symptoms Noted During/After Treatment fatigue   Treatment Detail/Skilled Intervention Pt seated in recliner at start of session and agreeable to PT. Stated that she just walked with OT but was agreeable to exercises. Completed seated LAQ, ankle pumps, isometric adduction, abduction, marching x20 each c cues for form   PT Discharge Planning   PT Plan Progress mobility and strength   PT Discharge Recommendation (DC Rec) Transitional Care Facility   PT Rationale for DC Rec Pt seems unable to properly care for self at home and would benefit from strengthening to improve her functional mobility.   PT Brief overview of current status Sit<->stand = SBA; amb x 40 ft CGA/SBA with FWW; stairs x 4 = CGA with bilat rails.   Total Session Time   Timed Code Treatment Minutes 10   Total Session Time (sum of timed and untimed services) 10

## 2024-05-22 NOTE — PLAN OF CARE
"Reason for admission: CHF  Pt is Alert confused at times was able to state at beginning of shift where she was, and why she was here.  Pivot to commode, with frequent toileting this shift, outputs as charted. Frequent reminders to use call light this shift, Call light within reach, alarms on bed active and audible, room next to nurses station. wheels locked, ID band on. Denies pain throughout shift.  IV SL       /63 (BP Location: Left arm, Patient Position: Sitting, Cuff Size: Adult Regular)   Pulse 72   Temp 96.9  F (36.1  C) (Tympanic)   Resp 18   Ht 1.499 m (4' 11.02\")   Wt 85.8 kg (189 lb 2.5 oz)   SpO2 93%   BMI 38.18 kg/m      Face to face report given with opportunity to observe patient.    Report given to Vickie Murphy RN on 5/22/2024 at 7:05 AM          "

## 2024-05-22 NOTE — PHARMACY-ANTICOAGULATION SERVICE
Pharmacy Consult-Warfarin Assessment Day #5    Heather Rosas is a 73 year old female admitted on 5/18/2024 with Acute on chronic right-sided congestive heart failure (H)     Primary Indication(s) for Anticoagulation: A-fib     Goal INR: 2-3     FYI, patient is followed by the Anticoagulation[facility] Clinic at: [facility]      Patient previously anticoagulated on Coumadin at a dose of 2 mg daily     Factors that may increase patient's bleeding risk and/or sensitivity to warfarin (Coumadin) include: advanced age     Factors that may decrease patient's bleeding risk and/or sensitivity to warfarin (Coumadin) include: unsure of last dose/how compliant patient has been with warfarin    Anticoagulation Dose History  More data exists         5/18/2024 5/19/2024 5/20/2024 5/21/2024 5/22/2024   Recent Dosing and Labs   warfarin ANTICOAGULANT (COUMADIN) 2 MG tablet 2 mg, $Given - 4 mg, $Given 4 mg, $Given -   warfarin ANTICOAGULANT (COUMADIN) 3 MG tablet - 3 mg, $Given - - -   INR 1.25  1.38  1.29  1.50  1.67      CBC RESULTS:   Recent Labs   Lab Test 05/22/24  0530   HGB 12.4          Assessment/Plan: Will give 5 mg warfarin tonight. Recheck INR tomorrow AM.     Thank You for the Consult. Will continue to follow.    Oralia Suarez RPH ....................  5/22/2024   8:41 AM

## 2024-05-22 NOTE — PROGRESS NOTES
Care Transitions focused note:      [facility]  [facility]    All decline due to no secondary insurance.    Referral sent to [facility].    [facility] for son Valentin to call to give update, will need LTC application completed.    Anabel Garcia CM

## 2024-05-22 NOTE — PROGRESS NOTES
05/22/24 0850   Appointment Info   Signing Clinician's Name / Credentials (PT) Karin Crawford, PT   Rehab Comments (PT) Pt up in chair finishing breakfast and agreeable to PT.   Physical Therapy Goals   PT Frequency 6x/week   PT Predicted Duration/Target Date for Goal Attainment 05/31/24   PT Goals Bed Mobility;Transfers;Gait;Stairs   PT: Bed Mobility Modified independent;Supine to/from sit   PT: Transfers Modified independent;Sit to/from stand;Bed to/from chair;Assistive device   PT: Gait Modified independent;Rolling walker;150 feet   PT: Stairs Modified independent;Greater than 10 stairs;Rail on left   Therapeutic Procedure/Exercise   Ther. Procedure: strength, endurance, ROM, flexibillity Minutes (33258) 6   Symptoms Noted During/After Treatment shortness of breath;fatigue   Treatment Detail/Skilled Intervention Sit to stands x 10 with UE support on walker from regular chair, not bedside recliner in room; Seated LAQ x  10 bilaterally, seated hip flexion x 10 bilaterally   Gait Training   Gait Training Minutes (76665) 12   Symptoms Noted During/After Treatment (Gait Training) none   Treatment Detail/Skilled Intervention Pt stood from bedside recliner with SBA and cues to place hands on walker; she ambulated 200' with FWW and SBA, no LOB and good negotiation of FWW around cart in hallway, good ability to stop when someone came out of a room near her and crossed her path; she ambulated 6' between chairs in her room with FWW and SBA, and assumed sitting in recliner with SBA.  Pt left sitting in chair with chair alarm in place, call light and TV remote within reach.   Distance in Feet 200'   [place] Level (Gait Training) stand-by assist   PT Discharge Planning   PT Plan Progress mobility and strength   PT Discharge Recommendation (DC Rec) Transitional Care Facility   PT Rationale for DC Rec Pt seems unable to properly care for self at home and would benefit from strengthening to improve her functional mobility.    PT Brief overview of current status Transfers with SBA for Sit<>stand, needs reminders for safety and hand placement on walker initially upon standing; ambulated 200' with FWW and SBA without LOB and good negotiation of obstacles in vega   Total Session Time   Timed Code Treatment Minutes 18   Total Session Time (sum of timed and untimed services) 18   Psychosocial Support   Trust Relationship/Rapport care explained;choices provided;questions answered;questions encouraged;thoughts/feelings acknowledged

## 2024-05-22 NOTE — PROGRESS NOTES
05/22/24 0900   Appointment Info   Signing Clinician's Name / Credentials (OT) Jacqueline Juárez, OTR/L   Interventions   Interventions Quick Adds Cognitive Retraining   Cognitive Retraining   Cognitive Skills Intervention 1st 15 Minutes Timed (70646) 15   Cognitive Skills Intervention Addl Minutes (49667) 1   Symptoms Noted During/After Treatment   (Towards end of test, patient reported that she was feeling tired and requested to be done with test. Encouraged patient to continue as there were only a few parts left and she was agreeable.)   Treatment Detail/Skilled Intervention Patient participated in Magdiel Cognitive Assessment. She scored 17/30, suggesting moderate cognitive impairment. Patient had most marked difficulty with delayed recall, language, and executive functioning.   OT Discharge Planning   OT Plan Progress ADLs, strength, and mobility   OT Discharge Recommendation (DC Rec) Transitional Care Facility   OT Rationale for DC Rec Patient participated in MoCA today and scored 17/30, suggesting moderate cognitive impairment. She continues to require assistance with ADLs, safety, and mobility. She is not safe to return home alone at this time. Recommend STR.   Total Session Time   Timed Code Treatment Minutes 16   Total Session Time (sum of timed and untimed services) 16

## 2024-05-22 NOTE — PLAN OF CARE
Pt A&O x 4 this shift. Pt afebrile, and remains on room air.Lung sounds are clear, diminished. Pt denies pain this shift.   Pt voiding spontaneously, output is straw yellow.  Mild edema remains present to bilateral lower extremities.      Pt assist of 1 with walker, and gait belt while ambulating this shift.     Face to face report given with opportunity to observe patient.    Report given to LORENA Collins.     Vickie Gonzalez RN   5/22/2024  10:39 PM

## 2024-05-22 NOTE — PROGRESS NOTES
[facility]    Hospitalist Progress Note    Heather Rosas is a 73 year old female with established diagnosis of CHF, COPD, hypertension, GERD, atrial fibrillation on Coumadin, coronary artery disease, CABG who presented to the hospital brought by EMS complaining of generalized weakness.  This has been the for presentation in the ER for the last few days.  Patient is known to be noncompliant to her medication.  Patient was discharged from the ER this morning and sent home and felt so weak that EMS brought her back.  According to EMS patient has buckets of stool and urine in her house.  Her living condition unlivable.     On admission WBC elevated at 14, potassium low at 3.3, slight increase of creatinine 1.28, baseline 1.19, UA negative for UTI,     Patient leukocytosis most likely from steroids she recently took for COPD.     For hypokalemia potassium replacement ordered.     Unclear etiology of generalized weakness. Possibly failure to thrive.  TSH pending to rule out hypothyroidism.  Patient will most likely need placement     Assessment & Plan     Acute on chronic right-sided congestive heart failure (H): Weight up significantly-previous dry weight has been 195. She does have some firm edema to LE but nonpitting. When she is in failure her creatinine typically bumps up a bit and it has. BNP more elevated than normal. He reports weakness-which is typical for her CHF exacerbations. She has chronic incontinence. Will place a canada and give IV lasix every 8 hrs. She typically diureses relatively easy.   -5/20: Patient put out 2500 cc of urine in last 24 hours weight down 1.2 kg from yesterday.  Hemodynamically stable pressures 110 and 130 systolic.  Heart rates in the 80s..  BUN/creatinine however are up somewhat BUNs in the 50 range normally she is down in the 20 range at most.  Overall her edema is actually relatively good.  This is about as good as she probably will get.  Is not pitting at all.  I  think I will stop her IV furosemide today and started on her oral dose this afternoon.  Recheck her electrolytes renal function tomorrow.  -5/21: Patient put out 8775 cc of urine yesterday.  Stopped the IV Lasix placed her on oral torsemide.  She remains hemodynamically stable pressures 115-127.  Pulse has been in the 70-80 range.  Room air sats greater than 95% on room air.  Weight went down 8 pounds from this yesterday.  Continue with the oral torsemide and monitor closely.  BUN/creatinine stable 41/1.04.  Potassium is 3.6.  Keep Martin catheter in for 1 more day  -5/22: Patient only got her torsemide yesterday and still put out 5600 cc of urine.  Unlikely that she has not been taking it at all at home which is not surprising.  Her weight is down further today to 85.8 kg dropped 2.8 kg.  Overnight.  Hemodynamically stable.  Room air sats 93%.  Potassium stable 3.6 renal function actually stable also 42/1.1.  Will continue with the daily dose of torsemide.     Pulmonary: Denies any real shortness of breath sats 95% on room air.  Lungs few scattered crackles occasional rhonchi but nothing of significance.  She is in no real distress.  -5/21: Pulmonary wise still continues to do well 95% sat on room air no dyspnea.  -5/22: Sats fine room air no complaints of dyspnea.     Atrial fibrillation (H): Rates a bit high on arrival-110's. Now 80-90's. Continue coumadin, metoprolol, diltiazem  -5/20: Rates adequately controlled on current regimen blood pressure stable.  In terms of the Coumadin she obviously has not been taking it she has not been getting her INR checked the only way I think it is reasonable to restart this is if she is in a structured nursing facility or has ongoing 24-hour care.  -5/21: Rate rate controlled fine.  INR is 1.50 today.  -5/22 INR is 1.67 is on warfarin rates adequately controlled       HTN (hypertension): stable on arrival, continue as above.  -5/20: Hemodynamically stable.  -5/21: Stable  hemodynamically  -5/22 stable blood pressures.       Schizophrenia (H)/dementia: EMT report her house was uninhabitable-buckets of urine and feces, flies everywhere etc. social service consult.  consult as well.  -5/20: This has been a recurrent concern issue that she is not taking medications appropriately.  Been in and out of the hospital multiple times.  Will need to get family to come in and discuss care options for patient will likely require at minimum assisted living situation versus nursing home.  Patient was discharged back on April 18.  The tentative plan was to have home care, and both nursing PT and OT as the patient has been noncompliant in all of her medications and follow-up.  Looking through the notes in the chart the clinic and home care services trying to contact the patient.  She would not answer the phone or the door.  911 was called please did a welfare check and apparently she seemed fine no other interventions were taken.  She was post of a follow-up appointment on the eighth and the clinic was a no-show and then presented to the ER on 5/16.  Again on 5/17.  And then again on 5/18 they attempted to bring her back home however her home was unlivable per the EMS staff and was brought back to the ER and then admitted here.  Based on her history of noncompliance not allowing follow-up do not feel that the patient is safe to return home to that current situation unless things are markedly changed.  Will need to have case management get involved today along with family decide on an appropriate disposition plan.  -5/22: Not really sure what to think about the history of schizophrenia.  This has been listed in her chart for the last 30 years has never really been on any specific medications for this at all.  She is not having any hallucinations at all no obvious clinical evidence of schizophrenia.  Was going to have OT perform a MoCA on patient.    Diet: Combination Diet 2 gm NA Diet  Fluids:  None    DVT Prophylaxis: Warfarin  Code Status: Full Code    Disposition: Expected discharge once accepting facility is found.  Anastacio López MD    Interval History   Patient alert pleasant no distress does have some baseline confusion.  Diuresing significantly.  On oral torsemide.  Renal function doing fine potassium stable.  Blood pressure getting a little softer have to watch this closely.  INR is being titrated up will need placement.  May be problematic just due to payment issues.    -Data reviewed today: I reviewed all new labs and imaging results over the last 24 hours. I personally reviewed no images or EKG's today.    Physical Exam   Temp: 97.2  F (36.2  C) Temp src: Tympanic BP: 98/58 Pulse: 77   Resp: 16 SpO2: 93 % O2 Device: None (Room air)    Vitals:    05/20/24 0537 05/21/24 0513 05/22/24 0244   Weight: 92.4 kg (203 lb 11.2 oz) 88.6 kg (195 lb 5.2 oz) 85.8 kg (189 lb 2.5 oz)     Vital Signs with Ranges  Temp:  [96.7  F (35.9  C)-98.4  F (36.9  C)] 97.2  F (36.2  C)  Pulse:  [72-77] 77  Resp:  [16-20] 16  BP: ()/(58-73) 98/58  SpO2:  [93 %-96 %] 93 %  I/O last 3 completed shifts:  In: 720 [P.O.:720]  Out: 5600 [Urine:5600]    Peripheral IV 05/18/24 Anterior;Distal;Left Lower forearm (Active)   Site Assessment WDL 05/22/24 0754   Line Status Saline locked 05/22/24 0754   Dressing Transparent 05/22/24 0754   Dressing Status clean;dry;intact 05/22/24 0754   Line Intervention Flushed 05/22/24 0754   Phlebitis Scale 0-->no symptoms 05/22/24 0754   Infiltration? no 05/22/24 0754   Number of days: 4       Wound 07/11/20 Left Foot Other (comment) small hardened area on left heel (Active)   Number of days: 1411       Incision/Surgical Site 12/11/18 Right Chest (Active)   Number of days: 1989       Incision/Surgical Site 10/06/20 Right Chest (Active)   Number of days: 1324     No line/device    Constitutional: Alert and oriented x3. No distress    HEENT: Normocephalic/atraumatic, PERRL, EOMI, mouth  moist, neck supple, no LN.     Cardiovascular:irreg RRR. no Murmur, no  rubs, or gallops.  JVD no.  Bruits no.  Pulses 2    Respiratory: Clear to auscultation bilaterally    Abdomen: Soft, nontender, nondistended, no organomegaly. Bowel sounds present    Extremities: Warm/dry. Less edema 3+ softer edema    Neuro:   Non focal, cranial nerves intact, Moves all extremities.  Medications   Current Facility-Administered Medications   Medication Dose Route Frequency Provider Last Rate Last Admin    Patient is already receiving anticoagulation with heparin, enoxaparin (LOVENOX), warfarin (COUMADIN)  or other anticoagulant medication   Does not apply Continuous PRN Rishabh Garcia MD         Current Facility-Administered Medications   Medication Dose Route Frequency Provider Last Rate Last Admin    busPIRone (BUSPAR) tablet 10 mg  10 mg Oral BID Rishabh Garcia MD   10 mg at 05/22/24 0802    diltiazem ER COATED BEADS (CARDIZEM CD/CARTIA XT) 24 hr capsule 120 mg  120 mg Oral Daily Rishabh Garcia MD   120 mg at 05/22/24 0802    lisinopril (ZESTRIL) tablet 10 mg  10 mg Oral Daily Claudia Chino NP   10 mg at 05/22/24 0801    metoprolol succinate ER (TOPROL XL) 24 hr tablet 100 mg  100 mg Oral Daily Rishabh Garcia MD   100 mg at 05/22/24 0802    torsemide (DEMADEX) tablet 60 mg  60 mg Oral Daily Anastacio López MD   60 mg at 05/22/24 0801    warfarin ANTICOAGULANT (COUMADIN) tablet 5 mg  5 mg Oral ONCE at 18:00 Anastacio López MD        Warfarin Dose Required Daily - Pharmacist Managed  1 each Oral See Admin Instructions Rishabh Garcia MD           Data   Recent Labs   Lab 05/22/24  0530 05/21/24  0533 05/20/24  0529   WBC 10.5 9.8 9.4   HGB 12.4 13.1 13.3   MCV 88 89 89    220 247   INR 1.67* 1.50* 1.29*    138 137   POTASSIUM 3.6 3.6 4.2   CHLORIDE 99 97* 100   CO2 33* 29 25   BUN 42.1* 41.4* 52.4*   CR 1.10* 1.04* 1.31*   ANIONGAP 9 12 12   CARINA 9.2 9.5 9.4   * 104* 105*   ALBUMIN 3.5 3.7  --     PROTTOTAL 6.1* 6.6  --    BILITOTAL 1.5* 1.8*  --    ALKPHOS 150 160*  --    ALT 18 18  --    AST 23 25  --        No results found for this or any previous visit (from the past 24 hour(s)).

## 2024-05-23 NOTE — PROGRESS NOTES
Care Transitions focused note:      LVM for son Valentin to return my call.    [facility] declines due to needs LTC application completed.    Anabel Garcia CM

## 2024-05-23 NOTE — PLAN OF CARE
"Reason for hospital stay:  CHF    Most recent vitals: /73 (BP Location: Right arm, Patient Position: Chair, Cuff Size: Adult Regular)   Pulse 78   Temp 97.7  F (36.5  C) (Tympanic)   Resp 24   Ht 1.499 m (4' 11.02\")   Wt 84 kg (185 lb 3 oz)   SpO2 95%   BMI 37.38 kg/m        Patient A+O but does get confused at times, talks about confabulated memories that falsely include writer and staff, rambles off subject and frequently changes the subject mid conversation. Pleasant and cooperative with assessment. Denied any pain this shift. Continues to have slight edema to BLEs. Blood pressure 103/65 this morning and received order to hold scheduled Cardizem and metoprolol doses per Dr. López. Lungs clear throughout and maintaining sats 95% on RA. IV saline locked. Transfers with SBA, gait belt, and walker. Ambulated in hallway today with OT and in room to bathroom with staff. Refused PT today.     Face to face report given with opportunity to observe patient.    Report given to Negar Baker RN   5/23/2024  7:21 PM    "

## 2024-05-23 NOTE — PROGRESS NOTES
05/23/24 0900   Appointment Info   Signing Clinician's Name / Credentials (OT) Jacqueline Juárez, OTR/L   Interventions   Interventions Quick Adds Therapeutic Activity   Therapeutic Activities   Therapeutic Activity Minutes (80388) 12   Symptoms noted during/after treatment fatigue   Treatment Detail/Skilled Intervention Patient was supine in bed at time of OT arrival and was agreeable to therapy. She declined ADLs as she had just gotten washed up with nursing staff but she was agreeable to exercises and functional mobility. She was able to transfer with SBA from supine to sitting EOB. She was able to stand from bed with SBA and fww. Patient ambulated ~125 feet in the hallway with fww and SBA. While seated in recliner, patient engaged in shoulder flexion, shoulder horizontal abduction, chest press, and shoulder internal/external rotation x 10 to improve UE strength for ADLs and mobility. Patient reported fatigue following UE exercises. She was left resting in recliner with call light within reach and chair alarm on at end of session.   OT Discharge Planning   OT Plan Progress ADLs, strength, and mobility   OT Discharge Recommendation (DC Rec) Transitional Care Facility   OT Rationale for DC Rec Patient continues to require assistance for ADLs and SBA for safety during ambulation. She also continues to demonstrate cognitive deficits and confusion. Patient not safe to return home at this time. Recommend STR.   OT Brief overview of current status SBA for transfers; 125 feet mobility with fww and SBA; cognitive deficits; SBA for bed mobility   Total Session Time   Timed Code Treatment Minutes 12   Total Session Time (sum of timed and untimed services) 12   Psychosocial Support   Trust Relationship/Rapport care explained;choices provided;questions answered;questions encouraged;thoughts/feelings acknowledged

## 2024-05-23 NOTE — PLAN OF CARE
"Reason for hospital stay:  Acute on Chronic Right-sided CHF  Living situation PTA: Home  Most recent vitals: /71 (BP Location: Right arm, Patient Position: Sitting, Cuff Size: Adult Regular)   Pulse 78   Temp 97.1  F (36.2  C) (Tympanic)   Resp 20   Ht 1.499 m (4' 11.02\")   Wt 84 kg (185 lb 3 oz)   SpO2 93%   BMI 37.38 kg/m      Pain Management:  Denies pain this shift  LOC:  A&O this shift.   Cardiac:  HR regular w/ irregular rhythm. BP stable, a little soft.  Respiratory:  Lungs clear in upper lobes and dim in bases equal bilat. O2 94% on RA.  GI:  Normoactive BS x 4, no BM this shift.  :  Voiding spontaneously w/o difficulty and frequently due to diuretic.  Skin Issues:  Scattered bruising and scabbing present. No adjustments needed. Trace edema to BLE.    IVF:  SL.  ABX:  N/A    Nutrition: Combination 2 G Na diet.  Activity: SBA w gait belt and walker  Safety:  bed in lowest position, wheels locked, and alarms in place. Call light and personal items within reach. Room near nurses station w/ door open and frequent rounding. Pt is impulsive and doesn't use her call light when needing to get up to commode. She has been educated on this several times and did comply by morning.    Face to face report given with opportunity to observe patient.    Report given to LORENA Melgar RN   5/23/2024  7:15 AM      "

## 2024-05-23 NOTE — PROGRESS NOTES
05/23/24 1400   Appointment Info   Signing Clinician's Name / Credentials (PT) Jennifer Jones DPT   Appointment Canceled Reason (PT) Patient declined   Appointment Cancel Comments (PT) Attempted to treat pt 3x today however she declined even with max encouragement. Will attempt tomorrow

## 2024-05-23 NOTE — PROGRESS NOTES
Care Transitions focused note:      Updated Heather regarding need for secondary payor source. We will need to complete a LTC application first so that the MA will be pending.    Anabel Garcia CM

## 2024-05-23 NOTE — PHARMACY-MEDICATION REGIMEN REVIEW
Pharmacy Consult-Warfarin Assessment Day #6    Heather Rosas is a 73 year old female admitted on 5/18/2024 with Acute on chronic right-sided congestive heart failure (H)    Primary Indication(s) for Anticoagulation: A-fib     Goal INR: 2-3     FYI, patient is followed by the Anticoagulation[facility] Clinic at: [facility]      Patient previously anticoagulated on Coumadin at a dose of 2 mg daily     Factors that may increase patient's bleeding risk and/or sensitivity to warfarin (Coumadin) include: advanced age     Factors that may decrease patient's bleeding risk and/or sensitivity to warfarin (Coumadin) include: unsure of last dose/how compliant patient has been with warfarin       Anticoagulation Dose History  More data exists         5/18/2024 5/19/2024 5/20/2024 5/21/2024 5/22/2024 5/23/2024   Recent Dosing and Labs   warfarin ANTICOAGULANT (COUMADIN) 2 MG tablet 2 mg, $Given - 4 mg, $Given 4 mg, $Given - -   warfarin ANTICOAGULANT (COUMADIN) 2.5 MG tablet - - - - 5 mg, $Given -   warfarin ANTICOAGULANT (COUMADIN) 3 MG tablet - 3 mg, $Given - - - -   INR 1.25  1.38  1.29  1.50  1.67  2.15      CBC RESULTS:   Recent Labs   Lab Test 05/23/24  0520   HGB 13.1          Assessment/Plan: Patient therapeutic. Slight bump up in INR of 0.48 in 24 hours. Will give 3 mg warfarin dose tonight. Recheck INR tomorrow.     Thank You for the Consult. Will continue to follow.    Oralia Suarez RPH ....................  5/23/2024   8:19 AM

## 2024-05-23 NOTE — PROGRESS NOTES
[facility]    Hospitalist Progress Note    Heather Rosas is a 73 year old female with established diagnosis of CHF, COPD, hypertension, GERD, atrial fibrillation on Coumadin, coronary artery disease, CABG who presented to the hospital brought by EMS complaining of generalized weakness.  This has been the for presentation in the ER for the last few days.  Patient is known to be noncompliant to her medication.  Patient was discharged from the ER this morning and sent home and felt so weak that EMS brought her back.  According to EMS patient has buckets of stool and urine in her house.  Her living condition unlivable.     On admission WBC elevated at 14, potassium low at 3.3, slight increase of creatinine 1.28, baseline 1.19, UA negative for UTI,     Patient leukocytosis most likely from steroids she recently took for COPD.     For hypokalemia potassium replacement ordered.     Unclear etiology of generalized weakness. Possibly failure to thrive.  TSH pending to rule out hypothyroidism.  Patient will most likely need placement     Assessment & Plan     Acute on chronic right-sided congestive heart failure (H): Weight up significantly-previous dry weight has been 195. She does have some firm edema to LE but nonpitting. When she is in failure her creatinine typically bumps up a bit and it has. BNP more elevated than normal. He reports weakness-which is typical for her CHF exacerbations. She has chronic incontinence. Will place a canada and give IV lasix every 8 hrs. She typically diureses relatively easy.   -5/20: Patient put out 2500 cc of urine in last 24 hours weight down 1.2 kg from yesterday.  Hemodynamically stable pressures 110 and 130 systolic.  Heart rates in the 80s..  BUN/creatinine however are up somewhat BUNs in the 50 range normally she is down in the 20 range at most.  Overall her edema is actually relatively good.  This is about as good as she probably will get.  Is not pitting at all.  I  think I will stop her IV furosemide today and started on her oral dose this afternoon.  Recheck her electrolytes renal function tomorrow.  -5/21: Patient put out 8775 cc of urine yesterday.  Stopped the IV Lasix placed her on oral torsemide.  She remains hemodynamically stable pressures 115-127.  Pulse has been in the 70-80 range.  Room air sats greater than 95% on room air.  Weight went down 8 pounds from this yesterday.  Continue with the oral torsemide and monitor closely.  BUN/creatinine stable 41/1.04.  Potassium is 3.6.  Keep Martin catheter in for 1 more day  -5/22: Patient only got her torsemide yesterday and still put out 5600 cc of urine.  Unlikely that she has not been taking it at all at home which is not surprising.  Her weight is down further today to 85.8 kg dropped 2.8 kg.  Overnight.  Hemodynamically stable.  Room air sats 93%.  Potassium stable 3.6 renal function actually stable also 42/1.1.  Will continue with the daily dose of torsemide.  -5/23: Weight is down another 1.5 kg.  Had 3.1 L out yesterday.  BUN/creatinine continue to improve actually at 31/0.96 potassium 3.8.  However blood pressures are little softer  systolic range pulse in the 70s.  Rhythm medications are diltiazem lisinopril and metoprolol.  The diltiazem and metoprolol are predominantly for her A-fib rate which is good in the 70s currently.  Lisinopril for hypertension she has normal systolic function we will hold that today.  Her echoes have shown normal systolic function.  Will give her 40 mg dose of her torsemide today.     Pulmonary: Denies any real shortness of breath sats 95% on room air.  Lungs few scattered crackles occasional rhonchi but nothing of significance.  She is in no real distress.  -5/21: Pulmonary wise still continues to do well 95% sat on room air no dyspnea.  -5/22: Sats fine room air no complaints of dyspnea.  -5/23: Sats remain good room air 93% no dyspnea.     Atrial fibrillation (H): Rates a bit  high on arrival-110's. Now 80-90's. Continue coumadin, metoprolol, diltiazem  -5/20: Rates adequately controlled on current regimen blood pressure stable.  In terms of the Coumadin she obviously has not been taking it she has not been getting her INR checked the only way I think it is reasonable to restart this is if she is in a structured nursing facility or has ongoing 24-hour care.  -5/21: Rate rate controlled fine.  INR is 1.50 today.  -5/22 INR is 1.67 is on warfarin rates adequately controlled  -5/23: Rates fine she is on the metoprolol and diltiazem.  Her INR is 2.15 today finally therapeutic.       HTN (hypertension): stable on arrival, continue as above.  -5/20: Hemodynamically stable.  -5/21: Stable hemodynamically  -5/22 stable blood pressures.  -5/23: As above again of stop the lisinopril she is on metoprolol and diltiazem hesitant to stop want either one of them because apparently her rates do increase significantly.  Follow closely  However we will just hold them this morning pressure is a little bit marginal which see how she does likely restart tomorrow or at a reduced dose        Schizophrenia (H)/dementia: EMT report her house was uninhabitable-buckets of urine and feces, flies everywhere etc. social service consult.  consult as well.  -5/20: This has been a recurrent concern issue that she is not taking medications appropriately.  Been in and out of the hospital multiple times.  Will need to get family to come in and discuss care options for patient will likely require at minimum assisted living situation versus nursing home.  Patient was discharged back on April 18.  The tentative plan was to have home care, and both nursing PT and OT as the patient has been noncompliant in all of her medications and follow-up.  Looking through the notes in the chart the clinic and home care services trying to contact the patient.  She would not answer the phone or the door.  911 was called please did a welfare  check and apparently she seemed fine no other interventions were taken.  She was post of a follow-up appointment on the eighth and the clinic was a no-show and then presented to the ER on 5/16.  Again on 5/17.  And then again on 5/18 they attempted to bring her back home however her home was unlivable per the EMS staff and was brought back to the ER and then admitted here.  Based on her history of noncompliance not allowing follow-up do not feel that the patient is safe to return home to that current situation unless things are markedly changed.  Will need to have case management get involved today along with family decide on an appropriate disposition plan.  -5/22: Not really sure what to think about the history of schizophrenia.  This has been listed in her chart for the last 30 years has never really been on any specific medications for this at all.  She is not having any hallucinations at all no obvious clinical evidence of schizophrenia.  Was going to have OT perform a MoCA on patient.  -5/23: MoCA was performed it was 17 out of 30.  We are planning on disposition to nursing home cannot return home at this point.    Diet: Combination Diet 2 gm NA Diet  Fluids: None    DVT Prophylaxis: Warfarin  Code Status: Full Code    Disposition: Expected discharge when accepting facility is found  Anastacio López MD    Interval History   Patient continues to diurese quite nicely.  Pressures getting a little on the softer side we will cut back on the torsemide to 40 mg daily discontinue lisinopril hold metoprolol and diltiazem for today and restart them tomorrow perhaps at a lower dose as long as her heart rate stays okay.  Awaiting for potential placement.  Like this is not can happen until next week.    -Data reviewed today: I reviewed all new labs and imaging results over the last 24 hours. I personally reviewed no images or EKG's today.    Physical Exam   Temp: 97.1  F (36.2  C) Temp src: Tympanic BP: 103/65 Pulse: 78    Resp: 24 SpO2: 94 % O2 Device: None (Room air)    Vitals:    05/21/24 0513 05/22/24 0244 05/23/24 0457   Weight: 88.6 kg (195 lb 5.2 oz) 85.8 kg (189 lb 2.5 oz) 84 kg (185 lb 3 oz)     Vital Signs with Ranges  Temp:  [96.8  F (36  C)-98.9  F (37.2  C)] 97.1  F (36.2  C)  Pulse:  [75-78] 78  Resp:  [16-24] 24  BP: ()/(58-71) 103/65  SpO2:  [93 %-97 %] 94 %  I/O last 3 completed shifts:  In: 1320 [P.O.:1320]  Out: 3125 [Urine:3125]    Peripheral IV 05/18/24 Anterior;Distal;Left Lower forearm (Active)   Site Assessment WDL 05/23/24 0045   Line Status Saline locked 05/23/24 0045   Dressing Transparent 05/23/24 0045   Dressing Status clean;dry;intact 05/23/24 0045   Line Intervention Flushed 05/23/24 0045   Phlebitis Scale 0-->no symptoms 05/23/24 0045   Infiltration? no 05/23/24 0045   Number of days: 5       Wound 07/11/20 Left Foot Other (comment) small hardened area on left heel (Active)   Number of days: 1412       Incision/Surgical Site 12/11/18 Right Chest (Active)   Number of days: 1990       Incision/Surgical Site 10/06/20 Right Chest (Active)   Number of days: 1325     No line/device    Constitutional: Alert and oriented x3. No distress    HEENT: Normocephalic/atraumatic, PERRL, EOMI, mouth moist, neck supple, no LN.     Cardiovascular: Irregular RRR.  No murmur, no  rubs, or gallops.  JVD no.  Bruits no.  Pulses 2    Respiratory: Clear to auscultation bilaterally    Abdomen: Soft, nontender, nondistended, no organomegaly. Bowel sounds present    Extremities: Warm/dry.  Less edema today calves are softer although she been laying in bed.  Edema    Neuro:   Non focal, cranial nerves intact, Moves all extremities.  Medications   Current Facility-Administered Medications   Medication Dose Route Frequency Provider Last Rate Last Admin    Patient is already receiving anticoagulation with heparin, enoxaparin (LOVENOX), warfarin (COUMADIN)  or other anticoagulant medication   Does not apply Continuous PRN Tsogmo,  MD Rishabh         Current Facility-Administered Medications   Medication Dose Route Frequency Provider Last Rate Last Admin    busPIRone (BUSPAR) tablet 10 mg  10 mg Oral BID Rishabh Garcia MD   10 mg at 05/23/24 0933    diltiazem ER COATED BEADS (CARDIZEM CD/CARTIA XT) 24 hr capsule 120 mg  120 mg Oral Daily Rishabh Garcia MD   120 mg at 05/22/24 0802    [Held by provider] lisinopril (ZESTRIL) tablet 10 mg  10 mg Oral Daily Claudia Chino NP   10 mg at 05/22/24 0801    metoprolol succinate ER (TOPROL XL) 24 hr tablet 100 mg  100 mg Oral Daily Rishabh Garcia MD   100 mg at 05/22/24 0802    [Held by provider] torsemide (DEMADEX) tablet 40 mg  40 mg Oral Daily Anastacio López MD        warfarin ANTICOAGULANT (COUMADIN) tablet 3 mg  3 mg Oral ONCE at 18:00 Anastacio López MD        Warfarin Dose Required Daily - Pharmacist Managed  1 each Oral See Admin Instructions Rishabh Garcia MD           Data   Recent Labs   Lab 05/23/24  0520 05/22/24  0530 05/21/24  0533   WBC 11.5* 10.5 9.8   HGB 13.1 12.4 13.1   MCV 90 88 89    217 220   INR 2.15* 1.67* 1.50*    141 138   POTASSIUM 3.8 3.6 3.6   CHLORIDE 96* 99 97*   CO2 34* 33* 29   BUN 30.8* 42.1* 41.4*   CR 0.96* 1.10* 1.04*   ANIONGAP 8 9 12   CARINA 9.0 9.2 9.5   GLC 87 105* 104*   ALBUMIN  --  3.5 3.7   PROTTOTAL  --  6.1* 6.6   BILITOTAL  --  1.5* 1.8*   ALKPHOS  --  150 160*   ALT  --  18 18   AST  --  23 25       No results found for this or any previous visit (from the past 24 hour(s)).

## 2024-05-24 PROBLEM — I48.20 CHRONIC ATRIAL FIBRILLATION (H): Status: ACTIVE | Noted: 2024-01-01

## 2024-05-24 PROBLEM — R60.0 PERIPHERAL EDEMA: Status: ACTIVE | Noted: 2024-01-01

## 2024-05-24 NOTE — PROGRESS NOTES
05/24/24 1400   Appointment Info   Signing Clinician's Name / Credentials (PT) Jennifer Jones DPT   Therapeutic Activity   Therapeutic Activities: dynamic activities to improve functional performance Minutes (01707) 25   Symptoms Noted During/After Treatment None   Treatment Detail/Skilled Intervention Pt seated in recliner at start of session and agreeable to treatment. Completed sit to stand mod I c FWW. Pt ambulated 350' c FWW and supervision. Steady and safe with FWW. Pt practiced ascending/descending 5 stairs c supervision using bilateral rails. Pt resting in recliner at end of session.   PT Discharge Planning   PT Plan Progress mobility and strength   PT Discharge Recommendation (DC Rec) Transitional Care Facility   PT Rationale for DC Rec Pt seems unable to properly care for self at home   Total Session Time   Timed Code Treatment Minutes 25   Total Session Time (sum of timed and untimed services) 25

## 2024-05-24 NOTE — PROGRESS NOTES
[facility]    Hospitalist Progress Note    Heather Rosas is a 73 year old female with established diagnosis of CHF, COPD, hypertension, GERD, atrial fibrillation on Coumadin, coronary artery disease, CABG who presented to the hospital brought by EMS complaining of generalized weakness.  This has been the for presentation in the ER for the last few days.  Patient is known to be noncompliant to her medication.  Patient was discharged from the ER this morning and sent home and felt so weak that EMS brought her back.  According to EMS patient has buckets of stool and urine in her house.  Her living condition unlivable.     On admission WBC elevated at 14, potassium low at 3.3, slight increase of creatinine 1.28, baseline 1.19, UA negative for UTI,     Patient leukocytosis most likely from steroids she recently took for COPD.     For hypokalemia potassium replacement ordered.     Unclear etiology of generalized weakness. Possibly failure to thrive.  TSH pending to rule out hypothyroidism.  Patient will most likely need placement     Assessment & Plan     Acute on chronic right-sided congestive heart failure (H): Weight up significantly-previous dry weight has been 195. She does have some firm edema to LE but nonpitting. When she is in failure her creatinine typically bumps up a bit and it has. BNP more elevated than normal. He reports weakness-which is typical for her CHF exacerbations. She has chronic incontinence. Will place a canada and give IV lasix every 8 hrs. She typically diureses relatively easy.   -5/20: Patient put out 2500 cc of urine in last 24 hours weight down 1.2 kg from yesterday.  Hemodynamically stable pressures 110 and 130 systolic.  Heart rates in the 80s..  BUN/creatinine however are up somewhat BUNs in the 50 range normally she is down in the 20 range at most.  Overall her edema is actually relatively good.  This is about as good as she probably will get.  Is not pitting at all.  I  think I will stop her IV furosemide today and started on her oral dose this afternoon.  Recheck her electrolytes renal function tomorrow.  -5/21: Patient put out 8775 cc of urine yesterday.  Stopped the IV Lasix placed her on oral torsemide.  She remains hemodynamically stable pressures 115-127.  Pulse has been in the 70-80 range.  Room air sats greater than 95% on room air.  Weight went down 8 pounds from this yesterday.  Continue with the oral torsemide and monitor closely.  BUN/creatinine stable 41/1.04.  Potassium is 3.6.  Keep Martin catheter in for 1 more day  -5/22: Patient only got her torsemide yesterday and still put out 5600 cc of urine.  Unlikely that she has not been taking it at all at home which is not surprising.  Her weight is down further today to 85.8 kg dropped 2.8 kg.  Overnight.  Hemodynamically stable.  Room air sats 93%.  Potassium stable 3.6 renal function actually stable also 42/1.1.  Will continue with the daily dose of torsemide.  -5/23: Weight is down another 1.5 kg.  Had 3.1 L out yesterday.  BUN/creatinine continue to improve actually at 31/0.96 potassium 3.8.  However blood pressures are little softer  systolic range pulse in the 70s.  Rhythm medications are diltiazem lisinopril and metoprolol.  The diltiazem and metoprolol are predominantly for her A-fib rate which is good in the 70s currently.  Lisinopril for hypertension she has normal systolic function we will hold that today.  Her echoes have shown normal systolic function.  Will give her 40 mg dose of her torsemide today.  -5/24: Held her diuretic yesterday but actually her weight is gone up again so we will restart it back up with 40 mg of Demadex daily.  Will restart her metoprolol and hold her diltiazem for now.  Renal function looks good blood pressure stable.  I think without the diuretic daily she is going to continue to hold onto fluid significantly.     Pulmonary: Denies any real shortness of breath sats 95% on room  air.  Lungs few scattered crackles occasional rhonchi but nothing of significance.  She is in no real distress.  -5/21: Pulmonary wise still continues to do well 95% sat on room air no dyspnea.  -5/22: Sats fine room air no complaints of dyspnea.  -5/23: Sats remain good room air 93% no dyspnea.  -5/24: Sats fine     Atrial fibrillation (H): Rates a bit high on arrival-110's. Now 80-90's. Continue coumadin, metoprolol, diltiazem  -5/20: Rates adequately controlled on current regimen blood pressure stable.  In terms of the Coumadin she obviously has not been taking it she has not been getting her INR checked the only way I think it is reasonable to restart this is if she is in a structured nursing facility or has ongoing 24-hour care.  -5/21: Rate rate controlled fine.  INR is 1.50 today.  -5/22 INR is 1.67 is on warfarin rates adequately controlled  -5/23: Rates fine she is on the metoprolol and diltiazem.  Her INR is 2.15 today finally therapeutic.  -5/24: Rates adequate but will restart her metoprolol.  On the Coumadin dosing by pharmacy.  INR 2.53.       HTN (hypertension): stable on arrival, continue as above.  -5/20: Hemodynamically stable.  -5/21: Stable hemodynamically  -5/22 stable blood pressures.  -5/23: As above again of stop the lisinopril she is on metoprolol and diltiazem hesitant to stop want either one of them because apparently her rates do increase significantly.  Follow closely  However we will just hold them this morning pressure is a little bit marginal which see how she does likely restart tomorrow or at a reduced dose  -5/24: Have discontinued the lisinopril.  She has normal systolic function.  She is on metoprolol and diltiazem more for rate control of her A-fib than anything else.  Will continue to hold the diltiazem and restart the metoprolol today.          Schizophrenia (H)/dementia: EMT report her house was uninhabitable-buckets of urine and feces, flies everywhere etc. social service  consult.  consult as well.  -5/20: This has been a recurrent concern issue that she is not taking medications appropriately.  Been in and out of the hospital multiple times.  Will need to get family to come in and discuss care options for patient will likely require at minimum assisted living situation versus nursing home.  Patient was discharged back on April 18.  The tentative plan was to have home care, and both nursing PT and OT as the patient has been noncompliant in all of her medications and follow-up.  Looking through the notes in the chart the clinic and home care services trying to contact the patient.  She would not answer the phone or the door.  911 was called please did a welfare check and apparently she seemed fine no other interventions were taken.  She was post of a follow-up appointment on the eighth and the clinic was a no-show and then presented to the ER on 5/16.  Again on 5/17.  And then again on 5/18 they attempted to bring her back home however her home was unlivable per the EMS staff and was brought back to the ER and then admitted here.  Based on her history of noncompliance not allowing follow-up do not feel that the patient is safe to return home to that current situation unless things are markedly changed.  Will need to have case management get involved today along with family decide on an appropriate disposition plan.  -5/22: Not really sure what to think about the history of schizophrenia.  This has been listed in her chart for the last 30 years has never really been on any specific medications for this at all.  She is not having any hallucinations at all no obvious clinical evidence of schizophrenia.  Was going to have OT perform a MoCA on patient.  -5/23: MoCA was performed it was 17 out of 30.  We are planning on disposition to nursing home cannot return home at this point.  -5/24: Patient initially was very cooperative this morning however at this point now she is demanding to be  discharged home.  Is getting extremely upset yelling.  Stating that we cannot keep her here against her will.  That is true.  I explained to her that she has been feeling at home for the last 3 times a week discharged her she comes back in worse shape every time.  She does not take her medication she does not allow home care to come in and evaluate her and help her.  She states that she will.  Case management apparently we do have her MA pending currently they are hopefully finding a place for her however she may fully refuse to do so.  If she declines that option we truly cannot keep her against her well despite her current MoCA score and history unless someone seeks guardianship on her.  Will see what happens after case management talks with her and family is involved.  She may very well be discharged home later today.    Diet: Combination Diet 2 gm NA Diet  Fluids: None    DVT Prophylaxis: Warfarin  Code Status: Full Code    Disposition: Expected discharge unclear depending upon acceptance at a facility and patient's acceptance of this  Anastacio López MD    Interval History   Patient initially was very calm cooperative however the got very angry and upset that she is not going home.  My plans were not to discharge her home today given her track record of noncompliance and repeat hospitalizations.  However she is demanding to do so.  Will see how it goes with case management and getting her placed.  Terms of her heart failure things are good I held her diuretics yesterday and weight did go up so need to restart this.  Blood pressures are stable will use only the metoprolol for right now given her pressures being on the marginal side.  Have discontinued the lisinopril.    -Data reviewed today: I reviewed all new labs and imaging results over the last 24 hours. I personally reviewed no images or EKG's today.    Physical Exam   Temp: 96.8  F (36  C) Temp src: Tympanic BP: 110/68 Pulse: 92   Resp: 20 SpO2: 94 % O2  Device: None (Room air)    Vitals:    05/22/24 0244 05/23/24 0457 05/24/24 0042   Weight: 85.8 kg (189 lb 2.5 oz) 84 kg (185 lb 3 oz) 85.6 kg (188 lb 11.4 oz)     Vital Signs with Ranges  Temp:  [96.8  F (36  C)-98.9  F (37.2  C)] 96.8  F (36  C)  Pulse:  [68-93] 92  Resp:  [20-24] 20  BP: ()/(64-79) 110/68  SpO2:  [93 %-96 %] 94 %  I/O last 3 completed shifts:  In: 1800 [P.O.:1800]  Out: 1250 [Urine:1250]    Peripheral IV 05/18/24 Anterior;Distal;Left Lower forearm (Active)   Site Assessment WDL 05/23/24 2018   Line Status Saline locked 05/23/24 2018   Dressing Transparent 05/23/24 2018   Dressing Status clean;dry;intact 05/23/24 2018   Line Intervention Flushed 05/23/24 2018   Phlebitis Scale 0-->no symptoms 05/23/24 2018   Infiltration? no 05/23/24 2018   Number of days: 6       Wound 07/11/20 Left Foot Other (comment) small hardened area on left heel (Active)   Number of days: 1413       Incision/Surgical Site 12/11/18 Right Chest (Active)   Number of days: 1991       Incision/Surgical Site 10/06/20 Right Chest (Active)   Number of days: 1326     No line/device    Constitutional: Alert and oriented x3.  Angry  HEENT: Normocephalic/atraumatic, PERRL, EOMI, mouth moist, neck supple, no LN.     Cardiovascular: Irregular RRR.  No murmur, no  rubs, or gallops.  JVD unable.  Bruits no.  Pulses 2    Respiratory: Clear to auscultation bilaterally    Abdomen: Soft, nontender, nondistended, no organomegaly. Bowel sounds present    Extremities: Warm/dry.  Edema improved probably only two thirds of the way distally on her legs now.       Neuro:   Non focal, cranial nerves intact, Moves all extremities.  Medications   Current Facility-Administered Medications   Medication Dose Route Frequency Provider Last Rate Last Admin    Patient is already receiving anticoagulation with heparin, enoxaparin (LOVENOX), warfarin (COUMADIN)  or other anticoagulant medication   Does not apply Continuous PRN Rishabh Garcia MD          Current Facility-Administered Medications   Medication Dose Route Frequency Provider Last Rate Last Admin    busPIRone (BUSPAR) tablet 10 mg  10 mg Oral BID Rishabh Garcia MD   10 mg at 05/23/24 2023    [Held by provider] diltiazem ER COATED BEADS (CARDIZEM CD/CARTIA XT) 24 hr capsule 120 mg  120 mg Oral Daily Rishabh Garcia MD   120 mg at 05/22/24 0802    metoprolol succinate ER (TOPROL XL) 24 hr tablet 100 mg  100 mg Oral Daily Anastacio López MD   100 mg at 05/22/24 0802    torsemide (DEMADEX) tablet 40 mg  40 mg Oral Daily Anastacio López MD        Warfarin Dose Required Daily - Pharmacist Managed  1 each Oral See Admin Instructions Rishabh Garcia MD           Data   Recent Labs   Lab 05/24/24  0505 05/23/24  0520 05/22/24  0530 05/21/24  0533   WBC  --  11.5* 10.5 9.8   HGB  --  13.1 12.4 13.1   MCV  --  90 88 89   PLT  --  214 217 220   INR 2.53* 2.15* 1.67* 1.50*    138 141 138   POTASSIUM 3.7 3.8 3.6 3.6   CHLORIDE 99 96* 99 97*   CO2 32* 34* 33* 29   BUN 25.6* 30.8* 42.1* 41.4*   CR 0.97* 0.96* 1.10* 1.04*   ANIONGAP 9 8 9 12   CARINA 8.9 9.0 9.2 9.5   GLC 92 87 105* 104*   ALBUMIN  --   --  3.5 3.7   PROTTOTAL  --   --  6.1* 6.6   BILITOTAL  --   --  1.5* 1.8*   ALKPHOS  --   --  150 160*   ALT  --   --  18 18   AST  --   --  23 25       No results found for this or any previous visit (from the past 24 hour(s)).

## 2024-05-24 NOTE — TELEPHONE ENCOUNTER
Telephone call from Lisa Suarez, Pharmacist, [phone].  Patient is potentially being discharged to a nursing home. Her home is unlivable and needs assistance if she does go home, help with getting warfarin and other meds set up for her due to history of noncompliance.   She got 2 mg on 5/18, 3 mg 5/19, 4 mg 5/20 and 5/21, 5 mg on 5/22 and 3 mg on 5/23. Recommended 2 mg daily and recheck on Tuesday 5/28.    Patient may be discharged to a nursing home or assisted living where her INR's could be checked. If not, writer asked that before patient is discharged to have her next INR on Tuesday, 5/28 be scheduled.  Christy Haas RN on 5/24/2024 at 1:53 PM

## 2024-05-24 NOTE — PLAN OF CARE
Pt is A&O with intermittent forgetfulness. Vs and assessments as charted. Spo2 in lower 90's on RA. Denies pain. Afebrile. Edema to BLE +2 to legs. Pt can be impulsive at times standing prior to utilizing call light for assistance. Is redirectable. Impulsiveness has improved t/o night. BP's soft at beginning of shift. Last /68. Cardizem, lisinopril, metoprolol, and torsemide held. Bed is locked and low, call light within reach, free from falls this shift.       Face to face report given with opportunity to observe patient.    Report given to LORENA Lynn RN   5/24/2024  7:11 AM

## 2024-05-24 NOTE — PROGRESS NOTES
Name: Heather Rosas    MRN#: [id number]    Reason for Hospitalization: Weakness [R53.1]    Discharge Date: 5/24/2024    Patient / Family response to discharge plan: recommendation for SNF for STR, had been accepted and patient refused.    Follow-Up Appt:   Future Appointments   Date Time Provider Department Center   6/4/2024  8:00 AM Nicolette Huffman MD [facility]       Other Providers (Care Coordinator, County Services, PCA services etc): Yes: [facility] and [facility]    Discharge Disposition: home with homecare referral    MAARC filed [id number]    Filed request for examination of  with the state of [place].    Anabel Garcia CM

## 2024-05-24 NOTE — PLAN OF CARE
"Reason for hospital stay:  CHF Exac    Living situation PTA: Home    Most recent vitals: /64 (BP Location: Left arm, Patient Position: Sitting, Cuff Size: Adult Regular)   Pulse 86   Temp 97.2  F (36.2  C) (Tympanic)   Resp 24   Ht 1.499 m (4' 11.02\")   Wt 85.6 kg (188 lb 11.4 oz)   SpO2 96%   BMI 38.09 kg/m      Patient A+O but does get confused at times, frequently changes the subject mid conversation and is adamant about being discharged today. Was yelling this morning at provider demanding that she be discharged home today. Was pleasant and cooperative with assessment and used call light most of the time. Did self transfer to commode a few times without asking for help and did remove chair alarm numerous times this shift. Denied any pain. Continues to have slight edema to BLEs. Lungs with crackles to bilateral bases, maintaining sats 96% on RA. Transfers with SBA, gait belt, and walker. Ambulated in hallway today with staff and in room to bathroom with staff.          Patient discharged at 3:45 PM via wheel chair accompanied by staff via taxi service. Prescriptions sent to patients preferred pharmacy. All belongings sent with patient.     Discharge instructions reviewed with patient. Listed belongings gathered and returned to patient.     Patient discharged to home.     [facility]  Follow up appointment made:  Yes  Home medications returned to patient: N/A  Patient reports pain was well managed at discharge: Yes      "

## 2024-05-24 NOTE — PROGRESS NOTES
Care Transitions focused note:      Met with Heather this morning, advised she had been accepted at [facility], transportation set at 3P. Heather has decided that she wants to go home. Spoke with son Valentin, he will speak to his mom and try to convince her to go for STR. Completed PAS in anticipation of going to SNF. Shared with Heather that all therapies recommend STR, and that EMS documented that her home is currently not livable. If she refuses SNF for STR and Heather discharges home, will file a Vulnerable adult.    Awaiting call back from son Valentin.    Anabel Garcia CM

## 2024-05-24 NOTE — PROGRESS NOTES
"   05/24/24 1100   Appointment Info   Signing Clinician's Name / Credentials (OT) Jacqueline Juárez, OTR/L   Interventions   Interventions Quick Adds Therapeutic Activity   Therapeutic Activities   Therapeutic Activity Minutes (09620) 10   Treatment Detail/Skilled Intervention Patient was seated in recliner at time of OT arrival and was agreeable to therapy. She said she had already completed ADLs but was agreeable to exercise and functional mobility. She was able to don a robe with setup A. She stood from recliner with SBA and ambulated 75 feet in the hallway with SBA and fww. Before reaching end of the hallway, patient turned around and began walking back to her room. When asked if she was tired, patient reported \"no\" but declined any further ambulation. While seated in recliner, she engaged in gentle UE strengthening/ ROM with blue (medium) theraband including chest fly, tricep extension, and lat rows. Patient was cued to complete 10 reps but often stopped and moved onto the next arm after 5-7 reps even with assistance for counting and verbal cues to continue. Patient was left resting in recliner with call light within reach and chair alarm on.   OT Discharge Planning   OT Plan Progress ADLs, strength, and mobility   OT Discharge Recommendation (DC Rec) Transitional Care Facility   OT Rationale for DC Rec Patient continues to require assistance for ADLs and SBA for safety during ambulation. She also continues to demonstrate cognitive deficits and confusion. Patient not safe to return home at this time. Recommend STR.   OT Brief overview of current status SBA for transfers; impaired initiation/ cognitive skills; fatigue   Total Session Time   Timed Code Treatment Minutes 10   Total Session Time (sum of timed and untimed services) 10   Psychosocial Support   Trust Relationship/Rapport care explained;choices provided;reassurance provided       "

## 2024-05-24 NOTE — PROGRESS NOTES
"CLINICAL NUTRITION SERVICES  -  ASSESSMENT NOTE    REASON FOR ASSESSMENT:  LOS      NUTRITION HISTORY  Heather Rosas is a 73 year old female admitted for acute on chronic CHF. Medical hx includes CHF, HTN, dyslipidemia, GERD, afib, CVA hx, depression, schizophrenia. Weight trending down this admission with diuresis. Suspect decreased intake at home with weakness and the unlivable condition of her house was in when EMS arrived. Good appetite and intake during admission. Noted possible placement on discharge.    Allergies     Allergies   Allergen Reactions    Allegra [Fexofenadine] Nausea and Vomiting    Crestor [Rosuvastatin] Dizziness    Cats Other (See Comments)     Sneezing, runny nose    Codeine Sulfate Nausea and Vomiting and GI Disturbance       CURRENT NUTRITION ORDERS  Diet Order:   Orders Placed This Encounter      Combination Diet 2 gm NA Diet  Current Intake/Tolerance: 16 meals with % intake (14 of 16 meals with 100% intake)    ANTHROPOMETRICS  Height: 4' 11.016\"  Weight: 188 lbs 11.42 oz  Body mass index is 38.09 kg/m .  Weight Status:  Obesity Grade II BMI 35-39.9  Weight History:   Wt Readings from Last 10 Encounters:   05/24/24 85.6 kg (188 lb 11.4 oz)   04/18/24 91.8 kg (202 lb 6.1 oz)   03/04/24 88.5 kg (195 lb)   02/26/24 88.8 kg (195 lb 12.3 oz)   02/14/24 87.9 kg (193 lb 12.6 oz)   09/22/23 84.7 kg (186 lb 11.7 oz)   08/03/23 90.4 kg (199 lb 6.4 oz)   07/29/23 102.9 kg (226 lb 13.7 oz)   07/24/23 98.1 kg (216 lb 6.1 oz)   07/10/23 93.6 kg (206 lb 5.6 oz)      LABS  Labs reviewed    MEDICATIONS  Medications reviewed    Malnutrition Diagnosis: Patient does not meet two of the above necessary for diagnosing malnutrition    NUTRITION INTERVENTIONS  Recommendations / Nutrition Prescription  Encourage intake as needed    MONITORING AND EVALUATION:  Intake, weight, labs          "

## 2024-05-24 NOTE — PHARMACY-ANTICOAGULATION SERVICE
Pharmacy Consult-Warfarin Assessment Day #7    Heather Rosas is a 73 year old female admitted on 5/18/2024 with Acute on chronic right-sided congestive heart failure (H)    Primary Indication(s) for Anticoagulation: A-fib     Goal INR: 2-3     FYI, patient is followed by the Anticoagulation/Protime Clinic at: [facility]      Patient previously anticoagulated on Coumadin at a dose of 2 mg daily     Factors that may increase patient's bleeding risk and/or sensitivity to warfarin (Coumadin) include: advanced age, ability to take warfarin correctly if patient goes home      Factors that may decrease patient's bleeding risk and/or sensitivity to warfarin (Coumadin) include: ability to take warfarin correctly if patient goes home     Anticoagulation Dose History  More data exists         Latest Ref Rng & Units 5/18/2024 5/19/2024 5/20/2024 5/21/2024 5/22/2024 5/23/2024 5/24/2024   Recent Dosing and Labs   warfarin ANTICOAGULANT (COUMADIN) 2 MG tablet - 2 mg, $Given - 4 mg, $Given 4 mg, $Given - - -   warfarin ANTICOAGULANT (COUMADIN) 2.5 MG tablet - - - - - 5 mg, $Given - -   warfarin ANTICOAGULANT (COUMADIN) 3 MG tablet - - 3 mg, $Given - - - 3 mg, $Given -   INR 0.85 - 1.15 1.25  1.38  1.29  1.50  1.67  2.15  2.53      CBC RESULTS:   Recent Labs   Lab Test 05/23/24  0520   HGB 13.1          Assessment/Plan:     It is currently uncertain at this time whether the patient will be discharging to a facility or back home. Regardless, spoke with Christy from Anticoagulation Clinic. Now that the patient is therapeutic, we decided to resume the patient's home warfarin dose of 2 mg daily with an INR recheck on Tuesday, 5/28/24.         Thank You for the Consult. Will continue to follow.    Oralia Suarez RPH ....................  5/24/2024   8:07 AM

## 2024-05-24 NOTE — PROGRESS NOTES
PAS-RR    Per DHS regulation, CTS team completed and submitted PAS-RR to [place] [employer] via the [employer]. CTS team advised SNF and they are aware a PAS-RR has been submitted.     CTS team reviewed with pt or health care agent that they may be contacted for a follow up appointment within 10 days of hospital discharge if SNF stay is less than 30 days. Contact information for [employer] was also provided.     Pt or health care agent verbalized understanding.     PAS-RR # [id number]

## 2024-05-24 NOTE — DISCHARGE SUMMARY
"[facility]  Hospitalist Discharge Summary      Date of Admission:  5/18/2024  Date of Discharge:  5/24/2024  Discharging Provider: Anastacio López MD  Discharge Service: Hospitalist Service    Discharge Diagnoses     Generalized weakness  Medical noncompliance  Chronic atrial fibrillation  Acute on chronic heart failure with preserved ejection fraction  Likely right sided heart failure  Peripheral edema  Acute renal injury  Moderate cognitive impairment        Clinically Significant Risk Factors     # Obesity: Estimated body mass index is 38.09 kg/m  as calculated from the following:    Height as of this encounter: 1.499 m (4' 11.02\").    Weight as of this encounter: 85.6 kg (188 lb 11.4 oz).       Follow-ups Needed After Discharge   Follow-up Appointments     Follow-up and recommended labs and tests       Follow up with primary care provider, Nicolette Huffman, within 7 days for   hospital follow- up.  The following labs/tests are recommended: BMP.        Patient has been noncompliant after our discharge from the hospital.  We do have her set up for follow-up appointment with primary care provider, home care with RN visits, PT and OT.    Unresulted Labs Ordered in the Past 30 Days of this Admission       No orders found from 4/18/2024 to 5/19/2024.        These results will be followed up by not needed    Discharge Disposition   Discharged to home  Condition at discharge: Fair    Hospital Course   Heather Rosas is a 73 year old female with established diagnosis of CHF with preserved ejection fraction, also likely right sided heart failure, COPD, hypertension, GERD, atrial fibrillation on Coumadin, coronary artery disease, CABG who presented to the hospital brought by EMS complaining of generalized weakness.  This has been the for presentation in the ER for the last few days.  Patient is known to be noncompliant to her medication.  Patient was discharged from the ER this morning and sent home and felt " so weak that EMS brought her back.  According to EMS patient has buckets of stool and urine in her house.  Per their report her living arrangements were extremely poor.  On admission WBC elevated at 14, potassium low at 3.3, slight increase of creatinine 1.28, baseline 1.19, UA negative for UTI.  Upon arrival to the ER she was afebrile, blood pressure 120/84, saturating 94% on room air, pulse was 103.  She did have significant amount of lower extremity edema.  Complains of just severe weakness.     Patient leukocytosis most likely from steroids she recently took for COPD.     For hypokalemia potassium replacement ordered.     Unclear etiology of generalized weakness. Possibly failure to thrive.  TSH pending to rule out hypothyroidism.  Patient will most likely need placement     Hospital course:     Acute on chronic right-sided congestive heart failure/heart failure with preserved ejection fraction: (H): Weight up significantly-previous dry weight has been 195.  Upon arrival her weight was 94.5 kg / 208 pounds.  She does have some firm edema to LE but nonpitting. When she is in failure her creatinine typically bumps up a bit and it has. BNP more elevated than normal. He reports weakness-which is typical for her CHF exacerbations. She has chronic incontinence. Will place a canada and give IV lasix every 8 hrs. She typically diureses relatively easy.   -5/20: Patient put out 2500 cc of urine in last 24 hours weight down 1.2 kg from yesterday.  Hemodynamically stable pressures 110 and 130 systolic.  Heart rates in the 80s..  BUN/creatinine however are up somewhat BUNs in the 50 range normally she is down in the 20 range at most.  Overall her edema is actually relatively good.  This is about as good as she probably will get.  Is not pitting at all.  I think I will stop her IV furosemide today and started on her oral dose this afternoon.  Recheck her electrolytes renal function tomorrow.  -5/21: Patient put out 8775 cc of  urine yesterday.  Stopped the IV Lasix placed her on oral torsemide.  She remains hemodynamically stable pressures 115-127.  Pulse has been in the 70-80 range.  Room air sats greater than 95% on room air.  Weight went down 8 pounds from this yesterday.  Continue with the oral torsemide and monitor closely.  BUN/creatinine stable 41/1.04.  Potassium is 3.6.  Keep Martin catheter in for 1 more day  -5/22: Patient only got her torsemide yesterday and still put out 5600 cc of urine.  Unlikely that she has not been taking it at all at home which is not surprising.  Her weight is down further today to 85.8 kg dropped 2.8 kg.  Overnight.  Hemodynamically stable.  Room air sats 93%.  Potassium stable 3.6 renal function actually stable also 42/1.1.  Will continue with the daily dose of torsemide.  -5/23: Weight is down another 1.5 kg.  Had 3.1 L out yesterday.  BUN/creatinine continue to improve actually at 31/0.96 potassium 3.8.  However blood pressures are little softer  systolic range pulse in the 70s.  Rhythm medications are diltiazem lisinopril and metoprolol.  The diltiazem and metoprolol are predominantly for her A-fib rate which is good in the 70s currently.  Lisinopril for hypertension she has normal systolic function we will hold that today.  Her echoes have shown normal systolic function.  Will give her 40 mg dose of her torsemide today.  -5/24: Held her diuretic yesterday but actually her weight is gone up again so we will restart it back up with 40 mg of Demadex daily.  Will restart her metoprolol and hold her diltiazem for now.  Renal function looks good blood pressure stable.  Patient does require daily doses of her torsemide.  Will have her be on 40 mg daily.  Her discharge weight today was 188 pounds / 85.6 kg.  BUN/creatinine were 25.6/0.97.  She just had echocardiogram done last month her EF is 6065% the RV function was moderately reduced she had severe left atrial enlargement moderate right atrial  enlargement moderate mitral insufficiency moderate tricuspid insufficiency.  RV systolic pressure was 44+ right atrium which was estimated around 15 at that time.      Pulmonary: Denies any real shortness of breath sats 95% on room air.  Lungs few scattered crackles occasional rhonchi but nothing of significance.  She is in no real distress.  -5/21: Pulmonary wise still continues to do well 95% sat on room air no dyspnea.  -5/22: Sats fine room air no complaints of dyspnea.  -5/23: Sats remain good room air 93% no dyspnea.  -5/24: Sats fine from pulmonary standpoint she really does not have any significant issues in terms of oxygenation.  Her chest x-rays showed may be some mild pulmonary vascular congestion but no major issues.     Atrial fibrillation (H): Rates a bit high on arrival-110's. Now 80-90's. Continue coumadin, metoprolol, diltiazem  -5/20: Rates adequately controlled on current regimen blood pressure stable.  In terms of the Coumadin she obviously has not been taking it she has not been getting her INR checked the only way I think it is reasonable to restart this is if she is in a structured nursing facility or has ongoing 24-hour care.  -5/21: Rate rate controlled fine.  INR is 1.50 today.  -5/22 INR is 1.67 is on warfarin rates adequately controlled  -5/23: Rates fine she is on the metoprolol and diltiazem.  Her INR is 2.15 today finally therapeutic.  -5/24: Rates adequate but will restart her metoprolol.  On the Coumadin dosing by pharmacy.  INR 2.53.  So at this point her rate is adequately controlled in the 80s.  She is going to be on just her metoprolol currently 100 mg daily of the XL.  We are having her stop her diltiazem for now because her pressures were sort of borderline.  If her heart rate does jump up higher then she should restart the diltiazem.  We have started on the warfarin for her stroke prophylaxis.  This has been problematic in the past in terms of her not taking the medication.   We do have her set up to be seen by home care and primary care hopefully next week.  I explained to her in detail that without the Coumadin certainly she is at high risk of stroke.  She is stated multiple times she understands this.       HTN (hypertension): stable on arrival, continue as above.  -5/20: Hemodynamically stable.  -5/21: Stable hemodynamically  -5/22 stable blood pressures.  -5/23: As above again of stop the lisinopril she is on metoprolol and diltiazem hesitant to stop want either one of them because apparently her rates do increase significantly.  Follow closely  However we will just hold them this morning pressure is a little bit marginal which see how she does likely restart tomorrow or at a reduced dose  -5/24: Have discontinued the lisinopril.  She has normal systolic function.  She is on metoprolol and diltiazem more for rate control of her A-fib than anything else.  Will continue to hold the diltiazem and restart the metoprolol today.          Schizophrenia (H)/dementia: EMT report her house was uninhabitable-buckets of urine and feces, flies everywhere etc. social service consult.  consult as well.  -5/20: This has been a recurrent concern issue that she is not taking medications appropriately.  Been in and out of the hospital multiple times.  Will need to get family to come in and discuss care options for patient will likely require at minimum assisted living situation versus nursing home.  Patient was discharged back on April 18.  The tentative plan was to have home care, and both nursing PT and OT as the patient has been noncompliant in all of her medications and follow-up.  Looking through the notes in the chart the clinic and home care services trying to contact the patient.  She would not answer the phone or the door.  911 was called please did a welfare check and apparently she seemed fine no other interventions were taken.  She was post of a follow-up appointment on the eighth and  the clinic was a no-show and then presented to the ER on 5/16.  Again on 5/17.  And then again on 5/18 they attempted to bring her back home however her home was unlivable per the EMS staff and was brought back to the ER and then admitted here.  Based on her history of noncompliance not allowing follow-up do not feel that the patient is safe to return home to that current situation unless things are markedly changed.  Will need to have case management get involved today along with family decide on an appropriate disposition plan.  -5/22: Not really sure what to think about the history of schizophrenia.  This has been listed in her chart for the last 30 years has never really been on any specific medications for this at all.  She is not having any hallucinations at all no obvious clinical evidence of schizophrenia.  Was going to have OT perform a MoCA on patient.  -5/23: MoCA was performed it was 17 out of 30.  We are planning on disposition to nursing home cannot return home at this point.  -5/24: Patient initially was very cooperative this morning however at this point now she is demanding to be discharged home.  Is getting extremely upset yelling.  Stating that we cannot keep her here against her will.  That is true.  I explained to her that she has been feeling at home for the last 3 times a week discharged her she comes back in worse shape every time.  She does not take her medication she does not allow home care to come in and evaluate her and help her.  She states that she will.  Case management apparently we do have her MA pending currently they are hopefully finding a place for her however she may fully refuse to do so.  If she declines that option we truly cannot keep her against her well despite her current MoCA score and history unless someone seeks guardianship on her.  Will see what happens after case management talks with her and family is involved.  She may very well be discharged home later  today.  Patient declines admission to the nursing home despite having a bed available.  She was able to do stairs with physical therapy.  My physical standpoint is not a great deal of problem.  We do have some concerns regarding her cognitive status.  Will reiterate once again she does need to follow-up with primary care provider.  She does need to take her medications.  Will have PT OT and nursing visit her and follow her INRs.      Consultations This Hospital Stay   PHARMACY TO DOSE WARFARIN  PHYSICAL THERAPY ADULT IP CONSULT  OCCUPATIONAL THERAPY ADULT IP CONSULT  OCCUPATIONAL THERAPY ADULT IP CONSULT    Code Status   Full Code    Time Spent on this Encounter   I, Anastacio López MD, personally saw the patient today and spent greater than 30 minutes discharging this patient.       Anastacio López MD  [facility]  [address] [place] [id number]  Phone: [phone]  Fax: [phone]  ______________________________________________________________________    Physical Exam   Vital Signs: Temp: 97.2  F (36.2  C) Temp src: Tympanic BP: 121/64 Pulse: 86   Resp: 24 SpO2: 96 % O2 Device: None (Room air)    Weight: 188 lbs 11.42 oz  Constitutional: awake, alert, cooperative, no apparent distress, and appears stated age  Eyes: Lids and lashes normal, pupils equal, round and reactive to light, extra ocular muscles intact, sclera clear, conjunctiva normal  Respiratory: No increased work of breathing, good air exchange, clear to auscultation bilaterally, no crackles or wheezing  Cardiovascular: Irregular rate rhythm 26 systolic murmur.  Neck veins appear to be flat or difficult to assess.  Pulses are 2+ and equal.  GI: No scars, normal bowel sounds, soft, non-distended, non-tender, no masses palpated, no hepatosplenomegally  Musculoskeletal: Does have edema about snf down pitting.  Markedly improved from her admission.       Primary Care Physician   Nicolette Huffman    Discharge Orders      Home Care  Referral      ANTICOAGULATION CLINIC REFERRAL      Brief Discharge Instructions    Regarding warfarin therapy:   Take 2 mg warfarin by mouth daily. Recheck INR on Tuesday, 5/28/24 and receive further instructions from Anticoagulation Clinic.     Reason for your hospital stay    Patient was admitted with increasing shortness of breath and peripheral edema due to right-sided heart failure.  Was initiated on aggressive diuresis with significant weight loss.  O2 sat stable.  Was restarted on Coumadin due to her atrial fibrillation.  Options for nursing home were presented patient declines.  Will be sent home.     Follow-up and recommended labs and tests     Follow up with primary care provider, Nicolette Huffman, within 7 days for hospital follow- up.  The following labs/tests are recommended: BMP.     Activity    Your activity upon discharge: activity as tolerated     Discharge Instructions    Patient needs to take her medications as prescribed daily.  Weigh herself daily and write it down.  Needs follow-up with primary care provider Dr. Huffman within 1 week.  Is now on Coumadin and needs to make sure she follows up with the Coumadin clinic.  Will have home care come in and evaluate her both the nurse and physical and Occupational Therapy.     Monitor and record    weight every day     Diet    Follow this diet upon discharge: Orders Placed This Encounter      Combination Diet 2 gm NA Diet       Significant Results and Procedures   Most Recent 3 CBC's:  Recent Labs   Lab Test 05/23/24  0520 05/22/24  0530 05/21/24  0533   WBC 11.5* 10.5 9.8   HGB 13.1 12.4 13.1   MCV 90 88 89    217 220     Most Recent 3 BMP's:  Recent Labs   Lab Test 05/24/24  0505 05/23/24  0520 05/22/24  0530    138 141   POTASSIUM 3.7 3.8 3.6   CHLORIDE 99 96* 99   CO2 32* 34* 33*   BUN 25.6* 30.8* 42.1*   CR 0.97* 0.96* 1.10*   ANIONGAP 9 8 9   CARINA 8.9 9.0 9.2   GLC 92 87 105*     Most Recent 2 LFT's:  Recent Labs   Lab Test  05/22/24  0530 05/21/24  0533   AST 23 25   ALT 18 18   ALKPHOS 150 160*   BILITOTAL 1.5* 1.8*     Most Recent 3 INR's:  Recent Labs   Lab Test 05/24/24  0505 05/23/24  0520 05/22/24  0530   INR 2.53* 2.15* 1.67*     Most Recent 3 BNP's:  Recent Labs   Lab Test 05/18/24  1241 05/17/24  0743 05/16/24  0809   NTBNPI 6,706* 6,504* 4,312*     Most Recent TSH and T4:  Recent Labs   Lab Test 05/19/24  0308   TSH 0.83     Most Recent Urinalysis:  Recent Labs   Lab Test 05/18/24  1226   COLOR Light Yellow   APPEARANCE Clear   URINEGLC Negative   URINEBILI Negative   URINEKETONE Negative   SG 1.007   UBLD Negative   URINEPH 6.5   PROTEIN 10*   NITRITE Negative   LEUKEST Negative   RBCU 1   WBCU 2   ,   Results for orders placed or performed during the hospital encounter of 05/17/24   XR Chest 2 Views    Narrative    PROCEDURE:  XR CHEST 2 VIEWS    HISTORY:  SOB.     COMPARISON:  5/16/2024    FINDINGS:   Heart is enlarged. There is mild interstitial thickening stable from  previous examination. There is some subsegmental atelectasis at the  right lung base. No pleural effusion or pneumothorax.      Impression    IMPRESSION: Subsegmental atelectasis at the right lung base otherwise  no change from 5/16/2024    JEFERSON NEGRO MD         SYSTEM ID:  [id number]       Discharge Medications   Current Discharge Medication List        CONTINUE these medications which have CHANGED    Details   warfarin ANTICOAGULANT (COUMADIN) 2 MG tablet Take 2 mg warfarin by mouth daily. Recheck INR on Tuesday, 5/28/24 and receive further instructions from Anticoagulation Clinic.Take 2 mg warfarin by mouth daily. Recheck INR on Tuesday, 5/28/24 and receive further instructions from Anticoagulation Clinic.  Qty: 60 tablet, Refills: 1    Associated Diagnoses: Chronic atrial fibrillation (H)           CONTINUE these medications which have NOT CHANGED    Details   albuterol (PROAIR HFA/PROVENTIL HFA/VENTOLIN HFA) 108 (90 Base) MCG/ACT inhaler Inhale 2  puffs into the lungs every 6 hours as needed for shortness of breath, wheezing or cough  Qty: 18 g, Refills: 0    Comments: Pharmacy may dispense brand covered by insurance (Proair, or proventil or ventolin or generic albuterol inhaler)  Associated Diagnoses: Wheezing      busPIRone (BUSPAR) 10 MG tablet Take 1 tablet (10 mg) by mouth 2 times daily  Qty: 180 tablet, Refills: 0    Associated Diagnoses: ANNA (generalized anxiety disorder)      ipratropium - albuterol 0.5 mg/2.5 mg/3 mL (DUONEB) 0.5-2.5 (3) MG/3ML neb solution Take 1 vial (3 mLs) by nebulization every 6 hours as needed for shortness of breath, wheezing or cough  Qty: 90 mL, Refills: 0      metoprolol succinate ER (TOPROL XL) 100 MG 24 hr tablet Take 1 tablet (100 mg) by mouth daily  Qty: 90 tablet, Refills: 1    Associated Diagnoses: Longstanding persistent atrial fibrillation (H)      nystatin (MYCOSTATIN) 989898 UNIT/GM external powder Apply 1 g topically 3 times daily as needed (groin)      torsemide (DEMADEX) 20 MG tablet Take 3 tablets (60 mg) by mouth daily  Qty: 270 tablet, Refills: 1    Associated Diagnoses: Chronic diastolic congestive heart failure (H)      potassium chloride ER (K-TAB) 20 MEQ CR tablet Take 1 tablet (20 mEq) by mouth 2 times daily  Qty: 60 tablet, Refills: 0    Associated Diagnoses: Hypokalemia           STOP taking these medications       diltiazem ER (DILT-XR) 120 MG 24 hr capsule Comments:   Reason for Stopping:         lisinopril (ZESTRIL) 10 MG tablet Comments:   Reason for Stopping:         predniSONE (DELTASONE) 20 MG tablet Comments:   Reason for Stopping:             Allergies   Allergies   Allergen Reactions    Allegra [Fexofenadine] Nausea and Vomiting    Crestor [Rosuvastatin] Dizziness    Cats Other (See Comments)     Sneezing, runny nose    Codeine Sulfate Nausea and Vomiting and GI Disturbance

## 2024-05-27 NOTE — PROGRESS NOTES
Occupational Therapy Discharge Summary    Reason for therapy discharge:    Discharged to home with home therapy.    Progress towards therapy goal(s). See goals on Care Plan in [facility] electronic health record for goal details.  Goals partially met.  Barriers to achieving goals:   discharge from facility.    Therapy recommendation(s):    Continued therapy is recommended.  Rationale/Recommendations:  Continue to recommend therapy for safety and independence with ADLs and mobility.

## 2024-05-28 NOTE — PROGRESS NOTES
Care Transitions focused note:      Attempted to reach GERARDO Romero for return call.    Anabel Garcia CM

## 2024-06-03 NOTE — TELEPHONE ENCOUNTER
RN CC called patient for TCM.   RN CC attempted to reach patient two times, 5/31 and 6/3.  RN CC LVM for patient to return call.

## 2024-06-04 PROBLEM — J96.01 ACUTE HYPOXIC RESPIRATORY FAILURE (H): Status: RESOLVED | Noted: 2024-01-01 | Resolved: 2024-01-01

## 2024-06-04 PROBLEM — J44.1 COPD EXACERBATION (H): Status: RESOLVED | Noted: 2024-01-01 | Resolved: 2024-01-01

## 2024-06-04 PROBLEM — I50.9 ACUTE ON CHRONIC CONGESTIVE HEART FAILURE, UNSPECIFIED HEART FAILURE TYPE (H): Status: RESOLVED | Noted: 2024-01-01 | Resolved: 2024-01-01

## 2024-06-04 NOTE — TELEPHONE ENCOUNTER
Attempt # 1  Outcome: Left Message   Comment: Left message for patient to call back to schedule a physical per quality measures.  
Family

## 2024-06-11 PROBLEM — R06.02 SOB (SHORTNESS OF BREATH): Status: ACTIVE | Noted: 2024-01-01

## 2024-06-11 PROBLEM — R79.1 SUBTHERAPEUTIC INTERNATIONAL NORMALIZED RATIO (INR): Status: ACTIVE | Noted: 2024-01-01

## 2024-06-11 PROBLEM — N17.9 ACUTE KIDNEY INJURY (H): Status: ACTIVE | Noted: 2024-01-01

## 2024-06-11 PROBLEM — I95.9 HYPOTENSION: Status: ACTIVE | Noted: 2024-01-01

## 2024-06-11 NOTE — ED PROVIDER NOTES
History     Chief Complaint   Patient presents with    Shortness of Breath     The history is provided by the patient.   Shortness of Breath  Severity:  Moderate  Onset quality:  Gradual  Duration:  2 days  Timing:  Constant  Progression:  Worsening  Chronicity:  Recurrent  Associated symptoms: no abdominal pain, no chest pain, no cough, no diaphoresis, no fever, no headaches, no neck pain, no rash, no vomiting and no wheezing          Allergies:  Allergies   Allergen Reactions    Allegra [Fexofenadine] Nausea and Vomiting    Crestor [Rosuvastatin] Dizziness    Cats Other (See Comments)     Sneezing, runny nose    Codeine Sulfate Nausea and Vomiting and GI Disturbance       Problem List:    Patient Active Problem List    Diagnosis Date Noted    SOB (shortness of breath) 06/11/2024     Priority: Medium    Acute kidney injury (H24) 06/11/2024     Priority: Medium    Subtherapeutic international normalized ratio (INR) 06/11/2024     Priority: Medium    Hypotension 06/11/2024     Priority: Medium    Peripheral edema 05/24/2024     Priority: Medium    Weakness 05/18/2024     Priority: Medium    Severe tricuspid regurgitation 03/04/2024     Priority: Medium    Hypokalemia 03/04/2024     Priority: Medium    Chronic diastolic congestive heart failure (H) 03/04/2024     Priority: Medium    Lesion of colon 03/04/2024     Priority: Medium    Lesion of bladder 03/04/2024     Priority: Medium    Generalized edema due to fluid overload 02/05/2024     Priority: Medium    Pulmonary nodules 08/03/2023     Priority: Medium     5/2023 - 8mm      Thyroid nodule 08/03/2023     Priority: Medium     1cm, noted in 2019      Tobacco dependence 08/03/2023     Priority: Medium    Aortic valve sclerosis (7/2023) 07/24/2023     Priority: Medium    Diarrhea 07/07/2023     Priority: Medium    Noncompliance with medication regimen 07/07/2023     Priority: Medium    Coronary artery disease involving native coronary artery 10/12/2021     Priority:  Medium     CAD S/P NSTEMI with CABG x 4 vessel 9/10/18 Dr. Oconnell.       Status post chemotherapy 09/25/2020     Priority: Medium     Added automatically from request for surgery [id number]      Atrial fibrillation (H) 11/26/2018     Priority: Medium    Hyponatremia 10/30/2018     Priority: Medium    Moderate mitral regurgitation 10/30/2018     Priority: Medium    Adenocarcinoma of the endometrium/uterus (H) 10/09/2018     Priority: Medium     Discovered on 02/18 biopsy. S/p resection and chemo. Pt lost to follow up.   High-grade endometrial adenocarcinoma of the uterus with staging consistent with pathologic T1b N0 I plus or stage 1B with isolated tumor cells involving one of the periaortic lymph nodes.       Chronic right-sided congestive heart failure (H) 09/20/2018     Priority: Medium    S/P CABG x 4 09/10/2018     Priority: Medium    HTN (hypertension)      Priority: Medium    ANNA (generalized anxiety disorder) 01/01/2011     Priority: Medium    GERD (Gastroesophageal reflux disease) 01/01/2011     Priority: Medium    Schizophrenia (H) 01/01/2011     Priority: Medium    Seasonal allergic rhinitis 01/01/2011     Priority: Medium    Fibromyalgia 06/14/2004     Priority: Medium    Dyslipidemia 11/26/2003     Priority: Medium        Past Medical History:    Past Medical History:   Diagnosis Date    Acute diastolic congestive heart failure (H) 10/12/2021    Acute exacerbation of CHF (congestive heart failure) (H) 10/11/2021    Acute hypoxic respiratory failure (H) 04/16/2024    Acute kidney failure, unspecified (H)     Acute on chronic congestive heart failure, unspecified heart failure type (H) 09/21/2023    Acute on chronic diastolic congestive heart failure (H)     Allergic rhinitis 01/01/2011    Anxiety 01/01/2011    Anxiety state, unspecified     Atrial fibrillation with RVR (H) 07/07/2023    Atrial flutter with rapid ventricular response (H) 10/12/2021    COPD exacerbation (H) 04/16/2024    CVA (cerebral vascular  accident) (H) 05/14/2018    Dyslipidemia 11/26/2003    fibromyalgia 06/14/2004    GERD, Esophageal reflux 01/01/2011    History of non-ST elevation myocardial infarction (NSTEMI) 09/06/2018    HTN (hypertension)     Major depression, recurrent (H24) 01/01/2011    Major depressive disorder, recurrent episode, moderate (H) 01/01/2011    Metrorrhagia 02/18/2018    Non compliance with medical treatment 07/07/2023    Nonorganic sleep disorder, unspecified     Obesity 01/01/2011    Obesity (H) 01/01/2011    Rapid atrial fibrillation (H)     Schizophrenia (H) 01/01/2011    Toxic shock syndrome (H) 2006       Past Surgical History:    Past Surgical History:   Procedure Laterality Date    ANGIOGRAM  02/2005    Normal     BIOPSY BREAST  1984    LT, normal    BYPASS GRAFT ARTERY CORONARY  09/10/2018    CARPAL TUNNEL RELEASE RT/LT  2005    RT, carpal tunnel    COLONOSCOPY  2011    Repeat in ten years     COLONOSCOPY  1996    COLONOSCOPY  2004    DILATION AND CURETTAGE, HYSTEROSCOPY, ABLATE ENDOMETRIUM, COMBINED N/A 2/19/2018    Procedure: COMBINED DILATION AND CURETTAGE, HYSTEROSCOPY, ABLATE ENDOMETRIUM;  HYSTEROSCOPY DILATION AND CURETTAGE, ENDOMETRIAL ABLATION;  Surgeon: Jose Nettles MD;  Location: [facility] OR    HYSTERECTOMY TOTAL ABD, NICK SALPINGO-OOPHORECTOMY, NODE DISSECTION, TUMOR DEBULKING, COMBINED  10/30/2018    INSERT PORT VASCULAR ACCESS N/A 12/11/2018    Procedure: PORT-A-CATH PLACEMENT;  Surgeon: Rfai Trinidad MD;  Location: [facility] OR    placement of central line  2005    REMOVE PORT VASCULAR ACCESS N/A 10/6/2020    Procedure: port a cath removal;  Surgeon: Rafi Trinidad MD;  Location: [facility]       Family History:    Family History   Problem Relation Age of Onset    C.A.D. Father 45        (cause of death)     Other - See Comments Father         rheumatic fever     Allergies Father     Cancer Sister 56        Esophageal to bone cancer    Gastrointestinal Disease Mother         GERD    Cancer Mother         pancreatic  "ca (cause of death) /liver ca    Breast Cancer Maternal Aunt     Breast Cancer Maternal Aunt     Colon Cancer Paternal Aunt         (cause of death)     Crohn's Disease Other     Depression Maternal Uncle     Depression Maternal Aunt     Diabetes Paternal Grandmother         type 2    Neurologic Disorder Brother         neuropathy    Cancer Brother 58        Tonsilular cancer/ lymph node in neck    Allergies Brother     Breast Cancer Cousin     Breast Cancer Cousin     Breast Cancer Cousin        Social History:  Marital Status:   [4]  Social History     Tobacco Use    Smoking status: Every Day     Current packs/day: 1.00     Average packs/day: 1 pack/day for 30.0 years (30.0 ttl pk-yrs)     Types: Cigarettes     Passive exposure: Never    Smokeless tobacco: Never    Tobacco comments:     pt declined, stated she would use, \"the patch\". Patient has not had a cigarette in 1 week because she was in the hospital   Vaping Use    Vaping status: Never Used   Substance Use Topics    Alcohol use: No     Comment: rare    Drug use: No        Medications:    No current outpatient medications on file.        Review of Systems   Constitutional:  Negative for chills, diaphoresis and fever.   HENT:  Negative for voice change.    Eyes:  Negative for visual disturbance.   Respiratory:  Positive for shortness of breath. Negative for cough, chest tightness and wheezing.    Cardiovascular:  Positive for leg swelling. Negative for chest pain and palpitations.   Gastrointestinal:  Negative for abdominal distention, abdominal pain, anal bleeding, blood in stool, nausea and vomiting.   Genitourinary:  Negative for decreased urine volume, dysuria, flank pain and hematuria.   Musculoskeletal:  Negative for arthralgias, back pain, gait problem, myalgias, neck pain and neck stiffness.   Skin:  Negative for color change, pallor, rash and wound.   Neurological:  Negative for dizziness, syncope, light-headedness, numbness and headaches. " "  Psychiatric/Behavioral:  Negative for confusion and suicidal ideas.        Physical Exam   BP: 90/63  Pulse: 71  Temp: 97.6  F (36.4  C)  Resp: 24  Height: 149.9 cm (4' 11\")  Weight: 89.6 kg (197 lb 8.5 oz)  SpO2: 100 % (pt with duoneb in progress)      Physical Exam  Vitals and nursing note reviewed.   Constitutional:       Appearance: She is well-developed.   HENT:      Head: Normocephalic and atraumatic.      Mouth/Throat:      Pharynx: No oropharyngeal exudate.   Eyes:      Conjunctiva/sclera: Conjunctivae normal.      Pupils: Pupils are equal, round, and reactive to light.   Neck:      Thyroid: No thyromegaly.      Vascular: No JVD.      Trachea: No tracheal deviation.   Cardiovascular:      Rate and Rhythm: Normal rate and regular rhythm.      Heart sounds: Normal heart sounds. No murmur heard.     No friction rub. No gallop.   Pulmonary:      Effort: Pulmonary effort is normal. No respiratory distress.      Breath sounds: Normal breath sounds. No stridor. No wheezing or rales.   Chest:      Chest wall: No tenderness.   Abdominal:      General: Bowel sounds are normal. There is no distension.      Palpations: Abdomen is soft. There is no mass.      Tenderness: There is no abdominal tenderness. There is no guarding or rebound.   Musculoskeletal:         General: No tenderness. Normal range of motion.      Cervical back: Normal range of motion and neck supple.      Right lower leg: Edema present.      Left lower leg: Edema present.   Lymphadenopathy:      Cervical: No cervical adenopathy.   Skin:     General: Skin is warm and dry.      Coloration: Skin is not pale.      Findings: No erythema or rash.   Neurological:      Mental Status: She is alert and oriented to person, place, and time.   Psychiatric:         Behavior: Behavior normal.         ED Course     ED Course as of 06/11/24 1357   Tue Jun 11, 2024   0797 I received signout from Dr. Burnham about Heather Rosas.  Plan at this time is to follow-up with " the delta troponin and then plan disposition.  Possibly discharge.  Patient presents here today with concern of shortness of breath.     1010 Patient discussed with Dr. Dacosta who accepted the patient for admission.     Procedures                  Results for orders placed or performed during the hospital encounter of 06/11/24 (from the past 24 hour(s))   EKG 12 lead   Result Value Ref Range    Systolic Blood Pressure  mmHg    Diastolic Blood Pressure  mmHg    Ventricular Rate 78 BPM    Atrial Rate  BPM    NC Interval  ms    QRS Duration 150 ms     ms    QTc 501 ms    P Axis  degrees    R AXIS 107 degrees    T Axis 8 degrees    Interpretation ECG       Atrial fibrillation  Right bundle branch block  Possible Inferior infarct (cited on or before 15-APR-2024)  Abnormal ECG  When compared with ECG of 18-MAY-2024 12:32,  No significant change was found  Confirmed by MD Luisito, Nick ([id number]) on 6/11/2024 8:34:12 AM     Comprehensive metabolic panel   Result Value Ref Range    Sodium 129 (L) 135 - 145 mmol/L    Potassium 3.1 (L) 3.4 - 5.3 mmol/L    Carbon Dioxide (CO2) 20 (L) 22 - 29 mmol/L    Anion Gap 13 7 - 15 mmol/L    Urea Nitrogen 25.4 (H) 8.0 - 23.0 mg/dL    Creatinine 1.50 (H) 0.51 - 0.95 mg/dL    GFR Estimate 37 (L) >60 mL/min/1.73m2    Calcium 8.8 8.8 - 10.2 mg/dL    Chloride 96 (L) 98 - 107 mmol/L    Glucose 112 (H) 70 - 99 mg/dL    Alkaline Phosphatase 159 (H) 40 - 150 U/L    AST 18 0 - 45 U/L    ALT 14 0 - 50 U/L    Protein Total 6.1 (L) 6.4 - 8.3 g/dL    Albumin 3.5 3.5 - 5.2 g/dL    Bilirubin Total 1.9 (H) <=1.2 mg/dL   CBC with Platelets & Differential    Narrative    The following orders were created for panel order CBC with Platelets & Differential.  Procedure                               Abnormality         Status                     ---------                               -----------         ------                     CBC with platelets and d...[[id number]]  Abnormal            Final  result                 Please view results for these tests on the individual orders.   Nt probnp inpatient   Result Value Ref Range    N terminal Pro BNP Inpatient 3,769 (H) 0 - 900 pg/mL   Troponin T, High Sensitivity   Result Value Ref Range    Troponin T, High Sensitivity 47 (H) <=14 ng/L   INR   Result Value Ref Range    INR 1.41 (H) 0.85 - 1.15   CBC with platelets and differential   Result Value Ref Range    WBC Count 8.7 4.0 - 11.0 10e3/uL    RBC Count 4.36 3.80 - 5.20 10e6/uL    Hemoglobin 11.6 (L) 11.7 - 15.7 g/dL    Hematocrit 37.5 35.0 - 47.0 %    MCV 86 78 - 100 fL    MCH 26.6 26.5 - 33.0 pg    MCHC 30.9 (L) 31.5 - 36.5 g/dL    RDW 19.1 (H) 10.0 - 15.0 %    Platelet Count 389 150 - 450 10e3/uL    % Neutrophils 64 %    % Lymphocytes 14 %    % Monocytes 14 %    % Eosinophils 6 %    % Basophils 1 %    % Immature Granulocytes 1 %    NRBCs per 100 WBC 0 <1 /100    Absolute Neutrophils 5.6 1.6 - 8.3 10e3/uL    Absolute Lymphocytes 1.2 0.8 - 5.3 10e3/uL    Absolute Monocytes 1.3 0.0 - 1.3 10e3/uL    Absolute Eosinophils 0.5 0.0 - 0.7 10e3/uL    Absolute Basophils 0.1 0.0 - 0.2 10e3/uL    Absolute Immature Granulocytes 0.1 <=0.4 10e3/uL    Absolute NRBCs 0.0 10e3/uL   XR Chest Port 1 View    Narrative    PROCEDURE:  XR CHEST PORT 1 VIEW    HISTORY:  sob.     COMPARISON:  5/17/2024    FINDINGS:   The heart is enlarged there is interstitial thickening seen in both  lungs. Interstitial thickening is stable from 5/17/2024 No pleural  effusion or pneumothorax.      Impression    IMPRESSION:  Cardiomegaly and interstitial thickening stable from  5/17/2024      JEFERSON NEGRO MD         SYSTEM ID:  [id number]   Troponin T, High Sensitivity   Result Value Ref Range    Troponin T, High Sensitivity 45 (H) <=14 ng/L   Basic metabolic panel   Result Value Ref Range    Sodium 127 (L) 135 - 145 mmol/L    Potassium 3.7 3.4 - 5.3 mmol/L    Chloride 96 (L) 98 - 107 mmol/L    Carbon Dioxide (CO2) 19 (L) 22 - 29 mmol/L    Anion  Gap 12 7 - 15 mmol/L    Urea Nitrogen 25.9 (H) 8.0 - 23.0 mg/dL    Creatinine 1.45 (H) 0.51 - 0.95 mg/dL    GFR Estimate 38 (L) >60 mL/min/1.73m2    Calcium 8.4 (L) 8.8 - 10.2 mg/dL    Glucose 95 70 - 99 mg/dL   Procalcitonin   Result Value Ref Range    Procalcitonin 0.08 <0.50 ng/mL   US Renal Complete Non-Vascular    Narrative    Exam: US RENAL COMPLETE NON-VASCULAR    Exam reason: margot    Technique:  Ultrasound of the kidneys and bladder was performed.    Comparison: 2/5/2024    FINDINGS:    Right Kidney:   The right kidney measures 11 cm in length. Normal in size and  echogenicity.  No hydronephrosis or solid mass. There are a couple of  small echogenic foci which may be due to nonobstructing stones or  calcified atherosclerosis.    Left Kidney:   The left kidney measures 9.5 cm in length. Normal in size and  echogenicity.  No hydronephrosis. There is a 1.8 x 1.7 x 0 1.5 cm  hypoechoic structure in the mid left kidney. There are a couple small  echogenic foci which may be due to nonobstructing stones or calcified  atherosclerosis.     Bladder:   No focal abnormality where visualized.      Impression    IMPRESSION:    No hydronephrosis.    There is a nonspecific hypoechoic structure in the mid left kidney  measuring up to 1.8 cm. A nonemergent CT urogram could be considered  for further evaluation.    MORENA GARDNER MD         SYSTEM ID:  [id number]       Medications   lidocaine 1 % 0.1-1 mL (has no administration in time range)   lidocaine (LMX4) cream (has no administration in time range)   sodium chloride (PF) 0.9% PF flush 3 mL (has no administration in time range)   sodium chloride (PF) 0.9% PF flush 3 mL (has no administration in time range)   senna-docusate (SENOKOT-S/PERICOLACE) 8.6-50 MG per tablet 1 tablet (has no administration in time range)     Or   senna-docusate (SENOKOT-S/PERICOLACE) 8.6-50 MG per tablet 2 tablet (has no administration in time range)   calcium carbonate (TUMS) chewable tablet 1,000  mg (has no administration in time range)   albuterol (PROVENTIL HFA/VENTOLIN HFA) inhaler (has no administration in time range)   busPIRone (BUSPAR) tablet 10 mg (has no administration in time range)   ipratropium - albuterol 0.5 mg/2.5 mg/3 mL (DUONEB) neb solution 3 mL (3 mLs Nebulization Not Given 6/11/24 1213)   metoprolol succinate ER (TOPROL XL) 24 hr tablet 100 mg (has no administration in time range)   rOPINIRole (REQUIP) tablet 1 mg (1 mg Oral $Given 6/11/24 0603)   HYDROmorphone (PF) (DILAUDID) injection 0.3 mg (0.3 mg Intravenous $Given 6/11/24 0546)   furosemide (LASIX) injection 20 mg (20 mg Intravenous $Given 6/11/24 0625)   sodium chloride 0.9% BOLUS 500 mL (0 mLs Intravenous Stopped 6/11/24 0846)       Assessments & Plan (with Medical Decision Making)   SOB for 2 days, leg swelling that is chronic but maybe getting worse as per patient  EKG : Afib + RBBB that is old    Lab reviewed  Trop slightly more than her baseline  CXR: mild congestion  BP soft on arrival so low dose lasix 20mg given  Awaiting for 2nd trop and re-eval   S/o to Dr Clark at the change of shift.  I have reviewed the nursing notes.    I have reviewed the findings, diagnosis, plan and need for follow up with the patient.        Current Discharge Medication List          Final diagnoses:   Subtherapeutic international normalized ratio (INR)   SOB (shortness of breath)   Hyponatremia   Hypokalemia   Acute kidney injury (H24)       6/11/2024   [facility]       Adriel Burnham MD  06/11/24 4816       Quirino Clark MD  06/11/24 5539

## 2024-06-11 NOTE — ED TRIAGE NOTES
Pt arrives per EMS from home with C/O SOB X2-3 days. Arrives with duoneb in progress per EMS. Denies cough or exposure to covid/influenza     Triage Assessment (Adult)       Row Name 06/11/24 0440          Triage Assessment    Airway WDL WDL        Respiratory WDL    Respiratory WDL X;rhythm/pattern;expansion/retractions     Rhythm/Pattern, Respiratory shortness of breath;tachypneic        Skin Circulation/Temperature WDL    Skin Circulation/Temperature WDL WDL        Cardiac WDL    Cardiac WDL WDL        Peripheral/Neurovascular WDL    Peripheral Neurovascular WDL WDL        Cognitive/Neuro/Behavioral WDL    Cognitive/Neuro/Behavioral WDL WDL

## 2024-06-11 NOTE — MEDICATION SCRIBE - ADMISSION MEDICATION HISTORY
Medication Scribe Admission Medication History    Admission medication history is complete. The information provided in this note is only as accurate as the sources available at the time of the update.    Information Source(s): Patient, [facility] pharmacy[facility] and [facility] via in-person and phone    Pertinent Information:   Patient manages her own medications and is a fair historian.   According to fill history, patient should be out of Buspar, Metoprolol, and Torsemide (all last filled 2/27/24 for 30 days).   Pt reports that she took these yesterday morning.  Potassium and Duonebs - never picked up from the pharmacy   Pt reports taking these yesterday morning  Pt has a known hx of non-compliance with medications  Warfarin - Patient states she IS taking her Warfarin as prescribed  INR today (6/11/24) in the ER was 1.41  Last Coumadin/INR check in clinic was scheduled for 5/28/24, but patient was a no-show for this appt    Changes made to PTA medication list:  Added: None  Deleted: Mycostatin - states no longer using and does not have any recent fill hx  Changed: Buspar - reports she takes once daily     Allergies reviewed with patient and updates made in EHR: yes    Medication History Completed By: Ghada Munoz 6/11/2024 1:45 PM    PTA Med List   Medication Sig Note Last Dose    albuterol (PROAIR HFA/PROVENTIL HFA/VENTOLIN HFA) 108 (90 Base) MCG/ACT inhaler Inhale 2 puffs into the lungs every 6 hours as needed for shortness of breath, wheezing or cough  6/10/2024 at 1000    busPIRone (BUSPAR) 10 MG tablet Take 1 tablet (10 mg) by mouth 2 times daily (Patient taking differently: Take 10 mg by mouth daily)  6/10/2024 at 1000    ipratropium - albuterol 0.5 mg/2.5 mg/3 mL (DUONEB) 0.5-2.5 (3) MG/3ML neb solution Take 1 vial (3 mLs) by nebulization every 6 hours as needed for shortness of breath, wheezing or cough 6/11/2024: Never picked up from pharmacy 6/10/2024 at 1000    metoprolol succinate ER (TOPROL  XL) 100 MG 24 hr tablet Take 1 tablet (100 mg) by mouth daily  6/10/2024 at 1000    potassium chloride ER (K-TAB) 20 MEQ CR tablet Take 1 tablet (20 mEq) by mouth 2 times daily 6/11/2024: Never picked up from pharmacy 6/10/2024 at 1000    torsemide (DEMADEX) 20 MG tablet Take 3 tablets (60 mg) by mouth daily  6/10/2024 at 1000    warfarin ANTICOAGULANT (COUMADIN) 2 MG tablet Take 2 mg warfarin by mouth daily. Recheck INR on Tuesday, 5/28/24 and receive further instructions from Anticoagulation Clinic.Take 2 mg warfarin by mouth daily. Recheck INR on Tuesday, 5/28/24 and receive further instructions from Anticoagulation Clinic.  6/10/2024 at 1000

## 2024-06-11 NOTE — ED NOTES
Patient being admitted to acute care. Nurse to nurse given to LORENA Mckoy. US currently at bedside requesting to do bedside US in ED prior to patient going upstairs with ERT.

## 2024-06-11 NOTE — ED NOTES
No SOB noted at this time. Pt has intermittent dry cough. Drinking coffee. Drowsy from dilaudid given for right ankle/foot pain.

## 2024-06-11 NOTE — H&P
[facility]    History and Physical - Hospitalist Service       Date of Admission:  6/11/2024    Assessment & Plan      Heather Rosas is a 73 year old female admitted on 6/11/2024. She has established diagnosis of  chronic heart failure preserved ejection fraction, generalized weakness, medical noncompliance, chronic atrial fibrillation on Coumadin, peripheral edema, moderate cognitive impairment MoCA of 17/30.  Essential hypertension, schizophrenia, COPD, history of CABG x 4, coronary artery disease, GERD, severe tricuspid regurgitation dyslipidemia, tobacco dependence.  Patient recently was admitted to the hospital in May with acute on chronic right-sided congestive heart failure heart failure with preserved ejection fraction she required IV diuresis in the hospital and she ultimately was discharged home on May 24 as she refused to go to rehab.  Presented emergency room complaint of shortness of breath on June 11.  She was found to have hyponatremia which is new, hypokalemia, MILDRED on CKD stage III, patient was found to have anemia hemoglobin 11.6,Pro BNP was elevated at 3700, she did have swelling in her legs troponin was elevated at 47 but was flat, INR 1.4.  Urine studies were done for hyponatremia, patient did have also hypotension in the emergency room blood pressure was high 80s systolics, she was given IV fluid bolus 500 cc normal saline with improvement in blood pressure.  Patient was admitted for further workup and monitoring.  She is  full code.    Active Problems:     SOB (shortness of breath)    Chronic diastolic congestive heart failure (H)   Not in exacerbation at this time  Not hypoxic  No signs of infection  She was given lasix in ER  Chest xray was done showing Cardiomegaly and interstitial thickening stable from  5/17/2024    Will monitor for right now      Acute kidney injury (H24)  Suspected she has CKD stage III  Monitor cr   Avoid nephrotic meds  Renal US was ordered        "Hypotension   She receive lasix  in er  No signs of infection  We did order 500cc ns bolus bp improved   Will monitor for right now   Lactic acid ordered    Elevated  troponin  Elevated bnp  Numbers appear flat  No chest pain   Monitor       Hyponatremia  Urine studies  Monitor sodium for now q 6 hrs     Dyslipidemia  Not on statin      HTN (hypertension)  Only on diuretic      ANNA (generalized anxiety disorder)  Prn atarax      GERD (Gastroesophageal reflux disease)  Not on ppi      Schizophrenia (H)  On buspar      Atrial fibrillation (H)    Subtherapeutic international normalized ratio (INR)  On home coumadin  Rate controlled      Tobacco dependence  Prn nicotine patch      S/P CABG x 4    Severe tricuspid regurgitation      Hypokalemia  Replace per protocol                Diet: 2 Gram Sodium Diet    DVT Prophylaxis: Warfarin  Martin Catheter: Not present  Lines: None     Cardiac Monitoring: None  Code Status: Full Code      Clinically Significant Risk Factors Present on Admission        # Hypokalemia: Lowest K = 3.1 mmol/L in last 2 days, will replace as needed  # Hyponatremia: Lowest Na = 127 mmol/L in last 2 days, will monitor as appropriate  # Hypocalcemia: Lowest Ca = 8.4 mg/dL in last 2 days, will monitor and replace as appropriate      # Drug Induced Coagulation Defect: home medication list includes an anticoagulant medication    # Hypertension: Noted on problem list  # Acute heart failure with preserved ejection fraction: heart failure noted on problem list, last echo with EF >50%, and receiving IV diuretics            # Obesity: Estimated body mass index is 39.9 kg/m  as calculated from the following:    Height as of this encounter: 1.499 m (4' 11\").    Weight as of this encounter: 89.6 kg (197 lb 8.5 oz).        # History of CABG: noted on surgical history       Disposition Plan     Medically Ready for Discharge: Anticipated in 2-4 Days           Octavio Phillips,   Hospitalist Service  [facility]  Securely message with [employer]pl[employer] (more info)  Text page via [facility] Paging/Directory     ______________________________________________________________________    Chief Complaint   Shortness of breath     History is obtained from the patient, electronic health record, and emergency department physician    History of Present Illness   Heather Rosas is a 73 year old female who  She has established diagnosis of  chronic heart failure preserved ejection fraction, generalized weakness, medical noncompliance, chronic atrial fibrillation on Coumadin, peripheral edema, moderate cognitive impairment MoCA of 17/30.  Essential hypertension, schizophrenia, COPD, history of CABG x 4, coronary artery disease, GERD, severe tricuspid regurgitation dyslipidemia, tobacco dependence.  Patient recently was admitted to the hospital in May with acute on chronic right-sided congestive heart failure heart failure with preserved ejection fraction she required IV diuresis in the hospital and she ultimately was discharged home on May 24 as she refused to go to rehab.  Presented emergency room complaint of shortness of breath on June 11.  Patient was found to have potassium 3.1, creatinine 1.5 baseline of 1.1, alk phos elevated at 159, bilirubin 1.9 previously 1.5 sodium 129 previously 140, CBC showed hemoglobin 11.6, white count 8.7, BNP 3700, troponin 40 7 repeat troponin, downtrending 45, no chest pain, procalcitonin 0.08, INR 1.41.  Patient stated that she has been taking her medication but she is only takes half of them because she thinks she only needs half.  Patient denies any fever chills sweating.  She did mention she has had diarrhea but she is taking laxative at home.  Patient did receive initially IV Lasix dose but then was given IV fluids as her blood pressure was reported to be in the 80s systolics.  Urine studies were ordered to workup further for the low sodium and urine analysis.  Which was all pending.  Patient  did have a chest x-ray no acute findings other than cardiomegaly.  Patient was admitted for further workup and care.  She is a full code.      Past Medical History    Past Medical History:   Diagnosis Date    Acute diastolic congestive heart failure (H) 10/12/2021    Acute exacerbation of CHF (congestive heart failure) (H) 10/11/2021    Acute hypoxic respiratory failure (H) 04/16/2024    Acute kidney failure, unspecified (H)     Acute on chronic congestive heart failure, unspecified heart failure type (H) 09/21/2023    Acute on chronic diastolic congestive heart failure (H)     Allergic rhinitis 01/01/2011    Anxiety 01/01/2011    Anxiety state, unspecified     Anxiety state    Atrial fibrillation with RVR (H) 07/07/2023    Atrial flutter with rapid ventricular response (H) 10/12/2021    COPD exacerbation (H) 04/16/2024    CVA (cerebral vascular accident) (H) 05/14/2018    Dyslipidemia 11/26/2003    fibromyalgia 06/14/2004    GERD, Esophageal reflux 01/01/2011    History of non-ST elevation myocardial infarction (NSTEMI) 09/06/2018    HTN (hypertension)     Essential hypertension    Major depression, recurrent (H24) 01/01/2011    Major depressive disorder, recurrent episode, moderate (H) 01/01/2011    Metrorrhagia 02/18/2018    Non compliance with medical treatment 07/07/2023    Nonorganic sleep disorder, unspecified     Non-org. 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, ARDS, renal failure       Past Surgical History   Past Surgical History:   Procedure Laterality Date    ANGIOGRAM  02/2005    Normal     BIOPSY BREAST  1984    LT, normal    BYPASS GRAFT ARTERY CORONARY  09/10/2018    CARPAL TUNNEL RELEASE RT/LT  2005    RT, carpal tunnel    COLONOSCOPY  2011    Repeat in ten years     COLONOSCOPY  1996    COLONOSCOPY  2004    DILATION AND CURETTAGE, HYSTEROSCOPY, ABLATE  ENDOMETRIUM, COMBINED N/A 2/19/2018    Procedure: COMBINED DILATION AND CURETTAGE, HYSTEROSCOPY, ABLATE ENDOMETRIUM;  HYSTEROSCOPY DILATION AND CURETTAGE, ENDOMETRIAL ABLATION;  Surgeon: Jose Nettles MD;  Location: [facility] OR    HYSTERECTOMY TOTAL ABD, NICK SALPINGO-OOPHORECTOMY, NODE DISSECTION, TUMOR DEBULKING, COMBINED  10/30/2018    INSERT PORT VASCULAR ACCESS N/A 12/11/2018    Procedure: PORT-A-CATH PLACEMENT;  Surgeon: Rafi Trinidad MD;  Location: [facility] OR    placement of central line  2005    REMOVE PORT VASCULAR ACCESS N/A 10/6/2020    Procedure: port a cath removal;  Surgeon: Rafi Trinidad MD;  Location: [facility] OR       Prior to Admission Medications   Prior to Admission Medications   Prescriptions Last Dose Informant Patient Reported? Taking?   albuterol (PROAIR HFA/PROVENTIL HFA/VENTOLIN HFA) 108 (90 Base) MCG/ACT inhaler 6/10/2024  No Yes   Sig: Inhale 2 puffs into the lungs every 6 hours as needed for shortness of breath, wheezing or cough   busPIRone (BUSPAR) 10 MG tablet 6/10/2024  No Yes   Sig: Take 1 tablet (10 mg) by mouth 2 times daily   Patient taking differently: Take 10 mg by mouth daily   ipratropium - albuterol 0.5 mg/2.5 mg/3 mL (DUONEB) 0.5-2.5 (3) MG/3ML neb solution 6/10/2024  No Yes   Sig: Take 1 vial (3 mLs) by nebulization every 6 hours as needed for shortness of breath, wheezing or cough   metoprolol succinate ER (TOPROL XL) 100 MG 24 hr tablet 6/10/2024  No Yes   Sig: Take 1 tablet (100 mg) by mouth daily   nystatin (MYCOSTATIN) 668207 UNIT/GM external powder Unknown  Yes Yes   Sig: Apply 1 g topically 3 times daily as needed (groin)   potassium chloride ER (K-TAB) 20 MEQ CR tablet Past Month  No Yes   Sig: Take 1 tablet (20 mEq) by mouth 2 times daily   torsemide (DEMADEX) 20 MG tablet 6/10/2024  No Yes   Sig: Take 3 tablets (60 mg) by mouth daily   warfarin ANTICOAGULANT (COUMADIN) 2 MG tablet 6/10/2024  No Yes   Sig: Take 2 mg warfarin by mouth daily. Recheck INR on Tuesday, 5/28/24  "and receive further instructions from Anticoagulation Clinic.Take 2 mg warfarin by mouth daily. Recheck INR on Tuesday, 5/28/24 and receive further instructions from Anticoagulation Clinic.      Facility-Administered Medications: None        Review of Systems    The 10 point Review of Systems is negative other than noted in the HPI or here.      Social History   I have reviewed this patient's social history and updated it with pertinent information if needed.  Social History     Tobacco Use    Smoking status: Every Day     Current packs/day: 1.00     Average packs/day: 1 pack/day for 30.0 years (30.0 ttl pk-yrs)     Types: Cigarettes     Passive exposure: Never    Smokeless tobacco: Never    Tobacco comments:     pt declined, stated she would use, \"the patch\". Patient has not had a cigarette in 1 week because she was in the hospital   Vaping Use    Vaping status: Never Used   Substance Use Topics    Alcohol use: No     Comment: rare    Drug use: No         Family History   I have reviewed this patient's family history and updated it with pertinent information if needed.  Family History   Problem Relation Age of Onset    C.A.D. Father 45        (cause of death)     Other - See Comments Father         rheumatic fever     Allergies Father     Cancer Sister 56        Esophageal to bone cancer    Gastrointestinal Disease Mother         GERD    Cancer Mother         pancreatic ca (cause of death) /liver ca    Breast Cancer Maternal Aunt     Breast Cancer Maternal Aunt     Colon Cancer Paternal Aunt         (cause of death)     Crohn's Disease Other     Depression Maternal Uncle     Depression Maternal Aunt     Diabetes Paternal Grandmother         type 2    Neurologic Disorder Brother         neuropathy    Cancer Brother 58        Tonsilular cancer/ lymph node in neck    Allergies Brother     Breast Cancer Cousin     Breast Cancer Cousin     Breast Cancer Cousin          Allergies   Allergies   Allergen Reactions    " Allegra [Fexofenadine] Nausea and Vomiting    Crestor [Rosuvastatin] Dizziness    Cats Other (See Comments)     Sneezing, runny nose    Codeine Sulfate Nausea and Vomiting and GI Disturbance        Physical Exam   Vital Signs: Temp: 97  F (36.1  C) Temp src: Tympanic BP: 102/69 Pulse: 82   Resp: 18 SpO2: 96 % O2 Device: None (Room air)    Weight: 197 lbs 8.51 oz    Physical Exam  Constitutional:       Appearance: She is obese.   HENT:      Right Ear: External ear normal.      Left Ear: External ear normal.      Nose: Nose normal.      Mouth/Throat:      Pharynx: Oropharynx is clear.   Eyes:      Conjunctiva/sclera: Conjunctivae normal.   Cardiovascular:      Rate and Rhythm: Normal rate.   Pulmonary:      Effort: Pulmonary effort is normal.   Abdominal:      General: Abdomen is flat.   Musculoskeletal:         General: Swelling present.   Skin:     Coloration: Skin is not jaundiced.   Neurological:      Mental Status: She is alert. Mental status is at baseline.         Medical Decision Making       56 MINUTES SPENT BY ME on the date of service doing chart review, history, exam, documentation & further activities per the note.      Data     I have personally reviewed the following data over the past 24 hrs:    8.7  \   11.6 (L)   / 389     127 (L) 96 (L) 25.9 (H) /  95   3.7 19 (L) 1.45 (H) \     ALT: 14 AST: 18 AP: 159 (H) TBILI: 1.9 (H)   ALB: 3.5 TOT PROTEIN: 6.1 (L) LIPASE: N/A     Trop: 45 (H) BNP: 3,769 (H)     Procal: 0.08 CRP: N/A Lactic Acid: N/A       INR:  1.41 (H) PTT:  N/A   D-dimer:  N/A Fibrinogen:  N/A       Imaging results reviewed over the past 24 hrs:   Recent Results (from the past 24 hour(s))   XR Chest Port 1 View    Narrative    PROCEDURE:  XR CHEST PORT 1 VIEW    HISTORY:  sob.     COMPARISON:  5/17/2024    FINDINGS:   The heart is enlarged there is interstitial thickening seen in both  lungs. Interstitial thickening is stable from 5/17/2024 No pleural  effusion or pneumothorax.       Impression    IMPRESSION:  Cardiomegaly and interstitial thickening stable from  5/17/2024      JEFERSON NEGRO MD         SYSTEM ID:  [id number]   US Renal Complete Non-Vascular    Narrative    Exam: US RENAL COMPLETE NON-VASCULAR    Exam reason: margot    Technique:  Ultrasound of the kidneys and bladder was performed.    Comparison: 2/5/2024    FINDINGS:    Right Kidney:   The right kidney measures 11 cm in length. Normal in size and  echogenicity.  No hydronephrosis or solid mass. There are a couple of  small echogenic foci which may be due to nonobstructing stones or  calcified atherosclerosis.    Left Kidney:   The left kidney measures 9.5 cm in length. Normal in size and  echogenicity.  No hydronephrosis. There is a 1.8 x 1.7 x 0 1.5 cm  hypoechoic structure in the mid left kidney. There are a couple small  echogenic foci which may be due to nonobstructing stones or calcified  atherosclerosis.     Bladder:   No focal abnormality where visualized.      Impression    IMPRESSION:    No hydronephrosis.    There is a nonspecific hypoechoic structure in the mid left kidney  measuring up to 1.8 cm. A nonemergent CT urogram could be considered  for further evaluation.    MORENA GARDNER MD         SYSTEM ID:  [id number]

## 2024-06-11 NOTE — PLAN OF CARE
Goal Outcome Evaluation:       [facility] Inpatient Admission Note:    Patient admitted to 3110/3110-2 at approximately 1100 via wheel chair accompanied by transport tech from emergency room . Report received from Marisela CARRILLO in SBAR format at 1020 via telephone. Patient ambulated to chair via self.. Patient is alert and oriented X 3, denies pain; rates at 0 on 0-10 scale.  Patient oriented to room, unit, hourly rounding, and plan of care. Explained admission packet and patient handbook with patient bill of rights brochure. Will continue to monitor and document as needed.     Inpatient Nursing criteria listed below was met:    Health care directives status obtained and documented: Yes    Patient identifies a surrogate decision maker: yes If yes, who Contact Information:Glenn Rosas [phone]     If initial lactic acid greater than 2.0, repeat lactic acid drawn within one hour of arrival to unit: NA. If no, state reason:     Clergy visit ordered if patient requests: N/A    Skin issues/needs documented: Yes    Isolation Patient: no Education given, correct sign in place and documentation row added to PCS:   na    Fall Prevention Yes: Care plan updated, education given and documented, sticker and magnet in place: Yes    Care Plan initiated: Yes    Education Documented (including assessment): Yes    Patient has discharge needs : No If yes, please explain:

## 2024-06-11 NOTE — PLAN OF CARE
Goal Outcome Evaluation:  VSS, remains on ra.  Has non-productive cough and lungs have ex whz t/o.  2+ BLE edema.  Is on a 2G sodium diet and ate shortly after coming to floor.  Na is 127 with recheck Q8H.  Pt up in chair, call light in reach and alarm is on.  Refuses to elevate legs while up in chair.   Face to face report given with opportunity to observe patient.    Report given to Janeen Dsouza RN   6/11/2024  4:35 PM

## 2024-06-11 NOTE — PHARMACY-ANTICOAGULATION SERVICE
Pharmacy Consult-Warfarin Assessment Day #1    Heather Rosas is a 73 year old female admitted on 6/11/2024.    Primary Indication(s) for Anticoagulation: Afib    Goal INR: 2-3    FYI, patient is followed by the Anticoagulation/Protime Clinic at: [facility]    Patient previously anticoagulated on Coumadin at 2mg daily, compliance in question     Home tablet strength(s): 2 mg      Anticoagulation Dose History  More data exists         Latest Ref Rng & Units 6/11/2024   Recent Dosing and Labs   warfarin ANTICOAGULANT (COUMADIN) 2 MG tablet - -   warfarin ANTICOAGULANT (COUMADIN) 2.5 MG tablet - -   warfarin ANTICOAGULANT (COUMADIN) 3 MG tablet - -   INR 0.85 - 1.15 1.41      CBC RESULTS:   Recent Labs   Lab Test 06/11/24  0519   HGB 11.6*          Assessment/Plan: INR subtherapeutic. Will give warfarin 4mg today. INR with AM labs.    Thank You for the Consult. Will continue to follow.    Aure Brar, [facility] ....................  6/11/2024   3:22 PM

## 2024-06-12 NOTE — PROGRESS NOTES
Care Transitions focused note:      Full assessment deferred as Heather was recently hospitalized and she states no changes at home.  She expresses that her biggest concern is whether or not her dog, Baljeet is being cared for.  Called son, Valentin.  He indicates that he will  the dog and take care of him.  Heather reassured by this and is willing to go to short term rehab at this time.  She is aware of limited options d/t being underinsured.      Pt/family was provided with the Medicare Compare list for Skilled Nursing Facilities.  Discussed associated Medicare star ratings to assist with choice for referrals/discharge planning.    Education was given to pt/family that star ratings are updated/maintained by Medicare and can be reviewed by visiting www.medicare.gov.    Referral pending with Nila at [facility] for the below facilities.     Patient/family choices for vendor in priority are:   First Choice : [facility]    Second Choice: [facility]    Third Choice: [facility] [place]

## 2024-06-12 NOTE — UTILIZATION REVIEW
"Admission Status; Secondary Review Determination     Under the authority of the Utilization Management Committee, the utilization review process indicated a secondary review on the above patient. The review outcome is based on review of the medical records, discussions with staff, and applying clinical experience noted on the date of the review.     (x) Observation Status Appropriate - This patient does not meet hospital inpatient criteria and is placed in observation status. If this patient's primary payer is Medicare and was admitted as an inpatient, Condition Code 44 should be used and patient status changed to \"observation\".     RATIONALE FOR DETERMINATION:      Per provider note:  \"73 year old female admitted on 6/11/2024. She has established diagnosis of  chronic heart failure preserved ejection fraction, generalized weakness, medical noncompliance, chronic atrial fibrillation on Coumadin, peripheral edema, moderate cognitive impairment MoCA of 17/30.  Essential hypertension, schizophrenia, COPD, history of CABG x 4, coronary artery disease, GERD, severe tricuspid regurgitation dyslipidemia, tobacco dependence.  Patient recently was admitted to the hospital in May with acute on chronic right-sided congestive heart failure heart failure with preserved ejection fraction she required IV diuresis in the hospital and she ultimately was discharged home on May 24 as she refused to go to rehab.  Presented emergency room complaint of shortness of breath on June 11\"    VS unremarkable.  No fever.  No hypoxia    Labs notable for sodium near 130 and creatinine near 1.5, both stable.  UA shows 17 WBC    CXR shows no acute findings    Patient is recieiving IV Rocephin for possible UTI.  She is receiving oral torsemide    Given current intensity of service, observation status is recommended  The severity of illness, intensity of service provided, expected LOS and risk for adverse outcome make the care appropriate for further " observation; however, doesn't meet criteria for hospital inpatient admission. Dr Phillips notified of this determination via text message today.   This document was produced using voice recognition software.   The information on this document is developed by the utilization review team in order for the business office to ensure compliance. This only denotes the appropriateness of proper admission status and does not reflect the quality of care rendered.   The definitions of Inpatient Status and Observation Status used in making the determination above are those provided in the CMS Coverage Manual, Chapter 1 and Chapter 6, section 70.4.     Sincerely,     Anastacio Reza MD  Utilization Review   Physician Advisor   [facility]

## 2024-06-12 NOTE — PLAN OF CARE
Pt A&O x 4 this shift. Pt afebrile, and remains on room air. Lung sounds are diminished.   Pt complaints of restless leg pain while sleeping this shift. Provider notified, no new orders.   Pt denies pain once up in chair this morning.   Potassium low this morning 3.4. Pt receives oral 40 mEq of potassium this morning.   Pt stand by assist with walker and gait belt this shift.     Face to face report given with opportunity to observe patient.    Report given to LORENA Newell.     Vickie Gonzalez RN   6/12/2024  6:54 AM

## 2024-06-12 NOTE — PLAN OF CARE
Goal Outcome Evaluation:         Pt VSS except BP soft. Lungs dim, on ra.  K+ replacement with recheck at 11am.  Na 130 today.  SBA with walker.  Uses call light.  Alarms on and call light in reach.   Face to face report given with opportunity to observe patient.    Report given to Gely Dsouza RN   6/12/2024  10:55 AM

## 2024-06-12 NOTE — PHARMACY-ANTICOAGULATION SERVICE
Pharmacy Consult-Warfarin Assessment Day #2    Heather Rosas is a 73 year old female admitted on 6/11/2024.    Primary Indication(s) for Anticoagulation: Afib    Goal INR: 2-3    FYI, patient is followed by the Anticoagulation/Protime Clinic at: [facility]    Patient previously anticoagulated on Coumadin at 2mg daily, compliance in question    Home tablet strength(s): 2 mg    Factors that may increase patient's bleeding risk and/or sensitivity to warfarin (Coumadin) include: ceftriaxone      Anticoagulation Dose History  More data exists         Latest Ref Rng & Units 6/11/2024 6/12/2024   Recent Dosing and Labs   warfarin ANTICOAGULANT (COUMADIN) 2 MG tablet - 4 mg, $Given -   INR 0.85 - 1.15 1.41  1.57      CBC RESULTS:   Recent Labs   Lab Test 06/12/24  0506   HGB 11.5*          Assessment/Plan:  INR subtherapeutic. Will give warfarin 3mg today. INR with AM labs.     Thank You for the Consult. Will continue to follow.    Aure Brar [facility] ....................  6/12/2024   7:15 AM

## 2024-06-12 NOTE — PHARMACY
Pharmacy Antimicrobial Stewardship Assessment     Current Antimicrobial Therapy:  Anti-infectives (From now, onward)      Start     Dose/Rate Route Frequency Ordered Stop    24 2130  cefTRIAXone in d5w (ROCEPHIN) intermittent infusion 1 g         1 g  over 30 Minutes Intravenous EVERY 24 HOURS 24              Indication: UTI    Days of Therapy: 2     Pertinent Labs:    Recent labs: (last 7 days)     24  0519 24  0912 24  2356 24  0506   WBC 8.7  --   --  8.9   LACT  --   --  1.3  --    PCAL  --  0.08  --   --        Temperature:  Temp (24hrs), Av.4  F (36.3  C), Min:97  F (36.1  C), Max:97.9  F (36.6  C)      Culture Results:   7-Day Micro Results       Collected Updated Procedure Result Status      2024 1806 2024 1825 Urine Culture [[id number]]   Urine, Midstream    In process Component Value   No component results                   Recommendations/Interventions:  1. None at this time.    Aure Brar, [facility]  2024

## 2024-06-12 NOTE — PLAN OF CARE
Free from falls/injuries this shift. Assess as charted. Dx: CHF, A Fib. Bed/chair alarm on at all times. Pt denies falls at home. Up with assist of 1, Gaitbelt and walker Refused SCD's this evening. Ua shows UTI. Started on IV Rocephin. Hypo natremia. Na checked Q8H. BLE's with 3+ pitting edema.     Face to face report given with opportunity to observe patient.    Report given to Viviane Khan RN   6/11/2024  10:41 PM

## 2024-06-12 NOTE — PROGRESS NOTES
[facility]    Medicine Progress Note - Hospitalist Service    Date of Admission:  6/11/2024    Assessment & Plan      Heather Rosas is a 73 year old female admitted on 6/11/2024. She has established diagnosis of  chronic heart failure preserved ejection fraction, generalized weakness, medical noncompliance, chronic atrial fibrillation on Coumadin, peripheral edema, moderate cognitive impairment MoCA of 17/30.  Essential hypertension, schizophrenia, COPD, history of CABG x 4, coronary artery disease, GERD, severe tricuspid regurgitation dyslipidemia, tobacco dependence.  Patient recently was admitted to the hospital in May with acute on chronic right-sided congestive heart failure heart failure with preserved ejection fraction she required IV diuresis in the hospital and she ultimately was discharged home on May 24 as she refused to go to rehab.  Presented emergency room complaint of shortness of breath on June 11.  She was found to have hyponatremia which is new, hypokalemia, MILDRED on CKD stage III, patient was found to have anemia hemoglobin 11.6,Pro BNP was elevated at 3700, she did have swelling in her legs troponin was elevated at 47 but was flat, INR 1.4.  Urine studies were done for hyponatremia, patient did have also hypotension in the emergency room blood pressure was high 80s systolics, she was given IV fluid bolus 500 cc normal saline with improvement in blood pressure.  Patient was admitted for further workup and monitoring.  She is  full code.    Active Problems:     SOB (shortness of breath)    Chronic diastolic congestive heart failure (H)   Not in exacerbation at this time  Not hypoxic  No signs of infection  She was given lasix in ER  Chest xray was done showing Cardiomegaly and interstitial thickening stable from  5/17/2024    Will monitor for right now      Acute kidney injury (H24)  Suspected she has CKD stage III  Monitor cr   Avoid nephrotic meds  Renal US was ordered     UTI  "POA  On rocephin  Monitor cultures      Hypotension   She received lasix  in er  She is being treated for UTI   She did received 500cc ns bolus in ER with improvement of BP   Will monitor for right now   Lactic acid normal     Elevated  troponin  Elevated bnp  Numbers appear flat  No chest pain   Monitor       Hyponatremia  Urine studies  Monitor sodium for now q 6 hrs     Dyslipidemia  Not on statin      HTN (hypertension)  Only on diuretic      ANNA (generalized anxiety disorder)  Prn atarax      GERD (Gastroesophageal reflux disease)  Not on ppi      Schizophrenia (H)  On buspar      Atrial fibrillation (H)    Subtherapeutic international normalized ratio (INR)  On home coumadin  Rate controlled      Tobacco dependence  Prn nicotine patch      S/P CABG x 4    Severe tricuspid regurgitation      Hypokalemia  Replace per protocol                Diet: 2 Gram Sodium Diet    DVT Prophylaxis: Warfarin  Martin Catheter: Not present  Lines: None     Cardiac Monitoring: None  Code Status: Full Code      Clinically Significant Risk Factors Present on Admission        # Hypokalemia: Lowest K = 3.1 mmol/L in last 2 days, will replace as needed  # Hyponatremia: Lowest Na = 127 mmol/L in last 2 days, will monitor as appropriate  # Hypocalcemia: Lowest Ca = 8.4 mg/dL in last 2 days, will monitor and replace as appropriate      # Drug Induced Coagulation Defect: home medication list includes an anticoagulant medication    # Hypertension: Noted on problem list  # Acute heart failure with preserved ejection fraction: heart failure noted on problem list, last echo with EF >50%, and receiving IV diuretics            # Severe Obesity: Estimated body mass index is 40.16 kg/m  as calculated from the following:    Height as of this encounter: 1.499 m (4' 11\").    Weight as of this encounter: 90.2 kg (198 lb 13.7 oz).        # History of CABG: noted on surgical history       Disposition Plan     Medically Ready for Discharge: Anticipated " Tomorrow             Octavio Phillips,   Hospitalist Service  [facility]  Securely message with [employer] (more info)  Text page via [facility] Paging/Directory   ______________________________________________________________________    Interval History   Patient seen this morning for     medical rounds.  Patient denies chest pain shortness of breath.  We did discuss regarding restarting her home diuretic but at a lower dose due to lower blood pressures.  Also she does have urinary tract infection was started on Rocephin last night.  Awaiting cultures.  Physical therapy was also ordered.    Physical Exam   Vital Signs: Temp: 97.3  F (36.3  C) Temp src: Tympanic BP: 111/60 Pulse: 82   Resp: 18 SpO2: 96 % O2 Device: None (Room air)    Weight: 198 lbs 13.68 oz    Physical Exam  Constitutional:       Appearance: She is obese.   HENT:      Right Ear: External ear normal.      Left Ear: External ear normal.      Nose: Nose normal.      Mouth/Throat:      Pharynx: Oropharynx is clear.   Eyes:      Conjunctiva/sclera: Conjunctivae normal.   Cardiovascular:      Rate and Rhythm: Normal rate.   Pulmonary:      Effort: Pulmonary effort is normal.   Abdominal:      General: Abdomen is flat.   Musculoskeletal:         General: Swelling present.   Skin:     Coloration: Skin is not jaundiced.   Neurological:      Mental Status: She is alert. Mental status is at baseline.         Medical Decision Making       53 MINUTES SPENT BY ME on the date of service doing chart review, history, exam, documentation & further activities per the note.      Data     I have personally reviewed the following data over the past 24 hrs:    8.9  \   11.5 (L)   / 344     130 (L); 130 (L) 98 29.2 (H) /  103 (H)   3.4 21 (L) 1.46 (H) \     Procal: N/A CRP: N/A Lactic Acid: 1.3       INR:  1.57 (H) PTT:  N/A   D-dimer:  N/A Fibrinogen:  N/A       Imaging results reviewed over the past 24 hrs:   Recent Results (from the past 24 hour(s))   US Renal  Complete Non-Vascular    Narrative    Exam: US RENAL COMPLETE NON-VASCULAR    Exam reason: margot    Technique:  Ultrasound of the kidneys and bladder was performed.    Comparison: 2/5/2024    FINDINGS:    Right Kidney:   The right kidney measures 11 cm in length. Normal in size and  echogenicity.  No hydronephrosis or solid mass. There are a couple of  small echogenic foci which may be due to nonobstructing stones or  calcified atherosclerosis.    Left Kidney:   The left kidney measures 9.5 cm in length. Normal in size and  echogenicity.  No hydronephrosis. There is a 1.8 x 1.7 x 0 1.5 cm  hypoechoic structure in the mid left kidney. There are a couple small  echogenic foci which may be due to nonobstructing stones or calcified  atherosclerosis.     Bladder:   No focal abnormality where visualized.      Impression    IMPRESSION:    No hydronephrosis.    There is a nonspecific hypoechoic structure in the mid left kidney  measuring up to 1.8 cm. A nonemergent CT urogram could be considered  for further evaluation.    MORENA GARDNER MD         SYSTEM ID:  [id number]

## 2024-06-12 NOTE — PROVIDER NOTIFICATION
Pt numerous times waking up complaining of pain in lower extremities, but more in R lower extremities. RLE does not appear red and/or warm. Provider notified. No new orders received.

## 2024-06-12 NOTE — PROGRESS NOTES
06/12/24 1203   Appointment Info   Signing Clinician's Name / Credentials (PT) Karyn Costa DPALEC   Quick Adds   Quick Adds Certification   Living Environment   People in Home alone   Current Living Arrangements house   Home Accessibility stairs to enter home;stairs within home   Number of Stairs, Main Entrance 1   Stair Railings, Main Entrance railing on left side (ascending)   Number of Stairs, Within Home, Primary six   Stair Railings, Within Home, Primary railing on right side (ascending)   Transportation Anticipated car, drives self;family or friend will provide   Living Environment Comments Pt states she has a split level home and has been managing on upper level only since discharge on 5/24/24.   Self-Care   Usual Activity Tolerance moderate   Current Activity Tolerance fair   Equipment Currently Used at Home walker, rolling   Fall history within last six months no   Activity/Exercise/Self-Care Comment Pt states she has been managing ADLs independently.  States she has been completing sponge bath at her sink.  Pt ambulates with FWW   General Information   Onset of Illness/Injury or Date of Surgery 06/11/24   Referring Physician Dr. Phillips   Patient/Family Therapy Goals Statement (PT) To go home to her dog   Pertinent History of Current Problem (include personal factors and/or comorbidities that impact the POC) Pt admitted with hyponatremia which is new, hypokalemia, MILDRED on CKD stage III.  Has h/o CHF with known non-compliance   Existing Precautions/Restrictions fall   General Observations Pt sleeping upon PT arrival   Cognition   Affect/Mental Status (Cognition) WFL   Orientation Status (Cognition) oriented to;person;place;time   Follows Commands (Cognition) WFL   Pain Assessment   Patient Currently in Pain No   Integumentary/Edema   Integumentary/Edema Comments increased LE edema   Posture    Posture Forward head position   Range of Motion (ROM)   Range of Motion ROM is WFL   Strength (Manual Muscle  Testing)   Strength (Manual Muscle Testing) Deficits observed during functional mobility   Bed Mobility   Bed Mobility no deficits identified   Transfers   Transfers sit-stand transfer   Sit-Stand Transfer   Sit-Stand Roca (Transfers) supervision   Comment, (Sit-Stand Transfer) cues for hand placement provided   Assistive Device (Sit-Stand Transfers) walker, front-wheeled   Gait/Stairs (Locomotion)   Roca Level (Gait) supervision   Assistive Device (Gait) walker, front-wheeled   Distance in Feet (Gait) 100'   Pattern (Gait) step-through   Comment, (Gait/Stairs) Pt demonstrates dyspnea with ambulaiton, O2 sats 96%, HR 120s   Balance   Balance Comments fair+ with support from walker   Coordination   Coordination no deficits were identified   Clinical Impression   Criteria for Skilled Therapeutic Intervention Yes, treatment indicated   PT Diagnosis (PT) gait disturbance   Influenced by the following impairments weakness, decreased activity tolerance, decreased balance   Functional limitations due to impairments decreased tolerance for functional mobility and ADLs   Clinical Presentation (PT Evaluation Complexity) stable   Clinical Presentation Rationale Clinical judgment   Clinical Decision Making (Complexity) low complexity   Planned Therapy Interventions (PT) balance training;gait training;home exercise program;manual therapy techniques;neuromuscular re-education;patient/family education;stair training;strengthening;stretching;transfer training;progressive activity/exercise   Risk & Benefits of therapy have been explained evaluation/treatment results reviewed;care plan/treatment goals reviewed;risks/benefits reviewed;participants included;patient   Clinical Impression Comments PT eval completed.  Pt independent with bed mobility and SBA with all transfers and ambulation.  Based on functional mobility, pt would be appropriate to discharge to home.  However pt may benefit from more of a structured  living environment to improve her compliance with medications and ADL management.  Pt would benefit from home PT services however has been known to not allow them into her home.  Will see during acute care stay to progress activity as able.   PT Total Evaluation Time   PT Eval, Low Complexity Minutes (78480) 13   Therapy Certification   Start of care date 06/12/24   Certification date from 06/12/24   Certification date to 06/19/24   Medical Diagnosis hyponatremia   Physical Therapy Goals   PT Frequency 6x/week   PT Predicted Duration/Target Date for Goal Attainment 06/19/24   PT Goals Transfers;Gait;Stairs   PT: Transfers Modified independent;Sit to/from stand;Bed to/from chair;Assistive device   PT: Gait Modified independent;Rolling walker;150 feet   PT: Stairs Modified independent;5 stairs;Rail on both sides   Therapeutic Activity   Therapeutic Activities: dynamic activities to improve functional performance Minutes (87113) 10   Symptoms Noted During/After Treatment Fatigue   Treatment Detail/Skilled Intervention Pt instructed in and managed 4 steps with bilateral rails SBA with step-through pattern to ascend, step-to to descend.  Pt transferred sit<>stand SBA from w/c and ambulated additional 20' with FWW SBA.  Pt left sitting in chair.  Discussed discharge and pt insists she will be going home to her dog.  Pt left sitting in chair with clip alarm on and call light in reach.   PT Discharge Planning   PT Plan Progress mobility and strength   PT Discharge Recommendation (DC Rec) home with home care physical therapy   PT Rationale for DC Rec PT eval completed. Pt independent with bed mobility and SBA with all transfers and ambulation. Based on functional mobility, pt would be appropriate to discharge to home. However pt may benefit from more of a structured living environment to improve her compliance with medications and ADL management. Pt would benefit from home PT services however has been known to not allow them  into her home. Will see during acute care stay to progress activity as able.   PT Brief overview of current status sup>sit mod I, sit<>stand SBA, ambulated 100' with FWW SBA, managed 4 steps with bilateral rails SBA   Total Session Time   Timed Code Treatment Minutes 10   Total Session Time (sum of timed and untimed services) 23   I certify the need for these services furnished under this plan of treatment and while under my care. (Physician co-signature of this document indicates review and certification of the therapy plan).

## 2024-06-13 NOTE — PROVIDER NOTIFICATION
"Upon initial assessment pt appeared uncomfortable, pt was shifting weight and moving legs and tearful. When asked if pt was in pain, pt reported yes \"both of my legs below my knees\" pt hollered while in room a couple times. Pt was unable to describe pain when asked. Pt was reporting pain 7/10. MD notified. Morphine 2mg x1 IV ordered.     Cindy Murphy RN on 6/13/2024 at 12:59 AM    "

## 2024-06-13 NOTE — PROGRESS NOTES
Accepted to Janine at [facility] for admission today.  [facility] scheduled for 1000.  Heather aware and agreeable.  Care team updated.      PAS-RR    Per DHS regulation, CTS team completed and submitted PAS-RR to [place] [employer] on [employer]ging Direct Connect via the Senior LinkAge Line. CTS team advised SNF and they are aware a PAS-RR has been submitted.     CTS team reviewed with pt or health care agent that they may be contacted for a follow up appointment within 10 days of hospital discharge if SNF stay is less than 30 days. Contact information for [facility] LinkAge Line was also provided.     Pt or health care agent verbalized understanding.     PAS-RR # [id number]     Name: Heather Rosas    MRN#: [id number]    Reason for Hospitalization: Hypokalemia [E87.6];Hyponatremia [E87.1];SOB (shortness of breath) [R06.02];Acute kidney injury (H24) [N17.9];Subtherapeutic international normalized ratio (INR) [R79.1]    TOBI: medium    Discharge Date: 6/13/2024    Patient / Family response to discharge plan: agreeable    Follow-Up Appt: No future appointments.    Other Providers (Care Coordinator, County Services, PCA services etc): Yes: orders to Nila with [place]    Discharge Disposition: nursing home- [facility] via [facility] at 1000    APRIL Murrell

## 2024-06-13 NOTE — PROGRESS NOTES
06/13/24 0800   Appointment Info   Signing Clinician's Name / Credentials (PT) Aquiles Beasley PTA   Therapeutic Activity   Therapeutic Activities: dynamic activities to improve functional performance Minutes (25040) 10   Symptoms Noted During/After Treatment Fatigue   Treatment Detail/Skilled Intervention The pt was sitting in recliner chair upon arrival.  Sit<>stand from recliner chair with SBA with cues for pre-postural alignment and handplacement.  She has the tendency to pull up on the walker.  She ambulated with SBA and w/c follow 80'x2 using FWW with a theraptuic rest between due to min SOB.  Once back in her room bed side table, chair alarm and phone in easy reach.   PT Discharge Planning   PT Plan Progress mobility and strength   PT Discharge Recommendation (DC Rec) home with home care physical therapy   PT Rationale for DC Rec Pt independent with bed mobility and SBA with all transfers and ambulation. Based on functional mobility, pt would be appropriate to discharge to home. However pt may benefit from more of a structured living environment to improve her compliance with medications and ADL management. Pt would benefit from home PT services however has been known to not allow them into her home. Will see during acute care stay to progress activity as able.   PT Brief overview of current status Ambulated 80'x2 with FWW with SBA.  SBA with sit<>stand and cues for correct tech.   Total Session Time   Timed Code Treatment Minutes 10   Total Session Time (sum of timed and untimed services) 10

## 2024-06-13 NOTE — PLAN OF CARE
Patient discharged at 10:00 AM via wheel chair accompanied by other: [facility] transport.Prescriptions - None ordered for discharge. All belongings sent with patient.     Discharge instructions reviewed with [facility]. Listed belongings gathered and returned to patient. Yes    Patient discharged to [facility]  Report called to Nursing Home:  [facility]     Surgical Patient   Surgical Procedures during stay: No  Did patient receive discharge instruction on wound care and recognition of infection symptoms? N/A    MISC  Follow up appointment made:  No  Home medications returned to patient: N/A  Patient reports pain was well managed at discharge: Yes

## 2024-06-13 NOTE — PHARMACY
Pharmacy Antimicrobial Stewardship Assessment     Current Antimicrobial Therapy:  Anti-infectives (From now, onward)      Start     Dose/Rate Route Frequency Ordered Stop    24  cefTRIAXone in d5w (ROCEPHIN) intermittent infusion 1 g         1 g  over 30 Minutes Intravenous EVERY 24 HOURS 24              Indication: UTI    Days of Therapy: 3     Pertinent Labs:    Recent labs: (last 7 days)     24  0519 24  0912 24  2356 24  0506   WBC 8.7  --   --  8.9   LACT  --   --  1.3  --    PCAL  --  0.08  --   --        Temperature:  Temp (24hrs), Av.6  F (36.4  C), Min:97.3  F (36.3  C), Max:98.2  F (36.8  C)      Culture Results:   7-Day Micro Results       Collected Updated Procedure Result Status      2024 1806 2024 0659 Urine Culture [[id number]]   Urine, Midstream    Final result Component Value   Culture 10,000-50,000 CFU/mL Mixture of Urogenital Rosy                   Recommendations/Interventions:  1. None at this time.    Aure Brar, [facility]  2024

## 2024-06-13 NOTE — PLAN OF CARE
When attempting to give 1x dose of PRN morphine to pt. Nurse asked pain level and pt denied having any pain. And appears comfortable with eyes closed. Morphine not given.    Cindy Murphy RN on 6/13/2024 at 1:15 AM

## 2024-06-13 NOTE — PLAN OF CARE
"Pt continues to get out of bed without using call light and getting up from chair by removing the alarm. Pt stated to nurse this shift \"I feel wobbly when I get up.\" She has been educated several times on the importance of using her call light for safety purposes. She became very upset after nurse had been repeatedly educating patient regarding fall risk. She has been swearing and yelling at staff because she \"feels like there are too many rules.\" After several times of redirection she continues to get up despite the education she was given.   "

## 2024-06-13 NOTE — PHARMACY-ANTICOAGULATION SERVICE
Pharmacy Consult-Warfarin Assessment Day #3    Heather Rosas is a 73 year old female admitted on 6/11/2024.    Primary Indication(s) for Anticoagulation: Afib    Goal INR: 2-3    FYI, patient is followed by the Anticoagulation/[facility] Clinic at: [facility]     Patient previously anticoagulated on Coumadin at 2mg daily, compliance in question     Home tablet strength(s): 2 mg     Factors that may increase patient's bleeding risk and/or sensitivity to warfarin (Coumadin) include: cefdinir    Anticoagulation Dose History  More data exists         Latest Ref Rng & Units 6/11/2024 6/12/2024 6/13/2024   Recent Dosing and Labs   warfarin ANTICOAGULANT (COUMADIN) 2 MG tablet - 4 mg, $Given - -   warfarin ANTICOAGULANT (COUMADIN) 3 MG tablet - - 3 mg, $Given -   INR 0.85 - 1.15 1.41  1.57  1.67      CBC RESULTS:   Recent Labs   Lab Test 06/12/24  0506   HGB 11.5*          Assessment/Plan: INR subtherapeutic. Pt is being discharged with instructions to take  warfarin 3mg by mouth on 6/13/24. Follow-up INR on 6/14/24 with further dosing instructions from anticoagulation clinic at that time.      Aure Brar, [facility]

## 2024-06-13 NOTE — PROGRESS NOTES
06/13/24 0800   Appointment Info   Signing Clinician's Name / Credentials (PT) Aquiles Beasley PTA   Therapeutic Activity   Therapeutic Activities: dynamic activities to improve functional performance Minutes (78269) 10   Symptoms Noted During/After Treatment Fatigue   Treatment Detail/Skilled Intervention The pt was sitting in recliner chair upon arrival.  Sit<>stand from recliner chair with SBA with cues for pre-postural alignment and handplacement.  She has the tendency to pull up on the walker.  She ambulated with SBA and w/c follow 80'x2 using FWW with a theraptuic rest between due to min SOB.  Once back in her room bed side table, chair alarm and phone in [facility].   PT Discharge Planning   PT Plan Progress mobility and strength   PT Discharge Recommendation (DC Rec) home with home care physical therapy   PT Rationale for DC Rec Pt independent with bed mobility and SBA with all transfers and ambulation. Based on functional mobility, pt would be appropriate to discharge to home. However pt may benefit from more of a structured living environment to improve her compliance with medications and ADL management. Pt would benefit from home PT services however has been known to not allow them into her home. Will see during acute care stay to progress activity as able.   PT Brief overview of current status Ambulated 80'x2 with FWW with SBA.  SBA with sit<>stand and cues for correct tech.   Total Session Time   Timed Code Treatment Minutes 10   Total Session Time (sum of timed and untimed services) 10

## 2024-06-13 NOTE — PLAN OF CARE
A&O, VSS, afebrile. Denies pain. Up SBA to commode. Needing redirection several times today on use of call light. IV SL. Continues IV abx. Appetite good, voiding adequately. BM this shift. Agitated this evening (see notes). Alarms active.     Face to face report given with opportunity to observe patient.    Report given to Silvia Aquino RN   6/12/2024  11:19 PM

## 2024-06-13 NOTE — DISCHARGE SUMMARY
"[facility]  Hospitalist Discharge Summary      Date of Admission:  6/11/2024  Date of Discharge:  6/13/2024  Discharging Provider: Octavio Phillips DO  Discharge Service: Hospitalist Service    Discharge Diagnoses    SOB (shortness of breath)    Chronic diastolic congestive heart failure (H)    Acute kidney injury (H24)  UTI POA    Hypotension resolved  Elevated  troponin  Elevated bnp    Hyponatremia   Dyslipidemia    HTN (hypertension)    ANNA (generalized anxiety disorder)    GERD (Gastroesophageal reflux disease)    Schizophrenia (H)    Atrial fibrillation (H)    Subtherapeutic international normalized ratio (INR)    Tobacco dependence    S/P CABG x 4    Severe tricuspid regurgitation    Hypokalemia      Clinically Significant Risk Factors     # Obesity: Estimated body mass index is 39.54 kg/m  as calculated from the following:    Height as of this encounter: 1.499 m (4' 11\").    Weight as of this encounter: 88.8 kg (195 lb 12.3 oz).       Follow-ups Needed After Discharge   Follow-up Appointments     Follow Up and recommended labs and tests      Follow up with Nursing home physician.  No follow up labs or test are   needed.        Follow-up and recommended labs and tests       Follow up with primary care provider, Nicolette Huffman, within 7 days for   hospital follow- up.  No follow up labs or test are needed.             Unresulted Labs Ordered in the Past 30 Days of this Admission       No orders found from 5/12/2024 to 6/12/2024.        These results will be followed up by Nicolette Huffman MD      Discharge Disposition   Discharged to Nursing home   Condition at discharge: Stable    Hospital Course   Patient is a 73 year old female admitted on 6/11/2024. She has established diagnosis of  chronic heart failure preserved ejection fraction, generalized weakness, medical noncompliance, chronic atrial fibrillation on Coumadin, peripheral edema, moderate cognitive impairment MoCA of 17/30.  " Essential hypertension, schizophrenia, COPD, history of CABG x 4, coronary artery disease, GERD, severe tricuspid regurgitation dyslipidemia, tobacco dependence.  Patient recently was admitted to the hospital in May with acute on chronic right-sided congestive heart failure heart failure with preserved ejection fraction she required IV diuresis in the hospital and she ultimately was discharged home on May 24 as she refused to go to rehab.  Presented emergency room complaint of shortness of breath on June 11.  She was found to have hyponatremia which is new, hypokalemia, MILDRED on CKD stage III, patient was found to have anemia hemoglobin 11.6,Pro BNP was elevated at 3700, she did have swelling in her legs troponin was elevated at 47 but was flat, INR 1.4.  Urine studies were done for hyponatremia, patient did have also hypotension in the emergency room blood pressure was high 80s systolics, she was given IV fluid bolus 500 cc normal saline with improvement in blood pressure.  Patient was admitted for further workup and monitoring.  Her sodium normalized, I did restart her torsemide at loser dose 20mg daily and will need to be up titrated out patient. She also did have UTI and was on rocephin pending cultures, I did switch her to omnicef awaiting cultures. She did agree to go to rehab at [facility] in [place]. The rehab stay was set up on previous admission.    Consultations This Hospital Stay   PHARMACY TO DOSE WARFARIN  PHYSICAL THERAPY ADULT IP CONSULT  PHYSICAL THERAPY ADULT IP CONSULT  OCCUPATIONAL THERAPY ADULT IP CONSULT    Code Status   Full Code    Time Spent on this Encounter   IOctavio DO, personally saw the patient today and spent greater than 30 minutes discharging this patient.       Octavio Phillips DO  [facility]  [facility] [address] [place] [id number]  Phone: [phone]  Fax:  [phone]  ______________________________________________________________________    Physical Exam   Vital Signs: Temp: 97.5  F (36.4  C) Temp src: Tympanic BP: 125/94 Pulse: 101   Resp: 20 SpO2: 93 % O2 Device: None (Room air)    Weight: 195 lbs 12.3 oz  Constitutional:       Appearance: She is obese.   HENT:      Right Ear: External ear normal.      Left Ear: External ear normal.      Nose: Nose normal.      Mouth/Throat:      Pharynx: Oropharynx is clear.   Eyes:      Conjunctiva/sclera: Conjunctivae normal.   Cardiovascular:      Rate and Rhythm: Normal rate.   Pulmonary:      Effort: Pulmonary effort is normal.   Abdominal:      General: Abdomen is flat.   Musculoskeletal:         General: Swelling present.   Skin:     Coloration: Skin is not jaundiced.   Neurological:      Mental Status: She is alert. Mental status is at baseline.    Primary Care Physician   Nicolette Huffman    Discharge Orders      Reason for your hospital stay    Patient is a 73 year old female admitted on 6/11/2024. She has established diagnosis of  chronic heart failure preserved ejection fraction, generalized weakness, medical noncompliance, chronic atrial fibrillation on Coumadin, peripheral edema, moderate cognitive impairment MoCA of 17/30.  Essential hypertension, schizophrenia, COPD, history of CABG x 4, coronary artery disease, GERD, severe tricuspid regurgitation dyslipidemia, tobacco dependence.  Patient recently was admitted to the hospital in May with acute on chronic right-sided congestive heart failure heart failure with preserved ejection fraction she required IV diuresis in the hospital and she ultimately was discharged home on May 24 as she refused to go to rehab.  Presented emergency room complaint of shortness of breath on June 11.  She was found to have hyponatremia which is new, hypokalemia, MILDRED on CKD stage III, patient was found to have anemia hemoglobin 11.6,Pro BNP was elevated at 3700, she did have swelling in  her legs troponin was elevated at 47 but was flat, INR 1.4.  Urine studies were done for hyponatremia, patient did have also hypotension in the emergency room blood pressure was high 80s systolics, she was given IV fluid bolus 500 cc normal saline with improvement in blood pressure.  Patient was admitted for further workup and monitoring.  Her sodium normalized, I did restart her torsemide at loser dose 20mg daily and will need to be up titrated out patient. She also did have UTI and was on rocephin pending cultures, I did switch her to omnicef awaiting cultures. She did agree to go to rehab at [facility]. The rehab stay was set up on previous admission.     Follow-up and recommended labs and tests     Follow up with primary care provider, Nicolette Huffman, within 7 days for hospital follow- up.  No follow up labs or test are needed.     Activity    Your activity upon discharge: activity as tolerated     General info for SNF    Length of Stay Estimate: Short Term Care: Estimated # of Days <30  Condition at Discharge: Stable  Level of care:skilled   Rehabilitation Potential: Good  Admission H&P remains valid and up-to-date: Yes  Recent Chemotherapy: N/A  Use Nursing Home Standing Orders: Yes     Follow Up and recommended labs and tests    Follow up with Nursing home physician.  No follow up labs or test are needed.     Activity - Up with nursing assistance     Physical Therapy Adult Consult    Evaluate and treat as clinically indicated.    Reason:  weakness     Occupational Therapy Adult Consult    Evaluate and treat as clinically indicated.    Reason:  weakness     Fall precautions     Diet    Follow this diet upon discharge: Orders Placed This Encounter      2 Gram Sodium Diet     Diet    Follow this diet upon discharge: Orders Placed This Encounter      2 Gram Sodium Diet      Diet       Significant Results and Procedures   Most Recent 3 CBC's:  Recent Labs   Lab Test 06/12/24  0506 06/11/24  0519  05/23/24  0520   WBC 8.9 8.7 11.5*   HGB 11.5* 11.6* 13.1   MCV 86 86 90    389 214     Most Recent 3 BMP's:  Recent Labs   Lab Test 06/13/24  0252 06/12/24  2202 06/12/24  1344 06/12/24  1050 06/12/24  0506 06/12/24  0018 06/11/24  1632 06/11/24  0912 06/11/24  0519    132* 131*  --  130*  130*  --    < > 127* 129*   POTASSIUM 3.9  --   --  3.4 3.4  --   --  3.7 3.1*   CHLORIDE  --   --   --   --  98  --   --  96* 96*   CO2  --   --   --   --  21*  --   --  19* 20*   BUN  --   --   --   --  29.2*  --   --  25.9* 25.4*   CR  --   --   --   --  1.46*  --   --  1.45* 1.50*   ANIONGAP  --   --   --   --  11  --   --  12 13   CARINA  --   --   --   --  8.4*  --   --  8.4* 8.8   GLC  --   --   --   --  103* 129*  --  95 112*    < > = values in this interval not displayed.   ,   Results for orders placed or performed during the hospital encounter of 06/11/24   XR Chest Port 1 View    Narrative    PROCEDURE:  XR CHEST PORT 1 VIEW    HISTORY:  sob.     COMPARISON:  5/17/2024    FINDINGS:   The heart is enlarged there is interstitial thickening seen in both  lungs. Interstitial thickening is stable from 5/17/2024 No pleural  effusion or pneumothorax.      Impression    IMPRESSION:  Cardiomegaly and interstitial thickening stable from  5/17/2024      JEFERSON NEGRO MD         SYSTEM ID:  [id number]   US Renal Complete Non-Vascular    Narrative    Exam: US RENAL COMPLETE NON-VASCULAR    Exam reason: margot    Technique:  Ultrasound of the kidneys and bladder was performed.    Comparison: 2/5/2024    FINDINGS:    Right Kidney:   The right kidney measures 11 cm in length. Normal in size and  echogenicity.  No hydronephrosis or solid mass. There are a couple of  small echogenic foci which may be due to nonobstructing stones or  calcified atherosclerosis.    Left Kidney:   The left kidney measures 9.5 cm in length. Normal in size and  echogenicity.  No hydronephrosis. There is a 1.8 x 1.7 x 0 1.5 cm  hypoechoic structure  in the mid left kidney. There are a couple small  echogenic foci which may be due to nonobstructing stones or calcified  atherosclerosis.     Bladder:   No focal abnormality where visualized.      Impression    IMPRESSION:    No hydronephrosis.    There is a nonspecific hypoechoic structure in the mid left kidney  measuring up to 1.8 cm. A nonemergent CT urogram could be considered  for further evaluation.    MORENA GARDNER MD         SYSTEM ID:  [id number]       Discharge Medications   Current Discharge Medication List        START taking these medications    Details   cefdinir (OMNICEF) 300 MG capsule Take 1 capsule (300 mg) by mouth 2 times daily for 3 days  Qty: 6 capsule, Refills: 0    Associated Diagnoses: Acute cystitis without hematuria           CONTINUE these medications which have CHANGED    Details   torsemide (DEMADEX) 20 MG tablet Take 1 tablet (20 mg) by mouth daily for 30 days  Qty: 30 tablet, Refills: 0    Associated Diagnoses: Chronic right-sided congestive heart failure (H)           CONTINUE these medications which have NOT CHANGED    Details   albuterol (PROAIR HFA/PROVENTIL HFA/VENTOLIN HFA) 108 (90 Base) MCG/ACT inhaler Inhale 2 puffs into the lungs every 6 hours as needed for shortness of breath, wheezing or cough  Qty: 18 g, Refills: 0    Comments: Pharmacy may dispense brand covered by insurance (Proair, or proventil or ventolin or generic albuterol inhaler)  Associated Diagnoses: Wheezing      busPIRone (BUSPAR) 10 MG tablet Take 1 tablet (10 mg) by mouth 2 times daily  Qty: 180 tablet, Refills: 0    Associated Diagnoses: ANNA (generalized anxiety disorder)      ipratropium - albuterol 0.5 mg/2.5 mg/3 mL (DUONEB) 0.5-2.5 (3) MG/3ML neb solution Take 1 vial (3 mLs) by nebulization every 6 hours as needed for shortness of breath, wheezing or cough  Qty: 90 mL, Refills: 0      metoprolol succinate ER (TOPROL XL) 100 MG 24 hr tablet Take 1 tablet (100 mg) by mouth daily  Qty: 90 tablet, Refills:  1    Associated Diagnoses: Longstanding persistent atrial fibrillation (H)      potassium chloride ER (K-TAB) 20 MEQ CR tablet Take 1 tablet (20 mEq) by mouth 2 times daily  Qty: 60 tablet, Refills: 0    Associated Diagnoses: Hypokalemia      warfarin ANTICOAGULANT (COUMADIN) 2 MG tablet Take 2 mg warfarin by mouth daily. Recheck INR on Tuesday, 5/28/24 and receive further instructions from Anticoagulation Clinic.Take 2 mg warfarin by mouth daily. Recheck INR on Tuesday, 5/28/24 and receive further instructions from Anticoagulation Clinic.  Qty: 60 tablet, Refills: 1    Associated Diagnoses: Chronic atrial fibrillation (H)           Allergies   Allergies   Allergen Reactions    Allegra [Fexofenadine] Nausea and Vomiting    Crestor [Rosuvastatin] Dizziness    Cats Other (See Comments)     Sneezing, runny nose    Codeine Sulfate Nausea and Vomiting and GI Disturbance

## 2024-06-13 NOTE — PLAN OF CARE
"Assumed cares at 2300  Reason for admission: SOB  A&O, VSS, afebrile.  Up SBA to commode. Needing redirection several times this shift to use call light. Pt tearful at times wanting ex  and to leave, pt was redirectable. New PIV placed, flushes well. Call light within reach, wheels locked, ID band on. Reported pain this shift see previous note, currently denying pain. All alarms active and audible      /78 (BP Location: Left arm, Patient Position: Chair, Cuff Size: Adult Regular)   Pulse 103   Temp 97.4  F (36.3  C) (Tympanic)   Resp 18   Ht 1.499 m (4' 11\")   Wt 88.8 kg (195 lb 12.3 oz)   SpO2 94%   BMI 39.54 kg/m        Face to face report given with opportunity to observe patient.    Report given to Karen Murphy RN on 6/13/2024 at 6:59 AM          "

## 2024-06-14 NOTE — PROGRESS NOTES
ANTICOAGULATION MANAGEMENT     Heather Rosas 73 year old female is on warfarin with subtherapeutic INR result. (Goal INR 2.0-3.0)    Recent labs: (last 7 days)     06/14/24  1059   INR 1.8*       ASSESSMENT     Source(s): Chart Review and Home Care/Facility Nurse     Warfarin doses taken: While hospitalized on 6/11-6/13: anticoagulation calendar updated with inpatient dosing and patient has never follow up after hospital stays to get an established dose.  Discharged on: Next INR 6/14  Diet: No new diet changes identified  Medication/supplement changes:  discharge on cefdnir x 3 days   New illness, injury, or hospitalization: Yes: UTI, CHF  Signs or symptoms of bleeding or clotting: No  Previous result: Subtherapeutic  Additional findings:  new start we have never established a dose for this patient, warfarin given in hospital but patient does not follow up. Now patient in acute rehab facility [facility]       PLAN     Recommended plan for no diet, medication or health factor changes affecting INR     Dosing Instructions: Continue your current warfarin dose with next INR in 3 days       Summary  As of 6/14/2024      Full warfarin instructions:  3 mg every day   Next INR check:  6/17/2024               Faxed dosing and follow up instructions to [facility]    Orders given to  Homecare nurse/facility to recheck    Patient not here, [employer] Communication sheet with screening questions and current INR received via FAX from outside agency. Results reviewed, Warfarin dosing per protocol, and recommended follow-up appointment made. Paperwork FAXED back to facility.       Plan made per [facility] anticoagulation protocol    Naya Barajas RN  Anticoagulation Clinic  6/14/2024    _______________________________________________________________________     Anticoagulation Episode Summary       Current INR goal:  2.0-3.0   TTR:  37.5% (3 mo)   Target end date:  Indefinite   Send INR reminders to:  JAZLYN SAGASTUME       Comments:                Anticoagulation Care Providers       Provider Role Specialty Phone number    Nicolette Huffman MD Referring Family Medicine [phone]    Anastacio López MD Referring Internal Medicine [phone]

## 2024-06-15 PROBLEM — I25.10 CORONARY ARTERY DISEASE INVOLVING NATIVE CORONARY ARTERY OF NATIVE HEART WITHOUT ANGINA PECTORIS: Status: ACTIVE | Noted: 2021-10-12

## 2024-06-15 PROBLEM — I50.9 ACUTE ON CHRONIC CONGESTIVE HEART FAILURE, UNSPECIFIED HEART FAILURE TYPE (H): Status: ACTIVE | Noted: 2024-01-01

## 2024-06-15 PROBLEM — R79.89 ABNORMAL LFTS: Status: ACTIVE | Noted: 2024-01-01

## 2024-06-15 PROBLEM — I48.91 ATRIAL FIBRILLATION, UNSPECIFIED TYPE (H): Status: ACTIVE | Noted: 2024-01-01

## 2024-06-15 PROBLEM — E83.42 HYPOMAGNESEMIA: Status: ACTIVE | Noted: 2024-01-01

## 2024-06-15 PROBLEM — I48.20 CHRONIC ATRIAL FIBRILLATION (H): Status: ACTIVE | Noted: 2018-11-26

## 2024-06-15 PROBLEM — I50.33 ACUTE ON CHRONIC DIASTOLIC HEART FAILURE (H): Status: ACTIVE | Noted: 2024-01-01

## 2024-06-15 NOTE — H&P
[facility]    History and Physical - Hospitalist Service       Date of Admission:  6/15/2024    Assessment & Plan      Heather Rosas is a 73 year old female with a PMH significant for schizophrenia, atrial fib on warfarin, CAD S/P CABG x 4, HTN, anxiety, fibromyalgia, tobacco use and endometrial adenocarcinoma who presented to the ER with complaints of shortness of breath and leg swelling.  She had just been in the hospital in [place] from 6/11-13/2024 for CHF exacerbation and cystitis.  She was discharged to the [facility] but felt like her symptoms were getting worse, especially with increased dyspnea with exertion and orthopnea.  In the ER she had a slight increase in her BNP compared to previous but otherwise no significant findings.  However her INR is subtherapeutic and she does not feel like she can return to the [facility] at this time so is placed in observation for increased diuresis.  By the time she arrived to the medical unit she was feeling better.    Principal Problem:    Acute on chronic diastolic heart failure (H)    Date Noted: 6/15/2024       Given Lasix 40 mg IV in the ER and is feeling better.  Continue increased Lasix dose overnight, increase potassium supplementation and plan for possible discharge tomorrow.  Last echo 4/15/2024 with EF 60-65%, LVH.    Active Problems:    Benign essential hypertension      Controlled with Lasix/torsemide and Toprol. Continue.      Gastroesophageal reflux disease without esophagitis    Date Noted: 1/1/2011       Not on any regular medications at this time.  May use Tums as needed.      Chronic atrial fibrillation (H)    Date Noted: 11/26/2018    Subtherapeutic international normalized ratio (INR)    Date Noted: 6/11/2024       Rate controlled with Toprol, on warfarin for anticoagulation but subtherapeutic.  Will use SCD's in addition until INR 2-3.  Pharmacy to manage INR.      S/P CABG x 4    Date Noted: 9/10/2018    Coronary artery  "disease involving native coronary artery of native heart without angina pectoris    Date Noted: 10/12/2021       No chest pain.  Troponin minimally elevated so trend.  Suspect slight elevation due to fluid overload with type II NSTEMI.  Diurese and expect symptomatic improvement.       Hypokalemia     Date Noted: 3/4/2024     Hypomagnesemia     Date Noted: 6/15/2024        Replace IV and po as indicated.  Recheck labs in AM.         Observation Goals: -diagnostic tests and consults completed and resulted, -vital signs normal or at patient baseline, -dyspnea improved and O2 sats greater than 88% on room air or prior home oxygen levels, -returns to baseline functional status, -safe disposition plan has been identified, Nurse to notify provider when observation goals have been met and patient is ready for discharge.  Diet: Combination Diet Regular Diet; Low Saturated Fat Na <2400mg Diet  DVT Prophylaxis: Warfarin and Pneumatic Compression Devices  Martin Catheter: Not present  Lines: None     Cardiac Monitoring: ACTIVE order. Indication: Acute decompensated heart failure (48 hours)  Code Status: Full Code    Clinically Significant Risk Factors Present on Admission        # Hypokalemia: Lowest K = 3.1 mmol/L in last 2 days, will replace as needed     # Hypomagnesemia: Lowest Mg = 1.4 mg/dL in last 2 days, will replace as needed      # Drug Induced Coagulation Defect: home medication list includes an anticoagulant medication    # Hypertension: Noted on problem list  # Acute heart failure with preserved ejection fraction: heart failure noted on problem list, last echo with EF >50%, and receiving IV diuretics            # Obesity: Estimated body mass index is 37.75 kg/m  as calculated from the following:    Height as of this encounter: 1.499 m (4' 11\").    Weight as of this encounter: 84.8 kg (186 lb 14.4 oz).        # History of CABG: noted on surgical history       Disposition Plan     Medically Ready for Discharge: " Anticipated Tomorrow           Lise Mott MD  Hospitalist Service  [facility]  Securely message with [employer] (more info)  Text page via [facility] Paging/Directory     ______________________________________________________________________    Chief Complaint   Shortness of breath and leg swelling.    History is obtained from the patient, electronic health record, and emergency department physician    History of Present Illness   Heather Rosas is a 73 year old female with a PMH significant for schizophrenia, atrial fib on warfarin, CAD S/P CABG x 4, HTN, anxiety, fibromyalgia, tobacco use and endometrial adenocarcinoma who presented to the ER with complaints of shortness of breath and leg swelling.  She had just been in the hospital in [place] from 6/11-13/2024 for CHF exacerbation and cystitis.  She was discharged to the [facility] but felt like her symptoms were getting worse, especially with increased dyspnea with exertion and orthopnea.  In the ER she had a slight increase in her BNP compared to previous but otherwise no significant findings.  However her INR is subtherapeutic and she does not feel like she can return to the [facility] at this time so is placed in observation for increased diuresis.  By the time she arrived to the medical unit she was feeling better.      Past Medical History    Past Medical History:   Diagnosis Date    Acute diastolic congestive heart failure (H) 10/12/2021    Acute exacerbation of CHF (congestive heart failure) (H) 10/11/2021    Acute hypoxic respiratory failure (H) 04/16/2024    Acute kidney failure, unspecified (H)     Acute on chronic congestive heart failure, unspecified heart failure type (H) 09/21/2023    Acute on chronic diastolic congestive heart failure (H)     Allergic rhinitis 01/01/2011    Anxiety 01/01/2011    Anxiety state, unspecified     Anxiety state    Atrial fibrillation with RVR (H) 07/07/2023    Atrial flutter with rapid ventricular  response (H) 10/12/2021    COPD exacerbation (H) 04/16/2024    CVA (cerebral vascular accident) (H) 05/14/2018    Dyslipidemia 11/26/2003    fibromyalgia 06/14/2004    GERD, Esophageal reflux 01/01/2011    History of non-ST elevation myocardial infarction (NSTEMI) 09/06/2018    HTN (hypertension)     Essential hypertension    Major depression, recurrent (H24) 01/01/2011    Major depressive disorder, recurrent episode, moderate (H) 01/01/2011    Metrorrhagia 02/18/2018    Non compliance with medical treatment 07/07/2023    Nonorganic sleep disorder, unspecified     Non-org. 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, ARDS, renal failure       Past Surgical History   Past Surgical History:   Procedure Laterality Date    ANGIOGRAM  02/2005    Normal     BIOPSY BREAST  1984    LT, normal    BYPASS GRAFT ARTERY CORONARY  09/10/2018    CARPAL TUNNEL RELEASE RT/LT  2005    RT, carpal tunnel    COLONOSCOPY  2011    Repeat in ten years     COLONOSCOPY  1996    COLONOSCOPY  2004    DILATION AND CURETTAGE, HYSTEROSCOPY, ABLATE ENDOMETRIUM, COMBINED N/A 2/19/2018    Procedure: COMBINED DILATION AND CURETTAGE, HYSTEROSCOPY, ABLATE ENDOMETRIUM;  HYSTEROSCOPY DILATION AND CURETTAGE, ENDOMETRIAL ABLATION;  Surgeon: Jose Nettles MD;  Location: [facility] OR    HYSTERECTOMY TOTAL ABD, NICK SALPINGO-OOPHORECTOMY, NODE DISSECTION, TUMOR DEBULKING, COMBINED  10/30/2018    INSERT PORT VASCULAR ACCESS N/A 12/11/2018    Procedure: PORT-A-CATH PLACEMENT;  Surgeon: Rafi Trinidad MD;  Location: [facility] OR    placement of central line  2005    REMOVE PORT VASCULAR ACCESS N/A 10/6/2020    Procedure: port a cath removal;  Surgeon: Rafi Trinidad MD;  Location: [facility] OR       Prior to Admission Medications   Prior to Admission Medications   Prescriptions Last Dose Informant Patient Reported? Taking?   albuterol (PROAIR  "HFA/PROVENTIL HFA/VENTOLIN HFA) 108 (90 Base) MCG/ACT inhaler Unknown at PRN  No Yes   Sig: Inhale 2 puffs into the lungs every 6 hours as needed for shortness of breath, wheezing or cough   busPIRone (BUSPAR) 10 MG tablet 6/15/2024 at AM  No Yes   Sig: Take 1 tablet (10 mg) by mouth 2 times daily   cefdinir (OMNICEF) 300 MG capsule 6/15/2024 at AM (4th dose)  No Yes   Sig: Take 1 capsule (300 mg) by mouth 2 times daily for 3 days   hydrocortisone butyrate 0.1 % CREA 6/15/2024 at AM  Yes Yes   Sig: Externally apply topically 2 times daily To back   metoprolol succinate ER (TOPROL XL) 100 MG 24 hr tablet 6/15/2024 at AM  No Yes   Sig: Take 1 tablet (100 mg) by mouth daily   potassium chloride ER (K-TAB) 20 MEQ CR tablet 6/15/2024 at AM  No Yes   Sig: Take 1 tablet (20 mEq) by mouth 2 times daily   torsemide (DEMADEX) 20 MG tablet 6/15/2024 at AM  No Yes   Sig: Take 1 tablet (20 mg) by mouth daily for 30 days   warfarin ANTICOAGULANT (COUMADIN) 2 MG tablet 6/14/2024 at PM  Yes Yes   Sig: Take 3mg by mouth on 6/13/24. Follow-up INR on 6/14/24 with further dosing instructions from anticoagulation clinic at that time.      Facility-Administered Medications: None        Review of Systems    The 5 point Review of Systems is negative other than noted in the HPI or here.     Social History   I have reviewed this patient's social history and updated it with pertinent information if needed.  Social History     Tobacco Use    Smoking status: Every Day     Current packs/day: 1.00     Average packs/day: 1 pack/day for 30.0 years (30.0 ttl pk-yrs)     Types: Cigarettes     Passive exposure: Never    Smokeless tobacco: Never    Tobacco comments:     pt declined, stated she would use, \"the patch\". Patient has not had a cigarette in 1 week because she was in the hospital   Vaping Use    Vaping status: Never Used   Substance Use Topics    Alcohol use: No     Comment: rare    Drug use: No         Family History   I have reviewed this " patient's family history and updated it with pertinent information if needed.  Family History   Problem Relation Age of Onset    Gastrointestinal Disease Mother         GERD    Cancer Mother         pancreatic ca (cause of death) /liver ca    C.A.D. Father 45        (cause of death)     Other - See Comments Father         rheumatic fever     Allergies Father     Cancer Sister 56        Esophageal to bone cancer    Neurologic Disorder Brother         neuropathy    Cancer Brother 58        Tonsilular cancer/ lymph node in neck    Allergies Brother     Diabetes Paternal Grandmother         type 2    Breast Cancer Maternal Aunt     Breast Cancer Maternal Aunt     Depression Maternal Aunt     Depression Maternal Uncle     Colon Cancer Paternal Aunt         (cause of death)     Breast Cancer Cousin     Breast Cancer Cousin     Breast Cancer Cousin     Crohn's Disease Other     No Known Problems Son         2 sons         Allergies   Allergies   Allergen Reactions    Allegra [Fexofenadine] Nausea and Vomiting    Cats Other (See Comments)     Sneezing, runny nose    Codeine Sulfate Nausea and Vomiting and GI Disturbance    Crestor [Rosuvastatin] Dizziness        Physical Exam   Vital Signs: Temp: 97.7  F (36.5  C) Temp src: Tympanic BP: 124/85 Pulse: 113   Resp: 20 SpO2: 96 % O2 Device: None (Room air)    Weight: 186 lbs 14.4 oz    Constitutional: awake, alert, cooperative, no apparent distress, appears stated age, and moderately obese  Eyes: pupils equal, round and reactive to light, extra-ocular muscles intact, and conjunctiva normal  ENT: normocephalic, atraumatic  Respiratory: no increased work of breathing, fair air exchange, and clear to auscultation  Cardiovascular: regular rate and rhythm, normal S1 and S2, and no murmur noted  GI: normal bowel sounds, soft, non-distended, and non-tender  Skin: no redness, warmth, or swelling and brown patches on bilateral cheeks - solar lentigo  Musculoskeletal: 2+ firm edema  bilateral lower legs but there is some wrinkling of the skin indicating there was more swelling recently, there is no redness, warmth, or swelling of the joints  Neurologic: Mental Status Exam:  Fund of Knowledge:  normal  Attention/Concentration:  normal  Language:  normal  Cranial Nerves:  cranial nerves II-XII are grossly intact  Motor Exam:  moves all extremities well and symmetrically  Neuropsychiatric: General: normal, calm, and normal eye contact  Level of consciousness: alert / normal  Affect: normal  Orientation: oriented only to self  Memory and insight: impaired: answers questions but the information provided is not accurate    Medical Decision Making             Data     I have personally reviewed the following data over the past 24 hrs:    8.9  \   12.5   / 338     139 97 (L) 31.5 (H) /  109 (H)   3.1 (L) 30 (H) 0.90 \     ALT: 16 AST: 24 AP: 178 (H) TBILI: 1.6 (H)   ALB: 3.9 TOT PROTEIN: 7.3 LIPASE: N/A     Trop: 25 (H) BNP: 5,788 (H)     TSH: 2.11 T4: N/A A1C: N/A     INR:  1.65 (H) PTT:  N/A   D-dimer:  0.69 (H) Fibrinogen:  N/A       Imaging results reviewed over the past 24 hrs:   Recent Results (from the past 24 hour(s))   XR Chest Port 1 View    Narrative    PROCEDURE:  XR CHEST PORT 1 VIEW    HISTORY:  sob, chf.     COMPARISON:  June 11, 2024    FINDINGS:   The heart is enlarged. There is widespread interstitial opacities  noted. The interstitial opacities are stable from previous  examination. No pleural effusion or pneumothorax. Postoperative  changes are seen in the mediastinum.      Impression    IMPRESSION:  Cardiomegaly. Interstitial thickening stable from June 11, 2024      JEFERSON NEGRO MD         SYSTEM ID:  [email or web address]   CT Chest Pulmonary Embolism w Contrast    Narrative    PROCEDURE: CT CHEST PULMONARY EMBOLISM W CONTRAST 6/15/2024 3:05 PM    HISTORY: SOB, CHF, effusion    COMPARISONS: April 16, 2024    Meds/Dose Given: isovue 370    TECHNIQUE: CT angiogram of the chest with  sagittal and coronal MIPS  reconstructions    FINDINGS: There are no pulmonary emboli. The heart is enlarged. The  thoracic aorta is normal in appearance. There is reflux of contrast  into the hepatic veins indicating tricuspid insufficiency.    Interstitial thickening is seen in both lungs. The interstitial  thickening is stable from previous examination. There is a 5 mm  diameter right middle lobe nodule which is stable from previous  examination There is some linear atelectasis or scarring seen in the  lingula. The hilar and mediastinal lymph nodes are enlarged. The lymph  nodes are stable from 4/16/2024 axillary and supraclavicular lymph  nodes are normal in caliber. There is some mild pleural thickening at  the right lung base stable from previous examination.    The upper portion of the liver spleen and pancreas appear normal.  Degenerative changes are present in the thoracic spine         Impression    IMPRESSION: No pulmonary emboli. No change from previous examination  in April 2024    JEFERSON NEGRO MD         SYSTEM ID:  [id number]   US Lower Extremity Venous Duplex Bilateral    Narrative    Exam:US LOWER EXTREMITY VENOUS DUPLEX BILATERAL    History: Bilateral leg swelling    Comparisons: 2/8/2024    Technique: Venous duplex ultrasonography of the bilateral lower  extremities was performed.     Findings: The common femoral veins, superficial femoral veins and  popliteal veins are fully compressible with spontaneous and  augmentable venous flow.           Impression    Impression: No evidence of deep venous thrombosis within the lower  extremities.    JEFERSON NEGRO MD         SYSTEM ID:  [email or web address]   CT Abdomen Pelvis w Contrast    Narrative    EXAMINATION: CT ABDOMEN PELVIS W CONTRAST, 6/15/2024 3:18 PM    TECHNIQUE:  Helical CT images from the lung bases through the  symphysis pubis were obtained  with IV contrast. Contrast dose:    COMPARISON: none    HISTORY: elevated LFTs    FINDINGS:    There is  dependent atelectasis at the lung bases. The heart is  enlarged.    The liver is free of masses or biliary ductal enlargement. There is  fatty infiltration of the liver. No calcified gallstones are seen.    The the spleen and pancreas appear normal.    The adrenal glands are normal.    The right and left kidneys are free of masses or hydronephrosis.    The periaortic lymph nodes are normal in caliber.    No intraperitoneal masses or inflammatory changes are noted.      In the abdominal and pelvic wall there is extensive  edema in the  subcutaneous fat. There are degenerative changes in the lumbar spine.  There is nonspondylolytic spondylolisthesis of L4 on L5.          Impression    IMPRESSION: There is fatty infiltration of the liver. No liver masses  or biliary ductal enlargement is noted.    No intraperitoneal masses or inflammatory changes.     Marked cardiomegaly.    JEFERSON NEGRO MD         SYSTEM ID:  [email or web address]

## 2024-06-15 NOTE — ED TRIAGE NOTES
Patient arrives via [place] ambulance from [facility] with CHF exacerbation with increased SOB and swelling.     Triage Assessment (Adult)       Row Name 06/15/24 1313          Triage Assessment    Airway WDL WDL        Respiratory WDL    Respiratory WDL X;rhythm/pattern     Rhythm/Pattern, Respiratory shortness of breath        Cardiac WDL    Cardiac WDL X;rhythm     Pulse Rate & Regularity tachycardic        Peripheral/Neurovascular WDL    Peripheral Neurovascular WDL WDL        Cognitive/Neuro/Behavioral WDL    Cognitive/Neuro/Behavioral WDL WDL

## 2024-06-15 NOTE — PROGRESS NOTES
LORENA Ha at the [facility] updated with admission.states pt is confused at times, hides it well. Would like fluid restrictions and low sodium diet ordered for discharge, will update MD.

## 2024-06-15 NOTE — ED NOTES

## 2024-06-15 NOTE — ED PROVIDER NOTES
EMERGENCY DEPARTMENT ENCOUNTER      NAME: Heather Rossa  AGE: 73 year old female  YOB: 1950  MRN: [id number]  EVALUATION DATE & TIME: 6/15/2024  1:04 PM    PCP: Nicolette Huffman    ED PROVIDER: Haroon Sanford PA-C       CHIEF COMPLAINT:  Chief Complaint   Patient presents with    Shortness of Breath         HPI  Heather Rosas is a pleasant 73 year old female with history of congestive heart failure, COPD, CVA, NSTEMI, hypertension, obesity and CABG currently on Coumadin who presents to the ER today complaining of shortness of breath.  Symptoms have been worsening over the past 2 days.  Patient was discharged from the hospital from here being on 6/13/2024 after being admitted on 6/11/2024 for congestive heart failure exacerbation.  She was sent to [facility] for rehab.  She was also admitted in May for congestive heart failure exacerbation.   Having worsening shortness of breath especially with exertion.  Shortness of breath slightly worse when lying flat.  Also having swelling and pain of the lower extremities.  Denies any fevers, chills, congestion, runny nose.  No sore throat.  Slight nonproductive cough.  No wheezing.        REVIEW OF SYSTEMS   Review of Systems  As above, otherwise ROS is unremarkable.      PAST MEDICAL HISTORY:  Past Medical History:   Diagnosis Date    Acute diastolic congestive heart failure (H) 10/12/2021    Acute exacerbation of CHF (congestive heart failure) (H) 10/11/2021    Acute hypoxic respiratory failure (H) 04/16/2024    Acute kidney failure, unspecified (H)     Acute on chronic congestive heart failure, unspecified heart failure type (H) 09/21/2023    Acute on chronic diastolic congestive heart failure (H)     Allergic rhinitis 01/01/2011    Anxiety 01/01/2011    Anxiety state, unspecified     Anxiety state    Atrial fibrillation with RVR (H) 07/07/2023    Atrial flutter with rapid ventricular response (H) 10/12/2021    COPD exacerbation (H) 04/16/2024     CVA (cerebral vascular accident) (H) 05/14/2018    Dyslipidemia 11/26/2003    fibromyalgia 06/14/2004    GERD, Esophageal reflux 01/01/2011    History of non-ST elevation myocardial infarction (NSTEMI) 09/06/2018    HTN (hypertension)     Essential hypertension    Major depression, recurrent (H24) 01/01/2011    Major depressive disorder, recurrent episode, moderate (H) 01/01/2011    Metrorrhagia 02/18/2018    Non compliance with medical treatment 07/07/2023    Nonorganic sleep disorder, unspecified     Non-org. 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, ARDS, renal failure         PAST SURGICAL HISTORY:  Past Surgical History:   Procedure Laterality Date    ANGIOGRAM  02/2005    Normal     BIOPSY BREAST  1984    LT, normal    BYPASS GRAFT ARTERY CORONARY  09/10/2018    CARPAL TUNNEL RELEASE RT/LT  2005    RT, carpal tunnel    COLONOSCOPY  2011    Repeat in ten years     COLONOSCOPY  1996    COLONOSCOPY  2004    DILATION AND CURETTAGE, HYSTEROSCOPY, ABLATE ENDOMETRIUM, COMBINED N/A 2/19/2018    Procedure: COMBINED DILATION AND CURETTAGE, HYSTEROSCOPY, ABLATE ENDOMETRIUM;  HYSTEROSCOPY DILATION AND CURETTAGE, ENDOMETRIAL ABLATION;  Surgeon: Jose Nettles MD;  Location: [facility] OR    HYSTERECTOMY TOTAL ABD, NICK SALPINGO-OOPHORECTOMY, NODE DISSECTION, TUMOR DEBULKING, COMBINED  10/30/2018    INSERT PORT VASCULAR ACCESS N/A 12/11/2018    Procedure: PORT-A-CATH PLACEMENT;  Surgeon: Rafi Trinidad MD;  Location: [facility] OR    placement of central line  2005    REMOVE PORT VASCULAR ACCESS N/A 10/6/2020    Procedure: port a cath removal;  Surgeon: Rafi Trinidad MD;  Location: [facility] OR         CURRENT MEDICATIONS:    Current Outpatient Medications   Medication Instructions    albuterol (PROAIR HFA/PROVENTIL HFA/VENTOLIN HFA) 108 (90 Base) MCG/ACT inhaler 2 puffs, Inhalation, EVERY 6 HOURS PRN     busPIRone (BUSPAR) 10 mg, Oral, 2 TIMES DAILY    cefdinir (OMNICEF) 300 mg, Oral, 2 TIMES DAILY    ipratropium - albuterol 0.5 mg/2.5 mg/3 mL (DUONEB) 0.5-2.5 (3) MG/3ML neb solution 3 mLs, Nebulization, EVERY 6 HOURS PRN    metoprolol succinate ER (TOPROL XL) 100 mg, Oral, DAILY    potassium chloride ER (K-TAB) 20 MEQ CR tablet 20 mEq, Oral, 2 TIMES DAILY    torsemide (DEMADEX) 20 mg, Oral, DAILY    warfarin ANTICOAGULANT (COUMADIN) 2 MG tablet Take 3mg by mouth on 6/13/24. Follow-up INR on 6/14/24 with further dosing instructions from anticoagulation clinic at that time.         ALLERGIES:  Allergies   Allergen Reactions    Allegra [Fexofenadine] Nausea and Vomiting    Cats Other (See Comments)     Sneezing, runny nose    Codeine Sulfate Nausea and Vomiting and GI Disturbance    Crestor [Rosuvastatin] Dizziness         FAMILY HISTORY:  Family History   Problem Relation Age of Onset    C.A.D. Father 45        (cause of death)     Other - See Comments Father         rheumatic fever     Allergies Father     Cancer Sister 56        Esophageal to bone cancer    Gastrointestinal Disease Mother         GERD    Cancer Mother         pancreatic ca (cause of death) /liver ca    Breast Cancer Maternal Aunt     Breast Cancer Maternal Aunt     Colon Cancer Paternal Aunt         (cause of death)     Crohn's Disease Other     Depression Maternal Uncle     Depression Maternal Aunt     Diabetes Paternal Grandmother         type 2    Neurologic Disorder Brother         neuropathy    Cancer Brother 58        Tonsilular cancer/ lymph node in neck    Allergies Brother     Breast Cancer Cousin     Breast Cancer Cousin     Breast Cancer Cousin          SOCIAL HISTORY:   Social History     Socioeconomic History    Marital status:    Occupational History    Occupation: [id number]     Employer: [employer]     Comment:    Tobacco Use    Smoking status: Every Day     Current packs/day: 1.00     Average  "packs/day: 1 pack/day for 30.0 years (30.0 ttl pk-yrs)     Types: Cigarettes     Passive exposure: Never    Smokeless tobacco: Never    Tobacco comments:     pt declined, stated she would use, \"the patch\". Patient has not had a cigarette in 1 week because she was in the hospital   Vaping Use    Vaping status: Never Used   Substance and Sexual Activity    Alcohol use: No     Comment: rare    Drug use: No    Sexual activity: Never     Comment: devorced   Other Topics Concern     Service No    Blood Transfusions Yes     Comment: Permits if needed    Caffeine Concern Yes     Comment: 1 cup    Occupational Exposure No    Hobby Hazards No    Sleep Concern No    Stress Concern No    Weight Concern No    Special Diet No    Back Care No    Exercise No    Seat Belt Yes    Self-Exams Yes    Parent/sibling w/ CABG, MI or angioplasty before 65F 55M? Yes     Comment: Father     Social Determinants of Health     Financial Resource Strain: Low Risk  (3/4/2024)    Financial Resource Strain     Within the past 12 months, have you or your family members you live with been unable to get utilities (heat, electricity) when it was really needed?: No   Food Insecurity: Low Risk  (3/4/2024)    Food Insecurity     Within the past 12 months, did you worry that your food would run out before you got money to buy more?: No     Within the past 12 months, did the food you bought just not last and you didn t have money to get more?: No   Transportation Needs: Low Risk  (3/4/2024)    Transportation Needs     Within the past 12 months, has lack of transportation kept you from medical appointments, getting your medicines, non-medical meetings or appointments, work, or from getting things that you need?: No   Physical Activity: Insufficiently Active (2/28/2024)    Exercise Vital Sign     Days of Exercise per Week: 1 day     Minutes of Exercise per Session: 20 min   Stress: No Stress Concern Present (2/28/2024)    [place] [employer] of " "Occupational Health - Occupational Stress Questionnaire     Feeling of Stress : Not at all   Social Connections: Moderately Integrated (2/28/2024)    Social Connection and Isolation Panel [NHANES]     Frequency of Communication with Friends and Family: More than three times a week     Frequency of Social Gatherings with Friends and Family: Three times a week     Attends Confucianism Services: More than 4 times per year     Active Member of Clubs or Organizations: Yes     Attends Club or Organization Meetings: More than 4 times per year     Marital Status:    Interpersonal Safety: Low Risk  (3/4/2024)    Interpersonal Safety     Do you feel physically and emotionally safe where you currently live?: Yes     Within the past 12 months, have you been hit, slapped, kicked or otherwise physically hurt by someone?: No     Within the past 12 months, have you been humiliated or emotionally abused in other ways by your partner or ex-partner?: No   Housing Stability: Low Risk  (3/4/2024)    Housing Stability     Do you have housing? : Yes     Are you worried about losing your housing?: No         ==================================================================================================================================    PHYSICAL EXAM    VITAL SIGNS: /85   Pulse 106   Temp 98.1  F (36.7  C) (Tympanic)   Resp 13   Ht 1.499 m (4' 11\")   Wt 87.5 kg (193 lb)   SpO2 96%   BMI 38.98 kg/m      Patient Vitals for the past 24 hrs:   BP Temp Temp src Pulse Resp SpO2 Height Weight   06/15/24 1546 -- -- -- 106 13 -- -- --   06/15/24 1531 -- -- -- 112 19 96 % -- --   06/15/24 1516 -- -- -- 94 14 99 % -- --   06/15/24 1515 121/85 -- -- 105 25 97 % -- --   06/15/24 1501 -- -- -- 103 18 -- -- --   06/15/24 1401 (!) 124/93 -- -- 100 13 97 % -- --   06/15/24 1316 (!) 120/93 -- -- 107 -- 95 % -- --   06/15/24 1311 105/85 98.1  F (36.7  C) Tympanic 101 20 95 % 1.499 m (4' 11\") 87.5 kg (193 lb)       Physical Exam  Vitals " and nursing note reviewed.   Constitutional:       Appearance: She is well-developed. She is obese. She is not diaphoretic.   HENT:      Nose: Nose normal. No congestion or rhinorrhea.      Mouth/Throat:      Mouth: Mucous membranes are moist.      Pharynx: Oropharynx is clear.   Eyes:      Conjunctiva/sclera: Conjunctivae normal.   Cardiovascular:      Rate and Rhythm: Tachycardia present. Rhythm irregular.      Pulses: Normal pulses.      Heart sounds: Normal heart sounds.   Pulmonary:      Effort: Pulmonary effort is normal.      Breath sounds: Rales (Questional bilateral Rales at the bases) present. No wheezing or rhonchi.   Chest:      Chest wall: No tenderness.   Abdominal:      General: Abdomen is flat. Bowel sounds are normal.      Palpations: Abdomen is soft.      Tenderness: There is no abdominal tenderness.   Musculoskeletal:         General: Normal range of motion.      Cervical back: Normal range of motion and neck supple.      Right lower leg: Edema present.      Left lower leg: Edema present.   Skin:     General: Skin is warm and dry.      Capillary Refill: Capillary refill takes less than 2 seconds.   Neurological:      General: No focal deficit present.      Mental Status: She is alert.   Psychiatric:         Mood and Affect: Mood normal.           ==================================================================================================================================     LABS & RADIOLOGY:  All pertinent labs reviewed and interpreted. Reviewed all pertinent imaging. Please see official radiology report.  Results for orders placed or performed during the hospital encounter of 06/15/24   XR Chest Port 1 View    Impression    IMPRESSION:  Cardiomegaly. Interstitial thickening stable from June 11, 2024      JEFERSON NEGRO MD         SYSTEM ID:  [email or web address]   US Lower Extremity Venous Duplex Bilateral    Impression    Impression: No evidence of deep venous thrombosis within the  lower  extremities.    JEFERSON NEGRO MD         SYSTEM ID:  [email or web address]   CT Chest Pulmonary Embolism w Contrast    Impression    IMPRESSION: No pulmonary emboli. No change from previous examination  in April 2024    JEFERSON NEGRO MD         SYSTEM ID:  [email or web address]   CT Abdomen Pelvis w Contrast    Impression    IMPRESSION: There is fatty infiltration of the liver. No liver masses  or biliary ductal enlargement is noted.    No intraperitoneal masses or inflammatory changes.     Marked cardiomegaly.    JEFERSON NEGRO MD         SYSTEM ID:  [email or web address]   Comprehensive metabolic panel   Result Value Ref Range    Sodium 139 135 - 145 mmol/L    Potassium 3.1 (L) 3.4 - 5.3 mmol/L    Carbon Dioxide (CO2) 30 (H) 22 - 29 mmol/L    Anion Gap 12 7 - 15 mmol/L    Urea Nitrogen 31.5 (H) 8.0 - 23.0 mg/dL    Creatinine 0.90 0.51 - 0.95 mg/dL    GFR Estimate 68 >60 mL/min/1.73m2    Calcium 9.5 8.8 - 10.2 mg/dL    Chloride 97 (L) 98 - 107 mmol/L    Glucose 109 (H) 70 - 99 mg/dL    Alkaline Phosphatase 178 (H) 40 - 150 U/L    AST 24 0 - 45 U/L    ALT 16 0 - 50 U/L    Protein Total 7.3 6.4 - 8.3 g/dL    Albumin 3.9 3.5 - 5.2 g/dL    Bilirubin Total 1.6 (H) <=1.2 mg/dL   Result Value Ref Range    Troponin T, High Sensitivity 27 (H) <=14 ng/L   Result Value Ref Range    Magnesium 1.4 (L) 1.7 - 2.3 mg/dL   TSH Reflex GH   Result Value Ref Range    TSH 2.11 0.30 - 4.20 uIU/mL   Blood gas venous   Result Value Ref Range    pH Venous 7.36 7.32 - 7.43    pCO2 Venous 56 (H) 40 - 50 mm Hg    pO2 Venous 13 (L) 25 - 47 mm Hg    Bicarbonate Venous 32 (H) 21 - 28 mmol/L    Base Excess/Deficit Venous 4.6 (H) -3.0 - 3.0 mmol/L    FIO2 21     Oxyhemoglobin Venous 10 (L) 70 - 75 %    O2 Sat, Venous 10.0 (L) 70.0 - 75.0 %   Nt probnp inpatient (BNP)   Result Value Ref Range    N terminal Pro BNP Inpatient 5,788 (H) 0 - 900 pg/mL   UA with Microscopic reflex to Culture    Specimen: Urine, Clean Catch   Result Value Ref Range    Color Urine Yellow  Colorless, Straw, Light Yellow, Yellow    Appearance Urine Clear Clear    Glucose Urine Negative Negative mg/dL    Bilirubin Urine Negative Negative    Ketones Urine Negative Negative mg/dL    Specific Gravity Urine 1.006 1.000 - 1.030    Blood Urine Negative Negative    pH Urine 6.5 5.0 - 9.0    Protein Albumin Urine Negative Negative mg/dL    Urobilinogen Urine Normal Normal, 2.0 mg/dL    Nitrite Urine Negative Negative    Leukocyte Esterase Urine Negative Negative    Bacteria Urine Few (A) None Seen /HPF    Mucus Urine Present (A) None Seen /LPF    RBC Urine <1 <=2 /HPF    WBC Urine <1 <=5 /HPF   CBC with platelets and differential   Result Value Ref Range    WBC Count 8.9 4.0 - 11.0 10e3/uL    RBC Count 4.61 3.80 - 5.20 10e6/uL    Hemoglobin 12.5 11.7 - 15.7 g/dL    Hematocrit 40.9 35.0 - 47.0 %    MCV 89 78 - 100 fL    MCH 27.1 26.5 - 33.0 pg    MCHC 30.6 (L) 31.5 - 36.5 g/dL    RDW 19.5 (H) 10.0 - 15.0 %    Platelet Count 338 150 - 450 10e3/uL    % Neutrophils 69 %    % Lymphocytes 12 %    % Monocytes 13 %    % Eosinophils 4 %    % Basophils 1 %    % Immature Granulocytes 1 %    NRBCs per 100 WBC 0 <1 /100    Absolute Neutrophils 6.1 1.6 - 8.3 10e3/uL    Absolute Lymphocytes 1.0 0.8 - 5.3 10e3/uL    Absolute Monocytes 1.2 0.0 - 1.3 10e3/uL    Absolute Eosinophils 0.4 0.0 - 0.7 10e3/uL    Absolute Basophils 0.1 0.0 - 0.2 10e3/uL    Absolute Immature Granulocytes 0.1 <=0.4 10e3/uL    Absolute NRBCs 0.0 10e3/uL   Extra Blue Top Tube   Result Value Ref Range    Hold Specimen JIC    Extra Red Top Tube   Result Value Ref Range    Hold Specimen JIC    Extra Purple Top Tube   Result Value Ref Range    Hold Specimen JIC    D dimer quantitative   Result Value Ref Range    D-Dimer Quantitative 0.69 (H) 0.00 - 0.50 ug/mL FEU   Result Value Ref Range    INR 1.65 (H) 0.85 - 1.15     CT Abdomen Pelvis w Contrast   Final Result   IMPRESSION: There is fatty infiltration of the liver. No liver masses   or biliary ductal  enlargement is noted.      No intraperitoneal masses or inflammatory changes.       Marked cardiomegaly.      JEFERSON NEGRO MD            SYSTEM ID:  [email or web address]      US Lower Extremity Venous Duplex Bilateral   Final Result   Impression: No evidence of deep venous thrombosis within the lower   extremities.      JEFEROSN NEGRO MD            SYSTEM ID:  [email or web address]      CT Chest Pulmonary Embolism w Contrast   Final Result   IMPRESSION: No pulmonary emboli. No change from previous examination   in April 2024      JEFERSON NEGRO MD            SYSTEM ID:  [email or web address]      XR Chest Port 1 View   Final Result   IMPRESSION:  Cardiomegaly. Interstitial thickening stable from June 11, 2024        JEFERSON NEGRO MD            [facility] ID:  [email or web address]            EKG:    EKG reviewed at 1347.  1) Rhythm: Atrial fibrillation with RVR  2) Rate: ventricular rate 107 bpm.  3) QRS Axis: No axis deviation  4) P waves/ WA interval: Sinus. Nml KANDICE. No atrial enlargement  5) QRS complex: Narrow.  Right BBB. No ventricular hypertrophy. No pathological Q waves.  6) ST Segment: No acute ST segment elevation or depression  7) T waves: No T wave inversions. No peaked or flattened T waves.     I have independently reviewed and interpreted today's EKG, pending cardiologist over read.       ==================================================================================================================================    ED COURSE, MEDICAL DECISION MAKING, FINAL IMPRESSION AND PLAN:      Assessment / Plan:  1. Acute on chronic congestive heart failure, unspecified heart failure type (H)    2. Hypomagnesemia    3. Hypokalemia    4. Subtherapeutic international normalized ratio (INR)    5. Abnormal LFTs    6. Atrial fibrillation, unspecified type (H)        The patient was interviewed and examined.  HPI and physical exam as below.  Differential diagnosis and MDM Key Documentation Elements as below.  Vitals, triage note, and nursing notes  "were reviewed./85   Pulse 106   Temp 98.1  F (36.7  C) (Tympanic)   Resp 13   Ht 1.499 m (4' 11\")   Wt 87.5 kg (193 lb)   SpO2 96%   BMI 38.98 kg/m      Differential includes but is not limited to congestive heart failure exacerbation, PE, pneumonia, COPD exacerbation, dehydration,  Abnormalities, ACS/MI, unstable angina    Patient was afebrile but tachycardic.  Patient was normal tensive.  Patient had 2+ pitting edema of the bilateral lower extremities with tenderness.  Lungs had questional Rales at the bases.  Heart rates are really irregular and tachycardic consistent with underlying atrial fibrillation which patient does have a history of.    Initial EKG showed atrial fibrillation with pulses in the 100s.  Screening troponin slightly elevated at 27 but significantly below her baseline.  BNP was around 5700, elevated from 3700 days ago on 6/11.  Patient with no leukocytosis or anemia.  BUN slightly elevated 31.7 otherwise normal renal function.  Alk phos elevated 178, total bilirubin 1.6, no abdominal pain or tenderness.  INR was subtherapeutic at 1.65.  D-dimer minimally elevated 0.69.  Patient with low potassium 3.111 days and 1.4.  UA was negative for hematuria or signs of UTI.  TSH normal.  Venous blood glass showed fairly stable CO2 of 56, O2 13, and bicarb of 32.  Initial chest x-ray showed cardiomegaly.  CT chest PE study showed no acute findings and no acute PE.  Lower extremity DVTs were unremarkable for signs of acute DVT.  CT abdomen pelvis IV contrast today showed continued fatty liver but no acute findings.    Patient given IV magnesium and oral potassium for replacement here in the ER.  Patient given IV Lasix 40 mg for congestive heart failure exacerbation.    Patient remained vitally stable here in the ER with mild tachycardia.  Patient with no hypoxia.  Patient did present from the [facility] and was feeling short of breath here.  Patient was amenable to staying overnight for continued " IV diuretics and further cares.  Discussed patient with hospitalist service, Dr. Mott.  Patient will be admitted to observation.  Patient was admitted in stable condition.    Pertinent Labs & Imaging studies reviewed. (See chart for details)  Results for orders placed or performed during the hospital encounter of 06/15/24   XR Chest Port 1 View    Impression    IMPRESSION:  Cardiomegaly. Interstitial thickening stable from June 11, 2024      JEFERSON NEGRO MD         SYSTEM ID:  [email or web address]   US Lower Extremity Venous Duplex Bilateral    Impression    Impression: No evidence of deep venous thrombosis within the lower  extremities.    JEFERSON NEGRO MD         SYSTEM ID:  [email or web address]   CT Chest Pulmonary Embolism w Contrast    Impression    IMPRESSION: No pulmonary emboli. No change from previous examination  in April 2024    JEFERSON NEGRO MD         SYSTEM ID:  [email or web address]   CT Abdomen Pelvis w Contrast    Impression    IMPRESSION: There is fatty infiltration of the liver. No liver masses  or biliary ductal enlargement is noted.    No intraperitoneal masses or inflammatory changes.     Marked cardiomegaly.    JEFERSON NEGRO MD         SYSTEM ID:  [email or web address]   Comprehensive metabolic panel   Result Value Ref Range    Sodium 139 135 - 145 mmol/L    Potassium 3.1 (L) 3.4 - 5.3 mmol/L    Carbon Dioxide (CO2) 30 (H) 22 - 29 mmol/L    Anion Gap 12 7 - 15 mmol/L    Urea Nitrogen 31.5 (H) 8.0 - 23.0 mg/dL    Creatinine 0.90 0.51 - 0.95 mg/dL    GFR Estimate 68 >60 mL/min/1.73m2    Calcium 9.5 8.8 - 10.2 mg/dL    Chloride 97 (L) 98 - 107 mmol/L    Glucose 109 (H) 70 - 99 mg/dL    Alkaline Phosphatase 178 (H) 40 - 150 U/L    AST 24 0 - 45 U/L    ALT 16 0 - 50 U/L    Protein Total 7.3 6.4 - 8.3 g/dL    Albumin 3.9 3.5 - 5.2 g/dL    Bilirubin Total 1.6 (H) <=1.2 mg/dL   Result Value Ref Range    Troponin T, High Sensitivity 27 (H) <=14 ng/L   Result Value Ref Range    Magnesium 1.4 (L) 1.7 - 2.3 mg/dL   TSH Reflex GH    Result Value Ref Range    TSH 2.11 0.30 - 4.20 uIU/mL   Blood gas venous   Result Value Ref Range    pH Venous 7.36 7.32 - 7.43    pCO2 Venous 56 (H) 40 - 50 mm Hg    pO2 Venous 13 (L) 25 - 47 mm Hg    Bicarbonate Venous 32 (H) 21 - 28 mmol/L    Base Excess/Deficit Venous 4.6 (H) -3.0 - 3.0 mmol/L    FIO2 21     Oxyhemoglobin Venous 10 (L) 70 - 75 %    O2 Sat, Venous 10.0 (L) 70.0 - 75.0 %   Nt probnp inpatient (BNP)   Result Value Ref Range    N terminal Pro BNP Inpatient 5,788 (H) 0 - 900 pg/mL   UA with Microscopic reflex to Culture    Specimen: Urine, Clean Catch   Result Value Ref Range    Color Urine Yellow Colorless, Straw, Light Yellow, Yellow    Appearance Urine Clear Clear    Glucose Urine Negative Negative mg/dL    Bilirubin Urine Negative Negative    Ketones Urine Negative Negative mg/dL    Specific Gravity Urine 1.006 1.000 - 1.030    Blood Urine Negative Negative    pH Urine 6.5 5.0 - 9.0    Protein Albumin Urine Negative Negative mg/dL    Urobilinogen Urine Normal Normal, 2.0 mg/dL    Nitrite Urine Negative Negative    Leukocyte Esterase Urine Negative Negative    Bacteria Urine Few (A) None Seen /HPF    Mucus Urine Present (A) None Seen /LPF    RBC Urine <1 <=2 /HPF    WBC Urine <1 <=5 /HPF   CBC with platelets and differential   Result Value Ref Range    WBC Count 8.9 4.0 - 11.0 10e3/uL    RBC Count 4.61 3.80 - 5.20 10e6/uL    Hemoglobin 12.5 11.7 - 15.7 g/dL    Hematocrit 40.9 35.0 - 47.0 %    MCV 89 78 - 100 fL    MCH 27.1 26.5 - 33.0 pg    MCHC 30.6 (L) 31.5 - 36.5 g/dL    RDW 19.5 (H) 10.0 - 15.0 %    Platelet Count 338 150 - 450 10e3/uL    % Neutrophils 69 %    % Lymphocytes 12 %    % Monocytes 13 %    % Eosinophils 4 %    % Basophils 1 %    % Immature Granulocytes 1 %    NRBCs per 100 WBC 0 <1 /100    Absolute Neutrophils 6.1 1.6 - 8.3 10e3/uL    Absolute Lymphocytes 1.0 0.8 - 5.3 10e3/uL    Absolute Monocytes 1.2 0.0 - 1.3 10e3/uL    Absolute Eosinophils 0.4 0.0 - 0.7 10e3/uL    Absolute  "Basophils 0.1 0.0 - 0.2 10e3/uL    Absolute Immature Granulocytes 0.1 <=0.4 10e3/uL    Absolute NRBCs 0.0 10e3/uL   Extra Blue Top Tube   Result Value Ref Range    Hold Specimen JIC    Extra Red Top Tube   Result Value Ref Range    Hold Specimen JIC    Extra Purple Top Tube   Result Value Ref Range    Hold Specimen JIC    D dimer quantitative   Result Value Ref Range    D-Dimer Quantitative 0.69 (H) 0.00 - 0.50 ug/mL FEU   Result Value Ref Range    INR 1.65 (H) 0.85 - 1.15     No results found for: \"ABORH\"      Reassessments, Medications, Interventions, & Response to Treatments:  -as above    Medications given during today's ER visit:  Medications   magnesium sulfate 2 g in 50 mL sterile water intermittent infusion (2 g Intravenous $New Bag 6/15/24 1516)   furosemide (LASIX) injection 40 mg (40 mg Intravenous $Given 6/15/24 1515)   iopamidol (ISOVUE-370) solution 112 mL (112 mLs Intravenous $Given 6/15/24 1506)   potassium chloride jayashree ER (KLOR-CON M20) CR tablet 40 mEq (40 mEq Oral $Given 6/15/24 1515)       Consultations:  Hospitalist service, Dr. Mott.     Decision Rules, Medical Calculators, and Risk Stratification Tools:  None    MDM Key Documentation Elements for Patient's Evaluation:  Differential diagnosis to include high risk considerations: As above  Escalation to admission/observation considered: Admission/observation considered, patient was admitted/transferred in stable condition  Discussions and management with other clinicians:    3a. Independent interpretation of testing performed by another health professional:  -No  3b. Discussion of management or test interpretation with another health professional: -Yes  Independent interpretation of tests:  Ordering and/or review of 3+ test(s)  Discussion of test interpretations with radiology:  No  History obtained from source other than patient or assessment requiring an independent historian:  No  Review of non-ED/external records:  review of 3+ " records  Diagnostic tests considered but not ultimately performed/deferred:  -Echocardiogram  Prescription medications considered but not prescribed:  - Patient was admitted/transferred   Chronic conditions affecting care:  -Congestive heart failure, COPD  Care affected by social determinants of health:  -None    The patient's management involved:   - Laboratory studies  - Imaging studies  - Parenteral controlled substance  - Decision regarding hospitalization/transfer      A shared decision making model was used. Time was taken to answer all questions.  Patient and/or associated parties understood and were agreeable to treatment plan.  Plan and all results were discussed.  Patient was admitted/transferred in stable condition      NEW PRESCRIPTIONS STARTED AT TODAY'S ER VISIT:  New Prescriptions    No medications on file          ==================================================================================================================================      I, Bon Sanford PA-C, personally performed the services described in this documentation, and it is both accurate and complete.       Haroon Sanford PA-C  06/15/24 [id number]

## 2024-06-15 NOTE — PHARMACY-ADMISSION MEDICATION HISTORY
Pharmacist Admission Medication History    Admission medication history is complete. The information provided in this note is only as accurate as the sources available at the time of the update.    Information Source(s): Facility (U/NH/) medication list/MAR via in-person    Pertinent Information:  -Patient at the [facility] 6/14 + 6/15 after discharge from [facility]. Warfarin was received x1 dose at 3 mg per INR/Coumadin clinic  -Patient was 4/6 doses into cefdinir completion for cystitis.    Changes made to PTA medication list:  Added: Hydrocortisone cream  Deleted: Duonebs  Changed: None    Allergies reviewed with patient and updates made in EHR: yes    Medication History Completed By: Binta Grace RPH 6/15/2024 2:43 PM

## 2024-06-15 NOTE — PROVIDER NOTIFICATION
06/15/24 1700   Valuables   Patient Belongings remains with patient   Patient Belongings Remaining with Patient clothing   Did you bring any home meds/supplements to the hospital?  No     A               Admission:  I am responsible for any personal items that are not sent to the safe or pharmacy.  [facility] is not responsible for loss, theft or damage of any property in my possession.    Signature:  _________________________________ Date: _______  Time: _____                                              Staff Signature:  ____________________________ Date: ________  Time: _____      2nd Staff person, if patient is unable/unwilling to sign:    Signature: ________________________________ Date: ________  Time: _____     Discharge:  [facility] has returned all of my personal belongings:    Signature: _________________________________ Date: ________  Time: _____                                          Staff Signature:  ____________________________ Date: ________  Time: _____

## 2024-06-15 NOTE — PHARMACY-ANTICOAGULATION SERVICE
Pharmacy Consult- Initial Coumadin Assessment    Heather Rosas is a 73 year old female admitted on 6/15/2024 with <principal problem not specified>    Primary Indication(s) for Anticoagulation: A fib    Goal INR: 2-3    FYI, patient is followed by the [facility] at: [facility]    Patient Active Problem List   Diagnosis    Dyslipidemia    Fibromyalgia    HTN (hypertension)    ANNA (generalized anxiety disorder)    GERD (Gastroesophageal reflux disease)    Schizophrenia (H)    Seasonal allergic rhinitis    Adenocarcinoma of the endometrium/uterus (H)    Atrial fibrillation (H)    Chronic right-sided congestive heart failure (H)    Hyponatremia    Moderate mitral regurgitation    S/P CABG x 4    Status post chemotherapy    Coronary artery disease involving native coronary artery    Diarrhea    Noncompliance with medication regimen    Aortic valve sclerosis (7/2023)    Pulmonary nodules    Thyroid nodule    Tobacco dependence    Generalized edema due to fluid overload    Severe tricuspid regurgitation    Hypokalemia    Chronic diastolic congestive heart failure (H)    Lesion of colon    Lesion of bladder    Weakness    Peripheral edema    SOB (shortness of breath)    Acute kidney injury (H24)    Subtherapeutic international normalized ratio (INR)    Hypotension    Hypomagnesemia    Abnormal LFTs    Atrial fibrillation, unspecified type (H)    Acute on chronic congestive heart failure, unspecified heart failure type (H)       Patient previously anticoagulated on Coumadin at a dose of 21 mg/week; dosed as: 3 mg daily    Factors that may increase patient's sensitivity to warfarin (Coumadin) include: Acute HF exac, cefdinir    Factors that may decrease patient's sensitivity to warfarin (Coumadin) include: Subtherapeutic INR x5 days    Recent Labs   Lab Test 06/15/24  1316 06/14/24  1059 06/13/24  0252 06/12/24  0506 06/11/24  0519 05/24/24  0505 05/23/24  0520   HGB 12.5  --    --  11.5* 11.6*  --  13.1   INR 1.65* 1.8* 1.67* 1.57* 1.41*   < > 2.15*     --   --  344 389  --  214    < > = values in this interval not displayed.        Plan: Give increased dose from home @ 4 mg x1 @ 1800 6/15/24. Check INR in the AM and adjust dose as needed.    Thank You for the Consult. Will continue to follow.    Binta Grace [facility] ....................  6/15/2024   5:22 PM

## 2024-06-15 NOTE — PROGRESS NOTES
" NSG ADMISSION NOTE    Patient admitted to room 332 at approximately 1710 via wheel chair from emergency room. Patient was accompanied by transport tech.     Verbal SBAR report received from karen Moser prior to patient arrival.     Patient ambulated to bed with stand-by assist. Patient alert and oriented X 3. The patient is not having any pain.  . Admission vital signs: Blood pressure 124/85, pulse 113, temperature 97.7  F (36.5  C), temperature source Tympanic, resp. rate 20, height 1.499 m (4' 11\"), weight 84.8 kg (186 lb 14.4 oz), SpO2 96%. Patient was oriented to plan of care, call light, bed controls, tv, telephone, bathroom, and visiting hours.     Risk Assessment    The following safety risks were identified during admission: fall. Yellow risk band applied: YES.                Education    Patient has a Climax to Observation order: Yes  Observation education completed and documented: Yes      José Miguel Dougherty RN      "

## 2024-06-16 NOTE — PLAN OF CARE
PRIMARY DIAGNOSIS: heart failuire    OUTPATIENT/OBSERVATION GOALS TO BE MET BEFORE DISCHARGE:  ADLs back to baseline: No    Activity and level of assistance: Up with standby assistance.    Pain status: Pain free.    Return to near baseline physical activity: No     Discharge Planner Nurse   Safe discharge environment identified: Yes  Barriers to discharge: Yes       Entered by: Carmenza Sullivan RN 06/16/2024 6:39 AM     Please review provider order for any additional goals.   Nurse to notify provider when observation goals have been met and patient is ready for discharge.

## 2024-06-16 NOTE — PROGRESS NOTES
SAFETY CHECKLIST  ID Bands and Risk clasps correct and in place (DNR, Fall risk, Allergy, Latex, Limb):  Yes  All Lines Reconciled and labeled correctly: Yes  Whiteboard updated:Yes  Environmental interventions: Yes  Verify Tele #: 3

## 2024-06-16 NOTE — DISCHARGE SUMMARY
"[facility]  Hospitalist Discharge Summary      Date of Admission:  6/15/2024  Date of Discharge:  6/16/2024  Discharging Provider: Lise Mott MD  Discharge Service: Hospitalist Service    Discharge Diagnoses   Principal Problem:    Acute on chronic diastolic heart failure (H)    Date Noted: 6/15/2024  Active Problems:    Benign essential hypertension    Date Noted:     Gastroesophageal reflux disease without esophagitis    Date Noted: 1/1/2011    Chronic atrial fibrillation (H)    Date Noted: 11/26/2018    S/P CABG x 4    Date Noted: 9/10/2018    Coronary artery disease involving native coronary artery of native heart without angina pectoris    Date Noted: 10/12/2021    Hypokalemia    Date Noted: 3/4/2024    Subtherapeutic international normalized ratio (INR)    Date Noted: 6/11/2024    Hypomagnesemia    Date Noted: 6/15/2024      Clinically Significant Risk Factors     # Obesity: Estimated body mass index is 37.08 kg/m  as calculated from the following:    Height as of this encounter: 1.499 m (4' 11\").    Weight as of this encounter: 83.3 kg (183 lb 9.6 oz).       Follow-ups Needed After Discharge   Follow-up Appointments     Follow Up and recommended labs and tests      Follow up with [facility] physician.  The following labs/tests are   recommended: BMP.  Follow up with primary care provider in 1-2 weeks.  The following   labs/tests are recommended: BMP.        Adjust diuretic therapy to maintain fluid status and not hurt kidneys.    Unresulted Labs Ordered in the Past 30 Days of this Admission       No orders found for last 31 day(s).        These results will be followed up by PCP    Discharge Disposition   Discharged to short-term care facility  Condition at discharge: Satisfactory    Hospital Course      Heather Rosas is a 73 year old female with a PMH significant for schizophrenia, atrial fib on warfarin, CAD S/P CABG x 4, HTN, anxiety, fibromyalgia, tobacco use and endometrial " adenocarcinoma who presented to the ER with complaints of shortness of breath and leg swelling.  She had just been in the hospital in [place] from 6/11-13/2024 for CHF exacerbation and cystitis.  She was discharged to the [facility] but felt like her symptoms were getting worse, especially with increased dyspnea with exertion and orthopnea.  In the ER she had a slight increase in her BNP compared to previous but otherwise no significant findings.  However her INR is subtherapeutic and she does not feel like she can return to the [facility] at this time so is placed in observation for increased diuresis.  By the time she arrived to the medical unit she was feeling better.    Principal Problem:    Acute on chronic diastolic heart failure (H)    Date Noted: 6/15/2024       Given Lasix 40 mg IV in the ER and is feeling better.  Continued increased Lasix dose overnight, increased potassium supplementation.  She had a net loss of nearly 5 liters of fluid overnight  and is ready to discharge today.  Last echo 4/15/2024 with EF 60-65%, LVH.    Active Problems:    Benign essential hypertension      Controlled with torsemide and Toprol. Continue.      Gastroesophageal reflux disease without esophagitis    Date Noted: 1/1/2011       Not on any regular medications at this time.  May use Tums as needed.      Chronic atrial fibrillation (H)    Date Noted: 11/26/2018    Subtherapeutic international normalized ratio (INR)    Date Noted: 6/11/2024       Rate controlled with Toprol, on warfarin for anticoagulation.  Used SCD's overnight as but her INR the morning of discharge is 2.16 so continue warfarin.      S/P CABG x 4    Date Noted: 9/10/2018    Coronary artery disease involving native coronary artery of native heart without angina pectoris    Date Noted: 10/12/2021       No chest pain.  Troponin minimally elevated but decreased.  Suspect slight elevation due to fluid overload with type II NSTEMI.         Hypokalemia     Date Noted:  3/4/2024     Hypomagnesemia     Date Noted: 6/15/2024        Replaced IV and po and back to normal by the time of discharge.      Consultations This Hospital Stay   CARE MANAGEMENT / SOCIAL WORK IP CONSULT  PHYSICAL THERAPY ADULT IP CONSULT  OCCUPATIONAL THERAPY ADULT IP CONSULT  PHARMACY TO DOSE WARFARIN  PHYSICAL THERAPY ADULT IP CONSULT  OCCUPATIONAL THERAPY ADULT IP CONSULT    Code Status   Full Code    Time Spent on this Encounter   I, Lise Mott MD, personally saw the patient today and spent greater than 30 minutes discharging this patient.       Lise Mott MD  [facility]  [address]   [place] [address]  Phone: [phone]  Fax: [phone]  ______________________________________________________________________    Physical Exam   Vital Signs: Temp: 98.4  F (36.9  C) Temp src: Tympanic BP: 113/69 Pulse: 98   Resp: 18 SpO2: 93 % O2 Device: None (Room air)    Weight: 183 lbs 9.6 oz    Constitutional: awake, alert, cooperative, no apparent distress, appears stated age, and moderately obese, laying nearly flat in bed  Eyes: pupils equal, round and reactive to light, extra-ocular muscles intact, and conjunctiva normal  ENT: normocephalic, atraumatic  Respiratory: no increased work of breathing, fair air exchange, and clear to auscultation  Cardiovascular: regular rate and rhythm, normal S1 and S2, and no murmur noted  GI: normal bowel sounds, soft, non-distended, and non-tender  Skin: no redness, warmth, or swelling and brown patches on bilateral cheeks - solar lentigo  Musculoskeletal: trace firm edema bilateral lower legs but there is some wrinkling of the skin indicating there was more swelling recently, there is no redness, warmth, or swelling of the joints  Neurologic: Mental Status Exam:  Fund of Knowledge:  normal  Attention/Concentration:  normal  Language:  normal  Cranial Nerves:  cranial nerves II-XII are grossly intact  Motor Exam:  moves all extremities  well and symmetrically  Neuropsychiatric: General: normal, calm, and normal eye contact  Level of consciousness: alert / normal  Affect: normal  Orientation: oriented to self, place  Memory and insight: impaired: answers questions but the information provided is not accurate       Primary Care Physician   Nicolette Huffman    Discharge Orders      Basic metabolic panel     General info for SNF    Length of Stay Estimate: Long Term Care  Condition at Discharge: Improving  Level of care:skilled   Rehabilitation Potential: Good  Admission H&P remains valid and up-to-date: Yes  Recent Chemotherapy: N/A  Use Nursing Home Standing Orders: Yes     Reason for your hospital stay    CHF exacerbation     Daily weights    Call Provider for weight gain of more than 2 pounds per day or 5 pounds per week.     Follow Up and recommended labs and tests    Follow up with [facility] physician.  The following labs/tests are recommended: BMP.  Follow up with primary care provider in 1-2 weeks.  The following labs/tests are recommended: BMP.     Activity - Up ad jorden     Full Code     Physical Therapy Adult Consult    Evaluate and treat as clinically indicated.    Reason:  weakness, balance issues, edema     Occupational Therapy Adult Consult    Evaluate and treat as clinically indicated.    Reason:  weakness, balance issues, edema     Diet    Follow this diet upon discharge: Orders Placed This Encounter      Combination Diet Regular Diet; Low Saturated Fat Na <2400mg Diet       Significant Results and Procedures   Most Recent 3 CBC's:  Recent Labs   Lab Test 06/15/24  1316 06/12/24  0506 06/11/24  0519   WBC 8.9 8.9 8.7   HGB 12.5 11.5* 11.6*   MCV 89 86 86    344 389     Most Recent 3 BMP's:  Recent Labs   Lab Test 06/16/24  0616 06/15/24  1316 06/13/24  0252 06/12/24  1050 06/12/24  0506    139 137   < > 130*  130*   POTASSIUM 3.5 3.1* 3.9   < > 3.4   CHLORIDE 98 97*  --   --  98   CO2 29 30*  --   --  21*   BUN 26.2*  31.5*  --   --  29.2*   CR 0.81 0.90  --   --  1.46*   ANIONGAP 11 12  --   --  11   CARINA 9.1 9.5  --   --  8.4*   * 109*  --   --  103*    < > = values in this interval not displayed.     Most Recent 2 LFT's:  Recent Labs   Lab Test 06/15/24  1316 06/11/24  0519   AST 24 18   ALT 16 14   ALKPHOS 178* 159*   BILITOTAL 1.6* 1.9*     Most Recent INR's and Anticoagulation Dosing History:  Anticoagulation Dose History  More data exists         Latest Ref Rng & Units 5/24/2024 6/11/2024 6/12/2024 6/13/2024 6/14/2024 6/15/2024 6/16/2024   Recent Dosing and Labs   warfarin ANTICOAGULANT (COUMADIN) 2 MG tablet - - 4 mg, $Given - - - 4 mg, $Given -   warfarin ANTICOAGULANT (COUMADIN) 3 MG tablet - - - 3 mg, $Given - - - -   INR 0.85 - 1.15 2.53  1.41  1.57  1.67  1.8     1.65  2.16       Details          This result is from an external source.             Most Recent 3 Troponin's:  Recent Labs   Lab Test 10/11/21  2012 09/01/18  0950 05/14/18  1814 02/20/18  0449   TROPI  --  5.776* <0.015 1.994*   TROPONIN <0.015  --   --   --      Most Recent 3 BNP's:  Recent Labs   Lab Test 06/15/24  1316 06/11/24  0519 05/18/24  1241   NTBNPI 5,788* 3,769* 6,706*     Most Recent Cholesterol Panel:  Recent Labs   Lab Test 07/09/23  0437   CHOL 130   LDL 74   HDL 34*   TRIG 110     Most Recent TSH and T4:  Recent Labs   Lab Test 06/15/24  1316   TSH 2.11     Most Recent Hemoglobin A1c:  Recent Labs   Lab Test 07/09/23  0437   A1C 5.3   ,   Results for orders placed or performed during the hospital encounter of 06/15/24   XR Chest Port 1 View    Narrative    PROCEDURE:  XR CHEST PORT 1 VIEW    HISTORY:  sob, chf.     COMPARISON:  June 11, 2024    FINDINGS:   The heart is enlarged. There is widespread interstitial opacities  noted. The interstitial opacities are stable from previous  examination. No pleural effusion or pneumothorax. Postoperative  changes are seen in the mediastinum.      Impression    IMPRESSION:  Cardiomegaly.  Interstitial thickening stable from June 11, 2024      JEFERSON NEGRO MD         SYSTEM ID:  [email or web address]   US Lower Extremity Venous Duplex Bilateral    Narrative    Exam:US LOWER EXTREMITY VENOUS DUPLEX BILATERAL    History: Bilateral leg swelling    Comparisons: 2/8/2024    Technique: Venous duplex ultrasonography of the bilateral lower  extremities was performed.     Findings: The common femoral veins, superficial femoral veins and  popliteal veins are fully compressible with spontaneous and  augmentable venous flow.           Impression    Impression: No evidence of deep venous thrombosis within the lower  extremities.    JEFERSON NEGRO MD         SYSTEM ID:  [email or web address]   CT Chest Pulmonary Embolism w Contrast    Narrative    PROCEDURE: CT CHEST PULMONARY EMBOLISM W CONTRAST 6/15/2024 3:05 PM    HISTORY: SOB, CHF, effusion    COMPARISONS: April 16, 2024    Meds/Dose Given: isovue 370    TECHNIQUE: CT angiogram of the chest with sagittal and coronal MIPS  reconstructions    FINDINGS: There are no pulmonary emboli. The heart is enlarged. The  thoracic aorta is normal in appearance. There is reflux of contrast  into the hepatic veins indicating tricuspid insufficiency.    Interstitial thickening is seen in both lungs. The interstitial  thickening is stable from previous examination. There is a 5 mm  diameter right middle lobe nodule which is stable from previous  examination There is some linear atelectasis or scarring seen in the  lingula. The hilar and mediastinal lymph nodes are enlarged. The lymph  nodes are stable from 4/16/2024 axillary and supraclavicular lymph  nodes are normal in caliber. There is some mild pleural thickening at  the right lung base stable from previous examination.    The upper portion of the liver spleen and pancreas appear normal.  Degenerative changes are present in the thoracic spine         Impression    IMPRESSION: No pulmonary emboli. No change from previous examination  in  April 2024    JEFERSON NEGRO MD         SYSTEM ID:  [email or web address]   CT Abdomen Pelvis w Contrast    Narrative    EXAMINATION: CT ABDOMEN PELVIS W CONTRAST, 6/15/2024 3:18 PM    TECHNIQUE:  Helical CT images from the lung bases through the  symphysis pubis were obtained  with IV contrast. Contrast dose:    COMPARISON: none    HISTORY: elevated LFTs    FINDINGS:    There is dependent atelectasis at the lung bases. The heart is  enlarged.    The liver is free of masses or biliary ductal enlargement. There is  fatty infiltration of the liver. No calcified gallstones are seen.    The the spleen and pancreas appear normal.    The adrenal glands are normal.    The right and left kidneys are free of masses or hydronephrosis.    The periaortic lymph nodes are normal in caliber.    No intraperitoneal masses or inflammatory changes are noted.      In the abdominal and pelvic wall there is extensive  edema in the  subcutaneous fat. There are degenerative changes in the lumbar spine.  There is nonspondylolytic spondylolisthesis of L4 on L5.          Impression    IMPRESSION: There is fatty infiltration of the liver. No liver masses  or biliary ductal enlargement is noted.    No intraperitoneal masses or inflammatory changes.     Marked cardiomegaly.    JEFERSON NEGRO MD         SYSTEM ID:  [id number]       Discharge Medications   Current Discharge Medication List        START taking these medications    Details   potassium chloride jayashree ER (KLOR-CON M20) 20 MEQ CR tablet Take 2 tablets (40 mEq) by mouth 2 times daily  Qty: 120 tablet, Refills: 0    Associated Diagnoses: Subtherapeutic international normalized ratio (INR); Acute on chronic diastolic heart failure (H); Status post chemotherapy; Chronic atrial fibrillation (H); Undifferentiated schizophrenia (H)           CONTINUE these medications which have CHANGED    Details   potassium chloride ER (K-TAB) 20 MEQ CR tablet Take 1 tablet (20 mEq) by mouth 2 times daily  Qty: 60  tablet, Refills: 0    Comments: Increased to 40 mEq BID with increased diuresis.  Associated Diagnoses: Hypokalemia      torsemide (DEMADEX) 20 MG tablet Take 1 tablet (20 mg) by mouth 2 times daily  Qty: 60 tablet, Refills: 0    Associated Diagnoses: Chronic right-sided congestive heart failure (H)           CONTINUE these medications which have NOT CHANGED    Details   albuterol (PROAIR HFA/PROVENTIL HFA/VENTOLIN HFA) 108 (90 Base) MCG/ACT inhaler Inhale 2 puffs into the lungs every 6 hours as needed for shortness of breath, wheezing or cough  Qty: 18 g, Refills: 0    Comments: Pharmacy may dispense brand covered by insurance (Proair, or proventil or ventolin or generic albuterol inhaler)  Associated Diagnoses: Wheezing      busPIRone (BUSPAR) 10 MG tablet Take 1 tablet (10 mg) by mouth 2 times daily  Qty: 180 tablet, Refills: 0    Associated Diagnoses: ANNA (generalized anxiety disorder)      cefdinir (OMNICEF) 300 MG capsule Take 1 capsule (300 mg) by mouth 2 times daily for 3 days  Qty: 6 capsule, Refills: 0    Associated Diagnoses: Acute cystitis without hematuria      hydrocortisone butyrate 0.1 % CREA Externally apply topically 2 times daily To back      metoprolol succinate ER (TOPROL XL) 100 MG 24 hr tablet Take 1 tablet (100 mg) by mouth daily  Qty: 90 tablet, Refills: 1    Associated Diagnoses: Longstanding persistent atrial fibrillation (H)      warfarin ANTICOAGULANT (COUMADIN) 2 MG tablet Take 3mg by mouth on 6/13/24. Follow-up INR on 6/14/24 with further dosing instructions from anticoagulation clinic at that time.    Associated Diagnoses: Chronic atrial fibrillation (H)           STOP taking these medications       hydrocortisone 1 % CREA cream Comments:   Reason for Stopping:             Allergies   Allergies   Allergen Reactions    Allegra [Fexofenadine] Nausea and Vomiting    Cats Other (See Comments)     Sneezing, runny nose    Codeine Sulfate Nausea and Vomiting and GI Disturbance    Crestor  [Rosuvastatin] Dizziness

## 2024-06-16 NOTE — PROGRESS NOTES
Assisted pt to call son. She left a message for him.  Selena Self RN............................ 6/16/2024 10:30 AM

## 2024-06-16 NOTE — PHARMACY-ANTICOAGULATION SERVICE
Pharmacy Consult- Coumadin Follow-Up    Heather Rosas is a 73 year old female admitted on 6/15/2024 with Acute on chronic diastolic heart failure (H)    Primary Indication(s) for Anticoagulation: A fib     Goal INR: 2-3     FYI, patient is followed by the Anticoagulation/Protime Clinic at: [facility] (recently, [place] inpatient)    Patient Active Problem List   Diagnosis    Dyslipidemia    Fibromyalgia    Benign essential hypertension    ANNA (generalized anxiety disorder)    Gastroesophageal reflux disease without esophagitis    Schizophrenia (H)    Seasonal allergic rhinitis    Adenocarcinoma of the endometrium/uterus (H)    Chronic atrial fibrillation (H)    Chronic right-sided congestive heart failure (H)    Hyponatremia    Moderate mitral regurgitation    S/P CABG x 4    Status post chemotherapy    Coronary artery disease involving native coronary artery of native heart without angina pectoris    Diarrhea    Noncompliance with medication regimen    Aortic valve sclerosis (7/2023)    Pulmonary nodules    Thyroid nodule    Tobacco dependence    Generalized edema due to fluid overload    Severe tricuspid regurgitation    Hypokalemia    Chronic diastolic congestive heart failure (H)    Lesion of colon    Lesion of bladder    Weakness    Peripheral edema    SOB (shortness of breath)    Acute kidney injury (H24)    Subtherapeutic international normalized ratio (INR)    Hypotension    Hypomagnesemia    Abnormal LFTs    Atrial fibrillation, unspecified type (H)    Acute on chronic diastolic heart failure (H)       New factors that may increase patient's sensitivity to warfarin (Coumadin) include: Ceftriaxone (x1 dose), acute HF exac, diuresis, increase in INR by 0.5 overnight    New factors that may decrease patient's sensitivity to warfarin (Coumadin) include: Subtherapeutic INR x5 days prior to increase    Dietary Intake: 100 % recently    Recent Labs   Lab Test 06/16/24  0616 06/15/24  1316 06/14/24  105  06/13/24  0252 06/12/24  0506 06/11/24  0519 05/24/24  0505 05/23/24  0520   HGB  --  12.5  --   --  11.5* 11.6*  --  13.1   INR 2.16* 1.65* 1.8* 1.67* 1.57* 1.41*   < > 2.15*   PLT  --  338  --   --  344 389  --  214    < > = values in this interval not displayed.        Recent Dosing:    Date INR Dose Given   6/16 2.16 See below   6/15 1.65 4 mg       Plan: Give 3 mg (patient's home dose) x1 dose @ 1800 6/16/24. Check INR in the AM and adjust dose as needed.     Thank You for the Consult. Will continue to follow.    Binta Grace [facility] ....................  6/16/2024   7:18 AM

## 2024-06-16 NOTE — PLAN OF CARE
PRIMARY DIAGNOSIS: acute on chronic diastolic heart failure  OUTPATIENT/OBSERVATION GOALS TO BE MET BEFORE DISCHARGE:  ADLs back to baseline: Yes    Activity and level of assistance: Up with standby assistance.    Pain status: Pain free.    Return to near baseline physical activity: Yes     Discharge Planner Nurse   Safe discharge environment identified: Yes  Barriers to discharge: No       Entered by: Selena Self RN 06/16/2024 9:17 AM

## 2024-06-16 NOTE — PROGRESS NOTES
[facility] DISCHARGE NOTE    Patient discharged to acute rehab at 1:13 PM via wheel chair. Accompanied by son and staff. Discharge instructions sent with pt for nurses at the [facility], opportunity offered to ask questions. Prescriptions sent to patients preferred pharmacy. All belongings sent with patient.    Selena Self RN

## 2024-06-16 NOTE — PHARMACY - DISCHARGE MEDICATION RECONCILIATION AND EDUCATION
Pharmacy:  Discharge Counseling and Medication Reconciliation    Heather Rosas  [address] [facility] [address]  [phone] (home)   73 year old female  PCP: Nicolette Huffman    Allergies: Allegra [fexofenadine], Cats, Codeine sulfate, and Crestor [rosuvastatin]    Discharge Counseling:    Pharmacist did not meet with patient/family today as patient is discharging to [facility] where all medications will be handled/administered for patient.     Discharge Medication Reconciliation:    It has been determined that the patient has an adequate supply of medications available or which can be obtained from the patient's preferred pharmacy, which HE/SHE has confirmed as: [facility] [An updated medication list will be faxed to the patient's pharmacy.]    Thank you for the consult.    Binta Grace RPH........June 16, 2024 9:49 AM

## 2024-06-16 NOTE — PROGRESS NOTES
Son returned phone call to hospital, he will come and transport patient to [facility] this afternoon. Doris Alfaro RN, [facility] 6/16/2024 11:10 AM

## 2024-06-16 NOTE — PROGRESS NOTES
Tele MS3 cleaned and sent back to ICU.  Selena Self RN............................ 6/16/2024 11:53 AM

## 2024-06-16 NOTE — PLAN OF CARE
PRIMARY DIAGNOSIS: Heart failure  OUTPATIENT/OBSERVATION GOALS TO BE MET BEFORE DISCHARGE:  ADLs back to baseline: No    Activity and level of assistance: Up with standby assistance.    Pain status: Pain free.    Return to near baseline physical activity: No     Discharge Planner Nurse   Safe discharge environment identified: Yes  Barriers to discharge: Yes       Entered by: Carmenza Sullivan RN 06/16/2024 6:38 AM     Please review provider order for any additional goals.   Nurse to notify provider when observation goals have been met and patient is ready for discharge.    Patient has some intermittent confusion but was easily redirectable.  Bed alarm on at all times. Frequently voiding after lasix and down 3 lbs and patient has voided almost 5L since admission.   Lungs are clear and diminished with Sp02 >95% on room air.  Patient has some dyspnea with exertion, resolves with rest and has denied shortness of breath when in bed.  Heart rate is irregular with rate 100-120 when up ambulating to bathroom and comes down when resting in bed.  Patient has denied any pain this shift.  VSS and afebrile.

## 2024-06-16 NOTE — PHARMACY
[facility]  Part of [address] [address]    June 16, 2024    Dear Pharmacist,    Your customer, Heather Rosas, born on 1950, was recently discharged from [facility].  We have updated her medication list and want to alert you to the following:       Review of your medicines        START taking        Dose / Directions   potassium chloride jayashree ER 20 MEQ CR tablet  Commonly known as: KLOR-CON M20  Used for: Subtherapeutic international normalized ratio (INR), Status post chemotherapy, Chronic atrial fibrillation (H), Schizophrenia (H)      Dose: 40 mEq  Take 2 tablets (40 mEq) by mouth 2 times daily  Quantity: 120 tablet  Refills: 0            CONTINUE these medicines which may have CHANGED, or have new prescriptions. If we are uncertain of the size of tablets/capsules you have at home, strength may be listed as something that might have changed.        Dose / Directions   torsemide 20 MG tablet  Commonly known as: DEMADEX  This may have changed: when to take this  Used for: Chronic right-sided congestive heart failure (H)      Dose: 20 mg  Take 1 tablet (20 mg) by mouth 2 times daily  Quantity: 60 tablet  Refills: 0            CONTINUE these medicines which have NOT CHANGED        Dose / Directions   albuterol 108 (90 Base) MCG/ACT inhaler  Commonly known as: PROAIR HFA/PROVENTIL HFA/VENTOLIN HFA  Used for: Wheezing      Dose: 2 puff  Inhale 2 puffs into the lungs every 6 hours as needed for shortness of breath, wheezing or cough  Quantity: 18 g  Refills: 0     busPIRone 10 MG tablet  Commonly known as: BUSPAR  Used for: ANNA (generalized anxiety disorder)      Dose: 10 mg  Take 1 tablet (10 mg) by mouth 2 times daily  Quantity: 180 tablet  Refills: 0     cefdinir 300 MG capsule  Commonly known as: OMNICEF  Used for: Acute cystitis without hematuria      Dose: 300 mg  Take 1 capsule (300 mg) by mouth 2 times daily for 3  days  Quantity: 6 capsule  Refills: 0     hydrocortisone butyrate 0.1 % Crea      Externally apply topically 2 times daily To back  Refills: 0     metoprolol succinate  MG 24 hr tablet  Commonly known as: TOPROL XL  Used for: Longstanding persistent atrial fibrillation (H)      Dose: 100 mg  Take 1 tablet (100 mg) by mouth daily  Quantity: 90 tablet  Refills: 1     potassium chloride ER 20 MEQ CR tablet  Commonly known as: K-TAB  Used for: Hypokalemia      Dose: 20 mEq  Take 1 tablet (20 mEq) by mouth 2 times daily  Quantity: 60 tablet  Refills: 0     warfarin ANTICOAGULANT 2 MG tablet  Commonly known as: COUMADIN  Used for: Chronic atrial fibrillation (H)      Take as directed. If you are unsure how to take this medication, talk to your nurse or doctor.  Original instructions: Take 3mg by mouth on 6/13/24. Follow-up INR on 6/14/24 with further dosing instructions from anticoagulation clinic at that time.  Refills: 0               Where to get your medicines        These medications were sent to [facility] [address] [address]      Phone: [phone]   potassium chloride jayashree ER 20 MEQ CR tablet  potassium chloride ER 20 MEQ CR tablet  torsemide 20 MG tablet         We also reviewed Heather Rosas's allergy list and updated it as needed:  Allergies: Allegra [fexofenadine], Cats, Codeine sulfate, and Crestor [rosuvastatin]    Thank you for continuing to care for Heather Rosas.  We look forward to working together with you in the future.    Sincerely,  B[facility]

## 2024-06-17 NOTE — PROGRESS NOTES
ANTICOAGULATION MANAGEMENT     Heather Rosas 73 year old female is on warfarin with supratherapeutic INR result. (Goal INR 2.0-3.0)    Recent labs: (last 7 days)     06/17/24  0835   INR 3.7*       ASSESSMENT     Source(s): Chart Review and Home Care/Facility Nurse     Warfarin doses taken: While hospitalized on 6/15-6/16: anticoagulation calendar updated with inpatient dosing.  Discharged on: home regimen continued  Diet: No new diet changes identified  Medication/supplement changes: None noted  New illness, injury, or hospitalization: Yes: acute CHF  Signs or symptoms of bleeding or clotting: No  Previous result: Subtherapeutic  Additional findings: Still trying to establish dose for patient, previous patient would not take warfarin consistently and did not have lab follow up after hospital stays. Now she is being managed through an Short term care facility     PLAN     Recommended plan for ongoing change(s) affecting INR     Dosing Instructions: decrease your warfarin dose (14.3% change) with next INR in 4 days       Summary  As of 6/17/2024      Full warfarin instructions:  2 mg every Mon, Tue, Wed; 3 mg all other days   Next INR check:  6/20/2024               Faxed dosing and follow up instructions to [facility]    Orders given to  Homecare nurse/facility to recheck    Patient not here, [employer] Communication sheet with screening questions and current INR received via FAX from outside agency. Results reviewed, Warfarin dosing per protocol, and recommended follow-up appointment made. Paperwork FAXED back to facility.       Plan made per [facility] anticoagulation protocol    Naya Barajas RN  Anticoagulation Clinic  6/17/2024    _______________________________________________________________________     Anticoagulation Episode Summary       Current INR goal:  2.0-3.0   TTR:  37.2% (3.1 mo)   Target end date:  Indefinite   Send INR reminders to:  [facility] SAGASTU[facility]       Comments:               Anticoagulation Care  Providers       Provider Role Specialty Phone number    Nicolette Huffman MD Referring Family Medicine [phone]    Anastacio López MD Referring Internal Medicine [phone]

## 2024-06-17 NOTE — TELEPHONE ENCOUNTER
Per [facility], patient had hospital follow up today with nursing home provider. No TCM required.   Dangelo Stone RN on 6/17/2024 at 2:26 PM

## 2024-06-20 NOTE — PROGRESS NOTES
ANTICOAGULATION MANAGEMENT     Heather Rosas 73 year old female is on warfarin with supratherapeutic INR result. (Goal INR 2.0-3.0)    Recent labs: (last 7 days)     06/20/24  1053   INR 4.3*       ASSESSMENT     Source(s): Chart Review and Home Care/Facility Nurse     Warfarin doses taken: Warfarin taken as instructed  Diet: No new diet changes identified  Medication/supplement changes: None noted  New illness, injury, or hospitalization: No  Signs or symptoms of bleeding or clotting: No  Previous result: Supratherapeutic  Additional findings: None       PLAN     Recommended plan for no diet, medication or health factor changes affecting INR     Dosing Instructions: hold dose then decrease your warfarin dose (22.2% change) with next INR in 3 days       Summary  As of 6/20/2024      Full warfarin instructions:  6/20: Hold; Otherwise 2 mg every day   Next INR check:  6/24/2024               Faxed dosing and follow up instructions to [facility]    Orders given to  Homecare nurse/facility to recheck    Patient not here, [employer]time Communication sheet with screening questions and current INR received via FAX from outside agency. Results reviewed, Warfarin dosing per protocol, and recommended follow-up appointment made. Paperwork FAXED back to facility.     Plan made per [facility] anticoagulation protocol    Naya Barajas RN  Anticoagulation Clinic  6/20/2024    _______________________________________________________________________     Anticoagulation Episode Summary       Current INR goal:  2.0-3.0   TTR:  36.0% (3.2 mo)   Target end date:  Indefinite   Send INR reminders to:  JAZLYN SAGASTUME       Comments:               Anticoagulation Care Providers       Provider Role Specialty Phone number    Nicolette Huffman MD Referring Family Medicine [phone]    Anastacio López MD Referring Internal Medicine [phone]

## 2024-06-24 NOTE — PROGRESS NOTES
ANTICOAGULATION MANAGEMENT     Heather Rosas 73 year old female is on warfarin with supratherapeutic INR result. (Goal INR 2.0-3.0)    Recent labs: (last 7 days)     06/24/24  1012   INR 3.2*       ASSESSMENT     Source(s): Chart Review and Home Care/Facility Nurse     Warfarin doses taken: Warfarin taken as instructed  Diet: No new diet changes identified  Medication/supplement changes: None noted  New illness, injury, or hospitalization: No  Signs or symptoms of bleeding or clotting: No  Previous result: Supratherapeutic  Additional findings: None       PLAN     Recommended plan for no diet, medication or health factor changes affecting INR     Dosing Instructions: Continue your current warfarin dose with next INR in 4 days       Summary  As of 6/24/2024      Full warfarin instructions:  2 mg every day   Next INR check:  6/27/2024               Faxed dosing and follow up instructions to [facility]    Orders given to  Homecare nurse/facility to recheck    Patient not here, [employer]time Communication sheet with screening questions and current INR received via FAX from outside agency. Results reviewed, Warfarin dosing per protocol, and recommended follow-up appointment made. Paperwork FAXED back to facility.     Plan made per [facility] anticoagulation protocol    Naya Barajas RN  Anticoagulation Clinic  6/24/2024    _______________________________________________________________________     Anticoagulation Episode Summary       Current INR goal:  2.0-3.0   TTR:  34.6% (3.3 mo)   Target end date:  Indefinite   Send INR reminders to:  JAZLYN SAGASTUME       Comments:               Anticoagulation Care Providers       Provider Role Specialty Phone number    Nicolette Huffman MD Referring Family Medicine [phone]

## 2024-06-26 PROBLEM — J96.02 ACUTE RESPIRATORY ACIDOSIS (H): Status: ACTIVE | Noted: 2024-01-01

## 2024-06-26 PROBLEM — I50.30 HEART FAILURE WITH PRESERVED EJECTION FRACTION, NYHA CLASS I (H): Status: ACTIVE | Noted: 2024-01-01

## 2024-06-26 NOTE — PHARMACY-ADMISSION MEDICATION HISTORY
Pharmacist Admission Medication History    Admission medication history is complete. The information provided in this note is only as accurate as the sources available at the time of the update.    Information Source(s): Facility ([facility] medication list/MAR and CareEverywhere/SureScripts via in-person    Pertinent Information: Got MAR from [facility]. Patient has been getting meds from medlist despite no recent fills on SureScripts from [facility].     Changes made to PTA medication list:  Added: Duonebs, Pepto-Bismol  Deleted: Potassium Kcl 1 tab BID  Changed: Hydrocortisone sig, warfarin Sig, Buspirone BID -> TID     Allergies reviewed with MAR: yes    Medication History Completed By: Jonathan Gilbert [facility] 6/26/2024 10:02 A    PTA Med List   Medication Sig Last Dose    albuterol (PROAIR HFA/PROVENTIL HFA/VENTOLIN HFA) 108 (90 Base) MCG/ACT inhaler Inhale 2 puffs into the lungs every 6 hours as needed for shortness of breath, wheezing or cough Unknown    bismuth subsalicylate (PEPTO BISMOL) 262 MG/15ML suspension Take 30 mLs by mouth every 4 hours as needed for indigestion or diarrhea     busPIRone (BUSPAR) 10 MG tablet Take 10 mg by mouth 3 times daily 6/25/2024 at 1330    hydrocortisone butyrate 0.1 % CREA 2 times daily Apply topically to lower back two times a day for rash 6/25/2024 at AM    ipratropium - albuterol 0.5 mg/2.5 mg/3 mL (DUONEB) 0.5-2.5 (3) MG/3ML neb solution Take 1 vial by nebulization every 6 hours as needed for shortness of breath, wheezing or cough More than a month    melatonin 1 MG TABS tablet Take 1 mg by mouth nightly as needed for sleep May repeat one time if not falling asleep. 6/24/2024 at PM    metoprolol succinate ER (TOPROL XL) 100 MG 24 hr tablet Take 1 tablet (100 mg) by mouth daily 6/25/2024 at AM    potassium chloride jayashree ER (KLOR-CON M20) 20 MEQ CR tablet Take 2 tablets (40 mEq) by mouth 2 times daily 6/25/2024 at 1700    torsemide (DEMADEX) 20 MG tablet Take 1 tablet (20  mg) by mouth 2 times daily 6/25/2024 at 1530    warfarin ANTICOAGULANT (COUMADIN) 2 MG tablet Take 2 mg by mouth daily 6/24/2024 at 1930

## 2024-06-26 NOTE — ED PROVIDER NOTES
History     Chief Complaint   Patient presents with    Abdominal Pain    Tachycardia    Shortness of Breath     HPI  Heather Rosas is a 73 year old female who presents to the ED for evaluation of abdominal pain, tachycardia, sob. Pt comes in from the [facility] by EMS. States she has intermittent SOB today. Is now feeling ok. Also had some severe abdominal pain about 1 hour ago. She seems to have some confusion at baseline and her recap of the day has been changing. The nurse called ahead and states she had severe abdominal pain after a BM.     Allergies:  Allergies   Allergen Reactions    Allegra [Fexofenadine] Nausea and Vomiting    Cats Other (See Comments)     Sneezing, runny nose    Codeine Sulfate Nausea and Vomiting and GI Disturbance    Crestor [Rosuvastatin] Dizziness       Problem List:    Patient Active Problem List    Diagnosis Date Noted    Hypomagnesemia 06/15/2024     Priority: Medium    Abnormal LFTs 06/15/2024     Priority: Medium    Atrial fibrillation, unspecified type (H) 06/15/2024     Priority: Medium    Acute on chronic diastolic heart failure (H) 06/15/2024     Priority: Medium    SOB (shortness of breath) 06/11/2024     Priority: Medium    Acute kidney injury (H24) 06/11/2024     Priority: Medium    Subtherapeutic international normalized ratio (INR) 06/11/2024     Priority: Medium    Hypotension 06/11/2024     Priority: Medium    Peripheral edema 05/24/2024     Priority: Medium    Weakness 05/18/2024     Priority: Medium    Severe tricuspid regurgitation 03/04/2024     Priority: Medium    Hypokalemia 03/04/2024     Priority: Medium    Chronic diastolic congestive heart failure (H) 03/04/2024     Priority: Medium    Lesion of colon 03/04/2024     Priority: Medium    Lesion of bladder 03/04/2024     Priority: Medium    Generalized edema due to fluid overload 02/05/2024     Priority: Medium    Pulmonary nodules 08/03/2023     Priority: Medium     5/2023 - 8mm      Thyroid nodule 08/03/2023      Priority: Medium     1cm, noted in 2019      Tobacco dependence 08/03/2023     Priority: Medium    Aortic valve sclerosis (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 Oconnell.       Status post chemotherapy 09/25/2020     Priority: Medium     Added automatically from request for surgery [id number]      Chronic atrial fibrillation (H) 11/26/2018     Priority: Medium    Hyponatremia 10/30/2018     Priority: Medium    Moderate mitral regurgitation 10/30/2018     Priority: Medium    Adenocarcinoma of the endometrium/uterus (H) 10/09/2018     Priority: Medium     Discovered on 02/18 biopsy. S/p resection and chemo. Pt lost to follow up.   High-grade endometrial adenocarcinoma of the uterus with staging consistent with pathologic T1b N0 I plus or stage 1B with isolated tumor cells involving one of the periaortic lymph nodes.       Chronic right-sided congestive heart failure (H) 09/20/2018     Priority: Medium    S/P CABG x 4 09/10/2018     Priority: Medium    Benign essential hypertension      Priority: Medium    ANNA (generalized anxiety disorder) 01/01/2011     Priority: Medium    Gastroesophageal reflux disease without esophagitis 01/01/2011     Priority: Medium    Schizophrenia (H) 01/01/2011     Priority: Medium    Seasonal allergic rhinitis 01/01/2011     Priority: Medium    Fibromyalgia 06/14/2004     Priority: Medium    Dyslipidemia 11/26/2003     Priority: Medium        Past Medical History:    Past Medical History:   Diagnosis Date    Acute diastolic congestive heart failure (H) 10/12/2021    Acute exacerbation of CHF (congestive heart failure) (H) 10/11/2021    Acute hypoxic respiratory failure (H) 04/16/2024    Acute  kidney failure, unspecified (H)     Acute on chronic congestive heart failure, unspecified heart failure type (H) 09/21/2023    Acute on chronic diastolic congestive heart failure (H)     Allergic rhinitis 01/01/2011    Anxiety 01/01/2011    Anxiety state, unspecified     Atrial fibrillation with RVR (H) 07/07/2023    Atrial flutter with rapid ventricular response (H) 10/12/2021    COPD exacerbation (H) 04/16/2024    CVA (cerebral vascular accident) (H) 05/14/2018    Dyslipidemia 11/26/2003    fibromyalgia 06/14/2004    GERD, Esophageal reflux 01/01/2011    History of non-ST elevation myocardial infarction (NSTEMI) 09/06/2018    HTN (hypertension)     Major depression, recurrent (H24) 01/01/2011    Major depressive disorder, recurrent episode, moderate (H) 01/01/2011    Metrorrhagia 02/18/2018    Non compliance with medical treatment 07/07/2023    Nonorganic sleep disorder, unspecified     Obesity 01/01/2011    Obesity (H) 01/01/2011    Rapid atrial fibrillation (H)     Schizophrenia (H) 01/01/2011    Toxic shock syndrome (H) 2006       Past Surgical History:    Past Surgical History:   Procedure Laterality Date    ANGIOGRAM  02/2005    Normal     BIOPSY BREAST  1984    LT, normal    BYPASS GRAFT ARTERY CORONARY  09/10/2018    CARPAL TUNNEL RELEASE RT/LT  2005    RT, carpal tunnel    COLONOSCOPY  2011    Repeat in ten years     COLONOSCOPY  1996    COLONOSCOPY  2004    DILATION AND CURETTAGE, HYSTEROSCOPY, ABLATE ENDOMETRIUM, COMBINED N/A 2/19/2018    Procedure: COMBINED DILATION AND CURETTAGE, HYSTEROSCOPY, ABLATE ENDOMETRIUM;  HYSTEROSCOPY DILATION AND CURETTAGE, ENDOMETRIAL ABLATION;  Surgeon: Jose Nettles MD;  Location: [facility] OR    HYSTERECTOMY TOTAL ABD, NICK SALPINGO-OOPHORECTOMY, NODE DISSECTION, TUMOR DEBULKING, COMBINED  10/30/2018    INSERT PORT VASCULAR ACCESS N/A 12/11/2018    Procedure: PORT-A-CATH PLACEMENT;  Surgeon: Rafi Trinidad MD;  Location: [facility] OR    placement of central line  2005    REMOVE PORT  "VASCULAR ACCESS N/A 10/6/2020    Procedure: port a cath removal;  Surgeon: Rafi Trinidad MD;  Location: [facility] OR       Family History:    Family History   Problem Relation Age of Onset    Gastrointestinal Disease Mother         GERD    Cancer Mother         pancreatic ca (cause of death) /liver ca    C.A.D. Father 45        (cause of death)     Other - See Comments Father         rheumatic fever     Allergies Father     Cancer Sister 56        Esophageal to bone cancer    Neurologic Disorder Brother         neuropathy    Cancer Brother 58        Tonsilular cancer/ lymph node in neck    Allergies Brother     Diabetes Paternal Grandmother         type 2    Breast Cancer Maternal Aunt     Breast Cancer Maternal Aunt     Depression Maternal Aunt     Depression Maternal Uncle     Colon Cancer Paternal Aunt         (cause of death)     Breast Cancer Cousin     Breast Cancer Cousin     Breast Cancer Cousin     Crohn's Disease Other     No Known Problems Son         2 sons       Social History:  Marital Status:   [4]  Social History     Tobacco Use    Smoking status: Every Day     Current packs/day: 1.00     Average packs/day: 1 pack/day for 30.0 years (30.0 ttl pk-yrs)     Types: Cigarettes     Passive exposure: Never    Smokeless tobacco: Never    Tobacco comments:     pt declined, stated she would use, \"the patch\". Patient has not had a cigarette in 1 week because she was in the hospital   Vaping Use    Vaping status: Never Used   Substance Use Topics    Alcohol use: No     Comment: rare    Drug use: No        Medications:    albuterol (PROAIR HFA/PROVENTIL HFA/VENTOLIN HFA) 108 (90 Base) MCG/ACT inhaler  busPIRone (BUSPAR) 10 MG tablet  hydrocortisone butyrate 0.1 % CREA  metoprolol succinate ER (TOPROL XL) 100 MG 24 hr tablet  potassium chloride jayashree ER (KLOR-CON M20) 20 MEQ CR tablet  potassium chloride ER (K-TAB) 20 MEQ CR tablet  torsemide (DEMADEX) 20 MG tablet  warfarin ANTICOAGULANT (COUMADIN) 2 MG " "tablet          Review of Systems   Constitutional:  Negative for chills and fever.   HENT:  Negative for congestion.    Eyes:  Negative for visual disturbance.   Respiratory:  Positive for shortness of breath. Negative for cough.    Cardiovascular:  Negative for chest pain.   Gastrointestinal:  Negative for abdominal pain.   Genitourinary:  Negative for hematuria.   Allergic/Immunologic: Negative for immunocompromised state.   Neurological:  Negative for syncope.       Physical Exam   BP: 120/88  Pulse: (!) 135  Temp: 98.2  F (36.8  C)  Resp: 20  Height: 147.3 cm (4' 10\")  Weight: 83.9 kg (185 lb)  SpO2: 96 %      Physical Exam  Constitutional:       General: She is not in acute distress.     Appearance: She is well-developed. She is not diaphoretic.   HENT:      Head: Normocephalic and atraumatic.   Eyes:      General: No scleral icterus.     Conjunctiva/sclera: Conjunctivae normal.   Neck:      Vascular: No carotid bruit.   Cardiovascular:      Rate and Rhythm: Normal rate and regular rhythm.   Pulmonary:      Effort: Pulmonary effort is normal.      Breath sounds: Normal breath sounds.   Abdominal:      Palpations: Abdomen is soft.      Tenderness: There is no abdominal tenderness.   Musculoskeletal:         General: No deformity.      Cervical back: Neck supple.      Right lower leg: No edema.      Left lower leg: No edema.   Skin:     General: Skin is warm and dry.      Coloration: Skin is not jaundiced.      Findings: No rash.   Neurological:      Mental Status: She is alert. Mental status is at baseline.   Psychiatric:         Mood and Affect: Mood normal.         Behavior: Behavior normal.         ED Course     ED Course as of 06/26/24 0001   e Jun 25, 2024   2345 Sign out at shift change. From NH for abdominal pain. Patient c/o short of breath, in afib/flutter , on coumadin.    2346 Will try diltiazem for HR     Procedures         EKG read at 2209. , variable, atrial flutter with occasional " PVC.    Critical Care time:  none            Results for orders placed or performed during the hospital encounter of 06/25/24 (from the past 24 hour(s))   CBC with platelets differential    Narrative    The following orders were created for panel order CBC with platelets differential.  Procedure                               Abnormality         Status                     ---------                               -----------         ------                     CBC with platelets and d...[[id number]]  Abnormal            Final result                 Please view results for these tests on the individual orders.   INR   Result Value Ref Range    INR 3.77 (H) 0.85 - 1.15   Basic metabolic panel   Result Value Ref Range    Sodium 134 (L) 135 - 145 mmol/L    Potassium 4.8 3.4 - 5.3 mmol/L    Chloride 95 (L) 98 - 107 mmol/L    Carbon Dioxide (CO2) 24 22 - 29 mmol/L    Anion Gap 15 7 - 15 mmol/L    Urea Nitrogen 35.4 (H) 8.0 - 23.0 mg/dL    Creatinine 1.28 (H) 0.51 - 0.95 mg/dL    GFR Estimate 45 (L) >60 mL/min/1.73m2    Calcium 9.7 8.8 - 10.2 mg/dL    Glucose 136 (H) 70 - 99 mg/dL   Hepatic function panel   Result Value Ref Range    Protein Total 7.4 6.4 - 8.3 g/dL    Albumin 3.9 3.5 - 5.2 g/dL    Bilirubin Total 2.6 (H) <=1.2 mg/dL    Alkaline Phosphatase 192 (H) 40 - 150 U/L    AST 26 0 - 45 U/L    ALT 22 0 - 50 U/L    Bilirubin Direct 1.26 (H) 0.00 - 0.30 mg/dL   Lipase   Result Value Ref Range    Lipase 29 13 - 60 U/L   Lactic acid whole blood with 1x repeat in 2 hr when >2   Result Value Ref Range    Lactic Acid, Initial 2.6 (H) 0.7 - 2.0 mmol/L   Nt probnp inpatient (BNP)   Result Value Ref Range    N terminal Pro BNP Inpatient 9,339 (H) 0 - 900 pg/mL   CBC with platelets and differential   Result Value Ref Range    WBC Count 10.6 4.0 - 11.0 10e3/uL    RBC Count 4.61 3.80 - 5.20 10e6/uL    Hemoglobin 12.4 11.7 - 15.7 g/dL    Hematocrit 39.5 35.0 - 47.0 %    MCV 86 78 - 100 fL    MCH 26.9 26.5 - 33.0 pg    MCHC 31.4 (L)  31.5 - 36.5 g/dL    RDW 20.4 (H) 10.0 - 15.0 %    Platelet Count 234 150 - 450 10e3/uL    % Neutrophils 73 %    % Lymphocytes 15 %    % Monocytes 10 %    % Eosinophils 1 %    % Basophils 1 %    % Immature Granulocytes 1 %    NRBCs per 100 WBC 0 <1 /100    Absolute Neutrophils 7.7 1.6 - 8.3 10e3/uL    Absolute Lymphocytes 1.6 0.8 - 5.3 10e3/uL    Absolute Monocytes 1.0 0.0 - 1.3 10e3/uL    Absolute Eosinophils 0.2 0.0 - 0.7 10e3/uL    Absolute Basophils 0.1 0.0 - 0.2 10e3/uL    Absolute Immature Granulocytes 0.1 <=0.4 10e3/uL    Absolute NRBCs 0.0 10e3/uL   Symptomatic Influenza A/B, RSV, & SARS-CoV2 PCR (COVID-19) Nose    Specimen: Nose; Swab   Result Value Ref Range    Influenza A PCR Negative Negative    Influenza B PCR Negative Negative    RSV PCR Negative Negative    SARS CoV2 PCR Negative Negative    Narrative    Testing was performed using the Xpert Xpress CoV2/Flu/RSV Assay on the Cepheid GeneXpert Instrument. This test should be ordered for the detection of SARS-CoV-2, influenza, and RSV viruses in individuals who meet clinical and/or epidemiological criteria. Test performance is unknown in asymptomatic patients. This test is for in vitro diagnostic use under the FDA EUA for laboratories certified under CLIA to perform high or moderate complexity testing. This test has not been FDA cleared or approved. A negative result does not rule out the presence of PCR inhibitors in the specimen or target RNA in concentration below the limit of detection for the assay. If only one viral target is positive but coinfection with multiple targets is suspected, the sample should be re-tested with another FDA cleared, approved, or authorized test, if coinfection would change clinical management. This test was validated by the [facility]. These laboratories are certified under the Clinical Laboratory Improvement Amendments of 1988 (CLIA-88) as qualified to perform high complexity laboratory testing.    [facility]    Narrative    The following orders were created for panel order [facility].  Procedure                               Abnormality         Status                     ---------                               -----------         ------                     Extra Blue Top Tube[[id number]]                                                         Extra Red Top Tube[[id number]]                               Final result                 Please view results for these tests on the individual orders.   Extra Red Top Tube   Result Value Ref Range    Hold Specimen JIC    UA with Microscopic reflex to Culture    Specimen: Urine, Catheter   Result Value Ref Range    Color Urine Yellow Colorless, Straw, Light Yellow, Yellow    Appearance Urine Clear Clear    Glucose Urine Negative Negative mg/dL    Bilirubin Urine Negative Negative    Ketones Urine Negative Negative mg/dL    Specific Gravity Urine 1.009 1.000 - 1.030    Blood Urine Negative Negative    pH Urine 6.5 5.0 - 9.0    Protein Albumin Urine 30 (A) Negative mg/dL    Urobilinogen Urine 3.0 (A) Normal, 2.0 mg/dL    Nitrite Urine Negative Negative    Leukocyte Esterase Urine Negative Negative    Mucus Urine Present (A) None Seen /LPF    RBC Urine <1 <=2 /HPF    WBC Urine 1 <=5 /HPF    Narrative    Urine Culture not indicated       Medications   sodium chloride 0.9% BOLUS 1,000 mL (1,000 mLs Intravenous $New Bag 6/25/24 [id number])   diltiazem (CARDIZEM) injection 25 mg (has no administration in time range)       Assessments & Plan (with Medical Decision Making)   Nontoxic in NAD.  She does have a history of A-fib, she looks to be In atrial flutter with occasional PVC.  She does not report any further abdominal pain at this time.  Vital signs are otherwise stable other than variable heart rate.    Her care did occur during shift change, report given and care transferred to Dr. Garcia.     Brenden Tobin PA-C      I have reviewed the nursing notes.    I have reviewed  the findings, diagnosis, plan and need for follow up with the patient.          New Prescriptions    No medications on file       Final diagnoses:   None       6/25/2024   [facility]       Brenden Tobin PA  06/26/24 0001

## 2024-06-26 NOTE — DISCHARGE SUMMARY
"[facility]  Hospitalist Discharge Summary      Date of Admission:  6/25/2024  Date of Discharge:  6/26/2024  Discharging Provider: Jose Manuel Carrillo MD  Discharge Service: Hospitalist Service    Discharge Diagnoses   Acute Cholecystitis    Clinically Significant Risk Factors     # Obesity: Estimated body mass index is 37.94 kg/m  as calculated from the following:    Height as of this encounter: 1.473 m (4' 10\").    Weight as of this encounter: 82.3 kg (181 lb 8.2 oz).           Unresulted Labs Ordered in the Past 30 Days of this Admission       Date and Time Order Name Status Description    6/26/2024 10:29 AM Lipase In process     6/26/2024  5:34 AM Ova and Parasite Exam Routine In process     6/25/2024 11:04 PM Blood Culture Peripheral Blood In process     6/25/2024 11:04 PM Blood Culture Peripheral Blood In process         These results will be followed up by [facility]    Discharge Disposition   Transferred to [facility]  Condition at discharge: Critical    Hospital Course   72 yo recently discharged from [facility] for diastolic CHF readmitted with the primary complaint of SOB.  In the ED a CT of her C/A/P showed:  Findings suggestive of congestive heart failure.   2.   Extensive urinary bladder wall thickening and stranding of the perivesical   fat suspicious for cystitis. Correlate with urinalysis.   3.   Distended gallbladder demonstrates extensive wall thickening. There is   pericholecystic fluid. Correlate clinically for evidence of acute   cholecystitis.   4.   Stable appearance of a heterogeneously enhancing mass in the proximal   sigmoid colon measuring approximately 3 cm. No evidence of obstruction. Suggest   correlation with colonoscopy.     She was admitted, placed on a cardizem gtt and was diuresed.    Upon seeing the patient the following morning, her abdomen was very tender in the mid-epigastrum and RUQ.  Her lactate had also increased and the CT was reviewed with radiology who noted new " fran-cholecystic fluid since her prior study.  Her lactate has increased to 7.7 and her WBC is >15k.  She also has an increasing AG, due to the lactate.  Repeat LFTs demonstrate an increasing Alk phoe and bili.    Her INR is 3.77, and we have ordered 2 units of FFP to be started in transport.    She is hemodynamically stable with a SBP >90 and a HR of 90 as well.  She is not on pressors at this time, and will be transported with IVFs.    She is critically ill and is being transferred to [facility] in [place].  I talked to the on-call acute care general surgeon who agreed to accept the patient into the ICU.  I have also updated the son via phone.    She was started on abx (Zosyn) at admission which is being continued.     She does have a TTE from April:  Interpretation Summary  Global and regional left ventricular function is normal with an EF of 60-65%.  Mild concentric wall thickening consistent with left ventricular hypertrophy  is present. Global right ventricular function is moderately reduced.  Severe left atrial enlargement is present.Moderate right atrial enlargement is present.  Moderate mitral insufficiency is present.Moderate tricuspid insufficiency is present.  The right ventricular systolic pressure is 44mmHg above the right atrialpressure.  IVC diameter >2.1 cm collapsing <50% with sniff suggests a high RA pressure  estimated at 15 mmHg or greater.This study was compared with the study from 2/9/24 .  No significant changes noted.      Consultations This Hospital Stay   PHARMACY TO DOSE WARFARIN  OCCUPATIONAL THERAPY ADULT IP CONSULT  PHYSICAL THERAPY ADULT IP CONSULT  OCCUPATIONAL THERAPY ADULT IP CONSULT    Code Status   Full Code    Time Spent on this Encounter   IJose Manuel MD, personally saw the patient today and spent greater than 30 minutes discharging this patient.       Jose Manuel Carrillo MD  [facility]  [address]  [place] [address]  Phone: [phone]  Fax:  [phone]  ______________________________________________________________________    Physical Exam   Vital Signs: Temp: 97  F (36.1  C) Temp src: Temporal BP: 90/74 Pulse: 94   Resp: 22 SpO2: 95 % O2 Device: None (Room air)    Weight: 181 lbs 8.16 oz  ----------------------------------------------------------------------------------------       Primary Care Physician   Nicolette Huffman    Discharge Orders      Reason for your hospital stay    Acute Abd Pain, Probable Cholecystitis     Full Code     Enteric Bacteria and Virus Panel by MARTIN Stool     Diet    NPO       Significant Results and Procedures   Results for orders placed or performed during the hospital encounter of 06/25/24   XR Chest Port 1 View    Narrative    PROCEDURE INFORMATION:   Exam: XR Chest   Exam date and time: 6/25/2024 10:43 PM   Age: 73 years old   Clinical indication: Shortness of breath; Additional info: SOB     TECHNIQUE:   Imaging protocol: Radiologic exam of the chest.   Views: 1 view.     COMPARISON:   CT CHEST PULMONARY EMBOLISM W CONTRAST 6/15/2024 2:26 PM     FINDINGS:   Lungs: There is moderate bilateral interstitial lung disease.   Pleural spaces: Unremarkable. No pleural effusion. No pneumothorax.   Heart/Mediastinum: There is cardiomegaly.   Bones/joints: There is been a sternotomy.       Impression    IMPRESSION:   There is bilateral interstitial lung disease suggestive of pulmonary   edema.Clinical correlation is advised.     THIS DOCUMENT HAS BEEN ELECTRONICALLY SIGNED BY YA MARR MD   CT Chest/Abdomen/Pelvis w Contrast    Narrative    PROCEDURE INFORMATION:   Exam: CT Chest With Contrast; Diagnostic   Exam date and time: 6/26/2024 2:44 AM   Age: 73 years old   Clinical indication: Nausea and vomiting; Shortness of breath and other: Hf;   Additional info: Short of breath, hf, elevated bilirubin, n/v, schizophrenia,   poor historian     TECHNIQUE:   Imaging protocol: Diagnostic computed tomography of the chest with  "contrast.   3D rendering (Not supervised by radiologist): MIP and/or 3D reconstructed   images were created by the technologist.   Radiation optimization: All CT scans at this facility use at least one of these   dose optimization techniques: automated exposure control; mA and/or kV   adjustment per patient size (includes targeted exams where dose is matched to   clinical indication); or iterative reconstruction.   Contrast material: ISOVUE 370; Contrast volume: 107 ml; Contrast route:   INTRAVENOUS (IV);      COMPARISON:   CT CHEST PULMONARY EMBOLISM W CONTRAST 6/15/2024 2:26 PM     FINDINGS:   Lungs: Interstitial pulmonary edema. Calcified granuloma in the right middle   lobe.   Pleural spaces: Small right pleural effusion with adjacent compressive   atelectasis.   Heart: Cardiomegaly. Coarsely calcified aortic valve leaflets.   Coronary arteries: Atherosclerotic disease of the coronary arteries.   Lymph nodes: Mediastinal adenopathy with the largest lymph nodes in the right   paratracheal space measuring up to 2 cm in short axis.   Vasculature: See \"Heart\" finding.     Bones/joints: Status post median sternotomy and CABG. Kyphosis of the thoracic   spine. Multilevel degenerative disease and arthropathy.   Soft tissues: Unremarkable.       Impression    IMPRESSION:   Findings suggestive of congestive heart failure.    =========================  PROCEDURE INFORMATION:   Exam: CT Abdomen And Pelvis With Contrast   Exam date and time: 6/26/2024 2:44 AM   Age: 73 years old   Clinical indication: Nausea and vomiting; Shortness of breath and other: Hf;   Additional info: Short of breath, hf, elevated bilirubin, n/v, schizophrenia,   poor historian     TECHNIQUE:   Imaging protocol: Computed tomography of the abdomen and pelvis with contrast.   3D rendering (Not supervised by radiologist): MIP and/or 3D reconstructed   images were created by the technologist.   Radiation optimization: All CT scans at this facility use at " least one of these   dose optimization techniques: automated exposure control; mA and/or kV   adjustment per patient size (includes targeted exams where dose is matched to   clinical indication); or iterative reconstruction.   Contrast material: ISOVUE 370; Contrast volume: 107 ml; Contrast route:   INTRAVENOUS (IV);      COMPARISON:   CT ABDOMEN PELVIS W CONTRAST 6/15/2024 2:31 PM     FINDINGS:   Liver: Hepatomegaly and steatosis.   Gallbladder and biliary ducts: Distended gallbladder demonstrates extensive   wall thickening. There is pericholecystic fluid.   Pancreas: Normal. No ductal dilation.   Spleen: Normal. No splenomegaly.   Adrenal glands: Left adrenal hyperplasia.   Kidneys and ureters: Extensive bilateral renal cortical scarring.   Stomach and bowel: Stable appearance of a heterogeneously enhancing mass in the   proximal sigmoid colon measuring approximately 3 cm. No evidence of   obstruction.   Appendix: No evidence of appendicitis.     Intraperitoneal space: Small ascites.   Vasculature: Atherosclerotic disease.   Lymph nodes: Stable bilateral inguinal, common femoral, and external iliac   adenopathy.   Urinary bladder: Extensive urinary bladder wall thickening and stranding of the   perivesical fat suspicious for cystitis.   Reproductive: Status post hysterectomy.   Bones/joints: Severe multilevel degenerative disease and facet arthropathy.   Anterolisthesis of L4 on L5, degenerative in nature. Erosive changes involving   bilateral L4-L5 facet joints are once again noted.   Soft tissues: Unremarkable.     IMPRESSION:   1.   Extensive urinary bladder wall thickening and stranding of the perivesical   fat suspicious for cystitis. Correlate with urinalysis.   2.   Distended gallbladder demonstrates extensive wall thickening. There is   pericholecystic fluid. Correlate clinically for evidence of acute   cholecystitis.   3.   Stable appearance of a heterogeneously enhancing mass in the proximal   sigmoid  colon measuring approximately 3 cm. No evidence of obstruction. Suggest   correlation with colonoscopy.     THIS DOCUMENT HAS BEEN ELECTRONICALLY SIGNED BY RORY CUELLAR MD       Discharge Medications   Current Discharge Medication List        START taking these medications    Details   pantoprazole (PROTONIX) injection Inject 40 mg into the vein daily (with breakfast)    Associated Diagnoses: Acute cholecystitis      piperacillin-tazobactam (ZOSYN) 3-0.375 GM vial Inject 3.375 g into the vein every 6 hours    Associated Diagnoses: Acute cholecystitis           CONTINUE these medications which have NOT CHANGED    Details   albuterol (PROAIR HFA/PROVENTIL HFA/VENTOLIN HFA) 108 (90 Base) MCG/ACT inhaler Inhale 2 puffs into the lungs every 6 hours as needed for shortness of breath, wheezing or cough  Qty: 18 g, Refills: 0    Comments: Pharmacy may dispense brand covered by insurance (Proair, or proventil or ventolin or generic albuterol inhaler)  Associated Diagnoses: Wheezing      ipratropium - albuterol 0.5 mg/2.5 mg/3 mL (DUONEB) 0.5-2.5 (3) MG/3ML neb solution Take 1 vial by nebulization every 6 hours as needed for shortness of breath, wheezing or cough      melatonin 1 MG TABS tablet Take 1 mg by mouth nightly as needed for sleep May repeat one time if not falling asleep.           STOP taking these medications       bismuth subsalicylate (PEPTO BISMOL) 262 MG/15ML suspension Comments:   Reason for Stopping:         busPIRone (BUSPAR) 10 MG tablet Comments:   Reason for Stopping:         busPIRone (BUSPAR) 10 MG tablet Comments:   Reason for Stopping:         hydrocortisone butyrate 0.1 % CREA Comments:   Reason for Stopping:         metoprolol succinate ER (TOPROL XL) 100 MG 24 hr tablet Comments:   Reason for Stopping:         potassium chloride jayashree ER (KLOR-CON M20) 20 MEQ CR tablet Comments:   Reason for Stopping:         torsemide (DEMADEX) 20 MG tablet Comments:   Reason for Stopping:         warfarin  ANTICOAGULANT (COUMADIN) 2 MG tablet Comments:   Reason for Stopping:             Allergies   Allergies   Allergen Reactions    Allegra [Fexofenadine] Nausea and Vomiting    Cats Other (See Comments)     Sneezing, runny nose    Codeine Sulfate Nausea and Vomiting and GI Disturbance    Crestor [Rosuvastatin] Dizziness

## 2024-06-26 NOTE — ED PROVIDER NOTES
History     Chief Complaint   Patient presents with    Abdominal Pain    Tachycardia    Shortness of Breath     HPI  Heather Rosas is a 73 year old female who Zentz for shortness of breath.  Of significance she was recently admitted to our hospital from 6/15-6/16 for heart failure.  Today patient was complaining of shortness of breath.  At her care facility they said she complained of abdominal pain after having a bowel movement.  Here she has belchy and feels nauseated.  She also carries a diagnosis of reflux.  She has other significant medical history including atrial fibrillation on chronic anticoagulation, coronary artery disease status post CABG x 4, hypertension, anxiety, fibromyalgia, tobacco abuse and prior endometrial carcinoma lost to follow-up.    She is a complicated historian due to memory issues.  Essentially her biggest concern is she is still short of breath.  She is also asking for some Pepto-Bismol.  Imaging and labs concerning for acutely decompensated heart failure.  Additionally, had a slightly elevated lactate.  She had a CT of the abdomen and pelvis on 6/15/2024 which was not concerning other than fatty infiltration of the liver.    EKG showed atrial fibrillation and atrial flutter.  I gave her a dose of diltiazem.  Heart rate went from 120 to the 80s.  Troponin stable at 28 which is chronic for her.  INR elevated at 3.77.      Last echocardiogram 4/16/2024 showed a normal EF of 60 to 65%, LVH, right ventricular function moderately reduced, severe left atrial enlargement, moderate right atrial enlargement, moderate mitral insufficiency, moderate tricuspid insufficiency.  She was a A-fib at that time.    Allergies:  Allergies   Allergen Reactions    Allegra [Fexofenadine] Nausea and Vomiting    Cats Other (See Comments)     Sneezing, runny nose    Codeine Sulfate Nausea and Vomiting and GI Disturbance    Crestor [Rosuvastatin] Dizziness       Problem List:    Patient Active Problem List     Diagnosis Date Noted    Acute respiratory acidosis (H) 06/26/2024     Priority: Medium    Heart failure with preserved ejection fraction, NYHA class I (H) 06/26/2024     Priority: Medium    Hypomagnesemia 06/15/2024     Priority: Medium    Abnormal LFTs 06/15/2024     Priority: Medium    Atrial fibrillation, unspecified type (H) 06/15/2024     Priority: Medium    Acute on chronic diastolic heart failure (H) 06/15/2024     Priority: Medium    SOB (shortness of breath) 06/11/2024     Priority: Medium    Acute kidney injury (H24) 06/11/2024     Priority: Medium    Subtherapeutic international normalized ratio (INR) 06/11/2024     Priority: Medium    Hypotension 06/11/2024     Priority: Medium    Peripheral edema 05/24/2024     Priority: Medium    Weakness 05/18/2024     Priority: Medium    Severe tricuspid regurgitation 03/04/2024     Priority: Medium    Hypokalemia 03/04/2024     Priority: Medium    Chronic diastolic congestive heart failure (H) 03/04/2024     Priority: Medium    Lesion of colon 03/04/2024     Priority: Medium    Lesion of bladder 03/04/2024     Priority: Medium    Generalized edema due to fluid overload 02/05/2024     Priority: Medium    Pulmonary nodules 08/03/2023     Priority: Medium     5/2023 - 8mm      Thyroid nodule 08/03/2023     Priority: Medium     1cm, noted in 2019      Tobacco dependence 08/03/2023     Priority: Medium    Aortic valve sclerosis (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 Oconnell.       Status post chemotherapy 09/25/2020     Priority: Medium     Added automatically from request for surgery [id number]      Chronic atrial fibrillation (H) 11/26/2018     Priority: Medium    Hyponatremia  10/30/2018     Priority: Medium    Moderate mitral regurgitation 10/30/2018     Priority: Medium    Adenocarcinoma of the endometrium/uterus (H) 10/09/2018     Priority: Medium     Discovered on 02/18 biopsy. S/p resection and chemo. Pt lost to follow up.   High-grade endometrial adenocarcinoma of the uterus with staging consistent with pathologic T1b N0 I plus or stage 1B with isolated tumor cells involving one of the periaortic lymph nodes.       Chronic right-sided congestive heart failure (H) 09/20/2018     Priority: Medium    S/P CABG x 4 09/10/2018     Priority: Medium    Benign essential hypertension      Priority: Medium    ANNA (generalized anxiety disorder) 01/01/2011     Priority: Medium    Gastroesophageal reflux disease without esophagitis 01/01/2011     Priority: Medium    Schizophrenia (H) 01/01/2011     Priority: Medium    Seasonal allergic rhinitis 01/01/2011     Priority: Medium    Fibromyalgia 06/14/2004     Priority: Medium    Dyslipidemia 11/26/2003     Priority: Medium        Past Medical History:    Past Medical History:   Diagnosis Date    Acute diastolic congestive heart failure (H) 10/12/2021    Acute exacerbation of CHF (congestive heart failure) (H) 10/11/2021    Acute hypoxic respiratory failure (H) 04/16/2024    Acute kidney failure, unspecified (H)     Acute on chronic congestive heart failure, unspecified heart failure type (H) 09/21/2023    Acute on chronic diastolic congestive heart failure (H)     Allergic rhinitis 01/01/2011    Anxiety 01/01/2011    Anxiety state, unspecified     Atrial fibrillation with RVR (H) 07/07/2023    Atrial flutter with rapid ventricular response (H) 10/12/2021    COPD exacerbation (H) 04/16/2024    CVA (cerebral vascular accident) (H) 05/14/2018    Dyslipidemia 11/26/2003    fibromyalgia 06/14/2004    GERD, Esophageal reflux 01/01/2011    History of non-ST elevation myocardial infarction (NSTEMI) 09/06/2018    HTN (hypertension)     Major depression,  recurrent (H24) 01/01/2011    Major depressive disorder, recurrent episode, moderate (H) 01/01/2011    Metrorrhagia 02/18/2018    Non compliance with medical treatment 07/07/2023    Nonorganic sleep disorder, unspecified     Obesity 01/01/2011    Obesity (H) 01/01/2011    Rapid atrial fibrillation (H)     Schizophrenia (H) 01/01/2011    Toxic shock syndrome (H) 2006       Past Surgical History:    Past Surgical History:   Procedure Laterality Date    ANGIOGRAM  02/2005    Normal     BIOPSY BREAST  1984    LT, normal    BYPASS GRAFT ARTERY CORONARY  09/10/2018    CARPAL TUNNEL RELEASE RT/LT  2005    RT, carpal tunnel    COLONOSCOPY  2011    Repeat in ten years     COLONOSCOPY  1996    COLONOSCOPY  2004    DILATION AND CURETTAGE, HYSTEROSCOPY, ABLATE ENDOMETRIUM, COMBINED N/A 2/19/2018    Procedure: COMBINED DILATION AND CURETTAGE, HYSTEROSCOPY, ABLATE ENDOMETRIUM;  HYSTEROSCOPY DILATION AND CURETTAGE, ENDOMETRIAL ABLATION;  Surgeon: Jose Nettles MD;  Location: [facility] OR    HYSTERECTOMY TOTAL ABD, NICK SALPINGO-OOPHORECTOMY, NODE DISSECTION, TUMOR DEBULKING, COMBINED  10/30/2018    INSERT PORT VASCULAR ACCESS N/A 12/11/2018    Procedure: PORT-A-CATH PLACEMENT;  Surgeon: Rafi Trinidad MD;  Location: [facility] OR    placement of central line  2005    REMOVE PORT VASCULAR ACCESS N/A 10/6/2020    Procedure: port a cath removal;  Surgeon: Rafi Trinidad MD;  Location: [facility] OR       Family History:    Family History   Problem Relation Age of Onset    Gastrointestinal Disease Mother         GERD    Cancer Mother         pancreatic ca (cause of death) /liver ca    C.A.D. Father 45        (cause of death)     Other - See Comments Father         rheumatic fever     Allergies Father     Cancer Sister 56        Esophageal to bone cancer    Neurologic Disorder Brother         neuropathy    Cancer Brother 58        Tonsilular cancer/ lymph node in neck    Allergies Brother     Diabetes Paternal Grandmother         type 2    Breast Cancer  "Maternal Aunt     Breast Cancer Maternal Aunt     Depression Maternal Aunt     Depression Maternal Uncle     Colon Cancer Paternal Aunt         (cause of death)     Breast Cancer Cousin     Breast Cancer Cousin     Breast Cancer Cousin     Crohn's Disease Other     No Known Problems Son         2 sons       Social History:  Marital Status:   [4]  Social History     Tobacco Use    Smoking status: Every Day     Current packs/day: 1.00     Average packs/day: 1 pack/day for 30.0 years (30.0 ttl pk-yrs)     Types: Cigarettes     Passive exposure: Never    Smokeless tobacco: Never    Tobacco comments:     pt declined, stated she would use, \"the patch\". Patient has not had a cigarette in 1 week because she was in the hospital   Vaping Use    Vaping status: Never Used   Substance Use Topics    Alcohol use: No     Comment: rare    Drug use: No        Medications:    albuterol (PROAIR HFA/PROVENTIL HFA/VENTOLIN HFA) 108 (90 Base) MCG/ACT inhaler  busPIRone (BUSPAR) 10 MG tablet  hydrocortisone butyrate 0.1 % CREA  metoprolol succinate ER (TOPROL XL) 100 MG 24 hr tablet  potassium chloride jayashree ER (KLOR-CON M20) 20 MEQ CR tablet  potassium chloride ER (K-TAB) 20 MEQ CR tablet  torsemide (DEMADEX) 20 MG tablet  warfarin ANTICOAGULANT (COUMADIN) 2 MG tablet          Review of Systems   Unable to perform ROS: Dementia   Constitutional:  Negative for fatigue and fever.   Respiratory:  Positive for shortness of breath. Negative for cough.    Cardiovascular:  Positive for leg swelling.   Gastrointestinal:  Positive for nausea. Negative for abdominal pain, constipation, diarrhea and vomiting.       Physical Exam   BP: 120/88  Pulse: (!) 135  Temp: 98.2  F (36.8  C)  Resp: 20  Height: 147.3 cm (4' 10\")  Weight: 83.9 kg (185 lb)  SpO2: 96 %      Physical Exam  Vitals and nursing note reviewed.   Constitutional:       General: She is not in acute distress.     Appearance: Normal appearance. She is well-developed. She is not " diaphoretic.   HENT:      Head: Normocephalic and atraumatic.      Mouth/Throat:      Mouth: Mucous membranes are moist.   Eyes:      General: No scleral icterus.     Conjunctiva/sclera: Conjunctivae normal.   Cardiovascular:      Rate and Rhythm: Tachycardia present. Rhythm irregular.      Heart sounds: Normal heart sounds.   Pulmonary:      Effort: No respiratory distress.      Breath sounds: Normal breath sounds.   Abdominal:      General: Abdomen is flat. Bowel sounds are normal.      Tenderness: There is no abdominal tenderness.      Hernia: No hernia is present.   Musculoskeletal:      Cervical back: Neck supple.   Skin:     General: Skin is warm.      Findings: No rash.   Neurological:      Mental Status: She is alert.         ED Course     ED Course as of 06/26/24 0504   Tue Jun 25, 2024   2345 Sign out at shift change. From NH for abdominal pain. Patient c/o short of breath, in afib/flutter , on coumadin.    2346 Will try diltiazem for HR   Wed Jun 26, 2024   0349 HR back up to 110-120. Will start oral diltiazem 30mg q 6 hours   0350 Check repeat lactate   0421 Lactate increasing. Will give 1L NS. I am aware of her HF status but benefit outweighs risk. CT pending.     Procedures              EKG Interpretation:      Interpreted by Cynthia Garcia,   Time reviewed:   Symptoms at time of EKG: None   Rhythm: atrial flutter  Rate: 120-130  Axis: Normal  Ectopy: premature ventricular contractions (unifocal)  Conduction: right bundle branch block (complete)  ST Segments/ T Waves: No acute ischemic changes  Q Waves: II, III, and aVf  Comparison to prior: Unchanged    Clinical Impression: no acute changes            Critical Care time:  none     The Lactic acid level is elevated due to aflutter, at this time there is no sign of severe sepsis or septic shock.         Results for orders placed or performed during the hospital encounter of 06/25/24 (from the past 24 hour(s))   CBC with platelets differential     Narrative    The following orders were created for panel order CBC with platelets differential.  Procedure                               Abnormality         Status                     ---------                               -----------         ------                     CBC with platelets and d...[[id number]]  Abnormal            Final result                 Please view results for these tests on the individual orders.   INR   Result Value Ref Range    INR 3.77 (H) 0.85 - 1.15   Basic metabolic panel   Result Value Ref Range    Sodium 134 (L) 135 - 145 mmol/L    Potassium 4.8 3.4 - 5.3 mmol/L    Chloride 95 (L) 98 - 107 mmol/L    Carbon Dioxide (CO2) 24 22 - 29 mmol/L    Anion Gap 15 7 - 15 mmol/L    Urea Nitrogen 35.4 (H) 8.0 - 23.0 mg/dL    Creatinine 1.28 (H) 0.51 - 0.95 mg/dL    GFR Estimate 45 (L) >60 mL/min/1.73m2    Calcium 9.7 8.8 - 10.2 mg/dL    Glucose 136 (H) 70 - 99 mg/dL   Hepatic function panel   Result Value Ref Range    Protein Total 7.4 6.4 - 8.3 g/dL    Albumin 3.9 3.5 - 5.2 g/dL    Bilirubin Total 2.6 (H) <=1.2 mg/dL    Alkaline Phosphatase 192 (H) 40 - 150 U/L    AST 26 0 - 45 U/L    ALT 22 0 - 50 U/L    Bilirubin Direct 1.26 (H) 0.00 - 0.30 mg/dL   Lipase   Result Value Ref Range    Lipase 29 13 - 60 U/L   Lactic acid whole blood with 1x repeat in 2 hr when >2   Result Value Ref Range    Lactic Acid, Initial 2.6 (H) 0.7 - 2.0 mmol/L   Troponin T, High Sensitivity   Result Value Ref Range    Troponin T, High Sensitivity 28 (H) <=14 ng/L   Nt probnp inpatient (BNP)   Result Value Ref Range    N terminal Pro BNP Inpatient 9,339 (H) 0 - 900 pg/mL   CBC with platelets and differential   Result Value Ref Range    WBC Count 10.6 4.0 - 11.0 10e3/uL    RBC Count 4.61 3.80 - 5.20 10e6/uL    Hemoglobin 12.4 11.7 - 15.7 g/dL    Hematocrit 39.5 35.0 - 47.0 %    MCV 86 78 - 100 fL    MCH 26.9 26.5 - 33.0 pg    MCHC 31.4 (L) 31.5 - 36.5 g/dL    RDW 20.4 (H) 10.0 - 15.0 %    Platelet Count 234 150 - 450 10e3/uL     % Neutrophils 73 %    % Lymphocytes 15 %    % Monocytes 10 %    % Eosinophils 1 %    % Basophils 1 %    % Immature Granulocytes 1 %    NRBCs per 100 WBC 0 <1 /100    Absolute Neutrophils 7.7 1.6 - 8.3 10e3/uL    Absolute Lymphocytes 1.6 0.8 - 5.3 10e3/uL    Absolute Monocytes 1.0 0.0 - 1.3 10e3/uL    Absolute Eosinophils 0.2 0.0 - 0.7 10e3/uL    Absolute Basophils 0.1 0.0 - 0.2 10e3/uL    Absolute Immature Granulocytes 0.1 <=0.4 10e3/uL    Absolute NRBCs 0.0 10e3/uL   Ethanol GH   Result Value Ref Range    Alcohol ethyl <0.01 <=0.01 g/dL   Symptomatic Influenza A/B, RSV, & SARS-CoV2 PCR (COVID-19) Nose    Specimen: Nose; Swab   Result Value Ref Range    Influenza A PCR Negative Negative    Influenza B PCR Negative Negative    RSV PCR Negative Negative    SARS CoV2 PCR Negative Negative    Narrative    Testing was performed using the Xpert Xpress CoV2/Flu/RSV Assay on the [employer] GeneXpert Instrument. This test should be ordered for the detection of SARS-CoV-2, influenza, and RSV viruses in individuals who meet clinical and/or epidemiological criteria. Test performance is unknown in asymptomatic patients. This test is for in vitro diagnostic use under the FDA EUA for laboratories certified under CLIA to perform high or moderate complexity testing. This test has not been FDA cleared or approved. A negative result does not rule out the presence of PCR inhibitors in the specimen or target RNA in concentration below the limit of detection for the assay. If only one viral target is positive but coinfection with multiple targets is suspected, the sample should be re-tested with another FDA cleared, approved, or authorized test, if coinfection would change clinical management. This test was validated by the [facility] [employer]. These laboratories are certified under the Clinical Laboratory Improvement Amendments of 1988 (CLIA-88) as qualified to perform high complexity laboratory testing.   Anne Machado     Narrative    The following orders were created for panel order [place] Draw.  Procedure                               Abnormality         Status                     ---------                               -----------         ------                     Extra Blue Top Tube[[id number]]                              Final result               Extra Red Top Tube[[id number]]                               Final result                 Please view results for these tests on the individual orders.   Extra Blue Top Tube   Result Value Ref Range    Hold Specimen JIC    Extra Red Top Tube   Result Value Ref Range    Hold Specimen JIC    UA with Microscopic reflex to Culture    Specimen: Urine, Catheter   Result Value Ref Range    Color Urine Yellow Colorless, Straw, Light Yellow, Yellow    Appearance Urine Clear Clear    Glucose Urine Negative Negative mg/dL    Bilirubin Urine Negative Negative    Ketones Urine Negative Negative mg/dL    Specific Gravity Urine 1.009 1.000 - 1.030    Blood Urine Negative Negative    pH Urine 6.5 5.0 - 9.0    Protein Albumin Urine 30 (A) Negative mg/dL    Urobilinogen Urine 3.0 (A) Normal, 2.0 mg/dL    Nitrite Urine Negative Negative    Leukocyte Esterase Urine Negative Negative    Mucus Urine Present (A) None Seen /LPF    RBC Urine <1 <=2 /HPF    WBC Urine 1 <=5 /HPF    Narrative    Urine Culture not indicated   Urine Drug Screen    Narrative    The following orders were created for panel order Urine Drug Screen.  Procedure                               Abnormality         Status                     ---------                               -----------         ------                     Urine Drug Screen Panel[[id number]]      Normal              Final result                 Please view results for these tests on the individual orders.   Urine Drug Screen Panel   Result Value Ref Range    Amphetamines Urine Screen Negative Screen Negative    Barbituates Urine Screen Negative Screen Negative     Benzodiazepine Urine Screen Negative Screen Negative    Cannabinoids Urine Screen Negative Screen Negative    Cocaine Urine Screen Negative Screen Negative    Fentanyl Qual Urine Screen Negative Screen Negative    Opiates Urine Screen Negative Screen Negative    PCP Urine Screen Negative Screen Negative   XR Chest Port 1 View    Narrative    PROCEDURE INFORMATION:   Exam: XR Chest   Exam date and time: 6/25/2024 10:43 PM   Age: 73 years old   Clinical indication: Shortness of breath; Additional info: SOB     TECHNIQUE:   Imaging protocol: Radiologic exam of the chest.   Views: 1 view.     COMPARISON:   CT CHEST PULMONARY EMBOLISM W CONTRAST 6/15/2024 2:26 PM     FINDINGS:   Lungs: There is moderate bilateral interstitial lung disease.   Pleural spaces: Unremarkable. No pleural effusion. No pneumothorax.   Heart/Mediastinum: There is cardiomegaly.   Bones/joints: There is been a sternotomy.       Impression    IMPRESSION:   There is bilateral interstitial lung disease suggestive of pulmonary   edema.Clinical correlation is advised.     THIS DOCUMENT HAS BEEN ELECTRONICALLY SIGNED BY YA MARR MD   Lactic acid whole blood   Result Value Ref Range    Lactic Acid 3.3 (H) 0.7 - 2.0 mmol/L   Troponin T, High Sensitivity   Result Value Ref Range    Troponin T, High Sensitivity 28 (H) <=14 ng/L   Lactic acid whole blood   Result Value Ref Range    Lactic Acid 5.5 (HH) 0.7 - 2.0 mmol/L   Blood gas venous   Result Value Ref Range    pH Venous 7.26 (L) 7.32 - 7.43    pCO2 Venous 51 (H) 40 - 50 mm Hg    pO2 Venous 15 (L) 25 - 47 mm Hg    Bicarbonate Venous 23 21 - 28 mmol/L    Base Excess/Deficit Venous -4.7 (L) -3.0 - 3.0 mmol/L    FIO2 21     Oxyhemoglobin Venous 11 (L) 70 - 75 %    O2 Sat, Venous 10.8 (L) 70.0 - 75.0 %    Narrative    In healthy individuals, oxyhemoglobin (O2Hb) and oxygen saturation (SO2) are approximately equal. In the presence of dyshemoglobins, oxyhemoglobin can be considerably lower than oxygen  saturation.       Medications   bismuth subsalicylate (PEPTO BISMOL) chewable tablet 524 mg (has no administration in time range)   calcium carbonate (TUMS) chewable tablet 500 mg (500 mg Oral $Given 6/26/24 0113)   diltiazem (CARDIZEM) tablet 30 mg (30 mg Oral $Given 6/26/24 0354)   sodium chloride 0.9% BOLUS 1,000 mL (1,000 mLs Intravenous $New Bag 6/26/24 0431)   sodium chloride 0.9% BOLUS 1,000 mL (0 mLs Intravenous Stopped 6/26/24 0027)   diltiazem (CARDIZEM) injection 25 mg (25 mg Intravenous $Given 6/25/24 2357)   furosemide (LASIX) injection 20 mg (20 mg Intravenous $Given 6/26/24 0107)   iopamidol (ISOVUE-370) solution 107 mL (107 mLs Intravenous $Given 6/26/24 0326)       Assessments & Plan (with Medical Decision Making)     I have reviewed the nursing notes.    I have reviewed the findings, diagnosis, plan and need for follow up with the patient.       ED to Inpatient Handoff:    Discussed with Dr. Beach at 139am  Patient accepted for Observation Stay  Pending studies include   Code Status: Full Code             Medical Decision Making  The patient's presentation was of moderate complexity (a chronic illness mild to moderate exacerbation, progression, or side effect of treatment).    The patient's evaluation involved:  review of external note(s) from 3+ sources (see separate area of note for details)  review of 3+ test result(s) ordered prior to this encounter (see separate area of note for details)  ordering and/or review of 3+ test(s) in this encounter (see separate area of note for details)    The patient's management necessitated high risk (a decision regarding hospitalization).        New Prescriptions    No medications on file       Final diagnoses:   Heart failure with preserved ejection fraction, NYHA class I (H)   Acute respiratory acidosis (H)   Attempted to improve patient.  She was eventually able to take oral diltiazem.  However, heart rates have come back up to 130.  VBG shows a respiratory  acidosis.  I think she may benefit from a BiPAP or CPAP.  She has been intermittently compliant with recommendations and medications.  She is agreeable to admission at this time.  Unfortunately, lactate trended up to 5.5.  Although she is in heart failure, another liter of fluids has been started.  Recommend admission to ICU, diltiazem drip, strict SHELLY, etc.  Per discussion with hospitalist she is excepted to the ICU at this time.  Diltiazem drip has been started.  1 dose of Lasix 20 mg has been given IV.  She has not had any urine output since that time.  Discussed Martin catheter placement versus pure wick and patient has not been agreeable to this.  She is a full code.  She is her own decision maker per review of her chart.  Her INR is supratherapeutic.  CT chest abdomen and pelvis is pending at this time.    6/25/2024   [facility]       Cynthia Garcia, DO  06/26/24 0506    CT read resulted concerning for cholecystitis and/or possibly cystitis.  I think the cholecystitis may be more likely due to distended gall bladder from heart failure.  She may have cystitis.  UA was not obviously concerning but will treat with Zosyn to cover.    Staff then called at 530 this morning.  They reported to us patient has lost about 20 pounds.  She seems to be hungry all the time.  She has been trying to eat other peoples food at her care facility.  They want her checked for parasites in her stool.     Cynthia Garcia, DO  06/26/24 2922       Cynthia Garcia, DO  06/26/24 3108

## 2024-06-26 NOTE — PHARMACY-ANTICOAGULATION SERVICE
Pharmacy Consult- Initial Coumadin Assessment    Heather Rosas is a 73 year old female admitted on 6/25/2024 with <principal problem not specified>    Primary Indication(s) for Anticoagulation: atrial fibrillation     Goal INR:2-3    FYI, patient is followed by the Anticoagulation/Protime Clinic at: [facility]    Patient Active Problem List   Diagnosis    Dyslipidemia    Fibromyalgia    Benign essential hypertension    ANNA (generalized anxiety disorder)    Gastroesophageal reflux disease without esophagitis    Schizophrenia (H)    Seasonal allergic rhinitis    Adenocarcinoma of the endometrium/uterus (H)    Chronic atrial fibrillation (H)    Chronic right-sided congestive heart failure (H)    Hyponatremia    Moderate mitral regurgitation    S/P CABG x 4    Status post chemotherapy    Coronary artery disease involving native coronary artery of native heart without angina pectoris    Diarrhea    Noncompliance with medication regimen    Aortic valve sclerosis (7/2023)    Pulmonary nodules    Thyroid nodule    Tobacco dependence    Generalized edema due to fluid overload    Severe tricuspid regurgitation    Hypokalemia    Chronic diastolic congestive heart failure (H)    Lesion of colon    Lesion of bladder    Weakness    Peripheral edema    SOB (shortness of breath)    Acute kidney injury (H24)    Subtherapeutic international normalized ratio (INR)    Hypotension    Hypomagnesemia    Abnormal LFTs    Atrial fibrillation, unspecified type (H)    Acute on chronic diastolic heart failure (H)    Acute respiratory acidosis (H)    Heart failure with preserved ejection fraction, NYHA class I (H)       Patient previously anticoagulated on Coumadin at a dose of 14mg/week; dosed as: 2 mg every day     Factors that may increase patient's sensitivity to warfarin (Coumadin) include: Change in Diet    Factors that may decrease patient's sensitivity to warfarin (Coumadin) include: Change in Diet    Recent Labs   Lab Test 06/25/24  6209  06/24/24  1012 06/20/24  1053 06/17/24  0835 06/16/24  0616 06/15/24  1316 06/13/24  0252 06/12/24  0506 06/11/24  0519   HGB 12.4  --   --   --   --  12.5  --  11.5* 11.6*   INR 3.77* 3.2* 4.3* 3.7*   < > 1.65*   < > 1.57* 1.41*     --   --   --   --  338  --  344 389    < > = values in this interval not displayed.        Plan: Patient is presently supratherapeutic; will hold does today and reassess tomorrow morning.     Thank You for the Consult. Will continue to follow.    Michael Bishop [facility] ....................  6/26/2024   7:47 AM

## 2024-06-26 NOTE — ED TRIAGE NOTES
"Pt comes in from the [facility] by EMS. States she has intermittent SOB today. Is now feeling ok. Also had some severe abdominal pain about 1 hour ago. She seems to have some confusion at baseline and her recap of the day has been changing. The nurse called ahead and states she had severe abdominal pain after a BM.     /88   Pulse (!) 135   Temp 98.2  F (36.8  C) (Tympanic)   Resp 20   Ht 1.473 m (4' 10\")   Wt 83.9 kg (185 lb)   BMI 38.67 kg/m         Triage Assessment (Adult)       Row Name 06/25/24 5666          Triage Assessment    Airway WDL X        Respiratory WDL    Respiratory WDL X;rhythm/pattern     Rhythm/Pattern, Respiratory dyspnea upon exertion        Skin Circulation/Temperature WDL    Skin Circulation/Temperature WDL WDL        Cardiac WDL    Cardiac WDL X;rhythm     Pulse Rate & Regularity apical pulse irregular;tachycardic        Peripheral/Neurovascular WDL    Peripheral Neurovascular WDL WDL        Cognitive/Neuro/Behavioral WDL    Cognitive/Neuro/Behavioral WDL X     Level of Consciousness alert;intermittent confusion                     "

## 2024-06-26 NOTE — PROGRESS NOTES
"Brief Progress Note    Patient picked up from over night admit, upon reviewing chart stat labs were re-ordered and CT reviewed with radiology.    Radiology noted new finding of GB edema / fran-cholecystic fluid,    Bladder wall is thickened.    Also new mass is worrisome for malignancy, however cannot be further characterized on CT.    S:  Patients states she has had mid-epigastric abd pain for the last 2 or so days.    BP 90/67   Pulse 81   Temp 97  F (36.1  C) (Temporal)   Resp 22   Ht 1.473 m (4' 10\")   Wt 82.3 kg (181 lb 8.2 oz)   SpO2 95%   BMI 37.94 kg/m    Gen: ill appearing, lethargic and arrousable  Resp: shallow, diffuse crackles, non-labored  CV: irreg irreg  Abd: + BS, tender with guarding and peritoneal signs mid-eip to RUQ    Labs reviewed, notable for lactate >7    A/P    Sepsis due to Acute Cholecystitis:  Surgery at [facility] cannot take due to severe diastolic dysfxn and heart disease.  Requested transfer.  Have contacted [facility] for transfer, they are reviewing films and will call us back ASAP.  For INR, have ordered 2 units FFP.  Consent obtained.  NPO, IVFs  Cont Ariann    Have called family x 2, left VM with each son and listed number to call back.    Repeat LFTs, BMP pending.  "

## 2024-06-26 NOTE — H&P
HOSPITALIST TELEMED ADMISSION H&P    Service Date : 6/26/2024  Dr. Mere ADKINS am located in [place]  Heather Rosas is located in [place] at [facility].   The RN    on med/surg unit is assisting me today with this patient.    chief complaint:  shortness of breath    History of Present Illness:  Heather Rosas is a 73 year old female  with atrial fibrillation, atrial flutter, COPD, hypertension, previous CVA, gastroesophageal reflux, generalized anxiety disorder, schizophrenia, status post CABG x 4, presenting to ED today  shortness of breath that she said started last night after dinner.  Patient states that over the last couple of days she has had to sleep sitting up because she was short of breath.  She denied any known weight gain but does admit to lower leg edema.  She denied any fever, chills, nausea or vomiting.    Emergency Room Course - in the emergency room, serologies for CMP, CBC, lactic acid, troponin T, VBG, proBNP, lipase, INR, UDS, influenza A/B, RSV, COVID      Radiological diagnostics included:       Narrative & Impression   PROCEDURE INFORMATION:   Exam: XR Chest   Exam date and time: 6/25/2024 10:43 PM   Age: 73 years old   Clinical indication: Shortness of breath; Additional info: SOB      TECHNIQUE:   Imaging protocol: Radiologic exam of the chest.   Views: 1 view.      COMPARISON:   CT CHEST PULMONARY EMBOLISM W CONTRAST 6/15/2024 2:26 PM      FINDINGS:   Lungs: There is moderate bilateral interstitial lung disease.   Pleural spaces: Unremarkable. No pleural effusion. No pneumothorax.   Heart/Mediastinum: There is cardiomegaly.   Bones/joints: There is been a sternotomy.                                                                       IMPRESSION:   There is bilateral interstitial lung disease suggestive of pulmonary   edema.Clinical correlation is advised.      THIS DOCUMENT HAS BEEN ELECTRONICALLY SIGNED BY YA MARR MD            PROCEDURE INFORMATION:   Exam: CT  "Chest With Contrast; Diagnostic   Exam date and time: 6/26/2024 2:44 AM   Age: 73 years old   Clinical indication: Nausea and vomiting; Shortness of breath and other: Hf;   Additional info: Short of breath, hf, elevated bilirubin, n/v, schizophrenia,   poor historian      TECHNIQUE:   Imaging protocol: Diagnostic computed tomography of the chest with contrast.   3D rendering (Not supervised by radiologist): MIP and/or 3D reconstructed   images were created by the technologist.   Radiation optimization: All CT scans at this facility use at least one of these   dose optimization techniques: automated exposure control; mA and/or kV   adjustment per patient size (includes targeted exams where dose is matched to   clinical indication); or iterative reconstruction.   Contrast material: ISOVUE 370; Contrast volume: 107 ml; Contrast route:   INTRAVENOUS (IV);       COMPARISON:   CT CHEST PULMONARY EMBOLISM W CONTRAST 6/15/2024 2:26 PM      FINDINGS:   Lungs: Interstitial pulmonary edema. Calcified granuloma in the right middle   lobe.   Pleural spaces: Small right pleural effusion with adjacent compressive   atelectasis.   Heart: Cardiomegaly. Coarsely calcified aortic valve leaflets.   Coronary arteries: Atherosclerotic disease of the coronary arteries.   Lymph nodes: Mediastinal adenopathy with the largest lymph nodes in the right   paratracheal space measuring up to 2 cm in short axis.   Vasculature: See \"Heart\" finding.      Bones/joints: Status post median sternotomy and CABG. Kyphosis of the thoracic   spine. Multilevel degenerative disease and arthropathy.   Soft tissues: Unremarkable.                                                                       IMPRESSION:   Findings suggestive of congestive heart failure.     =========================  PROCEDURE INFORMATION:   Exam: CT Abdomen And Pelvis With Contrast   Exam date and time: 6/26/2024 2:44 AM   Age: 73 years old   Clinical indication: Nausea and vomiting; " Shortness of breath and other: Hf;   Additional info: Short of breath, hf, elevated bilirubin, n/v, schizophrenia,   poor historian      TECHNIQUE:   Imaging protocol: Computed tomography of the abdomen and pelvis with contrast.   3D rendering (Not supervised by radiologist): MIP and/or 3D reconstructed   images were created by the technologist.   Radiation optimization: All CT scans at this facility use at least one of these   dose optimization techniques: automated exposure control; mA and/or kV   adjustment per patient size (includes targeted exams where dose is matched to   clinical indication); or iterative reconstruction.   Contrast material: ISOVUE 370; Contrast volume: 107 ml; Contrast route:   INTRAVENOUS (IV);       COMPARISON:   CT ABDOMEN PELVIS W CONTRAST 6/15/2024 2:31 PM      FINDINGS:   Liver: Hepatomegaly and steatosis.   Gallbladder and biliary ducts: Distended gallbladder demonstrates extensive   wall thickening. There is pericholecystic fluid.   Pancreas: Normal. No ductal dilation.   Spleen: Normal. No splenomegaly.   Adrenal glands: Left adrenal hyperplasia.   Kidneys and ureters: Extensive bilateral renal cortical scarring.   Stomach and bowel: Stable appearance of a heterogeneously enhancing mass in the   proximal sigmoid colon measuring approximately 3 cm. No evidence of   obstruction.   Appendix: No evidence of appendicitis.      Intraperitoneal space: Small ascites.   Vasculature: Atherosclerotic disease.   Lymph nodes: Stable bilateral inguinal, common femoral, and external iliac   adenopathy.   Urinary bladder: Extensive urinary bladder wall thickening and stranding of the   perivesical fat suspicious for cystitis.   Reproductive: Status post hysterectomy.   Bones/joints: Severe multilevel degenerative disease and facet arthropathy.   Anterolisthesis of L4 on L5, degenerative in nature. Erosive changes involving   bilateral L4-L5 facet joints are once again noted.   Soft tissues:  Unremarkable.      IMPRESSION:   1.   Extensive urinary bladder wall thickening and stranding of the perivesical   fat suspicious for cystitis. Correlate with urinalysis.   2.   Distended gallbladder demonstrates extensive wall thickening. There is   pericholecystic fluid. Correlate clinically for evidence of acute   cholecystitis.   3.   Stable appearance of a heterogeneously enhancing mass in the proximal   sigmoid colon measuring approximately 3 cm. No evidence of obstruction. Suggest   correlation with colonoscopy.      THIS DOCUMENT HAS BEEN ELECTRONICALLY SIGNED BY RORY CUELLAR MD       Past Medical History  Past Medical History:   Diagnosis Date    Acute diastolic congestive heart failure (H) 10/12/2021    Acute exacerbation of CHF (congestive heart failure) (H) 10/11/2021    Acute hypoxic respiratory failure (H) 04/16/2024    Acute kidney failure, unspecified (H)     Acute on chronic congestive heart failure, unspecified heart failure type (H) 09/21/2023    Acute on chronic diastolic congestive heart failure (H)     Allergic rhinitis 01/01/2011    Anxiety 01/01/2011    Anxiety state, unspecified     Anxiety state    Atrial fibrillation with RVR (H) 07/07/2023    Atrial flutter with rapid ventricular response (H) 10/12/2021    COPD exacerbation (H) 04/16/2024    CVA (cerebral vascular accident) (H) 05/14/2018    Dyslipidemia 11/26/2003    fibromyalgia 06/14/2004    GERD, Esophageal reflux 01/01/2011    History of non-ST elevation myocardial infarction (NSTEMI) 09/06/2018    HTN (hypertension)     Essential hypertension    Major depression, recurrent (H24) 01/01/2011    Major depressive disorder, recurrent episode, moderate (H) 01/01/2011    Metrorrhagia 02/18/2018    Non compliance with medical treatment 07/07/2023    Nonorganic sleep disorder, unspecified     Non-org. sleep disorder    Obesity 01/01/2011    Obesity (H) 01/01/2011    Rapid atrial fibrillation (H)     Schizophrenia (H) 01/01/2011     Toxic shock syndrome (H) 2006    due to MRSA, ARDS, renal failure      Patient Active Problem List   Diagnosis    Dyslipidemia    Fibromyalgia    Benign essential hypertension    ANNA (generalized anxiety disorder)    Gastroesophageal reflux disease without esophagitis    Schizophrenia (H)    Seasonal allergic rhinitis    Adenocarcinoma of the endometrium/uterus (H)    Chronic atrial fibrillation (H)    Chronic right-sided congestive heart failure (H)    Hyponatremia    Moderate mitral regurgitation    S/P CABG x 4    Status post chemotherapy    Coronary artery disease involving native coronary artery of native heart without angina pectoris    Diarrhea    Noncompliance with medication regimen    Aortic valve sclerosis (7/2023)    Pulmonary nodules    Thyroid nodule    Tobacco dependence    Generalized edema due to fluid overload    Severe tricuspid regurgitation    Hypokalemia    Chronic diastolic congestive heart failure (H)    Lesion of colon    Lesion of bladder    Weakness    Peripheral edema    SOB (shortness of breath)    Acute kidney injury (H24)    Subtherapeutic international normalized ratio (INR)    Hypotension    Hypomagnesemia    Abnormal LFTs    Atrial fibrillation, unspecified type (H)    Acute on chronic diastolic heart failure (H)    Acute respiratory acidosis (H)    Heart failure with preserved ejection fraction, NYHA class I (H)         Past Surgical History  Past Surgical History:   Procedure Laterality Date    ANGIOGRAM  02/2005    Normal     BIOPSY BREAST  1984    LT, normal    BYPASS GRAFT ARTERY CORONARY  09/10/2018    CARPAL TUNNEL RELEASE RT/LT  2005    RT, carpal tunnel    COLONOSCOPY  2011    Repeat in ten years     COLONOSCOPY  1996    COLONOSCOPY  2004    DILATION AND CURETTAGE, HYSTEROSCOPY, ABLATE ENDOMETRIUM, COMBINED N/A 2/19/2018    Procedure: COMBINED DILATION AND CURETTAGE, HYSTEROSCOPY, ABLATE ENDOMETRIUM;  HYSTEROSCOPY DILATION AND CURETTAGE, ENDOMETRIAL ABLATION;  Surgeon:  Jose Nettles MD;  Location: [facility] OR    HYSTERECTOMY TOTAL ABD, NICK SALPINGO-OOPHORECTOMY, NODE DISSECTION, TUMOR DEBULKING, COMBINED  10/30/2018    INSERT PORT VASCULAR ACCESS N/A 12/11/2018    Procedure: PORT-A-CATH PLACEMENT;  Surgeon: Rafi Trinidad MD;  Location: [facility] OR    placement of central line  2005    REMOVE PORT VASCULAR ACCESS N/A 10/6/2020    Procedure: port a cath removal;  Surgeon: Rafi Trinidad MD;  Location: [facility] OR      Social History  Heather  reports that she has been smoking cigarettes. She has a 30 pack-year smoking history. She has never been exposed to tobacco smoke. She has never used smokeless tobacco. She reports that she does not drink alcohol and does not use drugs.    Family History  Heather's family history includes Allergies in her brother and father; Breast Cancer in her cousin, cousin, cousin, maternal aunt, and maternal aunt; C.A.D. (age of onset: 45) in her father; Cancer in her mother; Cancer (age of onset: 56) in her sister; Cancer (age of onset: 58) in her brother; Colon Cancer in her paternal aunt; Crohn's Disease in an other family member; Depression in her maternal aunt and maternal uncle; Diabetes in her paternal grandmother; Gastrointestinal Disease in her mother; Neurologic Disorder in her brother; No Known Problems in her son; Other - See Comments in her father.    Home Medications  Current Outpatient Medications   Medication Instructions    albuterol (PROAIR HFA/PROVENTIL HFA/VENTOLIN HFA) 108 (90 Base) MCG/ACT inhaler 2 puffs, Inhalation, EVERY 6 HOURS PRN    busPIRone (BUSPAR) 10 mg, Oral, 2 TIMES DAILY    hydrocortisone butyrate 0.1 % CREA Apply externally, 2 TIMES DAILY, To back    metoprolol succinate ER (TOPROL XL) 100 mg, Oral, DAILY    potassium chloride jayashree ER (KLOR-CON M20) 20 MEQ CR tablet 40 mEq, Oral, 2 TIMES DAILY    potassium chloride ER (K-TAB) 20 MEQ CR tablet 20 mEq, Oral, 2 TIMES DAILY    torsemide (DEMADEX) 20 mg, Oral, 2 TIMES DAILY    warfarin  "ANTICOAGULANT (COUMADIN) 2 MG tablet Take 3mg by mouth on 6/13/24. Follow-up INR on 6/14/24 with further dosing instructions from anticoagulation clinic at that time.       Allergies  Allergies   Allergen Reactions    Allegra [Fexofenadine] Nausea and Vomiting    Cats Other (See Comments)     Sneezing, runny nose    Codeine Sulfate Nausea and Vomiting and GI Disturbance    Crestor [Rosuvastatin] Dizziness        10 pt ROS neg except as noted in HPI    Physical Exam:  I performed all aspects of the physical examination via Telemedicine associated with two way audio and video communication.    Vital Signs: Blood pressure 100/80, pulse (!) 129, temperature 98.2  F (36.8  C), temperature source Tympanic, resp. rate 22, height 1.473 m (4' 10\"), weight 83.9 kg (185 lb), SpO2 96%.    Physical Exam    Gen:  Well-developed, well-nourished, in no acute distress, lying semi-supine in her hospital bed  HEENT:  NC/AT,hearing intact to voice  Resp:  No accessory muscle use,  bilaterally fine crackles are heard (per nursing)  Card:   irregularly irregular  Abd:  Soft per RN exam, no TTP, non-distended, normoactive bowel sounds are present  Ext:   bilateral lower extremities are noted with 2+ pitting edema to the knees  Skin: Normal, no rashes or skin breakdown noted.   Psych:   not anxious, not agitated    DATA:   I&O: No intake/output data recorded.       Clinically Significant Risk Factors Present on Admission           Labs:  I have personally reviewed the following studies:  Recent Labs   Lab 06/25/24  2212   POTASSIUM 4.8   CHLORIDE 95*   CO2 24   BUN 35.4*   ALBUMIN 3.9   ALKPHOS 192*   AST 26   ALT 22   LIPASE 29   INR 3.77*      Recent Labs   Lab 06/25/24  2212   WBC 10.6   HGB 12.4   HCT 39.5            Imaging: ER imaging reviewed        Misc  DVT prophylaxis:  Coumadin  Code Status: Full code   Cardiac Monitoring:  yes active telemetry  Martin:  none  Lines:  none  Diet:  cardiac      ASSESSMENT/PLAN:   " 73-year-old female with progressive shortness of breath and lower leg peripheral edema,  decompensated congestive heart failure with atrial fib/ flutter      This patient's current admission to an acute care setting is essential for the treatment of  decompensated congestive heart failure and atrial fib/flutter. The patient's recovery is dependent on the following treatments of  IV diuresis, IV diltiazem, and correcting any electrolyte imbalances to stabilize this condition. The patient is at risk of developing further complications of  respiratory failure if not treated in an acute care setting. I expect the patient's hospital stay to require 2 - 4 days    Our patient will be admitted under the hospitalist services and further management to be based on patient's clinical progress.       # ACTIVE PROBLEM LIST:     #COPD:  supplemental oxygen as needed, DuoNebs as needed      # Acute heart failure with preserved ejection fraction: heart failure noted on problem list, last echo with EF >50%, and receiving IV diuretics,  consider starting Entresto      # Hypertension:  continue with metoprolol    #  Generalized anxiety disorder/schizophrenia: Continue with buspirone    As the provider for the telehealth service, I attest that I introduced myself to the patient and provided my credentials. Based on review of the patient s chart and/or a discussion with members of the patient s treatment team, I determined that telemedicine via a real-time, two-way, interactive audio and video platform is an appropriate means of providing this service. The patient and I mutually agreed that this visit is appropriate for telemedicine as well.    The encounter was approximately 15 minutes. The nurse was present for the duration of the encounter.    Chart reviewed,labs and available imaging reviewed,consultant notes reviewed. Previous available medical records have been reviewed  Care plan discussed with care team    I have seen and  examined the patient today. Documentation for this visit was completed using a template. Everything documented was personally performed today with the necessary additions, deletions and changes made as appropriate with the diagnoses being listed in no particular order of clinical relevance.    Mere Beach MD, FACP  Securely message with the Vocera Web Console (learn more here)  Text page via [facility] Paging/Directory

## 2024-06-26 NOTE — PROGRESS NOTES
"Pt being transferred to [facility] via ground EMS. 2 units of FFP blood products given to be infused during transport. Unit #1   started just before departure.   Pt son Montrell given update from provider earlier this morning. Belongings sent with transport.     BP (!) 83/56   Pulse 95   Temp 98.2  F (36.8  C) (Temporal)   Resp 14   Ht 1.473 m (4' 10\")   Wt 82.3 kg (181 lb 8.2 oz)   SpO2 96%   BMI 37.94 kg/m      Angus Marcelo RN on 6/26/2024 at 1:06 PM    "

## 2024-06-27 NOTE — TELEPHONE ENCOUNTER
Patient transferred to higher level of care. No TCM call required per policy.   Nathaly Crawford RN on 6/27/2024 at 8:50 AM

## 2024-10-08 NOTE — TELEPHONE ENCOUNTER
----- Message from Nicolette Huffman sent at 10/4/2024  8:59 AM CDT -----  Can we set patient up for hospital follow up?   Routing to care coordination to assist also. Patient was hospitalized recently at  and needs cardiology follow up. Also has colon cancer and has not had follow up with surgery to discuss options. Very important we try to connect with her.  
10/11/24 at 830, 9 or 300 pm. Hold slots are okay to use.   
Attempt # 1  Outcome: No Answer/No Voicemail/Busy   Comment: no VM - called to schedule pt with PCP for hospital follow up.     
Attempt # 2  Outcome: Not Available   Comment: Unable to leave a message for patient to call to schedule hospital follow-up with PCP.   
Attempt # 3  Outcome: Not Available   Comment: I was unable to reach the patient, so I called the son as there was a consent on file.  He states she is at [facility] in [place] and he will discuss with her tonight as she has some other appts and will call to get her scheduled.   
No abnormalities

## 2024-10-11 PROBLEM — I27.20 PULMONARY HYPERTENSION (H): Status: ACTIVE | Noted: 2024-01-01

## 2024-10-11 PROBLEM — I34.0 NONRHEUMATIC MITRAL VALVE REGURGITATION: Status: ACTIVE | Noted: 2024-01-01

## 2024-10-11 PROBLEM — Z16.12 ESBL (EXTENDED SPECTRUM BETA-LACTAMASE) PRODUCING BACTERIA INFECTION: Chronic | Status: ACTIVE | Noted: 2024-01-01

## 2024-10-11 PROBLEM — C18.7 MALIGNANT NEOPLASM OF SIGMOID COLON (H): Status: ACTIVE | Noted: 2024-01-01

## 2024-10-11 PROBLEM — R60.1 ANASARCA: Status: ACTIVE | Noted: 2024-01-01

## 2024-10-11 PROBLEM — A49.9 ESBL (EXTENDED SPECTRUM BETA-LACTAMASE) PRODUCING BACTERIA INFECTION: Chronic | Status: ACTIVE | Noted: 2024-01-01

## 2024-10-11 PROBLEM — E87.20 LACTIC ACIDOSIS: Status: ACTIVE | Noted: 2024-01-01

## 2024-10-15 NOTE — TELEPHONE ENCOUNTER
Melatonin      Last Written Prescription Date:  Historical  Last Fill Quantity: -,   # refills: -  Last Office Visit: 3/4/24  Future Office visit:       Routing refill request to provider for review/approval because:  Medication is reported/historical    Protonix      Last Written Prescription Date:  Historical  Last Fill Quantity: -,   # refills: -  Last Office Visit: 3/4/24  Future Office visit:       Routing refill request to provider for review/approval because:  Medication is reported/historical    Aspirin      Last Written Prescription Date:  Historical  Last Fill Quantity: -,   # refills: -  Last Office Visit: 3/4/24  Future Office visit:       Routing refill request to provider for review/approval because:  Medication is reported/historical    Slowmag      Last Written Prescription Date:  Historical  Last Fill Quantity: -,   # refills: -  Last Office Visit: 3/4/24  Future Office visit:       Routing refill request to provider for review/approval because:  Medication is reported/historical    Senna      Last Written Prescription Date:  Historical  Last Fill Quantity: -,   # refills: -  Last Office Visit: 3/4/24  Future Office visit:       Routing refill request to provider for review/approval because:  Medication is reported/historical    KLOR      Last Written Prescription Date:  Historical  Last Fill Quantity: -,   # refills: -  Last Office Visit: 3/4/24  Future Office visit:       Routing refill request to provider for review/approval because:  Medication is reported/historical    Buspar      Last Written Prescription Date:  Historical  Last Fill Quantity: -,   # refills: -  Last Office Visit: 3/4/24  Future Office visit:       Routing refill request to provider for review/approval because:  Medication is reported/historical  Requip      Last Written Prescription Date:  Historical  Last Fill Quantity: -,   # refills: -  Last Office Visit: 3/4/24  Future Office visit:       Routing refill request to  provider for review/approval because:  Medication is reported/historical

## 2024-10-16 NOTE — TELEPHONE ENCOUNTER
PPI Protocol Dicbfn37/15/2024 10:50 AM   Protocol Details Medication indicated for associated diagnosis          Potassium Supplements Protocol Mntodw06/15/2024 10:50 AM   Protocol Details Normal serum potassium in past 12 months          Laxatives Protocol Vzyojo34/15/2024 10:50 AM   Protocol Details Recent (12 mo) or future (90 days) visit within the authorizing provider's specialty          Atypical Antidepressants Protocol Leteym35/15/2024 10:50 AM   Protocol Details Medication indicated for associated diagnosis          Antiparkinson's Agents Protocol Cezblf98/15/2024 10:50 AM   Protocol Details Medication indicated for associated diagnosis

## 2024-10-18 NOTE — PROGRESS NOTES
ANTICOAGULATION  MANAGEMENT    Heather Rosas is being discharged from the [facility] ([facility]).    Reason for discharge:  warfarin was discontinued during her hospital stay at [facility] with a discharged date of 9/24/24    Anticoagulation episode resolved    If patient needs warfarin management in the future, please send a new referral    Naya Barajas RN

## 2024-10-19 PROBLEM — K52.9 GASTROENTERITIS: Status: ACTIVE | Noted: 2024-01-01

## 2024-10-19 NOTE — ED PROVIDER NOTES
History     Chief Complaint   Patient presents with    Nausea, Vomiting, & Diarrhea     HPI  Heather Rosas is a 73 year old female who is here after a few episodes of emesis at her nursing home.  Patient denies pain.  Had some tuna type sandwich and then felt ill after that.  Also states she has pain in her legs and arms but that is more or less chronic.  States she has some difficulty breathing however does not have a cough or wheezing.    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  A delay in compatible RBCs may occur.    Allegra [Fexofenadine] Nausea and Vomiting    Cats Other (See Comments)     Sneezing, runny nose    Codeine Sulfate Nausea and Vomiting and GI Disturbance    Crestor [Rosuvastatin] Dizziness       Problem List:    Patient Active Problem List    Diagnosis Date Noted    Gastroenteritis 10/19/2024     Priority: Medium    ESBL (extended spectrum beta-lactamase) producing bacteria infection 09/03/2024     Priority: Medium     Formatting of this note might be different from the original.   Date and Source of first known ESBL: 8/30/2024 - URINE      Contact Precautions are indicated for hospitalized patients. Contact Infection Prevention  for your facility regarding criteria for removal from isolation.      Malignant neoplasm of sigmoid colon (H) 08/21/2024     Priority: Medium    Acute respiratory acidosis (H) 06/26/2024     Priority: Medium    Heart failure with preserved ejection fraction, NYHA class I (H) 06/26/2024     Priority: Medium    Lactic acidosis 06/26/2024     Priority: Medium    Pulmonary hypertension (H) 06/26/2024     Priority: Medium    Anasarca 06/26/2024     Priority: Medium    Nonrheumatic mitral valve regurgitation 06/26/2024     Priority: Medium    Hypomagnesemia 06/15/2024     Priority: Medium    Abnormal LFTs 06/15/2024      Priority: Medium    Atrial fibrillation, unspecified type (H) 06/15/2024     Priority: Medium    Acute on chronic diastolic heart failure (H) 06/15/2024     Priority: Medium    SOB (shortness of breath) 06/11/2024     Priority: Medium    Acute kidney injury (H) 06/11/2024     Priority: Medium    Subtherapeutic international normalized ratio (INR) 06/11/2024     Priority: Medium    Hypotension 06/11/2024     Priority: Medium    Peripheral edema 05/24/2024     Priority: Medium    Weakness 05/18/2024     Priority: Medium    Severe tricuspid regurgitation 03/04/2024     Priority: Medium    Hypokalemia 03/04/2024     Priority: Medium    Chronic diastolic congestive heart failure (H) 03/04/2024     Priority: Medium    Lesion of colon 03/04/2024     Priority: Medium    Lesion of bladder 03/04/2024     Priority: Medium    Generalized edema due to fluid overload 02/05/2024     Priority: Medium    Pulmonary nodules 08/03/2023     Priority: Medium     5/2023 - 8mm      Thyroid nodule 08/03/2023     Priority: Medium     1cm, noted in 2019      Tobacco dependence 08/03/2023     Priority: Medium    Aortic valve sclerosis (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 Oconnell.       Status post chemotherapy 09/25/2020     Priority: Medium     Added automatically from request for surgery [id number]      Chronic atrial fibrillation (H) 11/26/2018     Priority: Medium    Hyponatremia 10/30/2018     Priority: Medium    Moderate mitral regurgitation 10/30/2018     Priority: Medium    Adenocarcinoma of the endometrium/uterus (H) 10/09/2018     Priority: Medium     Discovered on 02/18 biopsy. S/p resection and chemo. Pt lost to follow up.   High-grade  endometrial adenocarcinoma of the uterus with staging consistent with pathologic T1b N0 I plus or stage 1B with isolated tumor cells involving one of the periaortic lymph nodes.       Chronic right-sided congestive heart failure (H) 09/20/2018     Priority: Medium    S/P CABG x 4 09/10/2018     Priority: Medium    Benign essential hypertension      Priority: Medium    ANNA (generalized anxiety disorder) 01/01/2011     Priority: Medium    Gastroesophageal reflux disease without esophagitis 01/01/2011     Priority: Medium    Schizophrenia (H) 01/01/2011     Priority: Medium    Seasonal allergic rhinitis 01/01/2011     Priority: Medium    Fibromyalgia 06/14/2004     Priority: Medium    Dyslipidemia 11/26/2003     Priority: Medium        Past Medical History:    Past Medical History:   Diagnosis Date    Acute diastolic congestive heart failure (H) 10/12/2021    Acute exacerbation of CHF (congestive heart failure) (H) 10/11/2021    Acute hypoxic respiratory failure (H) 04/16/2024    Acute kidney failure, unspecified (H)     Acute on chronic congestive heart failure, unspecified heart failure type (H) 09/21/2023    Acute on chronic diastolic congestive heart failure (H)     Allergic rhinitis 01/01/2011    Anxiety 01/01/2011    Anxiety state, unspecified     Atrial fibrillation with RVR (H) 07/07/2023    Atrial flutter with rapid ventricular response (H) 10/12/2021    COPD exacerbation (H) 04/16/2024    CVA (cerebral vascular accident) (H) 05/14/2018    Dyslipidemia 11/26/2003    fibromyalgia 06/14/2004    GERD, Esophageal reflux 01/01/2011    History of non-ST elevation myocardial infarction (NSTEMI) 09/06/2018    HTN (hypertension)     Major depression, recurrent (H) 01/01/2011    Major depressive disorder, recurrent episode, moderate (H) 01/01/2011    Metrorrhagia 02/18/2018    Non compliance with medical treatment 07/07/2023    Nonorganic sleep disorder, unspecified     Obesity 01/01/2011    Obesity (H) 01/01/2011     Rapid atrial fibrillation (H)     Schizophrenia (H) 01/01/2011    Toxic shock syndrome (H) 2006       Past Surgical History:    Past Surgical History:   Procedure Laterality Date    ANGIOGRAM  02/2005    Normal     BIOPSY BREAST  1984    LT, normal    BYPASS GRAFT ARTERY CORONARY  09/10/2018    CARPAL TUNNEL RELEASE RT/LT  2005    RT, carpal tunnel    COLONOSCOPY  2011    Repeat in ten years     COLONOSCOPY  1996    COLONOSCOPY  2004    DILATION AND CURETTAGE, HYSTEROSCOPY, ABLATE ENDOMETRIUM, COMBINED N/A 2/19/2018    Procedure: COMBINED DILATION AND CURETTAGE, HYSTEROSCOPY, ABLATE ENDOMETRIUM;  HYSTEROSCOPY DILATION AND CURETTAGE, ENDOMETRIAL ABLATION;  Surgeon: Jose Nettles MD;  Location: [facility]    HYSTERECTOMY TOTAL ABD, NICK SALPINGO-OOPHORECTOMY, NODE DISSECTION, TUMOR DEBULKING, COMBINED  10/30/2018    INSERT PORT VASCULAR ACCESS N/A 12/11/2018    Procedure: PORT-A-CATH PLACEMENT;  Surgeon: Rafi Trinidad MD;  Location: [facility] OR    placement of central line  2005    REMOVE PORT VASCULAR ACCESS N/A 10/6/2020    Procedure: port a cath removal;  Surgeon: Rafi Trinidad MD;  Location: [facility] OR       Family History:    Family History   Problem Relation Age of Onset    Gastrointestinal Disease Mother         GERD    Cancer Mother         pancreatic ca (cause of death) /liver ca    C.A.D. Father 45        (cause of death)     Other - See Comments Father         rheumatic fever     Allergies Father     Cancer Sister 56        Esophageal to bone cancer    Neurologic Disorder Brother         neuropathy    Cancer Brother 58        Tonsilular cancer/ lymph node in neck    Allergies Brother     Diabetes Paternal Grandmother         type 2    Breast Cancer Maternal Aunt     Breast Cancer Maternal Aunt     Depression Maternal Aunt     Depression Maternal Uncle     Colon Cancer Paternal Aunt         (cause of death)     Breast Cancer Cousin     Breast Cancer Cousin     Breast Cancer Cousin     Crohn's Disease Other     No  "Known Problems Son         2 sons       Social History:  Marital Status:   [4]  Social History     Tobacco Use    Smoking status: Every Day     Current packs/day: 1.00     Average packs/day: 1 pack/day for 30.0 years (30.0 ttl pk-yrs)     Types: Cigarettes     Passive exposure: Never    Smokeless tobacco: Never    Tobacco comments:     pt declined, stated she would use, \"the patch\". Patient has not had a cigarette in 1 week because she was in the hospital   Vaping Use    Vaping status: Never Used   Substance Use Topics    Alcohol use: No     Comment: rare    Drug use: No        Medications:    acetaminophen (TYLENOL) 325 MG tablet  albuterol (PROAIR HFA/PROVENTIL HFA/VENTOLIN HFA) 108 (90 Base) MCG/ACT inhaler  aspirin (ASA) 81 MG chewable tablet  atorvastatin (LIPITOR) 40 MG tablet  bisacodyl (DULCOLAX) 10 MG suppository  busPIRone (BUSPAR) 10 MG tablet  fluticasone-salmeterol (ADVAIR) 250-50 MCG/ACT inhaler  Magnesium Cl-Calcium Carbonate (SLOWMAG MG MUSCLE/HEART) 71.5-119 MG TBEC  melatonin 3 MG tablet  metoprolol tartrate (LOPRESSOR) 25 MG tablet  pantoprazole (PROTONIX) 40 MG EC tablet  potassium chloride jayashree ER (KLOR-CON M20) 20 MEQ CR tablet  rOPINIRole (REQUIP) 0.25 MG tablet  senna-docusate (SENNA PLUS) 8.6-50 MG tablet  spironolactone (ALDACTONE) 25 MG tablet  Torsemide 60 MG TABS  amiodarone (PACERONE) 200 MG tablet  ipratropium - albuterol 0.5 mg/2.5 mg/3 mL (DUONEB) 0.5-2.5 (3) MG/3ML neb solution          Review of Systems   Constitutional:  Negative for chills and fever.   Respiratory:  Positive for shortness of breath. Negative for cough.    All other systems reviewed and are negative.      Physical Exam   BP: (!) 120/107  Pulse: 104  Temp: 96.9  F (36.1  C)  Resp: 20  Height: 152.4 cm (5')  Weight: 70 kg (154 lb 5.2 oz)  SpO2: 100 %      Physical Exam  Constitutional:       General: She is not in acute distress.     Appearance: She is not diaphoretic.   HENT:      Head: Normocephalic and " atraumatic.      Right Ear: External ear normal.      Left Ear: External ear normal.      Nose: No congestion or rhinorrhea.      Mouth/Throat:      Pharynx: Oropharynx is clear. No oropharyngeal exudate.   Eyes:      General: No scleral icterus.     Pupils: Pupils are equal, round, and reactive to light.   Cardiovascular:      Rate and Rhythm: Regular rhythm. Tachycardia present.      Heart sounds: Normal heart sounds.   Pulmonary:      Effort: Pulmonary effort is normal. No respiratory distress.      Breath sounds: Normal breath sounds. No stridor. No wheezing or rhonchi.   Abdominal:      General: Bowel sounds are normal.      Palpations: Abdomen is soft.      Tenderness: There is no abdominal tenderness. There is no right CVA tenderness or left CVA tenderness.   Musculoskeletal:         General: No tenderness.      Cervical back: Normal range of motion and neck supple.      Right lower leg: No edema.      Left lower leg: No edema.   Skin:     General: Skin is warm.      Capillary Refill: Capillary refill takes less than 2 seconds.      Findings: No rash.   Neurological:      Mental Status: Mental status is at baseline.      Cranial Nerves: No cranial nerve deficit.   Psychiatric:         Mood and Affect: Mood normal.         Behavior: Behavior normal.         ED Course     ED Course as of 10/20/24 2054   Sat Oct 19, 2024   0548 Patient's blood pressure continues to slowly decline.  Repeated chemistry after 1 L of saline showed worsening creatinine.    Plain films showed pulmonary edema versus pneumonitis.  Patient is without cough, has excellent saturations on room air even while lying supine.  However, given her extensive cardiac history, I have ordered a BMP as well as delta troponin.    We did talk to her nursing home as patient has been sleeping a lot while crying out in pain, that is actually baseline for her.    CT chest and pelvis Noncon was done to look for any possible infection as patient is unable to  "tell me what hurts other than saying \"all over\".     Procedures             Critical Care time:      IV Antibiotics given and/or elevated Lactate of 10.7 and no sepsis note found - Delete this reminder and enter the sepsis note or '.edcms' before signing chart.>>>         Results for orders placed or performed during the hospital encounter of 10/19/24 (from the past 24 hour(s))   Glucose by meter   Result Value Ref Range    GLUCOSE BY METER POCT <10 (LL) 70 - 99 mg/dL   Glucose by meter   Result Value Ref Range    GLUCOSE BY METER POCT 171 (H) 70 - 99 mg/dL   Lactic acid whole blood   Result Value Ref Range    Lactic Acid 10.7 (HH) 0.7 - 2.0 mmol/L   Procalcitonin   Result Value Ref Range    Procalcitonin 0.18 <0.50 ng/mL   CBC with Platelets & Differential    Narrative    The following orders were created for panel order CBC with Platelets & Differential.  Procedure                               Abnormality         Status                     ---------                               -----------         ------                     CBC with platelets and d...[[id number]]  Abnormal            Final result               Manual Differential[[id number]]          Abnormal            Final result                 Please view results for these tests on the individual orders.   Troponin T, High Sensitivity   Result Value Ref Range    Troponin T, High Sensitivity 44 (H) <=14 ng/L   CBC with platelets and differential   Result Value Ref Range    WBC Count 17.2 (H) 4.0 - 11.0 10e3/uL    RBC Count 2.87 (L) 3.80 - 5.20 10e6/uL    Hemoglobin 8.0 (L) 11.7 - 15.7 g/dL    Hematocrit 28.8 (L) 35.0 - 47.0 %     78 - 100 fL    MCH 27.9 26.5 - 33.0 pg    MCHC 27.8 (L) 31.5 - 36.5 g/dL    RDW 16.7 (H) 10.0 - 15.0 %    Platelet Count 103 (L) 150 - 450 10e3/uL   Extra Tube    Narrative    The following orders were created for panel order Extra Tube.  Procedure                               Abnormality         Status                   "   ---------                               -----------         ------                     Extra Blue Top Tube[[id number]]                              Final result               Extra Red Top Tube[[id number]]                               Final result                 Please view results for these tests on the individual orders.   Extra Blue Top Tube   Result Value Ref Range    Hold Specimen JIC    Extra Red Top Tube   Result Value Ref Range    Hold Specimen JIC    Manual Differential   Result Value Ref Range    % Neutrophils 84 %    % Lymphocytes 13 %    % Monocytes 3 %    % Eosinophils 0 %    % Basophils 0 %    Absolute Neutrophils 14.4 (H) 1.6 - 8.3 10e3/uL    Absolute Lymphocytes 2.2 0.8 - 5.3 10e3/uL    Absolute Monocytes 0.5 0.0 - 1.3 10e3/uL    Absolute Eosinophils 0.0 0.0 - 0.7 10e3/uL    Absolute Basophils 0.0 0.0 - 0.2 10e3/uL    RBC Morphology Confirmed RBC Indices     Platelet Assessment  Automated Count Confirmed. Platelet morphology is normal.     Automated Count Confirmed. Platelet morphology is normal.    Acanthocytes Moderate (A) None Seen    Francisca Cells Moderate (A) None Seen    NRBCs per 100 WBC 2 %    Absolute NRBCs 0.3 10e3/uL   EKG 12-Lead, Tracing Only   Result Value Ref Range    Systolic Blood Pressure  mmHg    Diastolic Blood Pressure  mmHg    Ventricular Rate 67 BPM    Atrial Rate  BPM    KY Interval  ms    QRS Duration 174 ms     ms    QTc 555 ms    P Axis  degrees    R AXIS 115 degrees    T Axis -20 degrees    Interpretation ECG       Wide QRS rhythm with occasional Premature ventricular complexes  Right bundle branch block  Left posterior fascicular block  ** Bifascicular block **  T wave abnormality, consider inferior ischemia  Abnormal ECG  When compared with ECG of 19-Oct-2024 06:36, (unconfirmed)  Wide QRS rhythm has replaced Sinus rhythm  Vent. rate has decreased by  36 bpm  Confirmed by MD Luisito, Nick ([id number]) on 10/20/2024 11:28:05 AM     Glucose by meter   Result Value  Ref Range    GLUCOSE BY METER POCT 184 (H) 70 - 99 mg/dL   XR Chest Port 1 View    Narrative    Procedure:XR CHEST PORT 1 VIEW    Clinical history:Female, 73 years, ett placement    Technique: Single view was obtained.    Comparison: 10/19/2024    Findings: The cardiac silhouette is within normal limits.. The  pulmonary vasculature poorly seen secondary to scattered opacities  throughout the lungs    The lungs demonstrate worsening opacities throughout both lungs.  Endotracheal tube is satisfactorily positioned. Postoperative changes  are again seen within the mediastinum. Bony structures are  unremarkable.      Impression    Impression:   Satisfactory position of the endotracheal tube.     Worsening opacities throughout both lungs.    This report is in agreement with the preliminary report.    STONE COOK MD         SYSTEM ID:  [id number]   Blood gas venous   Result Value Ref Range    pH Venous 6.76 (LL) 7.32 - 7.43    pCO2 Venous 52 (H) 40 - 50 mm Hg    pO2 Venous 38 25 - 47 mm Hg    Bicarbonate Venous 7 (LL) 21 - 28 mmol/L    Base Excess/Deficit Venous -27.2 (L) -3.0 - 3.0 mmol/L    FIO2 100     Oxyhemoglobin Venous 32 (L) 70 - 75 %    O2 Sat, Venous 32.9 (L) 70.0 - 75.0 %    Narrative    In healthy individuals, oxyhemoglobin (O2Hb) and oxygen saturation (SO2) are approximately equal. In the presence of dyshemoglobins, oxyhemoglobin can be considerably lower than oxygen saturation.   Comprehensive metabolic panel   Result Value Ref Range    Sodium 133 (L) 135 - 145 mmol/L    Potassium 5.4 (H) 3.4 - 5.3 mmol/L    Carbon Dioxide (CO2) 8 (LL) 22 - 29 mmol/L    Anion Gap 28 (H) 7 - 15 mmol/L    Urea Nitrogen 46.1 (H) 8.0 - 23.0 mg/dL    Creatinine 2.36 (H) 0.51 - 0.95 mg/dL    GFR Estimate 21 (L) >60 mL/min/1.73m2    Calcium 8.3 (L) 8.8 - 10.4 mg/dL    Chloride 97 (L) 98 - 107 mmol/L    Glucose 175 (H) 70 - 99 mg/dL    Alkaline Phosphatase 236 (H) 40 - 150 U/L    AST 1,127 (HH) 0 - 45 U/L     (HH) 0 - 50  U/L    Protein Total 5.5 (L) 6.4 - 8.3 g/dL    Albumin 3.2 (L) 3.5 - 5.2 g/dL    Bilirubin Total 2.2 (H) <=1.2 mg/dL   Glucose by meter   Result Value Ref Range    GLUCOSE BY METER POCT 142 (H) 70 - 99 mg/dL   Troponin T, High Sensitivity   Result Value Ref Range    Troponin T, High Sensitivity 63 (H) <=14 ng/L   Extra Tube    Narrative    The following orders were created for panel order Extra Tube.  Procedure                               Abnormality         Status                     ---------                               -----------         ------                     Extra Purple Top Tube[[id number]]                            Final result                 Please view results for these tests on the individual orders.   Extra Purple Top Tube   Result Value Ref Range    Hold Specimen [facility]    XR Abdomen 1 View    Narrative    XR ABDOMEN 1 VIEW   10/20/2024 3:04 AM    History:Female,age  73 years, OG tube placement    Comparison: No relevant prior imaging.    FINDINGS: Single view is submitted.   Nasogastric/orogastric tube extends beneath the left hemidiaphragm  appears be coiled within the stomach. Bowel gas pattern is  unremarkable. Nodular reticular opacities seen at the lung bases,  likely unchanged compared to recent CT scan. .      Impression    IMPRESSION:  Satisfactory position of the enteric catheter.     This report is in agreement with the preliminary report.    STONE COOK MD         SYSTEM ID:  [id number]   Glucose by meter   Result Value Ref Range    GLUCOSE BY METER POCT 128 (H) 70 - 99 mg/dL       Medications   sodium bicarbonate 8.4 % injection 50 mEq (has no administration in time range)   ondansetron (ZOFRAN ODT) ODT tab 4 mg (4 mg Oral $Given 10/19/24 0311)   sodium chloride 0.9% BOLUS 1,000 mL (0 mLs Intravenous Stopped 10/19/24 2467)   HYDROmorphone (PF) (DILAUDID) injection 0.3 mg (0.3 mg Intravenous $Given 10/19/24 4746)   sodium chloride 0.9% BOLUS 1,000 mL (1,000 mLs Intravenous $New  Bag 10/20/24 0205)   albumin human 25 % injection 25 g (25 g Intravenous $New Bag 10/20/24 0245)   calcium gluconate 1 g in 50 mL in sodium chloride intermittent infusion (1 g Intravenous $Given 10/20/24 0218)   magnesium sulfate 2 g in 50 mL sterile water intermittent infusion (2 g Intravenous $New Bag 10/20/24 0310)   sodium bicarbonate 8.4 % injection 50 mEq (50 mEq Intravenous $Given 10/20/24 0238)   atropine injection 1 mg (1 mg Intravenous $Given 10/20/24 0236)   sodium bicarbonate 8.4 % injection 50 mEq (50 mEq Intravenous $Given 10/20/24 0240)   hydrocortisone sodium succinate PF (solu-CORTEF) injection 100 mg (100 mg Intravenous $Given 10/20/24 0304)       Assessments & Plan (with Medical Decision Making)     I have reviewed the nursing notes.    I have reviewed the findings, diagnosis, plan and need for follow up with the patient.          Medical Decision Making  The patient's presentation was of low complexity (an acute and uncomplicated illness or injury).    The patient's evaluation involved:  review of external note(s) from 3+ sources ([facility] discharge summaries)  review of 3+ test result(s) ordered prior to this encounter (previous creatinies)  ordering and/or review of 3+ test(s) in this encounter (see separate area of note for details)    The patient's management necessitated moderate risk (prescription drug management including medications given in the ED).    73-year-old female here with several episodes of vomiting.  Some loose stools.  Has not had any episodes of emesis while here nor any episodes of diarrhea.  States she had difficulty breathing when I entered the room but she was laying flat, when I sat her up she had no issues breathing.  Her pulse ox centimeter shows sats in the upper 90s on room air.  Patient has no wheezing or rales on exam.  I see no pitting edema.  Her plain film does not show any obvious pulmonary edema.  Patient's abdominal exam was without any tenderness.  I obtain  basic labs look for any consequences of vomiting or poor hydration, creatinine is decreased compared to her baseline.  My plan is to administer IV fluids, reassess kidney function.  If it is improving and patient can tolerate p.o., is fine to be discharged.  Patient has no abdominal tenderness and is vitally stable so I do not feel she needs a CT scan.    Discharge Medication List as of 10/20/2024  3:23 AM          Final diagnoses:   Acute kidney injury (H)   Gastroenteritis       10/19/2024   [facility]       Carloz Ibarra MD  10/19/24 0717       Carloz Ibarra MD  10/20/24 8086

## 2024-10-19 NOTE — ED TRIAGE NOTES
Pt comes from [facility] where pt is rehabbing. Pt was recently hospitalized at [facility] COPD     Pt reports she started vomiting after dinner last night and vomited 1 more time through the course of the night

## 2024-10-19 NOTE — PROGRESS NOTES
[facility] Inpatient Admission Note:    Patient admitted to 3232/3232-1 at approximately 0845 via cart accompanied by other:none from emergency room . Report received from Frida in SBAR format at 0830 via telephone. Patient transferred to bed via self.. Patient is alert and oriented X 3, denies pain; rates at 0 on 0-10 scale.  Patient oriented to room, unit, hourly rounding, and plan of care. Explained admission packet and patient handbook with patient bill of rights brochure. Will continue to monitor and document as needed.     Inpatient Nursing criteria listed below was met:    Health care directives status obtained and documented: Yes    Patient identifies a surrogate decision maker: Yes If yes, who:Valentin Elizabeth Contact Information:[phone]     If initial lactic acid greater than 2.0, repeat lactic acid drawn within one hour of arrival to unit: NA.    Clergy visit ordered if patient requests: N/A    Skin issues/needs documented: N/A    Isolation Patient: yes Education given, correct sign in place and documentation row added to PCS:  Yes    Fall Prevention Yes: Care plan updated, education given and documented, sticker and magnet in place: Yes    Care Plan initiated: Yes    Education Documented (including assessment): Yes    Patient has discharge needs : No

## 2024-10-19 NOTE — PLAN OF CARE
Goal Outcome Evaluation:      Plan of Care Reviewed With: patient    Overall Patient Progress: improvingOverall Patient Progress: improving    Significant events last 24 hours: admitted to floor from ED with gastroenteritis and MILDRED; PTA med list verified with [facility] staff via multiple phone calls and faxed med list-forwarded to MD; urine sample sent to lab-Dr. Martinez updated by charge RN regarding results, no antibiotics ordered thus far     Neuro: oriented x4; anxious; intermittently forgetful; very fearful and anxious at times when awake; yells out for help despite educating to use call light; drowsy off and on throughout the day   CV: BP and HR WNL  Resp: endorses SOB even at rest; GONZALEZ, but does not appear SOB at rest; lungs dim; sats stable on RA, but patient placed on 2L NC per patient request with MD approval for anxiety/SOB; MD notified regarding SOB/breathing concerns; requested PRN nebs-MD ordered and RT contacted and neb given x1 with relief; no cough; significant orthopnea   GI: reported constipation on admit; stool softener given; BM x1 this shift; intermittent nausea-zofran given x1 with resolution of nausea; no emesis; poor appetite   : U/A collected; culture pending; Dr. Martinez updated regarding urinalysis results-no new orders at this time per MD; denies dysuria; urine concentrated  Skin: WNL; no notable wounds/skin issues  Lines/Drains: PIV to left write infusing NS@75mL/hr  Mobility: up in room with SBA, FWW and GB; fearful of falling; generalized weakness; turns self in bed     Safety:   activity supervised, clutter free environment maintained, increased rounding and observation, nonskid shoes/slippers when out of bed, patient and family education, room near nurse's station, room organization consistent, safety round/check completed, supervised activity; call light within reach; hourly rounding throughout the shift.      Pain: intermittent reports of abdominal pain; yelling out in pain;  MD updated and requested pain and anxiety medication; MD ordered PRN morphine-offered to patient, but pain had improved; heating pads to abdomen; no further complaints of abdominal pain     Individualized Care Needs: anxious; intermittent confusion; talks to herself frequently; repeated questions    Expected Discharge Disposition: back to [facility] when cleared for discharge    Report Given: LORENA Moreno      /57 (BP Location: Left arm, Patient Position: Chair, Cuff Size: Adult Regular)   Pulse 101   Temp 96.9  F (36.1  C) (Tympanic)   Resp 20   Ht 1.524 m (5')   Wt 70 kg (154 lb 5.2 oz)   SpO2 100%   BMI 30.14 kg/m

## 2024-10-19 NOTE — H&P
[facility]    History and Physical - Hospitalist Service       Date of Admission:  10/19/2024    Assessment & Plan      Heather Rosas is a 73 year old female admitted on 10/19/2024. She presented with acute kidney injury.  Acute kidney injury  -Likely related to diuresis  -Gentle hydration  -Hold diuretics  -Monitor    Chronic diastolic CHF  -Last echo with EF 60 to 65%  -Elevated troponin and BNP noted  -EKG no change from prior studies  -No chest pain  -Picture supports volume depletion  -Monitor    Paroxysmal atrial fibrillation  -Resume metoprolol and amiodarone once reconciled  -EKG as noted above, no change  -Monitor        Diet: Combination Diet Low Saturated Fat Na <2400mg Diet, No Caffeine Diet    DVT Prophylaxis: Heparin SQ  Martin Catheter: Not present  Lines: None     Cardiac Monitoring: None  Code Status: Full Code      Clinically Significant Risk Factors Present on Admission         # Hyponatremia: Lowest Na = 131 mmol/L in last 2 days, will monitor as appropriate  # Hypochloremia: Lowest Cl = 94 mmol/L in last 2 days, will monitor as appropriate     # Anion Gap Metabolic Acidosis: Highest Anion Gap = 22 mmol/L in last 2 days, will monitor and treat as appropriate    # Drug Induced Platelet Defect: home medication list includes an antiplatelet medication   # Hypertension: Noted on problem list  # Chronic heart failure with preserved ejection fraction: heart failure noted on problem list and last echo with EF >50%   # Anemia: based on hgb <11       # Obesity: Estimated body mass index is 30.14 kg/m  as calculated from the following:    Height as of this encounter: 1.524 m (5').    Weight as of this encounter: 70 kg (154 lb 5.2 oz).        # History of CABG: noted on surgical history       Disposition Plan     Medically Ready for Discharge: Anticipated in 2-4 Days           Johny Martinez MD  Hospitalist Service  [facility]  Securely message with Moka (more info)  Text page  via [facility] Paging/Directory     ______________________________________________________________________    Chief Complaint   Acute kidney injury    History is obtained from the patient    History of Present Illness   Heather Rosas is a 73 year old female who has significant past medical history of CHF on diuretics, atrial fibrillation, anxiety disorder who presents with a 3-day history of weakness and severe shortness of breath.  Patient denies any fever.  No pain.  No nausea or vomiting.  The patient was recently seen in Coumadin clinic and discharged as she no longer takes warfarin.  In the ED, basic workup showed acute kidney injury.  Patient also had tachycardia and BNP was markedly elevated along with troponin.  EKG did not show any signs.  Patient does not have any chest pain.  Initial bolus did not show any improvement and the patient is being admitted for IV hydration.  This is likely related to diuresis.  Patient's last echo showed EF of 60 to 65%.  Still valid.  No obvious signs of further decompensation.  Will cautiously hydrate patient and keep an eye on volume status as patient also notes weight loss.      Past Medical History    Past Medical History:   Diagnosis Date    Acute diastolic congestive heart failure (H) 10/12/2021    Acute exacerbation of CHF (congestive heart failure) (H) 10/11/2021    Acute hypoxic respiratory failure (H) 04/16/2024    Acute kidney failure, unspecified (H)     Acute on chronic congestive heart failure, unspecified heart failure type (H) 09/21/2023    Acute on chronic diastolic congestive heart failure (H)     Allergic rhinitis 01/01/2011    Anxiety 01/01/2011    Anxiety state, unspecified     Anxiety state    Atrial fibrillation with RVR (H) 07/07/2023    Atrial flutter with rapid ventricular response (H) 10/12/2021    COPD exacerbation (H) 04/16/2024    CVA (cerebral vascular accident) (H) 05/14/2018    Dyslipidemia 11/26/2003    fibromyalgia 06/14/2004    GERD,  Esophageal reflux 01/01/2011    History of non-ST elevation myocardial infarction (NSTEMI) 09/06/2018    HTN (hypertension)     Essential hypertension    Major depression, recurrent (H) 01/01/2011    Major depressive disorder, recurrent episode, moderate (H) 01/01/2011    Metrorrhagia 02/18/2018    Non compliance with medical treatment 07/07/2023    Nonorganic sleep disorder, unspecified     Non-org. 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, ARDS, renal failure       Past Surgical History   Past Surgical History:   Procedure Laterality Date    ANGIOGRAM  02/2005    Normal     BIOPSY BREAST  1984    LT, normal    BYPASS GRAFT ARTERY CORONARY  09/10/2018    CARPAL TUNNEL RELEASE RT/LT  2005    RT, carpal tunnel    COLONOSCOPY  2011    Repeat in ten years     COLONOSCOPY  1996    COLONOSCOPY  2004    DILATION AND CURETTAGE, HYSTEROSCOPY, ABLATE ENDOMETRIUM, COMBINED N/A 2/19/2018    Procedure: COMBINED DILATION AND CURETTAGE, HYSTEROSCOPY, ABLATE ENDOMETRIUM;  HYSTEROSCOPY DILATION AND CURETTAGE, ENDOMETRIAL ABLATION;  Surgeon: Jose Nettles MD;  Location: [facility] OR    HYSTERECTOMY TOTAL ABD, NICK SALPINGO-OOPHORECTOMY, NODE DISSECTION, TUMOR DEBULKING, COMBINED  10/30/2018    INSERT PORT VASCULAR ACCESS N/A 12/11/2018    Procedure: PORT-A-CATH PLACEMENT;  Surgeon: Rafi Trinidad MD;  Location: [facility] OR    placement of central line  2005    REMOVE PORT VASCULAR ACCESS N/A 10/6/2020    Procedure: port a cath removal;  Surgeon: Rafi Trinidad MD;  Location: [facility] OR       Prior to Admission Medications   Prior to Admission Medications   Prescriptions Last Dose Informant Patient Reported? Taking?   Magnesium Cl-Calcium Carbonate (SLOWMAG MG MUSCLE/HEART) 71.5-119 MG TBEC   No No   Sig: Take 2 Tablets by mouth two times a day.   Torsemide 60 MG TABS   Yes No   Sig: Take 60 mg by mouth.    acetaminophen (TYLENOL) 325 MG tablet   Yes No   Sig: Take 650 mg by mouth.   albuterol (PROAIR HFA/PROVENTIL HFA/VENTOLIN HFA) 108 (90 Base) MCG/ACT inhaler  Nursing Home No No   Sig: Inhale 2 puffs into the lungs every 6 hours as needed for shortness of breath, wheezing or cough   amiodarone (PACERONE) 200 MG tablet   Yes No   Sig: Take 1 tablet by mouth daily.   aspirin (ASA) 81 MG chewable tablet   No No   Sig: Chew and swallow 1 Tablet one time a day. Take with food.   atorvastatin (LIPITOR) 40 MG tablet   Yes No   Sig: Take 1 tablet by mouth at bedtime.   bisacodyl (DULCOLAX) 10 MG suppository   Yes No   Sig: Place 10 mg rectally.   busPIRone (BUSPAR) 10 MG tablet   No No   Sig: Take 1 Tablet by mouth two times a day.   fluticasone-salmeterol (ADVAIR) 250-50 MCG/ACT inhaler   Yes No   Sig: Inhale 1 puff into the lungs.   ipratropium - albuterol 0.5 mg/2.5 mg/3 mL (DUONEB) 0.5-2.5 (3) MG/3ML neb solution  Nursing Home Yes No   Sig: Take 1 vial by nebulization every 6 hours as needed for shortness of breath, wheezing or cough   melatonin 1 MG TABS tablet  Nursing Home Yes No   Sig: Take 1 mg by mouth nightly as needed for sleep May repeat one time if not falling asleep.   melatonin 3 MG tablet   No No   Sig: Take 1 Tablet by mouth at bedtime.   metoprolol tartrate (LOPRESSOR) 25 MG tablet   Yes No   Sig: Take 1 tablet by mouth 2 times daily.   pantoprazole (PROTONIX) 40 MG EC tablet   No No   Sig: Take 1 Tablet by mouth every evening. Do not crush.   pantoprazole (PROTONIX) injection   No No   Sig: Inject 40 mg into the vein daily (with breakfast)   piperacillin-tazobactam (ZOSYN) 3-0.375 GM vial   No No   Sig: Inject 3.375 g into the vein every 6 hours   potassium chloride jayashree ER (KLOR-CON M20) 20 MEQ CR tablet   No No   Sig: Take 1 Tablet by mouth two times a day with meals. Do not crush.   rOPINIRole (REQUIP) 0.25 MG tablet   No No   Sig: Take 1 Tablet by mouth every evening.   senna-docusate (SENNA PLUS)  "8.6-50 MG tablet   No No   Sig: Take 1 Tablet by mouth one time a day.   spironolactone (ALDACTONE) 25 MG tablet   Yes No   Sig: Take 1 tablet by mouth daily.      Facility-Administered Medications: None        Review of Systems    The 10 point Review of Systems is negative other than noted in the HPI     Social History   I have reviewed this patient's social history and updated it with pertinent information if needed.  Social History     Tobacco Use    Smoking status: Every Day     Current packs/day: 1.00     Average packs/day: 1 pack/day for 30.0 years (30.0 ttl pk-yrs)     Types: Cigarettes     Passive exposure: Never    Smokeless tobacco: Never    Tobacco comments:     pt declined, stated she would use, \"the patch\". Patient has not had a cigarette in 1 week because she was in the hospital   Vaping Use    Vaping status: Never Used   Substance Use Topics    Alcohol use: No     Comment: rare    Drug use: No         Family History   I have reviewed this patient's family history and updated it with pertinent information if needed.  Family History   Problem Relation Age of Onset    Gastrointestinal Disease Mother         GERD    Cancer Mother         pancreatic ca (cause of death) /liver ca    C.A.D. Father 45        (cause of death)     Other - See Comments Father         rheumatic fever     Allergies Father     Cancer Sister 56        Esophageal to bone cancer    Neurologic Disorder Brother         neuropathy    Cancer Brother 58        Tonsilular cancer/ lymph node in neck    Allergies Brother     Diabetes Paternal Grandmother         type 2    Breast Cancer Maternal Aunt     Breast Cancer Maternal Aunt     Depression Maternal Aunt     Depression Maternal Uncle     Colon Cancer Paternal Aunt         (cause of death)     Breast Cancer Cousin     Breast Cancer Cousin     Breast Cancer Cousin     Crohn's Disease Other     No Known Problems Son         2 sons        Physical Exam   Vital Signs: Temp: 97.1  F (36.2  C) " Temp src: Tympanic BP: 113/80 Pulse: 101   Resp: 22 SpO2: 99 % O2 Device: None (Room air)    Weight: 154 lbs 5.15 oz    General: Patient sitting up in the chair not in acute distress  Respiratory: Diminished air entry both lower zones without any added sounds  Cardiovascular: Tachycardia with first and second heart sound  GI: Abdomen is obese, soft with positive bowel sounds  Integumentary: Skin is dry, warm with no evidence of edema    Medical Decision Making       55 MINUTES SPENT BY ME on the date of service doing chart review, history, exam, documentation & further activities per the note.      Data     I have personally reviewed the following data over the past 24 hrs:    9.4  \   10.1 (L)   / 312     133 (L) 97 (L) 46.1 (H) /  96   4.5 16 (L) 2.24 (H) \     ALT: 25 AST: 30 AP: 241 (H) TBILI: 0.6   ALB: 4.0 TOT PROTEIN: 7.3 LIPASE: 12 (L)     Trop: 50 (H) BNP: 17,096 (H)     Procal: 0.09 CRP: N/A Lactic Acid: N/A         Imaging results reviewed over the past 24 hrs:   Recent Results (from the past 24 hour(s))   XR Chest Port 1 View    Narrative    Procedure:XR CHEST PORT 1 VIEW    Clinical history:Female, 73 years, dyspnea    Technique: Single view was obtained.    Comparison: 6/25/2024    Findings: The cardiac silhouette is mildly enlarged. The pulmonary  vasculature appears somewhat distended, indistinct in certain areas    The lungs demonstrate diffuse interstitial thickening. No apparent  pleural effusion. Bony structures are unremarkable.      Impression    Impression:   Findings suggesting CHF. No apparent pleural effusion. Differential  diagnosis includes diffuse interstitial pneumonitis. No evidence of  focal pneumonia.    This report is in agreement with the preliminary report.    STONE COOK MD         SYSTEM ID:  [id number]

## 2024-10-19 NOTE — ED NOTES
Attempted to phone [facility] to update them on patient admission. Unable to reach anyone after several attempts.

## 2024-10-20 NOTE — PLAN OF CARE
This writer went into re-assess pain and get set of VS at 1243. Pt temp was checked first. Got 94.2, obtained warm blankets and shut fan off. Recheck temp was 90.1 Charge nurse was notified of pt's temp. Attempted get a full set of vitals, while charge nurse obtained a bear hugger. Monitor was unable to obtain BP. Manual attempted by this writer. Unable to auscultated. Charge nurse attempted with no success. Radial and dorsal pulse were weak and thready, with start of clammy skin Noc hospitalist updated via secure chat. No new orders at that time. ICU nurse came into assess pt and was able to get manual BP. Checked BG after that and obtained a low reading. Noc hospitalist messaged via secure chat with no new orders, as there was no orders for hypoglycemia. ICU nurse attempted to call hospitalist as no new orders were obtained at that time. ICU nurse received order for Dextrose and this writer went to update hospitalist on events. While updating hospitalist RRT was called over head and then code blue was called for pt at 0020.

## 2024-10-20 NOTE — PROGRESS NOTES
RT responded to rapid response which turned to code blue. CPR started. Pt ventilated via BVM, oral airway placed. MD intubated pt with bougie, ETT 7.5 and 23 at lips. Colorimetric turn yellow. Suctioned large amount of blood. Pt transferred from med/surg unit to ICU. Pt placed on ventilator with initial settings of Vt 370, RR 16, FiO2 100% and PEEP 5.  CXR confirmed ETT placement. Pt settings changed per MD order due to VBG Vt 450 RR 22 PEEP 10.

## 2024-10-20 NOTE — PLAN OF CARE
"Assumed care of patient in the ICU after Code Blue, post ROSC.     Pt intubated, ETT 23cm @ lip, OG 60cm @ lip to low intermittent suction. Vent settings as charted by RT. Versed and Precedex used for sedation, see MAR. Patient bradycardic and hypotensive, Dopamine and Levophed infusing to maintain MAP >65, see MAR. Sodium Bicarb pushes x5, then also infusing at 75 mL/hr. 1g Calcium Gluconate given once. Solu-cortef given once. 2g Mag given once. Martin catheter placed, small amount of cloudy yellow urine. Hypothermic mid 80's to low 90s for temp, gilberto hugger on, warm fluids infused.     Care handed off to EMS at approx. 0400.     Patient was transferred to [facility]  at approximately 0400 AM via [facility].   Patient status post-ROSC. Patient's most recent vitals: Blood pressure 107/60, pulse 85, temperature (!) 90.9  F (32.7  C), resp. rate 21, height 1.524 m (5'), weight 70 kg (154 lb 5.2 oz), SpO2 100%.  Pt transferred with intact IV.Yes  Intact IV site at Left wrist and AC. Central line, IO.   Pt transferred with oxygen. .Yes   O@2via ETT at O2 100%  Other LDA\"s: Martin catheter  Belongings were sent with Patient  AVS and pertinent records sent with patient. .Yes   Report given to LORENA Crowder in SBAR format.                          "

## 2024-10-20 NOTE — ED PROVIDER NOTES
I responded to a cardiac arrest.  My role in the resuscitation was primarily intubation and after ROSC I placed central line at request of hospitalist. Along with Dr. Gomez, I did several verbal orders for medications during the resuscitation and post-ROSC.    Intubation note  Using a MAC 4 I visualized the epiglottis, was unable to see cords, directed the bougie where I felt rings, bougie stopped so I felt safe that I was in the trachea.  I then placed a 7.5 endotracheal tube at 23.  Placement was confirmed with color change.  No medications were administered as patient was in cardiac arrest.  Endotracheal tube is at an adequate position by x-ray.  After intubation, there was some blood from endotracheal tube that was easily suction with suction device was available.    Central line procedure note  7.5 central line was placed at the right IJ, both subclavian vessels showed inconclusive color flow and I was unable to verify subclavian vein versus artery.  I adhered to sterile precautions using chlorhexidine and sterile gloves Face mask.  Patient was flat.  Blood went through all ports and placement was confirmed by x-ray.    The only complication for both procedures was blood during the intubation however it did not affect oxygenation.             Carloz Ibarra MD  10/20/24 [id number]

## 2024-10-20 NOTE — PLAN OF CARE
"Approximately 0045 primary RN called this writer to room for low temps and inability to get a B/P. Patient dusky, skin cool/clammy to the touch. Tympanic temps low 90's, primary RN and this writer unable to obtain B/P even with manual, barehugger obtained and applied.   This writer messaged Tele-hospitalist the following   0007:about patient condition, temp 94.2, bare hugger on, pulses weak, thready, cap refill > 3 seconds.   0008: RR 26 manual B/P obtain by ICU RN 82/36  0012: Blood glucose check read \"LO\" Grabbing D50    ICU RN stepped out to call Telehospitalist as no orders for low blood glucose and no response to messages. While waiting for orders for D50 primary RN and Charge nurse/this writer were able to rouse patient who was alert and responding, able to drink and swallow some orange juice with sugar in the meantime while awaiting orders. . Telehospitalist requested to speak to primary RN so ICU RN switched out with ICU RN. ICU RN obtained TORB for D50 which was administered by ICU RN. While primary RN was out of room on phone with Telehospitalist patient became more lethargic and rapid response was called, as the Rapid Response was being called overhead patient became unresponsive, turning blue, no pulse, code blue button pushed and CPR initiated immediately at 0020. Refer to Rescucitation Record in patient chart for Code Blue. ROSC @ 0037.     Verbal orders as follows in Emergent situation:  Levophed ordered 0037, started at 0040 per ICU RN. 0042 verbal order to pause Levophed. 0042 18 G IV placed in L AC by ER RN, labs drawn after wasting 20 ml blood @ 0043. Inline sx in use. 0045 Propofol ordered, started at 0049 per ICU RN. Moved patient to ICU room 3126 approximately 0053 Patient placed on Vent by RT at 0056. 0059 B/P 98/62 MAP 81 HR 42. VORB for Atropine which was given by ICU RN @ 0101. 0100 Levophed restarted at 0100 @0.03 mcg/kg/min per ICU RN. Zosyn ordered 0102, House Supervisor  notified and " "obtained, given per MAR. 0105 Tele ICU consulted. 0112 Per ICU RN Propofol was increased to \"20\" at this time.     See charting/flowsheets/MAR for more detailed report.          "

## 2024-10-20 NOTE — PROGRESS NOTES
TELE ICU    Called from the [facility] ICU about a new patient.  Used the TELE camera to view the patient and communicate with the ICU bedside team members in the ICU in [facility].  The patient is a 73 year old woman admitted to the ICU after ac cardiac arrest. The patient had an IO access needle placed during the CODE and the patient was intubated and provided vent support.  The patient has a new MILDRED, is now hypotensive, bradycardic, has an elevated WBC, an elevated Troponin level, and an elevated serum lactate.  The patient has an ESBL UTI in September 2024.  I have changed the empiric antibiotic coverage from Zosyn to Meropenem.  The patient has significant cardiac issues. I have written for bolus of 25% human albumin.  The patient's last cardiac echo demonstrated an EF of 60 to 65%.  The patient has a paroxysmal atrial fibrillation and the oral amiodarone was discontinued tonight.  The patient's VBG values have now reported and pH 6.76 with a bicarb of 7.  Multiple amputees of bicarbonate ordered, the bicarbonate drip increased to maximum, and the patient's vent support has been increased to provide an element of respiratory compensation for the patient's profound acidosis.  Will repeat the VBG in 20 minutes after additional bicarb and vent support have been provided.      PAST MEDICAL HISTORY:  Past Medical History:   Diagnosis Date    Acute diastolic congestive heart failure (H) 10/12/2021    Acute exacerbation of CHF (congestive heart failure) (H) 10/11/2021    Acute hypoxic respiratory failure (H) 04/16/2024    Acute kidney failure, unspecified (H)     Acute on chronic congestive heart failure, unspecified heart failure type (H) 09/21/2023    Acute on chronic diastolic congestive heart failure (H)     Allergic rhinitis 01/01/2011    Anxiety 01/01/2011    Anxiety state, unspecified     Anxiety state    Atrial fibrillation with RVR (H) 07/07/2023    Atrial flutter with rapid ventricular response (H) 10/12/2021     COPD exacerbation (H) 04/16/2024    CVA (cerebral vascular accident) (H) 05/14/2018    Dyslipidemia 11/26/2003    fibromyalgia 06/14/2004    GERD, Esophageal reflux 01/01/2011    History of non-ST elevation myocardial infarction (NSTEMI) 09/06/2018    HTN (hypertension)     Essential hypertension    Major depression, recurrent (H) 01/01/2011    Major depressive disorder, recurrent episode, moderate (H) 01/01/2011    Metrorrhagia 02/18/2018    Non compliance with medical treatment 07/07/2023    Nonorganic sleep disorder, unspecified     Non-org. 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, ARDS, renal failure        PAST SURGICAL HISTORY:  Past Surgical History:   Procedure Laterality Date    ANGIOGRAM  02/2005    Normal     BIOPSY BREAST  1984    LT, normal    BYPASS GRAFT ARTERY CORONARY  09/10/2018    CARPAL TUNNEL RELEASE RT/LT  2005    RT, carpal tunnel    COLONOSCOPY  2011    Repeat in ten years     COLONOSCOPY  1996    COLONOSCOPY  2004    DILATION AND CURETTAGE, HYSTEROSCOPY, ABLATE ENDOMETRIUM, COMBINED N/A 2/19/2018    Procedure: COMBINED DILATION AND CURETTAGE, HYSTEROSCOPY, ABLATE ENDOMETRIUM;  HYSTEROSCOPY DILATION AND CURETTAGE, ENDOMETRIAL ABLATION;  Surgeon: Jose Nettles MD;  Location: [facility] OR    HYSTERECTOMY TOTAL ABD, NICK SALPINGO-OOPHORECTOMY, NODE DISSECTION, TUMOR DEBULKING, COMBINED  10/30/2018    INSERT PORT VASCULAR ACCESS N/A 12/11/2018    Procedure: PORT-A-CATH PLACEMENT;  Surgeon: Rafi Trinidad MD;  Location: [facility] OR    placement of central line  2005    REMOVE PORT VASCULAR ACCESS N/A 10/6/2020    Procedure: port a cath removal;  Surgeon: Rafi Trinidad MD;  Location: [facility] OR        MEDICATIONS:  Current Facility-Administered Medications   Medication Dose Route Frequency Provider Last Rate Last Admin    albumin human 25 % injection 25 g  25 g Intravenous  Once Magdi Padilla MD        albuterol (PROVENTIL HFA/VENTOLIN HFA) inhaler  2 puff Inhalation Q6H PRN Johny Martinez MD        aspirin (ASA) chewable tablet 81 mg  81 mg Oral Daily Johny Martinez MD   81 mg at 10/19/24 1744    atorvastatin (LIPITOR) tablet 40 mg  40 mg Oral At Bedtime Johny Martinez MD   40 mg at 10/19/24 2020    busPIRone (BUSPAR) tablet 10 mg  10 mg Oral BID Johny aMrtinez MD   10 mg at 10/19/24 2020    calcium carbonate (TUMS) chewable tablet 1,000 mg  1,000 mg Oral 4x Daily PRN Johny Martinez MD        calcium gluconate 1 g in 50 mL in sodium chloride intermittent infusion  1 g Intravenous Once Magdi Padilla MD   1 g at 10/20/24 0218    chlorhexidine (PERIDEX) 0.12 % solution 15 mL  15 mL Mouth/Throat Q12H Magdi Padilla MD        dexmedeTOMIDine (PRECEDEX) 4 mcg/mL in sodium chloride 0.9 % 100 mL infusion  0.1-1.2 mcg/kg/hr Intravenous Continuous Magdi Padilla MD 3.5 mL/hr at 10/20/24 0202 0.2 mcg/kg/hr at 10/20/24 0202    glucose gel 15-30 g  15-30 g Oral Q15 Min PRN Aure Gomez MD        Or    dextrose 50 % injection 25-50 mL  25-50 mL Intravenous Q15 Min PRN Aure Gomez MD        Or    glucagon injection 1 mg  1 mg Subcutaneous Q15 Min PRN Aure Gomez MD        docusate sodium (COLACE) capsule 100 mg  100 mg Oral BID Johny Martinez MD   100 mg at 10/19/24 2020    DOPamine 400 mg in dextrose 5% 250 mL (adult std) - premix - CENTRAL  2-20 mcg/kg/min Intravenous Continuous Magdi Padilla MD 10.5 mL/hr at 10/20/24 0224 4 mcg/kg/min at 10/20/24 0224    heparin ANTICOAGULANT injection 5,000 Units  5,000 Units Subcutaneous Q8H Johny Martinez MD   5,000 Units at 10/19/24 1559    ipratropium - albuterol 0.5 mg/2.5 mg/3 mL (DUONEB) neb solution 3 mL  3 mL Nebulization Q4H PRN Johny Martinez MD   3 mL at 10/19/24 1213    lactated ringers infusion   Intravenous Continuous Carloz Ibarra  mL/hr at 10/19/24 0638 New Bag at 10/19/24 0638     lidocaine (LMX4) cream   Topical Q1H PRN Johny Martinez MD        lidocaine 1 % 0.1-1 mL  0.1-1 mL Other Q1H PRN Johny Martinez MD        magnesium sulfate 2 g in 50 mL sterile water intermittent infusion  2 g Intravenous Once Magdi Padilla MD        meropenem (MERREM) 500 mg in NS 50 mL intermittent infusion  500 mg Intravenous Q6H Magdi Padilla MD        [Held by provider] metoprolol tartrate (LOPRESSOR) tablet 25 mg  25 mg Oral BID Johny Martinez MD   25 mg at 10/19/24 2020    midazolam (VERSED) 100 mg/100 mL NS infusion - ADULT  1-8 mg/hr Intravenous Continuous Magdi Padilla MD 1 mL/hr at 10/20/24 0145 1 mg/hr at 10/20/24 0145    morphine (PF) injection 1 mg  1 mg Intravenous Q4H PRN Johny Martinez MD   1 mg at 10/19/24 2018    naloxone (NARCAN) injection 0.2 mg  0.2 mg Intravenous Q2 Min PRN Johny Martinez MD        Or    naloxone (NARCAN) injection 0.4 mg  0.4 mg Intravenous Q2 Min PRN Johny Martinez MD        Or    naloxone (NARCAN) injection 0.2 mg  0.2 mg Intramuscular Q2 Min PRN Johny Martinez MD        Or    naloxone (NARCAN) injection 0.4 mg  0.4 mg Intramuscular Q2 Min PRN Johny Martinez MD        norepinephrine (LEVOPHED) 4 mg in  mL PERIPHERAL infusion  0.01-0.125 mcg/kg/min Intravenous Continuous Magdi Padilla MD        ondansetron (ZOFRAN ODT) ODT tab 4 mg  4 mg Oral Q6H PRN Johny Martinez MD   4 mg at 10/19/24 2035    Or    ondansetron (ZOFRAN) injection 4 mg  4 mg Intravenous Q6H PRN Johny Martinez MD   4 mg at 10/19/24 1223    pantoprazole (PROTONIX) 2 mg/mL suspension 40 mg  40 mg Per Feeding Tube QAM AC Magdi Padilla MD        Or    pantoprazole (PROTONIX) IV push injection 40 mg  40 mg Intravenous QAM AC Magdi Padilla MD        rOPINIRole (REQUIP) tablet 0.5 mg  0.5 mg Oral QPM Johny Martinez MD   0.5 mg at 10/19/24 2020    senna-docusate (SENOKOT-S/PERICOLACE) 8.6-50 MG per tablet 1 tablet  1 tablet Oral BID PRN Juan  "MD Johny        Or    senna-docusate (SENOKOT-S/PERICOLACE) 8.6-50 MG per tablet 2 tablet  2 tablet Oral BID PRN Johny Martinez MD        sodium bicarbonate 8.4 % injection 50 mEq  50 mEq Intravenous Once Magdi Padilla MD        sodium chloride (PF) 0.9% PF flush 3 mL  3 mL Intracatheter Q8H Johny Martinez MD   3 mL at 10/19/24 0903    sodium chloride (PF) 0.9% PF flush 3 mL  3 mL Intracatheter q1 min prn Johny Martinez MD        sodium chloride 0.9 % infusion   Intravenous Continuous Magdi Padilla MD 75 mL/hr at 10/20/24 0204 New Bag at 10/20/24 0204    sodium chloride 0.9 % infusion   Intravenous Continuous Johny Martinez MD 75 mL/hr at 10/19/24 2211 New Bag at 10/19/24 2211        ALLERGIES:  Allergies   Allergen Reactions    Blood-Group Specific Substance Other (See Comments)     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  A delay in compatible RBCs may occur.    Allegra [Fexofenadine] Nausea and Vomiting    Cats Other (See Comments)     Sneezing, runny nose    Codeine Sulfate Nausea and Vomiting and GI Disturbance    Crestor [Rosuvastatin] Dizziness        SOCIAL HISTORY:  Social History     Socioeconomic History    Marital status:    Occupational History    Occupation: [employer]     Employer: [employer]     Comment:    Tobacco Use    Smoking status: Every Day     Current packs/day: 1.00     Average packs/day: 1 pack/day for 30.0 years (30.0 ttl pk-yrs)     Types: Cigarettes     Passive exposure: Never    Smokeless tobacco: Never    Tobacco comments:     pt declined, stated she would use, \"the patch\". Patient has not had a cigarette in 1 week because she was in the hospital   Vaping Use    Vaping status: Never Used   Substance and Sexual Activity    Alcohol use: No     Comment: rare    Drug use: No    Sexual activity: Never     Comment: devorced   Other Topics Concern     Service No    Blood " Transfusions Yes     Comment: Permits if needed    Caffeine Concern Yes     Comment: 1 cup    Occupational Exposure No    Hobby Hazards No    Sleep Concern No    Stress Concern No    Weight Concern No    Special Diet No    Back Care No    Exercise No    Seat Belt Yes    Self-Exams Yes    Parent/sibling w/ CABG, MI or angioplasty before 65F 55M? Yes     Comment: Father   Social History Narrative    , lives in [place].  Has 2 sons, one in the [facility] and one on [facility] [place] [facility].     Social Determinants of Health     Financial Resource Strain: Low Risk  (10/19/2024)    Financial Resource Strain     Within the past 12 months, have you or your family members you live with been unable to get utilities (heat, electricity) when it was really needed?: No   Food Insecurity: Low Risk  (10/19/2024)    Food Insecurity     Within the past 12 months, did you worry that your food would run out before you got money to buy more?: No     Within the past 12 months, did the food you bought just not last and you didn t have money to get more?: No   Transportation Needs: Low Risk  (10/19/2024)    Transportation Needs     Within the past 12 months, has lack of transportation kept you from medical appointments, getting your medicines, non-medical meetings or appointments, work, or from getting things that you need?: No   Physical Activity: Insufficiently Active (2/28/2024)    Exercise Vital Sign     Days of Exercise per Week: 1 day     Minutes of Exercise per Session: 20 min   Stress: No Stress Concern Present (2/28/2024)    [place] [employer] - Occupational Stress Questionnaire     Feeling of Stress : Not at all   Social Connections: Moderately Integrated (2/28/2024)    Social Connection and Isolation Panel [NHANES]     Frequency of Communication with Friends and Family: More than three times a week     Frequency of Social Gatherings with Friends and Family: Three times a week     Attends Yazdanism  Services: More than 4 times per year     Active Member of Clubs or Organizations: Yes     Attends Club or Organization Meetings: More than 4 times per year     Marital Status:    Interpersonal Safety: Low Risk  (10/19/2024)    Interpersonal Safety     Do you feel physically and emotionally safe where you currently live?: Yes     Within the past 12 months, have you been hit, slapped, kicked or otherwise physically hurt by someone?: No     Within the past 12 months, have you been humiliated or emotionally abused in other ways by your partner or ex-partner?: No   Housing Stability: Low Risk  (10/19/2024)    Housing Stability     Do you have housing? : Yes     Are you worried about losing your housing?: No       FAMILY HISTORY:  Family History   Problem Relation Age of Onset    Gastrointestinal Disease Mother         GERD    Cancer Mother         pancreatic ca (cause of death) /liver ca    C.A.D. Father 45        (cause of death)     Other - See Comments Father         rheumatic fever     Allergies Father     Cancer Sister 56        Esophageal to bone cancer    Neurologic Disorder Brother         neuropathy    Cancer Brother 58        Tonsilular cancer/ lymph node in neck    Allergies Brother     Diabetes Paternal Grandmother         type 2    Breast Cancer Maternal Aunt     Breast Cancer Maternal Aunt     Depression Maternal Aunt     Depression Maternal Uncle     Colon Cancer Paternal Aunt         (cause of death)     Breast Cancer Cousin     Breast Cancer Cousin     Breast Cancer Cousin     Crohn's Disease Other     No Known Problems Son         2 sons     Vital Signs: BP (!) 81/47   Pulse (!) 45   Temp (!) 91.4  F (33  C)   Resp 19   Ht 1.524 m (5')   Wt 70 kg (154 lb 5.2 oz)   SpO2 100%   BMI 30.14 kg/m         TELE ICU Progress Note          Assessment and Plan:     The patient, Heather Rosas, is a 73 year old woman admitted to the ICU after a cardiac arrest.      Neurology: Patient sedated with  Precedex and versed.     Cardiovascular / Hemodynamics: Active resuscitation   Pulmonary: Patient on full vent support.  ET tube OK on CXR.    GI and Nutrition: NPO, consider RUQ US, shock liver likely   Renal, Fluid and Electrolytes: IV fluid support for shock.     Infectious Disease: Changed to Meropenem as the patient has an ESBL UTI recnetly.     Hematology and Oncology: Hgb 8 gm/dL   Endocrinology: IV stress steroids given    Intensive Care: Central line: placed urgent by TELE ICU order  Arterial line: No arterial line present  Restraints: Orders  Martin catheter: Placed at TELE order  GI prophylaxis: IV ordered   Primary Team Communication:  Camera used to communicate with the ICU bedsides care team.     Billing: Total critical care time spent by me, excluding procedures, was 60  minutes.               Hospital Course and Key events       The patient remains critically ill with Shock and Bradicardia. The patient's VBG values have now reported and pH 6.76 with a bicarb of 7.  Multiple amputees of bicarbonate ordered, the bicarbonate drip increased to maximum, and the patient's vent support has been increased to provide an element of respiratory compensation for the patient's profound acidosis.  Will repeat the VBG in 20 minutes after additional bicarb and vent support have been provided.               Medications:       Current Facility-Administered Medications   Medication Dose Route Frequency Provider Last Rate Last Admin    dexmedeTOMIDine (PRECEDEX) 4 mcg/mL in sodium chloride 0.9 % 100 mL infusion  0.1-1.2 mcg/kg/hr Intravenous Continuous Magdi Padilla MD 3.5 mL/hr at 10/20/24 0202 0.2 mcg/kg/hr at 10/20/24 0202    DOPamine 400 mg in dextrose 5% 250 mL (adult std) - premix - CENTRAL  2-20 mcg/kg/min Intravenous Continuous Magdi Padilla MD 26.3 mL/hr at 10/20/24 0237 10 mcg/kg/min at 10/20/24 0237    lactated ringers infusion   Intravenous Continuous Carloz Ibarra  mL/hr at 10/19/24 0638  New Bag at 10/19/24 0638    midazolam (VERSED) 100 mg/100 mL NS infusion - ADULT  1-8 mg/hr Intravenous Continuous Magdi Padilla MD 1 mL/hr at 10/20/24 0145 1 mg/hr at 10/20/24 0145    norepinephrine (LEVOPHED) 4 mg in  mL PERIPHERAL infusion  0.01-0.125 mcg/kg/min Intravenous Continuous Magdi Padilla MD 7.9 mL/hr at 10/20/24 0232 0.03 mcg/kg/min at 10/20/24 0232    sodium bicarbonate 75 mEq in D5W 1,075 mL infusion   Intravenous Continuous Magdi Padilla MD        sodium chloride 0.9 % infusion   Intravenous Continuous Magdi Padilla MD 75 mL/hr at 10/20/24 0204 New Bag at 10/20/24 0204    sodium chloride 0.9 % infusion   Intravenous Continuous Johny Martinez MD 75 mL/hr at 10/19/24 2211 New Bag at 10/19/24 2211          Vitals and Exam:       Vital Sign Ranges  Temperature Temp  Av.7  F (34.8  C)  Min: 90.1  F (32.3  C)  Max: 98.6  F (37  C)   Blood pressure Systolic (24hrs), Av , Min:79 , Max:132        Diastolic (24hrs), Av, Min:36, Max:99      Pulse Pulse  Av.4  Min: 43  Max: 106   Respirations Resp  Av.2  Min: 14  Max: 32   Pulse oximetry SpO2  Av %  Min: 94 %  Max: 100 %       I/O    Intake/Output Summary (Last 24 hours) at 10/20/2024 0249  Last data filed at 10/19/2024 2305  Gross per 24 hour   Intake 1702 ml   Output 100 ml   Net 1602 ml       FiO2 (%): 100 %, Resp: 19, Inspiratory Pressure (cm H2O) (Drager Beatriz): 28, Vent Mode: CMV/AC, Resp Rate (Set): 16 breaths/min, Tidal Volume (Set, mL): 370 mL, PEEP (cm H2O): 10 cmH2O, Resp Rate (Set): 16 breaths/min, Tidal Volume (Set, mL): 370 mL, PEEP (cm H2O): 10 cmH2O    Physical Examination:   I examined this patient remotely using the telemedicine camera in the [facility]. The patient is located at [facility] ICU ([place])    General Appearance:   The patient is intubated and sedated.     HEENT:   Endotrachael tube is present     Respiratory:   Good patient-ventilator  synchrony            Pertinent Data:     All pertinent labs and diagnostic studies were reviewed    Labs:  CMP  Recent Labs   Lab 10/20/24  0209 10/20/24  0143 10/20/24  0118 10/20/24  0030 10/20/24  0011 10/19/24  0505 10/19/24  0252   NA  --  133*  --   --   --  133* 131*   POTASSIUM  --  5.4*  --   --   --  4.5 4.6   CHLORIDE  --  97*  --   --   --  97* 94*   CO2  --  8*  --   --   --  16* 15*   ANIONGAP  --  28*  --   --   --  20* 22*   * 175* 184* 171*   < > 96 133*   BUN  --  46.1*  --   --   --  46.1* 47.2*   CR  --  2.36*  --   --   --  2.24* 2.29*   GFRESTIMATED  --  21*  --   --   --  23* 22*   CARINA  --  8.3*  --   --   --  8.8 9.2   PROTTOTAL  --  5.5*  --   --   --   --  7.3   ALBUMIN  --  3.2*  --   --   --   --  4.0   BILITOTAL  --  2.2*  --   --   --   --  0.6   ALKPHOS  --  236*  --   --   --   --  241*   AST  --  1,127*  --   --   --   --  30   ALT  --  597*  --   --   --   --  25    < > = values in this interval not displayed.     CBC  Recent Labs   Lab 10/20/24  0046 10/19/24  0251   WBC 17.2* 9.4   RBC 2.87* 3.59*   HGB 8.0* 10.1*   HCT 28.8* 32.9*    92   MCH 27.9 28.1   MCHC 27.8* 30.7*   RDW 16.7* 15.9*   * 312     INRNo lab results found in last 7 days.  Arterial Blood GasNo lab results found in last 7 days.  Lactic Acid   Date Value Ref Range Status   10/20/2024 10.7 (HH) 0.7 - 2.0 mmol/L Final   07/22/2017 2.2 (H) 0.4 - 2.0 mmol/L Final     Comment:     Significant value called to and read back by  JANE ROTH AT 0034 BY W[facility]         Imaging:  CT Chest Abdomen Pelvis w/o Contrast  Narrative: CT CHEST ABDOMEN PELVIS W/O CONTRAST    CLINICAL HISTORY: Female, age 73 years, generalized pain, hypotension,  recent n/v/d, increasing creatinine;    Comparison:  CTA chest 6/15/2024; CT scan chest abdomen pelvis  6/26/2024, similar in appearance compared to the 6/26/2024 CT scan    TECHNIQUE:  CT scanwas performed of the chest, abdomen and pelvis  without  contrast. Axial;  sagittal and coronal images were reviewed.  3-D MIP images were reviewed to optimize lung nodule conspicuity.  This facility minimizes radiation dose by adjusting the mA and/or kV  according to each patient size.  This CT scan was performed using one or more the following dose  reduction techniques:  -Automated exposure control,  -Adjustment of the mA and/or kV according to patient's size, and/or,  -Use of iterative reconstruction technique.    FINDINGS:  Chest:   Lungs demonstrate areas of air trapping intermixed with interstitial  thickening, groundglass opacification and nodularity. The heart is  enlarged. Small right-sided pleural effusion is present. Number of  normal-sized enlarged nodes are seen throughout the mediastinum and  hilar regions. Enlargement of the thyroid gland with a calcified  nodule in the left lobe again seen without significant change.  Postoperative changes are again appreciated consistent with coronary  artery bypass graft. Bony structures of the chest demonstrate no acute  abnormality.    Abdomen/Pelvis:  Stomach and duodenum: Unremarkable.    Liver: Unremarkable.    Gallbladder: Unremarkable.    Spleen: Unremarkable.    Pancreas: No acute abnormality.    Adrenal glands: Unremarkable.    Kidneys: Unremarkable.    Ureters: Unremarkable.    Urine bladder: Unremarkable.    Large and small bowel: Moderate volume of stool in the distal colon.  There is circumferential wall thickening of the sigmoid colon, seen on  the prior examination without significant change.    The abdominal aorta and inferior vena cava are unremarkable.  Atherosclerotic calcifications are similar in appearance.    Retroperitoneal fat stranding again seen. Mesenteric nodes are normal  in size.     Bony structures: Unremarkable.  Impression: IMPRESSION:   Congestive heart failure with small right-sided effusion.    Circumferential thickening and mass again seen in the sigmoid colon    Generalized anasarca.    This report  is in agreement with the preliminary reports.    STONE COOK MD         SYSTEM ID:  [id number]  XR Chest Port 1 View  Narrative: Procedure:XR CHEST PORT 1 VIEW    Clinical history:Female, 73 years, dyspnea    Technique: Single view was obtained.    Comparison: 6/25/2024    Findings: The cardiac silhouette is mildly enlarged. The pulmonary  vasculature appears somewhat distended, indistinct in certain areas    The lungs demonstrate diffuse interstitial thickening. No apparent  pleural effusion. Bony structures are unremarkable.  Impression: Impression:   Findings suggesting CHF. No apparent pleural effusion. Differential  diagnosis includes diffuse interstitial pneumonitis. No evidence of  focal pneumonia.    This report is in agreement with the preliminary report.    STONE COOK MD         SYSTEM ID:  [id number]     A/P:  The patient continues to be in shock with an elevated WBC, an elevated serum lactate, and ongoing bradycardia, in a patient with likely ESBL UTI vs possible acute cholecystosis, I have changed the emp gayle antibiotic coverage to Meropenem.  I have written for both dopamine and levophed for HR and BP support.  IV human albumin bolus as the patient has a cardiac issues.  IV bicarbonate boluses.  STAT ABG ordered.  The HR has now responded to the atropine and dopamine.  The MAP has improved and the patinet is being prepared for transport to the [facility] via land ambulance as the fog in the area precludes air transport.  This critically ill patient required 60 minute of TELE ICU critical care time on 10/20/2024.   The patient's VBG values have now reported and pH 6.76 with a bicarb of 7.  Multiple amputees of bicarbonate ordered, the bicarbonate drip increased to maximum, and the patient's vent support has been increased to provide an element of respiratory compensation for the patient's profound acidosis.  Will repeat the VBG in 20 minutes after additional bicarb and vent  support have been provided.      Magdi Padilla M.D., Ph.D.

## 2024-10-20 NOTE — PROGRESS NOTES
Notified of hypotension, hypoglycemia, AMS. Ordered cbc, cmp, lactic, procal, 1L NS bolus, hypoglycemia protocol zowsyn, and transferring to ICU    Addendum: Shortly after this note, I was notified that patient had a cardiac arrest.  Patient had a PA arrest.  Patient was treated with multiple rounds of epinephrine, chest compressions, IV fluid bolus, bicarb and ultimately [place] was obtained fortunately.  Patient was placed on Levophed drip and transferred to ICU.  She was intubated by the ER doctor.  After arrival to the ICU she did have some bradycardia for which I gave some atropine with good result.  I think patient is probably septic.  Status probably the etiology of the code.      Post code labs show a troponin of 44 which is actually down.  Lactic acid quite high at 10.7.  White count of 17.  Hemoglobin down to 8 from 10 did get a lot of fluids.  BMP shows stable MILDRED with a creatinine of 2.4 but worsening acidosis with a bicarb of 8 and potassium of 5.4.   I am going to place patient on bicarb drip. LFTs also showed significant transaminitis consistent with shock liver?  Although this all seemed to start with hypoglycemia so I wonder if the liver issues proceeded the code actually?  LFTs were normal on admission but that was yesterday.      Patient is being transferred to [place], discussed with the accepting physician   graciously accepted patient, also having patient seen by tele ICU while here.  Tried to call family but  no answer.

## 2024-10-21 LAB — BACTERIA UR CULT: ABNORMAL

## 2024-12-31 NOTE — DISCHARGE SUMMARY
"[facility]  Hospitalist Discharge Summary      Date of Admission:  10/19/2024  Date of Discharge:  10/20/2024  4:30 AM  Discharging Provider: Aure Gomez MD  Discharge Service: Hospitalist Service    Discharge Diagnoses   Septic shock  PEA arrest  Hypoglycemia  Shock liver   Lactic acidosis     Clinically Significant Risk Factors          Follow-ups Needed After Discharge       Unresulted Labs Ordered in the Past 30 Days of this Admission       No orders found from 9/19/2024 to 10/20/2024.            Discharge Disposition   Transferred to [facility]  Condition at discharge: Critical    Hospital Course     Pt was admitted on 10/19/24. Per admission H and P \"Heather Rosas is a 73 year old female who has significant past medical history of CHF on diuretics, atrial fibrillation, anxiety disorder who presents with a 3-day history of weakness and severe shortness of breath.  Patient denies any fever.  No pain.  No nausea or vomiting.  The patient was recently seen in Coumadin clinic and discharged as she no longer takes warfarin.  In the ED, basic workup showed acute kidney injury.  Patient also had tachycardia and BNP was markedly elevated along with troponin.  EKG did not show any signs.  Patient does not have any chest pain.  Initial bolus did not show any improvement and the patient is being admitted for IV hydration.  This is likely related to diuresis.  Patient's last echo showed EF of 60 to 65%.  Still valid.  No obvious signs of further decompensation.  Will cautiously hydrate patient and keep an eye on volume status as patient also notes weight loss.\" Pt was admitted and placed on IVF     I was called later that evening, and was notified of hypotension, hypoglycemia, AMS. I ordered cbc, cmp, lactic, procal, 1L NS bolus, hypoglycemia protocol zosyn, and ordered to transfer pt to ICU.     Shortly thereafter, I was notified that patient had a cardiac arrest.  Patient had a PEA arrest. I and " ER MD responded to code. Patient was treated with multiple rounds of epinephrine, chest compressions, IV fluid bolus, bicarb and ultimately [place] was obtained fortunately.  Patient was placed on Levophed drip and transferred to ICU.  She was intubated by the ER doctor.  After arrival to the ICU she did have some bradycardia for which I gave some atropine with good result. Pt was placed on broad spectrum abx for possible sepsis.       Post code labs show a troponin of 44 which is actually down.  Lactic acid quite high at 10.7.  White count of 17.  Hemoglobin down to 8 from 10 did get a lot of fluids.  BMP shows stable MILDRED with a creatinine of 2.4 but worsening acidosis with a bicarb of 8 and potassium of 5.4.   I  placed patient on bicarb drip. LFTs also showed significant transaminitis consistent with shock liver?  Although this all seemed to start with hypoglycemia so I wonder if the liver issues proceeded the code actually?  LFTs were normal on admission but that was yesterday.     Tele ICU was consulted. I coordinated transferred to [facility], for higher level of care given cardiac arrest. I discussed with the accepting physician   who graciously accepted patient in transfer.       Consultations This Hospital Stay   CARE MANAGEMENT / SOCIAL WORK IP CONSULT    Code Status   Prior    Time Spent on this Encounter   I, Aure Gomez MD, personally saw the patient today and spent greater than 30 minutes discharging this patient.       Aure Gomez MD  [address] [facility]  [address] [place] [id number]  Phone: [phone]  Fax: [phone]  ______________________________________________________________________    Physical Exam   Vital Signs:                    Weight: 154 lbs 5.15 oz    Primary Care Physician   Nicolette Huffman    Discharge Orders   No discharge procedures on file.    Significant Results and Procedures   Results for orders placed or performed during the hospital  encounter of 10/19/24   XR Chest Port 1 View    Narrative    Procedure:XR CHEST PORT 1 VIEW    Clinical history:Female, 73 years, dyspnea    Technique: Single view was obtained.    Comparison: 6/25/2024    Findings: The cardiac silhouette is mildly enlarged. The pulmonary  vasculature appears somewhat distended, indistinct in certain areas    The lungs demonstrate diffuse interstitial thickening. No apparent  pleural effusion. Bony structures are unremarkable.      Impression    Impression:   Findings suggesting CHF. No apparent pleural effusion. Differential  diagnosis includes diffuse interstitial pneumonitis. No evidence of  focal pneumonia.    This report is in agreement with the preliminary report.    STONE COOK MD         SYSTEM ID:  [id number]   CT Chest Abdomen Pelvis w/o Contrast    Narrative    CT CHEST ABDOMEN PELVIS W/O CONTRAST    CLINICAL HISTORY: Female, age 73 years, generalized pain, hypotension,  recent n/v/d, increasing creatinine;    Comparison:  CTA chest 6/15/2024; CT scan chest abdomen pelvis  6/26/2024, similar in appearance compared to the 6/26/2024 CT scan    TECHNIQUE:  CT scanwas performed of the chest, abdomen and pelvis  without  contrast. Axial; sagittal and coronal images were reviewed.  3-D MIP images were reviewed to optimize lung nodule conspicuity.  This facility minimizes radiation dose by adjusting the mA and/or kV  according to each patient size.  This CT scan was performed using one or more the following dose  reduction techniques:  -Automated exposure control,  -Adjustment of the mA and/or kV according to patient's size, and/or,  -Use of iterative reconstruction technique.    FINDINGS:  Chest:   Lungs demonstrate areas of air trapping intermixed with interstitial  thickening, groundglass opacification and nodularity. The heart is  enlarged. Small right-sided pleural effusion is present. Number of  normal-sized enlarged nodes are seen throughout the mediastinum  and  hilar regions. Enlargement of the thyroid gland with a calcified  nodule in the left lobe again seen without significant change.  Postoperative changes are again appreciated consistent with coronary  artery bypass graft. Bony structures of the chest demonstrate no acute  abnormality.    Abdomen/Pelvis:  Stomach and duodenum: Unremarkable.    Liver: Unremarkable.    Gallbladder: Unremarkable.    Spleen: Unremarkable.    Pancreas: No acute abnormality.    Adrenal glands: Unremarkable.    Kidneys: Unremarkable.    Ureters: Unremarkable.    Urine bladder: Unremarkable.    Large and small bowel: Moderate volume of stool in the distal colon.  There is circumferential wall thickening of the sigmoid colon, seen on  the prior examination without significant change.    The abdominal aorta and inferior vena cava are unremarkable.  Atherosclerotic calcifications are similar in appearance.    Retroperitoneal fat stranding again seen. Mesenteric nodes are normal  in size.     Bony structures: Unremarkable.        Impression    IMPRESSION:   Congestive heart failure with small right-sided effusion.    Circumferential thickening and mass again seen in the sigmoid colon    Generalized anasarca.    This report is in agreement with the preliminary reports.    STONE COOK MD         SYSTEM ID:  [id number]   XR Chest Port 1 View    Narrative    Procedure:XR CHEST PORT 1 VIEW    Clinical history:Female, 73 years, ett placement    Technique: Single view was obtained.    Comparison: 10/19/2024    Findings: The cardiac silhouette is within normal limits.. The  pulmonary vasculature poorly seen secondary to scattered opacities  throughout the lungs    The lungs demonstrate worsening opacities throughout both lungs.  Endotracheal tube is satisfactorily positioned. Postoperative changes  are again seen within the mediastinum. Bony structures are  unremarkable.      Impression    Impression:   Satisfactory position of the endotracheal  tube.     Worsening opacities throughout both lungs.    This report is in agreement with the preliminary report.    STONE COOK MD         SYSTEM ID:  [id number]   XR Abdomen 1 View    Narrative    XR ABDOMEN 1 VIEW   10/20/2024 3:04 AM    History:Female,age  73 years, OG tube placement    Comparison: No relevant prior imaging.    FINDINGS: Single view is submitted.   Nasogastric/orogastric tube extends beneath the left hemidiaphragm  appears be coiled within the stomach. Bowel gas pattern is  unremarkable. Nodular reticular opacities seen at the lung bases,  likely unchanged compared to recent CT scan. .      Impression    IMPRESSION:  Satisfactory position of the enteric catheter.     This report is in agreement with the preliminary report.    STONE COOK MD         SYSTEM ID:  [id number]       Discharge Medications   Discharge Medication List as of 10/20/2024  3:23 AM        CONTINUE these medications which have NOT CHANGED    Details   acetaminophen (TYLENOL) 325 MG tablet Take 650 mg by mouth every 4 hours as needed for pain., Historical      albuterol (PROAIR HFA/PROVENTIL HFA/VENTOLIN HFA) 108 (90 Base) MCG/ACT inhaler Inhale 2 puffs into the lungs every 6 hours as needed for shortness of breath, wheezing or cough, Disp-18 g, R-0, E-PrescribePharmacy may dispense brand covered by insurance (Proair, or proventil or ventolin or generic albuterol inhaler)      aspirin (ASA) 81 MG chewable tablet Chew and swallow 1 Tablet one time a day. Take with food., Disp-30 tablet, R-11, E-Prescribe      atorvastatin (LIPITOR) 40 MG tablet Take 1 tablet by mouth at bedtime., Historical      bisacodyl (DULCOLAX) 10 MG suppository Place 10 mg rectally., Historical      busPIRone (BUSPAR) 10 MG tablet Take 1 Tablet by mouth two times a day., Disp-60 tablet, R-11, E-Prescribe      fluticasone-salmeterol (ADVAIR) 250-50 MCG/ACT inhaler Inhale 1 puff into the lungs 2 times daily., Historical      Magnesium Cl-Calcium  Carbonate (SLOWMAG MG MUSCLE/HEART) 71.5-119 MG TBEC Take 2 Tablets by mouth two times a day., Disp-120 tablet, R-11, E-Prescribe      melatonin 3 MG tablet Take 1 Tablet by mouth at bedtime., Disp-30 tablet, R-11, E-Prescribe      metoprolol tartrate (LOPRESSOR) 25 MG tablet Take 1 tablet by mouth 2 times daily., Historical      pantoprazole (PROTONIX) 40 MG EC tablet Take 1 Tablet by mouth every evening. Do not crush., Disp-30 tablet, R-11, E-Prescribe      potassium chloride jayashree ER (KLOR-CON M20) 20 MEQ CR tablet Take 1 Tablet by mouth two times a day with meals. Do not crush., Disp-60 tablet, R-11, E-Prescribe      rOPINIRole (REQUIP) 0.25 MG tablet Take 1 Tablet by mouth every evening., Disp-30 tablet, R-11, E-Prescribe      senna-docusate (SENNA PLUS) 8.6-50 MG tablet Take 1 Tablet by mouth one time a day., Disp-30 tablet, R-11, E-Prescribe      spironolactone (ALDACTONE) 25 MG tablet Take 1 tablet by mouth daily., Historical      Torsemide 60 MG TABS Take 60 mg by mouth daily. 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  A delay in compatible RBCs may occur.    Allegra [Fexofenadine] Nausea and Vomiting    Cats Other (See Comments)     Sneezing, runny nose    Codeine Sulfate Nausea and Vomiting and GI Disturbance    Crestor [Rosuvastatin] Dizziness

## (undated) DEVICE — DRAPE-STERI 45X60CM #1010

## (undated) DEVICE — CAUTERY PAD-POLYHESIVE II ADULT

## (undated) DEVICE — TAPE-MEDIPORE 4" X 2YD

## (undated) DEVICE — TOPICAL SKIN ADHESIVE EXOFIN

## (undated) DEVICE — BAG-DECANTER

## (undated) DEVICE — SUTURE-MONOCRYL 4-0 PS-2 Y496G

## (undated) DEVICE — SUTURE BOOTS-STANDARD

## (undated) DEVICE — DRSG-SPONGE X-RAY 4 X 4

## (undated) DEVICE — CANISTER-SUCTION 2000CC

## (undated) DEVICE — SENSOR-OXISENSOR II ADULT

## (undated) DEVICE — IRRIGATION-NACL 3000ML (BAG)

## (undated) DEVICE — DRAPE-LITHOTOMY GYN-UROLOGY W/POUCH

## (undated) DEVICE — PACK-BASIN SET-UP

## (undated) DEVICE — TUBING-INFLOW HYSTEROPUMP

## (undated) DEVICE — SUTURE-VICRYL 3-0 SH J416H

## (undated) DEVICE — COVER-IVAS TRANSDUCER SLEEVE (6X58')

## (undated) DEVICE — GLV-7.5 PROTEXIS PI CLASSIC LF/PF

## (undated) DEVICE — ENDOMETRIAL ABLATION DEVICE-NOVASURE

## (undated) DEVICE — NDL-25G 1-1/2" NON-SAFETY

## (undated) DEVICE — LIGHT HANDLE COVER

## (undated) DEVICE — PACK-LAPAROTOMY-CUSTOM

## (undated) DEVICE — GOWN-SURG XL LVL 3 REINFORCED

## (undated) DEVICE — SYRINGE-3CC LUER LOCK

## (undated) DEVICE — APPLICATOR-CHLORAPREP 26ML TINTED CHG 2%+ 70% IPA-SURGICAL

## (undated) DEVICE — TRAY-SKIN PREP POVIDONE/IODINE

## (undated) DEVICE — NDL-BLUNT FILL 18G 1.5"

## (undated) DEVICE — NDL-22G X 1 1/2" NON-SAFETY

## (undated) DEVICE — PACK-GYN CYSTO-CUSTOM

## (undated) DEVICE — MARKER-SKIN REG

## (undated) DEVICE — DRSG-ISLAND 4IN X 5IN

## (undated) DEVICE — SYRINGE-10CC SLIP TIP

## (undated) DEVICE — LABEL-STERILE PREPRINTED FOR OR

## (undated) DEVICE — SPONGE-LAPAROTOMY PADS 18 X 18

## (undated) DEVICE — DRSG-NON ADHERING 3 X 8 TELFA

## (undated) DEVICE — NDL COUNTER-20-40 CT MAGNET/FOAM BLOCK

## (undated) DEVICE — IRRIGATION-H2O 1000ML

## (undated) DEVICE — CATH-URETHRAL 14FR

## (undated) DEVICE — SANITARY NAPKINS-SINGLE

## (undated) DEVICE — COVER-TABLE SHEET

## (undated) DEVICE — IRRIGATION-NACL 1000ML

## (undated) DEVICE — TUBING-OUTFLOW HYSTEROPUMP

## (undated) DEVICE — DRAPE-C-ARM

## (undated) DEVICE — DRSG-SPONGE STERILE 4 X 4

## (undated) DEVICE — PRESSURE INFUSOR DISP. 3000CC

## (undated) DEVICE — GLV-7.5 BIOGEL LATEX

## (undated) DEVICE — SCD SLEEVE-KNEE REG.

## (undated) DEVICE — SUTURE-PROLENE 3-0 SH DOUBLE ARMED 8534H

## (undated) DEVICE — SYRINGE-10CC LUER LOCK

## (undated) DEVICE — SWABSTICK-BENZOIN

## (undated) RX ORDER — DILTIAZEM HYDROCHLORIDE 30 MG/1
TABLET, FILM COATED ORAL
Status: DISPENSED
Start: 2024-01-01

## (undated) RX ORDER — PROPOFOL 10 MG/ML
INJECTION, EMULSION INTRAVENOUS
Status: DISPENSED
Start: 2020-10-06

## (undated) RX ORDER — PROPOFOL 10 MG/ML
INJECTION, EMULSION INTRAVENOUS
Status: DISPENSED
Start: 2018-02-19

## (undated) RX ORDER — FENTANYL CITRATE 50 UG/ML
INJECTION, SOLUTION INTRAMUSCULAR; INTRAVENOUS
Status: DISPENSED
Start: 2018-12-11

## (undated) RX ORDER — DILTIAZEM HCL/D5W 125 MG/125
PLASTIC BAG, INJECTION (ML) INTRAVENOUS
Status: DISPENSED
Start: 2024-01-01

## (undated) RX ORDER — MAGNESIUM SULFATE HEPTAHYDRATE 40 MG/ML
INJECTION, SOLUTION INTRAVENOUS
Status: DISPENSED
Start: 2024-01-01

## (undated) RX ORDER — FUROSEMIDE 10 MG/ML
INJECTION INTRAMUSCULAR; INTRAVENOUS
Status: DISPENSED
Start: 2024-01-01

## (undated) RX ORDER — PROPOFOL 10 MG/ML
INJECTION, EMULSION INTRAVENOUS
Status: DISPENSED
Start: 2018-12-11

## (undated) RX ORDER — FENTANYL CITRATE 50 UG/ML
INJECTION, SOLUTION INTRAMUSCULAR; INTRAVENOUS
Status: DISPENSED
Start: 2020-10-06

## (undated) RX ORDER — CALCIUM CARBONATE 500 MG/1
TABLET, CHEWABLE ORAL
Status: DISPENSED
Start: 2024-01-01

## (undated) RX ORDER — LIDOCAINE HYDROCHLORIDE 20 MG/ML
INJECTION, SOLUTION EPIDURAL; INFILTRATION; INTRACAUDAL; PERINEURAL
Status: DISPENSED
Start: 2020-10-06

## (undated) RX ORDER — DILTIAZEM HYDROCHLORIDE 5 MG/ML
INJECTION INTRAVENOUS
Status: DISPENSED
Start: 2024-01-01

## (undated) RX ORDER — LIDOCAINE HYDROCHLORIDE 20 MG/ML
INJECTION, SOLUTION EPIDURAL; INFILTRATION; INTRACAUDAL; PERINEURAL
Status: DISPENSED
Start: 2018-02-19

## (undated) RX ORDER — PIPERACILLIN SODIUM, TAZOBACTAM SODIUM 3; .375 G/15ML; G/15ML
INJECTION, POWDER, LYOPHILIZED, FOR SOLUTION INTRAVENOUS
Status: DISPENSED
Start: 2024-01-01

## (undated) RX ORDER — POTASSIUM CHLORIDE 1500 MG/1
TABLET, EXTENDED RELEASE ORAL
Status: DISPENSED
Start: 2024-01-01

## (undated) RX ORDER — FENTANYL CITRATE 50 UG/ML
INJECTION, SOLUTION INTRAMUSCULAR; INTRAVENOUS
Status: DISPENSED
Start: 2018-02-19